# Patient Record
Sex: FEMALE | Race: WHITE | Employment: FULL TIME | URBAN - METROPOLITAN AREA
[De-identification: names, ages, dates, MRNs, and addresses within clinical notes are randomized per-mention and may not be internally consistent; named-entity substitution may affect disease eponyms.]

---

## 2011-01-02 LAB — COLONOSCOPY, EXTERNAL: NORMAL

## 2013-08-08 LAB — MAMMOGRAPHY, EXTERNAL: NORMAL

## 2017-01-03 DIAGNOSIS — E11.65 UNCONTROLLED TYPE 2 DIABETES MELLITUS WITH HYPERGLYCEMIA, WITH LONG-TERM CURRENT USE OF INSULIN (HCC): Primary | ICD-10-CM

## 2017-01-03 DIAGNOSIS — Z79.4 UNCONTROLLED TYPE 2 DIABETES MELLITUS WITH HYPERGLYCEMIA, WITH LONG-TERM CURRENT USE OF INSULIN (HCC): Primary | ICD-10-CM

## 2017-01-03 NOTE — TELEPHONE ENCOUNTER
Patient called stating she received letter stating that Trulicity requires prior auth with [facility]. Please advise patient. [phone]. Patient will need by Sunday.

## 2017-01-05 DIAGNOSIS — Z79.4 TYPE 2 DIABETES MELLITUS WITH HYPERGLYCEMIA, WITH LONG-TERM CURRENT USE OF INSULIN (HCC): Primary | ICD-10-CM

## 2017-01-05 DIAGNOSIS — E11.65 TYPE 2 DIABETES MELLITUS WITH HYPERGLYCEMIA, WITH LONG-TERM CURRENT USE OF INSULIN (HCC): Primary | ICD-10-CM

## 2017-01-05 NOTE — TELEPHONE ENCOUNTER
----- Message from Dom Ayala sent at 1/5/2017  1:02 PM EST -----  Regarding: Dr. Jeff Perry [phone]   Pt would like a call back from Dr. Elzbieta Mendez concerning information she received from [facility] advising an order for \"Bydureon\" Rx was sent to them. Pt stated, she has never heard of the medication and is unsure if it is needed. In addition the Rx is expensive and would like to know if there is an alternative Rx.

## 2017-01-10 NOTE — TELEPHONE ENCOUNTER
[facility] said [facility] will cover BYureon instead of Trulicity which is a similar medicine, may be due to high deductible the cost is $300 - this is just a thought and she can check with her insurance    The reason I wanted her to be on those meds to help with DM aswell as weight loss    If expensive stop Bydureon , Trulicity - change in [facility]    Start Metformin 1 tab daily for a week then 1 tab  twice daily - not as strong as above meds for weight loss

## 2017-01-10 NOTE — TELEPHONE ENCOUNTER
Informed pt that Trulicity is not covered by her insurance and Bydureon was sent to pharmacy. Pt states Bydureon will cost over $300 and she can not afford it. . She is asking for an alternative medication.

## 2017-01-11 RX ORDER — METFORMIN HYDROCHLORIDE 1000 MG/1
1000 TABLET ORAL 2 TIMES DAILY WITH MEALS
Qty: 180 TAB | Refills: 3 | Status: SHIPPED | OUTPATIENT
Start: 2017-01-11 | End: 2017-02-27 | Stop reason: SDUPTHER

## 2017-01-11 NOTE — TELEPHONE ENCOUNTER
Spoke to pt and informed her that we received a letter from [facility] stating Arnaldo Boone is not covered. Asked pt if she could call her insurance to see which medication is preferred or if she like per Dr. Candice Tijerina she can try metfromin. Pt agreed. Will send metformin to pt's pharmacy. Pt verbalized understanding with no further questions or concerns at this time.

## 2017-02-27 ENCOUNTER — OFFICE VISIT (OUTPATIENT)
Dept: ENDOCRINOLOGY | Age: 57
End: 2017-02-27

## 2017-02-27 VITALS
SYSTOLIC BLOOD PRESSURE: 100 MMHG | HEIGHT: 62 IN | BODY MASS INDEX: 38.16 KG/M2 | WEIGHT: 207.4 LBS | HEART RATE: 93 BPM | DIASTOLIC BLOOD PRESSURE: 54 MMHG | TEMPERATURE: 97.8 F | RESPIRATION RATE: 16 BRPM

## 2017-02-27 DIAGNOSIS — Z79.4 TYPE 2 DIABETES MELLITUS WITH HYPERGLYCEMIA, WITH LONG-TERM CURRENT USE OF INSULIN (HCC): Primary | ICD-10-CM

## 2017-02-27 DIAGNOSIS — E11.65 TYPE 2 DIABETES MELLITUS WITH HYPERGLYCEMIA, WITH LONG-TERM CURRENT USE OF INSULIN (HCC): Primary | ICD-10-CM

## 2017-02-27 DIAGNOSIS — I10 ESSENTIAL HYPERTENSION: ICD-10-CM

## 2017-02-27 DIAGNOSIS — E78.2 MIXED HYPERLIPIDEMIA: ICD-10-CM

## 2017-02-27 LAB
GLUCOSE POC: 254 MG/DL
HBA1C MFR BLD HPLC: 10.7 %

## 2017-02-27 RX ORDER — DULAGLUTIDE 0.75 MG/.5ML
INJECTION, SOLUTION SUBCUTANEOUS
COMMUNITY
Start: 2017-02-22 | End: 2017-02-27 | Stop reason: SDUPTHER

## 2017-02-27 RX ORDER — GABAPENTIN 100 MG/1
CAPSULE ORAL
COMMUNITY
Start: 2017-02-09 | End: 2019-01-12

## 2017-02-27 RX ORDER — METFORMIN HYDROCHLORIDE 750 MG/1
750 TABLET, EXTENDED RELEASE ORAL 2 TIMES DAILY
Qty: 180 TAB | Refills: 3 | Status: SHIPPED | OUTPATIENT
Start: 2017-02-27 | End: 2017-03-06 | Stop reason: SDUPTHER

## 2017-02-27 NOTE — PROGRESS NOTES
Inna Neighbor is a 61 y.o. female here for   Chief Complaint   Patient presents with    Diabetes     f/u    Cholesterol Problem     f/u    Hypertension     f/u       Functional glucose monitor and record keeping system? - yes  Eye exam within last year? - no  Foot exam within last year? - yes Feb 2017    Lab Results   Component Value Date/Time    Hemoglobin A1c (POC) 13.4 11/09/2016 03:12 PM       Wt Readings from Last 3 Encounters:   11/09/16 205 lb (93 kg)   09/13/13 199 lb 2 oz (90.3 kg)   06/26/12 195 lb (88.5 kg)     Temp Readings from Last 3 Encounters:   11/09/16 98 °F (36.7 °C) (Oral)   09/13/13 99 °F (37.2 °C)   06/29/12 99 °F (37.2 °C)     BP Readings from Last 3 Encounters:   11/09/16 165/77   09/13/13 128/64   06/29/12 147/78     Pulse Readings from Last 3 Encounters:   11/09/16 66   09/13/13 87   06/29/12 84

## 2017-02-27 NOTE — PROGRESS NOTES
[facility] AND ENDOCRINOLOGY               Gianna Vieira MD        [address] 78 [phone] Fax [phone]               Patient Information  Date:2/27/2017  Name : Andreia Tao 61 y.o.     YOB: 1960         PCP : Matt Mckeon MD         Chief Complaint   Patient presents with    Diabetes     f/u    Cholesterol Problem     f/u    Hypertension     f/u       History of Present Illness: Andreia Tao is a 61 y.o. female here for fu of  Type 2 Diabetes Mellitus. She was managed by Dr. Zachery Slade, endocrinologist.  She has longstanding history of diabetes which was uncontrolled. She has underlying obstructive sleep apnea and hypertension. She has microvascular disease. She CABG in 2009. She is checking blood glucose 2- 3 times daily    - 250   Missing after noon insulin   She was not able to get Trulicity for a while    No hypoglycemia. She has polyuria, polydipsia and weakness.           Wt Readings from Last 3 Encounters:   02/27/17 207 lb 6.4 oz (94.1 kg)   11/09/16 205 lb (93 kg)   09/13/13 199 lb 2 oz (90.3 kg)       BP Readings from Last 3 Encounters:   02/27/17 100/54   11/09/16 165/77   09/13/13 128/64           Past Medical History:   Diagnosis Date    Arthritis     CAD (coronary artery disease) 2009    Hx of 2 MI's and then CABG 5/2009    Diabetes ([facility] [address]     IDDM type 3    Dyslipidemia     Hypertension     Other ill-defined conditions(799.89)     gastroparesis     Unspecified sleep apnea     no cpap repeat study sched for 9/20/13     Current Outpatient Prescriptions   Medication Sig    gabapentin (NEURONTIN) 100 mg capsule     glucose blood VI test strips (ONETOUCH ULTRA TEST) strip Test blood glucose 3 times daily Dx Code: E11.65    lispro-protamine & lispro (HUMALOG 50-50 MIX) 100 unit/mL (50-50) inpn Inject 60 units before each meal    isosorbide mononitrate ER (IMDUR) 60 mg CR tablet Take 60 mg by mouth every morning.  cholecalciferol, vitamin D3, (VITAMIN D3) 2,000 unit Tab Take 1 Tab by mouth daily.  aspirin 81 mg chewable tablet Take 81 mg by mouth nightly.  carvedilol (COREG) 12.5 mg tablet Take 25 mg by mouth daily.  clopidogrel (PLAVIX) 75 mg tablet Take 75 mg by mouth daily.  furosemide (LASIX) 40 mg tablet Take 40 mg by mouth daily.  digoxin (LANOXIN) 0.25 mg tablet Take 0.25 mg by mouth daily.  atorvastatin (LIPITOR) 20 mg tablet Take 20 mg by mouth Daily (before breakfast).  enalapril (VASOTEC) 10 mg tablet Take 20 mg by mouth daily.  potassium chloride (KLOR-CON M20) 20 mEq tablet Take 20 mEq by mouth daily.  dulaglutide (TRULICITY) 1.5 AS/6.6 mL sub-q pen 0.5 mL by SubCUTAneous route every seven (7) days.  metFORMIN ER (GLUCOPHAGE XR) 750 mg tablet Take 1 Tab by mouth two (2) times a day. Stop Metformin 1000 mg     No current facility-administered medications for this visit. No Known Allergies      Review of Systems:  - Constitutional Symptoms: no fevers, no chills, no weight loss  - Eyes: no blurry vision no double vision  - Cardiovascular: no chest pain ,no palpitations  - Respiratory: no cough no shortness of breath  - Gastrointestinal: no dysphagia no  abdominal pain  - Musculoskeletal: no joint pains +  weakness  - Integumentary: no rashes  - Neurological: no numbness, tingling, + headaches  - Psychiatric: no depression no  anxiety  - Endocrine: no heat or cold intolerance    Physical Examination:   Blood pressure 100/54, pulse 93, temperature 97.8 °F (36.6 °C), temperature source Oral, resp. rate 16, height 5' 2\" (1.575 m), weight 207 lb 6.4 oz (94.1 kg), last menstrual period 06/25/2005. Estimated body mass index is 37.93 kg/(m^2) as calculated from the following:    Height as of this encounter: 5' 2\" (1.575 m). -   Weight as of this encounter: 207 lb 6.4 oz (94.1 kg).   - General: pleasant, no distress, good eye contact  - HEENT: no pallor, no periorbital edema, EOMI  - Neck: supple, no thyromegaly, no nodules  - Cardiovascular: regular, normal rate, normal S1 and S2, no murmurs  - Respiratory: clear to auscultation bilaterally  - Gastrointestinal: soft, nontender, nondistended,  BS +  - Musculoskeletal: no proximal muscle weakness in upper or lower extremities  - Integumentary: no acanthosis nigricans,no edema,   - Neurological:alert and oriented  - Psychiatric: normal mood and affect  - Skin: color, texture, turgor normal.       Data Reviewed:     [] Glucose records reviewed. [] See glucose records for details (to be scanned). [] A1C  [] Reviewed labs      Assessment/Plan:     1. Type 2 diabetes mellitus with hyperglycemia, with long-term current use of insulin ([address]    2. Mixed hyperlipidemia    3. Essential hypertension        1. Type 2 Diabetes Mellitus   Lab Results   Component Value Date/Time    Hemoglobin A1c (POC) 10.7 02/27/2017 01:42 PM     She has not seen Gail Miranda MD and has not had any recent blood work. She has signs of insulin resistance ,DM  poorly controlled   A1C has improved to 10.7 from 14     Truclity 1.5 mg weekly. Metformin. Premixed insulin   Discussed the importance of checking home glucose regularly and taking all of their scheduled medications in order to have the best possible outcome. Reviewed the complications and importance of diet. FLU annually ,Pneumovax ,aspirin daily,annual eye exam,microalbumin    2. HTN : Continue current therapy     3. Hyperlipidemia : Continue statin. 4.Obesity:Body mass index is 37.93 kg/(m^2). Discussed about the importance of exercise and carbohydrate portion control. She has a chart dx of Gastroparesis - did not have Gastric emptying study , doing well on GLP1, so less likely she has gastroparesis      There are no Patient Instructions on file for this visit. Follow-up Disposition:  Return in about 3 months (around 5/27/2017).     Thank you for allowing me to participate in the care of this patient.     Malcolm Ortega MD      Patient verbalized understanding

## 2017-02-27 NOTE — MR AVS SNAPSHOT
Visit Information Date & Time Provider Department Dept. Phone Encounter #  
 2/27/2017  1:30 PM Ar Anders MD [facility] Diabetes & Endocrinology [phone] [id number] Follow-up Instructions Return in about 3 months (around 5/27/2017). Upcoming Health Maintenance Date Due Hepatitis C Screening 1960 LIPID PANEL Q1 1960 FOOT EXAM Q1 8/19/1969 EYE EXAM RETINAL OR DILATED Q1 8/19/1969 Pneumococcal 19-64 Medium Risk (1 of 1 - PPSV23) 8/19/1978 DTaP/Tdap/Td series (1 - Tdap) 8/19/1980 PAP AKA CERVICAL CYTOLOGY 8/19/1980 BREAST CANCER SCRN MAMMOGRAM 8/19/2009 FOBT Q 1 YEAR AGE 50-75 8/19/2009 INFLUENZA AGE 9 TO ADULT 8/1/2016 HEMOGLOBIN A1C Q6M 5/9/2017 MICROALBUMIN Q1 11/9/2017 Allergies as of 2/27/2017  Review Complete On: 11/9/2016 By: Ar Anders MD  
 No Known Allergies Current Immunizations  Never Reviewed No immunizations on file. Not reviewed this visit You Were Diagnosed With   
  
 Codes Comments Type 2 diabetes mellitus with hyperglycemia, with long-term current use of insulin (HCC)    -  Primary ICD-10-CM: E11.65, Z79.4 ICD-9-CM: 250.00, 790.29, V58.67 Mixed hyperlipidemia     ICD-10-CM: E78.2 ICD-9-CM: 272.2 Essential hypertension     ICD-10-CM: I10 
ICD-9-CM: 401.9 Vitals BP  
  
  
  
  
  
 100/54 (BP 1 Location: Left arm, BP Patient Position: Sitting) BMI and BSA Data Body Mass Index Body Surface Area  
 37.93 kg/m 2 2.03 m 2 Preferred Pharmacy Pharmacy Name Phone [address] [facility] [phone] Your Updated Medication List  
  
   
This list is accurate as of: 2/27/17  2:25 PM.  Always use your most recent med list.  
  
  
  
  
 aspirin 81 mg chewable tablet Take 81 mg by mouth nightly. carvedilol 12.5 mg tablet Commonly known as:  Milo Kirk Take 25 mg by mouth daily. digoxin 0.25 mg tablet Commonly known as:  LANOXIN Take 0.25 mg by mouth daily. enalapril 10 mg tablet Commonly known as:  Alexandre Cole Take 20 mg by mouth daily. furosemide 40 mg tablet Commonly known as:  LASIX Take 40 mg by mouth daily. gabapentin 100 mg capsule Commonly known as:  NEURONTIN  
  
 glucose blood VI test strips strip Commonly known as:  ONETOUCH ULTRA TEST Test blood glucose 3 times daily Dx Code: E11.65  
  
 isosorbide mononitrate ER 60 mg CR tablet Commonly known as:  IMDUR Take 60 mg by mouth every morning. KLOR-CON M20 20 mEq tablet Generic drug:  potassium chloride Take 20 mEq by mouth daily. LIPITOR 20 mg tablet Generic drug:  atorvastatin Take 20 mg by mouth Daily (before breakfast). lispro-protamine & lispro 100 unit/mL (50-50) Inpn Commonly known as:  HUMALOG 50-50 MIX Inject 60 units before each meal  
  
 metFORMIN 1,000 mg tablet Commonly known as:  GLUCOPHAGE Take 1 Tab by mouth two (2) times daily (with meals). PLAVIX 75 mg Tab Generic drug:  clopidogrel Take 75 mg by mouth daily. TRULICITY 4.70 CI/2.2 mL sub-q pen Generic drug:  dulaglutide VITAMIN D3 2,000 unit Tab Generic drug:  cholecalciferol (vitamin D3) Take 1 Tab by mouth daily. We Performed the Following AMB POC GLUCOSE, QUANTITATIVE, BLOOD [44532 CPT(R)] AMB POC HEMOGLOBIN A1C [78396 CPT(R)] Follow-up Instructions Return in about 3 months (around 5/27/2017). Introducing [facility] & HEALTH SERVICES! Kathia Singh introduces [employer] patient portal. Now you can access parts of your medical record, email your doctor's office, and request medication refills online. 1. In your internet browser, go to [email or web address] 2. Click on the First Time User? Click Here link in the Sign In box. You will see the New Member Sign Up page. 3. Enter your [employer] Access Code exactly as it appears below. You will not need to use this code after youve completed the sign-up process. If you do not sign up before the expiration date, you must request a new code. · [employer] Access Code: [email or web address] Expires: 5/28/2017  2:25 PM 
 
4. Enter the last four digits of your Social Security Number (xxxx) and Date of Birth (mm/dd/yyyy) as indicated and click Submit. You will be taken to the next sign-up page. 5. Create a [employer] ID. This will be your [employer] login ID and cannot be changed, so think of one that is secure and easy to remember. 6. Create a [employer] password. You can change your password at any time. 7. Enter your Password Reset Question and Answer. This can be used at a later time if you forget your password. 8. Enter your e-mail address. You will receive e-mail notification when new information is available in [address] 9. Click Sign Up. You can now view and download portions of your medical record. 10. Click the Download Summary menu link to download a portable copy of your medical information. If you have questions, please visit the Frequently Asked Questions section of the [employer] website. Remember, [employer] is NOT to be used for urgent needs. For medical emergencies, dial 911. Now available from your iPhone and Android! Please provide this summary of care documentation to your next provider. Your primary care clinician is listed as [facility]. If you have any questions after today's visit, please call [phone].

## 2017-03-06 DIAGNOSIS — E11.65 TYPE 2 DIABETES MELLITUS WITH HYPERGLYCEMIA, WITH LONG-TERM CURRENT USE OF INSULIN (HCC): ICD-10-CM

## 2017-03-06 DIAGNOSIS — Z79.4 TYPE 2 DIABETES MELLITUS WITH HYPERGLYCEMIA, WITH LONG-TERM CURRENT USE OF INSULIN (HCC): ICD-10-CM

## 2017-03-06 RX ORDER — METFORMIN HYDROCHLORIDE 750 MG/1
750 TABLET, EXTENDED RELEASE ORAL 2 TIMES DAILY
Qty: 180 TAB | Refills: 3 | Status: SHIPPED | OUTPATIENT
Start: 2017-03-06 | End: 2017-05-31 | Stop reason: ALTCHOICE

## 2017-03-06 NOTE — TELEPHONE ENCOUNTER
Patient called stating that [facility] to [facility].   Please send [facility]

## 2017-05-31 ENCOUNTER — OFFICE VISIT (OUTPATIENT)
Dept: ENDOCRINOLOGY | Age: 57
End: 2017-05-31

## 2017-05-31 VITALS
HEART RATE: 86 BPM | HEIGHT: 62 IN | WEIGHT: 209.2 LBS | RESPIRATION RATE: 16 BRPM | SYSTOLIC BLOOD PRESSURE: 124 MMHG | OXYGEN SATURATION: 99 % | DIASTOLIC BLOOD PRESSURE: 63 MMHG | BODY MASS INDEX: 38.5 KG/M2 | TEMPERATURE: 97.5 F

## 2017-05-31 DIAGNOSIS — E11.65 TYPE 2 DIABETES MELLITUS WITH HYPERGLYCEMIA, WITH LONG-TERM CURRENT USE OF INSULIN (HCC): Primary | ICD-10-CM

## 2017-05-31 DIAGNOSIS — I10 ESSENTIAL HYPERTENSION: ICD-10-CM

## 2017-05-31 DIAGNOSIS — E78.2 MIXED HYPERLIPIDEMIA: ICD-10-CM

## 2017-05-31 DIAGNOSIS — Z79.4 TYPE 2 DIABETES MELLITUS WITH HYPERGLYCEMIA, WITH LONG-TERM CURRENT USE OF INSULIN (HCC): Primary | ICD-10-CM

## 2017-05-31 LAB
GLUCOSE POC: 233 MG/DL
HBA1C MFR BLD HPLC: 11 %

## 2017-05-31 RX ORDER — PREGABALIN 50 MG/1
50 CAPSULE ORAL DAILY
COMMUNITY
End: 2019-01-12

## 2017-05-31 RX ORDER — SYRINGE AND NEEDLE,INSULIN,1ML 31GX15/64"
1 SYRINGE, EMPTY DISPOSABLE MISCELLANEOUS 3 TIMES DAILY
Qty: 100 PEN NEEDLE | Refills: 11 | Status: SHIPPED | OUTPATIENT
Start: 2017-05-31 | End: 2020-09-21

## 2017-05-31 NOTE — MR AVS SNAPSHOT
Visit Information Date & Time Provider Department Dept. Phone Encounter #  
 5/31/2017  2:45 PM Nazia Foster MD [facility] [phone] [id number] Follow-up Instructions Return in about 3 months (around 8/31/2017). Upcoming Health Maintenance Date Due Hepatitis C Screening 1960 LIPID PANEL Q1 1960 FOOT EXAM Q1 8/19/1969 EYE EXAM RETINAL OR DILATED Q1 8/19/1969 Pneumococcal 19-64 Medium Risk (1 of 1 - PPSV23) 8/19/1978 DTaP/Tdap/Td series (1 - Tdap) 8/19/1980 PAP AKA CERVICAL CYTOLOGY 8/19/1980 BREAST CANCER SCRN MAMMOGRAM 8/19/2009 FOBT Q 1 YEAR AGE 50-75 8/19/2009 INFLUENZA AGE 9 TO ADULT 8/1/2017 HEMOGLOBIN A1C Q6M 8/27/2017 MICROALBUMIN Q1 11/9/2017 Allergies as of 5/31/2017  Review Complete On: 5/31/2017 By: Nazia Foster MD  
 No Known Allergies Current Immunizations  Never Reviewed No immunizations on file. Not reviewed this visit You Were Diagnosed With   
  
 Codes Comments Type 2 diabetes mellitus with hyperglycemia, with long-term current use of insulin (HCC)    -  Primary ICD-10-CM: E11.65, Z79.4 ICD-9-CM: 250.00, 790.29, V58.67 Mixed hyperlipidemia     ICD-10-CM: E78.2 ICD-9-CM: 272.2 Essential hypertension     ICD-10-CM: I10 
ICD-9-CM: 401.9 Vitals BP Pulse Temp Resp Height(growth percentile) Weight(growth percentile) 124/63 (BP 1 Location: Right arm, BP Patient Position: Sitting) 86 97.5 °F (36.4 °C) (Oral) 16 5' 2\" (1.575 m) 209 lb 3.2 oz (94.9 kg) LMP SpO2 BMI OB Status Smoking Status 06/25/2005 99% 38.26 kg/m2 Hysterectomy Never Smoker Vitals History BMI and BSA Data Body Mass Index Body Surface Area  
 38.26 kg/m 2 2.04 m 2 Preferred Pharmacy Pharmacy Name Phone [address] [facility] [phone] Your Updated Medication List  
  
   
 This list is accurate as of: 5/31/17  3:08 PM.  Always use your most recent med list.  
  
  
  
  
 aspirin 81 mg chewable tablet Take 81 mg by mouth nightly. carvedilol 12.5 mg tablet Commonly known as:  Costa Cocker Take 25 mg by mouth daily. digoxin 0.25 mg tablet Commonly known as:  LANOXIN Take 0.25 mg by mouth daily. dulaglutide 1.5 mg/0.5 mL sub-q pen Commonly known as:  TRULICITY  
0.5 mL by SubCUTAneous route every seven (7) days. enalapril 10 mg tablet Commonly known as:  Shara Dill Take 20 mg by mouth daily. furosemide 40 mg tablet Commonly known as:  LASIX Take 40 mg by mouth daily. gabapentin 100 mg capsule Commonly known as:  NEURONTIN  
  
 glucose blood VI test strips strip Commonly known as:  ONETOUCH ULTRA TEST Test blood glucose 3 times daily Dx Code: E11.65  
  
 isosorbide mononitrate ER 60 mg CR tablet Commonly known as:  IMDUR Take 60 mg by mouth every morning. KLOR-CON M20 20 mEq tablet Generic drug:  potassium chloride Take 20 mEq by mouth daily. LIPITOR 20 mg tablet Generic drug:  atorvastatin Take 20 mg by mouth Daily (before breakfast). lispro-protamine & lispro 100 unit/mL (50-50) Inpn Commonly known as:  HUMALOG 50-50 MIX Inject 60 units before each meal  
  
 LYRICA 50 mg capsule Generic drug:  pregabalin Take 50 mg by mouth daily. metFORMIN  mg tablet Commonly known as:  GLUCOPHAGE XR Take 1 Tab by mouth two (2) times a day. Stop Metformin 1000 mg PLAVIX 75 mg Tab Generic drug:  clopidogrel Take 75 mg by mouth daily. VITAMIN D3 2,000 unit Tab Generic drug:  cholecalciferol (vitamin D3) Take 1 Tab by mouth daily. We Performed the Following AMB POC GLUCOSE, QUANTITATIVE, BLOOD [93374 CPT(R)] AMB POC HEMOGLOBIN A1C [82203 CPT(R)] Follow-up Instructions Return in about 3 months (around 8/31/2017). Patient Instructions Novolin 70/30  70 units before each meal  
 
Trulicity weekly Introducing \[facility]\"" & HEALTH SERVICES! [place] [employer] introduces [place] patient portal. Now you can access parts of your medical record, email your doctor's office, and request medication refills online. 1. In your internet browser, go to [email or web address] 2. Click on the First Time User? Click Here link in the Sign In box. You will see the New Member Sign Up page. 3. Enter your [place] Access Code exactly as it appears below. You will not need to use this code after youve completed the sign-up process. If you do not sign up before the expiration date, you must request a new code. · [place] Access Code: [email or web address] Expires: 8/29/2017  3:08 PM 
 
4. Enter the last four digits of your Social Security Number (xxxx) and Date of Birth (mm/dd/yyyy) as indicated and click Submit. You will be taken to the next sign-up page. 5. Create a [place] ID. This will be your [place] login ID and cannot be changed, so think of one that is secure and easy to remember. 6. Create a [place] password. You can change your password at any time. 7. Enter your Password Reset Question and Answer. This can be used at a later time if you forget your password. 8. Enter your e-mail address. You will receive e-mail notification when new information is available in [address] 9. Click Sign Up. You can now view and download portions of your medical record. 10. Click the Download Summary menu link to download a portable copy of your medical information. If you have questions, please visit the Frequently Asked Questions section of the [employer] website. Remember, [employer] [place] is NOT to be used for urgent needs. For medical emergencies, dial 911. Now available from your iPhone and Android! Please provide this summary of care documentation to your next provider. Your primary care clinician is listed as [facility].  If you have any questions after today's visit, please call [phone].

## 2017-05-31 NOTE — PROGRESS NOTES
[facility]               Radha Luis MD        [address] [address] SR:[phone] Fax [phone]               Patient Information  Date:5/31/2017  Name : Killian Warren 61 y.o.     YOB: 1960         PCP : Demond England MD         Chief Complaint   Patient presents with    Diabetes       History of Present Illness: Killian Warren is a 61 y.o. female here for fu of  Type 2 Diabetes Mellitus. She was managed by Dr. Camryn Abarca, endocrinologist.  She has longstanding history of diabetes which was uncontrolled. She has underlying obstructive sleep apnea and hypertension. She has microvascular disease. She CABG in 2009. She is checking blood glucose 2- 3 times daily    - 250   Missing after noon insulin     She did not bring the log,no improvement in the glucose  Sometime she  Takes 60 units of premixed insulin, does not eat and after 2 hours the blood glucose is still high      No hypoglycemia. She has polyuria, polydipsia and weakness. Wt Readings from Last 3 Encounters:   05/31/17 209 lb 3.2 oz (94.9 kg)   02/27/17 207 lb 6.4 oz (94.1 kg)   11/09/16 205 lb (93 kg)       BP Readings from Last 3 Encounters:   05/31/17 124/63   02/27/17 100/54   11/09/16 165/77           Past Medical History:   Diagnosis Date    Arthritis     CAD (coronary artery disease) 2009    Hx of 2 MI's and then CABG 5/2009    Diabetes ([facility] [address]     IDDM type 3    Dyslipidemia     Hypertension     Other ill-defined conditions(799.89)     gastroparesis     Unspecified sleep apnea     no cpap repeat study sched for 9/20/13     Current Outpatient Prescriptions   Medication Sig    pregabalin (LYRICA) 50 mg capsule Take 50 mg by mouth daily.  dulaglutide (TRULICITY) 1.5 RM/8.2 mL sub-q pen 0.5 mL by SubCUTAneous route every seven (7) days.     glucose blood VI test strips (ONETOUCH ULTRA TEST) strip Test blood glucose 3 times daily Dx Code: E11.65    isosorbide mononitrate ER (IMDUR) 60 mg CR tablet Take 60 mg by mouth every morning.  cholecalciferol, vitamin D3, (VITAMIN D3) 2,000 unit Tab Take 1 Tab by mouth daily.  aspirin 81 mg chewable tablet Take 81 mg by mouth nightly.  carvedilol (COREG) 12.5 mg tablet Take 25 mg by mouth daily.  clopidogrel (PLAVIX) 75 mg tablet Take 75 mg by mouth daily.  furosemide (LASIX) 40 mg tablet Take 40 mg by mouth daily.  digoxin (LANOXIN) 0.25 mg tablet Take 0.25 mg by mouth daily.  atorvastatin (LIPITOR) 20 mg tablet Take 20 mg by mouth Daily (before breakfast).  enalapril (VASOTEC) 10 mg tablet Take 20 mg by mouth daily.  potassium chloride (KLOR-CON M20) 20 mEq tablet Take 20 mEq by mouth daily.  insulin NPH/insulin regular (NOVOLIN 70/30) 100 unit/mL (70-30) injection Inject 70 units three (3) times daily    dapagliflozin-metformin (XIGDUO XR) 5-1,000 mg TBph Take 1 Tab by mouth two (2) times daily (with meals). Stop Metformin    insulin syringe-needle U-100 1 mL 31 gauge x 15/64\" syrg 1 Syringe by Does Not Apply route three (3) times daily.  gabapentin (NEURONTIN) 100 mg capsule      No current facility-administered medications for this visit. No Known Allergies      Review of Systems:  -   - Respiratory: no cough no shortness of breath  - Gastrointestinal: no dysphagia no  abdominal pain  - Musculoskeletal: no joint pains +  weakness  - Integumentary: no rashes  - Neurological: no numbness, tingling, + headaches  - Psychiatric: no depression no  anxiety  - Endocrine: no heat or cold intolerance    Physical Examination:   Blood pressure 124/63, pulse 86, temperature 97.5 °F (36.4 °C), temperature source Oral, resp. rate 16, height 5' 2\" (1.575 m), weight 209 lb 3.2 oz (94.9 kg), last menstrual period 06/25/2005, SpO2 99 %. Estimated body mass index is 38.26 kg/(m^2) as calculated from the following:    Height as of this encounter: 5' 2\" (1.575 m).   - Weight as of this encounter: 209 lb 3.2 oz (94.9 kg). - General: pleasant, no distress, good eye contact  - HEENT: no pallor, no periorbital edema, EOMI  - Neck: supple, no thyromegaly, no nodules  - Cardiovascular: regular, normal rate, normal S1 and S2, no murmurs  - Respiratory: clear to auscultation bilaterally  - Gastrointestinal: soft, nontender, nondistended,  BS +  - Musculoskeletal: no proximal muscle weakness in upper or lower extremities  - Integumentary: no acanthosis nigricans,no edema,   - Neurological:alert and oriented  - Psychiatric: normal mood and affect  - Skin: color, texture, turgor normal.       Data Reviewed:     [] Glucose records reviewed. [] See glucose records for details (to be scanned). [] A1C  [] Reviewed labs      Assessment/Plan:     1. Type 2 diabetes mellitus with hyperglycemia, with long-term current use of insulin ([address]    2. Mixed hyperlipidemia    3. Essential hypertension        1. Type 2 Diabetes Mellitus   Lab Results   Component Value Date/Time    Hemoglobin A1c (POC) 11 05/31/2017 02:38 PM   She has signs of insulin resistance ,DM  poorly controlled     Truclity 1.5 mg weekly. Metformin. Xigduo  Novolin 70/30 70 units before each meal, changed the needle size to 6 MM,she has limited subcutaneous fat    FLU annually ,Pneumovax ,aspirin daily,annual eye exam,microalbumin    2. HTN : Continue current therapy     3. Hyperlipidemia : Continue statin. 4.Obesity:Body mass index is 38.26 kg/(m^2). Discussed about the importance of exercise and carbohydrate portion control. She has a chart dx of Gastroparesis - did not have Gastric emptying study , doing well on GLP1, so less likely she has gastroparesis      Patient Instructions   Novolin 70/30  70 units before each meal     Trulicity weekly     Follow-up Disposition:  Return in about 3 months (around 8/31/2017). Thank you for allowing me to participate in the care of this patient.     Avery Esparza, MD      Patient verbalized understanding

## 2017-05-31 NOTE — PROGRESS NOTES
Anup Prado is a 61 y.o. female here for   Chief Complaint   Patient presents with    Diabetes       Functional glucose monitor and record keeping system? - yes  Eye exam within last year? - no  Foot exam within last year? - yes, Feb 2017    Lab Results   Component Value Date/Time    Hemoglobin A1c (POC) 10.7 02/27/2017 01:42 PM       Wt Readings from Last 3 Encounters:   02/27/17 207 lb 6.4 oz (94.1 kg)   11/09/16 205 lb (93 kg)   09/13/13 199 lb 2 oz (90.3 kg)     Temp Readings from Last 3 Encounters:   02/27/17 97.8 °F (36.6 °C) (Oral)   11/09/16 98 °F (36.7 °C) (Oral)   09/13/13 99 °F (37.2 °C)     BP Readings from Last 3 Encounters:   02/27/17 100/54   11/09/16 165/77   09/13/13 128/64     Pulse Readings from Last 3 Encounters:   02/27/17 93   11/09/16 66   09/13/13 87

## 2017-12-18 ENCOUNTER — OFFICE VISIT (OUTPATIENT)
Dept: ENDOCRINOLOGY | Age: 57
End: 2017-12-18

## 2017-12-18 VITALS
WEIGHT: 207.8 LBS | OXYGEN SATURATION: 97 % | BODY MASS INDEX: 38.24 KG/M2 | HEART RATE: 87 BPM | RESPIRATION RATE: 14 BRPM | HEIGHT: 62 IN | TEMPERATURE: 98.3 F | DIASTOLIC BLOOD PRESSURE: 74 MMHG | SYSTOLIC BLOOD PRESSURE: 142 MMHG

## 2017-12-18 DIAGNOSIS — I10 ESSENTIAL HYPERTENSION: ICD-10-CM

## 2017-12-18 DIAGNOSIS — Z79.4 TYPE 2 DIABETES MELLITUS WITH HYPERGLYCEMIA, WITH LONG-TERM CURRENT USE OF INSULIN (HCC): ICD-10-CM

## 2017-12-18 DIAGNOSIS — Z79.4 TYPE 2 DIABETES MELLITUS WITH HYPERGLYCEMIA, WITH LONG-TERM CURRENT USE OF INSULIN (HCC): Primary | ICD-10-CM

## 2017-12-18 DIAGNOSIS — E78.2 MIXED HYPERLIPIDEMIA: ICD-10-CM

## 2017-12-18 DIAGNOSIS — E11.65 TYPE 2 DIABETES MELLITUS WITH HYPERGLYCEMIA, WITH LONG-TERM CURRENT USE OF INSULIN (HCC): Primary | ICD-10-CM

## 2017-12-18 DIAGNOSIS — E11.65 TYPE 2 DIABETES MELLITUS WITH HYPERGLYCEMIA, WITH LONG-TERM CURRENT USE OF INSULIN (HCC): ICD-10-CM

## 2017-12-18 LAB
GLUCOSE POC: 107 MG/DL
HBA1C MFR BLD HPLC: 9.4 %

## 2017-12-18 RX ORDER — PRAVASTATIN SODIUM 20 MG/1
20 TABLET ORAL
COMMUNITY
End: 2019-01-12

## 2017-12-18 RX ORDER — EZETIMIBE 10 MG/1
TABLET ORAL
COMMUNITY
End: 2019-01-12

## 2017-12-18 NOTE — MR AVS SNAPSHOT
Visit Information Date & Time Provider Department Dept. Phone Encounter #  
 12/18/2017  2:45 PM Panchito Millard MD [facility] Diabetes & Endocrinology [phone] [id number] Follow-up Instructions Return in about 4 months (around 4/18/2018). Upcoming Health Maintenance Date Due Hepatitis C Screening 1960 LIPID PANEL Q1 1960 FOOT EXAM Q1 8/19/1969 EYE EXAM RETINAL OR DILATED Q1 8/19/1969 Pneumococcal 19-64 Medium Risk (1 of 1 - PPSV23) 8/19/1978 DTaP/Tdap/Td series (1 - Tdap) 8/19/1980 PAP AKA CERVICAL CYTOLOGY 8/19/1980 FOBT Q 1 YEAR AGE 50-75 8/19/2009 Influenza Age 5 to Adult 8/1/2017 MICROALBUMIN Q1 11/9/2017 HEMOGLOBIN A1C Q6M 11/30/2017 Allergies as of 12/18/2017  Review Complete On: 12/18/2017 By: Panchito Millard MD  
 No Known Allergies Current Immunizations  Never Reviewed No immunizations on file. Not reviewed this visit You Were Diagnosed With   
  
 Codes Comments Type 2 diabetes mellitus with hyperglycemia, with long-term current use of insulin (HCC)    -  Primary ICD-10-CM: E11.65, Z79.4 ICD-9-CM: 250.00, 790.29, V58.67 Mixed hyperlipidemia     ICD-10-CM: E78.2 ICD-9-CM: 272.2 Essential hypertension     ICD-10-CM: I10 
ICD-9-CM: 401.9 Vitals BP Pulse Temp Resp Height(growth percentile) Weight(growth percentile) 142/74 (BP 1 Location: Left arm, BP Patient Position: Sitting) 87 98.3 °F (36.8 °C) (Oral) 14 5' 2\" (1.575 m) 207 lb 12.8 oz (94.3 kg) LMP SpO2 BMI OB Status Smoking Status 06/25/2005 97% 38.01 kg/m2 Hysterectomy Never Smoker Vitals History BMI and BSA Data Body Mass Index Body Surface Area 38.01 kg/m 2 2.03 m 2 Preferred Pharmacy Pharmacy Name Phone [facility] [address], [address] [phone] Your Updated Medication List  
  
   
 This list is accurate as of: 12/18/17  3:39 PM.  Always use your most recent med list.  
  
  
  
  
 aspirin 81 mg chewable tablet Take 81 mg by mouth nightly. carvedilol 12.5 mg tablet Commonly known as:  Jinx Re Take 25 mg by mouth daily. dapagliflozin-metformin 5-1,000 mg Tbph  
Commonly known as:  XIGDUO XR Take 1 Tab by mouth two (2) times daily (with meals). Stop Metformin  
  
 digoxin 0.25 mg tablet Commonly known as:  LANOXIN Take 0.25 mg by mouth daily. dulaglutide 1.5 mg/0.5 mL sub-q pen Commonly known as:  TRULICITY  
0.5 mL by SubCUTAneous route every seven (7) days. enalapril 10 mg tablet Commonly known as:  Janace Candi Take 20 mg by mouth daily. furosemide 40 mg tablet Commonly known as:  LASIX Take 40 mg by mouth as needed. gabapentin 100 mg capsule Commonly known as:  NEURONTIN  
  
 glucose blood VI test strips strip Commonly known as:  ONETOUCH ULTRA TEST Test blood glucose 3 times daily Dx Code: E11.65  
  
 insulin NPH/insulin regular 100 unit/mL (70-30) injection Commonly known as:  NovoLIN 70/30 Inject 70 units three (3) times daily  
  
 insulin syringe-needle U-100 1 mL 31 gauge x 15/64\" Syrg 1 Syringe by Does Not Apply route three (3) times daily. isosorbide mononitrate ER 60 mg CR tablet Commonly known as:  IMDUR Take 60 mg by mouth every morning. KLOR-CON M20 20 mEq tablet Generic drug:  potassium chloride Take 20 mEq by mouth daily. LIPITOR 20 mg tablet Generic drug:  atorvastatin Take 20 mg by mouth Daily (before breakfast). LYRICA 50 mg capsule Generic drug:  pregabalin Take 50 mg by mouth daily. PLAVIX 75 mg Tab Generic drug:  clopidogrel Take 75 mg by mouth daily. pravastatin 20 mg tablet Commonly known as:  PRAVACHOL Take 20 mg by mouth nightly. VITAMIN D3 2,000 unit Tab Generic drug:  cholecalciferol (vitamin D3) Take 1 Tab by mouth daily. ZETIA 10 mg tablet Generic drug:  ezetimibe Take  by mouth. We Performed the Following AMB POC GLUCOSE, QUANTITATIVE, BLOOD [62444 CPT(R)] AMB POC HEMOGLOBIN A1C [36684 CPT(R)] Follow-up Instructions Return in about 4 months (around 4/18/2018). Introducing [facility]! Lala Rodriguez introduces flatev patient portal. Now you can access parts of your medical record, email your doctor's office, and request medication refills online. 1. In your internet browser, go to [email or web address] 2. Click on the First Time User? Click Here link in the Sign In box. You will see the New Member Sign Up page. 3. Enter your flatev Access Code exactly as it appears below. You will not need to use this code after youve completed the sign-up process. If you do not sign up before the expiration date, you must request a new code. · flatev Access Code: [email or web address] Expires: 3/18/2018  3:39 PM 
 
4. Enter the last four digits of your Social Security Number (xxxx) and Date of Birth (mm/dd/yyyy) as indicated and click Submit. You will be taken to the next sign-up page. 5. Create a flatev ID. This will be your flatev login ID and cannot be changed, so think of one that is secure and easy to remember. 6. Create a flatev password. You can change your password at any time. 7. Enter your Password Reset Question and Answer. This can be used at a later time if you forget your password. 8. Enter your e-mail address. You will receive e-mail notification when new information is available in [address] 9. Click Sign Up. You can now view and download portions of your medical record. 10. Click the Download Summary menu link to download a portable copy of your medical information. If you have questions, please visit the Frequently Asked Questions section of the flatev website.  Remember, flatev is NOT to be used for urgent needs. For medical emergencies, dial 911. Now available from your iPhone and Android! Please provide this summary of care documentation to your next provider. Your primary care clinician is listed as [facility]. If you have any questions after today's visit, please call [phone].

## 2017-12-18 NOTE — PROGRESS NOTES
Sherice Hall is a 61 y.o. female here for   Chief Complaint   Patient presents with    Diabetes       Functional glucose monitor and record keeping system? - yes  Eye exam within last year? - over a yr ago  Foot exam within last year? - Feb 2017    1. Have you been to the ER, urgent care clinic since your last visit? Hospitalized since your last visit? -no    2. Have you seen or consulted any other health care providers outside of the [address] [place] [address] since your last visit? Include any pap smears or colon screening. -PCP      Lab Results   Component Value Date/Time    Hemoglobin A1c (POC) 11 05/31/2017 02:38 PM       Wt Readings from Last 3 Encounters:   05/31/17 209 lb 3.2 oz (94.9 kg)   02/27/17 207 lb 6.4 oz (94.1 kg)   11/09/16 205 lb (93 kg)     Temp Readings from Last 3 Encounters:   05/31/17 97.5 °F (36.4 °C) (Oral)   02/27/17 97.8 °F (36.6 °C) (Oral)   11/09/16 98 °F (36.7 °C) (Oral)     BP Readings from Last 3 Encounters:   05/31/17 124/63   02/27/17 100/54   11/09/16 165/77     Pulse Readings from Last 3 Encounters:   05/31/17 86   02/27/17 93   11/09/16 66

## 2017-12-18 NOTE — PROGRESS NOTES
[facility]               Kimmy Arreola MD        [address] [address] :[phone] Fax [phone]               Patient Information  Date:12/18/2017  Name : Amy Nielsen 61 y.o.     YOB: 1960         PCP : Becky Brasher MD         Chief Complaint   Patient presents with    Diabetes       History of Present Illness: Amy Nielsen is a 61 y.o. female here for fu of  Type 2 Diabetes Mellitus. She was managed by Dr. Louie Lira, endocrinologist.  She has longstanding history of diabetes which was uncontrolled. She has underlying obstructive sleep apnea and hypertension. She has microvascular disease. She CABG in 2009. She is checking blood glucose 2- 3 times daily   She is still missing noon insulin. Reports if she takes the medications and controls the diet her blood glucose is better, recently under a lot of stress and not able to take care of herself. No log or  meter. No hypoglycemia. She has polyuria, polydipsia and weakness. Wt Readings from Last 3 Encounters:   12/18/17 207 lb 12.8 oz (94.3 kg)   05/31/17 209 lb 3.2 oz (94.9 kg)   02/27/17 207 lb 6.4 oz (94.1 kg)       BP Readings from Last 3 Encounters:   12/18/17 142/74   05/31/17 124/63   02/27/17 100/54           Past Medical History:   Diagnosis Date    Arthritis     CAD (coronary artery disease) 2009    Hx of 2 MI's and then CABG 5/2009    Diabetes ([facility] [address]     IDDM type 3    Dyslipidemia     Hypertension     Other ill-defined conditions(799.89)     gastroparesis     Unspecified sleep apnea     no cpap repeat study sched for 9/20/13     Current Outpatient Prescriptions   Medication Sig    pravastatin (PRAVACHOL) 20 mg tablet Take 20 mg by mouth nightly.  ezetimibe (ZETIA) 10 mg tablet Take  by mouth.  pregabalin (LYRICA) 50 mg capsule Take 50 mg by mouth daily.     insulin NPH/insulin regular (NOVOLIN 70/30) 100 unit/mL (70-30) injection Inject 70 units three (3) times daily    dapagliflozin-metformin (XIGDUO XR) 5-1,000 mg TBph Take 1 Tab by mouth two (2) times daily (with meals). Stop Metformin    insulin syringe-needle U-100 1 mL 31 gauge x 15/64\" syrg 1 Syringe by Does Not Apply route three (3) times daily.  dulaglutide (TRULICITY) 1.5 HZ/8.8 mL sub-q pen 0.5 mL by SubCUTAneous route every seven (7) days.  glucose blood VI test strips (ONETOUCH ULTRA TEST) strip Test blood glucose 3 times daily Dx Code: E11.65    isosorbide mononitrate ER (IMDUR) 60 mg CR tablet Take 60 mg by mouth every morning.  aspirin 81 mg chewable tablet Take 81 mg by mouth nightly.  clopidogrel (PLAVIX) 75 mg tablet Take 75 mg by mouth daily.  furosemide (LASIX) 40 mg tablet Take 40 mg by mouth as needed.  digoxin (LANOXIN) 0.25 mg tablet Take 0.25 mg by mouth daily.  atorvastatin (LIPITOR) 20 mg tablet Take 20 mg by mouth Daily (before breakfast).  enalapril (VASOTEC) 10 mg tablet Take 20 mg by mouth daily.  potassium chloride (KLOR-CON M20) 20 mEq tablet Take 20 mEq by mouth daily.  gabapentin (NEURONTIN) 100 mg capsule     cholecalciferol, vitamin D3, (VITAMIN D3) 2,000 unit Tab Take 1 Tab by mouth daily.  carvedilol (COREG) 12.5 mg tablet Take 25 mg by mouth daily. No current facility-administered medications for this visit. No Known Allergies      Review of Systems:  -   - Respiratory: no cough no shortness of breath  - Gastrointestinal: no dysphagia no  abdominal pain  - Musculoskeletal: no joint pains +  weakness  - Integumentary: no rashes  - Neurological: no numbness, tingling, + headaches  - Psychiatric: no depression no  anxiety  - Endocrine: no heat or cold intolerance    Physical Examination:   Blood pressure 142/74, pulse 87, temperature 98.3 °F (36.8 °C), temperature source Oral, resp. rate 14, height 5' 2\" (1.575 m), weight 207 lb 12.8 oz (94.3 kg), last menstrual period 06/25/2005, SpO2 97 %. Estimated body mass index is 38.01 kg/(m^2) as calculated from the following:    Height as of this encounter: 5' 2\" (1.575 m). -   Weight as of this encounter: 207 lb 12.8 oz (94.3 kg). - General: pleasant, no distress, good eye contact  - HEENT: no pallor, no periorbital edema, EOMI  - Neck: supple, no thyromegaly, no nodules  - Cardiovascular: regular, normal rate, normal S1 and S2, no murmurs  - Respiratory: clear to auscultation bilaterally  - Gastrointestinal: soft, nontender, nondistended,  BS +  - Musculoskeletal: no proximal muscle weakness in upper or lower extremities  - Integumentary: no acanthosis nigricans, +edema,   - Neurological:alert and oriented  - Psychiatric: normal mood and affect  - Skin: color, texture, turgor normal.       Data Reviewed:     [] Glucose records reviewed. [] See glucose records for details (to be scanned). [] A1C  [] Reviewed labs      Assessment/Plan:     1. Type 2 diabetes mellitus with hyperglycemia, with long-term current use of insulin ([address]    2. Mixed hyperlipidemia    3. Essential hypertension        1. Type 2 Diabetes Mellitus   Lab Results   Component Value Date/Time    Hemoglobin A1c (POC) 9.4 12/18/2017 03:05 PM   She has signs of insulin resistance ,DM  poorly controlled   A1c is improved from 11-9  Truclity 1.5 mg weekly. Metformin. Xigduo  Novolin 70/30 70 units before each meal, changed the needle size to 6 MM,she has limited subcutaneous fat    FLU annually ,Pneumovax ,aspirin daily,annual eye exam,microalbumin    2. HTN : Continue current therapy     3. Hyperlipidemia : Continue statin. 4.Obesity:Body mass index is 38.01 kg/(m^2). Discussed about the importance of exercise and carbohydrate portion control. She has a chart dx of Gastroparesis - did not have Gastric emptying study , doing well on GLP1, so less likely she has gastroparesis      There are no Patient Instructions on file for this visit.   Follow-up Disposition: Not on File    Thank you for allowing me to participate in the care of this patient.     Jeryl Buerger, MD      Patient verbalized understanding

## 2018-04-10 ENCOUNTER — ED HISTORICAL/CONVERTED ENCOUNTER (OUTPATIENT)
Dept: OTHER | Age: 58
End: 2018-04-10

## 2018-04-12 ENCOUNTER — OP HISTORICAL/CONVERTED ENCOUNTER (OUTPATIENT)
Dept: OTHER | Age: 58
End: 2018-04-12

## 2018-05-19 ENCOUNTER — IP HISTORICAL/CONVERTED ENCOUNTER (OUTPATIENT)
Dept: OTHER | Age: 58
End: 2018-05-19

## 2018-06-10 ENCOUNTER — ED HISTORICAL/CONVERTED ENCOUNTER (OUTPATIENT)
Dept: OTHER | Age: 58
End: 2018-06-10

## 2018-06-11 ENCOUNTER — OFFICE VISIT (OUTPATIENT)
Dept: ENDOCRINOLOGY | Age: 58
End: 2018-06-11

## 2018-06-11 VITALS
HEART RATE: 96 BPM | WEIGHT: 200.4 LBS | TEMPERATURE: 97.9 F | HEIGHT: 62 IN | OXYGEN SATURATION: 96 % | DIASTOLIC BLOOD PRESSURE: 60 MMHG | BODY MASS INDEX: 36.88 KG/M2 | RESPIRATION RATE: 16 BRPM | SYSTOLIC BLOOD PRESSURE: 147 MMHG

## 2018-06-11 DIAGNOSIS — E78.2 MIXED HYPERLIPIDEMIA: ICD-10-CM

## 2018-06-11 DIAGNOSIS — E11.65 TYPE 2 DIABETES MELLITUS WITH HYPERGLYCEMIA, WITH LONG-TERM CURRENT USE OF INSULIN (HCC): ICD-10-CM

## 2018-06-11 DIAGNOSIS — Z79.4 TYPE 2 DIABETES MELLITUS WITH HYPERGLYCEMIA, WITH LONG-TERM CURRENT USE OF INSULIN (HCC): Primary | ICD-10-CM

## 2018-06-11 DIAGNOSIS — E11.65 TYPE 2 DIABETES MELLITUS WITH HYPERGLYCEMIA, WITH LONG-TERM CURRENT USE OF INSULIN (HCC): Primary | ICD-10-CM

## 2018-06-11 DIAGNOSIS — Z79.4 TYPE 2 DIABETES MELLITUS WITH HYPERGLYCEMIA, WITH LONG-TERM CURRENT USE OF INSULIN (HCC): ICD-10-CM

## 2018-06-11 RX ORDER — METOCLOPRAMIDE 10 MG/1
10 TABLET ORAL
COMMUNITY
End: 2021-03-29

## 2018-06-11 RX ORDER — INSULIN DEGLUDEC 200 U/ML
100 INJECTION, SOLUTION SUBCUTANEOUS DAILY
Qty: 18 ML | Refills: 11 | Status: SHIPPED | OUTPATIENT
Start: 2018-06-11 | End: 2019-07-11 | Stop reason: SDUPTHER

## 2018-06-11 RX ORDER — HYDROCODONE BITARTRATE AND ACETAMINOPHEN 5; 325 MG/1; MG/1
TABLET ORAL
COMMUNITY
End: 2019-01-12

## 2018-06-11 RX ORDER — SUCRALFATE 1 G/1
1 TABLET ORAL 4 TIMES DAILY
COMMUNITY
End: 2020-09-21

## 2018-06-11 RX ORDER — ONDANSETRON 4 MG/1
4 TABLET, FILM COATED ORAL
COMMUNITY
End: 2019-01-12

## 2018-06-11 RX ORDER — INSULIN DEGLUDEC 200 U/ML
INJECTION, SOLUTION SUBCUTANEOUS
Qty: 1 PEN | Refills: 0 | Status: SHIPPED | COMMUNITY
Start: 2018-06-11 | End: 2018-06-11 | Stop reason: SDUPTHER

## 2018-06-11 NOTE — PROGRESS NOTES
Eulalia Meza is a 61 y.o. female here for   Chief Complaint   Patient presents with    Diabetes       Functional glucose monitor and record keeping system? - yes  Eye exam within last year? - within last year Dr Iman Frost exam within last year? - due today    1. Have you been to the ER, urgent care clinic since your last visit? Hospitalized since your last visit? - ER last night for gastroparesis, April 10, 2018 Aril 12, 2018, May 19, 2018    2. Have you seen or consulted any other health care providers outside of the [address] [place] [address] since your last visit?   Include any pap smears or colon screening.- PCP

## 2018-06-11 NOTE — PROGRESS NOTES
[facility]               Helen Moya MD        [address] [address] [phone] Fax [phone]               Patient Information  Date:2018  Name : Kevin aMk 61 y.o.     YOB: 1960         PCP : Nery Lay MD         Chief Complaint   Patient presents with    Diabetes       History of Present Illness: Kevin Mak is a 61 y.o. female here for fu of  Type 2 Diabetes Mellitus. She was managed by Dr. Christiana Kelly, endocrinologist.  She has longstanding history of diabetes which was uncontrolled. She has underlying obstructive sleep apnea and hypertension. She has microvascular disease. She CABG in . She was hospitalized with abdominal complaints and was diagnosed with gastroparesis, however she was on Trulicity at that time. Evaluated by gastroenterologist.  She is on Reglan  Eventually came off Trulicity, now blood glucose has been high. She has not changed the diet. She is on NovoLog 70/30  She is checking blood glucose 2- 3 times daily   She is still missing noon insulin. No hypoglycemia. She has polyuria, polydipsia and weakness. Wt Readings from Last 3 Encounters:   18 200 lb 6.4 oz (90.9 kg)   17 207 lb 12.8 oz (94.3 kg)   17 209 lb 3.2 oz (94.9 kg)       BP Readings from Last 3 Encounters:   18 147/60   17 142/74   17 124/63           Past Medical History:   Diagnosis Date    Arthritis     CAD (coronary artery disease)     Hx of 2 MI's and then CABG 2009    Diabetes ([facility] [address]     IDDM type 3    Dyslipidemia     Hypertension     Other ill-defined conditions(799.89)     gastroparesis     Unspecified sleep apnea     no cpap repeat study sched for 13     Current Outpatient Prescriptions   Medication Sig    evolocumab (REPATHA SURECLICK) pen injection by SubCUTAneous route.     metoclopramide HCl (REGLAN) 10 mg tablet Take 10 mg by mouth Before breakfast, lunch, dinner and at bedtime.  sucralfate (CARAFATE) 1 gram tablet Take 1 g by mouth four (4) times daily.  HYDROcodone-acetaminophen (NORCO) 5-325 mg per tablet Take  by mouth every six (6) hours as needed for Pain.  ondansetron hcl (ZOFRAN) 4 mg tablet Take 4 mg by mouth every eight (8) hours as needed for Nausea.  dapagliflozin-metformin (XIGDUO XR) 5-1,000 mg TBph Take 1 Tab by mouth two (2) times daily (with meals). Stop Metformin (Patient taking differently: Take 1 Tab by mouth daily. Stop Metformin)    insulin syringe-needle U-100 1 mL 31 gauge x 15/64\" syrg 1 Syringe by Does Not Apply route three (3) times daily.  glucose blood VI test strips (ONETOUCH ULTRA TEST) strip Test blood glucose 3 times daily Dx Code: E11.65    isosorbide mononitrate ER (IMDUR) 60 mg CR tablet Take 60 mg by mouth every morning.  aspirin 81 mg chewable tablet Take 81 mg by mouth nightly.  clopidogrel (PLAVIX) 75 mg tablet Take 75 mg by mouth daily.  furosemide (LASIX) 40 mg tablet Take 40 mg by mouth as needed.  digoxin (LANOXIN) 0.25 mg tablet Take 0.25 mg by mouth daily.  enalapril (VASOTEC) 10 mg tablet Take 20 mg by mouth daily.  potassium chloride (KLOR-CON M20) 20 mEq tablet Take 20 mEq by mouth daily.  insulin degludec (TRESIBA FLEXTOUCH U-200) 200 unit/mL (3 mL) inpn 100 Units by SubCUTAneous route daily.  insulin lispro (HUMALOG KWIKPEN INSULIN) 200 unit/mL (3 mL) inpn Inject 30-40 units before meals with SSI. Max units daily: 165. Stop Novolog 70/30    pravastatin (PRAVACHOL) 20 mg tablet Take 20 mg by mouth nightly.  ezetimibe (ZETIA) 10 mg tablet Take  by mouth.  pregabalin (LYRICA) 50 mg capsule Take 50 mg by mouth daily.  gabapentin (NEURONTIN) 100 mg capsule     cholecalciferol, vitamin D3, (VITAMIN D3) 2,000 unit Tab Take 1 Tab by mouth daily.  carvedilol (COREG) 12.5 mg tablet Take 25 mg by mouth daily.     atorvastatin (LIPITOR) 20 mg tablet Take 20 mg by mouth Daily (before breakfast). No current facility-administered medications for this visit. No Known Allergies      Review of Systems:  -   - Respiratory: no cough no shortness of breath  - Gastrointestinal: no dysphagia no  abdominal pain  - Musculoskeletal: no joint pains +  weakness  - Integumentary: no rashes  - Neurological: no numbness, tingling, + headaches  - Psychiatric: no depression no  anxiety  - Endocrine: no heat or cold intolerance    Physical Examination:   Blood pressure 147/60, pulse 96, temperature 97.9 °F (36.6 °C), temperature source Oral, resp. rate 16, height 5' 2\" (1.575 m), weight 200 lb 6.4 oz (90.9 kg), last menstrual period 06/25/2005, SpO2 96 %. Estimated body mass index is 36.65 kg/(m^2) as calculated from the following:    Height as of this encounter: 5' 2\" (1.575 m). -   Weight as of this encounter: 200 lb 6.4 oz (90.9 kg). - General: pleasant, no distress, good eye contact  - HEENT: no pallor, no periorbital edema, EOMI  - Neck: supple,   - Cardiovascular: regular, normal rate, normal S1 and S2,   - Respiratory: clear to auscultation bilaterally  - Gastrointestinal: soft, nontender, nondistended,  BS +  - Musculoskeletal: no proximal muscle weakness in upper or lower extremities  - Integumentary:  +edema,   - Neurological:alert and oriented  - Psychiatric: normal mood and affect  - Skin: color, texture, turgor normal.     Diabetic foot exam: June 2018    Left Foot:   Visual Exam: normal    Pulse DP: 2+ (normal)   Filament test: reduced sensation    Vibratory sensation: absent      Right Foot:   Visual Exam: normal    Pulse DP: 2+ (normal)   Filament test: reduced sensation    Vibratory sensation: absent        Data Reviewed:     [] Glucose records reviewed. [] See glucose records for details (to be scanned). [] A1C  [] Reviewed labs      Assessment/Plan:     1.  Type 2 diabetes mellitus with hyperglycemia, with long-term current use of insulin (HCC) 2. Mixed hyperlipidemia        1. Type 2 Diabetes Mellitus   Lab Results   Component Value Date/Time    Hemoglobin A1c 8.7 (H) 05/14/2018 03:24 PM    Hemoglobin A1c (POC) 9.4 12/18/2017 03:05 PM     Uncontrolled diabetes, was diagnosed with gastroparesis however was on GLP-1 agonist.  Cannot really diagnose gastroparesis when on GLP-1 agonist.  Discouraged from going on gastric pacemaker, the key is low carbohydrate diet, controlling the glucose which helps with the symptoms. She is off Trulicity  Stressed the importance of low carbohydrate diet. Tresiba 100 units  NovoLog 30-40 units before each meal.  Discussed about insulin pump  she has limited subcutaneous fat  Compliance discussed, if she has persistent low ejection fraction discussed about discontinuing metformin    FLU annually ,Pneumovax ,aspirin daily,annual eye exam,microalbumin    2. HTN : Continue current therapy     3. Hyperlipidemia : Managed by cardiology    4. Obesity:Body mass index is 36.65 kg/(m^2). Discussed about the importance of exercise and carbohydrate portion control. 5.  CAD-followed by cardiology Dr Cherl Fleischer,    Follow-up Disposition:  Return in about 3 months (around 9/11/2018). Thank you for allowing me to participate in the care of this patient.     Festus Cabral MD      Patient verbalized understanding

## 2018-06-11 NOTE — MR AVS SNAPSHOT
[address] [facility] [address] 
[phone] Patient: Julia Ying MRN: [id number] GLA:[id number] Visit Information Date & Time Provider Department Dept. Phone Encounter #  
 6/11/2018 10:30 AM Kayla Machuca MD [facility] End[facility] [phone] [id number] Follow-up Instructions Return in about 3 months (around 9/11/2018). Upcoming Health Maintenance Date Due Hepatitis C Screening 1960 FOOT EXAM Q1 8/19/1969 EYE EXAM RETINAL OR DILATED Q1 8/19/1969 Pneumococcal 19-64 Medium Risk (1 of 1 - PPSV23) 8/19/1978 DTaP/Tdap/Td series (1 - Tdap) 8/19/1980 PAP AKA CERVICAL CYTOLOGY 8/19/1980 BREAST CANCER SCRN MAMMOGRAM 8/19/2009 FOBT Q 1 YEAR AGE 50-75 8/19/2009 Influenza Age 5 to Adult 8/1/2018 HEMOGLOBIN A1C Q6M 11/14/2018 MICROALBUMIN Q1 5/14/2019 LIPID PANEL Q1 5/14/2019 Allergies as of 6/11/2018  Review Complete On: 6/11/2018 By: Kayla Machuca MD  
 No Known Allergies Current Immunizations  Never Reviewed No immunizations on file. Not reviewed this visit You Were Diagnosed With   
  
 Codes Comments Type 2 diabetes mellitus with hyperglycemia, with long-term current use of insulin (HCC)    -  Primary ICD-10-CM: E11.65, Z79.4 ICD-9-CM: 250.00, 790.29, V58.67 Mixed hyperlipidemia     ICD-10-CM: E78.2 ICD-9-CM: 272.2 Vitals BP Pulse Temp Resp Height(growth percentile) Weight(growth percentile) 147/60 (BP 1 Location: Left arm, BP Patient Position: Sitting) 96 97.9 °F (36.6 °C) (Oral) 16 5' 2\" (1.575 m) 200 lb 6.4 oz (90.9 kg) LMP SpO2 BMI OB Status Smoking Status 06/25/2005 96% 36.65 kg/m2 Hysterectomy Never Smoker Vitals History BMI and BSA Data Body Mass Index Body Surface Area  
 36.65 kg/m 2 1.99 m 2 [facility] [address] [facility] [phone] Your Updated Medication List  
  
   
This list is accurate as of 6/11/18 11:34 AM.  Always use your most recent med list.  
  
  
  
  
 aspirin 81 mg chewable tablet Take 81 mg by mouth nightly. carvedilol 12.5 mg tablet Commonly known as:  Greyson Peace Take 25 mg by mouth daily. dapagliflozin-metformin 5-1,000 mg Tbph  
Commonly known as:  XIGDUO XR Take 1 Tab by mouth two (2) times daily (with meals). Stop Metformin  
  
 digoxin 0.25 mg tablet Commonly known as:  LANOXIN Take 0.25 mg by mouth daily. enalapril 10 mg tablet Commonly known as:  Willia Aroldo Take 20 mg by mouth daily. furosemide 40 mg tablet Commonly known as:  LASIX Take 40 mg by mouth as needed. gabapentin 100 mg capsule Commonly known as:  NEURONTIN  
  
 glucose blood VI test strips strip Commonly known as:  ONETOUCH ULTRA TEST Test blood glucose 3 times daily Dx Code: E11.65  
  
 insulin NPH/insulin regular 100 unit/mL (70-30) injection Commonly known as:  NovoLIN 70/30 U-100 Insulin Inject 70 units three (3) times daily  
  
 insulin syringe-needle U-100 1 mL 31 gauge x 15/64\" Syrg 1 Syringe by Does Not Apply route three (3) times daily. isosorbide mononitrate ER 60 mg CR tablet Commonly known as:  IMDUR Take 60 mg by mouth every morning. KLOR-CON M20 20 mEq tablet Generic drug:  potassium chloride Take 20 mEq by mouth daily. LIPITOR 20 mg tablet Generic drug:  atorvastatin Take 20 mg by mouth Daily (before breakfast). LYRICA 50 mg capsule Generic drug:  pregabalin Take 50 mg by mouth daily. NORCO 5-325 mg per tablet Generic drug:  HYDROcodone-acetaminophen Take  by mouth every six (6) hours as needed for Pain. PLAVIX 75 mg Tab Generic drug:  clopidogrel Take 75 mg by mouth daily. pravastatin 20 mg tablet Commonly known as:  PRAVACHOL  
 Take 20 mg by mouth nightly. REGLAN 10 mg tablet Generic drug:  metoclopramide HCl Take 10 mg by mouth Before breakfast, lunch, dinner and at bedtime. REPATHA SURECLICK pen injection Generic drug:  evolocumab  
by SubCUTAneous route. sucralfate 1 gram tablet Commonly known as:  Yesy Constant Take 1 g by mouth four (4) times daily. VITAMIN D3 2,000 unit Tab Generic drug:  cholecalciferol (vitamin D3) Take 1 Tab by mouth daily. ZETIA 10 mg tablet Generic drug:  ezetimibe Take  by mouth. ZOFRAN 4 mg tablet Generic drug:  ondansetron hcl Take 4 mg by mouth every eight (8) hours as needed for Nausea. Follow-up Instructions Return in about 3 months (around 9/11/2018). Patient Instructions Stop Old insulin Start tresiba 100 units in AM  
 
Novolog 30 - 40 units after meals Additional Novolog fo high sugars Blood sugar  Breakfast/Lunch/Dinner 130-200  Add  3  Units 201-250  Add  6 Units 251-300  Add  9 Units 301-350  Add 12  Units 351-400  Add 15  Units Introducing [facility]! [employer] introduces [email or web address] patient portal. Now you can access parts of your medical record, email your doctor's office, and request medication refills online. 1. In your internet browser, go to [email or web address] 2. Click on the First Time User? Click Here link in the Sign In box. You will see the New Member Sign Up page. 3. Enter your Par[employer] Access Code exactly as it appears below. You will not need to use this code after youve completed the sign-up process. If you do not sign up before the expiration date, you must request a new code. · [email or web address] Access Code: [email or web address] Expires: 9/9/2018 11:34 AM 
 
4. Enter the last four digits of your Social Security Number (xxxx) and Date of Birth (mm/dd/yyyy) as indicated and click Submit. You will be taken to the next sign-up page. 5. Create a 9Cookies ID. This will be your 9Cookies login ID and cannot be changed, so think of one that is secure and easy to remember. 6. Create a 9Cookies password. You can change your password at any time. 7. Enter your Password Reset Question and Answer. This can be used at a later time if you forget your password. 8. Enter your e-mail address. You will receive e-mail notification when new information is available in [address] 9. Click Sign Up. You can now view and download portions of your medical record. 10. Click the Download Summary menu link to download a portable copy of your medical information. If you have questions, please visit the Frequently Asked Questions section of the 9Cook[employer] website. Remember, 9Cookies is NOT to be used for urgent needs. For medical emergencies, dial 911. Now available from your iPhone and Android! Please provide this summary of care documentation to your next provider. Your primary care clinician is listed as [facility]. If you have any questions after today's visit, please call [phone].

## 2018-06-11 NOTE — PATIENT INSTRUCTIONS
Stop Old insulin     Start tresiba 100 units in AM     Novolog 30 - 40 units after meals     Additional Novolog fo high sugars     Blood sugar  Breakfast/Lunch/Dinner        130-200  Add  3  Units       201-250  Add  6 Units       251-300  Add  9 Units       301-350  Add 12  Units        351-400  Add 15  Units

## 2018-06-15 ENCOUNTER — ED HISTORICAL/CONVERTED ENCOUNTER (OUTPATIENT)
Dept: OTHER | Age: 58
End: 2018-06-15

## 2018-06-21 ENCOUNTER — IP HISTORICAL/CONVERTED ENCOUNTER (OUTPATIENT)
Dept: OTHER | Age: 58
End: 2018-06-21

## 2018-09-24 ENCOUNTER — ED HISTORICAL/CONVERTED ENCOUNTER (OUTPATIENT)
Dept: OTHER | Age: 58
End: 2018-09-24

## 2018-10-14 LAB
CREATININE, EXTERNAL: 0.75
HBA1C MFR BLD HPLC: 9.5 %
LDL-C, EXTERNAL: 74
MICROALBUMIN UR TEST STR-MCNC: 30.7 MG/DL

## 2018-10-29 ENCOUNTER — ED HISTORICAL/CONVERTED ENCOUNTER (OUTPATIENT)
Dept: OTHER | Age: 58
End: 2018-10-29

## 2018-12-03 DIAGNOSIS — E11.65 TYPE 2 DIABETES MELLITUS WITH HYPERGLYCEMIA, WITH LONG-TERM CURRENT USE OF INSULIN (HCC): ICD-10-CM

## 2018-12-03 DIAGNOSIS — Z79.4 TYPE 2 DIABETES MELLITUS WITH HYPERGLYCEMIA, WITH LONG-TERM CURRENT USE OF INSULIN (HCC): ICD-10-CM

## 2018-12-03 RX ORDER — DAPAGLIFLOZIN AND METFORMIN HYDROCHLORIDE 5; 1000 MG/1; MG/1
TABLET, FILM COATED, EXTENDED RELEASE ORAL
Qty: 60 TAB | Refills: 11 | Status: SHIPPED | OUTPATIENT
Start: 2018-12-03 | End: 2020-09-21

## 2018-12-04 ENCOUNTER — TELEPHONE (OUTPATIENT)
Dept: ENDOCRINOLOGY | Age: 58
End: 2018-12-04

## 2019-01-07 ENCOUNTER — TELEPHONE (OUTPATIENT)
Dept: ENDOCRINOLOGY | Age: 59
End: 2019-01-07

## 2019-01-07 ENCOUNTER — OFFICE VISIT (OUTPATIENT)
Dept: ENDOCRINOLOGY | Age: 59
End: 2019-01-07

## 2019-01-07 VITALS
RESPIRATION RATE: 16 BRPM | OXYGEN SATURATION: 96 % | TEMPERATURE: 99.2 F | SYSTOLIC BLOOD PRESSURE: 159 MMHG | BODY MASS INDEX: 39.73 KG/M2 | WEIGHT: 215.9 LBS | HEIGHT: 62 IN | HEART RATE: 85 BPM | DIASTOLIC BLOOD PRESSURE: 74 MMHG

## 2019-01-07 DIAGNOSIS — E78.2 MIXED HYPERLIPIDEMIA: ICD-10-CM

## 2019-01-07 DIAGNOSIS — Z79.4 TYPE 2 DIABETES MELLITUS WITH HYPERGLYCEMIA, WITH LONG-TERM CURRENT USE OF INSULIN (HCC): Primary | ICD-10-CM

## 2019-01-07 DIAGNOSIS — I10 ESSENTIAL HYPERTENSION: ICD-10-CM

## 2019-01-07 DIAGNOSIS — E11.65 TYPE 2 DIABETES MELLITUS WITH HYPERGLYCEMIA, WITH LONG-TERM CURRENT USE OF INSULIN (HCC): Primary | ICD-10-CM

## 2019-01-07 PROBLEM — E11.21 TYPE 2 DIABETES WITH NEPHROPATHY (HCC): Status: ACTIVE | Noted: 2019-01-07

## 2019-01-07 PROBLEM — E66.01 SEVERE OBESITY (HCC): Status: ACTIVE | Noted: 2019-01-07

## 2019-01-07 PROBLEM — E11.40 TYPE 2 DIABETES MELLITUS WITH DIABETIC NEUROPATHY (HCC): Status: ACTIVE | Noted: 2019-01-07

## 2019-01-07 RX ORDER — ERYTHROMYCIN 250 MG/1
TABLET, COATED ORAL
COMMUNITY
End: 2020-09-21

## 2019-01-07 NOTE — PROGRESS NOTES
Haylie Padilla is a 60 y.o. female Chief Complaint Patient presents with  Follow-up 3 month  Diabetes 1. Have you been to the ER, urgent care clinic since your last visit? Hospitalized since your last visit? No 
 
2. Have you seen or consulted any other health care providers outside of the [address] [place] [address] since your last visit? Include any pap smears or colon screening. No 
 
Visit Vitals /74 (BP 1 Location: Right arm, BP Patient Position: Sitting) Pulse 85 Temp 99.2 °F (37.3 °C) (Oral) Resp 16 Ht 5' 2\" (1.575 m) Wt 215 lb 14.4 oz (97.9 kg) SpO2 96% BMI 39.49 kg/m²

## 2019-01-07 NOTE — PROGRESS NOTES
[facility] DIABETES AND ENDOCRINOLOGY Isael Reyes MD 
 
    [address] [phone] Fax [phone] Patient Information Date:1/12/2019 Name : Matt Gallardo 60 y.o.    
YOB: 1960 PCP : Radha Streeter MD  
 
 
 
Chief Complaint Patient presents with  Follow-up 3 month  Diabetes History of Present Illness: Matt Gallardo is a 60 y.o. female here for fu of  Type 2 Diabetes Mellitus. She was managed by Dr. Ne Steiner, endocrinologist.  She has longstanding history of diabetes which was uncontrolled. She has underlying obstructive sleep apnea and hypertension. She has microvascular disease. She CABG in 2009. She was hospitalized with abdominal complaints and was diagnosed with gastroparesis, however she was on Trulicity at that time. Evaluated by gastroenterologist.  She is on Reglan 
 came off Trulicity, now blood glucose has been high. She is checking blood glucose 2- 3 times daily Complains of  cramping in the calf No hypoglycemia. She has polyuria, polydipsia and weakness. Wt Readings from Last 3 Encounters:  
01/07/19 215 lb 14.4 oz (97.9 kg) 06/11/18 200 lb 6.4 oz (90.9 kg) 12/18/17 207 lb 12.8 oz (94.3 kg) BP Readings from Last 3 Encounters:  
01/07/19 159/74  
06/11/18 147/60  
12/18/17 142/74 Past Medical History:  
Diagnosis Date  Arthritis  CAD (coronary artery disease) 2009 Hx of 2 MI's and then CABG 5/2009  Diabetes ([facility] [address] IDDM type 3  Dyslipidemia  Hypertension  Other ill-defined conditions(799.89) gastroparesis  Unspecified sleep apnea   
 no cpap repeat study sched for 9/20/13 Current Outpatient Medications Medication Sig  
 alirocumab (PRALUENT PEN) 75 mg/mL injector pen 75 mg by SubCUTAneous route Once every 2 weeks.  digoxin (LANOXIN) 0.25 mg/5 mL (5 mL) oral solution 5 mL.  erythromycin base (E-MYCIN) 250 mg tablet erythromycin 250 mg tablet  XIGDUO XR 5-1,000 mg TBph TAKE ONE TABLET BY MOUTH TWICE DAILY WITH MEALS  metoclopramide HCl (REGLAN) 10 mg tablet Take 10 mg by mouth Before breakfast, lunch, dinner and at bedtime.  sucralfate (CARAFATE) 1 gram tablet Take 1 g by mouth four (4) times daily.  insulin degludec (TRESIBA FLEXTOUCH U-200) 200 unit/mL (3 mL) inpn 100 Units by SubCUTAneous route daily.  insulin lispro (HUMALOG KWIKPEN INSULIN) 200 unit/mL (3 mL) inpn Inject 30-40 units before meals with SSI. Max units daily: 165. Stop Novolog 70/30  
 insulin syringe-needle U-100 1 mL 31 gauge x 15/64\" syrg 1 Syringe by Does Not Apply route three (3) times daily.  glucose blood VI test strips (ONETOUCH ULTRA TEST) strip Test blood glucose 3 times daily Dx Code: E11.65  
 aspirin 81 mg chewable tablet Take 81 mg by mouth nightly.  carvedilol (COREG) 12.5 mg tablet Take 25 mg by mouth daily.  clopidogrel (PLAVIX) 75 mg tablet Take 75 mg by mouth daily.  furosemide (LASIX) 40 mg tablet Take 40 mg by mouth as needed.  digoxin (LANOXIN) 0.25 mg tablet Take 0.25 mg by mouth daily.  enalapril (VASOTEC) 10 mg tablet Take 20 mg by mouth daily.  potassium chloride (KLOR-CON M20) 20 mEq tablet Take 20 mEq by mouth daily.  evolocumab (REPATHA SURECLICK) pen injection by SubCUTAneous route.  HYDROcodone-acetaminophen (NORCO) 5-325 mg per tablet Take  by mouth every six (6) hours as needed for Pain.  ondansetron hcl (ZOFRAN) 4 mg tablet Take 4 mg by mouth every eight (8) hours as needed for Nausea.  pravastatin (PRAVACHOL) 20 mg tablet Take 20 mg by mouth nightly.  ezetimibe (ZETIA) 10 mg tablet Take  by mouth.  pregabalin (LYRICA) 50 mg capsule Take 50 mg by mouth daily.  gabapentin (NEURONTIN) 100 mg capsule  isosorbide mononitrate ER (IMDUR) 60 mg CR tablet Take 60 mg by mouth every morning.  cholecalciferol, vitamin D3, (VITAMIN D3) 2,000 unit Tab Take 1 Tab by mouth daily.  atorvastatin (LIPITOR) 20 mg tablet Take 20 mg by mouth Daily (before breakfast). No current facility-administered medications for this visit. No Known Allergies Review of Systems: 
-  
- Respiratory: no cough no shortness of breath 
- Gastrointestinal: no dysphagia no  abdominal pain - Musculoskeletal: no joint pains +  weakness - Integumentary: no rashes - Neurological: no numbness, tingling, + headaches - Psychiatric: no depression no  anxiety - Endocrine: no heat or cold intolerance Physical Examination: 
 Blood pressure 159/74, pulse 85, temperature 99.2 °F (37.3 °C), temperature source Oral, resp. rate 16, height 5' 2\" (1.575 m), weight 215 lb 14.4 oz (97.9 kg), last menstrual period 06/25/2005, SpO2 96 %. Estimated body mass index is 39.49 kg/m² as calculated from the following: 
  Height as of this encounter: 5' 2\" (1.575 m). -   Weight as of this encounter: 215 lb 14.4 oz (97.9 kg). - General: pleasant, no distress, good eye contact 
- HEENT: no pallor, no periorbital edema, EOMI 
- Neck: supple,  
- Cardiovascular: regular, normal rate, normal S1 and S2,  
- Respiratory: clear to auscultation bilaterally - Gastrointestinal: soft, nontender, nondistended,  BS + 
- Musculoskeletal: no proximal muscle weakness in upper or lower extremities - Integumentary:  +edema,  
- Neurological:alert and oriented - Psychiatric: normal mood and affect 
- Skin: color, texture, turgor normal.  
 
Diabetic foot exam: June 2018 Left Foot: 
 Visual Exam: normal  
 Pulse DP: 2+ (normal) Filament test: reduced sensation Vibratory sensation: absent Right Foot: 
 Visual Exam: normal  
 Pulse DP: 2+ (normal) Filament test: reduced sensation Vibratory sensation: absent Data Reviewed:  
 
 
Assessment/Plan: 1.  Type 2 diabetes mellitus with hyperglycemia, with long-term current use of insulin ([facility] [address] 2. Mixed hyperlipidemia 3. Essential hypertension 1. Type 2 Diabetes Mellitus Lab Results Component Value Date/Time Hemoglobin A1c 8.7 (H) 05/14/2018 03:24 PM  
 Hemoglobin A1c (POC) 9.4 12/18/2017 03:05 PM  
 Hemoglobin A1c, External 9.5 10/14/2018 Uncontrolled diabetes, was diagnosed with gastroparesis however was on GLP-1 agonist.  Cannot really diagnose gastroparesis when on GLP-1 agonist.   
She is off Trulicity Stressed the importance of low carbohydrate diet. Tresiba 100 units NovoLog 30-40 units before each meal.  Discussed about insulin pump 
she has limited subcutaneous fat Compliance discussed, if she has persistent low ejection fraction discussed about discontinuing metformin Discussed about insulin pump FLU annually ,Pneumovax ,aspirin daily,annual eye exam,microalbumin 2. HTN : Continue current therapy 3. Hyperlipidemia : Managed by cardiology 4. Obesity:Body mass index is 39.49 kg/m². Discussed about the importance of exercise and carbohydrate portion control. 5.  CAD-followed by cardiology Dr Glenny Plunkett, 
 
Calf claudication: ANAHI ordered Follow-up Disposition: 
Return in about 4 months (around 5/7/2019). Thank you for allowing me to participate in the care of this patient. Christene Goodpasture, MD 
 
 
Patient verbalized understanding

## 2019-01-08 NOTE — TELEPHONE ENCOUNTER
Informed pt that Dr Anupam Coreas willmpalce the orders for the doppler and someone will contact her to schedule. Pt verbalized understanding and states she may never reach her deductible away so she wants test ordered.

## 2019-03-11 ENCOUNTER — ED HISTORICAL/CONVERTED ENCOUNTER (OUTPATIENT)
Dept: OTHER | Age: 59
End: 2019-03-11

## 2019-03-25 ENCOUNTER — HOSPITAL ENCOUNTER (OUTPATIENT)
Dept: VASCULAR SURGERY | Age: 59
Discharge: HOME | End: 2019-03-25
Attending: INTERNAL MEDICINE
Payer: COMMERCIAL

## 2019-03-25 DIAGNOSIS — E11.65 TYPE 2 DIABETES MELLITUS WITH HYPERGLYCEMIA, WITH LONG-TERM CURRENT USE OF INSULIN (HCC): ICD-10-CM

## 2019-03-25 DIAGNOSIS — E11.65 HYPERGLYCEMIA DUE TO TYPE 2 DIABETES MELLITUS (HCC): ICD-10-CM

## 2019-03-25 DIAGNOSIS — Z79.4 TYPE 2 DIABETES MELLITUS WITH HYPERGLYCEMIA, WITH LONG-TERM CURRENT USE OF INSULIN (HCC): ICD-10-CM

## 2019-03-25 LAB
LEFT ABI: 1.11
LEFT ANTERIOR TIBIAL: 195 MMHG
LEFT ARM BP: 186 MMHG
LEFT POSTERIOR TIBIAL: 208 MMHG
RIGHT ABI: 1.11
RIGHT ANTERIOR TIBIAL: 184 MMHG
RIGHT ARM BP: 188 MMHG
RIGHT POSTERIOR TIBIAL: 209 MMHG

## 2019-03-25 PROCEDURE — 93922 UPR/L XTREMITY ART 2 LEVELS: CPT

## 2019-05-29 ENCOUNTER — ED HISTORICAL/CONVERTED ENCOUNTER (OUTPATIENT)
Dept: OTHER | Age: 59
End: 2019-05-29

## 2019-07-06 ENCOUNTER — ED HISTORICAL/CONVERTED ENCOUNTER (OUTPATIENT)
Dept: OTHER | Age: 59
End: 2019-07-06

## 2019-07-09 ENCOUNTER — ED HISTORICAL/CONVERTED ENCOUNTER (OUTPATIENT)
Dept: OTHER | Age: 59
End: 2019-07-09

## 2019-07-11 DIAGNOSIS — E78.2 MIXED HYPERLIPIDEMIA: ICD-10-CM

## 2019-07-11 DIAGNOSIS — Z79.4 TYPE 2 DIABETES MELLITUS WITH HYPERGLYCEMIA, WITH LONG-TERM CURRENT USE OF INSULIN (HCC): ICD-10-CM

## 2019-07-11 DIAGNOSIS — E11.65 TYPE 2 DIABETES MELLITUS WITH HYPERGLYCEMIA, WITH LONG-TERM CURRENT USE OF INSULIN (HCC): ICD-10-CM

## 2019-07-12 RX ORDER — INSULIN DEGLUDEC INJECTION 200 U/ML
INJECTION, SOLUTION SUBCUTANEOUS
Qty: 18 ML | Refills: 11 | Status: SHIPPED | OUTPATIENT
Start: 2019-07-12 | End: 2019-07-23 | Stop reason: SDUPTHER

## 2019-07-16 DIAGNOSIS — E11.65 TYPE 2 DIABETES MELLITUS WITH HYPERGLYCEMIA, WITH LONG-TERM CURRENT USE OF INSULIN (HCC): ICD-10-CM

## 2019-07-16 DIAGNOSIS — E78.2 MIXED HYPERLIPIDEMIA: ICD-10-CM

## 2019-07-16 DIAGNOSIS — Z79.4 TYPE 2 DIABETES MELLITUS WITH HYPERGLYCEMIA, WITH LONG-TERM CURRENT USE OF INSULIN (HCC): ICD-10-CM

## 2019-07-17 ENCOUNTER — TELEPHONE (OUTPATIENT)
Dept: ENDOCRINOLOGY | Age: 59
End: 2019-07-17

## 2019-07-17 LAB
ALBUMIN SERPL-MCNC: 3.7 G/DL (ref 3.5–5.5)
ALBUMIN/CREAT UR: 11 MG/G CREAT (ref 0–30)
ALBUMIN/GLOB SERPL: 1.5 {RATIO} (ref 1.2–2.2)
ALP SERPL-CCNC: 59 IU/L (ref 39–117)
ALT SERPL-CCNC: 33 IU/L (ref 0–32)
AST SERPL-CCNC: 25 IU/L (ref 0–40)
BILIRUB SERPL-MCNC: 0.4 MG/DL (ref 0–1.2)
BUN SERPL-MCNC: 16 MG/DL (ref 6–24)
BUN/CREAT SERPL: 16 (ref 9–23)
CALCIUM SERPL-MCNC: 9.4 MG/DL (ref 8.7–10.2)
CHLORIDE SERPL-SCNC: 98 MMOL/L (ref 96–106)
CHOLEST SERPL-MCNC: 224 MG/DL (ref 100–199)
CO2 SERPL-SCNC: 23 MMOL/L (ref 20–29)
CREAT SERPL-MCNC: 1.01 MG/DL (ref 0.57–1)
CREAT UR-MCNC: 62.7 MG/DL
EST. AVERAGE GLUCOSE BLD GHB EST-MCNC: 272 MG/DL
GLOBULIN SER CALC-MCNC: 2.4 G/DL (ref 1.5–4.5)
GLUCOSE SERPL-MCNC: 541 MG/DL (ref 65–99)
HBA1C MFR BLD: 11.1 % (ref 4.8–5.6)
HDLC SERPL-MCNC: 36 MG/DL
INTERPRETATION, 910389: NORMAL
LDLC SERPL CALC-MCNC: ABNORMAL MG/DL (ref 0–99)
Lab: NORMAL
MICROALBUMIN UR-MCNC: 6.9 UG/ML
POTASSIUM SERPL-SCNC: 5.1 MMOL/L (ref 3.5–5.2)
PROT SERPL-MCNC: 6.1 G/DL (ref 6–8.5)
SODIUM SERPL-SCNC: 139 MMOL/L (ref 134–144)
TRIGL SERPL-MCNC: 467 MG/DL (ref 0–149)
VLDLC SERPL CALC-MCNC: ABNORMAL MG/DL (ref 5–40)

## 2019-07-17 NOTE — TELEPHONE ENCOUNTER
Informed pt of Dr. Jamie Garcia note. Pt verbalized understanding and that she tries to take it consistently but misses some times. She misses her lunch injection a lot, but stated Dr. Timoteo Hamlin is aware. Pt will keep logs and bring them to her appt. No further questions or concerns at this time.

## 2019-07-17 NOTE — TELEPHONE ENCOUNTER
Let pt know that sugars are very high - has been taking insulin consistently     if she is not feeling well - chest pain SOB need to go to ER     If not take the insulin  Consistently , cut down starches and check sugars , write down along with what she is eating , bring it to her appointment

## 2019-07-23 ENCOUNTER — OFFICE VISIT (OUTPATIENT)
Dept: ENDOCRINOLOGY | Age: 59
End: 2019-07-23

## 2019-07-23 VITALS
WEIGHT: 216.4 LBS | RESPIRATION RATE: 16 BRPM | BODY MASS INDEX: 39.82 KG/M2 | HEART RATE: 95 BPM | TEMPERATURE: 97.4 F | SYSTOLIC BLOOD PRESSURE: 179 MMHG | OXYGEN SATURATION: 98 % | HEIGHT: 62 IN | DIASTOLIC BLOOD PRESSURE: 81 MMHG

## 2019-07-23 DIAGNOSIS — E11.65 TYPE 2 DIABETES MELLITUS WITH HYPERGLYCEMIA, WITH LONG-TERM CURRENT USE OF INSULIN (HCC): ICD-10-CM

## 2019-07-23 DIAGNOSIS — I10 ESSENTIAL HYPERTENSION: ICD-10-CM

## 2019-07-23 DIAGNOSIS — Z79.4 TYPE 2 DIABETES MELLITUS WITH HYPERGLYCEMIA, WITH LONG-TERM CURRENT USE OF INSULIN (HCC): ICD-10-CM

## 2019-07-23 DIAGNOSIS — E11.21 TYPE 2 DIABETES WITH NEPHROPATHY (HCC): Primary | ICD-10-CM

## 2019-07-23 DIAGNOSIS — E78.2 MIXED HYPERLIPIDEMIA: ICD-10-CM

## 2019-07-23 DIAGNOSIS — B37.9 YEAST INFECTION: Primary | ICD-10-CM

## 2019-07-23 RX ORDER — INSULIN DEGLUDEC 200 U/ML
INJECTION, SOLUTION SUBCUTANEOUS
Qty: 18 ML | Refills: 11 | Status: SHIPPED | OUTPATIENT
Start: 2019-07-23 | End: 2020-07-27

## 2019-07-23 RX ORDER — PEN NEEDLE, DIABETIC 31 GX3/16"
NEEDLE, DISPOSABLE MISCELLANEOUS
Qty: 400 PEN NEEDLE | Refills: 3 | Status: CANCELLED | OUTPATIENT
Start: 2019-07-23

## 2019-07-23 RX ORDER — FLUCONAZOLE 150 MG/1
150 TABLET ORAL DAILY
Qty: 1 TAB | Refills: 0 | Status: SHIPPED | OUTPATIENT
Start: 2019-07-23 | End: 2019-07-24

## 2019-07-23 RX ORDER — PEN NEEDLE, DIABETIC 31 GX3/16"
NEEDLE, DISPOSABLE MISCELLANEOUS
Qty: 400 PEN NEEDLE | Refills: 11 | Status: SHIPPED | OUTPATIENT
Start: 2019-07-23 | End: 2020-09-21

## 2019-07-23 NOTE — LETTER
7/24/19 Patient: Anibal Lyles YOB: 1960 Date of Visit: 7/23/2019 Gurmeet Montoya MD 
[address] [facility] [address] VIA Facsimile: [phone] Dear Gurmeet Montoya MD, Thank you for referring Ms. Mary Morales to [id number] [place] [address] for evaluation. My notes for this consultation are attached. If you have questions, please do not hesitate to call me. I look forward to following your patient along with you. Sincerely, Sarah Hollingsworth MD

## 2019-07-23 NOTE — PROGRESS NOTES
[facility]               Aleksandra Sanchez MD        [address] 78 [phone] Fax [phone]               Patient Information  Date:7/23/2019  Name : Mily Abdul 60 y.o.     YOB: 1960         PCP : Aurora Ling MD         Chief Complaint   Patient presents with    Diabetes     follow up       History of Present Illness: Mily Abdul is a 60 y.o. female here for fu of  Type 2 Diabetes Mellitus. She was managed by Dr. Sea Angel, endocrinologist.  She has longstanding history of diabetes which was uncontrolled. She has underlying obstructive sleep apnea and hypertension. She has microvascular disease. CABG in 2009. She was hospitalized with abdominal complaints and was diagnosed with gastroparesis, however she was on Trulicity at that time. She is missing afternoon insulin  She is checking the blood glucose intermittently  Blood glucose are ranging from 250-300, diet is high in carbohydrates    Complains of weakness, no polyuria, polydipsia  No chest pain  Complains of vaginal itching, discharge            No hypoglycemia. She has polyuria, polydipsia and weakness.           Wt Readings from Last 3 Encounters:   07/23/19 216 lb 6.4 oz (98.2 kg)   01/07/19 215 lb 14.4 oz (97.9 kg)   06/11/18 200 lb 6.4 oz (90.9 kg)       BP Readings from Last 3 Encounters:   07/23/19 179/81   01/07/19 159/74   06/11/18 147/60           Past Medical History:   Diagnosis Date    Arthritis     CAD (coronary artery disease) 2009    Hx of 2 MI's and then CABG 5/2009    Diabetes ([facility] [address]     IDDM type 3    Dyslipidemia     Hypertension     Other ill-defined conditions(799.89)     gastroparesis     Unspecified sleep apnea     no cpap repeat study sched for 9/20/13     Current Outpatient Medications   Medication Sig    insulin lispro (HUMALOG KWIKPEN INSULIN) 200 unit/mL (3 mL) inpn INJECT 30 TO 40 UNITS SUBCUTANEOUSLY BEFORE MEALS WITH sliding scale (max 165 UNITS DAILY) STOP novolog 70/30    TRESIBA FLEXTOUCH U-200 200 unit/mL (3 mL) inpn INJECT 100 UNITS SUBCUTANEOUSLY EVERY DAY    digoxin (LANOXIN) 0.25 mg/5 mL (5 mL) oral solution 5 mL.  erythromycin base (E-MYCIN) 250 mg tablet erythromycin 250 mg tablet    XIGDUO XR 5-1,000 mg TBph TAKE ONE TABLET BY MOUTH TWICE DAILY WITH MEALS    metoclopramide HCl (REGLAN) 10 mg tablet Take 10 mg by mouth Before breakfast, lunch, dinner and at bedtime.  insulin syringe-needle U-100 1 mL 31 gauge x 15/64\" syrg 1 Syringe by Does Not Apply route three (3) times daily.  glucose blood VI test strips (ONETOUCH ULTRA TEST) strip Test blood glucose 3 times daily Dx Code: E11.65    aspirin 81 mg chewable tablet Take 81 mg by mouth nightly.  clopidogrel (PLAVIX) 75 mg tablet Take 75 mg by mouth daily.  furosemide (LASIX) 40 mg tablet Take 40 mg by mouth as needed.  digoxin (LANOXIN) 0.25 mg tablet Take 0.25 mg by mouth daily.  enalapril (VASOTEC) 10 mg tablet Take 20 mg by mouth daily.  potassium chloride (KLOR-CON M20) 20 mEq tablet Take 20 mEq by mouth daily.  alirocumab (PRALUENT PEN) 75 mg/mL injector pen 75 mg by SubCUTAneous route Once every 2 weeks.  evolocumab (REPATHA SURECLICK) pen injection by SubCUTAneous route.  sucralfate (CARAFATE) 1 gram tablet Take 1 g by mouth four (4) times daily.  carvedilol (COREG) 12.5 mg tablet Take 25 mg by mouth daily.  atorvastatin (LIPITOR) 20 mg tablet Take 20 mg by mouth Daily (before breakfast). No current facility-administered medications for this visit.       No Known Allergies      Review of Systems:  -   - Respiratory: no cough no shortness of breath  - Gastrointestinal: no dysphagia no  abdominal pain  - Musculoskeletal: no joint pains +  weakness  - Integumentary: no rashes  - Neurological: no numbness, tingling, + headaches  - Psychiatric: no depression no  anxiety  - Endocrine: no heat or cold intolerance    Physical Examination:   Blood pressure 179/81, pulse 95, temperature 97.4 °F (36.3 °C), temperature source Oral, resp. rate 16, height 5' 2\" (1.575 m), weight 216 lb 6.4 oz (98.2 kg), last menstrual period 06/25/2005, SpO2 98 %. Estimated body mass index is 39.58 kg/m² as calculated from the following:    Height as of this encounter: 5' 2\" (1.575 m). -   Weight as of this encounter: 216 lb 6.4 oz (98.2 kg).   - General: pleasant, no distress, good eye contact  - HEENT: no pallor, no periorbital edema, EOMI  - Neck: supple,   - Cardiovascular: regular, normal rate, normal S1 and S2,   - Respiratory: clear to auscultation bilaterally  - Gastrointestinal: soft, nontender, nondistended,  BS +  - Musculoskeletal: no proximal muscle weakness in upper or lower extremities  - Integumentary:  +edema,   - Neurological:alert and oriented  - Psychiatric: normal mood and affect  - Skin: color, texture, turgor normal.     Diabetic foot exam: July 2019    Left Foot:   Visual Exam: normal    Pulse DP: 2+ (normal)   Filament test: reduced sensation    Vibratory sensation: absent      Right Foot:   Visual Exam: normal    Pulse DP: 2+ (normal)   Filament test: reduced sensation    Vibratory sensation: absent    Lab Results   Component Value Date/Time    Hemoglobin A1c 11.1 (H) 07/16/2019 03:38 PM    Hemoglobin A1c 8.7 (H) 05/14/2018 03:24 PM    Hemoglobin A1c, External 9.5 10/14/2018    Glucose 541 (>) 07/16/2019 03:38 PM    Glucose (POC) 246 (H) 09/13/2013 09:09 AM    Glucose  12/18/2017 03:05 PM    Microalb/Creat ratio (ug/mg creat.) 11.0 07/16/2019 03:38 PM    LDL, calculated Comment 07/16/2019 03:38 PM    Creatinine (POC) 0.9 06/20/2012 11:40 AM    Creatinine 1.01 (H) 07/16/2019 03:38 PM      Lab Results   Component Value Date/Time    GFR est non-AA 61 07/16/2019 03:38 PM    GFRNA, POC >60 06/20/2012 11:40 AM    GFR est AA 70 07/16/2019 03:38 PM    GFRAA, POC >60 06/20/2012 11:40 AM    Creatinine 1.01 (H) 07/16/2019 03:38 PM    Creatinine (POC) 0.9 06/20/2012 11:40 AM    BUN 16 07/16/2019 03:38 PM    Sodium 139 07/16/2019 03:38 PM    Potassium 5.1 07/16/2019 03:38 PM    Chloride 98 07/16/2019 03:38 PM    CO2 23 07/16/2019 03:38 PM         Data Reviewed:       Assessment/Plan:     1. Type 2 diabetes with nephropathy ([address]    2. Type 2 diabetes mellitus with hyperglycemia, with long-term current use of insulin ([facility])        1. Type 2 Diabetes Mellitus   Lab Results   Component Value Date/Time    Hemoglobin A1c 11.1 (H) 07/16/2019 03:38 PM    Hemoglobin A1c (POC) 9.4 12/18/2017 03:05 PM    Hemoglobin A1c, External 9.5 10/14/2018     Poorly controlled diabetes mellitus, dietary noncompliance  Was diagnosed with gastroparesis however was on GLP-1 agonist.    Severe hyperglycemia  Stressed the importance of low carbohydrate diet. Tresiba 110 units  NovoLog 40 units before each meal.  Compliance with insulin dietary plan given, she needs to go on low-carb diet, has difficulty reducing the carbs  Insulin requirement is increasing    FLU annually ,Pneumovax ,aspirin daily,annual eye exam,microalbumin    2. HTN : Continue current therapy     3. Hyperlipidemia : Managed by cardiology  Triglycerides are high due to uncontrolled diabetes mellitus  Was on Repatha, now on statin  Low-carb diet, good glycemic control    4. Obesity:Body mass index is 39.58 kg/m². Discussed about the importance of exercise and carbohydrate portion control. 5.  CAD-followed by cardiology Dr Maisha Welsh,    ANAHI normal 2019    Vaginal candidiasis: Diflucan        Thank you for allowing me to participate in the care of this patient.     Jesus Hedrick MD      Patient verbalized understanding

## 2019-07-23 NOTE — PROGRESS NOTES
1. Have you been to the ER, urgent care clinic since your last visit? Hospitalized since your last visit? No    2. Have you seen or consulted any other health care providers outside of the [address] [place] [address] since your last visit? Include any pap smears or colon screening. No     Chief Complaint   Patient presents with    Diabetes     follow up     Learning assessment complete  Abuse Screening Questionnaire 7/23/2019   Do you ever feel afraid of your partner? N   Are you in a relationship with someone who physically or mentally threatens you? N   Is it safe for you to go home? Y     Wt Readings from Last 3 Encounters:   07/23/19 216 lb 6.4 oz (98.2 kg)   01/07/19 215 lb 14.4 oz (97.9 kg)   06/11/18 200 lb 6.4 oz (90.9 kg)     Temp Readings from Last 3 Encounters:   07/23/19 97.4 °F (36.3 °C) (Oral)   01/07/19 99.2 °F (37.3 °C) (Oral)   06/11/18 97.9 °F (36.6 °C) (Oral)     BP Readings from Last 3 Encounters:   07/23/19 179/81   01/07/19 159/74   06/11/18 147/60     Pulse Readings from Last 3 Encounters:   07/23/19 95   01/07/19 85   06/11/18 96     Lab Results   Component Value Date/Time    Hemoglobin A1c 11.1 (H) 07/16/2019 03:38 PM    Hemoglobin A1c (POC) 9.4 12/18/2017 03:05 PM    Hemoglobin A1c, External 9.5 10/14/2018     Medication reviewed with patient to the best of his ability and nurse document \"not taking\" and/or \"taking\" based on patients response.

## 2019-07-23 NOTE — PATIENT INSTRUCTIONS
Foods to Eat  You should base the majority of your meals around these foods:   Meat: Red meat, steak, ham, sausage, burris, chicken and turkey. Fatty fish: Such as salmon, trout, tuna and mackerel. Eggs: Look for pastured or omega-3 whole eggs. Butter and cream: Look for grass-fed when possible. Cheese: Unprocessed cheese (cheddar, goat, cream, blue or mozzarella). Nuts and seeds: Almonds, walnuts, flaxseeds, pumpkin seeds, ernst seeds, etc.   Healthy oils: Primarily extra virgin olive oil, coconut oil and avocado oil. Avocados: Whole avocados or freshly made guacamole. Low-carb veggies: Most green veggies, tomatoes, onions, peppers, etc.   Condiments: You can use salt, pepper and various healthy herbs and spices. A Sample Meal Plan For 1 Week    Monday  Breakfast: [employer] Gaw, eggs and tomatoes. Lunch: Chicken salad with olive oil and feta cheese. Dinner: [employer] [place] with asparagus cooked in butter. Tuesday  Breakfast: Egg, tomato, basil and goat cheese omelet. Lunch: [place] milk, peanut butter, cocoa powder and stevia milkshake. Dinner: Meatballs, cheddar cheese and vegetables. Wednesday  Breakfast: Premier protein 2 + egg   Lunch: Shrimp salad with olive oil and avocado. Dinner: [employer] with Parmesan cheese, broccoli and salad. Thursday  Breakfast: Omelet with avocado, salsa, peppers, onion and spices. Lunch: A handful of nuts and celery sticks with guacamole and salsa. Dinner: Chicken stuffed with pesto and cream cheese, along with vegetables. Friday  Breakfast: Sugar-free yogurt with peanut butter, cocoa powder and stevia. Lunch: Beef stir-gomez cooked in coconut oil with vegetables. Dinner: [employer] with burris, egg and cheese. Saturday  Breakfast: Ham and cheese omelet with vegetables. Lunch: Ham and cheese slices with nuts. Dinner: White fish, egg and spinach cooked in coconut oil. Sunday  Breakfast: Fried eggs with burris and mushrooms.    Lunch: Katie [employer]lar with salsa, cheese and guacamole. Dinner: Steak and eggs with a side salad.         Tresiba 110 units in AM     Novolog 40  units after meals     Additional Novolog fo high sugars     Blood sugar  Breakfast/Lunch/Dinner        130-200  Add  3  Units       201-250  Add  6 Units       251-300  Add  9 Units       301-350  Add 12  Units        351-400  Add 15  Units       If you change the diet and have low sugars     Tresiba 80 units     Humalog 25 units before meals

## 2019-07-25 ENCOUNTER — TELEPHONE (OUTPATIENT)
Dept: ENDOCRINOLOGY | Age: 59
End: 2019-07-25

## 2019-07-25 DIAGNOSIS — E78.2 MIXED HYPERLIPIDEMIA: ICD-10-CM

## 2019-07-25 DIAGNOSIS — E11.65 TYPE 2 DIABETES MELLITUS WITH HYPERGLYCEMIA, WITH LONG-TERM CURRENT USE OF INSULIN (HCC): ICD-10-CM

## 2019-07-25 DIAGNOSIS — Z79.4 TYPE 2 DIABETES MELLITUS WITH HYPERGLYCEMIA, WITH LONG-TERM CURRENT USE OF INSULIN (HCC): ICD-10-CM

## 2019-07-25 NOTE — TELEPHONE ENCOUNTER
Pt stated all the freestyle tomi supplies were to go to [facility] , they were sent to [place].  Please resend to obie

## 2019-07-31 DIAGNOSIS — E11.65 UNCONTROLLED TYPE 2 DIABETES MELLITUS WITH HYPERGLYCEMIA, WITH LONG-TERM CURRENT USE OF INSULIN (HCC): ICD-10-CM

## 2019-07-31 DIAGNOSIS — Z79.4 UNCONTROLLED TYPE 2 DIABETES MELLITUS WITH HYPERGLYCEMIA, WITH LONG-TERM CURRENT USE OF INSULIN (HCC): ICD-10-CM

## 2019-08-26 ENCOUNTER — OFFICE VISIT (OUTPATIENT)
Dept: ENDOCRINOLOGY | Age: 59
End: 2019-08-26

## 2019-08-26 VITALS
SYSTOLIC BLOOD PRESSURE: 173 MMHG | TEMPERATURE: 97.8 F | BODY MASS INDEX: 40.67 KG/M2 | HEART RATE: 91 BPM | WEIGHT: 221 LBS | RESPIRATION RATE: 14 BRPM | OXYGEN SATURATION: 97 % | HEIGHT: 62 IN | DIASTOLIC BLOOD PRESSURE: 73 MMHG

## 2019-08-26 DIAGNOSIS — E78.2 MIXED HYPERLIPIDEMIA: ICD-10-CM

## 2019-08-26 DIAGNOSIS — Z79.4 TYPE 2 DIABETES MELLITUS WITH HYPERGLYCEMIA, WITH LONG-TERM CURRENT USE OF INSULIN (HCC): Primary | ICD-10-CM

## 2019-08-26 DIAGNOSIS — E11.65 TYPE 2 DIABETES MELLITUS WITH HYPERGLYCEMIA, WITH LONG-TERM CURRENT USE OF INSULIN (HCC): Primary | ICD-10-CM

## 2019-08-26 NOTE — PROGRESS NOTES
[facility] ENDOCRINOLOGY               Diana Fernandez MD        [address] [phone] Fax [phone]               Patient Information  Date:8/26/2019  Name : Erwin Sun 60 y.o.     YOB: 1960         PCP : Ita Disla MD         Chief Complaint   Patient presents with    Diabetes       History of Present Illness: Erwin Sun is a 60 y.o. female here for fu of  Type 2 Diabetes Mellitus. She has long-standing history of diabetes mellitus. She has underlying obstructive sleep apnea and hypertension. She has microvascular disease. CABG in 2009. She was hospitalized with abdominal complaints and was diagnosed with gastroparesis, however she was on Trulicity at that time.   However prior to that reports to have been diagnosed with gastroparesis when she was not on Trulicity    Since the last visit she has changed to low-carb diet, requiring less insulin, the blood glucose has significantly improved  She is checking the blood glucose 4 times daily  Motivated after seeing the good blood glucose readings  Few episodes of hypoglycemia          Wt Readings from Last 3 Encounters:   08/26/19 221 lb (100.2 kg)   07/23/19 216 lb 6.4 oz (98.2 kg)   01/07/19 215 lb 14.4 oz (97.9 kg)       BP Readings from Last 3 Encounters:   08/26/19 173/73   07/23/19 179/81   01/07/19 159/74           Past Medical History:   Diagnosis Date    Arthritis     CAD (coronary artery disease) 2009    Hx of 2 MI's and then CABG 5/2009    Diabetes ([facility] [address]     IDDM type 3    Dyslipidemia     Hypertension     Other ill-defined conditions(799.89)     gastroparesis     Unspecified sleep apnea     no cpap repeat study sched for 9/20/13     Current Outpatient Medications   Medication Sig    ONETOUCH ULTRA BLUE TEST STRIP strip TEST BLOOD GLUCOSE THREE TIMES A DAY    insulin degludec (TRESIBA FLEXTOUCH U-200) 200 unit/mL (3 mL) inpn INJECT 110 UNITS SUBCUTANEOUSLY EVERY DAY    insulin lispro (HUMALOG KWIKPEN INSULIN) 200 unit/mL (3 mL) inpn INJECT 40 UNITS SUBCUTANEOUSLY BEFORE MEALS WITH sliding scale (max 165 UNITS DAILY) STOP novolog 70/30    Insulin Needles, Disposable, (RENE PEN NEEDLE) 32 gauge x 5/32\" ndle USe with insulin 4 times a day    XIGDUO XR 5-1,000 mg TBph TAKE ONE TABLET BY MOUTH TWICE DAILY WITH MEALS    metoclopramide HCl (REGLAN) 10 mg tablet Take 10 mg by mouth Before breakfast, lunch, dinner and at bedtime.  insulin syringe-needle U-100 1 mL 31 gauge x 15/64\" syrg 1 Syringe by Does Not Apply route three (3) times daily.  aspirin 81 mg chewable tablet Take 81 mg by mouth nightly.  carvedilol (COREG) 12.5 mg tablet Take 25 mg by mouth daily.  clopidogrel (PLAVIX) 75 mg tablet Take 75 mg by mouth daily.  furosemide (LASIX) 40 mg tablet Take 40 mg by mouth as needed.  digoxin (LANOXIN) 0.25 mg tablet Take 0.25 mg by mouth daily.  enalapril (VASOTEC) 10 mg tablet Take 20 mg by mouth daily.  potassium chloride (KLOR-CON M20) 20 mEq tablet Take 20 mEq by mouth daily.  flash glucose sensor (FREESTYLE OMARI 14 DAY SENSOR) kit Use to check BG 4 times daily. Dx code E11.65    flash glucose scanning reader (FREESTYLE OMARI 14 DAY READER) misc Use to check BG 4 times daily. Dx code E11.65    alirocumab (PRALUENT PEN) 75 mg/mL injector pen 75 mg by SubCUTAneous route Once every 2 weeks.  digoxin (LANOXIN) 0.25 mg/5 mL (5 mL) oral solution 5 mL.  erythromycin base (E-MYCIN) 250 mg tablet erythromycin 250 mg tablet    evolocumab (REPATHA SURECLICK) pen injection by SubCUTAneous route.  sucralfate (CARAFATE) 1 gram tablet Take 1 g by mouth four (4) times daily. No current facility-administered medications for this visit.       No Known Allergies      Review of Systems:  -   - Respiratory: no cough no shortness of breath  - Gastrointestinal: no dysphagia no  abdominal pain  - Musculoskeletal: no joint pains +  weakness  - Integumentary: no rashes  - Neurological: no numbness, tingling, + headaches  - Psychiatric: no depression no  anxiety  - Endocrine: no heat or cold intolerance    Physical Examination:   Blood pressure 173/73, pulse 91, temperature 97.8 °F (36.6 °C), temperature source Oral, resp. rate 14, height 5' 2\" (1.575 m), weight 221 lb (100.2 kg), last menstrual period 06/25/2005, SpO2 97 %. Estimated body mass index is 40.42 kg/m² as calculated from the following:    Height as of this encounter: 5' 2\" (1.575 m). -   Weight as of this encounter: 221 lb (100.2 kg).   - General: pleasant, no distress, good eye contact  - HEENT: no pallor, no periorbital edema, EOMI  - Neck: supple,   - Cardiovascular: regular, normal rate, normal S1 and S2,   - Respiratory: clear to auscultation bilaterally  - Gastrointestinal: soft, nontender, nondistended,  BS +  - Musculoskeletal: no proximal muscle weakness in upper or lower extremities  - Integumentary:  +edema,   - Neurological:alert and oriented  - Psychiatric: normal mood and affect  - Skin: color, texture, turgor normal.     Diabetic foot exam: July 2019    Left Foot:   Visual Exam: normal    Pulse DP: 2+ (normal)   Filament test: reduced sensation    Vibratory sensation: absent      Right Foot:   Visual Exam: normal    Pulse DP: 2+ (normal)   Filament test: reduced sensation    Vibratory sensation: absent    Lab Results   Component Value Date/Time    Hemoglobin A1c 11.1 (H) 07/16/2019 03:38 PM    Hemoglobin A1c 8.7 (H) 05/14/2018 03:24 PM    Hemoglobin A1c, External 9.5 10/14/2018    Glucose 541 (>) 07/16/2019 03:38 PM    Glucose (POC) 246 (H) 09/13/2013 09:09 AM    Glucose  12/18/2017 03:05 PM    Microalb/Creat ratio (ug/mg creat.) 11.0 07/16/2019 03:38 PM    LDL, calculated Comment 07/16/2019 03:38 PM    Creatinine (POC) 0.9 06/20/2012 11:40 AM    Creatinine 1.01 (H) 07/16/2019 03:38 PM      Lab Results   Component Value Date/Time    GFR est non-AA 61 07/16/2019 03:38 PM    GFRNA, POC >60 06/20/2012 11:40 AM    GFR est AA 70 07/16/2019 03:38 PM    GFRAA, POC >60 06/20/2012 11:40 AM    Creatinine 1.01 (H) 07/16/2019 03:38 PM    Creatinine (POC) 0.9 06/20/2012 11:40 AM    BUN 16 07/16/2019 03:38 PM    Sodium 139 07/16/2019 03:38 PM    Potassium 5.1 07/16/2019 03:38 PM    Chloride 98 07/16/2019 03:38 PM    CO2 23 07/16/2019 03:38 PM         Data Reviewed:       Assessment/Plan:     No diagnosis found. 1. Type 2 Diabetes Mellitus   Lab Results   Component Value Date/Time    Hemoglobin A1c 11.1 (H) 07/16/2019 03:38 PM    Hemoglobin A1c (POC) 9.4 12/18/2017 03:05 PM    Hemoglobin A1c, External 9.5 10/14/2018     Improvement in the blood glucose  ? Gastroparesis, followed by a GI, reports to have had gastroparesis before being on GLP-1 agonist  Tresiba 100  NovoLog 30-40 units before each meal.    Expect A1c to improve if she continues the same dietary plan    FLU annually ,Pneumovax ,aspirin daily,annual eye exam,microalbumin    2. HTN : Continue current therapy     3. Hyperlipidemia : Managed by cardiology  Triglycerides are high due to uncontrolled diabetes mellitus  Was on Repatha, now on statin      4. Obesity:Body mass index is 40.42 kg/m². Discussed about the importance of exercise and carbohydrate portion control. 5.  CAD-followed by cardiology Dr Karlene Patel,    ANAHI normal 2019            Thank you for allowing me to participate in the care of this patient.     Meron Laughlin MD      Patient verbalized understanding

## 2019-08-26 NOTE — PROGRESS NOTES
Carol Ann Johnson is a 60 y.o. female here for   Chief Complaint   Patient presents with    Diabetes       Functional glucose monitor and record keeping system? -yes   Eye exam within last year? - on file  Foot exam within last year? - on file    1. Have you been to the ER, urgent care clinic since your last visit? Hospitalized since your last visit? -no    2. Have you seen or consulted any other health care providers outside of the [address] [place] [address] since your last visit? Include any pap smears or colon screening. -PCP

## 2019-08-26 NOTE — PATIENT INSTRUCTIONS
Foods to Eat  You should base the majority of your meals around these foods:   Meat: Red meat, steak, ham, sausage, burris, chicken and turkey. Fatty fish: Such as salmon, trout, tuna and mackerel. Eggs: Look for pastured or omega-3 whole eggs. Butter and cream: Look for grass-fed when possible. Cheese: Unprocessed cheese (cheddar, goat, cream, blue or mozzarella). Nuts and seeds: Almonds, walnuts, flaxseeds, pumpkin seeds, ernst seeds, etc.   Healthy oils: Primarily extra virgin olive oil, coconut oil and avocado oil. Avocados: Whole avocados or freshly made guacamole. Low-carb veggies: Most green veggies, tomatoes, onions, peppers, etc.   Condiments: You can use salt, pepper and various healthy herbs and spices. A Sample Meal Plan For 1 Week    Monday  Breakfast: [place] [employer], eggs and tomatoes. Lunch: Chicken salad with olive oil and feta cheese. Dinner: [employer] with asparagus cooked in butter. Tuesday  Breakfast: Egg, tomato, basil and goat cheese omelet. Lunch: [place] milk, peanut butter, cocoa powder and stevia milkshake. Dinner: Meatballs, cheddar cheese and vegetables. Wednesday  Breakfast: Premier protein 2 + egg   Lunch: Shrimp salad with olive oil and avocado. Dinner: [place] with Parmesan cheese, broccoli and salad. Thursday  Breakfast: Omelet with avocado, salsa, peppers, onion and spices. Lunch: A handful of nuts and celery sticks with guacamole and salsa. Dinner: Chicken stuffed with pesto and cream cheese, along with vegetables. Friday  Breakfast: Sugar-free yogurt with peanut butter, cocoa powder and stevia. Lunch: Beef stir-gomez cooked in coconut oil with vegetables. Dinner: [employer] with burris, egg and cheese. Saturday  Breakfast: Ham and cheese omelet with vegetables. Lunch: Ham and cheese slices with nuts. Dinner: White fish, egg and spinach cooked in coconut oil. Sunday  Breakfast: Fried eggs with burris and mushrooms.    Lunch: [employer] with salsa, cheese and guacamole. Dinner: Steak and eggs with a side salad.         Tresiba 100 units in AM     Novolog 40  units after meals     Additional Novolog fo high sugars     Blood sugar  Breakfast/Lunch/Dinner        130-200  Add  3  Units       201-250  Add  6 Units       251-300  Add  9 Units       301-350  Add 12  Units        351-400  Add 15  Units

## 2019-08-26 NOTE — LETTER
8/26/19 Patient: Anibal Lyles YOB: 1960 Date of Visit: 8/26/2019 Gurmeet Montoya MD 
[address] [facility] [address] VIA Facsimile: [phone] Dear Gurmeet Montoya MD, Thank you for referring Ms. Mary Morales to [id number] [place] [address] for evaluation. My notes for this consultation are attached. If you have questions, please do not hesitate to call me. I look forward to following your patient along with you. Sincerely, Sarah Hollingsworth MD

## 2019-11-09 ENCOUNTER — ED HISTORICAL/CONVERTED ENCOUNTER (OUTPATIENT)
Dept: OTHER | Age: 59
End: 2019-11-09

## 2019-11-24 ENCOUNTER — OP HISTORICAL/CONVERTED ENCOUNTER (OUTPATIENT)
Dept: OTHER | Age: 59
End: 2019-11-24

## 2020-03-19 ENCOUNTER — ED HISTORICAL/CONVERTED ENCOUNTER (OUTPATIENT)
Dept: OTHER | Age: 60
End: 2020-03-19

## 2020-06-07 ENCOUNTER — IP HISTORICAL/CONVERTED ENCOUNTER (OUTPATIENT)
Dept: OTHER | Age: 60
End: 2020-06-07

## 2020-07-04 ENCOUNTER — ED HISTORICAL/CONVERTED ENCOUNTER (OUTPATIENT)
Dept: OTHER | Age: 60
End: 2020-07-04

## 2020-07-14 ENCOUNTER — OP HISTORICAL/CONVERTED ENCOUNTER (OUTPATIENT)
Dept: OTHER | Age: 60
End: 2020-07-14

## 2020-07-29 ENCOUNTER — OP HISTORICAL/CONVERTED ENCOUNTER (OUTPATIENT)
Dept: OTHER | Age: 60
End: 2020-07-29

## 2020-08-05 ENCOUNTER — TELEPHONE (OUTPATIENT)
Dept: ENDOCRINOLOGY | Age: 60
End: 2020-08-05

## 2020-08-05 NOTE — TELEPHONE ENCOUNTER
Attempted to call. Unsuccessful. Pt's full name on VM. Left detailed msg for Amanda Hathaway informing her Humalog was sent to [facility] this AM. A callback number was left for any questions or concerns.

## 2020-08-05 NOTE — TELEPHONE ENCOUNTER
----- Message from Sachi Jaeger sent at 8/5/2020  9:34 AM EDT -----  Regarding: Dr. Karla Hernandez first and last name:      Reason for call:  Follow up on request to refill Rx '' Humalog\" that was sent to [address] [place] [address] required yes/no and why: yes      Best contact number(s): [phone]      Details to clarify the request:      Sachi Jaeger

## 2020-09-21 ENCOUNTER — HOSPITAL ENCOUNTER (OUTPATIENT)
Age: 60
Setting detail: OBSERVATION
Discharge: HOME | End: 2020-09-22
Attending: EMERGENCY MEDICINE | Admitting: HOSPITALIST
Payer: COMMERCIAL

## 2020-09-21 DIAGNOSIS — E87.6 HYPOKALEMIA: ICD-10-CM

## 2020-09-21 DIAGNOSIS — K31.84 GASTROPARESIS: Primary | ICD-10-CM

## 2020-09-21 PROBLEM — E11.43 DIABETIC GASTROPARESIS (HCC): Status: ACTIVE | Noted: 2020-09-21

## 2020-09-21 LAB
ALBUMIN SERPL-MCNC: 3.4 G/DL (ref 3.5–5)
ALBUMIN/GLOB SERPL: 0.9 {RATIO} (ref 1.1–2.2)
ALP SERPL-CCNC: 48 U/L (ref 45–117)
ALT SERPL-CCNC: 20 U/L (ref 12–78)
ANION GAP SERPL CALC-SCNC: 7 MMOL/L (ref 5–15)
APPEARANCE UR: CLEAR
AST SERPL W P-5'-P-CCNC: 18 U/L (ref 15–37)
BACTERIA URNS QL MICRO: NEGATIVE /HPF
BASOPHILS # BLD: 0 K/UL (ref 0–0.1)
BASOPHILS NFR BLD: 0 % (ref 0–1)
BILIRUB SERPL-MCNC: 0.7 MG/DL (ref 0.2–1)
BILIRUB UR QL: NEGATIVE
BUN SERPL-MCNC: 9 MG/DL (ref 6–20)
BUN/CREAT SERPL: 13 (ref 12–20)
CA-I BLD-MCNC: 9.7 MG/DL (ref 8.5–10.1)
CHLORIDE SERPL-SCNC: 102 MMOL/L (ref 97–108)
CO2 SERPL-SCNC: 30 MMOL/L (ref 21–32)
COLOR UR: YELLOW
CREAT SERPL-MCNC: 0.7 MG/DL (ref 0.55–1.02)
DIFFERENTIAL METHOD BLD: ABNORMAL
EOSINOPHIL # BLD: 0 K/UL (ref 0–0.4)
EOSINOPHIL NFR BLD: 0 % (ref 0–7)
ERYTHROCYTE [DISTWIDTH] IN BLOOD BY AUTOMATED COUNT: 13.6 % (ref 11.5–14.5)
GLOBULIN SER CALC-MCNC: 3.8 G/DL (ref 2–4)
GLUCOSE BLD STRIP.AUTO-MCNC: 132 MG/DL (ref 65–100)
GLUCOSE SERPL-MCNC: 134 MG/DL (ref 65–100)
GLUCOSE UR STRIP.AUTO-MCNC: >300 MG/DL
HCT VFR BLD AUTO: 43.5 % (ref 35–47)
HGB BLD-MCNC: 14.7 % (ref 11.5–16)
HGB UR QL STRIP: NEGATIVE
IMM GRANULOCYTES # BLD AUTO: 0 K/UL (ref 0–0.04)
IMM GRANULOCYTES NFR BLD AUTO: 0 % (ref 0–0.5)
KETONES UR QL STRIP.AUTO: 20 MG/DL
LEUKOCYTE ESTERASE UR QL STRIP.AUTO: NEGATIVE
LIPASE SERPL-CCNC: 54 U/L (ref 73–393)
LYMPHOCYTES # BLD: 1.1 K/UL (ref 0.8–3.5)
LYMPHOCYTES NFR BLD: 11 % (ref 12–49)
MCH RBC QN AUTO: 30.8 PG (ref 26–34)
MCHC RBC AUTO-ENTMCNC: 33.8 G/DL (ref 30–36.5)
MCV RBC AUTO: 91 FL (ref 80–99)
MONOCYTES # BLD: 0.6 K/UL (ref 0–1)
MONOCYTES NFR BLD: 6 % (ref 5–13)
MUCOUS THREADS URNS QL MICRO: ABNORMAL /LPF
NEUTS SEG # BLD: 8.2 K/UL (ref 1.8–8)
NEUTS SEG NFR BLD: 83 % (ref 32–75)
NITRITE UR QL STRIP.AUTO: NEGATIVE
PERFORMED BY, TECHID: ABNORMAL
PH UR STRIP: 7 [PH] (ref 5–8)
PLATELET # BLD AUTO: 267 K/UL (ref 150–400)
PMV BLD AUTO: 11.5 FL (ref 8.9–12.9)
POTASSIUM SERPL-SCNC: 3.2 MMOL/L (ref 3.5–5.1)
PROT SERPL-MCNC: 7.2 G/DL (ref 6.4–8.2)
PROT UR STRIP-MCNC: 100 MG/DL
RBC # BLD AUTO: 4.78 M/UL (ref 3.8–5.2)
RBC #/AREA URNS HPF: ABNORMAL /HPF (ref 0–5)
SODIUM SERPL-SCNC: 139 MMOL/L (ref 136–145)
SP GR UR REFRACTOMETRY: 1.03 (ref 1–1.03)
TROPONIN I SERPL-MCNC: <0.05 NG/ML
UROBILINOGEN UR QL STRIP.AUTO: 2 EU/DL (ref 0.1–1)
WBC # BLD AUTO: 9.9 K/UL (ref 3.6–11)
WBC URNS QL MICRO: ABNORMAL /HPF (ref 0–4)

## 2020-09-21 PROCEDURE — 83690 ASSAY OF LIPASE: CPT

## 2020-09-21 PROCEDURE — 74011250636 HC RX REV CODE- 250/636: Performed by: EMERGENCY MEDICINE

## 2020-09-21 PROCEDURE — 81001 URINALYSIS AUTO W/SCOPE: CPT

## 2020-09-21 PROCEDURE — 99285 EMERGENCY DEPT VISIT HI MDM: CPT

## 2020-09-21 PROCEDURE — 80053 COMPREHEN METABOLIC PANEL: CPT

## 2020-09-21 PROCEDURE — 84484 ASSAY OF TROPONIN QUANT: CPT

## 2020-09-21 PROCEDURE — 99218 HC RM OBSERVATION: CPT

## 2020-09-21 PROCEDURE — 93005 ELECTROCARDIOGRAM TRACING: CPT

## 2020-09-21 PROCEDURE — 74011636637 HC RX REV CODE- 636/637: Performed by: HOSPITALIST

## 2020-09-21 PROCEDURE — 82962 GLUCOSE BLOOD TEST: CPT

## 2020-09-21 PROCEDURE — 85025 COMPLETE CBC W/AUTO DIFF WBC: CPT

## 2020-09-21 PROCEDURE — 96372 THER/PROPH/DIAG INJ SC/IM: CPT

## 2020-09-21 PROCEDURE — 36415 COLL VENOUS BLD VENIPUNCTURE: CPT

## 2020-09-21 RX ORDER — PANTOPRAZOLE SODIUM 40 MG/1
40 TABLET, DELAYED RELEASE ORAL
Status: DISCONTINUED | OUTPATIENT
Start: 2020-09-22 | End: 2020-09-23 | Stop reason: HOSPADM

## 2020-09-21 RX ORDER — POTASSIUM CHLORIDE 1.5 G/1.77G
40 POWDER, FOR SOLUTION ORAL ONCE
Status: DISCONTINUED | OUTPATIENT
Start: 2020-09-21 | End: 2020-09-21

## 2020-09-21 RX ORDER — POTASSIUM CHLORIDE 20 MEQ/1
20 TABLET, EXTENDED RELEASE ORAL DAILY
Status: DISCONTINUED | OUTPATIENT
Start: 2020-09-22 | End: 2020-09-23 | Stop reason: HOSPADM

## 2020-09-21 RX ORDER — CARVEDILOL 3.12 MG/1
1 TABLET ORAL 2 TIMES DAILY
COMMUNITY
Start: 2020-06-16

## 2020-09-21 RX ORDER — DIGOXIN 125 MCG
0.25 TABLET ORAL DAILY
Status: DISCONTINUED | OUTPATIENT
Start: 2020-09-22 | End: 2020-09-23 | Stop reason: HOSPADM

## 2020-09-21 RX ORDER — METOCLOPRAMIDE 10 MG/1
10 TABLET ORAL
Status: CANCELLED | OUTPATIENT
Start: 2020-09-22

## 2020-09-21 RX ORDER — ONDANSETRON 2 MG/ML
4 INJECTION INTRAMUSCULAR; INTRAVENOUS
Status: DISCONTINUED | OUTPATIENT
Start: 2020-09-21 | End: 2020-09-23 | Stop reason: HOSPADM

## 2020-09-21 RX ORDER — ENOXAPARIN SODIUM 100 MG/ML
40 INJECTION SUBCUTANEOUS DAILY
Status: DISCONTINUED | OUTPATIENT
Start: 2020-09-22 | End: 2020-09-23 | Stop reason: HOSPADM

## 2020-09-21 RX ORDER — ROSUVASTATIN CALCIUM 20 MG/1
20 TABLET, COATED ORAL
Status: ON HOLD | COMMUNITY
Start: 2019-04-10 | End: 2021-11-19

## 2020-09-21 RX ORDER — DRONABINOL 2.5 MG/1
1 CAPSULE ORAL 2 TIMES DAILY
COMMUNITY
Start: 2020-09-15 | End: 2020-10-19

## 2020-09-21 RX ORDER — AMOXICILLIN 250 MG
1 CAPSULE ORAL 2 TIMES DAILY
Status: DISCONTINUED | OUTPATIENT
Start: 2020-09-21 | End: 2020-09-23 | Stop reason: HOSPADM

## 2020-09-21 RX ORDER — DEXTROSE 50 % IN WATER (D50W) INTRAVENOUS SYRINGE
25-50 AS NEEDED
Status: DISCONTINUED | OUTPATIENT
Start: 2020-09-21 | End: 2020-09-23 | Stop reason: HOSPADM

## 2020-09-21 RX ORDER — CARVEDILOL 3.12 MG/1
3.12 TABLET ORAL 2 TIMES DAILY
Status: DISCONTINUED | OUTPATIENT
Start: 2020-09-22 | End: 2020-09-23 | Stop reason: HOSPADM

## 2020-09-21 RX ORDER — ATORVASTATIN CALCIUM 10 MG/1
10 TABLET, FILM COATED ORAL
Status: DISCONTINUED | OUTPATIENT
Start: 2020-09-21 | End: 2020-09-23 | Stop reason: HOSPADM

## 2020-09-21 RX ORDER — ACETAMINOPHEN 325 MG/1
650 TABLET ORAL
Status: DISCONTINUED | OUTPATIENT
Start: 2020-09-21 | End: 2020-09-23 | Stop reason: HOSPADM

## 2020-09-21 RX ORDER — PROMETHAZINE HYDROCHLORIDE 25 MG/1
12.5 TABLET ORAL
Status: DISCONTINUED | OUTPATIENT
Start: 2020-09-21 | End: 2020-09-23 | Stop reason: HOSPADM

## 2020-09-21 RX ORDER — CLOPIDOGREL BISULFATE 75 MG/1
75 TABLET ORAL DAILY
Status: DISCONTINUED | OUTPATIENT
Start: 2020-09-22 | End: 2020-09-23 | Stop reason: HOSPADM

## 2020-09-21 RX ORDER — ACETAMINOPHEN 650 MG/1
650 SUPPOSITORY RECTAL
Status: DISCONTINUED | OUTPATIENT
Start: 2020-09-21 | End: 2020-09-23 | Stop reason: HOSPADM

## 2020-09-21 RX ORDER — ERYTHROMYCIN 250 MG/1
250 TABLET, DELAYED RELEASE ORAL 4 TIMES DAILY
Status: DISCONTINUED | OUTPATIENT
Start: 2020-09-22 | End: 2020-09-23 | Stop reason: HOSPADM

## 2020-09-21 RX ORDER — GUAIFENESIN 100 MG/5ML
81 LIQUID (ML) ORAL
Status: DISCONTINUED | OUTPATIENT
Start: 2020-09-21 | End: 2020-09-23 | Stop reason: HOSPADM

## 2020-09-21 RX ORDER — MAGNESIUM SULFATE 100 %
4 CRYSTALS MISCELLANEOUS AS NEEDED
Status: DISCONTINUED | OUTPATIENT
Start: 2020-09-21 | End: 2020-09-23 | Stop reason: HOSPADM

## 2020-09-21 RX ORDER — PANTOPRAZOLE SODIUM 40 MG/1
1 TABLET, DELAYED RELEASE ORAL
COMMUNITY
Start: 2020-09-15 | End: 2021-03-29

## 2020-09-21 RX ORDER — DRONABINOL 2.5 MG/1
2.5 CAPSULE ORAL 2 TIMES DAILY
Status: DISCONTINUED | OUTPATIENT
Start: 2020-09-22 | End: 2020-09-23 | Stop reason: HOSPADM

## 2020-09-21 RX ORDER — ISOSORBIDE MONONITRATE 30 MG/1
1 TABLET, EXTENDED RELEASE ORAL DAILY
COMMUNITY
Start: 2020-06-16 | End: 2020-10-19

## 2020-09-21 RX ORDER — ENALAPRIL MALEATE 10 MG/1
10 TABLET ORAL DAILY
COMMUNITY
End: 2021-03-29

## 2020-09-21 RX ORDER — INSULIN GLARGINE 100 [IU]/ML
60 INJECTION, SOLUTION SUBCUTANEOUS
Status: DISCONTINUED | OUTPATIENT
Start: 2020-09-21 | End: 2020-09-23 | Stop reason: HOSPADM

## 2020-09-21 RX ORDER — ENALAPRIL MALEATE 10 MG/1
10 TABLET ORAL DAILY
Status: DISCONTINUED | OUTPATIENT
Start: 2020-09-22 | End: 2020-09-23 | Stop reason: HOSPADM

## 2020-09-21 RX ORDER — INSULIN LISPRO 100 [IU]/ML
20 INJECTION, SOLUTION INTRAVENOUS; SUBCUTANEOUS
Status: DISCONTINUED | OUTPATIENT
Start: 2020-09-22 | End: 2020-09-23 | Stop reason: HOSPADM

## 2020-09-21 RX ORDER — ISOSORBIDE MONONITRATE 30 MG/1
30 TABLET, EXTENDED RELEASE ORAL DAILY
Status: DISCONTINUED | OUTPATIENT
Start: 2020-09-22 | End: 2020-09-23 | Stop reason: HOSPADM

## 2020-09-21 RX ORDER — INSULIN DEGLUDEC 100 U/ML
100 INJECTION, SOLUTION SUBCUTANEOUS
COMMUNITY
End: 2020-09-25 | Stop reason: ALTCHOICE

## 2020-09-21 RX ORDER — SODIUM CHLORIDE 0.9 % (FLUSH) 0.9 %
5-40 SYRINGE (ML) INJECTION EVERY 8 HOURS
Status: DISCONTINUED | OUTPATIENT
Start: 2020-09-21 | End: 2020-09-23 | Stop reason: HOSPADM

## 2020-09-21 RX ORDER — SODIUM CHLORIDE 0.9 % (FLUSH) 0.9 %
5-40 SYRINGE (ML) INJECTION AS NEEDED
Status: DISCONTINUED | OUTPATIENT
Start: 2020-09-21 | End: 2020-09-23 | Stop reason: HOSPADM

## 2020-09-21 RX ADMIN — SODIUM CHLORIDE 1000 ML: 9 INJECTION, SOLUTION INTRAVENOUS at 19:17

## 2020-09-21 RX ADMIN — INSULIN GLARGINE 60 UNITS: 100 INJECTION, SOLUTION SUBCUTANEOUS at 23:13

## 2020-09-21 NOTE — ED PROVIDER NOTES
HPI   Chief Complaint   Patient presents with    Abdominal Pain     60yoF hx of cholecystectomy, CAD, DM on insulin and gastroparesis presents with abdominal pain. Pt reports since June waxing waning epigastric pain, radiating to RUQ, sharp. She is here today because she now experiences constant pain, mild currently but at time severe, associated with nausea and bilious emesis and constipation. Last BM was this morning and was small. She reports reduced PO intake and weight loss of 10 lbs since June. She reports poor glucose control. She reports being on reglan, prucalopride and dronabinol and PPI with no relief. She tried to see GI today but no availability, she does have GI appointment for 10/9/2020. Past Medical History:   Diagnosis Date    Arthritis     CAD (coronary artery disease) 2009    Hx of 2 MI's and then CABG 5/2009    Diabetes [facility])     IDDM type 3    Dyslipidemia     Hypertension     Other ill-defined conditions(799.89)     gastroparesis     Unspecified sleep apnea     no cpap repeat study sched for 9/20/13       Past Surgical History:   Procedure Laterality Date    CARDIAC SURG PROCEDURE UNLIST  2009    CABG X 3    ECHO 2D ADULT  6/27/12    LVEF about 50%. There is possible HK of distal ant/septal wall and apical walls (technically difficult study).  ECHO 2D ADULT  4/2010    LVEF 45%, akinetic apex and septum.   HK mid-anterior wall    HX CHOLECYSTECTOMY  6/2012    HX HYSTERECTOMY  2005    HX ORTHOPAEDIC  2008    RIGHT SHOULDER ARTHROSCOPY    HX ORTHOPAEDIC  2003    ANTERIOR CERVICAL FUSION INSTRUMENTATION         Family History:   Problem Relation Age of Onset    Diabetes Mother     Heart Disease Father        Social History     Socioeconomic History    Marital status:      Spouse name: Not on file    Number of children: Not on file    Years of education: Not on file    Highest education level: Not on file   Occupational History    Not on file   Social Needs    Financial resource strain: Not on file    Food insecurity     Worry: Not on file     Inability: Not on file    Transportation needs     Medical: Not on file     Non-medical: Not on file   Tobacco Use    Smoking status: Never Smoker    Smokeless tobacco: Never Used   Substance and Sexual Activity    Alcohol use: No     Comment: 1 DRINK PER MONTH    Drug use: No    Sexual activity: Yes     Birth control/protection: None   Lifestyle    Physical activity     Days per week: Not on file     Minutes per session: Not on file    Stress: Not on file   Relationships    Social connections     Talks on phone: Not on file     Gets together: Not on file     Attends Hindu service: Not on file     Active member of club or organization: Not on file     Attends meetings of clubs or organizations: Not on file     Relationship status: Not on file    Intimate partner violence     Fear of current or ex partner: Not on file     Emotionally abused: Not on file     Physically abused: Not on file     Forced sexual activity: Not on file   Other Topics Concern    Not on file   Social History Narrative    Not on file         ALLERGIES: Patient has no known allergies. Review of Systems   Constitutional: Positive for appetite change. Gastrointestinal: Positive for abdominal pain, constipation, nausea and vomiting. Endocrine:        Poor glucose control   All other systems reviewed and are negative. Vitals:    09/21/20 1036   BP: (!) 147/68   Pulse: 99   Resp: 18   Temp: 99.1 °F (37.3 °C)   SpO2: 98%   Weight: 88.5 kg (195 lb)   Height: 5' 2\" (1.575 m)            Physical Exam   No data found. Nursing note and vitals reviewed. Constitutional: NAD. Head: Normocephalic and atraumatic. Mouth/Throat: Airway patent. Moist mucous membranes. Eyes: EOMI. No scleral icterus. Neck: Neck supple. Cardiovascular: Normal rate, regular rhythm. Normal heart sounds. No murmur, rub, or gallop.  Good pulses throughout. Pulmonary/Chest: No respiratory distress. Clear to auscultation bilaterally. No wheezes, rales, or rhonchi. Abdominal/GI: BS normal, Soft, mildly tender epigastrium, non-distended. No rebound or guarding. Musculoskeletal: No gross injuries or deformities. Neurological: Alert and oriented to person, place, and time. Cranial Nerves 2-12 intact. Moving all extremities. No gross deficits. Psych: Pleasant, cooperative. Skin: Skin is warm and dry. No rash noted. MDM   DDx = gastroparesis, pancreatitis, DKA, ACS, lower suspicion for bowel obstruction. Trop neg. Hypokalemia K of 3.2. Pt says erythromycin in the past has worked for her gastroparesis but she has complex cardiac history, hesitant to prescribe for her as outpatient especially with abnormal potassium. Admitting to hospitalist for gastroparesis. Labs Reviewed   CBC WITH AUTOMATED DIFF - Abnormal; Notable for the following components:       Result Value    NEUTROPHILS 83 (*)     LYMPHOCYTES 11 (*)     ABS.  NEUTROPHILS 8.2 (*)     All other components within normal limits   URINALYSIS W/ RFLX MICROSCOPIC - Abnormal; Notable for the following components:    Protein 100 (*)     Glucose >300 (*)     Ketone 20 (*)     Urobilinogen 2.0 (*)     All other components within normal limits   METABOLIC PANEL, COMPREHENSIVE - Abnormal; Notable for the following components:    Potassium 3.2 (*)     Glucose 134 (*)     Albumin 3.4 (*)     A-G Ratio 0.9 (*)     All other components within normal limits   LIPASE - Abnormal; Notable for the following components:    Lipase 54 (*)     All other components within normal limits   GLUCOSE, POC - Abnormal; Notable for the following components:    Glucose (POC) 132 (*)     All other components within normal limits   TROPONIN I   URINE MICROSCOPIC     No orders to display     Medications   potassium chloride (KLOR-CON) packet for solution 40 mEq (has no administration in time range)   sodium chloride 0.9 % bolus infusion 1,000 mL (1,000 mL IntraVENous New Bag 9/21/20 1917)            Procedures

## 2020-09-22 VITALS
TEMPERATURE: 98.7 F | SYSTOLIC BLOOD PRESSURE: 155 MMHG | RESPIRATION RATE: 18 BRPM | HEART RATE: 87 BPM | WEIGHT: 197.75 LBS | BODY MASS INDEX: 36.39 KG/M2 | DIASTOLIC BLOOD PRESSURE: 69 MMHG | HEIGHT: 62 IN | OXYGEN SATURATION: 99 %

## 2020-09-22 LAB
ALBUMIN SERPL-MCNC: 3.2 G/DL (ref 3.5–5)
ANION GAP SERPL CALC-SCNC: 6 MMOL/L (ref 5–15)
BACTERIA URNS QL MICRO: NEGATIVE /HPF
BUN SERPL-MCNC: 10 MG/DL (ref 6–20)
BUN/CREAT SERPL: 12 (ref 12–20)
CA-I BLD-MCNC: 9.3 MG/DL (ref 8.5–10.1)
CHLORIDE SERPL-SCNC: 102 MMOL/L (ref 97–108)
CO2 SERPL-SCNC: 31 MMOL/L (ref 21–32)
CREAT SERPL-MCNC: 0.83 MG/DL (ref 0.55–1.02)
DIGOXIN SERPL-MCNC: <0.2 NG/ML (ref 0.9–2)
GLUCOSE BLD STRIP.AUTO-MCNC: 178 MG/DL (ref 65–100)
GLUCOSE BLD STRIP.AUTO-MCNC: 236 MG/DL (ref 65–100)
GLUCOSE SERPL-MCNC: 168 MG/DL (ref 65–100)
PERFORMED BY, TECHID: ABNORMAL
PERFORMED BY, TECHID: ABNORMAL
PHOSPHATE SERPL-MCNC: 3.1 MG/DL (ref 2.6–4.7)
POTASSIUM SERPL-SCNC: 3.1 MMOL/L (ref 3.5–5.1)
RBC #/AREA URNS HPF: NORMAL /HPF (ref 0–5)
SODIUM SERPL-SCNC: 139 MMOL/L (ref 136–145)
WBC URNS QL MICRO: NORMAL /HPF (ref 0–4)

## 2020-09-22 PROCEDURE — 96372 THER/PROPH/DIAG INJ SC/IM: CPT

## 2020-09-22 PROCEDURE — 99218 HC RM OBSERVATION: CPT

## 2020-09-22 PROCEDURE — 74011250637 HC RX REV CODE- 250/637: Performed by: HOSPITALIST

## 2020-09-22 PROCEDURE — 36415 COLL VENOUS BLD VENIPUNCTURE: CPT

## 2020-09-22 PROCEDURE — 74011250637 HC RX REV CODE- 250/637: Performed by: NURSE PRACTITIONER

## 2020-09-22 PROCEDURE — 74011250636 HC RX REV CODE- 250/636: Performed by: HOSPITALIST

## 2020-09-22 PROCEDURE — 80162 ASSAY OF DIGOXIN TOTAL: CPT

## 2020-09-22 PROCEDURE — 74011636637 HC RX REV CODE- 636/637: Performed by: HOSPITALIST

## 2020-09-22 PROCEDURE — 82962 GLUCOSE BLOOD TEST: CPT

## 2020-09-22 PROCEDURE — 80069 RENAL FUNCTION PANEL: CPT

## 2020-09-22 PROCEDURE — 74011250637 HC RX REV CODE- 250/637

## 2020-09-22 RX ORDER — ERYTHROMYCIN 250 MG/1
250 TABLET, DELAYED RELEASE ORAL 4 TIMES DAILY
Qty: 48 TAB | Refills: 0 | Status: SHIPPED | OUTPATIENT
Start: 2020-09-22 | End: 2020-10-04

## 2020-09-22 RX ORDER — ISOSORBIDE MONONITRATE 30 MG/1
TABLET, EXTENDED RELEASE ORAL
Status: COMPLETED
Start: 2020-09-22 | End: 2020-09-22

## 2020-09-22 RX ORDER — ATORVASTATIN CALCIUM 10 MG/1
TABLET, FILM COATED ORAL
Status: DISPENSED
Start: 2020-09-22 | End: 2020-09-22

## 2020-09-22 RX ORDER — POTASSIUM CHLORIDE 750 MG/1
40 TABLET, FILM COATED, EXTENDED RELEASE ORAL
Status: COMPLETED | OUTPATIENT
Start: 2020-09-22 | End: 2020-09-22

## 2020-09-22 RX ORDER — POTASSIUM CHLORIDE 20 MEQ/1
TABLET, EXTENDED RELEASE ORAL
Status: DISPENSED
Start: 2020-09-22 | End: 2020-09-22

## 2020-09-22 RX ADMIN — INSULIN LISPRO 20 UNITS: 100 INJECTION, SOLUTION INTRAVENOUS; SUBCUTANEOUS at 13:04

## 2020-09-22 RX ADMIN — Medication 5 ML: at 13:08

## 2020-09-22 RX ADMIN — POTASSIUM CHLORIDE 20 MEQ: 1500 TABLET, EXTENDED RELEASE ORAL at 09:45

## 2020-09-22 RX ADMIN — CARVEDILOL 3.12 MG: 3.12 TABLET, FILM COATED ORAL at 09:46

## 2020-09-22 RX ADMIN — CLOPIDOGREL BISULFATE 75 MG: 75 TABLET ORAL at 09:46

## 2020-09-22 RX ADMIN — POTASSIUM CHLORIDE 40 MEQ: 750 TABLET, FILM COATED, EXTENDED RELEASE ORAL at 17:45

## 2020-09-22 RX ADMIN — ISOSORBIDE MONONITRATE 30 MG: 30 TABLET, EXTENDED RELEASE ORAL at 13:05

## 2020-09-22 RX ADMIN — DRONABINOL 2.5 MG: 2.5 CAPSULE ORAL at 09:45

## 2020-09-22 RX ADMIN — ENOXAPARIN SODIUM 40 MG: 40 INJECTION SUBCUTANEOUS at 09:46

## 2020-09-22 RX ADMIN — ATORVASTATIN CALCIUM 10 MG: 10 TABLET, FILM COATED ORAL at 03:41

## 2020-09-22 RX ADMIN — PANTOPRAZOLE SODIUM 40 MG: 40 TABLET, DELAYED RELEASE ORAL at 13:04

## 2020-09-22 NOTE — PROGRESS NOTES
Hospitalist Progress Note             Daily Progress Note: 9/22/2020      Subjective:     Problem List:  Patient Active Problem List   Diagnosis Code    Mixed hyperlipidemia E78.2    Essential hypertension I10    Type 2 diabetes with nephropathy ([facility] [address] E11.21    Type 2 diabetes mellitus with diabetic neuropathy ([facility] E11.40    Severe obesity ([facility] E66.01    Gastroparesis K31.84    Diabetic gastroparesis ([email or web address] E11.43, K31.84         Medications reviewed  Current Facility-Administered Medications   Medication Dose Route Frequency    aspirin chewable tablet 81 mg  81 mg Oral QHS    clopidogreL (PLAVIX) tablet 75 mg  75 mg Oral DAILY    digoxin (LANOXIN) tablet 0.25 mg  0.25 mg Oral DAILY    potassium chloride (K-DUR, KLOR-CON) SR tablet 20 mEq  20 mEq Oral DAILY    insulin lispro (HUMALOG) injection 20 Units  20 Units SubCUTAneous TID WITH MEALS    pantoprazole (PROTONIX) tablet 40 mg  40 mg Oral ACB    dronabinoL (MARINOL) capsule 2.5 mg  2.5 mg Oral BID    carvediloL (COREG) tablet 3.125 mg  3.125 mg Oral BID    atorvastatin (LIPITOR) tablet 10 mg  10 mg Oral QHS    isosorbide mononitrate ER (IMDUR) tablet 30 mg  30 mg Oral DAILY    enalapril (VASOTEC) tablet 10 mg  10 mg Oral DAILY    insulin glargine (LANTUS) injection 60 Units  60 Units SubCUTAneous QHS    sodium chloride (NS) flush 5-40 mL  5-40 mL IntraVENous Q8H    sodium chloride (NS) flush 5-40 mL  5-40 mL IntraVENous PRN    acetaminophen (TYLENOL) tablet 650 mg  650 mg Oral Q6H PRN    Or    acetaminophen (TYLENOL) suppository 650 mg  650 mg Rectal Q6H PRN    promethazine (PHENERGAN) tablet 12.5 mg  12.5 mg Oral Q6H PRN    Or    ondansetron (ZOFRAN) injection 4 mg  4 mg IntraVENous Q6H PRN    enoxaparin (LOVENOX) injection 40 mg  40 mg SubCUTAneous DAILY    senna-docusate (PERICOLACE) 8.6-50 mg per tablet 1 Tab  1 Tab Oral BID    glucose chewable tablet 16 g  4 Tab Oral PRN    dextrose (D50W) injection syrg 12.5-25 g  25-50 mL IntraVENous PRN    glucagon (GLUCAGEN) injection 1 mg  1 mg IntraMUSCular PRN    erythromycin (ARCADIO-TAB) tablet 250 mg  250 mg Oral QID     Current Outpatient Medications   Medication Sig    prucalopride 2 mg tab Take 2 mg by mouth daily.  pantoprazole (PROTONIX) 40 mg tablet Take 1 Tab by mouth Daily (before breakfast).  dronabinoL (MARINOL) 2.5 mg capsule Take 1 Cap by mouth two (2) times a day.  carvediloL (COREG) 3.125 mg tablet Take 1 Tab by mouth two (2) times a day.  rosuvastatin (CRESTOR) 5 mg tablet Take 1 Tab by mouth nightly.  isosorbide mononitrate ER (IMDUR) 30 mg tablet Take 1 Tab by mouth daily.  insulin degludec 100 unit/mL soln 100 Units by SubCUTAneous route nightly.  enalapril (VASOTEC) 10 mg tablet Take 10 mg by mouth daily.  insulin lispro (HumaLOG KwikPen Insulin) 200 unit/mL (3 mL) inpn Inject 40 units subcutaneously before meals with sliding scale (Max  UNITS daily), need appointment for future refills    metoclopramide HCl (REGLAN) 10 mg tablet Take 10 mg by mouth Before breakfast, lunch, dinner and at bedtime.  aspirin 81 mg chewable tablet Take 81 mg by mouth nightly.  clopidogrel (PLAVIX) 75 mg tablet Take 75 mg by mouth daily.  furosemide (LASIX) 40 mg tablet Take 40 mg by mouth as needed.  digoxin (LANOXIN) 0.25 mg tablet Take 0.25 mg by mouth daily.  potassium chloride (KLOR-CON M20) 20 mEq tablet Take 20 mEq by mouth daily. Review of Systems:   Review of Systems   Constitutional: Negative for chills, diaphoresis, fever, malaise/fatigue and weight loss. HENT: Negative for ear discharge, ear pain, hearing loss, nosebleeds, sinus pain and tinnitus. Respiratory: Negative for cough, hemoptysis, sputum production, shortness of breath and wheezing. Cardiovascular: Negative for chest pain, palpitations, orthopnea, claudication and leg swelling. Gastrointestinal: Positive for nausea.  Negative for abdominal pain, constipation, diarrhea, heartburn and vomiting. Genitourinary: Negative for dysuria, flank pain, frequency, hematuria and urgency. Musculoskeletal: Negative for back pain, falls, joint pain, myalgias and neck pain. Neurological: Negative for dizziness, tingling, focal weakness, seizures, weakness and headaches. Psychiatric/Behavioral: Negative for depression, hallucinations, memory loss, substance abuse and suicidal ideas. The patient is not nervous/anxious. Objective:   Physical Exam  Constitutional:       General: She is not in acute distress. Appearance: Normal appearance. HENT:      Head: Normocephalic and atraumatic. Mouth/Throat:      Mouth: Mucous membranes are moist.   Eyes:      Pupils: Pupils are equal, round, and reactive to light. Neck:      Musculoskeletal: No neck rigidity. Cardiovascular:      Rate and Rhythm: Normal rate and regular rhythm. Pulses: Normal pulses. Heart sounds: Normal heart sounds. Pulmonary:      Effort: Pulmonary effort is normal. No respiratory distress. Breath sounds: Normal breath sounds. No wheezing. Abdominal:      General: Bowel sounds are normal.      Palpations: Abdomen is soft. There is no mass. Tenderness: There is no abdominal tenderness. Musculoskeletal:         General: No swelling, tenderness, deformity or signs of injury. Skin:     General: Skin is warm and dry. Findings: No bruising or erythema. Neurological:      General: No focal deficit present. Mental Status: She is alert and oriented to person, place, and time. Motor: No weakness.    Psychiatric:         Mood and Affect: Mood normal.            Visit Vitals  BP (!) 155/69 (BP 1 Location: Right arm, BP Patient Position: At rest)   Pulse 87   Temp 98.7 °F (37.1 °C)   Resp 18   Ht 5' 2\" (1.575 m)   Wt 89.7 kg (197 lb 12 oz)   SpO2 99%   BMI 36.17 kg/m²         Data Review:       Recent Days:  Recent Labs     09/21/20  1615 WBC 9.9   HGB 14.7   HCT 43.5        Recent Labs     09/22/20  0332 09/21/20  1700    139   K 3.1* 3.2*    102   CO2 31 30   * 134*   BUN 10 9   CREA 0.83 0.70   CA 9.3 9.7   PHOS 3.1  --    ALB 3.2* 3.4*   TBILI  --  0.7   ALT  --  20     No results for input(s): PH, PCO2, PO2, HCO3, FIO2 in the last 72 hours.         Assessment/Plan

## 2020-09-22 NOTE — PROGRESS NOTES
Admit Skin Assessment    Pt has an IV in the left hand. The pt has a healed surgical scar on chest. All other skin is a warm, dry, and intact. Skin assessment completed by YUMIKO Hill RN and Nikko Arellano RN.

## 2020-09-22 NOTE — PROGRESS NOTES
Discharge    Pt discharge education and materials provided at bedside. Pt and caregiver verbalize understanding at bedside. Pt escorted down to vehicle. Pt showing no signs or symptoms of acute distress.

## 2020-09-22 NOTE — DISCHARGE SUMMARY
Hospitalist         Discharge Summary Note: 9/22/2020      Subjective:   Patient is a 79-year-old female with history of uncontrolled diabetes and chronic gastroparesis who was admitted on 09/21/2020 due to nausea and vomiting. She was found to have exacerbation of gastroparesis. She was started on erythromycin with resolution of symptoms. She was also found to have hypokalemia secondary to vomiting prior to admission. She was given potassium during hospital stay. Today she is without abdominal pain nausea or vomiting and is feeling well. She is stable for discharge to home to complete treatment with erythromycin outpatient and follow-up with her gastroenterologist on 10/09/2020 as scheduled. She has also been counseled to follow-up with her primary care provider in 3 to 5 days for repeat potassium level.       Problem List:  Patient Active Problem List   Diagnosis Code    Mixed hyperlipidemia E78.2    Essential hypertension I10    Type 2 diabetes with nephropathy ([facility] [address] E11.21    Type 2 diabetes mellitus with diabetic neuropathy ([facility] [address] E11.40    Severe obesity ([address] E66.01    Gastroparesis K31.84    Diabetic gastroparesis ([address] E11.43, K31.84         Medications reviewed  Current Facility-Administered Medications   Medication Dose Route Frequency    potassium chloride SR (KLOR-CON 10) tablet 40 mEq  40 mEq Oral NOW    aspirin chewable tablet 81 mg  81 mg Oral QHS    clopidogreL (PLAVIX) tablet 75 mg  75 mg Oral DAILY    digoxin (LANOXIN) tablet 0.25 mg  0.25 mg Oral DAILY    potassium chloride (K-DUR, KLOR-CON) SR tablet 20 mEq  20 mEq Oral DAILY    insulin lispro (HUMALOG) injection 20 Units  20 Units SubCUTAneous TID WITH MEALS    pantoprazole (PROTONIX) tablet 40 mg  40 mg Oral ACB    dronabinoL (MARINOL) capsule 2.5 mg  2.5 mg Oral BID    carvediloL (COREG) tablet 3.125 mg  3.125 mg Oral BID    atorvastatin (LIPITOR) tablet 10 mg  10 mg Oral QHS    isosorbide mononitrate ER (IMDUR) tablet 30 mg  30 mg Oral DAILY    enalapril (VASOTEC) tablet 10 mg  10 mg Oral DAILY    insulin glargine (LANTUS) injection 60 Units  60 Units SubCUTAneous QHS    sodium chloride (NS) flush 5-40 mL  5-40 mL IntraVENous Q8H    sodium chloride (NS) flush 5-40 mL  5-40 mL IntraVENous PRN    acetaminophen (TYLENOL) tablet 650 mg  650 mg Oral Q6H PRN    Or    acetaminophen (TYLENOL) suppository 650 mg  650 mg Rectal Q6H PRN    promethazine (PHENERGAN) tablet 12.5 mg  12.5 mg Oral Q6H PRN    Or    ondansetron (ZOFRAN) injection 4 mg  4 mg IntraVENous Q6H PRN    enoxaparin (LOVENOX) injection 40 mg  40 mg SubCUTAneous DAILY    senna-docusate (PERICOLACE) 8.6-50 mg per tablet 1 Tab  1 Tab Oral BID    glucose chewable tablet 16 g  4 Tab Oral PRN    dextrose (D50W) injection syrg 12.5-25 g  25-50 mL IntraVENous PRN    glucagon (GLUCAGEN) injection 1 mg  1 mg IntraMUSCular PRN    erythromycin (ARCADIO-TAB) tablet 250 mg  250 mg Oral QID       Review of Systems:   Review of Systems   Constitutional: Negative for chills, diaphoresis, fever, malaise/fatigue and weight loss. HENT: Negative for ear discharge, ear pain, hearing loss, nosebleeds, sinus pain and tinnitus. Respiratory: Negative for cough, hemoptysis, sputum production, shortness of breath and wheezing. Cardiovascular: Negative for chest pain, palpitations, orthopnea, claudication and leg swelling. Gastrointestinal: Negative for abdominal pain, constipation, diarrhea, heartburn, nausea and vomiting. Genitourinary: Negative for dysuria, flank pain, frequency, hematuria and urgency. Musculoskeletal: Negative for back pain, falls, joint pain, myalgias and neck pain. Neurological: Negative for dizziness, tingling, focal weakness, seizures, weakness and headaches. Psychiatric/Behavioral: Negative for depression, hallucinations, memory loss, substance abuse and suicidal ideas. The patient is not nervous/anxious. Objective:   Physical Exam  Constitutional:       General: She is not in acute distress. Appearance: Normal appearance. HENT:      Head: Normocephalic and atraumatic. Mouth/Throat:      Mouth: Mucous membranes are moist.   Eyes:      Pupils: Pupils are equal, round, and reactive to light. Neck:      Musculoskeletal: No neck rigidity. Cardiovascular:      Rate and Rhythm: Normal rate and regular rhythm. Pulses: Normal pulses. Heart sounds: Normal heart sounds. Pulmonary:      Effort: Pulmonary effort is normal. No respiratory distress. Breath sounds: Normal breath sounds. No wheezing. Abdominal:      General: Bowel sounds are normal.      Palpations: Abdomen is soft. There is no mass. Tenderness: There is no abdominal tenderness. Musculoskeletal:         General: No swelling, tenderness, deformity or signs of injury. Skin:     General: Skin is warm and dry. Findings: No bruising or erythema. Neurological:      General: No focal deficit present. Mental Status: She is alert and oriented to person, place, and time. Motor: No weakness. Psychiatric:         Mood and Affect: Mood normal.          Visit Vitals  BP (!) 155/69 (BP 1 Location: Right arm, BP Patient Position: At rest)   Pulse 87   Temp 98.7 °F (37.1 °C)   Resp 18   Ht 5' 2\" (1.575 m)   Wt 89.7 kg (197 lb 12 oz)   SpO2 99%   BMI 36.17 kg/m²        Data Review:       Recent Days:  Recent Labs     09/21/20  1615   WBC 9.9   HGB 14.7   HCT 43.5        Recent Labs     09/22/20  0332 09/21/20  1700    139   K 3.1* 3.2*    102   CO2 31 30   * 134*   BUN 10 9   CREA 0.83 0.70   CA 9.3 9.7   PHOS 3.1  --    ALB 3.2* 3.4*   TBILI  --  0.7   ALT  --  20     No results for input(s): PH, PCO2, PO2, HCO3, FIO2 in the last 72 hours. Assessment/Plan   1. Gastroparesis  2. Diabetes type 2  3. Essential hypertension  4.   Hypokalemia    Continue with erythromycin 250 mg 4 times daily for 12 more days  Continue with insulin at home  Continue with potassium supplements at home  Follow-up with PCP in 3 to 5 days for repeat potassium level  Follow-up with your primary gastroenterologist on 10/09/20 as scheduled      Care Plan discussed with: Patient/Family    Total time spent with patient: 30 minutes.     Shweta Coe NP

## 2020-09-22 NOTE — H&P
History and Physical            History and Physical    Subjective:   Chief Complaint : Intractable vomiting  Source of information : Patient, medical records, ER provider    History of present illness:   63 y.o. female with history of diabetes, diabetic gastroparesis, coronary artery disease, hypertension presents to the emergency room with intractable nausea vomiting. States diagnosed with gastroparesis 12 years ago, but getting worse since  of this year, was kept on multiple medications with not much improvement. She is unable to tolerate anything by mouth, complains of abdominal pain that is not improved even when not eating. It is like a knot in the stomach and feels significant pain. Denies any fever, chills, cough or trouble breathing. Past Medical History:   Diagnosis Date    Arthritis     CAD (coronary artery disease)     Hx of 2 MI's and then CABG 2009    Diabetes [facility])     IDDM type 3    Dyslipidemia     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[facility] [address]     Hypertension     Other ill-defined conditions(799.89)     gastroparesis     Unspecified sleep apnea     no cpap repeat study sched for 13     Past Surgical History:   Procedure Laterality Date    CARDIAC SURG PROCEDURE UNLIST  2009    CABG X 3    ECHO 2D ADULT  12    LVEF about 50%. There is possible HK of distal ant/septal wall and apical walls (technically difficult study).  ECHO 2D ADULT  2010    LVEF 45%, akinetic apex and septum.   HK mid-anterior wall    HX CHOLECYSTECTOMY  2012    HX HYSTERECTOMY      HX ORTHOPAEDIC  2008    RIGHT SHOULDER ARTHROSCOPY    HX ORTHOPAEDIC  2003    ANTERIOR CERVICAL FUSION INSTRUMENTATION     Family History   Problem Relation Age of Onset    Diabetes Mother     Heart Disease Father          of heart disease at age 61      Social History     Tobacco Use    Smoking status: Never Smoker    Smokeless tobacco: Never Used   Substance Use Topics    Alcohol use: No     Comment: 1 DRINK PER MONTH       Prior to Admission medications    Medication Sig Start Date End Date Taking? Authorizing Provider   prucalopride 2 mg tab Take 2 mg by mouth daily. 9/15/20  Yes Provider, Historical   pantoprazole (PROTONIX) 40 mg tablet Take 1 Tab by mouth Daily (before breakfast). 9/15/20  Yes Provider, Historical   dronabinoL (MARINOL) 2.5 mg capsule Take 1 Cap by mouth two (2) times a day. 9/15/20  Yes Provider, Historical   carvediloL (COREG) 3.125 mg tablet Take 1 Tab by mouth two (2) times a day. 6/16/20  Yes Provider, Historical   rosuvastatin (CRESTOR) 5 mg tablet Take 1 Tab by mouth nightly. 4/10/19  Yes Provider, Historical   isosorbide mononitrate ER (IMDUR) 30 mg tablet Take 1 Tab by mouth daily. 6/16/20  Yes Provider, Historical   insulin degludec 100 unit/mL soln 100 Units by SubCUTAneous route nightly. Yes Provider, Historical   enalapril (VASOTEC) 10 mg tablet Take 10 mg by mouth daily. Yes Provider, Historical   insulin lispro (HumaLOG KwikPen Insulin) 200 unit/mL (3 mL) inpn Inject 40 units subcutaneously before meals with sliding scale (Max  UNITS daily), need appointment for future refills 8/5/20  Yes Shira Viera MD   metoclopramide HCl (REGLAN) 10 mg tablet Take 10 mg by mouth Before breakfast, lunch, dinner and at bedtime. Yes Provider, Historical   aspirin 81 mg chewable tablet Take 81 mg by mouth nightly. Yes Other, MD Jamal   clopidogrel (PLAVIX) 75 mg tablet Take 75 mg by mouth daily. Yes Other, MD Jamal   furosemide (LASIX) 40 mg tablet Take 40 mg by mouth as needed. Yes Other, MD Jamal   digoxin (LANOXIN) 0.25 mg tablet Take 0.25 mg by mouth daily. Yes Justo, MD Jamal   potassium chloride (KLOR-CON M20) 20 mEq tablet Take 20 mEq by mouth daily.      Yes Other, MD Jamal     No Known Allergies          Review of Systems:  Constitutional: Appetite is not good, admits weight loss, no fever, no chills, no night sweats  Eye: No recent visual disturbances, no discharge, no double vision  Ear/nose/mouth/throat : No hearing disturbance, no ear pain, no nasal congestion, no sore throat, no trouble swallowing. Respiratory : No trouble breathing, no cough, no shortness of breath, no hemoptysis, no wheezing  Cardiovascular : No chest pain, no palpitation, no racing of heart, no orthopnea, no paroxysmal nocturnal dyspnea, no peripheral edema  Gastrointestinal : +++ nausea,   +++vomiting, no diarrhea, +++constipation, ++heartburn, ++++abdominal pain  Genitourinary : No dysuria, no hematuria, no increased frequency, incontinence,  Lymphatics : No swollen glands -Neck, axillary, inguinal  Endocrine : No excessive thirst, no polyuria no cold intolerance, no heat intolerance. Immunologic : No hives, urticaria, no seasonal allergies,   Musculoskeletal : No joint swelling, pain, effusion,  no back pain, no neck pain,   Integumentary : No rash, no pruritus, no ecchymosis  Hematology : No petechiae, No easy bruising,  No tendency to bleed easy  Neurology : Denies change in mental status, no abnormal balance, no headache, no confusion, numbness, tingling,  Psychiatric : No mood swings, no anxiety, depression    Vitals:     Visit Vitals  BP (!) 147/68   Pulse 99   Temp 99.1 °F (37.3 °C)   Resp 18   Ht 5' 2\" (1.575 m)   Wt 88.5 kg (195 lb)   SpO2 98%   BMI 35.67 kg/m²       Physical Exam:   General : Appearance, looks comfortable, no respiratory distress noted  HEENT : PERRLA, normal oral mucosa, atraumatic normocephalic, Normal ear and nose.    Neck : Supple, no JVD, no masses noted, no carotid bruit  Lungs : Breath sounds with good air entry bilaterally, no wheezes or rales, no accessory muscle use,  CVS : Rhythm rate regular, S1+, S2+, no murmur or gallop  Abdomen : Soft, nontender, no organomegaly, bowel sounds active  Extremities : No edema noted,  pedal pulses palpable  Musculoskeletal : Fair range of motion, no joint swelling or effusion, muscle tone and power appears normal,   Skin : Moist, warm, skin turgor, no pathological rash  Lymphatic : No cervical lymphadenopathy. Neurological : Awake, alert, oriented to time place person. No neurological deficits psychiatric : Mood and affect appears appropriate to the situation. Data Review:   Recent Results (from the past 24 hour(s))   CBC WITH AUTOMATED DIFF    Collection Time: 09/21/20  4:15 PM   Result Value Ref Range    WBC 9.9 3.6 - 11.0 K/uL    RBC 4.78 3.80 - 5.20 M/uL    HGB 14.7 11.5 - 16.0 %    HCT 43.5 35.0 - 47.0 %    MCV 91.0 80.0 - 99.0 FL    MCH 30.8 26.0 - 34.0 PG    MCHC 33.8 30.0 - 36.5 g/dL    RDW 13.6 11.5 - 14.5 %    PLATELET 017 012 - 748 K/uL    MPV 11.5 8.9 - 12.9 FL    NEUTROPHILS 83 (H) 32 - 75 %    LYMPHOCYTES 11 (L) 12 - 49 %    MONOCYTES 6 5 - 13 %    EOSINOPHILS 0 0 - 7 %    BASOPHILS 0 0 - 1 %    IMMATURE GRANULOCYTES 0 0.0 - 0.5 %    ABS. NEUTROPHILS 8.2 (H) 1.8 - 8.0 K/UL    ABS. LYMPHOCYTES 1.1 0.8 - 3.5 K/UL    ABS. MONOCYTES 0.6 0.0 - 1.0 K/UL    ABS. EOSINOPHILS 0.0 0.0 - 0.4 K/UL    ABS. BASOPHILS 0.0 0.0 - 0.1 K/UL    ABS. IMM.  GRANS. 0.0 0.00 - 0.04 K/UL    DF AUTOMATED     URINALYSIS W/ RFLX MICROSCOPIC    Collection Time: 09/21/20  4:15 PM   Result Value Ref Range    Color Yellow      Appearance Clear Clear      Specific gravity 1.027 1.003 - 1.030      pH (UA) 7.0 5.0 - 8.0      Protein 100 (A) Negative mg/dL    Glucose >300 (A) Negative mg/dL    Ketone 20 (A) Negative mg/dL    Bilirubin Negative Negative      Blood Negative Negative      Urobilinogen 2.0 (H) 0.1 - 1.0 EU/dL    Nitrites Negative Negative      Leukocyte Esterase Negative Negative      WBC 0-4 0 - 4 /hpf    RBC 0-5 0 - 5 /hpf    Bacteria Negative Negative /hpf    Mucus 2+ /lpf   TROPONIN I    Collection Time: 09/21/20  5:00 PM   Result Value Ref Range    Troponin-I, Qt. <0.05 <6.27 ng/mL   METABOLIC PANEL, COMPREHENSIVE    Collection Time: 09/21/20  5:00 PM   Result Value Ref Range    Sodium 139 136 - 145 mmol/L    Potassium 3.2 (L) 3.5 - 5.1 mmol/L    Chloride 102 97 - 108 mmol/L    CO2 30 21 - 32 mmol/L    Anion gap 7 5 - 15 mmol/L    Glucose 134 (H) 65 - 100 mg/dL    BUN 9 6 - 20 mg/dL    Creatinine 0.70 0.55 - 1.02 mg/dL    BUN/Creatinine ratio 13 12 - 20      GFR est AA >60 >60 ml/min/1.73m2    GFR est non-AA >60 >60 ml/min/1.73m2    Calcium 9.7 8.5 - 10.1 mg/dL    Bilirubin, total 0.7 0.2 - 1.0 mg/dL    AST (SGOT) 18 15 - 37 U/L    ALT (SGPT) 20 12 - 78 U/L    Alk. phosphatase 48 45 - 117 U/L    Protein, total 7.2 6.4 - 8.2 g/dL    Albumin 3.4 (L) 3.5 - 5.0 g/dL    Globulin 3.8 2.0 - 4.0 g/dL    A-G Ratio 0.9 (L) 1.1 - 2.2     LIPASE    Collection Time: 09/21/20  5:00 PM   Result Value Ref Range    Lipase 54 (L) 73 - 393 U/L   GLUCOSE, POC    Collection Time: 09/21/20  7:41 PM   Result Value Ref Range    Glucose (POC) 132 (H) 65 - 100 mg/dL    Performed by Kaleigh Austin              Assessment and Plan :     Intractable vomiting associated with abdominal pain and nausea: Received erythromycin in the emergency room with some improvement. Admitted for close monitoring, and will continue azithromycin. We are holding the metoclopramide and other new medication that she is taking for gastroparesis that is not helping. 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 Benign essential hypertension: Seems controlled with home medications which I will continue, already doses were reduced from previous doses    History of coronary artery disease with coronary artery bypass graft status, stable with no evidence of decompensation    Admitted for observation, full CODE STATUS, home medications reviewed and documented. She has no advance medical directives.       Signed By: Brie Patten MD     September 21, 2020 This dictation was done by dragon, computer voice recognition software. Often unanticipated grammatical, syntax, [place] phones and other interpretive errors are inadvertently transcribed. Please excuse errors that have escaped final proofreading.

## 2020-09-22 NOTE — ROUTINE PROCESS
TRANSFER - OUT REPORT:    Verbal report given to JEFF Moore RN on [employer]  being transferred to [phone] (unit) for routine progression of care       Report consisted of patients Situation, Background, Assessment and   Recommendations(SBAR). Information from the following report(s) SBAR, ED Summary and MAR was reviewed with the receiving nurse. Lines:   Peripheral IV 09/21/20 Anterior; Left Hand (Active)   Site Assessment Clean, dry, & intact 09/21/20 1642   Dressing Status Clean, dry, & intact 09/21/20 1642   Alcohol Cap Used Yes 09/21/20 1642        Opportunity for questions and clarification was provided.       Patient transported with:   [employer]

## 2020-09-22 NOTE — DISCHARGE INSTRUCTIONS
Patient Education        Learning About Certified Diabetes Educators  What is a certified diabetes educator? Certified diabetes educators are health professionals who have special training to help you manage your diabetes. They may be:  · Registered nurses. · Registered dietitians. · Pharmacists. · Social workers. · Doctors. Your diabetes educator will give you tips and help with daily diabetes care. He or she also may teach classes. These classes give you a chance to learn from and connect with others who have diabetes. Why see a diabetes educator? Your doctor wants you to get the personal support and help that a diabetes educator can give. And most insurance plans will cover part or all of the cost.  Learning is a key part of living with diabetes. A diabetes educator teaches about the most important parts of your care. You will learn about:  · Eating healthy meals. · Being active. · Taking medicine. · Checking your blood sugar. · Dealing with your feelings about having diabetes. He or she can help you find ways to live better with diabetes. And your diabetes educator can show you small changes that can make a big difference in your daily routine and your health. If you need to make a big change, he or she will be able to answer your questions and guide you though each step. When should you see one? It can be helpful to see a diabetes educator at certain points in your care. He or she can:  · Get you started when you're first diagnosed with diabetes. · Check in once a year for a review of your health and daily routine. · Show you how to handle a new health problem along with your diabetes. · Help you work with a new health care team.  Follow-up care is a key part of your treatment and safety. Be sure to make and go to all appointments, and call your doctor if you are having problems. It's also a good idea to know your test results and keep a list of the medicines you take.   Where can you learn more?  Go to http://aye-denton.info/  Enter A317 in the search box to learn more about \"Learning About Certified Diabetes Educators. \"  Current as of: December 20, 2019               Content Version: 12.6  © 9294-8006 [employer]. Care instructions adapted under license by [employer] (which disclaims liability or warranty for this information). If you have questions about a medical condition or this instruction, always ask your healthcare professional. [address] any warranty or liability for your use of this information. Patient Education        Learning About Meal Planning for Diabetes  Why plan your meals? Meal planning can be a key part of managing diabetes. Planning meals and snacks with the right balance of carbohydrate, protein, and fat can help you keep your blood sugar at the target level you set with your doctor. You don't have to eat special foods. You can eat what your family eats, including sweets once in a while. But you do have to pay attention to how often you eat and how much you eat of certain foods. You may want to work with a dietitian or a certified diabetes educator. He or she can give you tips and meal ideas and can answer your questions about meal planning. This health professional can also help you reach a healthy weight if that is one of your goals. What plan is right for you? Your dietitian or diabetes educator may suggest that you start with the plate format or carbohydrate counting. The plate format  The plate format is a simple way to help you manage how you eat. You plan meals by learning how much space each food should take on a plate. Using the plate format helps you spread carbohydrate throughout the day. It can make it easier to keep your blood sugar level within your target range. It also helps you see if you're eating healthy portion sizes.   To use the plate format, you put non-starchy vegetables on half your plate. Add meat or meat substitutes on one-quarter of the plate. Put a grain or starchy vegetable (such as brown rice or a potato) on the final quarter of the plate. You can add a small piece of fruit and some low-fat or fat-free milk or yogurt, depending on your carbohydrate goal for each meal.  Here are some tips for using the plate format:  · Make sure that you are not using an oversized plate. A 9-inch plate is best. Many restaurants use larger plates. · Get used to using the plate format at home. Then you can use it when you eat out. · Write down your questions about using the plate format. Talk to your doctor, a dietitian, or a diabetes educator about your concerns. Carbohydrate counting  With carbohydrate counting, you plan meals based on the amount of carbohydrate in each food. Carbohydrate raises blood sugar higher and more quickly than any other nutrient. It is found in desserts, breads and cereals, and fruit. It's also found in starchy vegetables such as potatoes and corn, grains such as rice and pasta, and milk and yogurt. Spreading carbohydrate throughout the day helps keep your blood sugar levels within your target range. Your daily amount depends on several things, including your weight, how active you are, which diabetes medicines you take, and what your goals are for your blood sugar levels. A registered dietitian or diabetes educator can help you plan how much carbohydrate to include in each meal and snack. A guideline for your daily amount of carbohydrate is:  · 45 to 60 grams at each meal. That's about the same as 3 to 4 carbohydrate servings. · 15 to 20 grams at each snack. That's about the same as 1 carbohydrate serving. The Nutrition Facts label on packaged foods tells you how much carbohydrate is in a serving of the food. First, look at the serving size on the food label. Is that the amount you eat in a serving?  All of the nutrition information on a food label is based on that serving size. So if you eat more or less than that, you'll need to adjust the other numbers. Total carbohydrate is the next thing you need to look for on the label. If you count carbohydrate servings, one serving of carbohydrate is 15 grams. For foods that don't come with labels, such as fresh fruits and vegetables, you'll need a guide that lists carbohydrate in these foods. Ask your doctor, dietitian, or diabetes educator about books or other nutrition guides you can use. If you take insulin, you need to know how many grams of carbohydrate are in a meal. This lets you know how much rapid-acting insulin to take before you eat. If you use an insulin pump, you get a constant rate of insulin during the day. So the pump must be programmed at meals to give you extra insulin to cover the rise in blood sugar after meals. When you know how much carbohydrate you will eat, you can take the right amount of insulin. Or, if you always use the same amount of insulin, you need to make sure that you eat the same amount of carbohydrate at meals. If you need more help to understand carbohydrate counting and food labels, ask your doctor, dietitian, or diabetes educator. How do you get started with meal planning? Here are some tips to get started:  · Plan your meals a week at a time. Don't forget to include snacks too. · Use cookbooks or online recipes to plan several main meals. Plan some quick meals for busy nights. You also can double some recipes that freeze well. Then you can save half for other busy nights when you don't have time to cook. · Make sure you have the ingredients you need for your recipes. If you're running low on basic items, put these items on your shopping list too. · List foods that you use to make breakfasts, lunches, and snacks. List plenty of fruits and vegetables. · Post this list on the refrigerator. Add to it as you think of more things you need.   · Take the list to the store to do your weekly shopping. Follow-up care is a key part of your treatment and safety. Be sure to make and go to all appointments, and call your doctor if you are having problems. It's also a good idea to know your test results and keep a list of the medicines you take. Where can you learn more? Go to http://aye-denton.info/  Enter J158 in the search box to learn more about \"Learning About Meal Planning for Diabetes. \"  Current as of: December 20, 2019               Content Version: 12.6  © 3613-7776 [employer]. Care instructions adapted under license by [employer] (which disclaims liability or warranty for this information). If you have questions about a medical condition or this instruction, always ask your healthcare professional. [address] any warranty or liability for your use of this information. Patient Education        Gastroparesis: Care Instructions  Your Care Instructions     When you have gastroparesis, your stomach takes a lot longer to empty. This delay can cause belly pain, bloating, and belching. It also can cause hiccups, heartburn, nausea or vomiting. You may not feel like eating. These symptoms may come and go. They most often occur during and after meals. You may feel full after only a few bites of food. This condition occurs when the nerves to the stomach don't work properly. Diabetes is the most common cause of this nerve damage. Gastroparesis can make it harder to control your blood sugar levels. But keeping your blood sugar levels under control may help with your symptoms. Parkinson's disease, stroke, and some medicines can also cause this condition. Home treatment can often help. Follow-up care is a key part of your treatment and safety. Be sure to make and go to all appointments, and call your doctor if you are having problems.  It's also a good idea to know your test results and keep a list of the medicines you take. How can you care for yourself at home? · Eat several small meals each day rather than three large meals. · Eat foods that are low in fiber and fat. · If your doctor suggests it, take medicines that help the stomach empty more quickly. These are called motility agents. When should you call for help? Call your doctor now or seek immediate medical care if:    · You are vomiting.     · You have new or worse belly pain.     · You have a fever.     · You cannot pass stools or gas. Watch closely for changes in your health, and be sure to contact your doctor if you have any problems. Where can you learn more? Go to http://aye-denton.info/  Enter M106 in the search box to learn more about \"Gastroparesis: Care Instructions. \"  Current as of: April 15, 2020               Content Version: 12.6  © 3208-4875 [facility]. Care instructions adapted under license by [employer] (which disclaims liability or warranty for this information). If you have questions about a medical condition or this instruction, always ask your healthcare professional. [address] any warranty or liability for your use of this information.        Follow-up with your primary gastroenterologist on 10/09/2020 as scheduled

## 2020-10-19 ENCOUNTER — OFFICE VISIT (OUTPATIENT)
Dept: ENDOCRINOLOGY | Age: 60
End: 2020-10-19
Payer: COMMERCIAL

## 2020-10-19 VITALS
DIASTOLIC BLOOD PRESSURE: 82 MMHG | RESPIRATION RATE: 16 BRPM | TEMPERATURE: 97.9 F | SYSTOLIC BLOOD PRESSURE: 160 MMHG | BODY MASS INDEX: 37.36 KG/M2 | WEIGHT: 203 LBS | OXYGEN SATURATION: 99 % | HEART RATE: 95 BPM | HEIGHT: 62 IN

## 2020-10-19 DIAGNOSIS — E78.2 MIXED HYPERLIPIDEMIA: ICD-10-CM

## 2020-10-19 DIAGNOSIS — E11.65 TYPE 2 DIABETES MELLITUS WITH HYPERGLYCEMIA, WITH LONG-TERM CURRENT USE OF INSULIN (HCC): ICD-10-CM

## 2020-10-19 DIAGNOSIS — E11.43 DIABETIC GASTROPARESIS (HCC): ICD-10-CM

## 2020-10-19 DIAGNOSIS — K31.84 DIABETIC GASTROPARESIS (HCC): ICD-10-CM

## 2020-10-19 DIAGNOSIS — E11.40 TYPE 2 DIABETES MELLITUS WITH DIABETIC NEUROPATHY, WITH LONG-TERM CURRENT USE OF INSULIN (HCC): Primary | ICD-10-CM

## 2020-10-19 DIAGNOSIS — Z79.4 TYPE 2 DIABETES MELLITUS WITH DIABETIC NEUROPATHY, WITH LONG-TERM CURRENT USE OF INSULIN (HCC): Primary | ICD-10-CM

## 2020-10-19 DIAGNOSIS — Z79.4 TYPE 2 DIABETES MELLITUS WITH HYPERGLYCEMIA, WITH LONG-TERM CURRENT USE OF INSULIN (HCC): ICD-10-CM

## 2020-10-19 DIAGNOSIS — I10 ESSENTIAL HYPERTENSION: ICD-10-CM

## 2020-10-19 LAB — HBA1C MFR BLD HPLC: 7.9 %

## 2020-10-19 PROCEDURE — 83036 HEMOGLOBIN GLYCOSYLATED A1C: CPT | Performed by: INTERNAL MEDICINE

## 2020-10-19 PROCEDURE — 99215 OFFICE O/P EST HI 40 MIN: CPT | Performed by: INTERNAL MEDICINE

## 2020-10-19 PROCEDURE — 3051F HG A1C>EQUAL 7.0%<8.0%: CPT | Performed by: INTERNAL MEDICINE

## 2020-10-19 RX ORDER — ERYTHROMYCIN 500 MG/1
250 TABLET, COATED ORAL
COMMUNITY
End: 2021-11-30

## 2020-10-19 RX ORDER — FLUCONAZOLE 150 MG/1
150 TABLET ORAL DAILY
Qty: 1 TAB | Refills: 1 | Status: SHIPPED | OUTPATIENT
Start: 2020-10-19 | End: 2020-10-20

## 2020-10-19 RX ORDER — INSULIN DEGLUDEC INJECTION 200 U/ML
INJECTION, SOLUTION SUBCUTANEOUS
Qty: 18 ML | Refills: 11 | Status: SHIPPED | OUTPATIENT
Start: 2020-10-19 | End: 2021-11-09

## 2020-10-19 RX ORDER — METFORMIN HYDROCHLORIDE 500 MG/1
500 TABLET, FILM COATED, EXTENDED RELEASE ORAL 2 TIMES DAILY
COMMUNITY
End: 2021-03-29 | Stop reason: SDUPTHER

## 2020-10-19 NOTE — LETTER
10/20/20 Patient: Osmar Church YOB: 1960 Date of Visit: 10/19/2020 Miguel Weir MD 
[address] [facility] [address] VIA In Basket Dear Miguel Weir MD, Thank you for referring Ms. Edin Saha to [id number] [place] [address] for evaluation. My notes for this consultation are attached. If you have questions, please do not hesitate to call me. I look forward to following your patient along with you. Sincerely, Nilson Ledezma MD

## 2020-10-19 NOTE — PROGRESS NOTES
Arnaldo Lemus MD          Patient Information  Date:10/20/2020  Name : Kajal Jacob 63 y.o.     YOB: 1960         PCP : Michelle Ojeda MD         Chief Complaint   Patient presents with    Diabetes     LV 8/2019       History of Present Illness: Kajal Jacob is a 63 y.o. female here for fu of  Type 2 Diabetes Mellitus. She has long-standing history of diabetes mellitus. She has underlying obstructive sleep apnea and hypertension. She has microvascular disease. CABG in 2009. She was hospitalized with abdominal complaints and was diagnosed with gastroparesis, of GLP-1 agonist.  Last visit was more than a year ago  Having hypoglycemia, not able to say if it was within an hour or 2 after eating. She is checking blood glucose intermittently    Blood pressure is elevated  No pain  Gastroparesis seeing gastroenterologist, on Reglan  She has gained weight      Wt Readings from Last 3 Encounters:   10/19/20 203 lb (92.1 kg)   09/22/20 197 lb 12 oz (89.7 kg)   08/26/19 221 lb (100.2 kg)       BP Readings from Last 3 Encounters:   10/19/20 (!) 160/82   09/22/20 (!) 155/69   08/26/19 173/73           Past Medical History:   Diagnosis Date    Arthritis     CAD (coronary artery disease) 2009    Hx of 2 MI's and then CABG 5/2009    Diabetes ([address]     IDDM type 3    Dyslipidemia     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  erythromycin 500 mg tablet Take 250 mg by mouth four (4) times daily (with meals).  pantoprazole (PROTONIX) 40 mg tablet Take 1 Tab by mouth Daily (before breakfast).     carvediloL (COREG) 3.125 mg tablet Take 1 Tab by mouth daily.  rosuvastatin (CRESTOR) 5 mg tablet Take 10 mg by mouth nightly.  enalapril (VASOTEC) 10 mg tablet Take 10 mg by mouth daily.  insulin lispro (HumaLOG KwikPen Insulin) 200 unit/mL (3 mL) inpn Inject 40 units subcutaneously before meals with sliding scale (Max  UNITS daily), need appointment for future refills (Patient taking differently: Inject 40 units subcutaneously QID)    metoclopramide HCl (REGLAN) 10 mg tablet Take 10 mg by mouth Before breakfast, lunch, dinner and at bedtime.  aspirin 81 mg chewable tablet Take 81 mg by mouth nightly.  clopidogrel (PLAVIX) 75 mg tablet Take 75 mg by mouth daily.  furosemide (LASIX) 40 mg tablet Take 40 mg by mouth as needed.  potassium chloride (KLOR-CON M20) 20 mEq tablet Take 20 mEq by mouth daily.  insulin degludec Tiffanie Sa FlexTouch U-200) 200 unit/mL (3 mL) inpn INJECT 100 UNITS SUBCUTANEOUSLY EVERY DAY    dapagliflozin (Farxiga) 10 mg tab tablet Take 1 Tab by mouth every morning.  fluconazole (DIFLUCAN) 150 mg tablet Take 1 Tab by mouth daily for 1 day. FDA advises cautious prescribing of oral fluconazole in pregnancy.  prucalopride 2 mg tab Take 2 mg by mouth daily. No current facility-administered medications for this visit. No Known Allergies      Review of Systems:  -   - Review of all systems negative other than mentioned in HPI    Physical Examination:   Blood pressure (!) 160/82, pulse 95, temperature 97.9 °F (36.6 °C), temperature source Oral, resp. rate 16, height 5' 2\" (1.575 m), weight 203 lb (92.1 kg), last menstrual period 06/25/2005, SpO2 99 %. Estimated body mass index is 37.13 kg/m² as calculated from the following:    Height as of this encounter: 5' 2\" (1.575 m). -   Weight as of this encounter: 203 lb (92.1 kg).   - General: pleasant, no distress, good eye contact  - HEENT: no pallor, no periorbital edema, EOMI  - Neck: supple,   - Cardiovascular: regular, normal rate, normal S1 and S2, - Respiratory: clear to auscultation bilaterally  - Gastrointestinal: soft, nontender, nondistended,  BS +  - Musculoskeletal: no proximal muscle weakness in upper or lower extremities  - Integumentary: No edema  - Neurological:alert and oriented  - Psychiatric: normal mood and affect  - Skin: color, texture, turgor normal.     Diabetic foot exam: July 2019    Left Foot:   Visual Exam: normal    Pulse DP: 2+ (normal)   Filament test: reduced sensation    Vibratory sensation: absent      Right Foot:   Visual Exam: normal    Pulse DP: 2+ (normal)   Filament test: reduced sensation    Vibratory sensation: absent    Lab Results   Component Value Date/Time    Hemoglobin A1c 11.1 (H) 07/16/2019 03:38 PM    Hemoglobin A1c 8.7 (H) 05/14/2018 03:24 PM    Hemoglobin A1c, External 9.5 10/14/2018    Glucose 168 (H) 09/22/2020 03:32 AM    Glucose (POC) 236 (H) 09/22/2020 12:46 PM    Microalb/Creat ratio (ug/mg creat.) 11.0 07/16/2019 03:38 PM    LDL, calculated Comment 07/16/2019 03:38 PM    Creatinine (POC) 0.9 06/20/2012 11:40 AM    Creatinine 0.83 09/22/2020 03:32 AM      Lab Results   Component Value Date/Time    GFR est non-AA >60 09/22/2020 03:32 AM    GFRNA, POC >60 06/20/2012 11:40 AM    GFR est AA >60 09/22/2020 03:32 AM    GFRAA, POC >60 06/20/2012 11:40 AM    Creatinine 0.83 09/22/2020 03:32 AM    Creatinine (POC) 0.9 06/20/2012 11:40 AM    BUN 10 09/22/2020 03:32 AM    Sodium 139 09/22/2020 03:32 AM    Potassium 3.1 (L) 09/22/2020 03:32 AM    Chloride 102 09/22/2020 03:32 AM    CO2 31 09/22/2020 03:32 AM    Phosphorus 3.1 09/22/2020 03:32 AM         Data Reviewed:       Assessment/Plan:     1. Type 2 diabetes mellitus with diabetic neuropathy, with long-term current use of insulin ([address]    2. Mixed hyperlipidemia    3. Essential hypertension        1.  Type 2 Diabetes Mellitus   Lab Results   Component Value Date/Time    Hemoglobin A1c 11.1 (H) 07/16/2019 03:38 PM    Hemoglobin A1c (POC) 7.9 10/19/2020 08:44 AM Hemoglobin A1c, External 9.5 10/14/2018     Better A1c control with change in the diet  Dietitian visit to help with food choices  Tresiba 100  NovoLog 30-40 units before each meal.    Multiple comorbidities    FLU annually ,Pneumovax ,aspirin daily,annual eye exam,microalbumin    2. HTN : Continue current therapy     3. Hyperlipidemia : Managed by cardiology  Triglycerides are high due to uncontrolled diabetes mellitus  Was on Repatha, now on statin      4. Obesity:Body mass index is 37.13 kg/m². Discussed about the importance of exercise and carbohydrate portion control. 5.  CAD-followed by cardiology Dr Axel Parker,      6. Gastroparesis: Followed by gastroenterologist        Follow-up and Dispositions    · Return in about 3 months (around 1/19/2021). Thank you for allowing me to participate in the care of this patient.     Cassie Beltran MD      Patient verbalized understanding

## 2020-10-19 NOTE — PROGRESS NOTES
Danny Luu is a 63 y.o. female here for   Chief Complaint   Patient presents with    Diabetes     LV 8/2019       1. Have you been to the ER, urgent care clinic since your last visit? Hospitalized since your last visit? -[facility] in June for cardiac stent and 3 weeks ago for gastroparesis    2. Have you seen or consulted any other health care providers outside of the [address] [place] [address] since your last visit? Include any pap smears or colon screening. -GI Dr. Jessy Galeano

## 2020-11-02 ENCOUNTER — VIRTUAL VISIT (OUTPATIENT)
Dept: DIABETES SERVICES | Age: 60
End: 2020-11-02
Payer: COMMERCIAL

## 2020-11-02 DIAGNOSIS — E11.40 TYPE 2 DIABETES MELLITUS WITH DIABETIC NEUROPATHY, WITH LONG-TERM CURRENT USE OF INSULIN (HCC): ICD-10-CM

## 2020-11-02 DIAGNOSIS — K31.84 DIABETIC GASTROPARESIS (HCC): ICD-10-CM

## 2020-11-02 DIAGNOSIS — Z79.4 TYPE 2 DIABETES MELLITUS WITH DIABETIC NEUROPATHY, WITH LONG-TERM CURRENT USE OF INSULIN (HCC): ICD-10-CM

## 2020-11-02 DIAGNOSIS — E11.43 DIABETIC GASTROPARESIS (HCC): ICD-10-CM

## 2020-11-02 PROCEDURE — 97802 MEDICAL NUTRITION INDIV IN: CPT | Performed by: DIETITIAN, REGISTERED

## 2020-11-02 NOTE — PROGRESS NOTES
[facility]  Initial Nutrition Assessment    Patient's Name: Carley Ortega  Date: 11/2/2020  Reason for Referral: gastroparesis meal planning with low carbohydrates  Referral Source: Selena Snow MD    Nutrition Assessment:  Pertinent Medical History:    Patient Active Problem List   Diagnosis Code    Mixed hyperlipidemia E78.2    Essential hypertension I10    Type 2 diabetes with nephropathy ([address] E11.21    Type 2 diabetes mellitus with diabetic neuropathy ([address] E11.40    Severe obesity ([address] E66.01    Gastroparesis K31.84    Diabetic gastroparesis ([address] E11.43, K31.84       Pertinent Medications/Supplements:  Prior to Admission medications    Medication Sig Start Date End Date Taking? Authorizing Provider   [facility] ER) 500 mg TG24 24 hour tablet Take 500 mg by mouth daily. Provider, Historical   erythromycin 500 mg tablet Take 250 mg by mouth four (4) times daily (with meals). Provider, Historical   insulin degludec Davida Taylor FlexTouch U-200) 200 unit/mL (3 mL) inpn INJECT 100 UNITS SUBCUTANEOUSLY EVERY DAY 10/19/20   Selena Snow MD   dapagliflozin Sherif Ros) 10 mg tab tablet Take 1 Tab by mouth every morning. 10/19/20   Selena Snow MD   prucalopride 2 mg tab Take 2 mg by mouth daily. 9/15/20   Provider, Historical   pantoprazole (PROTONIX) 40 mg tablet Take 1 Tab by mouth Daily (before breakfast). 9/15/20   Provider, Historical   carvediloL (COREG) 3.125 mg tablet Take 1 Tab by mouth daily. 6/16/20   Provider, Historical   rosuvastatin (CRESTOR) 5 mg tablet Take 10 mg by mouth nightly. 4/10/19   Provider, Historical   enalapril (VASOTEC) 10 mg tablet Take 10 mg by mouth daily.     Provider, Historical   insulin lispro (HumaLOG KwikPen Insulin) 200 unit/mL (3 mL) inpn Inject 40 units subcutaneously before meals with sliding scale (Max  UNITS daily), need appointment for future refills  Patient taking differently: Inject 40 units subcutaneously QID 8/5/20   Alanna Maloney MD   metoclopramide HCl (REGLAN) 10 mg tablet Take 10 mg by mouth Before breakfast, lunch, dinner and at bedtime. Provider, Jose   aspirin 81 mg chewable tablet Take 81 mg by mouth nightly. Jamal Kemp MD   clopidogrel (PLAVIX) 75 mg tablet Take 75 mg by mouth daily. Jamal Kemp MD   furosemide (LASIX) 40 mg tablet Take 40 mg by mouth as needed. Jamal Kemp MD   potassium chloride (KLOR-CON M20) 20 mEq tablet Take 20 mEq by mouth daily. Jamal Kemp MD       Vitamin/mineral supplements: need to assess    Pertinent Biochemical Data:      Per pt her A1c 2 weeks ago was 7.7%    Lab Results   Component Value Date/Time    Hemoglobin A1c 11.1 (H) 07/16/2019 03:38 PM    Hemoglobin A1c (POC) 7.9 10/19/2020 08:44 AM    Hemoglobin A1c, External 9.5 10/14/2018          Home blood glucose records: SMBG 1x/day, Range of readings- 102-167 mg/dL but this am was 232 mg/dl    Anthropometrics:  Height:   Ht Readings from Last 1 Encounters:   10/19/20 5' 2\" (1.575 m)     Weight:   Wt Readings from Last 1 Encounters:   10/19/20 203 lb (92.1 kg)       Wgt 187# now per pt    IBW: 110#   Adjusted BW: 141#  BMI: 34.2 kg/m2         Weight hx: Wt Readings from Last 3 Encounters:   10/19/20 203 lb (92.1 kg)   09/22/20 197 lb 12 oz (89.7 kg)   08/26/19 221 lb (100.2 kg)        Food- and nutrition-related history:  Pt has had the gastric emptying study 3 times and last time was told everything was normal.  She reports she started walking 4 times a day after meals and went weeks with no issue but then \"it all fell apart again\"      Pt has not had education on gastroparesis diet other than her own research. She is not tolerating most veggies and is afraid to try much of them. Tolerates green beans mostly. She is making many high fat choices which can set off issues. She does have glucerna at home for bad days.  She is a fast eater so is not chewing what she does eat well.  She denies lactose intolerance but is using whole milk products which between fat and lactose may cause an issue. May not be aware of how fat is impacting her sx i.e. made pasta with olive oil and had issues later    She is requesting sample menus to help her with daily meal prep      24-hour recall:  Breakfast ): bagel thin with egg (18 gm carb) or 1 packet brown sugar oatmeal (24-30 gms carb) or 1 pack cream of wheat (15 gms carb) or rice krispies with whole milk (? amount)  Lunch  Bagel thin w/PB; Whole milk cottage cheese or pretzels  Dinner : shrimp, rice, b. Potato-no skin; most veggies cause a flare up  Snacks  Pretzels; ice cream at night  Beverages: water; gatorade        Activity level: Light activity    Estimated Nutrition Needs:    Calorie needs (using [place] St Jeor x 1.35AF): 1564 kcal/day   Very low carb (<26% carb) =105 gms carb/day  Low carb (40% )150 gms carb per day        Nutrition Diagnosis: (11/2/2020): Variable carbohydrate intake related to lack of knowledge of gastroparesis diet as evidenced by food recall and discussion. Nutrition Interventions:  Reviewed gastroparesis guidelines: low fat; low fiber; frequent small meals  Discussed low FOD-MAP foods-garlic, onion, wheat; sugar alcohols  Discussed taking 20 minutes to eat and chew food thoroughly  Discussed starting day with solids and ending day with liquids as one apprpoach  Explored meal ideas and using lowfat or FF version of cottage cheese and milk      Patient Goals: to have a sample menu to follow so she is getting a variety of foods    Resources Provided/Reviewed: info on gastroparesis diet and sample plans  Information Reviewed with: Patient Pt was receptive and exhibited understanding of the material presented    Nutrition Monitoring/Evaluation:  (11/2/2020): decrease in sx and frequency of sx    MNT Follow-up Visit: TBD    REGKH-[id number] Public Health Emergency Adaptations for Telehealth:    Hilario Rios is a 63 y.o. female being evaluated through a synchronous (real-time) audio-video technology platform, as a substitution for an in-person encounter, to address the healthcare issues mentioned above. A caregiver was present when appropriate. I was in the office. The patient was at home. The patient and/or her healthcare decision maker, is aware that this patient-initiated, Telehealth encounter on 11/2/2020 is a billable service, with coverage as determined by her insurance carrier. She is aware that she may receive a bill and has provided verbal consent to proceed: Yes. This telehealth encounter occurred during the COVID-19 pandemic and public health emergency. Evaluation of the following organ systems was limited: Vitals/Constitutional/EENT/Resp/CV/GI//MS/Neuro/Skin/Heme-Lymph-Imm. Pursuant to the emergency declaration under the [address] [place] [address], [address] [place] [address] authority and the [employer] [place] Act, this Virtual Visit was conducted with patient's (and/or legal guardian's) consent, to reduce the risk of exposure to COVID-19 and provide necessary medical care. --Steve Winn RD on 11/2/2020 at 3:44 PM    An electronic signature was used to authenticate this note.  I was in the office for the appointment and time spent: 44 minutes

## 2021-01-12 DIAGNOSIS — E11.65 UNCONTROLLED TYPE 2 DIABETES MELLITUS WITH HYPERGLYCEMIA, WITH LONG-TERM CURRENT USE OF INSULIN (HCC): ICD-10-CM

## 2021-01-12 DIAGNOSIS — Z79.4 UNCONTROLLED TYPE 2 DIABETES MELLITUS WITH HYPERGLYCEMIA, WITH LONG-TERM CURRENT USE OF INSULIN (HCC): ICD-10-CM

## 2021-03-03 LAB
ALBUMIN SERPL-MCNC: 4 G/DL (ref 3.8–4.8)
ALBUMIN/CREAT UR: 32 MG/G CREAT (ref 0–29)
ALBUMIN/GLOB SERPL: 1.5 {RATIO} (ref 1.2–2.2)
ALP SERPL-CCNC: 44 IU/L (ref 39–117)
ALT SERPL-CCNC: 16 IU/L (ref 0–32)
AST SERPL-CCNC: 17 IU/L (ref 0–40)
BILIRUB SERPL-MCNC: 0.6 MG/DL (ref 0–1.2)
BUN SERPL-MCNC: 11 MG/DL (ref 8–27)
BUN/CREAT SERPL: 13 (ref 12–28)
CALCIUM SERPL-MCNC: 9.7 MG/DL (ref 8.7–10.3)
CHLORIDE SERPL-SCNC: 101 MMOL/L (ref 96–106)
CO2 SERPL-SCNC: 28 MMOL/L (ref 20–29)
CREAT SERPL-MCNC: 0.87 MG/DL (ref 0.57–1)
CREAT UR-MCNC: 33.9 MG/DL
EST. AVERAGE GLUCOSE BLD GHB EST-MCNC: 174 MG/DL
GLOBULIN SER CALC-MCNC: 2.7 G/DL (ref 1.5–4.5)
GLUCOSE SERPL-MCNC: 266 MG/DL (ref 65–99)
HBA1C MFR BLD: 7.7 % (ref 4.8–5.6)
MICROALBUMIN UR-MCNC: 11 UG/ML
POTASSIUM SERPL-SCNC: 3.9 MMOL/L (ref 3.5–5.2)
PROT SERPL-MCNC: 6.7 G/DL (ref 6–8.5)
SODIUM SERPL-SCNC: 145 MMOL/L (ref 134–144)

## 2021-03-29 ENCOUNTER — OFFICE VISIT (OUTPATIENT)
Dept: ENDOCRINOLOGY | Age: 61
End: 2021-03-29
Payer: COMMERCIAL

## 2021-03-29 VITALS
HEIGHT: 62 IN | TEMPERATURE: 98.5 F | WEIGHT: 202 LBS | HEART RATE: 88 BPM | OXYGEN SATURATION: 99 % | SYSTOLIC BLOOD PRESSURE: 140 MMHG | RESPIRATION RATE: 18 BRPM | BODY MASS INDEX: 37.17 KG/M2 | DIASTOLIC BLOOD PRESSURE: 63 MMHG

## 2021-03-29 DIAGNOSIS — E11.65 TYPE 2 DIABETES MELLITUS WITH HYPERGLYCEMIA, UNSPECIFIED WHETHER LONG TERM INSULIN USE (HCC): Primary | ICD-10-CM

## 2021-03-29 DIAGNOSIS — E78.2 MIXED HYPERLIPIDEMIA: ICD-10-CM

## 2021-03-29 PROCEDURE — 99215 OFFICE O/P EST HI 40 MIN: CPT | Performed by: INTERNAL MEDICINE

## 2021-03-29 PROCEDURE — 3051F HG A1C>EQUAL 7.0%<8.0%: CPT | Performed by: INTERNAL MEDICINE

## 2021-03-29 RX ORDER — TORSEMIDE 20 MG/1
20 TABLET ORAL DAILY
COMMUNITY

## 2021-03-29 RX ORDER — METFORMIN HYDROCHLORIDE 500 MG/1
500 TABLET, FILM COATED, EXTENDED RELEASE ORAL 2 TIMES DAILY
Qty: 180 TAB | Refills: 3 | Status: SHIPPED | OUTPATIENT
Start: 2021-03-29 | End: 2021-04-02 | Stop reason: ALTCHOICE

## 2021-03-29 NOTE — LETTER
3/30/2021 Patient: Jeff Vizcarra YOB: 1960 Date of Visit: 3/29/2021 Domingo Whipple MD 
[address] [place] [phone] Via Fax: [phone] Dear Domingo Whipple MD, Thank you for referring Ms. Logan Akbar to [id number] [place] [address] for evaluation. My notes for this consultation are attached. If you have questions, please do not hesitate to call me. I look forward to following your patient along with you. Sincerely, Jefry Soares MD

## 2021-03-29 NOTE — PROGRESS NOTES
Adriana Laguerre is a 63 y.o. female here for   Chief Complaint   Patient presents with    Diabetes       1. Have you been to the ER, urgent care clinic since your last visit? Hospitalized since your last visit? -[facility] in Jan and [facility] for caths     2. Have you seen or consulted any other health care providers outside of the [address] [place] [address] since your last visit? Include any pap smears or colon screening. -PCP and Dr. Marcie Herrera

## 2021-03-29 NOTE — PROGRESS NOTES
Mary Morris MD          Patient Information  Date:3/30/2021  Name : Kervin Palacio 63 y.o.     YOB: 1960         PCP : Luz Sands MD         Chief Complaint   Patient presents with    Diabetes       History of Present Illness: Kervin Palacio is a 63 y.o. female here for fu of  Type 2 Diabetes Mellitus. She has long-standing history of diabetes mellitus. She has underlying obstructive sleep apnea and hypertension. She has microvascular disease. CABG in 2009.    2020: She was hospitalized with abdominal complaints and was diagnosed with gastroparesis, off GLP-1 agonist.  2021: Admitted to hospital for cardiac cath, insulin was reduced after discharge, recently blood glucose started increasing. 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  OneTouch Ultra Blue Test Strip strip USE TO TEST BLOOD GLUCOSE THREE TIMES A DAY    erythromycin 500 mg tablet Take 250 mg by mouth four (4) times daily (with meals).     insulin degludec Silver Nay FlexTouch U-200) 200 unit/mL (3 mL) inpn INJECT 100 UNITS SUBCUTANEOUSLY EVERY DAY    dapagliflozin (Farxiga) 10 mg tab tablet Take 1 Tab by mouth every morning.  carvediloL (COREG) 3.125 mg tablet Take 1 Tab by mouth two (2) times a day.  rosuvastatin (CRESTOR) 20 mg tablet Take 20 mg by mouth nightly.  insulin lispro (HumaLOG KwikPen Insulin) 200 unit/mL (3 mL) inpn Inject 40 units subcutaneously before meals with sliding scale (Max  UNITS daily), need appointment for future refills (Patient taking differently: Inject 40 units subcutaneously QID)    aspirin 81 mg chewable tablet Take 81 mg by mouth nightly.  clopidogrel (PLAVIX) 75 mg tablet Take 75 mg by mouth daily.  potassium chloride (KLOR-CON M20) 20 mEq tablet Take 20 mEq by mouth daily.  metFORMIN (GLUMETZA ER) 500 mg TG24 24 hour tablet Take 500 mg by mouth two (2) times a day.  prucalopride 2 mg tab Take 2 mg by mouth daily. No current facility-administered medications for this visit. No Known Allergies      Review of Systems:  -   - Review of all systems negative other than mentioned in HPI    Physical Examination:   Blood pressure (!) 140/63, pulse 88, temperature 98.5 °F (36.9 °C), temperature source Oral, resp. rate 18, height 5' 2\" (1.575 m), weight 202 lb (91.6 kg), last menstrual period 06/25/2005, SpO2 99 %. Estimated body mass index is 36.95 kg/m² as calculated from the following:    Height as of this encounter: 5' 2\" (1.575 m). -   Weight as of this encounter: 202 lb (91.6 kg).   - General: pleasant, no distress, good eye contact  - HEENT: no pallor, no periorbital edema, EOMI  - Neck: supple,   - Cardiovascular: regular, normal rate, normal S1 and S2,   - Respiratory: clear to auscultation bilaterally  -   - Musculoskeletal: no proximal muscle weakness in upper or lower extremities  -   - Neurological:alert and oriented  - Psychiatric: normal mood and affect  - Skin: color, texture, turgor normal.     Diabetic foot exam: July 2019    Left Foot:   Visual Exam: normal    Pulse DP: 2+ (normal)   Filament test: reduced sensation    Vibratory sensation: absent      Right Foot:   Visual Exam: normal    Pulse DP: 2+ (normal)   Filament test: reduced sensation    Vibratory sensation: absent    Lab Results   Component Value Date/Time    Hemoglobin A1c 7.7 (H) 03/02/2021 07:07 AM    Hemoglobin A1c 11.1 (H) 07/16/2019 03:38 PM    Hemoglobin A1c 8.7 (H) 05/14/2018 03:24 PM    Hemoglobin A1c, External 9.5 10/14/2018    Glucose 266 (H) 03/02/2021 07:07 AM    Glucose (POC) 236 (H) 09/22/2020 12:46 PM    Microalb/Creat ratio (ug/mg creat.) 32 (H) 03/02/2021 07:07 AM    LDL, calculated Comment 07/16/2019 03:38 PM    Creatinine (POC) 0.9 06/20/2012 11:40 AM    Creatinine 0.87 03/02/2021 07:07 AM      Lab Results   Component Value Date/Time    GFR est non-AA 72 03/02/2021 07:07 AM    GFRNA, POC >60 06/20/2012 11:40 AM    GFR est AA 83 03/02/2021 07:07 AM    GFRAA, POC >60 06/20/2012 11:40 AM    Creatinine 0.87 03/02/2021 07:07 AM    Creatinine (POC) 0.9 06/20/2012 11:40 AM    BUN 11 03/02/2021 07:07 AM    Sodium 145 (H) 03/02/2021 07:07 AM    Potassium 3.9 03/02/2021 07:07 AM    Chloride 101 03/02/2021 07:07 AM    CO2 28 03/02/2021 07:07 AM    Phosphorus 3.1 09/22/2020 03:32 AM         Data Reviewed:       Assessment/Plan:     1. Type 2 diabetes mellitus with hyperglycemia, unspecified whether long term insulin use ([address]    2. Mixed hyperlipidemia        1.  Type 2 Diabetes Mellitus   Lab Results   Component Value Date/Time    Hemoglobin A1c 7.7 (H) 03/02/2021 07:07 AM    Hemoglobin A1c (POC) 7.9 10/19/2020 08:44 AM    Hemoglobin A1c, External 9.5 10/14/2018   A1c 7.7  Mixed meal, protein snacks  Insulin adjusted  Metformin, Farxiga  Tresiba 65 units, was on 100 units in the past  NovoLog 25 units before each meal.  Was on 40 units before meal.  No hypoglycemia, if she has hypoglycemia immediately postmeal, will change the prandial insulin to right after the meals  Multiple comorbidities  Weight loss and low carbohydrate diet    FLU annually ,Pneumovax ,aspirin daily,annual eye exam,microalbumin    2. HTN : Continue current therapy     3. Hyperlipidemia : Managed by cardiology  Was on Repatha, now on statin      4. Obesity:Body mass index is 36.95 kg/m². Discussed about the importance of exercise and carbohydrate portion control. 5.  CAD-followed by cardiology Dr Millicent Bazan,  EF 45% 2021    6. Gastroparesis: Followed by gastroenterologist  On erythromycin      7 Neuropathy -B12, was on gabapentin in the past  Alpha lipoic acid    Spent > 40 minutes on the day of the visit reviewing chart, examining, ordering/reviewing labs, counseling, discussing therapeutics and documentation in the medical record  Follow-up and Dispositions    · Return in about 4 months (around 7/29/2021). Thank you for allowing me to participate in the care of this patient.     Yazmin Tucker MD      Patient verbalized understanding

## 2021-03-29 NOTE — PATIENT INSTRUCTIONS
Tresiba 65 units in AM  
 
Novolog 25 - 30   units after meals Additional Novolog fo high sugars Blood sugar  Breakfast/Lunch/Dinner 130-200  Add  3  Units 201-250  Add  6 Units 251-300  Add  9 Units 301-350  Add 12  Units 351-400  Add 15  Units Ftsg Text: The defect edges were debeveled with a #15 scalpel blade.  Given the location of the defect, shape of the defect and the proximity to free margins a full thickness skin graft was deemed most appropriate.  Using a sterile surgical marker, the primary defect shape was transferred to the donor site. The area thus outlined was incised deep to adipose tissue with a #15 scalpel blade.  The harvested graft was then trimmed of adipose tissue until only dermis and epidermis was left.  The skin margins of the secondary defect were undermined to an appropriate distance in all directions utilizing iris scissors.  The secondary defect was closed with interrupted buried subcutaneous sutures.  The skin edges were then re-apposed with running  sutures.  The skin graft was then placed in the primary defect and oriented appropriately.

## 2021-04-02 DIAGNOSIS — E11.65 TYPE 2 DIABETES MELLITUS WITH HYPERGLYCEMIA, UNSPECIFIED WHETHER LONG TERM INSULIN USE (HCC): Primary | ICD-10-CM

## 2021-04-02 RX ORDER — METFORMIN HYDROCHLORIDE 500 MG/1
500 TABLET, EXTENDED RELEASE ORAL 2 TIMES DAILY
Qty: 180 TAB | Refills: 3 | Status: SHIPPED | OUTPATIENT
Start: 2021-04-02 | End: 2022-05-24 | Stop reason: ALTCHOICE

## 2021-07-20 PROBLEM — I25.2 OLD MYOCARDIAL INFARCTION: Status: ACTIVE | Noted: 2021-07-20

## 2021-07-20 PROBLEM — I11.0 HYPERTENSIVE HEART DISEASE WITH HEART FAILURE (HCC): Status: ACTIVE | Noted: 2021-07-20

## 2021-07-20 PROBLEM — I25.810 ATHEROSCLEROSIS OF CORONARY ARTERY BYPASS GRAFT(S) WITHOUT ANGINA PECTORIS: Status: ACTIVE | Noted: 2021-07-20

## 2021-07-20 PROBLEM — G47.33 OBSTRUCTIVE SLEEP APNEA (ADULT) (PEDIATRIC): Status: ACTIVE | Noted: 2021-07-20

## 2021-07-20 PROBLEM — I25.82 CHRONIC TOTAL OCCLUSION OF CORONARY ARTERY: Status: ACTIVE | Noted: 2021-07-20

## 2021-07-20 PROBLEM — I50.22 CHRONIC SYSTOLIC (CONGESTIVE) HEART FAILURE (HCC): Status: ACTIVE | Noted: 2021-07-20

## 2021-09-15 DIAGNOSIS — E78.2 MIXED HYPERLIPIDEMIA: ICD-10-CM

## 2021-09-15 DIAGNOSIS — E11.65 TYPE 2 DIABETES MELLITUS WITH HYPERGLYCEMIA, WITH LONG-TERM CURRENT USE OF INSULIN (HCC): ICD-10-CM

## 2021-09-15 DIAGNOSIS — Z79.4 TYPE 2 DIABETES MELLITUS WITH HYPERGLYCEMIA, WITH LONG-TERM CURRENT USE OF INSULIN (HCC): ICD-10-CM

## 2021-09-16 RX ORDER — INSULIN LISPRO 200 [IU]/ML
INJECTION, SOLUTION SUBCUTANEOUS
Qty: 60 ML | Refills: 0 | Status: SHIPPED | OUTPATIENT
Start: 2021-09-16 | End: 2021-10-12 | Stop reason: ALTCHOICE

## 2021-10-11 NOTE — TELEPHONE ENCOUNTER
Informed pt:    Office did not receive a request from pharmacy regarding Humalog. Last PA request was March 2020. PA will be attempted. Informed pt someone will shan with decision from insurance company.

## 2021-10-11 NOTE — TELEPHONE ENCOUNTER
Patient is doing follow up on pa for humalog stating is has been a month she would like a follow up call today with roxy for jany as she is almost out of medication

## 2021-10-12 RX ORDER — INSULIN ASPART 100 [IU]/ML
INJECTION, SOLUTION INTRAVENOUS; SUBCUTANEOUS
Qty: 60 ML | Refills: 11 | Status: SHIPPED | OUTPATIENT
Start: 2021-10-12 | End: 2022-10-14

## 2021-11-09 DIAGNOSIS — E11.65 TYPE 2 DIABETES MELLITUS WITH HYPERGLYCEMIA, WITH LONG-TERM CURRENT USE OF INSULIN (HCC): ICD-10-CM

## 2021-11-09 DIAGNOSIS — Z79.4 TYPE 2 DIABETES MELLITUS WITH HYPERGLYCEMIA, WITH LONG-TERM CURRENT USE OF INSULIN (HCC): ICD-10-CM

## 2021-11-09 DIAGNOSIS — E78.2 MIXED HYPERLIPIDEMIA: ICD-10-CM

## 2021-11-09 RX ORDER — INSULIN DEGLUDEC INJECTION 200 U/ML
INJECTION, SOLUTION SUBCUTANEOUS
Qty: 18 ML | Refills: 11 | Status: SHIPPED | OUTPATIENT
Start: 2021-11-09

## 2021-11-12 NOTE — PERIOP NOTES
Informed patient of COVID requirements, patient to complete COVID curbside testing at [facility] Tuesday, November 16th between [id number]. Patient verbalized understanding that COVID test is required to proceed with procedure.

## 2021-11-16 ENCOUNTER — TRANSCRIBE ORDER (OUTPATIENT)
Dept: REGISTRATION | Age: 61
End: 2021-11-16

## 2021-11-16 ENCOUNTER — HOSPITAL ENCOUNTER (OUTPATIENT)
Dept: LAB | Age: 61
Discharge: HOME | End: 2021-11-16
Payer: COMMERCIAL

## 2021-11-16 DIAGNOSIS — Z01.812 PRE-PROCEDURAL LABORATORY EXAMINATIONS: Primary | ICD-10-CM

## 2021-11-16 DIAGNOSIS — Z01.812 PRE-PROCEDURAL LABORATORY EXAMINATIONS: ICD-10-CM

## 2021-11-16 PROCEDURE — U0005 INFEC AGEN DETEC AMPLI PROBE: HCPCS

## 2021-11-17 LAB
SARS-COV-2, XPLCVT: NOT DETECTED
SOURCE, COVRS: NORMAL

## 2021-11-18 RX ORDER — METOCLOPRAMIDE 5 MG/1
5 TABLET ORAL
COMMUNITY

## 2021-11-18 RX ORDER — PANTOPRAZOLE SODIUM 40 MG/1
40 TABLET, DELAYED RELEASE ORAL DAILY
COMMUNITY

## 2021-11-18 NOTE — PROGRESS NOTES
[address] [place] [address] Dr Bailey Preprocedure Instructions      1. On the day of your procedure, please report to registration located on the 2nd floor of the  [facility]. yes    2. You must have a responsible adult to drive you to the hospital, stay at the hospital during your procedure and drive you home. If they leave your procedure will not be started (no exceptions). yes    3. Do not have anything to eat or drink (including water, gum, mints, coffee, and juice) after midnight. This does not apply to the medications you were instructed to take by your physician. yes  If you are currently taking Plavix, Coumadin, Aspirin, or other blood-thinning agents, contact your physician for special instructions. yes    4. EGD    5. Have a list of all current medications, including vitamins, herbal supplements and any other over the counter medications. yes    6. If you wear glasses, contacts, dentures and/or hearing aids, they may be removed prior to procedure, please bring a case to store them in. yes    7. You should understand that if you do not follow these instructions your procedure may be cancelled. If your physical condition changes (I.e. fever, cold or flu) please contact your doctor as soon as possible. 8. It is important that you be on time. If for any reason you are unable to keep your appointment please call [phone] the day of or your physicians office prior to your scheduled procedure    9.  Have you received your COVID Vaccine? yes If no, you will need to receive a COVID test/swab here at [facility] the MOB parking lot Monday - Friday 8a - 11am. There are no Saturday or Sunday swabbing at any [facility]. (patient verbalizes understanding) yes

## 2021-11-19 ENCOUNTER — ANESTHESIA (OUTPATIENT)
Dept: ENDOSCOPY | Age: 61
End: 2021-11-19
Payer: COMMERCIAL

## 2021-11-19 ENCOUNTER — HOSPITAL ENCOUNTER (OUTPATIENT)
Age: 61
Setting detail: OUTPATIENT SURGERY
Discharge: HOME | End: 2021-11-19
Attending: INTERNAL MEDICINE | Admitting: INTERNAL MEDICINE
Payer: COMMERCIAL

## 2021-11-19 ENCOUNTER — ANESTHESIA EVENT (OUTPATIENT)
Dept: ENDOSCOPY | Age: 61
End: 2021-11-19
Payer: COMMERCIAL

## 2021-11-19 VITALS
HEART RATE: 97 BPM | RESPIRATION RATE: 23 BRPM | TEMPERATURE: 96.8 F | DIASTOLIC BLOOD PRESSURE: 76 MMHG | SYSTOLIC BLOOD PRESSURE: 133 MMHG | HEIGHT: 62 IN | BODY MASS INDEX: 37.61 KG/M2 | OXYGEN SATURATION: 98 % | WEIGHT: 204.37 LBS

## 2021-11-19 DIAGNOSIS — K31.84 GASTROPARESIS: Primary | ICD-10-CM

## 2021-11-19 LAB
ALBUMIN SERPL-MCNC: 4.1 G/DL (ref 3.8–4.8)
ALBUMIN/CREAT UR: 159 MG/G CREAT (ref 0–29)
ALBUMIN/GLOB SERPL: 1.6 {RATIO} (ref 1.2–2.2)
ALP SERPL-CCNC: 70 IU/L (ref 44–121)
ALT SERPL-CCNC: 14 IU/L (ref 0–32)
AST SERPL-CCNC: 19 IU/L (ref 0–40)
BILIRUB SERPL-MCNC: 0.8 MG/DL (ref 0–1.2)
BUN SERPL-MCNC: 13 MG/DL (ref 8–27)
BUN/CREAT SERPL: 13 (ref 12–28)
CALCIUM SERPL-MCNC: 10.1 MG/DL (ref 8.7–10.3)
CHLORIDE SERPL-SCNC: 100 MMOL/L (ref 96–106)
CO2 SERPL-SCNC: 25 MMOL/L (ref 20–29)
CREAT SERPL-MCNC: 0.98 MG/DL (ref 0.57–1)
CREAT UR-MCNC: 88.1 MG/DL
EST. AVERAGE GLUCOSE BLD GHB EST-MCNC: 280 MG/DL
GLOBULIN SER CALC-MCNC: 2.6 G/DL (ref 1.5–4.5)
GLUCOSE SERPL-MCNC: 349 MG/DL (ref 65–99)
HBA1C MFR BLD: 11.4 % (ref 4.8–5.6)
MICROALBUMIN UR-MCNC: 140.2 UG/ML
POTASSIUM SERPL-SCNC: 4.5 MMOL/L (ref 3.5–5.2)
PROT SERPL-MCNC: 6.7 G/DL (ref 6–8.5)
SODIUM SERPL-SCNC: 141 MMOL/L (ref 134–144)

## 2021-11-19 PROCEDURE — 77030021593 HC FCPS BIOP ENDOSC BSC -A: Performed by: INTERNAL MEDICINE

## 2021-11-19 PROCEDURE — 76060000031 HC ANESTHESIA FIRST 0.5 HR: Performed by: INTERNAL MEDICINE

## 2021-11-19 PROCEDURE — 88305 TISSUE EXAM BY PATHOLOGIST: CPT

## 2021-11-19 PROCEDURE — 76040000019: Performed by: INTERNAL MEDICINE

## 2021-11-19 PROCEDURE — 74011250636 HC RX REV CODE- 250/636: Performed by: INTERNAL MEDICINE

## 2021-11-19 PROCEDURE — 74011250636 HC RX REV CODE- 250/636: Performed by: NURSE ANESTHETIST, CERTIFIED REGISTERED

## 2021-11-19 PROCEDURE — 2709999900 HC NON-CHARGEABLE SUPPLY: Performed by: INTERNAL MEDICINE

## 2021-11-19 PROCEDURE — 74011000250 HC RX REV CODE- 250: Performed by: NURSE ANESTHETIST, CERTIFIED REGISTERED

## 2021-11-19 RX ORDER — SODIUM CHLORIDE 9 MG/ML
50 INJECTION, SOLUTION INTRAVENOUS CONTINUOUS
Status: DISCONTINUED | OUTPATIENT
Start: 2021-11-19 | End: 2021-11-19 | Stop reason: HOSPADM

## 2021-11-19 RX ORDER — EPINEPHRINE 0.1 MG/ML
1 INJECTION INTRACARDIAC; INTRAVENOUS
Status: DISCONTINUED | OUTPATIENT
Start: 2021-11-19 | End: 2021-11-19 | Stop reason: HOSPADM

## 2021-11-19 RX ORDER — LIDOCAINE HYDROCHLORIDE 20 MG/ML
INJECTION, SOLUTION EPIDURAL; INFILTRATION; INTRACAUDAL; PERINEURAL AS NEEDED
Status: DISCONTINUED | OUTPATIENT
Start: 2021-11-19 | End: 2021-11-19 | Stop reason: HOSPADM

## 2021-11-19 RX ORDER — FENTANYL CITRATE 50 UG/ML
100 INJECTION, SOLUTION INTRAMUSCULAR; INTRAVENOUS
Status: DISCONTINUED | OUTPATIENT
Start: 2021-11-19 | End: 2021-11-19 | Stop reason: HOSPADM

## 2021-11-19 RX ORDER — ATROPINE SULFATE 0.1 MG/ML
0.5 INJECTION INTRAVENOUS
Status: DISCONTINUED | OUTPATIENT
Start: 2021-11-19 | End: 2021-11-19 | Stop reason: HOSPADM

## 2021-11-19 RX ORDER — SODIUM CHLORIDE 9 MG/ML
INJECTION, SOLUTION INTRAVENOUS
Status: DISCONTINUED | OUTPATIENT
Start: 2021-11-19 | End: 2021-11-19 | Stop reason: HOSPADM

## 2021-11-19 RX ORDER — DEXTROMETHORPHAN/PSEUDOEPHED 2.5-7.5/.8
1.2 DROPS ORAL
Status: DISCONTINUED | OUTPATIENT
Start: 2021-11-19 | End: 2021-11-19 | Stop reason: HOSPADM

## 2021-11-19 RX ORDER — MIDAZOLAM HYDROCHLORIDE 1 MG/ML
.25-5 INJECTION, SOLUTION INTRAMUSCULAR; INTRAVENOUS
Status: DISCONTINUED | OUTPATIENT
Start: 2021-11-19 | End: 2021-11-19 | Stop reason: HOSPADM

## 2021-11-19 RX ORDER — FENTANYL CITRATE 50 UG/ML
INJECTION, SOLUTION INTRAMUSCULAR; INTRAVENOUS AS NEEDED
Status: DISCONTINUED | OUTPATIENT
Start: 2021-11-19 | End: 2021-11-19 | Stop reason: HOSPADM

## 2021-11-19 RX ORDER — GABAPENTIN 100 MG/1
100 CAPSULE ORAL
Qty: 60 CAPSULE | Refills: 5 | Status: SHIPPED | OUTPATIENT
Start: 2021-11-19 | End: 2021-12-19

## 2021-11-19 RX ORDER — GLYCOPYRROLATE 0.2 MG/ML
INJECTION INTRAMUSCULAR; INTRAVENOUS AS NEEDED
Status: DISCONTINUED | OUTPATIENT
Start: 2021-11-19 | End: 2021-11-19 | Stop reason: HOSPADM

## 2021-11-19 RX ORDER — NALOXONE HYDROCHLORIDE 0.4 MG/ML
0.4 INJECTION, SOLUTION INTRAMUSCULAR; INTRAVENOUS; SUBCUTANEOUS
Status: DISCONTINUED | OUTPATIENT
Start: 2021-11-19 | End: 2021-11-19 | Stop reason: HOSPADM

## 2021-11-19 RX ORDER — FLUMAZENIL 0.1 MG/ML
0.2 INJECTION INTRAVENOUS
Status: DISCONTINUED | OUTPATIENT
Start: 2021-11-19 | End: 2021-11-19 | Stop reason: HOSPADM

## 2021-11-19 RX ORDER — PROPOFOL 10 MG/ML
INJECTION, EMULSION INTRAVENOUS AS NEEDED
Status: DISCONTINUED | OUTPATIENT
Start: 2021-11-19 | End: 2021-11-19 | Stop reason: HOSPADM

## 2021-11-19 RX ADMIN — SODIUM CHLORIDE 50 ML/HR: 9 INJECTION, SOLUTION INTRAVENOUS at 15:14

## 2021-11-19 RX ADMIN — GLYCOPYRROLATE 0.1 MG: 0.2 INJECTION INTRAMUSCULAR; INTRAVENOUS at 15:47

## 2021-11-19 RX ADMIN — FENTANYL CITRATE 25 MCG: 50 INJECTION, SOLUTION INTRAMUSCULAR; INTRAVENOUS at 15:47

## 2021-11-19 RX ADMIN — SODIUM CHLORIDE: 9 INJECTION, SOLUTION INTRAVENOUS at 15:47

## 2021-11-19 RX ADMIN — PROPOFOL INJECTABLE EMULSION 70 MG: 10 INJECTION, EMULSION INTRAVENOUS at 15:47

## 2021-11-19 RX ADMIN — LIDOCAINE HYDROCHLORIDE 100 MG: 20 INJECTION, SOLUTION INTRAVENOUS at 15:47

## 2021-11-19 RX ADMIN — PROPOFOL INJECTABLE EMULSION 30 MG: 10 INJECTION, EMULSION INTRAVENOUS at 15:50

## 2021-11-19 RX ADMIN — PROPOFOL INJECTABLE EMULSION 30 MG: 10 INJECTION, EMULSION INTRAVENOUS at 15:52

## 2021-11-19 NOTE — PERIOP NOTES
1546  Timeout performed. Anesthesia staff at patient's bedside administering anesthesia and monitoring patients vital signs throughout procedure. See anesthesia note. Post procedure, report received from Tish FOSTER. 46  Endoscope was pre-cleaned at bedside immediately following procedure by endo Uri choi RN.     2261  Patient tolerated procedure. Abdomen soft and patient arousable and voices no complaints. Patient transported to endoscopy recovery area. Report given to post procedure Chela WADE.

## 2021-11-19 NOTE — H&P
Patient Name: Manfred Mckeon     2021     Gender: Female      (age): 1960 (57)           Referring Physician:      Mic Garcia, [address] [place] [address]  [phone] (phone)  [phone] (fax)            Chief Complaint:   Abdominal pain, nauseas               History of Present Illness:           58 y/o female presents with complaint of flare of gastroparesis. Has history of diabetic gastroparesis. She does not want to talk about her gastroparesis. She had Covid 2 months ago. Since then she has had pain in LUQ and has had a lot of nausea and vomiting. The emesis is \"fluffy\" and different. The pain is different from her gastroparesis. The pain is sharp. It is constant but varies in intensity. Worse with eating, no certain food or drink triggers. She is more constipated because she is eating more bland food. The pain is nonradiating. No rectal bleeding or melena. No weight loss. No reflux symptoms. Recent thyroid test normal. She has history of CCY. She rarely uses NSAIDs. She does not drink alcohol or smoke. LOV 10/2020, reglan was stopped and erythromycin sent in to use prn. History of MI 2020 with 2 stents. Last EGD 2018 by Dr. Senthil Mcconnell with gastritis and fluid in stomach. Last colonoscopy 10/2012 and it was normal.  She is not taking an acid reducer.      Recent CT scan implies fatty liver; recent glucose 370                 Past Medical History          Medical Conditions:     Arthritis    Congestive Heart Failure    COVID-19    Diabetes Mellitus    Gastroparesis    High blood pressure    High cholesterol    Ischemic Heart Disease    Name of blood thinner: plavix    S/P Stents         Surgical Procedures:     Stents, 2020    Abdominal exploratory surgery, 10/18/2012    Gallbladder surgery, 2012    Shoulder Surgery,     Triple Bypass, 2009    Hysterectomy, 2005    Neck Surgery,     Hand Surgery         Dx Studies:     Colonoscopy, 10/16/2012    Gastric Emptying Scan, 2018    Gastric Emptying Scan, 06/2020    Ultrasound, 7/29/2020         Medications:     aspirin 81 mg Take 1 tablet by mouth once a day    carvedilol 25 mg Take 1 tablet by mouth once a day    erythromycin 500 mg Take 1/2 tablet by mouth four times a day    Humalog KwikPen Insulin 200 unit/mL (3 mL) Administer 30 units subcutaneously three times a day    Klor-Con M20 20 mEq Take 1 tablet by mouth once a day    Plavix 75 mg Take 1 tablet by mouth once a day    Tresiba FlexTouch U-200 200 unit/mL (3 mL) Inject 100 unit subcutaneously once a day         Allergies:     Patient has no known allergies or drug allergies         Immunizations:     Influenza vaccination, 10/01/2020    Influenza, seasonal, injectable, 10/01/2019    COVID Vaccine (refused)    Influenza vaccination (refused)            Social History          Alcohol:   Alcohol consumption: Monthly. Tobacco:     Never smoker         Drugs:     None         Exercise:     None         Caffeine:   2. Weekly. Family History   No history of Celiac sprue, Colon Cancer, Colon Polyps, Esophageal Cancer, Esophogeal Cancer, GI Cancers, IBD (Crohn's or UC), Inflammatory bowel disease (Crohn's or Ulcerative Colitis), Liver disease, Pancreatic Cancer, Stomach Cancer           Review of Systems:       Cardiovascular: Denies chest pain, irregular heart beat, palpitations, peripheral edema, syncope, Sweats. Constitutional: Denies fatigue, fever, loss of appetite, weight gain, weight loss. ENMT: Denies nose bleeds, sore throat, hearing loss. Endocrine: Denies excessive thirst, heat intolerance. Eyes: Denies loss of vision. Gastrointestinal: Presents suffers from abdominal pain, abdominal swelling, constipation, Bloating/gas, nausea, vomiting. Denies change in bowel habits, diarrhea, heartburn, jaundice, rectal bleeding, stomach cramps, dysphagia, rectal pain, Stool incontinence, hematemesis. Genitourinary: Denies dark urine, dysuria, frequent urination, hematuria, incontinence. Hematologic/Lymphatic: Presents suffers from easy bruising. Denies prolonged bleeding. Integumentary: Denies itching, rashes, sun sensitivity. Musculoskeletal: Denies arthritis, back pain, gout, joint pain, muscle weakness, stiffness. Neurological: Denies dizziness, fainting, frequent headaches, memory loss. Psychiatric: Denies anxiety, depression, difficulty sleeping, hallucinations, nervousness, panic attacks, paranoia. Respiratory: Denies cough, dyspnea, wheezing. Vital Signs: See nursing notes        Physical Exam:       Constitutional:      Appearance: No distress, appears comfortable. Communication: Understands/receives spoken information. Head/face: Inspection: Normacephalic, atraumatic. Palpation: normal.   Eyes:      Conjunctivae/lids: Normal.   Pupils/irises: Pupils equal, round and normal.   Respiratory:      Effort: Normal respiratory effort, comfortable, speaks in complete sentences. Percussion: thorax normoresonant. Auscultation: normal breath sounds, no rubs, wheezes or rhonchi. Cardiovascular:      Palpation: normal size, PMI is palpable in the 5th intercostal space, left midclavicular line, normal rythym. Auscultation: normal, S1 and S2, no gallops , no rubs or murmurs . Gastrointestinal/Abdomen:      Liver/Spleen: normal, normal size, Liver size and consistency normal, spleen is non-palpable. Abdomen Exam: normal bowel sounds, mild distention, mild generalized TTP especially upper abdomen, epigastrum and LUQ. Psychiatric:      Judgment/insight: Normal, normal judgement, normal insight. Orientation: oriented to time, space and person.           Impressions:         Epigastric pain  LUQ pain  Nausea & vomiting  Constipation  GERD (gastroesophageal reflux disease)  Gastroparesis      I have discussed EGD biopsy, dilation, alternatives complications including but not limited to pain, cardiopulmonary event, bleeding, perforation; all questions answered.

## 2021-11-19 NOTE — ANESTHESIA PREPROCEDURE EVALUATION
Relevant Problems   RESPIRATORY SYSTEM   (+) Obstructive sleep apnea (adult) (pediatric)      CARDIOVASCULAR   (+) Atherosclerosis of coronary artery bypass graft(s) without angina pectoris   (+) Essential hypertension   (+) Old myocardial infarction      ENDOCRINE   (+) Severe obesity (HCC)   (+) Type 2 diabetes mellitus with diabetic neuropathy (HCC)   (+) Type 2 diabetes with nephropathy (HCC)       Anesthetic History   No history of anesthetic complications            Review of Systems / Medical History  Patient summary reviewed and pertinent labs reviewed    Pulmonary        Sleep apnea: No treatment           Neuro/Psych   Within defined limits           Cardiovascular    Hypertension          Past MI, CAD, cardiac stents (last stent 1/21), CABG (2008) and hyperlipidemia    Exercise tolerance: <4 METS     GI/Hepatic/Renal     GERD           Endo/Other    Diabetes: type 2    Morbid obesity and arthritis     Other Findings              Physical Exam    Airway  Mallampati: II  TM Distance: 4 - 6 cm  Neck ROM: normal range of motion   Mouth opening: Normal     Cardiovascular    Rhythm: regular  Rate: normal         Dental    Dentition: Upper dentition intact and Lower dentition intact     Pulmonary  Breath sounds clear to auscultation               Abdominal         Other Findings            Anesthetic Plan    ASA: 3  Anesthesia type: MAC          Induction: Intravenous  Anesthetic plan and risks discussed with: Patient

## 2021-11-19 NOTE — PROGRESS NOTES
Endoscopy discharge instructions have been reviewed and given to patient. The patient verbalized understanding and acceptance of instructions. Dr. Marlys Meza discussed with patient procedure findings and next steps.

## 2021-11-19 NOTE — PROGRESS NOTES
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 Chavez Gómez  Assistant(s): Endoscopy RN-1: Sudha Rico RN  Endoscopy RN-2: Tay Rankin RN    Specimens:   ID Type Source Tests Collected by Time Destination   1 : at #18 in esophagus Biopsy Preservative Esophagus, Mid  Yoselin Perez MD 11/19/2021 1553 Pathology     H. Pylori  no    Assessment:  Intra-procedure medications       Anesthesia gave intra-procedure sedation and medications, see anesthesia flow sheet yes    Intravenous fluids: NS@ KVO     Vital signs stable  yes    Abdominal assessment: round and soft  yes    Recommendation:  Discharge patient per MD order yes.   Return to floor outpatient  Family or Friend  spouse  Permission to share finding with family or friend yes

## 2021-11-19 NOTE — PROCEDURES
Hang Dumont M.D. 2021    Esophagogastroduodenoscopy (EGD) Procedure Note  Xander Bowser  : 1960  [employer] Medical Record Number: [id number]      Indications:    Vomiting, persitent of unclear etilogy  Referring Physician:  Brynn Hoover MD  Anesthesia/Sedation: see nursing notes  Endoscopist:  Dr. Sveta Garcia  Assistants: None  Permit:  The indications, risks, benefits and alternatives were reviewed with the patient or their decision maker who was provided an opportunity to ask questions and all questions were answered. The specific risks of esophagogastroduodenoscopy with conscious sedation were reviewed, including but not limited to anesthetic complication, bleeding, adverse drug reaction, missed lesion, infection, IV site reactions, and intestinal perforation which would lead to the need for surgical repair. Alternatives to EGD including radiographic imaging, observation without testing, or laboratory testing were reviewed as well as the limitations of those alternatives discussed. After considering the options and having all their questions answered, the patient or their decision maker provided both verbal and written consent to proceed. Procedure in Detail:  After obtaining informed consent, positioning of the patient in the left lateral decubitus position, and conduction of a pre-procedure pause or \"time out\" the endoscope was introduced into the mouth and advanced to the duodenum. A careful inspection was made, and findings or interventions are described below.     Findings:   Esophagus:ectopic gastric mucosa 18 cm; otherwise normal  Stomach: normal   Duodenum/jejunum: normal    Complications/estimated blood loss: none    Therapies:  biopsy of esophagus at 18 cm    Specimens: esophageal biopsies    Implants:none           Recommendations:  -need gastric emptying scan results from participating hospitals  Gabapentin 100 mg twice daily; if better with this medication will have to have refilled with your primary care providers  Thank you for entrusting me with this patient's care. Please do not hesitate to contact me with any questions or if I can be of assistance with any of your other patients' GI needs.   Signed By: Katherine Ruano MD

## 2021-11-19 NOTE — DISCHARGE INSTRUCTIONS
Jabier Blood  [id number]  1960    DISCHARGE INSTRUCTIONS    Results:  normal    Discomfort:  Redness at IV site- apply warm compress to area; if redness or soreness persist- contact your physician. There may be a slight amount of blood passed from the rectum. Gaseous discomfort - walking, belching will help relieve any discomfort. You may not operate a vehicle for 12 hours. You may not engage in an occupation involving machinery or appliances for rest of today. You may not drink alcoholic beverages for at least 12 hours. Avoid making any critical decisions for at least 24 hours. DIET:   High fiber diet. Medications: If erythromycin not of benefit discontinue. If erythromycin not helpful try gabapentin 100 mg before breakfast and before last daily meal              Resume usual medications today   ACTIVITY:  You may resume your normal daily activities it is recommended that you spend the remainder of the day resting -  avoid any strenuous activity. CALL M.D. ANY SIGN OF:   Increasing pain, nausea, vomiting  Abdominal distension (swelling)  New increased bleeding (oral or rectal)  Fever (chills)  Pain in chest area  Bloody discharge from nose or mouth  Shortness of breath     Follow-up Instructions:  Call Dr. Letty Hawk if you have any questions or problems.   Telephone # [phone]-        DISCHARGE SUMMARY from Nurse    The following personal items collected during your admission are returned to you:   Dental Appliance: Dental Appliances: None  Vision: Visual Aid: Glasses, At bedside  Hearing Aid:    Jewelry:    Clothing:    Other Valuables:    Valuables sent to safe:

## 2021-11-22 NOTE — ANESTHESIA POSTPROCEDURE EVALUATION
Procedure(s):  UPPER ENDOSCOPY (EGD)  ESOPHAGOGASTRODUODENAL (EGD) BIOPSY.     MAC    Anesthesia Post Evaluation      Multimodal analgesia: multimodal analgesia used between 6 hours prior to anesthesia start to PACU discharge  Patient location during evaluation: bedside  Patient participation: complete - patient participated  Level of consciousness: awake  Pain management: adequate  Airway patency: patent  Anesthetic complications: no  Cardiovascular status: acceptable  Respiratory status: acceptable  Hydration status: acceptable        INITIAL Post-op Vital signs:   Vitals Value Taken Time   /76 11/19/21 1615   Temp     Pulse 97 11/19/21 1615   Resp 23 11/19/21 1615   SpO2 98 % 11/19/21 1615

## 2021-11-30 ENCOUNTER — OFFICE VISIT (OUTPATIENT)
Dept: ENDOCRINOLOGY | Age: 61
End: 2021-11-30
Payer: COMMERCIAL

## 2021-11-30 VITALS
OXYGEN SATURATION: 100 % | TEMPERATURE: 98.5 F | RESPIRATION RATE: 18 BRPM | SYSTOLIC BLOOD PRESSURE: 131 MMHG | HEIGHT: 62 IN | WEIGHT: 191 LBS | HEART RATE: 103 BPM | BODY MASS INDEX: 35.15 KG/M2 | DIASTOLIC BLOOD PRESSURE: 68 MMHG

## 2021-11-30 DIAGNOSIS — E11.65 TYPE 2 DIABETES MELLITUS WITH HYPERGLYCEMIA, UNSPECIFIED WHETHER LONG TERM INSULIN USE (HCC): Primary | ICD-10-CM

## 2021-11-30 DIAGNOSIS — I10 ESSENTIAL HYPERTENSION: ICD-10-CM

## 2021-11-30 DIAGNOSIS — I73.9 PAD (PERIPHERAL ARTERY DISEASE) (HCC): ICD-10-CM

## 2021-11-30 PROCEDURE — 99215 OFFICE O/P EST HI 40 MIN: CPT | Performed by: INTERNAL MEDICINE

## 2021-11-30 NOTE — LETTER
12/2/2021    Patient: Avelina Marcum   YOB: 1960   Date of Visit: 11/30/2021     Kolton Davidson MD  [facility] [address]  Via Fax: [phone]    Dear Kolton Davidson MD,      Thank you for referring Ms. Brad Garcia to [address] [place] [address] for evaluation. My notes for this consultation are attached. If you have questions, please do not hesitate to call me. I look forward to following your patient along with you.       Sincerely,    Alessio Roberts MD

## 2021-11-30 NOTE — PATIENT INSTRUCTIONS
I have ordered scan/test and if you do not hear from the hospital in 3- 4 business days  please call the number [phone] to speak with the scheduling team directly.

## 2021-11-30 NOTE — PROGRESS NOTES
Hammad Acuña is a 57 y.o. female here for   Chief Complaint   Patient presents with    Diabetes       1. Have you been to the ER, urgent care clinic since your last visit? Hospitalized since your last visit? -[facility] for COVID testing in July 2. Have you seen or consulted any other health care providers outside of the [address] [place] [address] since your last visit?   Include any pap smears or colon screening.-dermatology

## 2021-11-30 NOTE — PROGRESS NOTES
Imelda Anderson MD          Patient Information  Date:12/2/2021  Name : Jabier Pace 57 y.o.     YOB: 1960         PCP : Jovita Denis MD         Chief Complaint   Patient presents with    Diabetes       History of Present Illness: Jabier Pace is a 57 y.o. female here for fu of  Type 2 Diabetes Mellitus. She has long-standing history of diabetes mellitus. She has underlying obstructive sleep apnea and hypertension. She has microvascular disease. CABG in 2009.    2020: She was hospitalized with abdominal complaints and was diagnosed with gastroparesis, off GLP-1 agonist.  2021: Admitted to hospital for cardiac cath, insulin was reduced after discharge  Under lot of stress, could not care for herself, did not bring the meter, not checking according to the recommendations. A1c increased  Has worsening of gastroparesis    Gastroparesis seeing gastroenterologist, on Reglan  Polyuria      Wt Readings from Last 3 Encounters:   11/30/21 191 lb (86.6 kg)   11/19/21 204 lb 5.9 oz (92.7 kg)   03/29/21 202 lb (91.6 kg)       BP Readings from Last 3 Encounters:   11/30/21 131/68   11/19/21 133/76   03/29/21 (!) 140/63           Past Medical History:   Diagnosis Date    Arthritis     CAD (coronary artery disease) 2009    Hx of 2 MI's and then CABG 5/2009    Diabetes ([facility]     IDDM type 3    Dyslipidemia     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[facility]     Hypertension     Other ill-defined conditions(799.89)     gastroparesis     Unspecified sleep apnea     no cpap repeat study sched for 9/20/13     Current Outpatient Medications   Medication Sig    gabapentin (NEURONTIN) 100 mg capsule Take 1 Capsule by mouth Before breakfast and dinner for 30 days. Max Daily Amount: 200 mg.    metoclopramide HCl (REGLAN) 5 mg tablet Take 5 mg by mouth Before breakfast, lunch, and dinner.     pantoprazole (PROTONIX) 40 mg tablet Take 40 mg by mouth daily. [place] FlexTouch U-200 200 unit/mL (3 mL) inpn pen INJECT 100 UNITS SUBCUTANEOUSLY EVERY DAY (Patient taking differently: INJECT 65 UNITS SUBCUTANEOUSLY EVERY DAY)    insulin aspart U-100 (NOVOLOG) 100 unit/mL (3 mL) inpn Inject 40 units before meals w/ SSI Max units daily: 165 Stop Humalog    torsemide (DEMADEX) 20 mg tablet Take 20 mg by mouth daily.  OneTouch Ultra Blue Test Strip strip USE TO TEST BLOOD GLUCOSE THREE TIMES A DAY    carvediloL (COREG) 3.125 mg tablet Take 1 Tablet by mouth two (2) times a day.  aspirin 81 mg chewable tablet Take 81 mg by mouth nightly.  clopidogrel (PLAVIX) 75 mg tablet Take 75 mg by mouth daily.  potassium chloride (KLOR-CON M20) 20 mEq tablet Take 20 mEq by mouth daily.  Farxiga 10 mg tab tablet TAKE ONE TABLET BY MOUTH EVERY MORNING    metFORMIN ER (GLUCOPHAGE XR) 500 mg tablet Take 1 Tab by mouth two (2) times a day. (Patient not taking: Reported on 11/18/2021)     No current facility-administered medications for this visit. No Known Allergies      Review of Systems:  -   - Per HPI    Physical Examination:   Blood pressure 131/68, pulse (!) 103, temperature 98.5 °F (36.9 °C), temperature source Temporal, resp. rate 18, height 5' 2\" (1.575 m), weight 191 lb (86.6 kg), last menstrual period 06/25/2005, SpO2 100 %. Estimated body mass index is 34.93 kg/m² as calculated from the following:    Height as of this encounter: 5' 2\" (1.575 m). -   Weight as of this encounter: 191 lb (86.6 kg).   - General: pleasant, no distress, good eye contact  - HEENT: no pallor, no periorbital edema, EOMI  - Neck: supple,   - Cardiovascular: regular, normal rate, normal S1 and S2,   - Respiratory: clear to auscultation bilaterally  -   - Musculoskeletal: no proximal muscle weakness in upper or lower extremities  -   - Neurological:alert and oriented  - Psychiatric: normal mood and affect  - Skin: color, texture, turgor normal.     Diabetic Foot and Eye Exam HM Status   Topic Date Due    Eye Exam  06/11/2020    Diabetic Foot Care  11/30/2022         Diabetic foot exam: November 2021    Left Foot:   Visual Exam: normal    Pulse DP: Could not palpate   Filament test: reduced sensation    Vibratory sensation: absent      Right Foot:   Visual Exam: normal    Pulse DP: Could not palpate   Filament test: reduced sensation    Vibratory sensation: absent    Lab Results   Component Value Date/Time    Hemoglobin A1c 11.4 (H) 11/18/2021 07:11 AM    Hemoglobin A1c 7.7 (H) 03/02/2021 07:07 AM    Hemoglobin A1c 11.1 (H) 07/16/2019 03:38 PM    Hemoglobin A1c, External 9.5 10/14/2018 12:00 AM    Glucose 349 (H) 11/18/2021 07:11 AM    Glucose (POC) 236 (H) 09/22/2020 12:46 PM    Microalb/Creat ratio (ug/mg creat.) 159 (H) 11/18/2021 07:11 AM    LDL, calculated Comment 07/16/2019 03:38 PM    Creatinine (POC) 0.9 06/20/2012 11:40 AM    Creatinine 0.98 11/18/2021 07:11 AM      Lab Results   Component Value Date/Time    GFR est non-AA 62 11/18/2021 07:11 AM    GFRNA, POC >60 06/20/2012 11:40 AM    GFR est AA 71 11/18/2021 07:11 AM    GFRAA, POC >60 06/20/2012 11:40 AM    Creatinine 0.98 11/18/2021 07:11 AM    Creatinine (POC) 0.9 06/20/2012 11:40 AM    BUN 13 11/18/2021 07:11 AM    Sodium 141 11/18/2021 07:11 AM    Potassium 4.5 11/18/2021 07:11 AM    Chloride 100 11/18/2021 07:11 AM    CO2 25 11/18/2021 07:11 AM    Phosphorus 3.1 09/22/2020 03:32 AM         Data Reviewed:       Assessment/Plan:     1. Type 2 diabetes mellitus with hyperglycemia, unspecified whether long term insulin use ([facility]    2. Essential hypertension    3. PAD (peripheral artery disease) ([facility]        1.  Type 2 Diabetes Mellitus   Lab Results   Component Value Date/Time    Hemoglobin A1c 11.4 (H) 11/18/2021 07:11 AM    Hemoglobin A1c (POC) 7.9 10/19/2020 08:44 AM    Hemoglobin A1c, External 9.5 10/14/2018 12:00 AM   Poorly controlled diabetes mellitus, worsening hyperglycemia  Mixed meal, protein snacks  Insulin adjusted  Metformin, Ulysess Counter 65 units, was on 100 units in the past  NovoLog 25 units before each meal. Multiple comorbidities  Weight loss and low carbohydrate diet      FLU annually ,Pneumovax ,aspirin daily,annual eye exam,microalbumin    2. HTN : Continue current therapy     3. Hyperlipidemia : Managed by cardiology  Was on Repatha, now on statin      4. Obesity:Body mass index is 34.93 kg/m². Discussed about the importance of exercise and carbohydrate portion control. 5.  CAD-followed by cardiology Dr Pretty Head  EF 45% 2021    6. Gastroparesis: Followed by gastroenterologist  On erythromycin      7 Neuropathy -B12, was on gabapentin in the past  Alpha lipoic acid    8. PAD: Claudication, cannot palpate dorsalis pedis, ANAHI  Has underlying peripheral neuropathy    Spent > 40 minutes on the day of the visit reviewing chart, examining, ordering/reviewing labs, counseling, discussing therapeutics and documentation in the medical record  Follow-up and Dispositions    · Return in about 3 weeks (around 12/21/2021). Thank you for allowing me to participate in the care of this patient.     Hafsa Emmanuel MD      Patient verbalized understanding

## 2021-12-01 DIAGNOSIS — E78.2 MIXED HYPERLIPIDEMIA: ICD-10-CM

## 2021-12-01 DIAGNOSIS — E11.65 TYPE 2 DIABETES MELLITUS WITH HYPERGLYCEMIA, WITH LONG-TERM CURRENT USE OF INSULIN (HCC): ICD-10-CM

## 2021-12-01 DIAGNOSIS — Z79.4 TYPE 2 DIABETES MELLITUS WITH HYPERGLYCEMIA, WITH LONG-TERM CURRENT USE OF INSULIN (HCC): ICD-10-CM

## 2021-12-01 RX ORDER — DAPAGLIFLOZIN 10 MG/1
TABLET, FILM COATED ORAL
Qty: 30 TABLET | Refills: 11 | Status: SHIPPED | OUTPATIENT
Start: 2021-12-01 | End: 2022-05-24 | Stop reason: ALTCHOICE

## 2021-12-21 ENCOUNTER — OFFICE VISIT (OUTPATIENT)
Dept: ENDOCRINOLOGY | Age: 61
End: 2021-12-21
Payer: COMMERCIAL

## 2021-12-21 VITALS
OXYGEN SATURATION: 99 % | HEART RATE: 94 BPM | TEMPERATURE: 97.6 F | DIASTOLIC BLOOD PRESSURE: 65 MMHG | HEIGHT: 62 IN | SYSTOLIC BLOOD PRESSURE: 125 MMHG | WEIGHT: 198.1 LBS | BODY MASS INDEX: 36.46 KG/M2

## 2021-12-21 DIAGNOSIS — G62.9 POLYNEUROPATHY, UNSPECIFIED: ICD-10-CM

## 2021-12-21 DIAGNOSIS — E11.65 TYPE 2 DIABETES MELLITUS WITH HYPERGLYCEMIA, UNSPECIFIED WHETHER LONG TERM INSULIN USE (HCC): Primary | ICD-10-CM

## 2021-12-21 PROCEDURE — 99214 OFFICE O/P EST MOD 30 MIN: CPT | Performed by: INTERNAL MEDICINE

## 2021-12-21 RX ORDER — GABAPENTIN 100 MG/1
100 CAPSULE ORAL
COMMUNITY
End: 2022-05-24

## 2021-12-21 NOTE — PROGRESS NOTES
Glen Rebolledo MD          Patient Information  Date:12/23/2021  Name : Frank Denny 57 y.o.     YOB: 1960         PCP : Abby Pan MD         Chief Complaint   Patient presents with    Diabetes       History of Present Illness: Frank Denny is a 57 y.o. female here for fu of  Type 2 Diabetes Mellitus. She has long-standing history of diabetes mellitus. She has underlying obstructive sleep apnea and hypertension. She has microvascular disease. CABG in 2009.    2020: She was hospitalized with abdominal complaints and was diagnosed with gastroparesis, off GLP-1 agonist.  2021: Admitted to hospital for cardiac cath  On MDI   Checking BG, when not checked missing prandial insulin   Has worsening of gastroparesis    Gastroparesis seeing gastroenterologist, on Reglan  Polyuria      Wt Readings from Last 3 Encounters:   12/21/21 198 lb 1.6 oz (89.9 kg)   11/30/21 191 lb (86.6 kg)   11/19/21 204 lb 5.9 oz (92.7 kg)       BP Readings from Last 3 Encounters:   12/21/21 125/65   11/30/21 131/68   11/19/21 133/76           Past Medical History:   Diagnosis Date    Arthritis     CAD (coronary artery disease) 2009    Hx of 2 MI's and then CABG 5/2009    Diabetes ([address]     IDDM type 3    Dyslipidemia     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[address]     Hypertension     Other ill-defined conditions(799.89)     gastroparesis     Unspecified sleep apnea     no cpap repeat study sched for 9/20/13     Current Outpatient Medications   Medication Sig    gabapentin (NEURONTIN) 100 mg capsule Take 100 mg by mouth nightly.  Farxiga 10 mg tab tablet TAKE ONE TABLET BY MOUTH EVERY MORNING    metoclopramide HCl (REGLAN) 5 mg tablet Take 5 mg by mouth Before breakfast, lunch, and dinner. As needed    pantoprazole (PROTONIX) 40 mg tablet Take 40 mg by mouth daily.    [facility] [place] FlexTouch U-200 200 unit/mL (3 mL) inpn pen INJECT 100 UNITS SUBCUTANEOUSLY EVERY DAY (Patient taking differently: INJECT 65 UNITS SUBCUTANEOUSLY EVERY DAY)    insulin aspart U-100 (NOVOLOG) 100 unit/mL (3 mL) inpn Inject 40 units before meals w/ SSI Max units daily: 165 Stop Humalog    metFORMIN ER (GLUCOPHAGE XR) 500 mg tablet Take 1 Tab by mouth two (2) times a day.  torsemide (DEMADEX) 20 mg tablet Take 20 mg by mouth daily.  OneTouch Ultra Blue Test Strip strip USE TO TEST BLOOD GLUCOSE THREE TIMES A DAY    carvediloL (COREG) 3.125 mg tablet Take 1 Tablet by mouth two (2) times a day.  aspirin 81 mg chewable tablet Take 81 mg by mouth nightly.  clopidogrel (PLAVIX) 75 mg tablet Take 75 mg by mouth daily.  potassium chloride (KLOR-CON M20) 20 mEq tablet Take 20 mEq by mouth daily. No current facility-administered medications for this visit. No Known Allergies      Review of Systems:  -   - Per HPI    Physical Examination:   Blood pressure 125/65, pulse 94, temperature 97.6 °F (36.4 °C), temperature source Temporal, height 5' 2\" (1.575 m), weight 198 lb 1.6 oz (89.9 kg), last menstrual period 06/25/2005, SpO2 99 %. Estimated body mass index is 36.23 kg/m² as calculated from the following:    Height as of this encounter: 5' 2\" (1.575 m). -   Weight as of this encounter: 198 lb 1.6 oz (89.9 kg).   - General: pleasant, no distress, good eye contact  - HEENT: no pallor, no periorbital edema, EOMI  - Neck: supple,   - Cardiovascular: regular, normal rate, normal S1 and S2,   - Respiratory: clear to auscultation bilaterally  -   - Musculoskeletal: no proximal muscle weakness in upper or lower extremities  -   - Neurological:alert and oriented  - Psychiatric: normal mood and affect  - Skin: color, texture, turgor normal.     Diabetic Foot and Eye Exam HM Status   Topic Date Due    Eye Exam  06/11/2020    Diabetic Foot Care  12/21/2022         Diabetic foot exam: November 2021    Left Foot:   Visual Exam: normal    Pulse DP: Could not palpate   Filament test: reduced sensation    Vibratory sensation: absent      Right Foot:   Visual Exam: normal    Pulse DP: Could not palpate   Filament test: reduced sensation    Vibratory sensation: absent    Lab Results   Component Value Date/Time    Hemoglobin A1c 11.4 (H) 11/18/2021 07:11 AM    Hemoglobin A1c 7.7 (H) 03/02/2021 07:07 AM    Hemoglobin A1c 11.1 (H) 07/16/2019 03:38 PM    Hemoglobin A1c, External 9.5 10/14/2018 12:00 AM    Glucose 349 (H) 11/18/2021 07:11 AM    Glucose (POC) 236 (H) 09/22/2020 12:46 PM    Microalb/Creat ratio (ug/mg creat.) 159 (H) 11/18/2021 07:11 AM    LDL, calculated Comment 07/16/2019 03:38 PM    Creatinine (POC) 0.9 06/20/2012 11:40 AM    Creatinine 0.98 11/18/2021 07:11 AM      Lab Results   Component Value Date/Time    GFR est non-AA 62 11/18/2021 07:11 AM    GFRNA, POC >60 06/20/2012 11:40 AM    GFR est AA 71 11/18/2021 07:11 AM    GFRAA, POC >60 06/20/2012 11:40 AM    Creatinine 0.98 11/18/2021 07:11 AM    Creatinine (POC) 0.9 06/20/2012 11:40 AM    BUN 13 11/18/2021 07:11 AM    Sodium 141 11/18/2021 07:11 AM    Potassium 4.5 11/18/2021 07:11 AM    Chloride 100 11/18/2021 07:11 AM    CO2 25 11/18/2021 07:11 AM    Phosphorus 3.1 09/22/2020 03:32 AM         Data Reviewed:       Assessment/Plan:     1. Type 2 diabetes mellitus with hyperglycemia, unspecified whether long term insulin use ([address]    2. Polyneuropathy, unspecified        1. Type 2 Diabetes Mellitus   Lab Results   Component Value Date/Time    Hemoglobin A1c 11.4 (H) 11/18/2021 07:11 AM    Hemoglobin A1c (POC) 7.9 10/19/2020 08:44 AM    Hemoglobin A1c, External 9.5 10/14/2018 12:00 AM   Poorly controlled diabetes mellitus  Mixed meal, protein snacks    Metformin, Farxiga  Tresiba 65 units, was on 100 units in the past  NovoLog 25 units before each meal. Multiple comorbidities  Weight loss and low carbohydrate diet      FLU annually ,Pneumovax ,aspirin daily,annual eye exam,microalbumin    2.   HTN : Continue current therapy     3. Hyperlipidemia : Managed by cardiology  Statin intolerance , off Crestor       4. Obesity:Body mass index is 36.23 kg/m². Discussed about the importance of exercise and carbohydrate portion control. 5.  CAD-followed by cardiology Dr Cl hodges  EF 45% 2021    6. Gastroparesis: Followed by gastroenterologist  On erythromycin      7 Neuropathy -B12, was on gabapentin in the past  Alpha lipoic acid    8. PAD: Claudication, cannot palpate dorsalis pedis, ANAHI  Has underlying peripheral neuropathy      Follow-up and Dispositions    · Return in about 4 months (around 4/21/2022) for fasting labs before next visit and follow up. Thank you for allowing me to participate in the care of this patient.     Freida Singh MD      Patient verbalized understanding

## 2021-12-21 NOTE — PROGRESS NOTES
Filemon Cuellar is a 57 y.o. female here for   Chief Complaint   Patient presents with    Diabetes       1. Have you been to the ER, urgent care clinic since your last visit? Hospitalized since your last visit? - no    2. Have you seen or consulted any other health care providers outside of the [address] [place] [address] since your last visit?   Include any pap smears or colon screening.- [facility] (Dr. Yury Caldwell)

## 2021-12-21 NOTE — LETTER
12/23/2021    Patient: Nelida King   YOB: 1960   Date of Visit: 12/21/2021     Neli Nye MD  [facility] [phone]  Via Fax: [phone]    Dear Neil Nye MD,      Thank you for referring Ms. Ji Cabrera to [address] [place] [address] for evaluation. My notes for this consultation are attached. If you have questions, please do not hesitate to call me. I look forward to following your patient along with you.       Sincerely,    Candida Talavera MD

## 2021-12-21 NOTE — PATIENT INSTRUCTIONS
Tresiba 65 units in AM     Novolog 25 units  before breakfast , 25 units before lunch , 20 units before dinner     Additional Novolog fo high sugars     Blood sugar  Fast acting insulin        150- 200  Add  3  Units       201-250  Add  6 Units       251-300  Add  9 Units       301-350  Add 12  Units        351-400  Add 15  Units

## 2021-12-27 ENCOUNTER — HOSPITAL ENCOUNTER (OUTPATIENT)
Dept: NON INVASIVE DIAGNOSTICS | Age: 61
Discharge: HOME | End: 2021-12-27
Payer: COMMERCIAL

## 2021-12-27 DIAGNOSIS — I73.9 PAD (PERIPHERAL ARTERY DISEASE) (HCC): ICD-10-CM

## 2021-12-27 PROCEDURE — 93922 UPR/L XTREMITY ART 2 LEVELS: CPT

## 2021-12-28 LAB
LEFT ARM BP: 156 MMHG
LEFT TBI: 0.6
LEFT TOE PRESSURE: 94 MMHG
RIGHT ARM BP: 155 MMHG
RIGHT TBI: 0.65
RIGHT TOE PRESSURE: 101 MMHG

## 2021-12-28 PROCEDURE — 93922 UPR/L XTREMITY ART 2 LEVELS: CPT | Performed by: SURGERY

## 2022-01-31 ENCOUNTER — APPOINTMENT (OUTPATIENT)
Dept: ULTRASOUND IMAGING | Age: 62
End: 2022-01-31
Attending: PHYSICIAN ASSISTANT
Payer: COMMERCIAL

## 2022-01-31 ENCOUNTER — HOSPITAL ENCOUNTER (EMERGENCY)
Age: 62
Discharge: HOME | End: 2022-01-31
Payer: COMMERCIAL

## 2022-01-31 ENCOUNTER — APPOINTMENT (OUTPATIENT)
Dept: GENERAL RADIOLOGY | Age: 62
End: 2022-01-31
Attending: PHYSICIAN ASSISTANT
Payer: COMMERCIAL

## 2022-01-31 VITALS
HEART RATE: 104 BPM | WEIGHT: 198 LBS | TEMPERATURE: 97.6 F | HEIGHT: 62 IN | BODY MASS INDEX: 36.44 KG/M2 | SYSTOLIC BLOOD PRESSURE: 140 MMHG | DIASTOLIC BLOOD PRESSURE: 80 MMHG | RESPIRATION RATE: 20 BRPM | OXYGEN SATURATION: 100 %

## 2022-01-31 DIAGNOSIS — R60.9 PERIPHERAL EDEMA: Primary | ICD-10-CM

## 2022-01-31 DIAGNOSIS — F41.1 ANXIETY STATE: ICD-10-CM

## 2022-01-31 DIAGNOSIS — E80.6 HYPERBILIRUBINEMIA: ICD-10-CM

## 2022-01-31 LAB
ALBUMIN SERPL-MCNC: 3.5 G/DL (ref 3.5–5)
ALBUMIN/GLOB SERPL: 0.9 {RATIO} (ref 1.1–2.2)
ALP SERPL-CCNC: 70 U/L (ref 45–117)
ALT SERPL-CCNC: 16 U/L (ref 12–78)
ANION GAP SERPL CALC-SCNC: 8 MMOL/L (ref 5–15)
AST SERPL W P-5'-P-CCNC: 25 U/L (ref 15–37)
BASOPHILS # BLD: 0.1 K/UL (ref 0–0.1)
BASOPHILS NFR BLD: 1 % (ref 0–1)
BILIRUB SERPL-MCNC: 2.1 MG/DL (ref 0.2–1)
BUN SERPL-MCNC: 19 MG/DL (ref 6–20)
BUN/CREAT SERPL: 13 (ref 12–20)
CA-I BLD-MCNC: 9.3 MG/DL (ref 8.5–10.1)
CHLORIDE SERPL-SCNC: 101 MMOL/L (ref 97–108)
CO2 SERPL-SCNC: 33 MMOL/L (ref 21–32)
CREAT SERPL-MCNC: 1.45 MG/DL (ref 0.55–1.02)
DIFFERENTIAL METHOD BLD: ABNORMAL
EOSINOPHIL # BLD: 0.1 K/UL (ref 0–0.4)
EOSINOPHIL NFR BLD: 2 % (ref 0–7)
ERYTHROCYTE [DISTWIDTH] IN BLOOD BY AUTOMATED COUNT: 15.8 % (ref 11.5–14.5)
GLOBULIN SER CALC-MCNC: 3.8 G/DL (ref 2–4)
GLUCOSE SERPL-MCNC: 258 MG/DL (ref 65–100)
HCT VFR BLD AUTO: 38.8 % (ref 35–47)
HGB BLD-MCNC: 11.4 G/DL (ref 11.5–16)
IMM GRANULOCYTES # BLD AUTO: 0 K/UL (ref 0–0.04)
IMM GRANULOCYTES NFR BLD AUTO: 0 % (ref 0–0.5)
LYMPHOCYTES # BLD: 0.9 K/UL (ref 0.8–3.5)
LYMPHOCYTES NFR BLD: 14 % (ref 12–49)
MCH RBC QN AUTO: 24.3 PG (ref 26–34)
MCHC RBC AUTO-ENTMCNC: 29.4 G/DL (ref 30–36.5)
MCV RBC AUTO: 82.6 FL (ref 80–99)
MONOCYTES # BLD: 0.4 K/UL (ref 0–1)
MONOCYTES NFR BLD: 6 % (ref 5–13)
NEUTS SEG # BLD: 4.8 K/UL (ref 1.8–8)
NEUTS SEG NFR BLD: 77 % (ref 32–75)
PLATELET # BLD AUTO: 228 K/UL (ref 150–400)
PMV BLD AUTO: 11 FL (ref 8.9–12.9)
POTASSIUM SERPL-SCNC: 3.6 MMOL/L (ref 3.5–5.1)
PROT SERPL-MCNC: 7.3 G/DL (ref 6.4–8.2)
RBC # BLD AUTO: 4.7 M/UL (ref 3.8–5.2)
SODIUM SERPL-SCNC: 142 MMOL/L (ref 136–145)
TROPONIN-HIGH SENSITIVITY: 26 NG/L (ref 0–51)
TROPONIN-HIGH SENSITIVITY: 28 NG/L (ref 0–51)
WBC # BLD AUTO: 6.3 K/UL (ref 3.6–11)

## 2022-01-31 PROCEDURE — 74011250636 HC RX REV CODE- 250/636: Performed by: PHYSICIAN ASSISTANT

## 2022-01-31 PROCEDURE — 76705 ECHO EXAM OF ABDOMEN: CPT

## 2022-01-31 PROCEDURE — 96374 THER/PROPH/DIAG INJ IV PUSH: CPT

## 2022-01-31 PROCEDURE — 85025 COMPLETE CBC W/AUTO DIFF WBC: CPT

## 2022-01-31 PROCEDURE — 84484 ASSAY OF TROPONIN QUANT: CPT

## 2022-01-31 PROCEDURE — 99284 EMERGENCY DEPT VISIT MOD MDM: CPT

## 2022-01-31 PROCEDURE — 93005 ELECTROCARDIOGRAM TRACING: CPT

## 2022-01-31 PROCEDURE — 80053 COMPREHEN METABOLIC PANEL: CPT

## 2022-01-31 PROCEDURE — 71045 X-RAY EXAM CHEST 1 VIEW: CPT

## 2022-01-31 RX ORDER — FUROSEMIDE 10 MG/ML
40 INJECTION INTRAMUSCULAR; INTRAVENOUS ONCE
Status: COMPLETED | OUTPATIENT
Start: 2022-01-31 | End: 2022-01-31

## 2022-01-31 RX ADMIN — FUROSEMIDE 40 MG: 10 INJECTION, SOLUTION INTRAMUSCULAR; INTRAVENOUS at 14:53

## 2022-01-31 NOTE — ED PROVIDER NOTES
EMERGENCY DEPARTMENT HISTORY AND PHYSICAL EXAM      Date: 1/31/2022  Patient Name: Ean Kerr    History of Presenting Illness     Chief Complaint   Patient presents with    Cough    Shortness of Breath       History Provided By: Patient and Patient's Daughter    HPI: Ean Kerr, 57 y.o. female with a past medical history significant Hypertension, high cholesterol, diabetes, coronary artery disease, bypass surgery 2009, gastroparesis presents to the ED with cc of shortness of breath x 2 weeks. Patient reports her symptoms started out as a cough but have been worsening over the last couple of weeks especially the last several days. Patient saw her PCP today who advised her to come to the emergency department for further evaluation as soon as possible. Patient states that she did have COVID-19 infection months ago and her symptoms feel similar however she feels like she never fully recovered. She is also complaining of waking up in the middle of the night when laying flat feeling like she cannot breathe and it causing a panic attack. Patient is never had a sleep study but does report snoring. Patient also reports generalized itching to her body over the last couple of days. She also states that she feels dehydrated. Associated symptoms include leg swelling. Patient specifically denies chest pain, fever, nausea, vomiting, diarrhea, recent travel, recent surgery, recent hospitalization, abdominal pain. There are no other complaints, changes, or physical findings at this time. PCP: Valente Braga MD    Current Outpatient Medications   Medication Sig Dispense Refill    Farxiga 10 mg tab tablet TAKE ONE TABLET BY MOUTH EVERY MORNING 30 Tablet 11    pantoprazole (PROTONIX) 40 mg tablet Take 40 mg by mouth daily.       insulin aspart U-100 (NOVOLOG) 100 unit/mL (3 mL) inpn Inject 40 units before meals w/ SSI Max units daily: 165 Stop Humalog 60 mL 11    metFORMIN ER (GLUCOPHAGE XR) 500 mg tablet Take 1 Tab by mouth two (2) times a day. 180 Tab 3    torsemide (DEMADEX) 20 mg tablet Take 20 mg by mouth daily.  carvediloL (COREG) 3.125 mg tablet Take 1 Tablet by mouth two (2) times a day.  aspirin 81 mg chewable tablet Take 81 mg by mouth nightly.  clopidogrel (PLAVIX) 75 mg tablet Take 75 mg by mouth daily.  potassium chloride (KLOR-CON M20) 20 mEq tablet Take 20 mEq by mouth daily.  gabapentin (NEURONTIN) 100 mg capsule Take 100 mg by mouth nightly.  metoclopramide HCl (REGLAN) 5 mg tablet Take 5 mg by mouth Before breakfast, lunch, and dinner. As needed      Tresiba FlexTouch U-200 200 unit/mL (3 mL) inpn pen INJECT 100 UNITS SUBCUTANEOUSLY EVERY DAY (Patient taking differently: INJECT 65 UNITS SUBCUTANEOUSLY EVERY DAY) 18 mL 11    OneTouch Ultra Blue Test Strip strip USE TO TEST BLOOD GLUCOSE THREE TIMES A  Strip 3       Past History     Past Medical History:  Past Medical History:   Diagnosis Date    Arthritis     CAD (coronary artery disease) 2009    Hx of 2 MI's and then CABG 5/2009    Diabetes [facility])     IDDM type 3    Dyslipidemia     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[facility] [address]     Hypertension     Other ill-defined conditions(799.89)     gastroparesis     Unspecified sleep apnea     no cpap repeat study sched for 9/20/13       Past Surgical History:  Past Surgical History:   Procedure Laterality Date    ECHO 2D ADULT  6/27/12    LVEF about 50%. There is possible HK of distal ant/septal wall and apical walls (technically difficult study).  ECHO 2D ADULT  4/2010    LVEF 45%, akinetic apex and septum. HK mid-anterior wall    HX CHOLECYSTECTOMY  6/2012    HX CORONARY STENT PLACEMENT  06/2020    HX HEART CATHETERIZATION  1/2021, 2/2021    with stents placed.      HX HYSTERECTOMY  2005    HX ORTHOPAEDIC  2008    RIGHT SHOULDER ARTHROSCOPY    HX ORTHOPAEDIC  2003    ANTERIOR CERVICAL FUSION INSTRUMENTATION    [place] CARDIAC SURG PROCEDURE UNLIST  2009    CABG X 3       Family History:  Family History   Problem Relation Age of Onset    Diabetes Mother     Heart Disease Father          of heart disease at age 61       Social History:  Social History     Tobacco Use    Smoking status: Never Smoker    Smokeless tobacco: Never Used   Vaping Use    Vaping Use: Never used   Substance Use Topics    Alcohol use: Yes     Comment: 1 DRINK PER MONTH    Drug use: No       Allergies:  No Known Allergies      Review of Systems   Review of Systems   Constitutional: Negative for activity change, chills and fever. HENT: Negative for congestion, ear pain, rhinorrhea, sneezing and sore throat. Eyes: Negative for pain and visual disturbance. Respiratory: Positive for cough, chest tightness and shortness of breath. Cardiovascular: Positive for leg swelling. Negative for chest pain. Gastrointestinal: Negative for abdominal pain, diarrhea, nausea and vomiting. Genitourinary: Negative for dysuria and hematuria. Musculoskeletal: Negative for gait problem. Skin: Negative for rash. Neurological: Negative for speech difficulty, weakness and headaches. Psychiatric/Behavioral: The patient is nervous/anxious. All other systems reviewed and are negative. Physical Exam   Physical Exam  Vitals and nursing note reviewed. Constitutional:       General: She is not in acute distress. Appearance: Normal appearance. She is not ill-appearing or toxic-appearing. HENT:      Head: Normocephalic and atraumatic. Nose: Nose normal.      Mouth/Throat:      Mouth: Mucous membranes are dry. Pharynx: Oropharynx is clear. Eyes:      Extraocular Movements: Extraocular movements intact. Conjunctiva/sclera: Conjunctivae normal.      Pupils: Pupils are equal, round, and reactive to light. Neck:      Vascular: No JVD. Trachea: Trachea normal.   Cardiovascular:      Rate and Rhythm: Tachycardia present.       Pulses: Normal pulses. Heart sounds: Normal heart sounds. No murmur heard. Pulmonary:      Effort: Pulmonary effort is normal. No tachypnea or respiratory distress. Breath sounds: Normal breath sounds. No wheezing, rhonchi or rales. Comments: Speaking in 5-6 word sentences  Abdominal:      General: Bowel sounds are normal.      Palpations: Abdomen is soft. Tenderness: There is no abdominal tenderness. Musculoskeletal:         General: No deformity or signs of injury. Normal range of motion. Cervical back: Normal range of motion. Right lower le+ Pitting Edema present. Left lower le+ Pitting Edema present. Skin:     General: Skin is warm and dry. Capillary Refill: Capillary refill takes less than 2 seconds. Findings: No rash. Neurological:      General: No focal deficit present. Mental Status: She is alert and oriented to person, place, and time. Cranial Nerves: No cranial nerve deficit.    Psychiatric:         Mood and Affect: Mood normal.         Diagnostic Study Results     Labs -     Recent Results (from the past 48 hour(s))   EKG, 12 LEAD, INITIAL    Collection Time: 22  2:51 PM   Result Value Ref Range    Ventricular Rate 102 BPM    Atrial Rate 100 BPM    QRS Duration 116 ms    Q-T Interval 538 ms    QTC Calculation (Bezet) 701 ms    Calculated R Axis -35 degrees    Calculated T Axis 120 degrees    Diagnosis       Normal sinus rhythm  Left axis deviation  ST & T wave abnormality, consider anterolateral ischemia  Prolonged QT  Abnormal ECG  When compared with ECG of 21-SEP-2020 20:23,  Significant changes have occurred  Confirmed by ERI MCCAIN, [facility] ([id number]) on 2022 10:15:55 AM     CBC WITH AUTOMATED DIFF    Collection Time: 22  3:00 PM   Result Value Ref Range    WBC 6.3 3.6 - 11.0 K/uL    RBC 4.70 3.80 - 5.20 M/uL    HGB 11.4 (L) 11.5 - 16.0 g/dL    HCT 38.8 35.0 - 47.0 %    MCV 82.6 80.0 - 99.0 FL    MCH 24.3 (L) 26.0 - 34.0 PG    MCHC 29.4 (L) 30.0 - 36.5 g/dL    RDW 15.8 (H) 11.5 - 14.5 %    PLATELET 847 851 - 076 K/uL    MPV 11.0 8.9 - 12.9 FL    NEUTROPHILS 77 (H) 32 - 75 %    LYMPHOCYTES 14 12 - 49 %    MONOCYTES 6 5 - 13 %    EOSINOPHILS 2 0 - 7 %    BASOPHILS 1 0 - 1 %    IMMATURE GRANULOCYTES 0 0.0 - 0.5 %    ABS. NEUTROPHILS 4.8 1.8 - 8.0 K/UL    ABS. LYMPHOCYTES 0.9 0.8 - 3.5 K/UL    ABS. MONOCYTES 0.4 0.0 - 1.0 K/UL    ABS. EOSINOPHILS 0.1 0.0 - 0.4 K/UL    ABS. BASOPHILS 0.1 0.0 - 0.1 K/UL    ABS. IMM. GRANS. 0.0 0.00 - 0.04 K/UL    DF AUTOMATED     METABOLIC PANEL, COMPREHENSIVE    Collection Time: 01/31/22  3:00 PM   Result Value Ref Range    Sodium 142 136 - 145 mmol/L    Potassium 3.6 3.5 - 5.1 mmol/L    Chloride 101 97 - 108 mmol/L    CO2 33 (H) 21 - 32 mmol/L    Anion gap 8 5 - 15 mmol/L    Glucose 258 (H) 65 - 100 mg/dL    BUN 19 6 - 20 mg/dL    Creatinine 1.45 (H) 0.55 - 1.02 mg/dL    BUN/Creatinine ratio 13 12 - 20      GFR est AA 44 (L) >60 ml/min/1.73m2    GFR est non-AA 37 (L) >60 ml/min/1.73m2    Calcium 9.3 8.5 - 10.1 mg/dL    Bilirubin, total 2.1 (H) 0.2 - 1.0 mg/dL    AST (SGOT) 25 15 - 37 U/L    ALT (SGPT) 16 12 - 78 U/L    Alk. phosphatase 70 45 - 117 U/L    Protein, total 7.3 6.4 - 8.2 g/dL    Albumin 3.5 3.5 - 5.0 g/dL    Globulin 3.8 2.0 - 4.0 g/dL    A-G Ratio 0.9 (L) 1.1 - 2.2     TROPONIN-HIGH SENSITIVITY    Collection Time: 01/31/22  3:00 PM   Result Value Ref Range    Troponin-High Sensitivity 26 0 - 51 ng/L   TROPONIN-HIGH SENSITIVITY    Collection Time: 01/31/22  4:20 PM   Result Value Ref Range    Troponin-High Sensitivity 28 0 - 51 ng/L       Radiologic Studies -   XR Results (most recent):  Results from Hospital Encounter encounter on 01/31/22    XR CHEST PORT    Narrative  Chest single view. Comparison single view chest July 4, 2020. Lungs clear. No interstitial or alveolar pulmonary edema. Unchanged sternal wires, some of which are chronically broken.  Cardiac and  mediastinal structures unchanged. No pneumothorax or sizable pleural effusion. Cervical neck hardware. CT Results  (Last 48 hours)    None        US Results (most recent):  Results from Hospital Encounter encounter on 01/31/22    US ABD LTD    Narrative  Elevated bilirubin, abdominal bloating. Comparison CT abdomen pelvis without contrast 3/19/2020. FINDINGS: Transabdominal ultrasound. Visualized portion of pancreas unremarkable. Proximal IVC normal grayscale. Abdominal aorta normal caliber. Liver normal size and echotexture. No identified intrahepatic mass or biliary  ductal dilation. Main portal vein normal flow directionality. Gallbladder absent. Common bile duct nondilated 0.4 cm. Sonographic Perea sign not indicated. Right kidney 10.7 cm long axis. No hydronephrosis, evident renal stone or solid  mass. Small-volume perihepatic ascites. Impression  1. Status post cholecystectomy. No evident biliary ductal dilation. 2. Small volume perihepatic ascites. Medical Decision Making and ED Course   I am the first provider for this patient. I reviewed the vital signs, available nursing notes, past medical history, past surgical history, family history and social history. Vital Signs-Reviewed the patient's vital signs. Wt Readings from Last 3 Encounters:   01/31/22 89.8 kg (198 lb)   12/21/21 89.9 kg (198 lb 1.6 oz)   11/30/21 86.6 kg (191 lb)     Temp Readings from Last 3 Encounters:   01/31/22 97.6 °F (36.4 °C)   12/21/21 97.6 °F (36.4 °C) (Temporal)   11/30/21 98.5 °F (36.9 °C) (Temporal)     BP Readings from Last 3 Encounters:   01/31/22 (!) 140/80   12/21/21 125/65   11/30/21 131/68     Pulse Readings from Last 3 Encounters:   01/31/22 (!) 104   12/21/21 94   11/30/21 (!) 103       No data found.     EKG interpretation: (Preliminary)  Normal sinus rhythm at 102 bpm, left axis deviation, T wave inversion in lead V4, V5, V6, changed from previous EKG 2012    Records Reviewed: Nursing Notes, Old Medical Records and Previous electrocardiograms    Provider Notes (Medical Decision Making):       MDM  Number of Diagnoses or Management Options  Anxiety state  Hyperbilirubinemia  Peripheral edema  Diagnosis management comments: DDX: CHF exacerbation, PNA, COVID-19, sleep apnea, orthopnea, abnormal LFTs, ACS    59-year-old female presenting with shortness of breath, gasping for air when laying down to sleep at night. She also reports that her skin is itching. Negative COVID-19 test in the previous visit. She does have a history of heart failure, coronary artery disease. Her EKG is with subtle changes from previous. Her troponin is negative x2. Her chest x-ray is without any acute findings to include no pulmonary edema or pleural effusion. She is saturating 100% on room air. Blood pressure stable. No leukocytosis, LFTs within normal range. No acute anemia. She does have an elevated bilirubin at 2.1. She has a prior cholecystectomy. Discussed the case with the ED attending who advised abdominal ultrasound. Abdominal ultrasound is without any biliary ductal dilation, small amount of perihepatic ascites. This could be contributing to patient's feeling of abdominal bloating. I suspect her itching is secondary to the elevated bilirubin level. No fever, no evidence of acute cholangitis. Patient is followed closely with a GI specialist, recently seen in office. I advised her to schedule a follow-up appointment as soon as possible to have her bilirubin level rechecked and discuss her lab results with her GI specialist.  Also advised close follow-up appointment with PCP in 2 to 3 days to ensure she is feeling better. She is stable for discharge with close return precautions to include fever, worsening chest pain or shortness of breath, vomiting, worsening abdominal pain. She voices understanding of the discharge plan.        Amount and/or Complexity of Data Reviewed  Clinical lab tests: ordered and reviewed  Tests in the radiology section of CPT®: ordered and reviewed  Review and summarize past medical records: yes          ED Course:   Initial assessment performed. The patients presenting problems have been discussed, and they are in agreement with the care plan formulated and outlined with them. I have encouraged them to ask questions as they arise throughout their visit. 6:00 PM  Progress Note:  Patient was re-evaluated at bedside. Patient states that she feels much better, her breathing is more controlled, she has had more than 500 cc of urine output. Procedures       Flynn Shone, PA-C    Procedures     Flynn Shone, PA-C        Disposition     Disposition: DC- Adult Discharges: All of the diagnostic tests were reviewed and questions answered. Diagnosis, care plan and treatment options were discussed. The patient understands the instructions and will follow up as directed. The patients results have been reviewed with them. They have been counseled regarding their diagnosis. The patient verbally convey understanding and agreement of the signs, symptoms, diagnosis, treatment and prognosis and additionally agrees to follow up as recommended with their PCP in 24 - 48 hours. They also agree with the care-plan and convey that all of their questions have been answered. I have also put together some discharge instructions for them that include: 1) educational information regarding their diagnosis, 2) how to care for their diagnosis at home, as well a 3) list of reasons why they would want to return to the ED prior to their follow-up appointment, should their condition change. Discharged     DISCHARGE PLAN:  1.    Current Discharge Medication List      CONTINUE these medications which have NOT CHANGED    Details   Farxiga 10 mg tab tablet TAKE ONE TABLET BY MOUTH EVERY MORNING  Qty: 30 Tablet, Refills: 11    Associated Diagnoses: Type 2 diabetes mellitus with hyperglycemia, with long-term current use of insulin ([address] Mixed hyperlipidemia      pantoprazole (PROTONIX) 40 mg tablet Take 40 mg by mouth daily. insulin aspart U-100 (NOVOLOG) 100 unit/mL (3 mL) inpn Inject 40 units before meals w/ SSI Max units daily: 165 Stop Humalog  Qty: 60 mL, Refills: 11    Associated Diagnoses: Type 2 diabetes mellitus with hyperglycemia, with long-term current use of insulin ([address] Mixed hyperlipidemia      metFORMIN ER (GLUCOPHAGE XR) 500 mg tablet Take 1 Tab by mouth two (2) times a day. Qty: 180 Tab, Refills: 3    Associated Diagnoses: Type 2 diabetes mellitus with hyperglycemia, unspecified whether long term insulin use (HCC)      torsemide (DEMADEX) 20 mg tablet Take 20 mg by mouth daily. carvediloL (COREG) 3.125 mg tablet Take 1 Tablet by mouth two (2) times a day. aspirin 81 mg chewable tablet Take 81 mg by mouth nightly. clopidogrel (PLAVIX) 75 mg tablet Take 75 mg by mouth daily. potassium chloride (KLOR-CON M20) 20 mEq tablet Take 20 mEq by mouth daily. gabapentin (NEURONTIN) 100 mg capsule Take 100 mg by mouth nightly. metoclopramide HCl (REGLAN) 5 mg tablet Take 5 mg by mouth Before breakfast, lunch, and dinner. As needed      Tresiba FlexTouch U-200 200 unit/mL (3 mL) inpn pen INJECT 100 UNITS SUBCUTANEOUSLY EVERY DAY  Qty: 18 mL, Refills: 11    Associated Diagnoses: Type 2 diabetes mellitus with hyperglycemia, with long-term current use of insulin ([address] Mixed hyperlipidemia      OneTouch Ultra Blue Test Strip strip USE TO TEST BLOOD GLUCOSE THREE TIMES A DAY  Qty: 300 Strip, Refills: 3    Associated Diagnoses: Uncontrolled type 2 diabetes mellitus with hyperglycemia, with long-term current use of insulin ([address]           2.    Follow-up Information     Follow up With Specialties Details Why Contact Info    Jennifer Leija MD Internal Medicine Schedule an appointment as soon as possible for a visit  for follow up from ER visit [address]  [phone]      Nohelia Carr MD Cardio Vascular Surgery, Cardiology Schedule an appointment as soon as possible for a visit  for follow up from ER visit [facility] [address]      Marlee Quiroz MD Gastroenterology Schedule an appointment as soon as possible for a visit  for follow up from ER visit [address] [address]  [phone]          3. Return to ED if worse   4. Discharge Medication List as of 1/31/2022  6:28 PM          Diagnosis     Clinical Impression:   1. Peripheral edema    2. Hyperbilirubinemia    3. Anxiety state        Attestations:    Logan Garrett PA-C    Please note that this dictation was completed with [employer], the computer voice recognition software. Quite often unanticipated grammatical, syntax, homophones, and other interpretive errors are inadvertently transcribed by the computer software. Please disregard these errors. Please excuse any errors that have escaped final proofreading. Thank you.

## 2022-01-31 NOTE — DISCHARGE INSTRUCTIONS
Continue torsemide as prescribed  Follow up with Dr. Enedina Gonzalez (I have an elevated bilirubin level and small amount of fluid around my liver, need a follow up appointment)

## 2022-02-01 LAB
ATRIAL RATE: 100 BPM
CALCULATED R AXIS, ECG10: -35 DEGREES
CALCULATED T AXIS, ECG11: 120 DEGREES
DIAGNOSIS, 93000: NORMAL
Q-T INTERVAL, ECG07: 538 MS
QRS DURATION, ECG06: 116 MS
QTC CALCULATION (BEZET), ECG08: 701 MS
VENTRICULAR RATE, ECG03: 102 BPM

## 2022-03-05 ENCOUNTER — HOSPITAL ENCOUNTER (EMERGENCY)
Age: 62
Discharge: HOME | End: 2022-03-06
Attending: EMERGENCY MEDICINE
Payer: COMMERCIAL

## 2022-03-05 ENCOUNTER — APPOINTMENT (OUTPATIENT)
Dept: GENERAL RADIOLOGY | Age: 62
End: 2022-03-05
Attending: EMERGENCY MEDICINE
Payer: COMMERCIAL

## 2022-03-05 DIAGNOSIS — R05.9 COUGH: ICD-10-CM

## 2022-03-05 DIAGNOSIS — R60.9 PERIPHERAL EDEMA: ICD-10-CM

## 2022-03-05 DIAGNOSIS — R06.02 SOB (SHORTNESS OF BREATH): Primary | ICD-10-CM

## 2022-03-05 LAB
ALBUMIN SERPL-MCNC: 3 G/DL (ref 3.5–5)
ALBUMIN/GLOB SERPL: 0.7 {RATIO} (ref 1.1–2.2)
ALP SERPL-CCNC: 69 U/L (ref 45–117)
ALT SERPL-CCNC: 17 U/L (ref 12–78)
ANION GAP SERPL CALC-SCNC: 11 MMOL/L (ref 5–15)
AST SERPL W P-5'-P-CCNC: 27 U/L (ref 15–37)
BASOPHILS # BLD: 0.1 K/UL (ref 0–0.1)
BASOPHILS NFR BLD: 1 % (ref 0–1)
BILIRUB SERPL-MCNC: 1.3 MG/DL (ref 0.2–1)
BNP SERPL-MCNC: 1781 PG/ML
BUN SERPL-MCNC: 20 MG/DL (ref 6–20)
BUN/CREAT SERPL: 12 (ref 12–20)
CA-I BLD-MCNC: 9.1 MG/DL (ref 8.5–10.1)
CHLORIDE SERPL-SCNC: 96 MMOL/L (ref 97–108)
CO2 SERPL-SCNC: 31 MMOL/L (ref 21–32)
CREAT SERPL-MCNC: 1.64 MG/DL (ref 0.55–1.02)
DIFFERENTIAL METHOD BLD: ABNORMAL
EOSINOPHIL # BLD: 0.1 K/UL (ref 0–0.4)
EOSINOPHIL NFR BLD: 1 % (ref 0–7)
ERYTHROCYTE [DISTWIDTH] IN BLOOD BY AUTOMATED COUNT: 17 % (ref 11.5–14.5)
GLOBULIN SER CALC-MCNC: 4.4 G/DL (ref 2–4)
GLUCOSE SERPL-MCNC: 374 MG/DL (ref 65–100)
HCT VFR BLD AUTO: 36.8 % (ref 35–47)
HGB BLD-MCNC: 10.8 G/DL (ref 11.5–16)
IMM GRANULOCYTES # BLD AUTO: 0 K/UL (ref 0–0.04)
IMM GRANULOCYTES NFR BLD AUTO: 0 % (ref 0–0.5)
LYMPHOCYTES # BLD: 0.7 K/UL (ref 0.8–3.5)
LYMPHOCYTES NFR BLD: 10 % (ref 12–49)
MCH RBC QN AUTO: 23.5 PG (ref 26–34)
MCHC RBC AUTO-ENTMCNC: 29.3 G/DL (ref 30–36.5)
MCV RBC AUTO: 80 FL (ref 80–99)
MONOCYTES # BLD: 0.4 K/UL (ref 0–1)
MONOCYTES NFR BLD: 5 % (ref 5–13)
NEUTS SEG # BLD: 6.2 K/UL (ref 1.8–8)
NEUTS SEG NFR BLD: 83 % (ref 32–75)
PLATELET # BLD AUTO: 217 K/UL (ref 150–400)
PMV BLD AUTO: 10.6 FL (ref 8.9–12.9)
POTASSIUM SERPL-SCNC: 3.5 MMOL/L (ref 3.5–5.1)
PROT SERPL-MCNC: 7.4 G/DL (ref 6.4–8.2)
RBC # BLD AUTO: 4.6 M/UL (ref 3.8–5.2)
SODIUM SERPL-SCNC: 138 MMOL/L (ref 136–145)
TROPONIN-HIGH SENSITIVITY: 48 NG/L (ref 0–51)
WBC # BLD AUTO: 7.4 K/UL (ref 3.6–11)

## 2022-03-05 PROCEDURE — 36415 COLL VENOUS BLD VENIPUNCTURE: CPT

## 2022-03-05 PROCEDURE — 71046 X-RAY EXAM CHEST 2 VIEWS: CPT

## 2022-03-05 PROCEDURE — 83880 ASSAY OF NATRIURETIC PEPTIDE: CPT

## 2022-03-05 PROCEDURE — 80053 COMPREHEN METABOLIC PANEL: CPT

## 2022-03-05 PROCEDURE — 74011250636 HC RX REV CODE- 250/636: Performed by: EMERGENCY MEDICINE

## 2022-03-05 PROCEDURE — 85025 COMPLETE CBC W/AUTO DIFF WBC: CPT

## 2022-03-05 PROCEDURE — 96375 TX/PRO/DX INJ NEW DRUG ADDON: CPT

## 2022-03-05 PROCEDURE — 99284 EMERGENCY DEPT VISIT MOD MDM: CPT

## 2022-03-05 PROCEDURE — 84484 ASSAY OF TROPONIN QUANT: CPT

## 2022-03-05 PROCEDURE — 96374 THER/PROPH/DIAG INJ IV PUSH: CPT

## 2022-03-05 RX ORDER — BENZONATATE 100 MG/1
100 CAPSULE ORAL
Qty: 30 CAPSULE | Refills: 0 | Status: SHIPPED | OUTPATIENT
Start: 2022-03-05 | End: 2022-03-06 | Stop reason: SDUPTHER

## 2022-03-05 RX ORDER — KETOROLAC TROMETHAMINE 15 MG/ML
15 INJECTION, SOLUTION INTRAMUSCULAR; INTRAVENOUS
Status: COMPLETED | OUTPATIENT
Start: 2022-03-05 | End: 2022-03-05

## 2022-03-05 RX ORDER — ONDANSETRON 4 MG/1
4 TABLET, FILM COATED ORAL
Qty: 20 TABLET | Refills: 0 | Status: SHIPPED | OUTPATIENT
Start: 2022-03-05 | End: 2022-03-06 | Stop reason: SDUPTHER

## 2022-03-05 RX ORDER — ONDANSETRON 2 MG/ML
4 INJECTION INTRAMUSCULAR; INTRAVENOUS
Status: COMPLETED | OUTPATIENT
Start: 2022-03-05 | End: 2022-03-05

## 2022-03-05 RX ORDER — TRAMADOL HYDROCHLORIDE 50 MG/1
50 TABLET ORAL
Qty: 12 TABLET | Refills: 0 | Status: SHIPPED | OUTPATIENT
Start: 2022-03-05 | End: 2022-03-06 | Stop reason: SDUPTHER

## 2022-03-05 RX ORDER — FUROSEMIDE 10 MG/ML
60 INJECTION INTRAMUSCULAR; INTRAVENOUS ONCE
Status: COMPLETED | OUTPATIENT
Start: 2022-03-06 | End: 2022-03-06

## 2022-03-05 RX ADMIN — ONDANSETRON 4 MG: 2 INJECTION INTRAMUSCULAR; INTRAVENOUS at 22:53

## 2022-03-05 RX ADMIN — KETOROLAC TROMETHAMINE 15 MG: 15 INJECTION, SOLUTION INTRAMUSCULAR; INTRAVENOUS at 22:53

## 2022-03-05 NOTE — Clinical Note
[facility] EMERGENCY DEPARTMENT  [place] [address]  [phone]    Work/School Note    Date: 3/5/2022    To Whom It May concern:    Bhavya Valdez was seen and treated today in the emergency room by the following provider(s):  Attending Provider: Elizabteh Salmon MD.      Bhavya Valdez is excused from work/school on 3/5/2022 through 3/7/2022. She is medically clear to return to work/school on 3/8/2022.          Sincerely,          Roselia Dhillon MD

## 2022-03-06 VITALS
HEIGHT: 62 IN | SYSTOLIC BLOOD PRESSURE: 130 MMHG | HEART RATE: 95 BPM | DIASTOLIC BLOOD PRESSURE: 72 MMHG | WEIGHT: 200 LBS | BODY MASS INDEX: 36.8 KG/M2 | OXYGEN SATURATION: 98 % | TEMPERATURE: 97.9 F | RESPIRATION RATE: 20 BRPM

## 2022-03-06 PROCEDURE — 74011250636 HC RX REV CODE- 250/636: Performed by: EMERGENCY MEDICINE

## 2022-03-06 RX ORDER — ONDANSETRON 4 MG/1
4 TABLET, FILM COATED ORAL
Qty: 20 TABLET | Refills: 0 | Status: SHIPPED | OUTPATIENT
Start: 2022-03-06

## 2022-03-06 RX ORDER — BENZONATATE 100 MG/1
100 CAPSULE ORAL
Qty: 30 CAPSULE | Refills: 0 | Status: SHIPPED | OUTPATIENT
Start: 2022-03-06 | End: 2022-03-13

## 2022-03-06 RX ORDER — TRAMADOL HYDROCHLORIDE 50 MG/1
50 TABLET ORAL
Qty: 12 TABLET | Refills: 0 | Status: SHIPPED | OUTPATIENT
Start: 2022-03-06 | End: 2022-03-09

## 2022-03-06 RX ADMIN — FUROSEMIDE 60 MG: 10 INJECTION, SOLUTION INTRAMUSCULAR; INTRAVENOUS at 00:03

## 2022-03-06 NOTE — ED TRIAGE NOTES
\" I have been short of breath for a while now, I have seen all my doctors and had lab work done last week \"

## 2022-03-06 NOTE — ED PROVIDER NOTES
EMERGENCY DEPARTMENT HISTORY AND PHYSICAL EXAM      Date: 3/5/2022  Patient Name: Lucila Vera    History of Presenting Illness     Chief Complaint   Patient presents with    Shortness of Breath    Cough       History Provided By: Patient    HPI: Lucila Vera, 57 y.o. female with a past medical history significant diabetes, hypertension and CHF presents to the ED with cc of shortness of breath. Patient reports her symptoms have been going on for some time, has seen her doctors this week with unremarkable blood work. Patient reports orthopnea, reports anxiety. Patient reports bilateral lower extremity edema, states her cardiologist recently increased her torsemide dose 3 days ago. Patient denies fever chills, denies nausea or vomiting. There are no other complaints, changes, or physical findings at this time. PCP: Jennifer Turner MD    No current facility-administered medications on file prior to encounter. Current Outpatient Medications on File Prior to Encounter   Medication Sig Dispense Refill    gabapentin (NEURONTIN) 100 mg capsule Take 100 mg by mouth nightly.  Farxiga 10 mg tab tablet TAKE ONE TABLET BY MOUTH EVERY MORNING 30 Tablet 11    metoclopramide HCl (REGLAN) 5 mg tablet Take 5 mg by mouth Before breakfast, lunch, and dinner. As needed      pantoprazole (PROTONIX) 40 mg tablet Take 40 mg by mouth daily. [facility] FlexTouch U-200 200 unit/mL (3 mL) inpn pen INJECT 100 UNITS SUBCUTANEOUSLY EVERY DAY (Patient taking differently: INJECT 65 UNITS SUBCUTANEOUSLY EVERY DAY) 18 mL 11    insulin aspart U-100 (NOVOLOG) 100 unit/mL (3 mL) inpn Inject 40 units before meals w/ SSI Max units daily: 165 Stop Humalog 60 mL 11    metFORMIN ER (GLUCOPHAGE XR) 500 mg tablet Take 1 Tab by mouth two (2) times a day. 180 Tab 3    torsemide (DEMADEX) 20 mg tablet Take 20 mg by mouth daily.       OneTouch Ultra Blue Test Strip strip USE TO TEST BLOOD GLUCOSE THREE TIMES A  Strip 3    carvediloL (COREG) 3.125 mg tablet Take 1 Tablet by mouth two (2) times a day.  aspirin 81 mg chewable tablet Take 81 mg by mouth nightly.  clopidogrel (PLAVIX) 75 mg tablet Take 75 mg by mouth daily.  potassium chloride (KLOR-CON M20) 20 mEq tablet Take 20 mEq by mouth daily. 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 There is possible HK of distal ant/septal wall and apical walls (technically difficult study).  ECHO 2D ADULT  2010    LVEF 45%, akinetic apex and septum. HK mid-anterior wall    HX CHOLECYSTECTOMY  2012    HX CORONARY STENT PLACEMENT  2020    HX HEART CATHETERIZATION  2021, 2021    with stents placed.  HX HYSTERECTOMY  2005    HX ORTHOPAEDIC  2008    RIGHT SHOULDER ARTHROSCOPY    HX ORTHOPAEDIC  2003    ANTERIOR CERVICAL FUSION INSTRUMENTATION    SC CARDIAC SURG PROCEDURE UNLIST  2009    CABG X 3       Family History:  Family History   Problem Relation Age of Onset    Diabetes Mother     Heart Disease Father          of heart disease at age 61       Social History:  Social History     Tobacco Use    Smoking status: Never Smoker    Smokeless tobacco: Never Used   Vaping Use    Vaping Use: Never used   Substance Use Topics    Alcohol use: Yes     Comment: 1 DRINK PER MONTH    Drug use: No       Allergies:  No Known Allergies      Review of Systems   Review of Systems   Constitutional: Negative for chills and fever.    HENT: Negative for sinus pressure and sinus pain. Eyes: Negative for photophobia and redness. Respiratory: Positive for shortness of breath and negative for wheezing. Cardiovascular: Negative for chest pain and palpitations. Gastrointestinal: Negative for abdominal pain and nausea. Genitourinary: Negative for flank pain and hematuria. Musculoskeletal: Negative for arthralgias and gait problem. Skin: Negative for color change and pallor. Neurological: Negative for dizziness and weakness. Review of Systems    Physical Exam   Physical Exam  Constitutional:       General: No acute distress. Appearance: Normal appearance. Not toxic-appearing. HENT:      Head: Normocephalic and atraumatic. Nose: Nose normal.      Mouth/Throat:      Mouth: Mucous membranes are moist.   Eyes:      Extraocular Movements: Extraocular movements intact. Pupils: Pupils are equal, round, and reactive to light. Cardiovascular:      Rate and Rhythm: Normal rate. Pulses: Normal pulses. Pulmonary:      Effort: Pulmonary effort is normal.      Breath sounds: No stridor, clear to auscultation bilaterally  Abdominal:      General: Abdomen is flat. There is no distension. Musculoskeletal:         General: Normal range of motion. Cervical back: Normal range of motion and neck supple. Skin:     General: Skin is warm and dry. Bilateral distal lower extremity 2+ pitting edema     Capillary Refill: Capillary refill takes less than 2 seconds. Neurological:      General: No focal deficit present. Mental Status: Alert and oriented to person, place, and time.    Psychiatric:         Mood and Affect: Mood normal.         Behavior: Behavior normal.     Physical Exam    Lab and Diagnostic Study Results     Labs -     Recent Results (from the past 12 hour(s))   CBC WITH AUTOMATED DIFF    Collection Time: 03/05/22  9:33 PM   Result Value Ref Range    WBC 7.4 3.6 - 11.0 K/uL    RBC 4.60 3.80 - 5.20 M/uL    HGB 10.8 (L) 11.5 - 16.0 g/dL    HCT 36.8 35.0 - 47.0 %    MCV 80.0 80.0 - 99.0 FL    MCH 23.5 (L) 26.0 - 34.0 PG    MCHC 29.3 (L) 30.0 - 36.5 g/dL    RDW 17.0 (H) 11.5 - 14.5 %    PLATELET 196 722 - 386 K/uL    MPV 10.6 8.9 - 12.9 FL    NEUTROPHILS 83 (H) 32 - 75 %    LYMPHOCYTES 10 (L) 12 - 49 %    MONOCYTES 5 5 - 13 %    EOSINOPHILS 1 0 - 7 %    BASOPHILS 1 0 - 1 %    IMMATURE GRANULOCYTES 0 0.0 - 0.5 %    ABS. NEUTROPHILS 6.2 1.8 - 8.0 K/UL    ABS. LYMPHOCYTES 0.7 (L) 0.8 - 3.5 K/UL    ABS. MONOCYTES 0.4 0.0 - 1.0 K/UL    ABS. EOSINOPHILS 0.1 0.0 - 0.4 K/UL    ABS. BASOPHILS 0.1 0.0 - 0.1 K/UL    ABS. IMM. GRANS. 0.0 0.00 - 0.04 K/UL    DF AUTOMATED     METABOLIC PANEL, COMPREHENSIVE    Collection Time: 03/05/22  9:33 PM   Result Value Ref Range    Sodium 138 136 - 145 mmol/L    Potassium 3.5 3.5 - 5.1 mmol/L    Chloride 96 (L) 97 - 108 mmol/L    CO2 31 21 - 32 mmol/L    Anion gap 11 5 - 15 mmol/L    Glucose 374 (H) 65 - 100 mg/dL    BUN 20 6 - 20 mg/dL    Creatinine 1.64 (H) 0.55 - 1.02 mg/dL    BUN/Creatinine ratio 12 12 - 20      GFR est AA 38 (L) >60 ml/min/1.73m2    GFR est non-AA 32 (L) >60 ml/min/1.73m2    Calcium 9.1 8.5 - 10.1 mg/dL    Bilirubin, total 1.3 (H) 0.2 - 1.0 mg/dL    AST (SGOT) 27 15 - 37 U/L    ALT (SGPT) 17 12 - 78 U/L    Alk.  phosphatase 69 45 - 117 U/L    Protein, total 7.4 6.4 - 8.2 g/dL    Albumin 3.0 (L) 3.5 - 5.0 g/dL    Globulin 4.4 (H) 2.0 - 4.0 g/dL    A-G Ratio 0.7 (L) 1.1 - 2.2     NT-PRO BNP    Collection Time: 03/05/22  9:33 PM   Result Value Ref Range    NT pro-BNP 1,781 (H) <125 pg/mL   TROPONIN-HIGH SENSITIVITY    Collection Time: 03/05/22  9:34 PM   Result Value Ref Range    Troponin-High Sensitivity 48 0 - 51 ng/L       Radiologic Studies -   @lastxrresult@  CT Results  (Last 48 hours)    None        CXR Results  (Last 48 hours)               03/05/22 4059  XR CHEST PA LAT Final result    Impression:  Blunting of the left costophrenic angle which may represent   atelectasis or trace effusion. Chronic mild cardiomegaly without overt CHF manifest in the lungs. Otherwise   stable chest x-ray                   Narrative:  PROCEDURE: XR CHEST PA LAT       HISTORY:Short of breath, history of CHF       COMPARISON:Chest x-ray 31 January 2022       TECHNIQUE PA and lateral chest x-ray       BONES/EXTRATHORACIC SOFT TISSUES: No acute process. Postsurgical changes in the   cervical spine as well as wires from previous median sternotomy appear stable. LUNG: There is slight blunting of the left costophrenic angle. Elsewhere the   lungs are clear. MEDIASTINUM: Heart remains enlarged with evidence of previous CABG                   Medical Decision Making   - I am the first provider for this patient. - I reviewed the vital signs, available nursing notes, past medical history, past surgical history, family history and social history. - Initial assessment performed. The patients presenting problems have been discussed, and they are in agreement with the care plan formulated and outlined with them. I have encouraged them to ask questions as they arise throughout their visit. Vital Signs-Reviewed the patient's vital signs. Patient Vitals for the past 12 hrs:   Temp Pulse Resp BP SpO2   03/05/22 2127 97.9 °F (36.6 °C) (!) 110 22 138/69 97 %       Records Reviewed: Old Medical Records      ED Course:     ED Course as of 03/05/22 2354   Sat Mar 05, 2022   2351 Discussed with patient and family present the possibility of a trace pleural effusion, but not hypoxic, elevated BNP but normal troponin. Patient states she has a follow-up appointment on Monday with her primary care. Discussed plan to diurese, she states her cardiologist just increased her dose of torsemide wants to follow-up within a few weeks. Discussed return precautions at length. [CS]      ED Course User Index  [CS] Ulis Cockayne, MD         Disposition   Disposition: DC- Adult Discharges:  All of the diagnostic tests were reviewed and questions answered. Diagnosis, care plan and treatment options were discussed. The patient understands the instructions and will follow up as directed. The patients results have been reviewed with them. They have been counseled regarding their diagnosis. The patient verbally convey understanding and agreement of the signs, symptoms, diagnosis, treatment and prognosis and additionally agrees to follow up as recommended with their PCP in 24 - 48 hours. They also agree with the care-plan and convey that all of their questions have been answered. I have also put together some discharge instructions for them that include: 1) educational information regarding their diagnosis, 2) how to care for their diagnosis at home, as well a 3) list of reasons why they would want to return to the ED prior to their follow-up appointment, should their condition change. Discharged    DISCHARGE PLAN:  1. Current Discharge Medication List      CONTINUE these medications which have NOT CHANGED    Details   gabapentin (NEURONTIN) 100 mg capsule Take 100 mg by mouth nightly. Farxiga 10 mg tab tablet TAKE ONE TABLET BY MOUTH EVERY MORNING  Qty: 30 Tablet, Refills: 11    Associated Diagnoses: Type 2 diabetes mellitus with hyperglycemia, with long-term current use of insulin ([address] Mixed hyperlipidemia      metoclopramide HCl (REGLAN) 5 mg tablet Take 5 mg by mouth Before breakfast, lunch, and dinner. As needed      pantoprazole (PROTONIX) 40 mg tablet Take 40 mg by mouth daily. [facility] FlexTouch U-200 200 unit/mL (3 mL) inpn pen INJECT 100 UNITS SUBCUTANEOUSLY EVERY DAY  Qty: 18 mL, Refills: 11    Associated Diagnoses: Type 2 diabetes mellitus with hyperglycemia, with long-term current use of insulin ([address]  Mixed hyperlipidemia      insulin aspart U-100 (NOVOLOG) 100 unit/mL (3 mL) inpn Inject 40 units before meals w/ SSI Max units daily: 165 Stop Humalog  Qty: 60 mL, Refills: 11 Associated Diagnoses: Type 2 diabetes mellitus with hyperglycemia, with long-term current use of insulin ([address] Mixed hyperlipidemia      metFORMIN ER (GLUCOPHAGE XR) 500 mg tablet Take 1 Tab by mouth two (2) times a day. Qty: 180 Tab, Refills: 3    Associated Diagnoses: Type 2 diabetes mellitus with hyperglycemia, unspecified whether long term insulin use (HCC)      torsemide (DEMADEX) 20 mg tablet Take 20 mg by mouth daily. OneTouch Ultra Blue Test Strip strip USE TO TEST BLOOD GLUCOSE THREE TIMES A DAY  Qty: 300 Strip, Refills: 3    Associated Diagnoses: Uncontrolled type 2 diabetes mellitus with hyperglycemia, with long-term current use of insulin (HCC)      carvediloL (COREG) 3.125 mg tablet Take 1 Tablet by mouth two (2) times a day. aspirin 81 mg chewable tablet Take 81 mg by mouth nightly. clopidogrel (PLAVIX) 75 mg tablet Take 75 mg by mouth daily. potassium chloride (KLOR-CON M20) 20 mEq tablet Take 20 mEq by mouth daily. 2.   Follow-up Information     Follow up With Specialties Details Why Contact Info    Valente Braga MD Internal Medicine In 2 days  [address]  [phone]          3. Return to ED if worse   4. Current Discharge Medication List      START taking these medications    Details   benzonatate (Tessalon Perles) 100 mg capsule Take 1 Capsule by mouth three (3) times daily as needed for Cough for up to 7 days. Qty: 30 Capsule, Refills: 0  Start date: 3/5/2022, End date: 3/12/2022      ondansetron hcl (Zofran) 4 mg tablet Take 1 Tablet by mouth every eight (8) hours as needed for Nausea. Qty: 20 Tablet, Refills: 0  Start date: 3/5/2022      traMADoL (Ultram) 50 mg tablet Take 1 Tablet by mouth every six (6) hours as needed for Pain for up to 3 days. Max Daily Amount: 200 mg.   Qty: 12 Tablet, Refills: 0  Start date: 3/5/2022, End date: 3/8/2022    Associated Diagnoses: Peripheral edema               Diagnosis Clinical Impression:   1. SOB (shortness of breath)    2. Peripheral edema    3. Cough        Attestations:    Jeaneth Rivera MD    Please note that this dictation was completed with [employer], the computer voice recognition software. Quite often unanticipated grammatical, syntax, homophones, and other interpretive errors are inadvertently transcribed by the computer software. Please disregard these errors. Please excuse any errors that have escaped final proofreading. Thank you.

## 2022-03-06 NOTE — DISCHARGE INSTRUCTIONS
Thank you! Thank you for allowing me to care for you in the emergency department. I sincerely hope that you are satisfied with your visit today. It is my goal to provide you with excellent care. Below you will find a list of your labs and imaging from your visit today. Should you have any questions regarding these results please do not hesitate to call the emergency department. Labs -     Recent Results (from the past 12 hour(s))   CBC WITH AUTOMATED DIFF    Collection Time: 03/05/22  9:33 PM   Result Value Ref Range    WBC 7.4 3.6 - 11.0 K/uL    RBC 4.60 3.80 - 5.20 M/uL    HGB 10.8 (L) 11.5 - 16.0 g/dL    HCT 36.8 35.0 - 47.0 %    MCV 80.0 80.0 - 99.0 FL    MCH 23.5 (L) 26.0 - 34.0 PG    MCHC 29.3 (L) 30.0 - 36.5 g/dL    RDW 17.0 (H) 11.5 - 14.5 %    PLATELET 253 251 - 328 K/uL    MPV 10.6 8.9 - 12.9 FL    NEUTROPHILS 83 (H) 32 - 75 %    LYMPHOCYTES 10 (L) 12 - 49 %    MONOCYTES 5 5 - 13 %    EOSINOPHILS 1 0 - 7 %    BASOPHILS 1 0 - 1 %    IMMATURE GRANULOCYTES 0 0.0 - 0.5 %    ABS. NEUTROPHILS 6.2 1.8 - 8.0 K/UL    ABS. LYMPHOCYTES 0.7 (L) 0.8 - 3.5 K/UL    ABS. MONOCYTES 0.4 0.0 - 1.0 K/UL    ABS. EOSINOPHILS 0.1 0.0 - 0.4 K/UL    ABS. BASOPHILS 0.1 0.0 - 0.1 K/UL    ABS. IMM. GRANS. 0.0 0.00 - 0.04 K/UL    DF AUTOMATED     METABOLIC PANEL, COMPREHENSIVE    Collection Time: 03/05/22  9:33 PM   Result Value Ref Range    Sodium 138 136 - 145 mmol/L    Potassium 3.5 3.5 - 5.1 mmol/L    Chloride 96 (L) 97 - 108 mmol/L    CO2 31 21 - 32 mmol/L    Anion gap 11 5 - 15 mmol/L    Glucose 374 (H) 65 - 100 mg/dL    BUN 20 6 - 20 mg/dL    Creatinine 1.64 (H) 0.55 - 1.02 mg/dL    BUN/Creatinine ratio 12 12 - 20      GFR est AA 38 (L) >60 ml/min/1.73m2    GFR est non-AA 32 (L) >60 ml/min/1.73m2    Calcium 9.1 8.5 - 10.1 mg/dL    Bilirubin, total 1.3 (H) 0.2 - 1.0 mg/dL    AST (SGOT) 27 15 - 37 U/L    ALT (SGPT) 17 12 - 78 U/L    Alk.  phosphatase 69 45 - 117 U/L    Protein, total 7.4 6.4 - 8.2 g/dL    Albumin 3.0 (L) 3.5 - 5.0 g/dL    Globulin 4.4 (H) 2.0 - 4.0 g/dL    A-G Ratio 0.7 (L) 1.1 - 2.2     NT-PRO BNP    Collection Time: 03/05/22  9:33 PM   Result Value Ref Range    NT pro-BNP 1,781 (H) <125 pg/mL   TROPONIN-HIGH SENSITIVITY    Collection Time: 03/05/22  9:34 PM   Result Value Ref Range    Troponin-High Sensitivity 48 0 - 51 ng/L       Radiologic Studies -   XR CHEST PA LAT   Final Result   Blunting of the left costophrenic angle which may represent   atelectasis or trace effusion. Chronic mild cardiomegaly without overt CHF manifest in the lungs. Otherwise   stable chest x-ray                 CT Results  (Last 48 hours)      None          CXR Results  (Last 48 hours)                 03/05/22 2209  XR CHEST PA LAT Final result    Impression:  Blunting of the left costophrenic angle which may represent   atelectasis or trace effusion. Chronic mild cardiomegaly without overt CHF manifest in the lungs. Otherwise   stable chest x-ray                   Narrative:  PROCEDURE: XR CHEST PA LAT       HISTORY:Short of breath, history of CHF       COMPARISON:Chest x-ray 31 January 2022       TECHNIQUE PA and lateral chest x-ray       BONES/EXTRATHORACIC SOFT TISSUES: No acute process. Postsurgical changes in the   cervical spine as well as wires from previous median sternotomy appear stable. LUNG: There is slight blunting of the left costophrenic angle. Elsewhere the   lungs are clear. MEDIASTINUM: Heart remains enlarged with evidence of previous CABG                      If you feel that you have not received excellent quality care or timely care, please ask to speak to the nurse manager. Please choose us in the future for your continued health care needs. ------------------------------------------------------------------------------------------------------------  The exam and treatment you received in the Emergency Department were for an urgent problem and are not intended as complete care.  It is important that you follow-up with a doctor, nurse practitioner, or physician assistant to:  (1) confirm your diagnosis,  (2) re-evaluation of changes in your illness and treatment, and  (3) for ongoing care. If your symptoms become worse or you do not improve as expected and you are unable to reach your usual health care provider, you should return to the Emergency Department. We are available 24 hours a day. Please take your discharge instructions with you when you go to your follow-up appointment. If you have any problem arranging a follow-up appointment, contact the Emergency Department immediately. If a prescription has been provided, please have it filled as soon as possible to prevent a delay in treatment. Read the entire medication instruction sheet provided to you by the pharmacy. If you have any questions or reservations about taking the medication due to side effects or interactions with other medications, please call your primary care physician or contact the ER to speak with the charge nurse. Make an appointment with your family doctor or the physician you were referred to for follow-up of this visit as instructed on your discharge paperwork, as this is a mandatory follow-up. Return to the ER if you are unable to be seen or if you are unable to be seen in a timely manner. If you have any problem arranging the follow-up visit, contact the Emergency Department immediately.

## 2022-03-18 PROBLEM — I25.82 CHRONIC TOTAL OCCLUSION OF CORONARY ARTERY: Status: ACTIVE | Noted: 2021-07-20

## 2022-03-19 PROBLEM — E11.43 DIABETIC GASTROPARESIS (HCC): Status: ACTIVE | Noted: 2020-09-21

## 2022-03-19 PROBLEM — G47.33 OBSTRUCTIVE SLEEP APNEA (ADULT) (PEDIATRIC): Status: ACTIVE | Noted: 2021-07-20

## 2022-03-19 PROBLEM — E11.21 TYPE 2 DIABETES WITH NEPHROPATHY (HCC): Status: ACTIVE | Noted: 2019-01-07

## 2022-03-19 PROBLEM — I25.2 OLD MYOCARDIAL INFARCTION: Status: ACTIVE | Noted: 2021-07-20

## 2022-03-19 PROBLEM — E66.01 SEVERE OBESITY (HCC): Status: ACTIVE | Noted: 2019-01-07

## 2022-03-19 PROBLEM — I50.22 CHRONIC SYSTOLIC (CONGESTIVE) HEART FAILURE (HCC): Status: ACTIVE | Noted: 2021-07-20

## 2022-03-19 PROBLEM — I11.0 HYPERTENSIVE HEART DISEASE WITH HEART FAILURE (HCC): Status: ACTIVE | Noted: 2021-07-20

## 2022-03-19 PROBLEM — K31.84 GASTROPARESIS: Status: ACTIVE | Noted: 2020-09-21

## 2022-03-19 PROBLEM — I25.810 ATHEROSCLEROSIS OF CORONARY ARTERY BYPASS GRAFT(S) WITHOUT ANGINA PECTORIS: Status: ACTIVE | Noted: 2021-07-20

## 2022-03-19 PROBLEM — K31.84 DIABETIC GASTROPARESIS (HCC): Status: ACTIVE | Noted: 2020-09-21

## 2022-03-19 PROBLEM — E11.40 TYPE 2 DIABETES MELLITUS WITH DIABETIC NEUROPATHY (HCC): Status: ACTIVE | Noted: 2019-01-07

## 2022-04-05 ENCOUNTER — HOSPITAL ENCOUNTER (EMERGENCY)
Age: 62
Discharge: HOME | End: 2022-04-05
Attending: EMERGENCY MEDICINE
Payer: COMMERCIAL

## 2022-04-05 ENCOUNTER — APPOINTMENT (OUTPATIENT)
Dept: GENERAL RADIOLOGY | Age: 62
End: 2022-04-05
Attending: EMERGENCY MEDICINE
Payer: COMMERCIAL

## 2022-04-05 VITALS
WEIGHT: 198 LBS | BODY MASS INDEX: 36.44 KG/M2 | TEMPERATURE: 98.4 F | HEIGHT: 62 IN | RESPIRATION RATE: 22 BRPM | HEART RATE: 104 BPM | SYSTOLIC BLOOD PRESSURE: 137 MMHG | DIASTOLIC BLOOD PRESSURE: 84 MMHG | OXYGEN SATURATION: 100 %

## 2022-04-05 DIAGNOSIS — R73.9 HYPERGLYCEMIA: ICD-10-CM

## 2022-04-05 DIAGNOSIS — J04.0 LARYNGITIS, ACUTE: Primary | ICD-10-CM

## 2022-04-05 DIAGNOSIS — E87.6 HYPOKALEMIA: ICD-10-CM

## 2022-04-05 DIAGNOSIS — I51.7 CARDIOMEGALY: ICD-10-CM

## 2022-04-05 LAB
ALBUMIN SERPL-MCNC: 3 G/DL (ref 3.5–5)
ALBUMIN/GLOB SERPL: 0.7 {RATIO} (ref 1.1–2.2)
ALP SERPL-CCNC: 75 U/L (ref 45–117)
ALT SERPL-CCNC: 19 U/L (ref 12–78)
ANION GAP SERPL CALC-SCNC: 10 MMOL/L (ref 5–15)
AST SERPL W P-5'-P-CCNC: 23 U/L (ref 15–37)
BASOPHILS # BLD: 0.1 K/UL (ref 0–0.1)
BASOPHILS NFR BLD: 1 % (ref 0–1)
BILIRUB SERPL-MCNC: 2.2 MG/DL (ref 0.2–1)
BUN SERPL-MCNC: 27 MG/DL (ref 6–20)
BUN/CREAT SERPL: 17 (ref 12–20)
CA-I BLD-MCNC: 9.7 MG/DL (ref 8.5–10.1)
CHLORIDE SERPL-SCNC: 93 MMOL/L (ref 97–108)
CO2 SERPL-SCNC: 35 MMOL/L (ref 21–32)
CREAT SERPL-MCNC: 1.62 MG/DL (ref 0.55–1.02)
DIFFERENTIAL METHOD BLD: ABNORMAL
EOSINOPHIL # BLD: 0.1 K/UL (ref 0–0.4)
EOSINOPHIL NFR BLD: 1 % (ref 0–7)
ERYTHROCYTE [DISTWIDTH] IN BLOOD BY AUTOMATED COUNT: 18 % (ref 11.5–14.5)
FLUAV AG NPH QL IA: NEGATIVE
FLUBV AG NOSE QL IA: NEGATIVE
GLOBULIN SER CALC-MCNC: 4.3 G/DL (ref 2–4)
GLUCOSE BLD STRIP.AUTO-MCNC: 164 MG/DL (ref 65–117)
GLUCOSE SERPL-MCNC: 294 MG/DL (ref 65–100)
HCT VFR BLD AUTO: 38.8 % (ref 35–47)
HGB BLD-MCNC: 11.2 G/DL (ref 11.5–16)
IMM GRANULOCYTES # BLD AUTO: 0 K/UL (ref 0–0.04)
IMM GRANULOCYTES NFR BLD AUTO: 0 % (ref 0–0.5)
LACTATE SERPL-SCNC: 3.1 MMOL/L (ref 0.4–2)
LYMPHOCYTES # BLD: 0.8 K/UL (ref 0.8–3.5)
LYMPHOCYTES NFR BLD: 9 % (ref 12–49)
MAGNESIUM SERPL-MCNC: 1.7 MG/DL (ref 1.6–2.4)
MCH RBC QN AUTO: 22.9 PG (ref 26–34)
MCHC RBC AUTO-ENTMCNC: 28.9 G/DL (ref 30–36.5)
MCV RBC AUTO: 79.3 FL (ref 80–99)
MONOCYTES # BLD: 0.7 K/UL (ref 0–1)
MONOCYTES NFR BLD: 8 % (ref 5–13)
NEUTS SEG # BLD: 7.2 K/UL (ref 1.8–8)
NEUTS SEG NFR BLD: 81 % (ref 32–75)
PERFORMED BY, TECHID: ABNORMAL
PLATELET # BLD AUTO: 285 K/UL (ref 150–400)
PMV BLD AUTO: 10.3 FL (ref 8.9–12.9)
POTASSIUM SERPL-SCNC: 3.4 MMOL/L (ref 3.5–5.1)
PROT SERPL-MCNC: 7.3 G/DL (ref 6.4–8.2)
RBC # BLD AUTO: 4.89 M/UL (ref 3.8–5.2)
SODIUM SERPL-SCNC: 138 MMOL/L (ref 136–145)
WBC # BLD AUTO: 8.8 K/UL (ref 3.6–11)

## 2022-04-05 PROCEDURE — 71045 X-RAY EXAM CHEST 1 VIEW: CPT

## 2022-04-05 PROCEDURE — 87804 INFLUENZA ASSAY W/OPTIC: CPT

## 2022-04-05 PROCEDURE — 74011636637 HC RX REV CODE- 636/637: Performed by: EMERGENCY MEDICINE

## 2022-04-05 PROCEDURE — 83605 ASSAY OF LACTIC ACID: CPT

## 2022-04-05 PROCEDURE — 74011250636 HC RX REV CODE- 250/636: Performed by: EMERGENCY MEDICINE

## 2022-04-05 PROCEDURE — 96361 HYDRATE IV INFUSION ADD-ON: CPT

## 2022-04-05 PROCEDURE — 99284 EMERGENCY DEPT VISIT MOD MDM: CPT

## 2022-04-05 PROCEDURE — 74011250637 HC RX REV CODE- 250/637: Performed by: EMERGENCY MEDICINE

## 2022-04-05 PROCEDURE — 82962 GLUCOSE BLOOD TEST: CPT

## 2022-04-05 PROCEDURE — 74011000258 HC RX REV CODE- 258: Performed by: EMERGENCY MEDICINE

## 2022-04-05 PROCEDURE — 96365 THER/PROPH/DIAG IV INF INIT: CPT

## 2022-04-05 PROCEDURE — 85025 COMPLETE CBC W/AUTO DIFF WBC: CPT

## 2022-04-05 PROCEDURE — 83735 ASSAY OF MAGNESIUM: CPT

## 2022-04-05 PROCEDURE — 80053 COMPREHEN METABOLIC PANEL: CPT

## 2022-04-05 PROCEDURE — 96375 TX/PRO/DX INJ NEW DRUG ADDON: CPT

## 2022-04-05 PROCEDURE — 87040 BLOOD CULTURE FOR BACTERIA: CPT

## 2022-04-05 RX ORDER — POTASSIUM CHLORIDE 20 MEQ/1
40 TABLET, EXTENDED RELEASE ORAL
Status: COMPLETED | OUTPATIENT
Start: 2022-04-05 | End: 2022-04-05

## 2022-04-05 RX ORDER — PREDNISONE 20 MG/1
40 TABLET ORAL ONCE
Status: COMPLETED | OUTPATIENT
Start: 2022-04-05 | End: 2022-04-05

## 2022-04-05 RX ORDER — AMOXICILLIN AND CLAVULANATE POTASSIUM 875; 125 MG/1; MG/1
1 TABLET, FILM COATED ORAL 2 TIMES DAILY
Qty: 20 TABLET | Refills: 0 | Status: SHIPPED | OUTPATIENT
Start: 2022-04-05 | End: 2022-05-24 | Stop reason: ALTCHOICE

## 2022-04-05 RX ADMIN — PIPERACILLIN AND TAZOBACTAM 3.38 G: 3; .375 INJECTION, POWDER, LYOPHILIZED, FOR SOLUTION INTRAVENOUS at 18:12

## 2022-04-05 RX ADMIN — SODIUM CHLORIDE 250 ML: 9 INJECTION, SOLUTION INTRAVENOUS at 17:56

## 2022-04-05 RX ADMIN — POTASSIUM CHLORIDE 40 MEQ: 20 TABLET, EXTENDED RELEASE ORAL at 17:52

## 2022-04-05 RX ADMIN — PREDNISONE 40 MG: 20 TABLET ORAL at 18:57

## 2022-04-05 RX ADMIN — INSULIN HUMAN 4 UNITS: 100 INJECTION, SOLUTION PARENTERAL at 17:52

## 2022-04-05 NOTE — ED TRIAGE NOTES
Patient reports severe cough for about 4 days, reports productive cough. Also reports sore throat as well. Denies fever or body aches.

## 2022-04-05 NOTE — ED PROVIDER NOTES
EMERGENCY DEPARTMENT HISTORY AND PHYSICAL EXAM      Date: 4/5/2022  Patient Name: Kiarra Apple      History of Presenting Illness     Chief Complaint   Patient presents with    Cough       History Provided By: Patient    HPI: Kiarra Apple, 57 y.o. female with a past medical history significant diabetes, hypertension, hyperlipidemia and obesity presents to the ED with cc of Cough, loss of voice and sore throat. Started 3 days ago. No fever, chest pain or chills. Had CoVID last year. Has used OTC meds without relief. There are no other complaints, changes, or physical findings at this time. PCP: Giselle Foss MD    Current Facility-Administered Medications   Medication Dose Route Frequency Provider Last Rate Last Admin    predniSONE (DELTASONE) tablet 40 mg  40 mg Oral ONCE Monika Sevilla MD         Current Outpatient Medications   Medication Sig Dispense Refill    amoxicillin-clavulanate (Augmentin) 875-125 mg per tablet Take 1 Tablet by mouth two (2) times a day. 20 Tablet 0    ondansetron hcl (Zofran) 4 mg tablet Take 1 Tablet by mouth every eight (8) hours as needed for Nausea. 20 Tablet 0    gabapentin (NEURONTIN) 100 mg capsule Take 100 mg by mouth nightly.  Farxiga 10 mg tab tablet TAKE ONE TABLET BY MOUTH EVERY MORNING 30 Tablet 11    metoclopramide HCl (REGLAN) 5 mg tablet Take 5 mg by mouth Before breakfast, lunch, and dinner. As needed      pantoprazole (PROTONIX) 40 mg tablet Take 40 mg by mouth daily. Hardin Mable Goldberg FlexTouch U-200 200 unit/mL (3 mL) inpn pen INJECT 100 UNITS SUBCUTANEOUSLY EVERY DAY (Patient taking differently: INJECT 65 UNITS SUBCUTANEOUSLY EVERY DAY) 18 mL 11    insulin aspart U-100 (NOVOLOG) 100 unit/mL (3 mL) inpn Inject 40 units before meals w/ SSI Max units daily: 165 Stop Humalog 60 mL 11    metFORMIN ER (GLUCOPHAGE XR) 500 mg tablet Take 1 Tab by mouth two (2) times a day.  180 Tab 3    torsemide (DEMADEX) 20 mg tablet Take 20 mg by mouth daily.      OneTouch Ultra Blue Test Strip strip USE TO TEST BLOOD GLUCOSE THREE TIMES A  Strip 3    carvediloL (COREG) 3.125 mg tablet Take 1 Tablet by mouth two (2) times a day.  aspirin 81 mg chewable tablet Take 81 mg by mouth nightly.  clopidogrel (PLAVIX) 75 mg tablet Take 75 mg by mouth daily.  potassium chloride (KLOR-CON M20) 20 mEq tablet Take 20 mEq by mouth daily. 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 There is possible HK of distal ant/septal wall and apical walls (technically difficult study).  ECHO 2D ADULT  2010    LVEF 45%, akinetic apex and septum. HK mid-anterior wall    HX CHOLECYSTECTOMY  2012    HX CORONARY STENT PLACEMENT  2020    HX HEART CATHETERIZATION  2021, 2021    with stents placed.      HX HYSTERECTOMY  2005    HX ORTHOPAEDIC  2008    RIGHT SHOULDER ARTHROSCOPY    HX ORTHOPAEDIC  2003    ANTERIOR CERVICAL FUSION INSTRUMENTATION    MD CARDIAC SURG PROCEDURE UNLIST  2009    CABG X 3       Family History:  Family History   Problem Relation Age of Onset    Diabetes Mother     Heart Disease Father          of heart disease at age 61       Social History:  Social History     Tobacco Use    Smoking status: Never Smoker    Smokeless tobacco: Never Used   Vaping Use    Vaping Use: Never used   Substance Use Topics    Alcohol use: Yes     Comment: 1 DRINK PER MONTH    Drug use: No       Allergies:  No Known Allergies      Review of Systems     Review of Systems   Constitutional: Negative. HENT: Positive for sore throat. Respiratory: Positive for cough. Gastrointestinal: Negative. Genitourinary: Negative. Musculoskeletal: Negative. Neurological: Negative. All other systems reviewed and are negative. Physical Exam     Physical Exam  Vitals and nursing note reviewed. Constitutional:       Appearance: Normal appearance. She is obese. Eyes:      Extraocular Movements: Extraocular movements intact. Pupils: Pupils are equal, round, and reactive to light. Cardiovascular:      Rate and Rhythm: Normal rate and regular rhythm. Pulses: Normal pulses. Heart sounds: Normal heart sounds. Pulmonary:      Effort: Pulmonary effort is normal.      Breath sounds: Normal breath sounds. Abdominal:      Palpations: Abdomen is soft. Musculoskeletal:      Cervical back: Normal range of motion and neck supple. Neurological:      General: No focal deficit present. Mental Status: She is alert and oriented to person, place, and time. Lab and Diagnostic Study Results     Labs -     Recent Results (from the past 12 hour(s))   INFLUENZA A & B AG (RAPID TEST)    Collection Time: 04/05/22  4:30 PM   Result Value Ref Range    Influenza A Antigen Negative Negative      Influenza B Antigen Negative Negative     CBC WITH AUTOMATED DIFF    Collection Time: 04/05/22  4:50 PM   Result Value Ref Range    WBC 8.8 3.6 - 11.0 K/uL    RBC 4.89 3.80 - 5.20 M/uL    HGB 11.2 (L) 11.5 - 16.0 g/dL    HCT 38.8 35.0 - 47.0 %    MCV 79.3 (L) 80.0 - 99.0 FL    MCH 22.9 (L) 26.0 - 34.0 PG    MCHC 28.9 (L) 30.0 - 36.5 g/dL    RDW 18.0 (H) 11.5 - 14.5 %    PLATELET 159 887 - 377 K/uL    MPV 10.3 8.9 - 12.9 FL    NEUTROPHILS 81 (H) 32 - 75 %    LYMPHOCYTES 9 (L) 12 - 49 %    MONOCYTES 8 5 - 13 %    EOSINOPHILS 1 0 - 7 %    BASOPHILS 1 0 - 1 %    IMMATURE GRANULOCYTES 0 0.0 - 0.5 %    ABS.  NEUTROPHILS 7.2 1.8 - 8.0 K/UL    ABS. LYMPHOCYTES 0.8 0.8 - 3.5 K/UL    ABS. MONOCYTES 0.7 0.0 - 1.0 K/UL    ABS. EOSINOPHILS 0.1 0.0 - 0.4 K/UL    ABS. BASOPHILS 0.1 0.0 - 0.1 K/UL    ABS. IMM. GRANS. 0.0 0.00 - 0.04 K/UL    DF AUTOMATED     LACTIC ACID    Collection Time: 04/05/22  4:50 PM   Result Value Ref Range    Lactic acid 3.1 (HH) 0.4 - 2.0 mmol/L   MAGNESIUM    Collection Time: 04/05/22  4:50 PM   Result Value Ref Range    Magnesium 1.7 1.6 - 2.4 mg/dL   METABOLIC PANEL, COMPREHENSIVE    Collection Time: 04/05/22  4:50 PM   Result Value Ref Range    Sodium 138 136 - 145 mmol/L    Potassium 3.4 (L) 3.5 - 5.1 mmol/L    Chloride 93 (L) 97 - 108 mmol/L    CO2 35 (H) 21 - 32 mmol/L    Anion gap 10 5 - 15 mmol/L    Glucose 294 (H) 65 - 100 mg/dL    BUN 27 (H) 6 - 20 mg/dL    Creatinine 1.62 (H) 0.55 - 1.02 mg/dL    BUN/Creatinine ratio 17 12 - 20      GFR est AA 39 (L) >60 ml/min/1.73m2    GFR est non-AA 32 (L) >60 ml/min/1.73m2    Calcium 9.7 8.5 - 10.1 mg/dL    Bilirubin, total 2.2 (H) 0.2 - 1.0 mg/dL    AST (SGOT) 23 15 - 37 U/L    ALT (SGPT) 19 12 - 78 U/L    Alk. phosphatase 75 45 - 117 U/L    Protein, total 7.3 6.4 - 8.2 g/dL    Albumin 3.0 (L) 3.5 - 5.0 g/dL    Globulin 4.3 (H) 2.0 - 4.0 g/dL    A-G Ratio 0.7 (L) 1.1 - 2.2     GLUCOSE, POC    Collection Time: 04/05/22  6:49 PM   Result Value Ref Range    Glucose (POC) 164 (H) 65 - 117 mg/dL    Performed by Araceli Landin        Radiologic Studies -   [unfilled]  CT Results  (Last 48 hours)    None        CXR Results  (Last 48 hours)               04/05/22 1649  XR CHEST PORT Final result    Narrative:  1 new comparison March 5       Continued cardiomegaly and increased congestion. Underinflated but clear lungs. No effusion or pneumothorax             Medical Decision Making and ED Course   - I am the first and primary provider for this patient AND AM THE PRIMARY PROVIDER OF RECORD.     - I reviewed the vital signs, available nursing notes, past medical history, past surgical history, family history and social history. - Initial assessment performed. The patients presenting problems have been discussed, and the staff are in agreement with the care plan formulated and outlined with them. I have encouraged them to ask questions as they arise throughout their visit. Vital Signs-Reviewed the patient's vital signs. Patient Vitals for the past 12 hrs:   Temp Pulse Resp BP SpO2   04/05/22 1700  (!) 106  133/81    04/05/22 1611 98.4 °F (36.9 °C) (!) 110 24 (!) 143/85 95 %           Records Reviewed: Nursing Notes    The patient presents with     ED Course:              Provider Notes (Medical Decision Making):     MDM  Number of Diagnoses or Management Options     Amount and/or Complexity of Data Reviewed  Clinical lab tests: ordered and reviewed  Tests in the radiology section of CPT®: ordered and reviewed    Risk of Complications, Morbidity, and/or Mortality  Presenting problems: moderate  Diagnostic procedures: moderate  Management options: moderate  General comments: Refused admission. Adviced to return if worse. Daughter at bedside               Consultations:       Consultations:         Procedures and Critical Care       Performed by: Jayjay Guillen MD  PROCEDURES:  Procedures                   Disposition     Disposition: [facility] Sample Discharge: I informed the pt that they needed admission for adequate evaluation for their SIRS, Laryngitis and that by refusing the above, they at risk for further deterioration. They are awake, alert, and they understand their condition and the risks involved in leaving. They are clinically aware of their surroundings and able to ask appropriate questions. The patients relative and the nurse present confirms They are not clinically intoxicated and able to make medical decisions. They have verbalized knowing the risks and understood it was recommended that they stay and could also return at any time.  The patient's relative was present for the discussion and also verbalized that they understood both diagnosis, risks and could return at any time. They were both provided with warnings regarding worsening of Their condition and were provided instructions to follow up with their PCP tomorrow or return to the Emergency Room as soon as possible. This discussion was witnessed by nurse RN. Discharged    Remove if not discharged  DISCHARGE PLAN:  1. Current Discharge Medication List      CONTINUE these medications which have NOT CHANGED    Details   ondansetron hcl (Zofran) 4 mg tablet Take 1 Tablet by mouth every eight (8) hours as needed for Nausea. Qty: 20 Tablet, Refills: 0      gabapentin (NEURONTIN) 100 mg capsule Take 100 mg by mouth nightly. Farxiga 10 mg tab tablet TAKE ONE TABLET BY MOUTH EVERY MORNING  Qty: 30 Tablet, Refills: 11    Associated Diagnoses: Type 2 diabetes mellitus with hyperglycemia, with long-term current use of insulin ([address] Mixed hyperlipidemia      metoclopramide HCl (REGLAN) 5 mg tablet Take 5 mg by mouth Before breakfast, lunch, and dinner. As needed      pantoprazole (PROTONIX) 40 mg tablet Take 40 mg by mouth daily. Nica Parra FlexTouch U-200 200 unit/mL (3 mL) inpn pen INJECT 100 UNITS SUBCUTANEOUSLY EVERY DAY  Qty: 18 mL, Refills: 11    Associated Diagnoses: Type 2 diabetes mellitus with hyperglycemia, with long-term current use of insulin ([address] Mixed hyperlipidemia      insulin aspart U-100 (NOVOLOG) 100 unit/mL (3 mL) inpn Inject 40 units before meals w/ SSI Max units daily: 165 Stop Humalog  Qty: 60 mL, Refills: 11    Associated Diagnoses: Type 2 diabetes mellitus with hyperglycemia, with long-term current use of insulin ([address] Mixed hyperlipidemia      metFORMIN ER (GLUCOPHAGE XR) 500 mg tablet Take 1 Tab by mouth two (2) times a day.   Qty: 180 Tab, Refills: 3    Associated Diagnoses: Type 2 diabetes mellitus with hyperglycemia, unspecified whether long term insulin use (HCC)      torsemide (DEMADEX) 20 mg tablet Take 20 mg by mouth daily. OneTouch Ultra Blue Test Strip strip USE TO TEST BLOOD GLUCOSE THREE TIMES A DAY  Qty: 300 Strip, Refills: 3    Associated Diagnoses: Uncontrolled type 2 diabetes mellitus with hyperglycemia, with long-term current use of insulin (HCC)      carvediloL (COREG) 3.125 mg tablet Take 1 Tablet by mouth two (2) times a day. aspirin 81 mg chewable tablet Take 81 mg by mouth nightly. clopidogrel (PLAVIX) 75 mg tablet Take 75 mg by mouth daily. potassium chloride (KLOR-CON M20) 20 mEq tablet Take 20 mEq by mouth daily. 2.   Follow-up Information     Follow up With Specialties Details Why Contact Info    Cari Kamara MD Internal Medicine In 2 days  [address]  [phone]          3. Return to ED if worse   4. Current Discharge Medication List      START taking these medications    Details   amoxicillin-clavulanate (Augmentin) 875-125 mg per tablet Take 1 Tablet by mouth two (2) times a day. Qty: 20 Tablet, Refills: 0  Start date: 4/5/2022             Diagnosis     Clinical Impression:   1. Laryngitis, acute    2. Hyperglycemia    3. Hypokalemia    4. Cardiomegaly        Attestations:    Rashad Velázquez MD    Please note that this dictation was completed with [email or web address], the computer voice recognition software. Quite often unanticipated grammatical, syntax, homophones, and other interpretive errors are inadvertently transcribed by the computer software. Please disregard these errors. Please excuse any errors that have escaped final proofreading. Thank you.

## 2022-04-12 LAB
BACTERIA SPEC CULT: NORMAL
SPECIAL REQUESTS,SREQ: NORMAL

## 2022-05-03 PROCEDURE — 80048 BASIC METABOLIC PNL TOTAL CA: CPT

## 2022-05-03 PROCEDURE — 85025 COMPLETE CBC W/AUTO DIFF WBC: CPT

## 2022-05-04 ENCOUNTER — HOSPITAL ENCOUNTER (OUTPATIENT)
Dept: LAB | Age: 62
Discharge: HOME | End: 2022-05-04

## 2022-05-04 LAB
ANION GAP SERPL CALC-SCNC: 4 MMOL/L (ref 5–15)
BASOPHILS # BLD: 0.1 K/UL (ref 0–0.1)
BASOPHILS NFR BLD: 1 % (ref 0–1)
BUN SERPL-MCNC: 40 MG/DL (ref 6–20)
BUN/CREAT SERPL: 29 (ref 12–20)
CA-I BLD-MCNC: 9.9 MG/DL (ref 8.5–10.1)
CHLORIDE SERPL-SCNC: 100 MMOL/L (ref 97–108)
CO2 SERPL-SCNC: 33 MMOL/L (ref 21–32)
CREAT SERPL-MCNC: 1.37 MG/DL (ref 0.55–1.02)
DIFFERENTIAL METHOD BLD: ABNORMAL
EOSINOPHIL # BLD: 0.2 K/UL (ref 0–0.4)
EOSINOPHIL NFR BLD: 4 % (ref 0–7)
ERYTHROCYTE [DISTWIDTH] IN BLOOD BY AUTOMATED COUNT: 20.8 % (ref 11.5–14.5)
GLUCOSE SERPL-MCNC: 135 MG/DL (ref 65–100)
HCT VFR BLD AUTO: 38.2 % (ref 35–47)
HGB BLD-MCNC: 10.4 G/DL (ref 11.5–16)
IMM GRANULOCYTES # BLD AUTO: 0 K/UL (ref 0–0.04)
IMM GRANULOCYTES NFR BLD AUTO: 0 % (ref 0–0.5)
LYMPHOCYTES # BLD: 1.3 K/UL (ref 0.8–3.5)
LYMPHOCYTES NFR BLD: 23 % (ref 12–49)
MCH RBC QN AUTO: 22 PG (ref 26–34)
MCHC RBC AUTO-ENTMCNC: 27.2 G/DL (ref 30–36.5)
MCV RBC AUTO: 80.9 FL (ref 80–99)
MONOCYTES # BLD: 0.6 K/UL (ref 0–1)
MONOCYTES NFR BLD: 10 % (ref 5–13)
NEUTS SEG # BLD: 3.4 K/UL (ref 1.8–8)
NEUTS SEG NFR BLD: 62 % (ref 32–75)
NRBC # BLD: 0 K/UL (ref 0–0.01)
NRBC BLD-RTO: 0 PER 100 WBC
PLATELET # BLD AUTO: 280 K/UL (ref 150–400)
PMV BLD AUTO: 10.8 FL (ref 8.9–12.9)
POTASSIUM SERPL-SCNC: 3.6 MMOL/L (ref 3.5–5.1)
RBC # BLD AUTO: 4.72 M/UL (ref 3.8–5.2)
SODIUM SERPL-SCNC: 137 MMOL/L (ref 136–145)
WBC # BLD AUTO: 5.5 K/UL (ref 3.6–11)

## 2022-05-24 ENCOUNTER — OFFICE VISIT (OUTPATIENT)
Dept: PODIATRY | Age: 62
End: 2022-05-24
Payer: COMMERCIAL

## 2022-05-24 VITALS
HEIGHT: 62 IN | OXYGEN SATURATION: 98 % | HEART RATE: 79 BPM | BODY MASS INDEX: 36.21 KG/M2 | DIASTOLIC BLOOD PRESSURE: 62 MMHG | SYSTOLIC BLOOD PRESSURE: 130 MMHG | TEMPERATURE: 97.3 F

## 2022-05-24 DIAGNOSIS — E11.40 TYPE 2 DIABETES MELLITUS WITH DIABETIC NEUROPATHY, WITH LONG-TERM CURRENT USE OF INSULIN (HCC): Primary | ICD-10-CM

## 2022-05-24 DIAGNOSIS — Z79.4 TYPE 2 DIABETES MELLITUS WITH DIABETIC NEUROPATHY, WITH LONG-TERM CURRENT USE OF INSULIN (HCC): Primary | ICD-10-CM

## 2022-05-24 PROCEDURE — 99203 OFFICE O/P NEW LOW 30 MIN: CPT | Performed by: PODIATRIST

## 2022-05-24 RX ORDER — MIDODRINE HYDROCHLORIDE 10 MG/1
1 TABLET ORAL 3 TIMES DAILY
COMMUNITY
Start: 2022-04-25

## 2022-05-24 RX ORDER — METOLAZONE 2.5 MG/1
2.5 TABLET ORAL
COMMUNITY
Start: 2022-03-28

## 2022-05-24 RX ORDER — GABAPENTIN 300 MG/1
300 CAPSULE ORAL 2 TIMES DAILY
Qty: 60 CAPSULE | Refills: 2 | Status: SHIPPED | OUTPATIENT
Start: 2022-05-24 | End: 2022-08-26

## 2022-05-24 RX ORDER — HYDROXYZINE 25 MG/1
25 TABLET, FILM COATED ORAL
COMMUNITY
Start: 2022-01-31 | End: 2022-06-04

## 2022-05-24 RX ORDER — ALPRAZOLAM 0.25 MG/1
0.25 TABLET ORAL
COMMUNITY
Start: 2022-03-07

## 2022-05-24 NOTE — PROGRESS NOTES
1. Have you been to the ER, urgent care clinic since your last visit? Hospitalized since your last visit? Yes Where: [facility]    2. Have you seen or consulted any other health care providers outside of the [address] [place] [address] since your last visit? Include any pap smears or colon screening.  No    Chief Complaint   Patient presents with    Foot Pain     numbness/ tingling in both feet

## 2022-06-20 NOTE — PROGRESS NOTES
[facility]    Subjective:         Patient is a 57 y.o. female who is being seen as a new pt for diabetic pedal exam.  Patient states she has close follow-up with her PCP (Dr. Panda Alcantar). She states her last office visit with her PCP was 2022. She describes her diabetes as being under control, but cannot recall her last hemoglobin A1c. She admits to bilateral pedal pain, rises level of 5 out of 10. She describes the pain as a burning/tingling that is worse in the evenings. She denies any recent pedal trauma. She denies any local/systemic signs of infection. 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 There is possible HK of distal ant/septal wall and apical walls (technically difficult study).  ECHO 2D ADULT  2010    LVEF 45%, akinetic apex and septum. HK mid-anterior wall    HX CHOLECYSTECTOMY  2012    HX CORONARY STENT PLACEMENT  2020    HX HEART CATHETERIZATION  2021, 2021    with stents placed.      HX HYSTERECTOMY  2005    HX ORTHOPAEDIC  2008    RIGHT SHOULDER ARTHROSCOPY    HX ORTHOPAEDIC  2003    ANTERIOR CERVICAL FUSION INSTRUMENTATION    [place] CARDIAC SURG PROCEDURE UNLIST  2009    CABG X 3       Family History   Problem Relation Age of Onset    Diabetes Mother     Heart Disease Father          of heart disease at age 61      Social History     Tobacco Use    Smoking status: Never Smoker    Smokeless tobacco: Never Used   Substance Use Topics    Alcohol use: Yes     Comment: 1 DRINK PER MONTH     No Known Allergies  Prior to Admission medications    Medication Sig Start Date End Date Taking? Authorizing Provider   gabapentin (NEURONTIN) 300 mg capsule Take 1 Capsule by mouth two (2) times a day. Max Daily Amount: 600 mg. Patient taking differently: Take 200 mg by mouth two (2) times a day. 5/24/22  Yes Virgil Calles DPM   midodrine (PROAMATINE) 10 mg tablet Take 1 Tablet by mouth three (3) times daily. 4/25/22  Yes Provider, Historical   metOLazone (ZAROXOLYN) 2.5 mg tablet Take 2.5 mg by mouth. 3/28/22  Yes Provider, Historical   ALPRAZolam (XANAX) 0.25 mg tablet Take 0.25 mg by mouth two (2) times daily as needed. 3/7/22  Yes Provider, Historical   ondansetron hcl (Zofran) 4 mg tablet Take 1 Tablet by mouth every eight (8) hours as needed for Nausea. Patient taking differently: Take 4 mg by mouth every eight (8) hours as needed for Nausea. 3/6/22  Yes Bart Roche MD   metoclopramide HCl (REGLAN) 5 mg tablet Take 5 mg by mouth Before breakfast, lunch, and dinner. As needed   Yes Provider, Historical   pantoprazole (PROTONIX) 40 mg tablet Take 40 mg by mouth daily. Yes Provider, Historical   Kayleen Conde FlexTouch U-200 200 unit/mL (3 mL) inpn pen INJECT [address] [facility]  Patient taking differently: INJECT 65 UNITS SUBCUTANEOUSLY EVERY DAY 11/9/21  Yes Sherrell Knight MD   torsemide (DEMADEX) 20 mg tablet Take 20 mg by mouth daily. Yes Provider, Historical   carvediloL (COREG) 3.125 mg tablet Take 1 Tablet by mouth two (2) times a day. 6/16/20  Yes Provider, Historical   aspirin 81 mg chewable tablet Take 81 mg by mouth nightly. Yes Other, MD Jamal   clopidogrel (PLAVIX) 75 mg tablet Take 75 mg by mouth daily. Yes Other, MD Jamal   potassium chloride (KLOR-CON M20) 20 mEq tablet Take 20 mEq by mouth daily.      Yes Other, MD Jamal   insulin aspart U-100 (NOVOLOG) 100 unit/mL (3 mL) inpn Inject 40 units before meals w/ SSI Max units daily: 165 Stop Humalog 10/12/21   Jesus Reynoso MD   OneTouch Ultra Blue Test Strip strip USE TO TEST BLOOD GLUCOSE THREE TIMES A DAY 1/12/21   Malu Delgadillo MD       Review of Systems   Constitutional: Negative. HENT: Negative. Eyes: Negative. Respiratory: Positive for apnea. Cardiovascular: Negative. Gastrointestinal: Negative. Endocrine: Negative. Genitourinary: Negative. Musculoskeletal: Negative. Skin: Negative. Allergic/Immunologic: Positive for immunocompromised state. Neurological: Positive for numbness. Hematological: Negative. Psychiatric/Behavioral: The patient is nervous/anxious. All other systems reviewed and are negative. Objective:     Visit Vitals  /62 (BP 1 Location: Left upper arm, BP Patient Position: Sitting, BP Cuff Size: Adult)   Pulse 79   Temp 97.3 °F (36.3 °C) (Temporal)   Ht 5' 2\" (1.575 m)   SpO2 98%   BMI 36.21 kg/m²       Physical Exam  Vitals reviewed. Constitutional:       Appearance: She is morbidly obese. Cardiovascular:      Pulses:           Dorsalis pedis pulses are 2+ on the right side and 2+ on the left side. Posterior tibial pulses are 2+ on the right side and 2+ on the left side. Pulmonary:      Effort: Pulmonary effort is normal.   Musculoskeletal:      Right lower leg: No edema. Left lower leg: No edema. Right foot: Normal range of motion. No deformity or bunion. Left foot: Normal range of motion. No deformity or bunion. Feet:      Right foot:      Protective Sensation: 10 sites tested. 10 sites sensed. Skin integrity: Skin integrity normal.      Toenail Condition: Right toenails are normal.      Left foot:      Protective Sensation: 10 sites tested. 10 sites sensed. Skin integrity: Skin integrity normal.      Toenail Condition: Left toenails are normal.   Lymphadenopathy:      Lower Body: No right inguinal adenopathy.  No left inguinal adenopathy. Skin:     General: Skin is warm. Capillary Refill: Capillary refill takes 2 to 3 seconds. Neurological:      Mental Status: She is alert and oriented to person, place, and time. Comments: Paresthesias/burning noted   Psychiatric:         Mood and Affect: Mood and affect normal.         Behavior: Behavior is cooperative. Data Review: No results found for this or any previous visit (from the past 24 hour(s)). Impression:       ICD-10-CM ICD-9-CM    1. Type 2 diabetes mellitus with diabetic neuropathy, with long-term current use of insulin (HCC)  E11.40 250.60 gabapentin (NEURONTIN) 300 mg capsule    Z79.4 357.2      V58.67          Recommendation:     Patient seen and evaluated in the office  Discussed and educated patient regarding their current medical condition at it pertains to her diabetes and her overall pedal health. Instructed patient to monitor their feet daily for possible wound formation, be compliant with all medications and wear supportive shoe gear for wound prevention. Reviewed and discussed most recent lab work, specifically as it pertains to her diabetes. Pt verbalized understanding of all discussed and reviewed  In depth conversation had regarding her diabetic peripheral neuropathy. A prescription was given for gabapentin 300 mg taken p.o. daily for symptomatic relief        Ez Crowe, [facility], [address] [place] [address] and [facility]  [address] [place] [address]  O: [phone]  F: [phone]  C: [phone]

## 2022-06-24 LAB — HBA1C MFR BLD HPLC: 9.8 %

## 2022-08-03 LAB — CREATININE, EXTERNAL: 1.62

## 2022-08-26 DIAGNOSIS — Z79.4 TYPE 2 DIABETES MELLITUS WITH DIABETIC NEUROPATHY, WITH LONG-TERM CURRENT USE OF INSULIN (HCC): ICD-10-CM

## 2022-08-26 DIAGNOSIS — E11.40 TYPE 2 DIABETES MELLITUS WITH DIABETIC NEUROPATHY, WITH LONG-TERM CURRENT USE OF INSULIN (HCC): ICD-10-CM

## 2022-08-26 RX ORDER — GABAPENTIN 100 MG/1
200 CAPSULE ORAL 2 TIMES DAILY
Qty: 60 CAPSULE | Refills: 2 | Status: SHIPPED | OUTPATIENT
Start: 2022-08-26 | End: 2022-09-27

## 2022-09-27 ENCOUNTER — OFFICE VISIT (OUTPATIENT)
Dept: PODIATRY | Age: 62
End: 2022-09-27
Payer: COMMERCIAL

## 2022-09-27 VITALS
OXYGEN SATURATION: 99 % | TEMPERATURE: 97.3 F | HEIGHT: 62 IN | BODY MASS INDEX: 36.44 KG/M2 | HEART RATE: 92 BPM | DIASTOLIC BLOOD PRESSURE: 65 MMHG | SYSTOLIC BLOOD PRESSURE: 133 MMHG | WEIGHT: 198 LBS

## 2022-09-27 DIAGNOSIS — Z79.4 TYPE 2 DIABETES MELLITUS WITH DIABETIC NEUROPATHY, WITH LONG-TERM CURRENT USE OF INSULIN (HCC): Primary | ICD-10-CM

## 2022-09-27 DIAGNOSIS — E11.40 TYPE 2 DIABETES MELLITUS WITH DIABETIC NEUROPATHY, WITH LONG-TERM CURRENT USE OF INSULIN (HCC): Primary | ICD-10-CM

## 2022-09-27 PROCEDURE — 99213 OFFICE O/P EST LOW 20 MIN: CPT | Performed by: PODIATRIST

## 2022-09-27 PROCEDURE — 3078F DIAST BP <80 MM HG: CPT | Performed by: PODIATRIST

## 2022-09-27 PROCEDURE — 3074F SYST BP LT 130 MM HG: CPT | Performed by: PODIATRIST

## 2022-09-27 RX ORDER — GABAPENTIN 300 MG/1
300 CAPSULE ORAL 2 TIMES DAILY
Qty: 60 CAPSULE | Refills: 2 | Status: CANCELLED | OUTPATIENT
Start: 2022-09-27

## 2022-09-27 NOTE — PROGRESS NOTES
1. \"Have you been to the ER, urgent care clinic since your last visit? Hospitalized since your last visit? \" No    2. \"Have you seen or consulted any other health care providers outside of the [address] [place] [address] since your last visit? \" No     3. For patients aged 39-70: Has the patient had a colonoscopy / FIT/ Cologuard? Yes - no Care Gap present      If the patient is female:    4. For patients aged 41-77: Has the patient had a mammogram within the past 2 years? No      5. For patients aged 21-65: Has the patient had a pap smear?  No    Chief Complaint   Patient presents with    Diabetic Foot Exam     Glucose- 201

## 2022-10-14 DIAGNOSIS — E11.65 TYPE 2 DIABETES MELLITUS WITH HYPERGLYCEMIA, WITH LONG-TERM CURRENT USE OF INSULIN (HCC): ICD-10-CM

## 2022-10-14 DIAGNOSIS — Z79.4 TYPE 2 DIABETES MELLITUS WITH HYPERGLYCEMIA, WITH LONG-TERM CURRENT USE OF INSULIN (HCC): ICD-10-CM

## 2022-10-14 DIAGNOSIS — E78.2 MIXED HYPERLIPIDEMIA: ICD-10-CM

## 2022-10-14 RX ORDER — INSULIN ASPART 100 [IU]/ML
INJECTION, SOLUTION INTRAVENOUS; SUBCUTANEOUS
Qty: 60 ML | Refills: 11 | Status: SHIPPED | OUTPATIENT
Start: 2022-10-14

## 2022-10-19 DIAGNOSIS — E11.40 TYPE 2 DIABETES MELLITUS WITH DIABETIC NEUROPATHY, WITH LONG-TERM CURRENT USE OF INSULIN (HCC): ICD-10-CM

## 2022-10-19 DIAGNOSIS — Z79.4 TYPE 2 DIABETES MELLITUS WITH DIABETIC NEUROPATHY, WITH LONG-TERM CURRENT USE OF INSULIN (HCC): ICD-10-CM

## 2022-10-19 RX ORDER — GABAPENTIN 100 MG/1
CAPSULE ORAL
Qty: 60 CAPSULE | Refills: 2 | Status: SHIPPED | OUTPATIENT
Start: 2022-10-19

## 2022-10-28 ENCOUNTER — TELEPHONE (OUTPATIENT)
Dept: PODIATRY | Age: 62
End: 2022-10-28

## 2022-10-28 DIAGNOSIS — Z79.4 TYPE 2 DIABETES MELLITUS WITH DIABETIC NEUROPATHY, WITH LONG-TERM CURRENT USE OF INSULIN (HCC): Primary | ICD-10-CM

## 2022-10-28 DIAGNOSIS — E11.40 TYPE 2 DIABETES MELLITUS WITH DIABETIC NEUROPATHY, WITH LONG-TERM CURRENT USE OF INSULIN (HCC): Primary | ICD-10-CM

## 2022-10-28 RX ORDER — GABAPENTIN 300 MG/1
300 CAPSULE ORAL 2 TIMES DAILY
Qty: 180 CAPSULE | Refills: 0 | Status: SHIPPED | OUTPATIENT
Start: 2022-10-28

## 2022-11-05 RX ORDER — LANOLIN ALCOHOL/MO/W.PET/CERES
325 CREAM (GRAM) TOPICAL EVERY OTHER DAY
COMMUNITY
Start: 2022-08-17 | End: 2022-11-15

## 2022-11-05 RX ORDER — INSULIN ASPART 100 [IU]/ML
25 INJECTION, SOLUTION INTRAVENOUS; SUBCUTANEOUS
COMMUNITY

## 2022-11-05 RX ORDER — VITAMIN E 1000 UNIT
500 CAPSULE ORAL DAILY
COMMUNITY
Start: 2022-08-17 | End: 2022-11-15

## 2022-11-05 RX ORDER — SPIRONOLACTONE 25 MG/1
25 TABLET ORAL DAILY
COMMUNITY
Start: 2022-06-28

## 2022-11-05 RX ORDER — SACUBITRIL AND VALSARTAN 24; 26 MG/1; MG/1
0.5 TABLET, FILM COATED ORAL 2 TIMES DAILY
COMMUNITY
Start: 2022-10-19

## 2022-11-05 RX ORDER — NITROGLYCERIN 0.4 MG/1
0.4 TABLET SUBLINGUAL
COMMUNITY

## 2022-11-10 ENCOUNTER — TELEPHONE (OUTPATIENT)
Dept: ENDOCRINOLOGY | Age: 62
End: 2022-11-10

## 2022-11-10 DIAGNOSIS — Z79.4 TYPE 2 DIABETES MELLITUS WITH HYPERGLYCEMIA, WITH LONG-TERM CURRENT USE OF INSULIN (HCC): ICD-10-CM

## 2022-11-10 DIAGNOSIS — E78.2 MIXED HYPERLIPIDEMIA: ICD-10-CM

## 2022-11-10 DIAGNOSIS — E11.65 TYPE 2 DIABETES MELLITUS WITH HYPERGLYCEMIA, WITH LONG-TERM CURRENT USE OF INSULIN (HCC): ICD-10-CM

## 2022-11-10 RX ORDER — INSULIN DEGLUDEC 200 U/ML
INJECTION, SOLUTION SUBCUTANEOUS
Qty: 18 ML | Refills: 11 | Status: SHIPPED | OUTPATIENT
Start: 2022-11-10

## 2022-11-10 NOTE — TELEPHONE ENCOUNTER
[facility] pre-op pharmacy called and wanted to verify pt's DM regimen. Advised her that pt has not been seen since 12/2021. Pt is supposed to be on Tresiba, Novolog, metformin and farxiga per last note but in her chart it looks like metformin was d/c'd in May and fraxiga changed to jardiance. Pharmacist verbalized understanding with no further questions or concerns at this time.

## 2022-12-17 PROBLEM — M25.552 LEFT HIP PAIN: Status: ACTIVE | Noted: 2022-12-17

## 2022-12-17 PROBLEM — Z79.4 TYPE 2 DIABETES MELLITUS WITH HYPERGLYCEMIA, WITH LONG-TERM CURRENT USE OF INSULIN (HCC): Status: ACTIVE | Noted: 2019-01-07

## 2022-12-17 PROBLEM — I25.10 PRESENCE OF STENT IN CORONARY ARTERY IN PATIENT WITH CORONARY ARTERY DISEASE: Status: ACTIVE | Noted: 2022-12-17

## 2022-12-17 PROBLEM — I73.9 PVD (PERIPHERAL VASCULAR DISEASE) (HCC): Status: ACTIVE | Noted: 2022-12-17

## 2022-12-17 PROBLEM — E79.0 ELEVATED URIC ACID IN BLOOD: Status: ACTIVE | Noted: 2022-12-17

## 2022-12-17 PROBLEM — E11.65 TYPE 2 DIABETES MELLITUS WITH HYPERGLYCEMIA, WITH LONG-TERM CURRENT USE OF INSULIN (HCC): Status: ACTIVE | Noted: 2019-01-07

## 2022-12-17 PROBLEM — N18.32 STAGE 3B CHRONIC KIDNEY DISEASE (HCC): Status: ACTIVE | Noted: 2022-07-17

## 2022-12-17 PROBLEM — E88.09 HYPOALBUMINEMIA: Status: ACTIVE | Noted: 2022-12-17

## 2022-12-17 PROBLEM — Z95.5 PRESENCE OF STENT IN CORONARY ARTERY IN PATIENT WITH CORONARY ARTERY DISEASE: Status: ACTIVE | Noted: 2022-12-17

## 2022-12-17 PROBLEM — E78.00 HYPERCHOLESTEROLEMIA: Status: ACTIVE | Noted: 2022-12-17

## 2022-12-17 PROBLEM — I25.5 CARDIOMYOPATHY, ISCHEMIC: Status: ACTIVE | Noted: 2022-12-17

## 2022-12-18 LAB — HBA1C MFR BLD HPLC: 7.7 %

## 2022-12-21 ENCOUNTER — HOSPITAL ENCOUNTER (OUTPATIENT)
Dept: LAB | Age: 62
Discharge: HOME | End: 2022-12-21

## 2022-12-21 LAB
ANION GAP SERPL CALC-SCNC: 8 MMOL/L (ref 5–15)
BUN SERPL-MCNC: 36 MG/DL (ref 6–20)
BUN/CREAT SERPL: 27 (ref 12–20)
CA-I BLD-MCNC: 9.7 MG/DL (ref 8.5–10.1)
CHLORIDE SERPL-SCNC: 99 MMOL/L (ref 97–108)
CO2 SERPL-SCNC: 31 MMOL/L (ref 21–32)
CREAT SERPL-MCNC: 1.35 MG/DL (ref 0.55–1.02)
GLUCOSE SERPL-MCNC: 139 MG/DL (ref 65–100)
POTASSIUM SERPL-SCNC: 3.7 MMOL/L (ref 3.5–5.1)
SODIUM SERPL-SCNC: 138 MMOL/L (ref 136–145)

## 2022-12-21 PROCEDURE — 80048 BASIC METABOLIC PNL TOTAL CA: CPT

## 2022-12-24 LAB — CREATININE, EXTERNAL: 1.22

## 2023-01-10 ENCOUNTER — OFFICE VISIT (OUTPATIENT)
Dept: INTERNAL MEDICINE CLINIC | Age: 63
End: 2023-01-10
Payer: COMMERCIAL

## 2023-01-10 VITALS
BODY MASS INDEX: 36.99 KG/M2 | SYSTOLIC BLOOD PRESSURE: 120 MMHG | TEMPERATURE: 97.7 F | HEART RATE: 76 BPM | OXYGEN SATURATION: 99 % | WEIGHT: 201 LBS | DIASTOLIC BLOOD PRESSURE: 72 MMHG | HEIGHT: 62 IN | RESPIRATION RATE: 16 BRPM

## 2023-01-10 DIAGNOSIS — N18.32 STAGE 3B CHRONIC KIDNEY DISEASE (HCC): ICD-10-CM

## 2023-01-10 DIAGNOSIS — I10 ESSENTIAL HYPERTENSION: ICD-10-CM

## 2023-01-10 DIAGNOSIS — Z79.4 TYPE 2 DIABETES MELLITUS WITH HYPERGLYCEMIA, WITH LONG-TERM CURRENT USE OF INSULIN (HCC): ICD-10-CM

## 2023-01-10 DIAGNOSIS — I50.22 CHRONIC SYSTOLIC (CONGESTIVE) HEART FAILURE (HCC): ICD-10-CM

## 2023-01-10 DIAGNOSIS — E11.43 DIABETIC GASTROPARESIS (HCC): ICD-10-CM

## 2023-01-10 DIAGNOSIS — Z98.890 HX OF EXCISION OF LAMINA OF CERVICAL VERTEBRA FOR DECOMPRESSION OF SPINAL CORD: Primary | ICD-10-CM

## 2023-01-10 DIAGNOSIS — K31.84 DIABETIC GASTROPARESIS (HCC): ICD-10-CM

## 2023-01-10 DIAGNOSIS — D50.9 IRON DEFICIENCY ANEMIA, UNSPECIFIED IRON DEFICIENCY ANEMIA TYPE: ICD-10-CM

## 2023-01-10 DIAGNOSIS — E78.2 MIXED HYPERLIPIDEMIA: ICD-10-CM

## 2023-01-10 DIAGNOSIS — I25.810 ATHEROSCLEROSIS OF CORONARY ARTERY BYPASS GRAFT OF NATIVE HEART WITHOUT ANGINA PECTORIS: ICD-10-CM

## 2023-01-10 DIAGNOSIS — E11.65 TYPE 2 DIABETES MELLITUS WITH HYPERGLYCEMIA, WITH LONG-TERM CURRENT USE OF INSULIN (HCC): ICD-10-CM

## 2023-01-10 DIAGNOSIS — I25.5 CARDIOMYOPATHY, ISCHEMIC: ICD-10-CM

## 2023-01-10 PROBLEM — R60.9 EDEMA: Status: ACTIVE | Noted: 2022-11-14

## 2023-01-10 PROBLEM — G56.00 CARPAL TUNNEL SYNDROME: Status: ACTIVE | Noted: 2022-11-14

## 2023-01-10 PROBLEM — F41.9 ANXIETY: Status: ACTIVE | Noted: 2022-11-14

## 2023-01-10 PROCEDURE — 3074F SYST BP LT 130 MM HG: CPT | Performed by: INTERNAL MEDICINE

## 2023-01-10 PROCEDURE — 3078F DIAST BP <80 MM HG: CPT | Performed by: INTERNAL MEDICINE

## 2023-01-10 PROCEDURE — 99214 OFFICE O/P EST MOD 30 MIN: CPT | Performed by: INTERNAL MEDICINE

## 2023-01-10 RX ORDER — ENOXAPARIN SODIUM 100 MG/ML
40 INJECTION SUBCUTANEOUS DAILY
COMMUNITY
Start: 2022-12-16 | End: 2023-01-10

## 2023-01-10 RX ORDER — INSULIN DEGLUDEC 200 U/ML
INJECTION, SOLUTION SUBCUTANEOUS
Qty: 18 ML | Refills: 11 | Status: SHIPPED | OUTPATIENT
Start: 2023-01-10

## 2023-01-10 RX ORDER — DULOXETIN HYDROCHLORIDE 30 MG/1
30 CAPSULE, DELAYED RELEASE ORAL DAILY
COMMUNITY
Start: 2022-12-16 | End: 2023-01-15

## 2023-01-10 RX ORDER — SENNOSIDES 8.6 MG/1
17.2 TABLET ORAL AT BEDTIME
COMMUNITY
Start: 2022-12-16 | End: 2023-01-10

## 2023-01-10 RX ORDER — NITROFURANTOIN 25; 75 MG/1; MG/1
CAPSULE ORAL
COMMUNITY
Start: 2022-12-06 | End: 2023-01-10

## 2023-01-10 RX ORDER — NALOXONE HYDROCHLORIDE 4 MG/.1ML
4 SPRAY NASAL AS NEEDED
COMMUNITY
Start: 2022-12-16 | End: 2023-01-10

## 2023-01-10 RX ORDER — FERROUS SULFATE TAB 325 MG (65 MG ELEMENTAL FE) 325 (65 FE) MG
TAB ORAL
COMMUNITY
Start: 2022-11-15

## 2023-01-10 RX ORDER — IBUPROFEN 100 MG/5ML
500 SUSPENSION, ORAL (FINAL DOSE FORM) ORAL DAILY
COMMUNITY
Start: 2022-11-15

## 2023-01-10 RX ORDER — METHOCARBAMOL 750 MG/1
TABLET, FILM COATED ORAL
COMMUNITY
Start: 2022-12-31

## 2023-01-10 NOTE — PROGRESS NOTES
[address] [facility] Internal Medicine  [address]  [place], [address] [facility]  Phone: [phone]      Monika Hoffmann (: 1960) is a 60 y.o. female, established patient, here for evaluation of the following chief complaint(s):  Follow-up (The patient is here for hospital follow up from back surgery on 2023. She claims that she is now getting around from her surgery and overall she is doing good. The patient does not need any refills at this time.)         SUBJECTIVE/OBJECTIVE:  HPI:  The patient is here for hospital follow up from cervical spine surgery on 2023 at [facility]. She claims that she is now getting around better from her surgery and overall she is doing better. Patient had cervical laminectomy on 2022 and removal of C6-C7 plate which was placed before. Patient stayed in the hospital for 7 days and was discharged to [facility]. Over the patient stayed for 2 weeks. Patient just got home on . Patient's lab review on 2022 potassium was 3.8. Blood sugar was 167. BUN 40. Creatinine was 1.43. Hemoglobin was 9.3. Hematocrit 28.6. Her blood work on 2022 hemoglobin 9.2. BUN 35. Creatinine 1.22. Potassium 4.2. On  hemoglobin was 10.1. Creatinine was 1.38. Potassium was 3.6. Patient had hemoglobin A1c done on 2022 which is 7.7. States while in [place] her insulin has been reduced. Blood sugar has been staying up and down. Today morning it was 202. Patient has appointment with a neurosurgeon on . Getting physical therapy at home through home health. The patient does not need any refills at this time. She denies any fevers, coughs and shortness of breath. Patient states gabapentin is helping with her diabetic gastropathy. Also wants her long-term disability form to be filled. States she has not returned to work. Prior to Admission medications    Medication Sig Start Date End Date Taking? Authorizing Provider   ascorbic acid, vitamin C, (VITAMIN C) 1,000 mg tablet Take 500 mg by mouth daily. 11/15/22  Yes Provider, Historical   DULoxetine (CYMBALTA) 30 mg capsule Take 30 mg by mouth daily. 12/16/22 1/15/23 Yes Provider, Historical   FeroSuL 325 mg (65 mg iron) tablet TAKE ONE TABLET BY MOUTH THREE TIMES WEEKLY (MONDAY, [facility], [address] [facility]) 11/15/22  Yes Provider, Historical   methocarbamoL (ROBAXIN) 750 mg tablet TAKE 1 TABLET BY MOUTH EVERY 8 HOURS AS NEEDED FOR SPASMS 12/31/22  Yes Provider, Historical   insulin degludec Jolena Mitts FlexTouch U-200) 200 unit/mL (3 mL) inpn pen INJECT 65 UNITS SUBCUTANEOUSLY EVERY DAY (18-day supply) 1/10/23  Yes Valente Braga MD   empagliflozin (JARDIANCE) 10 mg tablet Take 1 Tablet by mouth daily. 1/10/23  Yes Valente Braga MD   insulin aspart U-100 (NOVOLOG) 100 unit/mL (3 mL) inpn 25 Units by SubCUTAneous route Before breakfast, lunch, and dinner. And sliding scale   Yes Provider, Historical   Entresto 24-26 mg tablet Take 0.5 Tablets by mouth two (2) times a day. 10/19/22  Yes Provider, Historical   spironolactone (ALDACTONE) 25 mg tablet Take 25 mg by mouth daily. 6/28/22  Yes Provider, Historical   gabapentin (NEURONTIN) 300 mg capsule Take 1 Capsule by mouth two (2) times a day. Max Daily Amount: 600 mg. 10/28/22  Yes Saida Calles DPM   insulin aspart U-100 (NovoLOG Flexpen U-100 Insulin) 100 unit/mL (3 mL) inpn INJECT 40 UNITS SUBCUTANEOUSLY BEFORE MEALS WITH sliding scale. max  UNITS DAILY (STOP humalog) 27 DAY supply 10/14/22  Yes Carmen Dotson MD   ALPRAZolam (XANAX) 0.25 mg tablet Take 0.25 mg by mouth two (2) times daily as needed. 3/7/22  Yes Provider, Historical   torsemide (DEMADEX) 20 mg tablet Take 20 mg by mouth two (2) times a day.    Yes Provider, Historical   OneTouch Ultra Blue Test Strip strip USE TO TEST BLOOD GLUCOSE THREE TIMES A DAY 1/12/21  Yes Citlaly Robb MD   carvediloL (COREG) 3.125 mg tablet Take 1 Tablet by mouth two (2) times a day. 20  Yes Provider, Historical   aspirin 81 mg chewable tablet Take 81 mg by mouth nightly. Yes Other, MD Jamal   clopidogreL (PLAVIX) 75 mg tab Take 75 mg by mouth daily. Yes Other, MD Jamal        Allergies   Allergen Reactions    Atorvastatin Other (comments)     Reaction Type: Allergy; Severity: Severe; Reaction(s): myopathy  Reaction Type: Allergy; Severity: Severe; Reaction(s): myopathy          Past Medical History:   Diagnosis Date    Arthritis     CAD (coronary artery disease)     Hx of 2 MI's and then CABG 2009    Cardiomyopathy, ischemic     Diabetes ([facility]     IDDM type 3    Diabetic neuropathy, painful (HCC)     Dyslipidemia     Elevated uric acid in blood     Elevated uric acid in blood     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)     Hip pain, left     Hypercholesterolemia     Hypertension     Hypertension, diastolic, benign     Hypoalbuminemia     Hypoalbuminemia     Other ill-defined conditions(799.89)     gastroparesis     Peripheral vascular disease ([facility] [address]     Presence of stent in coronary artery in patient with coronary artery disease     S/P CABG x 3     S/P CABG x 3     Type 2 diabetes mellitus with hyperglycemia, with long-term current use of insulin (HCC)     Type 2 diabetes mellitus with hyperglycemia, with long-term current use of insulin (HCC)     Unspecified sleep apnea     no cpap repeat study sched for 13        Family History   Problem Relation Age of Onset    Diabetes Mother     Heart Disease Father          of heart disease at age 61    Heart Disease Paternal Grandmother     Stroke Paternal Grandmother     Heart Disease Paternal Grandfather     Diabetes Paternal Grandfather         Past Surgical History:   Procedure Laterality Date    ECHO 2D ADULT  2012    LVEF about 50%. There is possible HK of distal ant/septal wall and apical walls (technically difficult study).       ECHO 2D ADULT  2010    LVEF 45%, akinetic apex and septum. HK mid-anterior wall    HX CERVICAL LAMINECTOMY      C3 and C4    HX CERVICAL LAMINECTOMY      C3 and C4    HX CHOLECYSTECTOMY  06/2012    HX CORONARY STENT PLACEMENT  06/2020    HX HEART CATHETERIZATION  1/2021, 2/2021    with stents placed. HX HYSTERECTOMY  2005    HX ORTHOPAEDIC  2008    RIGHT SHOULDER ARTHROSCOPY    HX ORTHOPAEDIC  2003    ANTERIOR CERVICAL FUSION INSTRUMENTATION    HX ORTHOPAEDIC  12/09/2022    Back surgery/plate in disc 3 -disc 6    HX OTHER SURGICAL Left 10/2019    hand tendon repair    NM UNLISTED PROCEDURE CARDIAC SURGERY  2009    CABG X 3       Review of Systems  Constitutional:  Negative for chills and fever. +ve weakness  HENT:  Negative for congestion, ear pain, nosebleeds, sinus pain, sore throat and tinnitus. Eyes:  Negative for redness. Respiratory:  Negative for cough and shortness of breath. Cardiovascular:  Negative for chest pain and palpitations. Gastrointestinal:  Negative for abdominal pain, diarrhea, nausea and vomiting. Endocrine: Negative for cold intolerance and polyuria. Genitourinary:  Negative for dysuria and hematuria. Musculoskeletal:  Negative for back pain and neck pain. Hands still bothers. Cannot walk much  Skin:  Negative for rash. Neurological:  Negative for dizziness and headaches. Psychiatric/Behavioral: Negative. /72 (BP 1 Location: Right upper arm, BP Patient Position: Sitting, BP Cuff Size: Large adult)   Pulse 76   Temp 97.7 °F (36.5 °C)   Resp 16   Ht 5' 2\" (1.575 m)   Wt 201 lb (91.2 kg)   LMP 06/25/2005   SpO2 99%   BMI 36.76 kg/m²      Physical Exam  Vitals and nursing note reviewed. Gen:  Not  in no acute distress. Well built and nourished. HEENT:  [place] conjunctivae, FAITH, EOMI, hearing intact . Mouth: Moist mucous membranes. No TPC  Neck:  Pt has neck collar.  Surgical incision healed, without masses, thyroid not enlarged  Resp:  No accessory muscle use, clear breath sounds without wheezes rales or rhonchi  Card: Positive pacemaker left infraclavicular area . 2/6  murmurs, normal S1, S2 without thrills, bruits or peripheral edema  Abd:  Soft, non-tender, non-distended, normoactive bowel sounds are present, no palpable organomegaly and no detectable hernias  Lymph:  No cervical or inguinal adenopathy  Musc:  No cyanosis or clubbing. Gait:Walks with walker   Skin:  No rashes or ulcers, skin turgor is good  Neuro: Alert and oriented x 3. Cranial nerves are grossly intact, no focal motor weakness, follows commands appropriately  Psych:  Good insight, mood normal.     ASSESSMENT/PLAN:  Below is the assessment and plan developed based on review of pertinent history, physical exam, labs, studies, and medications. 1. Hx of excision of lamina of cervical vertebra for decompression of spinal cord. Continue physical therapy. Patient gradually improving. 2. Essential hypertension. Stable. 3. Type 2 diabetes mellitus with hyperglycemia, with long-term current use of insulin ([address] Hemoglobin A1c has decreased to 7.7 from 9.8.  -     insulin degludec [employer] Model FlexTouch U-200) 200 unit/mL (3 mL) inpn pen; INJECT 65 UNITS SUBCUTANEOUSLY EVERY DAY (18-day supply), Normal, Disp-18 mL, R-11  -     HEMOGLOBIN A1C WITH EAG  -     METABOLIC PANEL, COMPREHENSIVE  -     MICROALBUMIN, UR, RAND W/ MICROALB/CREAT RATIO  4. Diabetic gastroparesis ([address] improved after better blood sugar control and also on gabapentin  5. Chronic systolic (congestive) heart failure ([address] Keep up appointment with cardiologist.  6. Atherosclerosis of coronary artery bypass graft of native heart without angina pectoris continue Plavix. 7. Cardiomyopathy, ischemic continue carvedilol and Entresto. 8. Stage 3b chronic kidney disease ([address] Continue to monitor. 9. Mixed hyperlipidemia  -     -     LIPID PANEL  -     TSH 3RD GENERATION  10. Iron deficiency anemia, unspecified iron deficiency anemia type.   Advised to continue iron. -     CBC WITH AUTOMATED DIFF  -     FERRITIN; Future  Patient is disabled because of her congestive cardiomyopathy and because of diabetic neuropathy and from cervical myelopathy. Disability form filled. Total time spent 55 minutes    Return in about 6 weeks (around 2/21/2023). There are no Patient Instructions on file for this visit. Health Maintenance Due   Topic Date Due    Hepatitis C Screening  Never done    COVID-19 Vaccine (1) Never done    Pneumococcal 0-64 years (1 - PCV) Never done    DTaP/Tdap/Td series (1 - Tdap) Never done    Shingles Vaccine (1 of 2) Never done    Breast Cancer Screen Mammogram  08/08/2015    Eye Exam Retinal or Dilated  06/11/2020    Colorectal Cancer Screening Combo  01/02/2021    Depression Screen  03/29/2022    Flu Vaccine (1) 08/01/2022    Diabetic Alb to Cr ratio (uACR) test  11/18/2022    Foot Exam Q1  12/23/2022        Aspects of this note may have been generated using voice recognition software. Despite editing, there may be unrecognized errors. An electronic signature was used to authenticate this note.   -- Jus Rodríguez MD

## 2023-04-20 ENCOUNTER — OFFICE VISIT (OUTPATIENT)
Dept: PODIATRY | Age: 63
End: 2023-04-20

## 2023-04-20 VITALS
DIASTOLIC BLOOD PRESSURE: 98 MMHG | WEIGHT: 201 LBS | BODY MASS INDEX: 36.99 KG/M2 | SYSTOLIC BLOOD PRESSURE: 116 MMHG | RESPIRATION RATE: 16 BRPM | HEIGHT: 62 IN | HEART RATE: 75 BPM

## 2023-04-20 DIAGNOSIS — E11.40 TYPE 2 DIABETES MELLITUS WITH DIABETIC NEUROPATHY, WITH LONG-TERM CURRENT USE OF INSULIN (HCC): Primary | ICD-10-CM

## 2023-04-20 DIAGNOSIS — Z79.4 TYPE 2 DIABETES MELLITUS WITH DIABETIC NEUROPATHY, WITH LONG-TERM CURRENT USE OF INSULIN (HCC): Primary | ICD-10-CM

## 2023-04-20 RX ORDER — GABAPENTIN 300 MG/1
300 CAPSULE ORAL 2 TIMES DAILY
Qty: 10 CAPSULE | Refills: 0 | Status: CANCELLED | OUTPATIENT
Start: 2023-04-20

## 2023-04-20 NOTE — PROGRESS NOTES
Chief Complaint   Patient presents with    Diabetes     Foot care    Medication Refill     Visit Vitals  BP (!) 116/98   Pulse 75   Resp 16   Ht 5' 2\" (1.575 m)   Wt 201 lb (91.2 kg)   LMP 06/25/2005   BMI 36.76 kg/m²     Sheryl Finley, A

## 2023-04-20 NOTE — PROGRESS NOTES
Chief Complaint   Patient presents with    Diabetes     Foot care    Medication Refill     Visit Vitals  BP (!) 116/98   Pulse 75   Resp 16   Ht 5' 2\" (1.575 m)   Wt 201 lb (91.2 kg)   LMP 06/25/2005   BMI 36.76 kg/m²     ARCHIE Ayoub

## 2023-04-27 ENCOUNTER — TELEPHONE (OUTPATIENT)
Dept: PODIATRY | Age: 63
End: 2023-04-27

## 2023-04-27 DIAGNOSIS — Z79.4 TYPE 2 DIABETES MELLITUS WITH DIABETIC NEUROPATHY, WITH LONG-TERM CURRENT USE OF INSULIN (HCC): Primary | ICD-10-CM

## 2023-04-27 DIAGNOSIS — E11.40 TYPE 2 DIABETES MELLITUS WITH DIABETIC NEUROPATHY, WITH LONG-TERM CURRENT USE OF INSULIN (HCC): Primary | ICD-10-CM

## 2023-04-27 RX ORDER — GABAPENTIN 300 MG/1
300 CAPSULE ORAL 2 TIMES DAILY
Qty: 60 CAPSULE | Refills: 0 | Status: SHIPPED | OUTPATIENT
Start: 2023-04-27 | End: 2023-05-27

## 2023-04-27 NOTE — TELEPHONE ENCOUNTER
Patient has been waiting for meds for over a week she said, can you please call her and let her know when it is sent to the pharmacy

## 2023-04-27 NOTE — TELEPHONE ENCOUNTER
Last RX was on 4/14/23 for 10 capsules to get her to the appointment. She came in for her appointment now needs the full refill. Med pended.      Honey Dancer, RMA

## 2023-04-28 ENCOUNTER — HOSPITAL ENCOUNTER (OUTPATIENT)
Dept: NON INVASIVE DIAGNOSTICS | Age: 63
End: 2023-04-28
Attending: PODIATRIST
Payer: COMMERCIAL

## 2023-04-28 DIAGNOSIS — Z79.4 TYPE 2 DIABETES MELLITUS WITH DIABETIC NEUROPATHY, WITH LONG-TERM CURRENT USE OF INSULIN (HCC): ICD-10-CM

## 2023-04-28 DIAGNOSIS — E11.40 TYPE 2 DIABETES MELLITUS WITH DIABETIC NEUROPATHY, WITH LONG-TERM CURRENT USE OF INSULIN (HCC): ICD-10-CM

## 2023-04-28 PROCEDURE — 93923 UPR/LXTR ART STDY 3+ LVLS: CPT

## 2023-04-30 DIAGNOSIS — I73.9 PVD (PERIPHERAL VASCULAR DISEASE) (HCC): Primary | ICD-10-CM

## 2023-04-30 LAB
LEFT ABI: 0.68
LEFT ARM BP: 151 MMHG
LEFT POSTERIOR TIBIAL: 102 MMHG
LEFT TBI: 0.25
LEFT TOE PRESSURE: 38 MMHG
RIGHT ABI: 0.31
RIGHT ARM BP: 145 MMHG
RIGHT POSTERIOR TIBIAL: 47 MMHG
RIGHT TBI: 0.32
RIGHT TOE PRESSURE: 48 MMHG
VAS LEFT DORSALIS PEDIS BP: 76 MMHG

## 2023-05-03 ENCOUNTER — TELEPHONE (OUTPATIENT)
Dept: PODIATRY | Age: 63
End: 2023-05-03

## 2023-05-25 ENCOUNTER — TELEPHONE (OUTPATIENT)
Age: 63
End: 2023-05-25

## 2023-05-25 DIAGNOSIS — E11.40 TYPE 2 DIABETES MELLITUS WITH DIABETIC NEUROPATHY, UNSPECIFIED (HCC): ICD-10-CM

## 2023-05-25 RX ORDER — METHOCARBAMOL 750 MG/1
TABLET, FILM COATED ORAL
COMMUNITY
Start: 2022-12-31

## 2023-05-25 RX ORDER — GABAPENTIN 300 MG/1
300 CAPSULE ORAL 2 TIMES DAILY
Qty: 180 CAPSULE | Refills: 0 | Status: SHIPPED | OUTPATIENT
Start: 2023-05-25 | End: 2023-08-23

## 2023-05-25 RX ORDER — GABAPENTIN 400 MG/1
CAPSULE ORAL
COMMUNITY
Start: 2023-02-16

## 2023-06-21 ENCOUNTER — TELEPHONE (OUTPATIENT)
Facility: CLINIC | Age: 63
End: 2023-06-21

## 2023-06-21 NOTE — TELEPHONE ENCOUNTER
Patient states that she just got new insurance and she called her [facility] and they stated that [facility] does not accept her Insurance. She wants to let you know that she is going to have to find a new doctor unfortunately. She says that she definitely does not want to but does not have a choice.

## 2023-06-23 DIAGNOSIS — E78.2 MIXED HYPERLIPIDEMIA: ICD-10-CM

## 2023-06-23 DIAGNOSIS — E11.65 TYPE 2 DIABETES MELLITUS WITH HYPERGLYCEMIA (HCC): ICD-10-CM

## 2023-06-27 RX ORDER — FLUCONAZOLE 150 MG/1
TABLET ORAL
Qty: 1 TABLET | Refills: 1 | OUTPATIENT
Start: 2023-06-27

## 2024-01-30 LAB
LEFT VENTRICULAR EJECTION FRACTION MODE: NORMAL
LV EF: 20 %

## 2024-07-25 LAB
HBA1C MFR BLD HPLC: 9.6 %
MICROALBUMIN/CREATININE RATIO, EXTERNAL: 72

## 2024-08-05 PROBLEM — G62.9 POLYNEUROPATHY: Status: ACTIVE | Noted: 2024-08-05

## 2024-08-13 ENCOUNTER — HOSPITAL ENCOUNTER (EMERGENCY)
Facility: HOSPITAL | Age: 64
Discharge: HOME | End: 2024-08-13
Attending: EMERGENCY MEDICINE
Payer: MEDICARE

## 2024-08-13 ENCOUNTER — APPOINTMENT (OUTPATIENT)
Facility: HOSPITAL | Age: 64
End: 2024-08-13
Attending: EMERGENCY MEDICINE
Payer: MEDICARE

## 2024-08-13 VITALS
OXYGEN SATURATION: 99 % | SYSTOLIC BLOOD PRESSURE: 129 MMHG | RESPIRATION RATE: 18 BRPM | TEMPERATURE: 99.3 F | WEIGHT: 208 LBS | HEART RATE: 92 BPM | BODY MASS INDEX: 38.28 KG/M2 | DIASTOLIC BLOOD PRESSURE: 66 MMHG | HEIGHT: 62 IN

## 2024-08-13 DIAGNOSIS — L03.116 CELLULITIS OF LEFT LOWER EXTREMITY: Primary | ICD-10-CM

## 2024-08-13 DIAGNOSIS — M71.22 BAKER'S CYST OF KNEE, LEFT: ICD-10-CM

## 2024-08-13 DIAGNOSIS — Z86.79 HISTORY OF PERIPHERAL VASCULAR DISEASE: ICD-10-CM

## 2024-08-13 PROCEDURE — 93971 EXTREMITY STUDY: CPT

## 2024-08-13 PROCEDURE — 6370000000 HC RX 637 (ALT 250 FOR IP): Performed by: EMERGENCY MEDICINE

## 2024-08-13 PROCEDURE — 99284 EMERGENCY DEPT VISIT MOD MDM: CPT

## 2024-08-13 RX ORDER — DOXYCYCLINE HYCLATE 100 MG
100 TABLET ORAL 2 TIMES DAILY
Qty: 20 TABLET | Refills: 0 | Status: SHIPPED | OUTPATIENT
Start: 2024-08-13 | End: 2024-08-23

## 2024-08-13 RX ORDER — CEPHALEXIN 500 MG/1
500 CAPSULE ORAL
Status: COMPLETED | OUTPATIENT
Start: 2024-08-13 | End: 2024-08-13

## 2024-08-13 RX ORDER — HYDROCODONE BITARTRATE AND ACETAMINOPHEN 5; 325 MG/1; MG/1
1 TABLET ORAL
Status: COMPLETED | OUTPATIENT
Start: 2024-08-13 | End: 2024-08-13

## 2024-08-13 RX ORDER — DOXYCYCLINE HYCLATE 100 MG/1
100 CAPSULE ORAL
Status: COMPLETED | OUTPATIENT
Start: 2024-08-13 | End: 2024-08-13

## 2024-08-13 RX ORDER — CEPHALEXIN 500 MG/1
500 CAPSULE ORAL 4 TIMES DAILY
Qty: 28 CAPSULE | Refills: 0 | Status: SHIPPED | OUTPATIENT
Start: 2024-08-13 | End: 2024-08-20

## 2024-08-13 RX ADMIN — HYDROCODONE BITARTRATE AND ACETAMINOPHEN 1 TABLET: 5; 325 TABLET ORAL at 19:05

## 2024-08-13 RX ADMIN — DOXYCYCLINE HYCLATE 100 MG: 100 CAPSULE ORAL at 19:05

## 2024-08-13 RX ADMIN — CEPHALEXIN 500 MG: 500 CAPSULE ORAL at 19:05

## 2024-08-13 ASSESSMENT — PAIN - FUNCTIONAL ASSESSMENT: PAIN_FUNCTIONAL_ASSESSMENT: 0-10

## 2024-08-13 ASSESSMENT — PAIN DESCRIPTION - ORIENTATION
ORIENTATION: LEFT
ORIENTATION: LEFT

## 2024-08-13 ASSESSMENT — PAIN DESCRIPTION - LOCATION
LOCATION: LEG;FOOT
LOCATION: LEG

## 2024-08-13 ASSESSMENT — PAIN SCALES - GENERAL
PAINLEVEL_OUTOF10: 9
PAINLEVEL_OUTOF10: 8

## 2024-08-13 ASSESSMENT — LIFESTYLE VARIABLES
HOW OFTEN DO YOU HAVE A DRINK CONTAINING ALCOHOL: NEVER
HOW MANY STANDARD DRINKS CONTAINING ALCOHOL DO YOU HAVE ON A TYPICAL DAY: PATIENT DOES NOT DRINK

## 2024-08-13 NOTE — ED TRIAGE NOTES
Pt arrived with complaint of left  leg pain swelling and redness, was warm earlier she states, concerned for blood clot, also has a wound to left foot that's been going on for a while, seen regularly for that. Alert oriented and ambulatory with Yonas on arrival

## 2024-08-13 NOTE — ED PROVIDER NOTES
redness.  She has already had prior bypass grafting to the leg and has been followed by vascular surgery and podiatry for this.  On exam she has redness and induration likely consistent with cellulitis.  DVT was ruled out with ultrasound.  They also found a small Baker's cyst unlikely to be contributing.  Will start on Keflex and doxycycline as she is not taking any antibiotics.  She says that her podiatrist been very pleased with her progress with the wounds although I suspect she may be developing underlying cellulitis.  Recommended she return if she develops a fever or chills which she says she has not as of yet, if she develops increasing pain or redness around the area in question or any other symptoms           ED Course:          _________________________________________________________________              Procedures and Critical Care   Performed by: Mike Abreu, DO  Procedures       Disposition: Discharged Home    DISCHARGE: At this time, patient is stable and appropriate for discharge home.  Patient demonstrates understanding of current diagnoses and is in agreement with the treatment plan.  They are advised that while the likelihood of serious underlying condition is low at this point given the evaluation performed today, we cannot fully rule it out.  They are advised to immediately return with any new symptoms or worsening of current condition. Any Incidental findings were noted on the patient's discharge paperwork as well as verbally directly to the patient, and the appropriate follow-up was given to the patient as far as instructions on testing needed as well as the timeframe.  All questions have been answered.  Patient is given educational material regarding their diagnoses, including danger symptoms and when to return to the ED.           Diagnosis:   1. Cellulitis of left lower extremity    2. History of peripheral vascular disease    3. Baker's cyst of knee, left          PATIENT REFERRED

## 2024-08-14 LAB — ECHO BSA: 2.03 M2

## 2024-08-19 ENCOUNTER — OFFICE VISIT (OUTPATIENT)
Age: 64
End: 2024-08-19
Payer: MEDICARE

## 2024-08-19 VITALS
HEIGHT: 62 IN | DIASTOLIC BLOOD PRESSURE: 73 MMHG | BODY MASS INDEX: 39.51 KG/M2 | HEART RATE: 95 BPM | SYSTOLIC BLOOD PRESSURE: 137 MMHG | OXYGEN SATURATION: 98 % | TEMPERATURE: 97.7 F | WEIGHT: 214.7 LBS

## 2024-08-19 DIAGNOSIS — E11.40 TYPE 2 DIABETES MELLITUS WITH DIABETIC NEUROPATHY, WITH LONG-TERM CURRENT USE OF INSULIN (HCC): Primary | ICD-10-CM

## 2024-08-19 DIAGNOSIS — N18.4 CHRONIC KIDNEY DISEASE (CKD), STAGE IV (SEVERE) (HCC): ICD-10-CM

## 2024-08-19 DIAGNOSIS — Z79.4 TYPE 2 DIABETES MELLITUS WITH DIABETIC NEUROPATHY, WITH LONG-TERM CURRENT USE OF INSULIN (HCC): Primary | ICD-10-CM

## 2024-08-19 DIAGNOSIS — I10 ESSENTIAL HYPERTENSION: ICD-10-CM

## 2024-08-19 DIAGNOSIS — G62.9 POLYNEUROPATHY: ICD-10-CM

## 2024-08-19 DIAGNOSIS — E78.2 MIXED HYPERLIPIDEMIA: ICD-10-CM

## 2024-08-19 DIAGNOSIS — I73.9 PAD (PERIPHERAL ARTERY DISEASE) (HCC): ICD-10-CM

## 2024-08-19 PROCEDURE — 3017F COLORECTAL CA SCREEN DOC REV: CPT | Performed by: INTERNAL MEDICINE

## 2024-08-19 PROCEDURE — 1036F TOBACCO NON-USER: CPT | Performed by: INTERNAL MEDICINE

## 2024-08-19 PROCEDURE — G8400 PT W/DXA NO RESULTS DOC: HCPCS | Performed by: INTERNAL MEDICINE

## 2024-08-19 PROCEDURE — 1090F PRES/ABSN URINE INCON ASSESS: CPT | Performed by: INTERNAL MEDICINE

## 2024-08-19 PROCEDURE — 3046F HEMOGLOBIN A1C LEVEL >9.0%: CPT | Performed by: INTERNAL MEDICINE

## 2024-08-19 PROCEDURE — G8427 DOCREV CUR MEDS BY ELIG CLIN: HCPCS | Performed by: INTERNAL MEDICINE

## 2024-08-19 PROCEDURE — 1123F ACP DISCUSS/DSCN MKR DOCD: CPT | Performed by: INTERNAL MEDICINE

## 2024-08-19 PROCEDURE — 3075F SYST BP GE 130 - 139MM HG: CPT | Performed by: INTERNAL MEDICINE

## 2024-08-19 PROCEDURE — 2022F DILAT RTA XM EVC RTNOPTHY: CPT | Performed by: INTERNAL MEDICINE

## 2024-08-19 PROCEDURE — G2211 COMPLEX E/M VISIT ADD ON: HCPCS | Performed by: INTERNAL MEDICINE

## 2024-08-19 PROCEDURE — G8417 CALC BMI ABV UP PARAM F/U: HCPCS | Performed by: INTERNAL MEDICINE

## 2024-08-19 PROCEDURE — 99215 OFFICE O/P EST HI 40 MIN: CPT | Performed by: INTERNAL MEDICINE

## 2024-08-19 PROCEDURE — 3078F DIAST BP <80 MM HG: CPT | Performed by: INTERNAL MEDICINE

## 2024-08-19 RX ORDER — LANCETS 33 GAUGE
EACH MISCELLANEOUS
Qty: 300 EACH | Refills: 3 | Status: SHIPPED | OUTPATIENT
Start: 2024-08-19

## 2024-08-19 RX ORDER — BLOOD SUGAR DIAGNOSTIC
STRIP MISCELLANEOUS
Qty: 300 EACH | Refills: 3 | Status: SHIPPED | OUTPATIENT
Start: 2024-08-19

## 2024-08-19 RX ORDER — BLOOD-GLUCOSE METER
EACH MISCELLANEOUS
Qty: 1 KIT | Refills: 0 | Status: SHIPPED | OUTPATIENT
Start: 2024-08-19

## 2024-08-19 RX ORDER — DAPAGLIFLOZIN 5 MG/1
5 TABLET, FILM COATED ORAL EVERY MORNING
COMMUNITY

## 2024-08-19 RX ORDER — PREGABALIN 100 MG/1
75 CAPSULE ORAL 2 TIMES DAILY
COMMUNITY

## 2024-08-19 NOTE — PROGRESS NOTES
[facility] DIABETES AND ENDOCRINOLOGY                 Madelyn Juares MD                  PCP : Cleveland Webb MD               Chief Complaint   Patient presents with    Diabetes               History of Present Illness: Nicci Hernandez is  here for reestablishment of care.  Insurance changed no which are excepted by our office.  Seen almost 3 years ago      She has long-standing history of diabetes mellitus.   She has underlying obstructive sleep apnea and hypertension.  She has microvascular disease. CABG in 2009.     2020: She was hospitalized with abdominal complaints and was diagnosed with gastroparesis, off GLP-1 agonist.  2024: PAD, foot ulcer, antibiotics, CKD   On MDI  CKD: Off metformin  Currently on Tresiba, NovoLog, Farxiga  Checking blood glucose intermittently  No worsening of gastroparesis  Blood glucose have been high when she checked,     Polyuria   is on dialysis  Limited activity, no weightbearing        Wt Readings from Last 3 Encounters:   08/19/24 97.4 kg (214 lb 11.2 oz)   08/13/24 94.3 kg (208 lb)   04/20/23 91.2 kg (201 lb)        Past Medical History:   Diagnosis Date    Arthritis     Bilateral lower leg cellulitis     CAD (coronary artery disease) 2009    Hx of 2 MI's and then CABG 5/2009    Cardiomyopathy, ischemic     Diabetes (HCC)     IDDM type 3    Diabetic neuropathy, painful (HCC)     Dyslipidemia     Elevated uric acid in blood     Elevated uric acid in blood     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)     Hip pain, left     Hypercholesterolemia     Hypertension     Hypertension, diastolic, benign     Hypoalbuminemia     Hypoalbuminemia     Other ill-defined conditions(799.89)     gastroparesis     Peripheral vascular disease (HCC)     Presence of stent in coronary artery in patient with coronary artery disease     S/P CABG x 3     S/P CABG x 3     Type 2 diabetes mellitus with hyperglycemia, with long-term current use of insulin (HCC)     Type

## 2024-08-19 NOTE — PATIENT INSTRUCTIONS
Blood glucose goals    Fasting or before meals less than 120,  2 hours after meals or at bedtime less than 180.If the bedtime sugars are less than 100 ,eat a 15 gm snack.    Low blood glucose is less than 70, if you are using continuous glucose monitor please crosscheck with fingerstick as continuous glucose monitor is not accurate when glucose is less than 70.    4 glucose tablets or half a cup of juice for sugars <70. Check blood sugar 15 minutes later, may repeat juice or tablets in 15 minutes.     Glucagon use by family member for severe hypoglycemia (unconciousness)        Tresiba 65 units in AM     Novolog or Humalog 20 units before breakfast  , lunch and dinner       Additional Novolog or Humalog for high sugars     Correction Scale:  3 units for every 50 above 150    Blood sugar  Fast acting insulin          150-200  Extra 3 Units       201-250  Extra 6 Units       251-300  Extra 9 Units       301-350  Extra 12  Units        351-400  Extra 15  Units     Example:   My planned insulin dose:    ____ Units for meals    +    ____ Extra Correction Units  if high =  ____ total units to take together as one injection.

## 2024-08-28 DIAGNOSIS — Z79.4 TYPE 2 DIABETES MELLITUS WITH DIABETIC NEUROPATHY, WITH LONG-TERM CURRENT USE OF INSULIN (HCC): ICD-10-CM

## 2024-08-28 DIAGNOSIS — E11.40 TYPE 2 DIABETES MELLITUS WITH DIABETIC NEUROPATHY, WITH LONG-TERM CURRENT USE OF INSULIN (HCC): ICD-10-CM

## 2024-08-28 RX ORDER — INSULIN ASPART INJECTION 100 [IU]/ML
INJECTION, SOLUTION SUBCUTANEOUS
Qty: 90 ML | Refills: 3 | Status: SHIPPED | OUTPATIENT
Start: 2024-08-28

## 2024-09-04 DIAGNOSIS — Z79.4 TYPE 2 DIABETES MELLITUS WITH DIABETIC NEUROPATHY, WITH LONG-TERM CURRENT USE OF INSULIN (HCC): ICD-10-CM

## 2024-09-04 DIAGNOSIS — E11.40 TYPE 2 DIABETES MELLITUS WITH DIABETIC NEUROPATHY, WITH LONG-TERM CURRENT USE OF INSULIN (HCC): ICD-10-CM

## 2024-09-04 RX ORDER — INSULIN DEGLUDEC 200 U/ML
65 INJECTION, SOLUTION SUBCUTANEOUS
Qty: 36 ML | Refills: 3 | Status: SHIPPED | OUTPATIENT
Start: 2024-09-04

## 2024-09-04 NOTE — TELEPHONE ENCOUNTER
[facility] called stating Insulin Degludec is not covered - brand Tresiba and Basaglar preferred.

## 2024-09-09 ENCOUNTER — TELEPHONE (OUTPATIENT)
Age: 64
End: 2024-09-09

## 2024-09-09 NOTE — TELEPHONE ENCOUNTER
Attempted to call, went straight to . Full name on . Left detailed msg advising pt we already took care of it and spoke to her insurance. They would not pay for the generic but pay for the brand name insulin. She should not have any issues getting her medication and it would be the brand Tresiba. A callback number was left. For any questions or concerns.

## 2024-09-09 NOTE — TELEPHONE ENCOUNTER
Patient would like a call in ref. Why her flex pen was denied. She received a letter from insurance. Thank you

## 2024-10-10 LAB — HBA1C MFR BLD HPLC: 7.2 %

## 2024-10-15 ENCOUNTER — OFFICE VISIT (OUTPATIENT)
Age: 64
End: 2024-10-15
Payer: MEDICARE

## 2024-10-15 VITALS
WEIGHT: 216.1 LBS | TEMPERATURE: 98.5 F | HEIGHT: 62 IN | BODY MASS INDEX: 39.77 KG/M2 | DIASTOLIC BLOOD PRESSURE: 63 MMHG | SYSTOLIC BLOOD PRESSURE: 136 MMHG | HEART RATE: 88 BPM | OXYGEN SATURATION: 98 %

## 2024-10-15 DIAGNOSIS — E66.01 MORBID (SEVERE) OBESITY DUE TO EXCESS CALORIES: ICD-10-CM

## 2024-10-15 DIAGNOSIS — K31.84 GASTROPARESIS: Primary | ICD-10-CM

## 2024-10-15 DIAGNOSIS — Z79.4 TYPE 2 DIABETES MELLITUS WITH DIABETIC NEUROPATHY, WITH LONG-TERM CURRENT USE OF INSULIN (HCC): ICD-10-CM

## 2024-10-15 DIAGNOSIS — E11.40 TYPE 2 DIABETES MELLITUS WITH DIABETIC NEUROPATHY, WITH LONG-TERM CURRENT USE OF INSULIN (HCC): ICD-10-CM

## 2024-10-15 PROCEDURE — 3017F COLORECTAL CA SCREEN DOC REV: CPT | Performed by: INTERNAL MEDICINE

## 2024-10-15 PROCEDURE — 1090F PRES/ABSN URINE INCON ASSESS: CPT | Performed by: INTERNAL MEDICINE

## 2024-10-15 PROCEDURE — 1036F TOBACCO NON-USER: CPT | Performed by: INTERNAL MEDICINE

## 2024-10-15 PROCEDURE — 3075F SYST BP GE 130 - 139MM HG: CPT | Performed by: INTERNAL MEDICINE

## 2024-10-15 PROCEDURE — G8484 FLU IMMUNIZE NO ADMIN: HCPCS | Performed by: INTERNAL MEDICINE

## 2024-10-15 PROCEDURE — G2211 COMPLEX E/M VISIT ADD ON: HCPCS | Performed by: INTERNAL MEDICINE

## 2024-10-15 PROCEDURE — G8417 CALC BMI ABV UP PARAM F/U: HCPCS | Performed by: INTERNAL MEDICINE

## 2024-10-15 PROCEDURE — G8400 PT W/DXA NO RESULTS DOC: HCPCS | Performed by: INTERNAL MEDICINE

## 2024-10-15 PROCEDURE — 1123F ACP DISCUSS/DSCN MKR DOCD: CPT | Performed by: INTERNAL MEDICINE

## 2024-10-15 PROCEDURE — 2022F DILAT RTA XM EVC RTNOPTHY: CPT | Performed by: INTERNAL MEDICINE

## 2024-10-15 PROCEDURE — 99215 OFFICE O/P EST HI 40 MIN: CPT | Performed by: INTERNAL MEDICINE

## 2024-10-15 PROCEDURE — 3078F DIAST BP <80 MM HG: CPT | Performed by: INTERNAL MEDICINE

## 2024-10-15 PROCEDURE — 3046F HEMOGLOBIN A1C LEVEL >9.0%: CPT | Performed by: INTERNAL MEDICINE

## 2024-10-15 PROCEDURE — G8427 DOCREV CUR MEDS BY ELIG CLIN: HCPCS | Performed by: INTERNAL MEDICINE

## 2024-10-15 RX ORDER — SEMAGLUTIDE 0.68 MG/ML
0.5 INJECTION, SOLUTION SUBCUTANEOUS WEEKLY
Qty: 9 ML | Refills: 3 | Status: SHIPPED | OUTPATIENT
Start: 2024-10-15 | End: 2024-10-15 | Stop reason: SDUPTHER

## 2024-10-15 RX ORDER — LOSARTAN POTASSIUM 25 MG/1
25 TABLET ORAL DAILY
COMMUNITY

## 2024-10-15 RX ORDER — SEMAGLUTIDE 0.68 MG/ML
0.5 INJECTION, SOLUTION SUBCUTANEOUS WEEKLY
Qty: 9 ML | Refills: 3 | Status: SHIPPED | OUTPATIENT
Start: 2024-10-15

## 2024-10-15 NOTE — PROGRESS NOTES
[facility] DIABETES AND ENDOCRINOLOGY                 Madelyn Juares MD                  PCP : Cleveland Webb MD               Chief Complaint   Patient presents with    Diabetes               History of Present Illness: Nicci Hernandez is  here for follow-up      She has long-standing history of diabetes mellitus.   She has underlying obstructive sleep apnea and hypertension.  She has microvascular disease. CABG in 2009.     2020: She was hospitalized with abdominal complaints and was diagnosed with gastroparesis, off GLP-1 agonist.  2024: PAD, foot ulcer, antibiotics, CKD   On MDI  CKD: Off metformin    History of Present Illness    She reports fluctuating blood sugar levels and is currently on Tresiba 65. She has been unable to obtain a new glucometer due to insurance issues. Her nephrologist, Dr. Ryder, recently conducted an A1c test, but the results are not yet available. She has attempted to use a Chas device, but it does not adhere properly. She takes NovoLog 20 units in the morning and forgets to take it at lunchtime. She has noticed that her blood sugar tends to drop after eating in the mornings.    She is interested in starting Ozempic, as recommended by her cardiologist, but is concerned about the cost. She has previously taken Victoza. She is also seeking advice on weight management, as she feels she is gaining weight even though she is not eating much.    Has underlying gastroparesis    She is under significant stress at home, caring for her mother who has dementia and her  who is on dialysis.            Wt Readings from Last 3 Encounters:   10/15/24 98 kg (216 lb 1.6 oz)   08/19/24 97.4 kg (214 lb 11.2 oz)   08/13/24 94.3 kg (208 lb)        Past Medical History:   Diagnosis Date    Arthritis     Bilateral lower leg cellulitis     CAD (coronary artery disease) 2009    Hx of 2 MI's and then CABG 5/2009    Cardiomyopathy, ischemic     Diabetes (HCC)     IDDM type 3     37.1

## 2024-10-15 NOTE — PATIENT INSTRUCTIONS
Blood glucose goals    Fasting or before meals less than 120,  2 hours after meals or at bedtime less than 180.If the bedtime sugars are less than 100 ,eat a 15 gm snack.    Low blood glucose is less than 70, if you are using continuous glucose monitor please crosscheck with fingerstick as continuous glucose monitor is not accurate when glucose is less than 70.    4 glucose tablets or half a cup of juice for sugars <70. Check blood sugar 15 minutes later, may repeat juice or tablets in 15 minutes.     Glucagon use by family member for severe hypoglycemia (unconciousness)        Tresiba 65 units in AM     Novolog or Humalog 20 units before breakfast  , lunch and dinner       Additional Novolog or Humalog for high sugars     Correction Scale:  3 units for every 50 above 150    Blood sugar  Fast acting insulin          150-200  Extra 3 Units       201-250  Extra 6 Units       251-300  Extra 9 Units       301-350  Extra 12  Units        351-400  Extra 15  Units     Example:   My planned insulin dose:    ____ Units for meals    +    ____ Extra Correction Units  if high =  ____ total units to take together as one injection.        OZEMPIC     Inject 0.25 mg weekly for one month, then increase to 0.5 mg weekly.  Some of the side effects are nausea, vomiting and in most cases it will eventually resolve.  If there is a family history of medullary thyroid cancer, pancreatic cancer you should not be taking this medication.  In some instances sudden shift in the blood glucose can worsen retinopathy with pre-existing retinopathy .    Once you tolerate 0.5 mg weekly we can increase it to 1 mg weekly if desired weight loss or sugar control is not achieved, you need a different prescription for 1 mg weekly dose    Missed dose: Missed dose should be administered as soon as possible within 4 days; resume usual schedule thereafter. If >4 days have elapsed, skip the missed dose and resume administration at the next scheduled weekly

## 2024-10-24 RX ORDER — MULTIVITAMIN WITH IRON
100 TABLET ORAL DAILY
COMMUNITY

## 2024-10-24 RX ORDER — PREGABALIN 75 MG/1
75 CAPSULE ORAL 2 TIMES DAILY
COMMUNITY

## 2024-10-24 NOTE — PROGRESS NOTES
[facility]  Preoperative Instructions        Surgery Date 11/5/2024          Time of Arrival to be called @ [phone]     On the day of your surgery, please report to Surgical Services Registration Desk and sign in at your designated time.  The Surgery Center is located to the right of the Emergency Room.     2. You must have someone with you to drive you home. You should not drive a car for 24 hours following surgery. Please make arrangements for a friend or family member to stay with you for the first 24 hours after your surgery.    3. Do not have anything to eat or drink (including water, gum, mints, coffee, juice) after midnight ??      .?This may not apply to medications prescribed by your physician. ?(Please note below the special instructions with medications to take the morning of your procedure.)    4. We recommend you do not drink any alcoholic beverages for 24 hours before and after your surgery.    5. Contact your surgeon's office for instructions on the following medications: non-steroidal anti-inflammatory drugs (i.e. Advil, Aleve), vitamins, and supplements. (Some surgeon's will want you to stop these medications prior to surgery and others may allow you to take them)  **If you are currently taking Plavix, Coumadin, Aspirin and/or other blood-thinning agents, contact your surgeon for instructions.** Your surgeon will partner with the physician prescribing these medications to determine if it is safe to stop or if you need to continue taking.  Please do not stop taking these medications without instructions from your surgeon    6. Wear comfortable clothes.  Wear glasses instead of contacts.  Do not bring any money or jewelry. Please bring picture ID, insurance card, and any prearranged co-payment or hospital payment.  Do not wear make-up, particularly mascara the morning of your surgery.  Do not wear nail polish, particularly if you are having foot /hand surgery.  Wear your

## 2024-10-25 NOTE — PROGRESS NOTES
Called [facility] and spoke to Bianca asking her if Kelly Holman NP or Dr Fazal Riddle could give us aspirin and plavix instructions for patients surgery scheduled on 10/25/2024 with Dr Hernandez. Fax and call back number provided. Requested last office notes and EKG with her instructions.     10/25/2024 0907 am Left voice message with Dr Hernandez's office requesting order sheet and H&P to be faxed.

## 2024-10-30 NOTE — PERIOP NOTE
Spoke with patient and she is aware to continue Aspirin and hold Plavix for 5 days per cardiologist.  Pt had been contacted by cardiology office and understood instructions.

## 2024-11-05 ENCOUNTER — ANESTHESIA (OUTPATIENT)
Facility: HOSPITAL | Age: 64
End: 2024-11-05
Payer: MEDICARE

## 2024-11-05 ENCOUNTER — ANESTHESIA EVENT (OUTPATIENT)
Facility: HOSPITAL | Age: 64
End: 2024-11-05
Payer: MEDICARE

## 2024-11-05 ENCOUNTER — HOSPITAL ENCOUNTER (OUTPATIENT)
Facility: HOSPITAL | Age: 64
Setting detail: OUTPATIENT SURGERY
Discharge: HOME | End: 2024-11-05
Attending: PODIATRIST | Admitting: PODIATRIST
Payer: MEDICARE

## 2024-11-05 VITALS
TEMPERATURE: 97.9 F | HEART RATE: 77 BPM | RESPIRATION RATE: 13 BRPM | OXYGEN SATURATION: 94 % | SYSTOLIC BLOOD PRESSURE: 118 MMHG | BODY MASS INDEX: 40.08 KG/M2 | WEIGHT: 217.81 LBS | DIASTOLIC BLOOD PRESSURE: 64 MMHG | HEIGHT: 62 IN

## 2024-11-05 DIAGNOSIS — G89.18 POST-OP PAIN: Primary | ICD-10-CM

## 2024-11-05 LAB
GLUCOSE BLD STRIP.AUTO-MCNC: 123 MG/DL (ref 65–117)
GLUCOSE BLD STRIP.AUTO-MCNC: 98 MG/DL (ref 65–117)
SERVICE CMNT-IMP: ABNORMAL
SERVICE CMNT-IMP: NORMAL

## 2024-11-05 PROCEDURE — C1763 CONN TISS, NON-HUMAN: HCPCS | Performed by: PODIATRIST

## 2024-11-05 PROCEDURE — 2500000003 HC RX 250 WO HCPCS: Performed by: NURSE ANESTHETIST, CERTIFIED REGISTERED

## 2024-11-05 PROCEDURE — 7100000000 HC PACU RECOVERY - FIRST 15 MIN: Performed by: PODIATRIST

## 2024-11-05 PROCEDURE — 6360000002 HC RX W HCPCS: Performed by: NURSE ANESTHETIST, CERTIFIED REGISTERED

## 2024-11-05 PROCEDURE — 6360000002 HC RX W HCPCS: Performed by: PODIATRIST

## 2024-11-05 PROCEDURE — 3600000002 HC SURGERY LEVEL 2 BASE: Performed by: PODIATRIST

## 2024-11-05 PROCEDURE — 3700000001 HC ADD 15 MINUTES (ANESTHESIA): Performed by: PODIATRIST

## 2024-11-05 PROCEDURE — 3600000012 HC SURGERY LEVEL 2 ADDTL 15MIN: Performed by: PODIATRIST

## 2024-11-05 PROCEDURE — 2709999900 HC NON-CHARGEABLE SUPPLY: Performed by: PODIATRIST

## 2024-11-05 PROCEDURE — 82962 GLUCOSE BLOOD TEST: CPT

## 2024-11-05 PROCEDURE — 6370000000 HC RX 637 (ALT 250 FOR IP): Performed by: PODIATRIST

## 2024-11-05 PROCEDURE — 2580000003 HC RX 258: Performed by: ANESTHESIOLOGY

## 2024-11-05 PROCEDURE — 2580000003 HC RX 258: Performed by: PODIATRIST

## 2024-11-05 PROCEDURE — 7100000001 HC PACU RECOVERY - ADDTL 15 MIN: Performed by: PODIATRIST

## 2024-11-05 PROCEDURE — 3700000000 HC ANESTHESIA ATTENDED CARE: Performed by: PODIATRIST

## 2024-11-05 DEVICE — TISSUE BIO MATRIDERM 5.2 X 7.4CM 1MM SM: Type: IMPLANTABLE DEVICE | Site: FOOT | Status: FUNCTIONAL

## 2024-11-05 RX ORDER — PHENYLEPHRINE HCL IN 0.9% NACL 0.4MG/10ML
SYRINGE (ML) INTRAVENOUS
Status: DISCONTINUED | OUTPATIENT
Start: 2024-11-05 | End: 2024-11-05 | Stop reason: SDUPTHER

## 2024-11-05 RX ORDER — PROCHLORPERAZINE EDISYLATE 5 MG/ML
5 INJECTION INTRAMUSCULAR; INTRAVENOUS
Status: DISCONTINUED | OUTPATIENT
Start: 2024-11-05 | End: 2024-11-05 | Stop reason: HOSPADM

## 2024-11-05 RX ORDER — FENTANYL CITRATE 50 UG/ML
INJECTION, SOLUTION INTRAMUSCULAR; INTRAVENOUS
Status: DISCONTINUED | OUTPATIENT
Start: 2024-11-05 | End: 2024-11-05 | Stop reason: SDUPTHER

## 2024-11-05 RX ORDER — OXYCODONE AND ACETAMINOPHEN 5; 325 MG/1; MG/1
1 TABLET ORAL EVERY 4 HOURS PRN
Qty: 12 TABLET | Refills: 0 | Status: SHIPPED | OUTPATIENT
Start: 2024-11-05 | End: 2024-11-08

## 2024-11-05 RX ORDER — IPRATROPIUM BROMIDE AND ALBUTEROL SULFATE 2.5; .5 MG/3ML; MG/3ML
1 SOLUTION RESPIRATORY (INHALATION)
Status: DISCONTINUED | OUTPATIENT
Start: 2024-11-05 | End: 2024-11-05 | Stop reason: HOSPADM

## 2024-11-05 RX ORDER — PROMETHAZINE HYDROCHLORIDE 25 MG/1
25 TABLET ORAL 4 TIMES DAILY PRN
Qty: 20 TABLET | Refills: 0 | Status: SHIPPED | OUTPATIENT
Start: 2024-11-05 | End: 2024-11-12

## 2024-11-05 RX ORDER — BUPIVACAINE HYDROCHLORIDE 5 MG/ML
INJECTION, SOLUTION PERINEURAL PRN
Status: DISCONTINUED | OUTPATIENT
Start: 2024-11-05 | End: 2024-11-05 | Stop reason: ALTCHOICE

## 2024-11-05 RX ORDER — HYDROMORPHONE HYDROCHLORIDE 1 MG/ML
0.5 INJECTION, SOLUTION INTRAMUSCULAR; INTRAVENOUS; SUBCUTANEOUS EVERY 5 MIN PRN
Status: DISCONTINUED | OUTPATIENT
Start: 2024-11-05 | End: 2024-11-05 | Stop reason: HOSPADM

## 2024-11-05 RX ORDER — EPHEDRINE SULFATE/0.9% NACL/PF 50 MG/5 ML
SYRINGE (ML) INTRAVENOUS
Status: DISCONTINUED | OUTPATIENT
Start: 2024-11-05 | End: 2024-11-05 | Stop reason: SDUPTHER

## 2024-11-05 RX ORDER — NALOXONE HYDROCHLORIDE 0.4 MG/ML
INJECTION, SOLUTION INTRAMUSCULAR; INTRAVENOUS; SUBCUTANEOUS
Status: DISCONTINUED | OUTPATIENT
Start: 2024-11-05 | End: 2024-11-05 | Stop reason: SDUPTHER

## 2024-11-05 RX ORDER — DEXTROSE MONOHYDRATE 100 MG/ML
INJECTION, SOLUTION INTRAVENOUS CONTINUOUS PRN
Status: DISCONTINUED | OUTPATIENT
Start: 2024-11-05 | End: 2024-11-05 | Stop reason: HOSPADM

## 2024-11-05 RX ORDER — SODIUM CHLORIDE, SODIUM LACTATE, POTASSIUM CHLORIDE, CALCIUM CHLORIDE 600; 310; 30; 20 MG/100ML; MG/100ML; MG/100ML; MG/100ML
INJECTION, SOLUTION INTRAVENOUS CONTINUOUS
Status: DISCONTINUED | OUTPATIENT
Start: 2024-11-05 | End: 2024-11-05 | Stop reason: HOSPADM

## 2024-11-05 RX ORDER — SODIUM CHLORIDE 0.9 % (FLUSH) 0.9 %
5-40 SYRINGE (ML) INJECTION PRN
Status: DISCONTINUED | OUTPATIENT
Start: 2024-11-05 | End: 2024-11-05 | Stop reason: HOSPADM

## 2024-11-05 RX ORDER — GLYCOPYRROLATE 0.2 MG/ML
INJECTION INTRAMUSCULAR; INTRAVENOUS
Status: DISCONTINUED | OUTPATIENT
Start: 2024-11-05 | End: 2024-11-05 | Stop reason: SDUPTHER

## 2024-11-05 RX ORDER — SODIUM CHLORIDE 9 MG/ML
INJECTION, SOLUTION INTRAVENOUS PRN
Status: DISCONTINUED | OUTPATIENT
Start: 2024-11-05 | End: 2024-11-05 | Stop reason: HOSPADM

## 2024-11-05 RX ORDER — DEXMEDETOMIDINE HYDROCHLORIDE 100 UG/ML
INJECTION, SOLUTION INTRAVENOUS
Status: DISCONTINUED | OUTPATIENT
Start: 2024-11-05 | End: 2024-11-05 | Stop reason: SDUPTHER

## 2024-11-05 RX ORDER — SODIUM CHLORIDE 0.9 % (FLUSH) 0.9 %
5-40 SYRINGE (ML) INJECTION EVERY 12 HOURS SCHEDULED
Status: DISCONTINUED | OUTPATIENT
Start: 2024-11-05 | End: 2024-11-05 | Stop reason: HOSPADM

## 2024-11-05 RX ORDER — GLUCAGON 1 MG/ML
1 KIT INJECTION PRN
Status: DISCONTINUED | OUTPATIENT
Start: 2024-11-05 | End: 2024-11-05 | Stop reason: HOSPADM

## 2024-11-05 RX ORDER — MIDAZOLAM HYDROCHLORIDE 1 MG/ML
INJECTION, SOLUTION INTRAMUSCULAR; INTRAVENOUS
Status: DISCONTINUED | OUTPATIENT
Start: 2024-11-05 | End: 2024-11-05 | Stop reason: SDUPTHER

## 2024-11-05 RX ORDER — FENTANYL CITRATE 50 UG/ML
25 INJECTION, SOLUTION INTRAMUSCULAR; INTRAVENOUS EVERY 5 MIN PRN
Status: DISCONTINUED | OUTPATIENT
Start: 2024-11-05 | End: 2024-11-05 | Stop reason: HOSPADM

## 2024-11-05 RX ORDER — FLUMAZENIL 0.1 MG/ML
INJECTION INTRAVENOUS
Status: DISCONTINUED | OUTPATIENT
Start: 2024-11-05 | End: 2024-11-05 | Stop reason: SDUPTHER

## 2024-11-05 RX ORDER — ONDANSETRON 2 MG/ML
4 INJECTION INTRAMUSCULAR; INTRAVENOUS
Status: DISCONTINUED | OUTPATIENT
Start: 2024-11-05 | End: 2024-11-05 | Stop reason: HOSPADM

## 2024-11-05 RX ORDER — NALOXONE HYDROCHLORIDE 0.4 MG/ML
INJECTION, SOLUTION INTRAMUSCULAR; INTRAVENOUS; SUBCUTANEOUS PRN
Status: DISCONTINUED | OUTPATIENT
Start: 2024-11-05 | End: 2024-11-05 | Stop reason: HOSPADM

## 2024-11-05 RX ADMIN — MIDAZOLAM HYDROCHLORIDE 2 MG: 1 INJECTION, SOLUTION INTRAMUSCULAR; INTRAVENOUS at 15:12

## 2024-11-05 RX ADMIN — FLUMAZENIL 0.2 MG: 0.1 INJECTION, SOLUTION INTRAVENOUS at 15:52

## 2024-11-05 RX ADMIN — NALXONE HYDROCHLORIDE 0.2 MG: 0.4 INJECTION INTRAMUSCULAR; INTRAVENOUS; SUBCUTANEOUS at 15:26

## 2024-11-05 RX ADMIN — Medication 3 AMPULE: at 14:25

## 2024-11-05 RX ADMIN — DEXMEDETOMIDINE 10 MCG: 100 INJECTION, SOLUTION INTRAVENOUS at 16:26

## 2024-11-05 RX ADMIN — Medication 10 MG: at 15:20

## 2024-11-05 RX ADMIN — SODIUM CHLORIDE, POTASSIUM CHLORIDE, SODIUM LACTATE AND CALCIUM CHLORIDE: 600; 310; 30; 20 INJECTION, SOLUTION INTRAVENOUS at 14:25

## 2024-11-05 RX ADMIN — GLYCOPYRROLATE 0.2 MG: 0.2 INJECTION, SOLUTION INTRAMUSCULAR; INTRAVENOUS at 15:19

## 2024-11-05 RX ADMIN — FENTANYL CITRATE 100 MCG: 50 INJECTION, SOLUTION INTRAMUSCULAR; INTRAVENOUS at 15:12

## 2024-11-05 RX ADMIN — WATER 2000 MG: 1 INJECTION INTRAMUSCULAR; INTRAVENOUS; SUBCUTANEOUS at 15:21

## 2024-11-05 RX ADMIN — Medication 120 MCG: at 15:23

## 2024-11-05 RX ADMIN — NALXONE HYDROCHLORIDE 0.2 MG: 0.4 INJECTION INTRAMUSCULAR; INTRAVENOUS; SUBCUTANEOUS at 15:38

## 2024-11-05 NOTE — OP NOTE
Operative Note      Patient: Nicci Hernandez  YOB: 1960  MRN: [id number]    Date of Procedure: 11/5/2024    Pre-Op Diagnosis Codes:      * Open wound of left foot, subsequent encounter [S91.302D]    Post-Op Diagnosis: Same       Procedure(s):  LEFT FOOT PREP WOUND, APPLY GRAFT    Surgeon(s):  Irma Hernandez DPM Wentz, Jennifer, DPM    Assistant:   Surgical Assistant: Dione Haas    Anesthesia: Monitor Anesthesia Care    Estimated Blood Loss (mL): Minimal    Complications: None    Specimens:   * No specimens in log *    Implants:  * No implants in log *      Drains: * No LDAs found *    Findings:  Infection Present At Time Of Surgery (PATOS) (choose all levels that have infection present):  No infection present      Detailed Description of Procedure:   The patient was seen in the preoperative area where we discussed her surgery including risks and complications.  She is agreeable to the procedure and signed consent.  She was taken the operating placed on the operative table supine position after adequate duction of sedation she was block with 0.5% Marcaine plain.  The wound on the underside of the left hallux submet 1 was debrided with the use of rongeur's curettes and a 15 blade down to healthy bleeding tissue.  Once we have prepped the wound appropriately a major Derm graft was sutured in place with a 4-0 Monocryl.  A dry sterile dressing was placed over this.    Electronically signed by Irma Hernandez DPM on 11/5/2024 at 3:30 PM

## 2024-11-05 NOTE — BRIEF OP NOTE
Brief Postoperative Note      Patient: Nicci Hernandez  YOB: 1960  MRN: [id number]    Date of Procedure: 11/5/2024    Pre-Op Diagnosis Codes:      * Open wound of left foot, subsequent encounter [S91.302D]    Post-Op Diagnosis: Same       Procedure(s):  LEFT FOOT PREP WOUND, APPLY GRAFT    Surgeon(s):  Irma Hernandez DPM Wentz, Jennifer, DPM    Assistant:  Surgical Assistant: Dione Haas    Anesthesia: Monitor Anesthesia Care    Estimated Blood Loss (mL): Minimal    Complications: None    Specimens:   * No specimens in log *    Implants:  * No implants in log *      Drains: * No LDAs found *    Findings:  Infection Present At Time Of Surgery (PATOS) (choose all levels that have infection present):  No infection present      Electronically signed by Irma Hernandez DPM on 11/5/2024 at 3:29 PM

## 2024-11-05 NOTE — DISCHARGE INSTRUCTIONS
INSTRUCTIONS FOLLOWING FOOT SURGERY    ACTIVITY:  Elevate feet for 48 hours  Use ice as directed by your doctor  Use crutches / walker as directed by your doctor, and follow your doctors instructions as to your weight bearing status - you can bear weight on the left foot    DIET:  Clear liquids until no nausea or vomiting; then light diet for the first day  An advance to regular diet on second day, unless your doctor orders otherwise.    PAIN:  Take pain medication as directed by your doctor.  Call your doctor if pain is not relieved by medication.  Do not take aspirin or blood thinners until directed by your doctor.    DRESSING CARE: keep dressing clean and dry, do not remove       FOLLOW-UP PHONE CALLS:  Calls will be made by nursing staff.  If you had any problems, call your doctor as needed.    CALL YOUR DOCTOR IF:  Excessive bleeding that does not stop after holding mild pressure over the area  Temperature of 101° F or above  Redness, excessive swelling or bruising, and/or green or yellow, smelly discharge from incision  Loss of sensation -cold, white, or blue toes    AFTER ANESTHESIA :   For the first 24 hours: do not drive, drink alcoholic beverages, or make important decisions.  Be aware of dizziness following anesthesia and while taking pain medication.      DISCHARGE MEDICATIONS: @[email or web address]@      APPOINTMENT DATE/TIME: please call for an appointment    YOUR DOCTOR’S PHONE NUMBER: [phone]    Patient’s signature:  Date: 11/5/2024  Discharging Nurse:     DISCHARGE SUMMARY from Nurse    PATIENT INSTRUCTIONS:    After general anesthesia or intravenous sedation, for 24 hours or while taking prescription narcotics:    Have someone responsible help you with your care  Limit your activities  Do not drive and operate hazardous machinery  Do not make important personal, legal or business decisions  Do not drink alcoholic beverages  If you have not urinated within 8 hours after discharge, please contact

## 2024-11-05 NOTE — ANESTHESIA PRE PROCEDURE
anesthetic complications:   Airway: Mallampati: II  TM distance: >3 FB   Neck ROM: full  Mouth opening: > = 3 FB   Dental: normal exam         Pulmonary:normal exam    (+)     sleep apnea:                                  Cardiovascular:  Exercise tolerance: poor (<4 METS)  (+) hypertension:, past MI: > 6 months, CAD:, CABG/stent: no interval change, CHF (EF 20% with mod MR): systolic, hyperlipidemia      ECG reviewed      Echocardiogram reviewed               ROS comment: 1/2024 TTE LVEF 20% and mild RV systolic dysfunction     Neuro/Psych:               GI/Hepatic/Renal:   (+) renal disease: CRI, morbid obesity          Endo/Other:    (+) Diabetes, blood dyscrasia: anemia:..                 Abdominal:             Vascular:   + PVD, aortic or cerebral.       Other Findings: Abdominal exam deferred        Anesthesia Plan      MAC     ASA 4       Induction: intravenous.    MIPS: Postoperative opioids intended and Prophylactic antiemetics administered.  Anesthetic plan and risks discussed with patient.    Use of blood products discussed with patient whom consented to blood products.    Plan discussed with CRNA.    Attending anesthesiologist reviewed and agrees with Preprocedure content              Juan Olsen MD   11/5/2024

## 2024-11-06 NOTE — ANESTHESIA POSTPROCEDURE EVALUATION
Department of Anesthesiology  Postprocedure Note    Patient: Nicci Hernandez  MRN: [id number]  YOB: 1960  Date of evaluation: 11/6/2024    Procedure Summary       Date: 11/05/24 Room / Location: [facility] MAIN OR  / [facility] MAIN OR    Anesthesia Start: 1512 Anesthesia Stop: 1547    Procedure: LEFT FOOT PREP WOUND, APPLY GRAFT (Left: Foot) Diagnosis:       Open wound of left foot, subsequent encounter      (Open wound of left foot, subsequent encounter [S91.302D])    Providers: Irma Hernandez DPM Responsible Provider: Mark Stinson MD    Anesthesia Type: MAC ASA Status: 4            Anesthesia Type: MAC    Gerson Phase I: Gerson Score: 10    Gerson Phase II:      Anesthesia Post Evaluation    Patient location during evaluation: PACU  Patient participation: complete - patient participated  Level of consciousness: sleepy but conscious and responsive to verbal stimuli  Airway patency: patent  Nausea & Vomiting: no vomiting and no nausea  Cardiovascular status: blood pressure returned to baseline and hemodynamically stable  Respiratory status: acceptable  Hydration status: stable    No notable events documented.

## 2024-11-21 ENCOUNTER — HOSPITAL ENCOUNTER (INPATIENT)
Facility: HOSPITAL | Age: 64
LOS: 10 days | Discharge: HOME HEALTH | DRG: 280 | End: 2024-12-01
Attending: INTERNAL MEDICINE | Admitting: INTERNAL MEDICINE
Payer: MEDICARE

## 2024-11-21 ENCOUNTER — APPOINTMENT (OUTPATIENT)
Facility: HOSPITAL | Age: 64
DRG: 280 | End: 2024-11-21
Payer: MEDICARE

## 2024-11-21 DIAGNOSIS — I50.9 ACUTE ON CHRONIC CONGESTIVE HEART FAILURE, UNSPECIFIED HEART FAILURE TYPE (HCC): Primary | ICD-10-CM

## 2024-11-21 DIAGNOSIS — R79.89 ELEVATED TROPONIN: ICD-10-CM

## 2024-11-21 DIAGNOSIS — R09.02 HYPOXIA: ICD-10-CM

## 2024-11-21 DIAGNOSIS — J18.9 PNEUMONIA DUE TO INFECTIOUS ORGANISM, UNSPECIFIED LATERALITY, UNSPECIFIED PART OF LUNG: ICD-10-CM

## 2024-11-21 LAB
ALBUMIN SERPL-MCNC: 3.7 G/DL (ref 3.5–5)
ALBUMIN/GLOB SERPL: 0.9 (ref 1.1–2.2)
ALP SERPL-CCNC: 83 U/L (ref 45–117)
ALT SERPL-CCNC: 29 U/L (ref 12–78)
ANION GAP SERPL CALC-SCNC: 7 MMOL/L (ref 2–12)
AST SERPL W P-5'-P-CCNC: 46 U/L (ref 15–37)
BASOPHILS # BLD: 0 K/UL (ref 0–0.1)
BASOPHILS NFR BLD: 1 % (ref 0–1)
BILIRUB SERPL-MCNC: 0.7 MG/DL (ref 0.2–1)
BNP SERPL-MCNC: ABNORMAL PG/ML
BUN SERPL-MCNC: 31 MG/DL (ref 6–20)
BUN/CREAT SERPL: 18 (ref 12–20)
CA-I BLD-MCNC: 9.4 MG/DL (ref 8.5–10.1)
CHLORIDE SERPL-SCNC: 106 MMOL/L (ref 97–108)
CO2 SERPL-SCNC: 26 MMOL/L (ref 21–32)
CREAT SERPL-MCNC: 1.77 MG/DL (ref 0.55–1.02)
CRP SERPL-MCNC: 1.45 MG/DL (ref 0–0.3)
DIFFERENTIAL METHOD BLD: ABNORMAL
EKG ATRIAL RATE: 88 BPM
EKG DIAGNOSIS: NORMAL
EKG P AXIS: 83 DEGREES
EKG P-R INTERVAL: 174 MS
EKG Q-T INTERVAL: 424 MS
EKG QRS DURATION: 138 MS
EKG QTC CALCULATION (BAZETT): 513 MS
EKG R AXIS: -58 DEGREES
EKG T AXIS: 132 DEGREES
EKG VENTRICULAR RATE: 88 BPM
EOSINOPHIL # BLD: 0 K/UL (ref 0–0.4)
EOSINOPHIL NFR BLD: 0 % (ref 0–7)
ERYTHROCYTE [DISTWIDTH] IN BLOOD BY AUTOMATED COUNT: 14.6 % (ref 11.5–14.5)
FLUAV RNA SPEC QL NAA+PROBE: NOT DETECTED
FLUBV RNA SPEC QL NAA+PROBE: NOT DETECTED
GLOBULIN SER CALC-MCNC: 3.9 G/DL (ref 2–4)
GLUCOSE BLD STRIP.AUTO-MCNC: 152 MG/DL (ref 65–100)
GLUCOSE SERPL-MCNC: 109 MG/DL (ref 65–100)
HCT VFR BLD AUTO: 40.8 % (ref 35–47)
HGB BLD-MCNC: 12.8 G/DL (ref 11.5–16)
IMM GRANULOCYTES # BLD AUTO: 0 K/UL (ref 0–0.04)
IMM GRANULOCYTES NFR BLD AUTO: 0 % (ref 0–0.5)
LYMPHOCYTES # BLD: 0.6 K/UL (ref 0.8–3.5)
LYMPHOCYTES NFR BLD: 12 % (ref 12–49)
MAGNESIUM SERPL-MCNC: 1.9 MG/DL (ref 1.6–2.4)
MCH RBC QN AUTO: 29.4 PG (ref 26–34)
MCHC RBC AUTO-ENTMCNC: 31.4 G/DL (ref 30–36.5)
MCV RBC AUTO: 93.6 FL (ref 80–99)
MONOCYTES # BLD: 0.4 K/UL (ref 0–1)
MONOCYTES NFR BLD: 10 % (ref 5–13)
NEUTS SEG # BLD: 3.6 K/UL (ref 1.8–8)
NEUTS SEG NFR BLD: 77 % (ref 32–75)
NRBC # BLD: 0 K/UL (ref 0–0.01)
NRBC BLD-RTO: 0 PER 100 WBC
PERFORMED BY:: ABNORMAL
PLATELET # BLD AUTO: 188 K/UL (ref 150–400)
PMV BLD AUTO: 10.8 FL (ref 8.9–12.9)
POTASSIUM SERPL-SCNC: 4 MMOL/L (ref 3.5–5.1)
PROCALCITONIN SERPL-MCNC: 0.08 NG/ML
PROT SERPL-MCNC: 7.6 G/DL (ref 6.4–8.2)
RBC # BLD AUTO: 4.36 M/UL (ref 3.8–5.2)
SARS-COV-2 RNA RESP QL NAA+PROBE: NOT DETECTED
SODIUM SERPL-SCNC: 139 MMOL/L (ref 136–145)
TROPONIN I SERPL HS-MCNC: 117 NG/L (ref 0–51)
TROPONIN I SERPL HS-MCNC: 121 NG/L (ref 0–51)
TROPONIN I SERPL HS-MCNC: 127 NG/L (ref 0–51)
WBC # BLD AUTO: 4.7 K/UL (ref 3.6–11)

## 2024-11-21 PROCEDURE — 84145 PROCALCITONIN (PCT): CPT

## 2024-11-21 PROCEDURE — 2580000003 HC RX 258: Performed by: NURSE PRACTITIONER

## 2024-11-21 PROCEDURE — 80053 COMPREHEN METABOLIC PANEL: CPT

## 2024-11-21 PROCEDURE — 6360000002 HC RX W HCPCS: Performed by: NURSE PRACTITIONER

## 2024-11-21 PROCEDURE — 87636 SARSCOV2 & INF A&B AMP PRB: CPT

## 2024-11-21 PROCEDURE — 86140 C-REACTIVE PROTEIN: CPT

## 2024-11-21 PROCEDURE — 82962 GLUCOSE BLOOD TEST: CPT

## 2024-11-21 PROCEDURE — 99285 EMERGENCY DEPT VISIT HI MDM: CPT

## 2024-11-21 PROCEDURE — 83880 ASSAY OF NATRIURETIC PEPTIDE: CPT

## 2024-11-21 PROCEDURE — 2060000000 HC ICU INTERMEDIATE R&B

## 2024-11-21 PROCEDURE — 96375 TX/PRO/DX INJ NEW DRUG ADDON: CPT

## 2024-11-21 PROCEDURE — 36415 COLL VENOUS BLD VENIPUNCTURE: CPT

## 2024-11-21 PROCEDURE — 71045 X-RAY EXAM CHEST 1 VIEW: CPT

## 2024-11-21 PROCEDURE — 87040 BLOOD CULTURE FOR BACTERIA: CPT

## 2024-11-21 PROCEDURE — 83735 ASSAY OF MAGNESIUM: CPT

## 2024-11-21 PROCEDURE — 71275 CT ANGIOGRAPHY CHEST: CPT

## 2024-11-21 PROCEDURE — 93005 ELECTROCARDIOGRAM TRACING: CPT | Performed by: INTERNAL MEDICINE

## 2024-11-21 PROCEDURE — 6370000000 HC RX 637 (ALT 250 FOR IP): Performed by: EMERGENCY MEDICINE

## 2024-11-21 PROCEDURE — 94762 N-INVAS EAR/PLS OXIMTRY CONT: CPT

## 2024-11-21 PROCEDURE — 96374 THER/PROPH/DIAG INJ IV PUSH: CPT

## 2024-11-21 PROCEDURE — 2580000003 HC RX 258

## 2024-11-21 PROCEDURE — 85025 COMPLETE CBC W/AUTO DIFF WBC: CPT

## 2024-11-21 PROCEDURE — 6360000004 HC RX CONTRAST MEDICATION: Performed by: EMERGENCY MEDICINE

## 2024-11-21 PROCEDURE — 6370000000 HC RX 637 (ALT 250 FOR IP)

## 2024-11-21 PROCEDURE — 84484 ASSAY OF TROPONIN QUANT: CPT

## 2024-11-21 PROCEDURE — 6370000000 HC RX 637 (ALT 250 FOR IP): Performed by: NURSE PRACTITIONER

## 2024-11-21 RX ORDER — ENOXAPARIN SODIUM 100 MG/ML
40 INJECTION SUBCUTANEOUS DAILY
Status: DISCONTINUED | OUTPATIENT
Start: 2024-11-22 | End: 2024-12-01 | Stop reason: HOSPADM

## 2024-11-21 RX ORDER — ASPIRIN 81 MG/1
81 TABLET, CHEWABLE ORAL DAILY
Status: DISCONTINUED | OUTPATIENT
Start: 2024-11-21 | End: 2024-12-01 | Stop reason: HOSPADM

## 2024-11-21 RX ORDER — CODEINE PHOSPHATE AND GUAIFENESIN 10; 100 MG/5ML; MG/5ML
5 SOLUTION ORAL EVERY 4 HOURS PRN
Status: DISCONTINUED | OUTPATIENT
Start: 2024-11-21 | End: 2024-12-01 | Stop reason: HOSPADM

## 2024-11-21 RX ORDER — LIDOCAINE 4 G/G
1 PATCH TOPICAL
Status: COMPLETED | OUTPATIENT
Start: 2024-11-21 | End: 2024-11-22

## 2024-11-21 RX ORDER — FUROSEMIDE 10 MG/ML
40 INJECTION INTRAMUSCULAR; INTRAVENOUS
Status: COMPLETED | OUTPATIENT
Start: 2024-11-21 | End: 2024-11-21

## 2024-11-21 RX ORDER — SODIUM CHLORIDE 0.9 % (FLUSH) 0.9 %
5-40 SYRINGE (ML) INJECTION PRN
Status: DISCONTINUED | OUTPATIENT
Start: 2024-11-21 | End: 2024-12-01 | Stop reason: HOSPADM

## 2024-11-21 RX ORDER — METHOCARBAMOL 500 MG/1
750 TABLET, FILM COATED ORAL PRN
Status: DISCONTINUED | OUTPATIENT
Start: 2024-11-21 | End: 2024-12-01 | Stop reason: HOSPADM

## 2024-11-21 RX ORDER — IOPAMIDOL 755 MG/ML
100 INJECTION, SOLUTION INTRAVASCULAR
Status: COMPLETED | OUTPATIENT
Start: 2024-11-21 | End: 2024-11-21

## 2024-11-21 RX ORDER — IPRATROPIUM BROMIDE AND ALBUTEROL SULFATE 2.5; .5 MG/3ML; MG/3ML
1 SOLUTION RESPIRATORY (INHALATION)
Status: DISCONTINUED | OUTPATIENT
Start: 2024-11-21 | End: 2024-11-21

## 2024-11-21 RX ORDER — INSULIN GLARGINE 100 [IU]/ML
0.25 INJECTION, SOLUTION SUBCUTANEOUS NIGHTLY
Status: DISCONTINUED | OUTPATIENT
Start: 2024-11-21 | End: 2024-12-01 | Stop reason: HOSPADM

## 2024-11-21 RX ORDER — GUAIFENESIN 600 MG/1
600 TABLET, EXTENDED RELEASE ORAL 2 TIMES DAILY
Status: DISCONTINUED | OUTPATIENT
Start: 2024-11-22 | End: 2024-12-01 | Stop reason: HOSPADM

## 2024-11-21 RX ORDER — INSULIN LISPRO 100 [IU]/ML
0-8 INJECTION, SOLUTION INTRAVENOUS; SUBCUTANEOUS
Status: DISCONTINUED | OUTPATIENT
Start: 2024-11-21 | End: 2024-12-01 | Stop reason: HOSPADM

## 2024-11-21 RX ORDER — POTASSIUM CHLORIDE 1500 MG/1
40 TABLET, EXTENDED RELEASE ORAL PRN
Status: DISCONTINUED | OUTPATIENT
Start: 2024-11-21 | End: 2024-12-01 | Stop reason: HOSPADM

## 2024-11-21 RX ORDER — SODIUM CHLORIDE 0.9 % (FLUSH) 0.9 %
5-40 SYRINGE (ML) INJECTION EVERY 12 HOURS SCHEDULED
Status: DISCONTINUED | OUTPATIENT
Start: 2024-11-21 | End: 2024-12-01 | Stop reason: HOSPADM

## 2024-11-21 RX ORDER — ONDANSETRON 2 MG/ML
4 INJECTION INTRAMUSCULAR; INTRAVENOUS EVERY 6 HOURS PRN
Status: DISCONTINUED | OUTPATIENT
Start: 2024-11-21 | End: 2024-12-01 | Stop reason: HOSPADM

## 2024-11-21 RX ORDER — POTASSIUM CHLORIDE 7.45 MG/ML
10 INJECTION INTRAVENOUS PRN
Status: DISCONTINUED | OUTPATIENT
Start: 2024-11-21 | End: 2024-12-01 | Stop reason: HOSPADM

## 2024-11-21 RX ORDER — GUAIFENESIN 600 MG/1
1200 TABLET, EXTENDED RELEASE ORAL
Status: COMPLETED | OUTPATIENT
Start: 2024-11-21 | End: 2024-11-21

## 2024-11-21 RX ORDER — TORSEMIDE 10 MG/1
20 TABLET ORAL 2 TIMES DAILY
Status: DISCONTINUED | OUTPATIENT
Start: 2024-11-21 | End: 2024-11-29

## 2024-11-21 RX ORDER — PREGABALIN 75 MG/1
75 CAPSULE ORAL 2 TIMES DAILY
Status: DISCONTINUED | OUTPATIENT
Start: 2024-11-21 | End: 2024-11-27

## 2024-11-21 RX ORDER — SODIUM CHLORIDE 9 MG/ML
INJECTION, SOLUTION INTRAVENOUS PRN
Status: DISCONTINUED | OUTPATIENT
Start: 2024-11-21 | End: 2024-12-01 | Stop reason: HOSPADM

## 2024-11-21 RX ORDER — ALPRAZOLAM 0.25 MG/1
0.25 TABLET ORAL 2 TIMES DAILY PRN
Status: DISCONTINUED | OUTPATIENT
Start: 2024-11-21 | End: 2024-12-01 | Stop reason: HOSPADM

## 2024-11-21 RX ORDER — MAGNESIUM SULFATE IN WATER 40 MG/ML
2000 INJECTION, SOLUTION INTRAVENOUS PRN
Status: DISCONTINUED | OUTPATIENT
Start: 2024-11-21 | End: 2024-12-01 | Stop reason: HOSPADM

## 2024-11-21 RX ORDER — ACETAMINOPHEN 650 MG/1
650 SUPPOSITORY RECTAL EVERY 6 HOURS PRN
Status: DISCONTINUED | OUTPATIENT
Start: 2024-11-21 | End: 2024-12-01 | Stop reason: HOSPADM

## 2024-11-21 RX ORDER — SPIRONOLACTONE 25 MG/1
25 TABLET ORAL DAILY
Status: DISCONTINUED | OUTPATIENT
Start: 2024-11-21 | End: 2024-11-29

## 2024-11-21 RX ORDER — ONDANSETRON 4 MG/1
4 TABLET, ORALLY DISINTEGRATING ORAL EVERY 8 HOURS PRN
Status: DISCONTINUED | OUTPATIENT
Start: 2024-11-21 | End: 2024-12-01 | Stop reason: HOSPADM

## 2024-11-21 RX ORDER — 0.9 % SODIUM CHLORIDE 0.9 %
500 INTRAVENOUS SOLUTION INTRAVENOUS ONCE
Status: DISCONTINUED | OUTPATIENT
Start: 2024-11-21 | End: 2024-11-21

## 2024-11-21 RX ORDER — LOSARTAN POTASSIUM 50 MG/1
25 TABLET ORAL DAILY
Status: DISCONTINUED | OUTPATIENT
Start: 2024-11-21 | End: 2024-11-27

## 2024-11-21 RX ORDER — ACETAMINOPHEN 325 MG/1
650 TABLET ORAL EVERY 6 HOURS PRN
Status: DISCONTINUED | OUTPATIENT
Start: 2024-11-21 | End: 2024-12-01 | Stop reason: HOSPADM

## 2024-11-21 RX ORDER — DEXTROSE MONOHYDRATE 100 MG/ML
INJECTION, SOLUTION INTRAVENOUS CONTINUOUS PRN
Status: DISCONTINUED | OUTPATIENT
Start: 2024-11-21 | End: 2024-12-01 | Stop reason: HOSPADM

## 2024-11-21 RX ORDER — GLUCAGON 1 MG/ML
1 KIT INJECTION PRN
Status: DISCONTINUED | OUTPATIENT
Start: 2024-11-21 | End: 2024-12-01 | Stop reason: HOSPADM

## 2024-11-21 RX ORDER — ASPIRIN 325 MG
325 TABLET ORAL
Status: COMPLETED | OUTPATIENT
Start: 2024-11-21 | End: 2024-11-21

## 2024-11-21 RX ORDER — MULTIVITAMIN WITH IRON
100 TABLET ORAL DAILY
Status: CANCELLED | OUTPATIENT
Start: 2024-11-21

## 2024-11-21 RX ORDER — POLYETHYLENE GLYCOL 3350 17 G/17G
17 POWDER, FOR SOLUTION ORAL DAILY PRN
Status: DISCONTINUED | OUTPATIENT
Start: 2024-11-21 | End: 2024-12-01 | Stop reason: HOSPADM

## 2024-11-21 RX ORDER — CLOPIDOGREL BISULFATE 75 MG/1
75 TABLET ORAL DAILY
Status: DISCONTINUED | OUTPATIENT
Start: 2024-11-21 | End: 2024-12-01 | Stop reason: HOSPADM

## 2024-11-21 RX ORDER — CARVEDILOL 3.12 MG/1
3.12 TABLET ORAL 2 TIMES DAILY
Status: DISCONTINUED | OUTPATIENT
Start: 2024-11-21 | End: 2024-12-01 | Stop reason: HOSPADM

## 2024-11-21 RX ADMIN — CEFTRIAXONE SODIUM 1000 MG: 1 INJECTION, POWDER, FOR SOLUTION INTRAMUSCULAR; INTRAVENOUS at 18:15

## 2024-11-21 RX ADMIN — ASPIRIN 325 MG: 325 TABLET ORAL at 16:13

## 2024-11-21 RX ADMIN — PREGABALIN 75 MG: 75 CAPSULE ORAL at 21:27

## 2024-11-21 RX ADMIN — GUAIFENESIN 1200 MG: 600 TABLET, EXTENDED RELEASE ORAL at 15:22

## 2024-11-21 RX ADMIN — SODIUM CHLORIDE 500 ML: 9 INJECTION, SOLUTION INTRAVENOUS at 15:54

## 2024-11-21 RX ADMIN — WATER 80 MG: 1 INJECTION INTRAMUSCULAR; INTRAVENOUS; SUBCUTANEOUS at 15:20

## 2024-11-21 RX ADMIN — CARVEDILOL 3.12 MG: 3.12 TABLET, FILM COATED ORAL at 21:27

## 2024-11-21 RX ADMIN — GUAIFENESIN AND CODEINE PHOSPHATE 5 ML: 100; 10 SOLUTION ORAL at 21:24

## 2024-11-21 RX ADMIN — FUROSEMIDE 40 MG: 10 INJECTION, SOLUTION INTRAMUSCULAR; INTRAVENOUS at 16:14

## 2024-11-21 RX ADMIN — SODIUM CHLORIDE, PRESERVATIVE FREE 10 ML: 5 INJECTION INTRAVENOUS at 21:27

## 2024-11-21 RX ADMIN — TORSEMIDE 20 MG: 10 TABLET ORAL at 21:26

## 2024-11-21 RX ADMIN — IOPAMIDOL 100 ML: 755 INJECTION, SOLUTION INTRAVENOUS at 16:40

## 2024-11-21 RX ADMIN — INSULIN GLARGINE 24 UNITS: 100 INJECTION, SOLUTION SUBCUTANEOUS at 21:25

## 2024-11-21 ASSESSMENT — LIFESTYLE VARIABLES
HOW MANY STANDARD DRINKS CONTAINING ALCOHOL DO YOU HAVE ON A TYPICAL DAY: PATIENT DOES NOT DRINK
HOW OFTEN DO YOU HAVE A DRINK CONTAINING ALCOHOL: NEVER

## 2024-11-21 ASSESSMENT — PAIN SCALES - GENERAL: PAINLEVEL_OUTOF10: 0

## 2024-11-21 ASSESSMENT — HEART SCORE: ECG: NON-SPECIFC REPOLARIZATION DISTURBANCE/LBTB/PM

## 2024-11-21 NOTE — ED NOTES
ED TO INPATIENT SBAR HANDOFF    Patient Name: Nicci Hernandez   Preferred Name: Nicci  : 1960  64 y.o.   Family/Caregiver Present: no   Code Status Order: No Order  PO Status:   Telemetry Order:   C-SSRS: Risk of Suicide: No Risk  Sitter no   Restraints:   Sepsis Risk Score     Situation  Chief Complaint   Patient presents with    Shortness of Breath     SOB, states  pneumonia.      Brief Description of Patient's Condition: pt came in with complaints of sob and cough.  Mental Status: oriented, alert, and coherent  Arrived from:Home  Imaging:   CTA CHEST W WO CONTRAST PE Eval   Final Result   1. Probable acute bronchiolitis in the lower lungs.   2. Cardiomegaly, LV dilation. Chronic LV apical MI.   3. Mild hepatic steatosis. Possible cirrhosis.      Electronically signed by Indio Larios      XR CHEST PORTABLE   Final Result   Cardiomegaly, pulmonary vascular congestion, probable interstitial edema.      Electronically signed by Indio Larios        Abnormal labs:   Abnormal Labs Reviewed   CBC WITH AUTO DIFFERENTIAL - Abnormal; Notable for the following components:       Result Value    RDW 14.6 (*)     Neutrophils % 77 (*)     Lymphocytes Absolute 0.6 (*)     All other components within normal limits   COMPREHENSIVE METABOLIC PANEL - Abnormal; Notable for the following components:    Glucose 109 (*)     BUN 31 (*)     Creatinine 1.77 (*)     Est, Glom Filt Rate 32 (*)     AST 46 (*)     Albumin/Globulin Ratio 0.9 (*)     All other components within normal limits   TROPONIN - Abnormal; Notable for the following components:    Troponin, High Sensitivity 121 (*)     All other components within normal limits   TROPONIN - Abnormal; Notable for the following components:    Troponin, High Sensitivity 127 (*)     All other components within normal limits   BRAIN NATRIURETIC PEPTIDE - Abnormal; Notable for the following components:    NT Pro-BNP 12,355 (*)     All other components within normal limits

## 2024-11-21 NOTE — ED PROVIDER NOTES
[facility] EMERGENCY DEPT  EMERGENCY DEPARTMENT HISTORY AND PHYSICAL EXAM      Date: 11/21/2024  Patient Name: Nicci Hernandez  MRN: [id number]  YOB: 1960  Date of evaluation: 11/21/2024  Provider: ILIA Shah NP   Note Started: 3:07 PM EST 11/21/24    HISTORY OF PRESENT ILLNESS     Chief Complaint   Patient presents with    Shortness of Breath     SOB, states  pneumonia.        History Provided By: Patient    HPI: Nicci Hernandez is a 64 y.o. female past medical history reviewed as listed below presents with 1 week history of cough productive of clear sputum, fatigue, nausea and diarrhea.  No known fever.   is currently admitted with pneumonia.  Saw her PCP 3 days ago with negative COVID test at that time.  Has chest x-ray ordered by her PCP however she was feeling so bad she came to the ER.  Denies any chest pain but does have shortness of breath that increases with activity and lying flat.  No improvement with over-the-counter cough medications.    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

## 2024-11-21 NOTE — H&P
Differential Type AUTOMATED     Comprehensive Metabolic Panel    Collection Time: 11/21/24  2:57 PM   Result Value Ref Range    Sodium 139 136 - 145 mmol/L    Potassium 4.0 3.5 - 5.1 mmol/L    Chloride 106 97 - 108 mmol/L    CO2 26 21 - 32 mmol/L    Anion Gap 7 2 - 12 mmol/L    Glucose 109 (H) 65 - 100 mg/dL    BUN 31 (H) 6 - 20 mg/dL    Creatinine 1.77 (H) 0.55 - 1.02 mg/dL    BUN/Creatinine Ratio 18 12 - 20      Est, Glom Filt Rate 32 (L) >60 ml/min/1.73m2    Calcium 9.4 8.5 - 10.1 mg/dL    Total Bilirubin 0.7 0.2 - 1.0 mg/dL    AST 46 (H) 15 - 37 U/L    ALT 29 12 - 78 U/L    Alk Phosphatase 83 45 - 117 U/L    Total Protein 7.6 6.4 - 8.2 g/dL    Albumin 3.7 3.5 - 5.0 g/dL    Globulin 3.9 2.0 - 4.0 g/dL    Albumin/Globulin Ratio 0.9 (L) 1.1 - 2.2     Magnesium    Collection Time: 11/21/24  2:57 PM   Result Value Ref Range    Magnesium 1.9 1.6 - 2.4 mg/dL   Troponin “IF” patient is greater than 40 years of age or has a history of cardiac disease.    Collection Time: 11/21/24  2:57 PM   Result Value Ref Range    Troponin, High Sensitivity 121 (HH) 0 - 51 ng/L   Troponin    Collection Time: 11/21/24  3:56 PM   Result Value Ref Range    Troponin, High Sensitivity 127 (HH) 0 - 51 ng/L   Brain Natriuretic Peptide    Collection Time: 11/21/24  3:56 PM   Result Value Ref Range    NT Pro-BNP 12,355 (H) <125 pg/mL   COVID-19 & Influenza Combo    Collection Time: 11/21/24  4:50 PM    Specimen: Nasopharyngeal   Result Value Ref Range    SARS-CoV-2, PCR Not Detected Not Detected      Rapid Influenza A By PCR Not Detected Not Detected      Rapid Influenza B By PCR Not Detected Not Detected           CT Result (most recent):  CTA CHEST W WO CONTRAST 11/21/2024    Narrative  CT ANGIOGRAPHY CHEST. 11/21/2024 4:52 PM    INDICATION: Pulmonary embolism.    COMPARISON: None.    TECHNIQUE: CT angiography of the chest was performed after the administration of  100 cc IV Isovue 370. Coronal and sagittal, and coronal MIP reconstructions

## 2024-11-21 NOTE — ED TRIAGE NOTES
Shortness of breathe, thinks she has pneumonia. Has a chest xray ordered but wanted to come here instead.

## 2024-11-22 ENCOUNTER — APPOINTMENT (OUTPATIENT)
Facility: HOSPITAL | Age: 64
DRG: 280 | End: 2024-11-22
Payer: MEDICARE

## 2024-11-22 LAB
ANION GAP SERPL CALC-SCNC: 10 MMOL/L (ref 2–12)
BASOPHILS # BLD: 0 K/UL (ref 0–0.1)
BASOPHILS NFR BLD: 0 % (ref 0–1)
BUN SERPL-MCNC: 34 MG/DL (ref 6–20)
BUN/CREAT SERPL: 21 (ref 12–20)
CA-I BLD-MCNC: 9.3 MG/DL (ref 8.5–10.1)
CHLORIDE SERPL-SCNC: 104 MMOL/L (ref 97–108)
CHOLEST SERPL-MCNC: 147 MG/DL
CO2 SERPL-SCNC: 25 MMOL/L (ref 21–32)
CREAT SERPL-MCNC: 1.61 MG/DL (ref 0.55–1.02)
DIFFERENTIAL METHOD BLD: ABNORMAL
EKG ATRIAL RATE: 72 BPM
EKG DIAGNOSIS: NORMAL
EKG P AXIS: 70 DEGREES
EKG P-R INTERVAL: 176 MS
EKG Q-T INTERVAL: 472 MS
EKG QRS DURATION: 146 MS
EKG QTC CALCULATION (BAZETT): 516 MS
EKG R AXIS: -66 DEGREES
EKG T AXIS: 141 DEGREES
EKG VENTRICULAR RATE: 72 BPM
EOSINOPHIL # BLD: 0 K/UL (ref 0–0.4)
EOSINOPHIL NFR BLD: 0 % (ref 0–7)
ERYTHROCYTE [DISTWIDTH] IN BLOOD BY AUTOMATED COUNT: 14.4 % (ref 11.5–14.5)
EST. AVERAGE GLUCOSE BLD GHB EST-MCNC: 131 MG/DL
GLUCOSE BLD STRIP.AUTO-MCNC: 133 MG/DL (ref 65–100)
GLUCOSE BLD STRIP.AUTO-MCNC: 160 MG/DL (ref 65–100)
GLUCOSE BLD STRIP.AUTO-MCNC: 172 MG/DL (ref 65–100)
GLUCOSE BLD STRIP.AUTO-MCNC: 288 MG/DL (ref 65–100)
GLUCOSE SERPL-MCNC: 159 MG/DL (ref 65–100)
HBA1C MFR BLD: 6.2 % (ref 4–5.6)
HCT VFR BLD AUTO: 39.8 % (ref 35–47)
HDLC SERPL-MCNC: 25 MG/DL
HDLC SERPL: 5.9 (ref 0–5)
HGB BLD-MCNC: 12.6 G/DL (ref 11.5–16)
IMM GRANULOCYTES # BLD AUTO: 0 K/UL
IMM GRANULOCYTES NFR BLD AUTO: 0 %
LDLC SERPL CALC-MCNC: 105.6 MG/DL (ref 0–100)
LIPID PANEL: ABNORMAL
LYMPHOCYTES # BLD: 0.3 K/UL (ref 0.8–3.5)
LYMPHOCYTES NFR BLD: 11 % (ref 12–49)
MCH RBC QN AUTO: 29.2 PG (ref 26–34)
MCHC RBC AUTO-ENTMCNC: 31.7 G/DL (ref 30–36.5)
MCV RBC AUTO: 92.1 FL (ref 80–99)
MONOCYTES # BLD: 0.2 K/UL (ref 0–1)
MONOCYTES NFR BLD: 5 % (ref 5–13)
NEUTS SEG # BLD: 2.5 K/UL (ref 1.8–8)
NEUTS SEG NFR BLD: 84 % (ref 32–75)
NRBC # BLD: 0 K/UL (ref 0–0.01)
NRBC BLD-RTO: 0 PER 100 WBC
PERFORMED BY:: ABNORMAL
PLATELET # BLD AUTO: 176 K/UL (ref 150–400)
PMV BLD AUTO: 10.8 FL (ref 8.9–12.9)
POTASSIUM SERPL-SCNC: 3.7 MMOL/L (ref 3.5–5.1)
RBC # BLD AUTO: 4.32 M/UL (ref 3.8–5.2)
RBC MORPH BLD: ABNORMAL
SODIUM SERPL-SCNC: 139 MMOL/L (ref 136–145)
TRIGL SERPL-MCNC: 82 MG/DL
VLDLC SERPL CALC-MCNC: 16.4 MG/DL
WBC # BLD AUTO: 3 K/UL (ref 3.6–11)

## 2024-11-22 PROCEDURE — 36415 COLL VENOUS BLD VENIPUNCTURE: CPT

## 2024-11-22 PROCEDURE — 6370000000 HC RX 637 (ALT 250 FOR IP)

## 2024-11-22 PROCEDURE — 97530 THERAPEUTIC ACTIVITIES: CPT

## 2024-11-22 PROCEDURE — 93005 ELECTROCARDIOGRAM TRACING: CPT | Performed by: INTERNAL MEDICINE

## 2024-11-22 PROCEDURE — 2580000003 HC RX 258

## 2024-11-22 PROCEDURE — 80061 LIPID PANEL: CPT

## 2024-11-22 PROCEDURE — 97161 PT EVAL LOW COMPLEX 20 MIN: CPT

## 2024-11-22 PROCEDURE — 80048 BASIC METABOLIC PNL TOTAL CA: CPT

## 2024-11-22 PROCEDURE — 6370000000 HC RX 637 (ALT 250 FOR IP): Performed by: INTERNAL MEDICINE

## 2024-11-22 PROCEDURE — 87077 CULTURE AEROBIC IDENTIFY: CPT

## 2024-11-22 PROCEDURE — 85025 COMPLETE CBC W/AUTO DIFF WBC: CPT

## 2024-11-22 PROCEDURE — 2500000003 HC RX 250 WO HCPCS: Performed by: INTERNAL MEDICINE

## 2024-11-22 PROCEDURE — 6360000004 HC RX CONTRAST MEDICATION

## 2024-11-22 PROCEDURE — 87186 SC STD MICRODIL/AGAR DIL: CPT

## 2024-11-22 PROCEDURE — 87205 SMEAR GRAM STAIN: CPT

## 2024-11-22 PROCEDURE — 83036 HEMOGLOBIN GLYCOSYLATED A1C: CPT

## 2024-11-22 PROCEDURE — 82962 GLUCOSE BLOOD TEST: CPT

## 2024-11-22 PROCEDURE — 87070 CULTURE OTHR SPECIMN AEROBIC: CPT

## 2024-11-22 PROCEDURE — C8924 2D TTE W OR W/O FOL W/CON,FU: HCPCS

## 2024-11-22 PROCEDURE — 2060000000 HC ICU INTERMEDIATE R&B

## 2024-11-22 PROCEDURE — 6360000002 HC RX W HCPCS

## 2024-11-22 RX ORDER — PREDNISONE 20 MG/1
20 TABLET ORAL DAILY
Status: COMPLETED | OUTPATIENT
Start: 2024-11-22 | End: 2024-11-24

## 2024-11-22 RX ADMIN — WHITE PETROLATUM,ZINC OXIDE: 57; 17 PASTE TOPICAL at 12:29

## 2024-11-22 RX ADMIN — CLOPIDOGREL BISULFATE 75 MG: 75 TABLET ORAL at 09:14

## 2024-11-22 RX ADMIN — INSULIN LISPRO 4 UNITS: 100 INJECTION, SOLUTION INTRAVENOUS; SUBCUTANEOUS at 20:38

## 2024-11-22 RX ADMIN — INSULIN GLARGINE 24 UNITS: 100 INJECTION, SOLUTION SUBCUTANEOUS at 20:38

## 2024-11-22 RX ADMIN — GUAIFENESIN 600 MG: 600 TABLET, EXTENDED RELEASE ORAL at 20:38

## 2024-11-22 RX ADMIN — LOSARTAN POTASSIUM 25 MG: 50 TABLET, FILM COATED ORAL at 09:13

## 2024-11-22 RX ADMIN — PERFLUTREN 2 ML: 6.52 INJECTION, SUSPENSION INTRAVENOUS at 18:24

## 2024-11-22 RX ADMIN — ENOXAPARIN SODIUM 40 MG: 100 INJECTION SUBCUTANEOUS at 09:13

## 2024-11-22 RX ADMIN — SODIUM CHLORIDE, PRESERVATIVE FREE 10 ML: 5 INJECTION INTRAVENOUS at 09:16

## 2024-11-22 RX ADMIN — CARVEDILOL 3.12 MG: 3.12 TABLET, FILM COATED ORAL at 09:14

## 2024-11-22 RX ADMIN — PREDNISONE 20 MG: 20 TABLET ORAL at 15:18

## 2024-11-22 RX ADMIN — GUAIFENESIN 600 MG: 600 TABLET, EXTENDED RELEASE ORAL at 09:14

## 2024-11-22 RX ADMIN — PREGABALIN 75 MG: 75 CAPSULE ORAL at 09:14

## 2024-11-22 RX ADMIN — GUAIFENESIN AND CODEINE PHOSPHATE 5 ML: 100; 10 SOLUTION ORAL at 22:30

## 2024-11-22 RX ADMIN — GUAIFENESIN AND CODEINE PHOSPHATE 5 ML: 100; 10 SOLUTION ORAL at 09:12

## 2024-11-22 RX ADMIN — SPIRONOLACTONE 25 MG: 25 TABLET ORAL at 09:14

## 2024-11-22 RX ADMIN — TORSEMIDE 20 MG: 10 TABLET ORAL at 09:13

## 2024-11-22 RX ADMIN — TORSEMIDE 20 MG: 10 TABLET ORAL at 20:39

## 2024-11-22 RX ADMIN — CARVEDILOL 3.12 MG: 3.12 TABLET, FILM COATED ORAL at 20:39

## 2024-11-22 RX ADMIN — SODIUM CHLORIDE, PRESERVATIVE FREE 10 ML: 5 INJECTION INTRAVENOUS at 20:31

## 2024-11-22 RX ADMIN — GUAIFENESIN AND CODEINE PHOSPHATE 5 ML: 100; 10 SOLUTION ORAL at 17:25

## 2024-11-22 RX ADMIN — WHITE PETROLATUM,ZINC OXIDE: 57; 17 PASTE TOPICAL at 20:44

## 2024-11-22 RX ADMIN — ASPIRIN 81 MG: 81 TABLET, CHEWABLE ORAL at 09:14

## 2024-11-22 RX ADMIN — PREGABALIN 75 MG: 75 CAPSULE ORAL at 20:38

## 2024-11-22 ASSESSMENT — PAIN SCALES - GENERAL
PAINLEVEL_OUTOF10: 0

## 2024-11-22 NOTE — WOUND CARE
every 2 hours  Pillow/Wedge  Manage Incontinence  Promote Continence; Skin Protective lotion to buttocks and sacrum daily and as needed with incontinence care  Offload heels with pillows or offloading boots      Please reconsult WC for any changes in skin condition.

## 2024-11-22 NOTE — CARE COORDINATION
11/22/24 1617   Service Assessment   Patient Orientation Alert and Oriented   Cognition Alert   History Provided By Patient   Primary Caregiver Self   Support Systems Family Members;Spouse/Significant Other   Patient's Healthcare Decision Maker is: Legal Next of Kin   PCP Verified by CM Yes   Last Visit to PCP Within last 3 months   Prior Functional Level Independent in ADLs/IADLs   Current Functional Level Independent in ADLs/IADLs   Can patient return to prior living arrangement Unknown at present   Ability to make needs known: Good   Family able to assist with home care needs: Yes   Would you like for me to discuss the discharge plan with any other family members/significant others, and if so, who? Yes  (Spouse.)     Advance Care Planning     General Advance Care Planning (ACP) Conversation    Date of Conversation: 11/22/2024  Conducted with: Patient with Decision Making Capacity  Other persons present: None    Healthcare Decision Maker:   Primary Decision Maker: Maurilio Hernandez - Spouse - [phone]    Secondary Decision Maker: Narcisa High - Child - [phone]     Today we documented Decision Maker(s) consistent with Legal Next of Kin hierarchy.    Length of Voluntary ACP Conversation in minutes:  <16 minutes (Non-Billable)    GARCÍA Dorman      CM met w/ pt at bedside to discuss dc planning. Pt lives at home w/ spouse, charted above. Pt reports using a walker at home and is currently followed by [facility], referral sent. Hx at [facility] from last year. Charted PCP is correct, uses [facility]. CM team to follow.

## 2024-11-22 NOTE — CONSULTS
Cardiology Consult    NAME: Nicci Hernandez   :  1960   MRN:  [id number]     Date/Time:  2024 8:01 AM    Patient PCP: Franco Ross MD  ________________________________________________________________________     Assessment/Plan:    Cough, shortness of breath.  Known severe left ventricular systolic dysfunction.  Continue diuresis, monitor electrolytes closely, monitor renal function.  Was home on Entresto, subsequently switched to losartan with affordability issues.  Continue carvedilol.  Will restart losartan with stabilizing renal function.Continue spironolactone and torsemide.    Troponin leak, flat , known coronary artery disease status post three-vessel CABG  at  [facility].  Denies any chest pain though patient acknowledges she never had chest pain with her MIs.  Await repeat echo.  Will repeat EKG.    Hypertension, well-controlled.    Diabetes,  hemoglobin A1c at 9.6.  Farxiga was prescribed, again with affordability issues was stopped.    Dyslipidemia, check lipid profile.  Stop statins with aches and pains.?   PCSK9    Chronic kidney disease, improving creatinine.    Obesity    Probable respiratory infection/pneumonia, continue antibiotics.    Diarrhea, patient says has resolved since admission.             []        High complexity decision making was performed        Subjective:   CHIEF COMPLAINT:   Cough, shortness of breath    REASON FOR CONSULT:    Decompensated CHF  HISTORY OF PRESENT ILLNESS:     Nicci Hernandez is a 64 y.o. White (non-) female who is known to have severe left ventricular dysfunction and coronary artery disease presenting with cough and shortness of breath.  Patient  also was ill and in the hospital just transferred to [facility].  Patient has had grafting on the left toe.  Reports being relatively immobile.  Presents with symptoms suggestive of decompensation as well as and or  respiratory/pneumonia.        Past Medical History:   Diagnosis

## 2024-11-22 NOTE — CONSULTS
Podiatry Consult Note    Subjective:         Date of Consultation: November 22, 2024     Referring Physician: Martha Choudhary PA-C      Patient is a 64 y.o.  female who is being seen for evaluation of right foot wound.   Patient has a hx of CHF, CKD, CAD, type 2 diabetes, hyperlipidemia, hypertension, PVD presenting the ED with shortness of breath and cough.  Patient had a debridement and graft application by Dr. Hernandez.  Date of surgery 11/5/2024.    Patient Active Problem List    Diagnosis Date Noted    Decompensated heart failure (HCC) 11/21/2024    Body mass index [BMI] 39.0-39.9, adult (Z68.39) 10/15/2024    Polyneuropathy 08/05/2024    Left hip pain 12/17/2022    Elevated uric acid in blood 12/17/2022    Cardiomyopathy, ischemic 12/17/2022    Hypoalbuminemia 12/17/2022    Presence of stent in coronary artery in patient with coronary artery disease 12/17/2022    Hypercholesterolemia 12/17/2022    PVD (peripheral vascular disease) (HCC) 12/17/2022    Anxiety 11/14/2022    Carpal tunnel syndrome 11/14/2022    Edema 11/14/2022    Stage 3b chronic kidney disease (HCC) 07/17/2022    Chronic total occlusion of coronary artery 07/20/2021    Chronic systolic (congestive) heart failure (HCC) 07/20/2021    Hypertensive heart disease with heart failure (HCC) 07/20/2021    Obstructive sleep apnea (adult) (pediatric) 07/20/2021    Atherosclerosis of coronary artery bypass graft(s) without angina pectoris 07/20/2021    Old myocardial infarction 07/20/2021    Diabetic gastroparesis (HCC) 09/21/2020    Type 2 diabetes mellitus with diabetic neuropathy (HCC) 01/07/2019    Severe obesity 01/07/2019    Type 2 diabetes mellitus with hyperglycemia, with long-term current use of insulin (HCC) 01/07/2019    Essential hypertension 11/10/2016    Mixed hyperlipidemia 11/10/2016     Past Medical History:   Diagnosis Date    Arthritis     Bilateral lower leg cellulitis     CAD (coronary artery disease) 2009    Hx of 2 MI's and

## 2024-11-23 ENCOUNTER — APPOINTMENT (OUTPATIENT)
Facility: HOSPITAL | Age: 64
DRG: 280 | End: 2024-11-23
Payer: MEDICARE

## 2024-11-23 LAB
ANION GAP SERPL CALC-SCNC: 7 MMOL/L (ref 2–12)
BASOPHILS # BLD: 0 K/UL (ref 0–0.1)
BASOPHILS NFR BLD: 0 % (ref 0–1)
BUN SERPL-MCNC: 48 MG/DL (ref 6–20)
BUN/CREAT SERPL: 25 (ref 12–20)
CA-I BLD-MCNC: 9 MG/DL (ref 8.5–10.1)
CHLORIDE SERPL-SCNC: 109 MMOL/L (ref 97–108)
CO2 SERPL-SCNC: 26 MMOL/L (ref 21–32)
CREAT SERPL-MCNC: 1.92 MG/DL (ref 0.55–1.02)
DIFFERENTIAL METHOD BLD: NORMAL
ECHO AO ASC DIAM: 2.5 CM
ECHO AO ASCENDING AORTA INDEX: 1.29 CM/M2
ECHO AO ROOT DIAM: 2.7 CM
ECHO AO ROOT INDEX: 1.39 CM/M2
ECHO AV AREA PEAK VELOCITY: 0.8 CM2
ECHO AV AREA VTI: 0.7 CM2
ECHO AV AREA/BSA PEAK VELOCITY: 0.4 CM2/M2
ECHO AV AREA/BSA VTI: 0.4 CM2/M2
ECHO AV MEAN GRADIENT: 3 MMHG
ECHO AV MEAN VELOCITY: 0.8 M/S
ECHO AV PEAK GRADIENT: 6 MMHG
ECHO AV PEAK VELOCITY: 1.2 M/S
ECHO AV VELOCITY RATIO: 0.5
ECHO AV VTI: 24 CM
ECHO BSA: 2.05 M2
ECHO EST RA PRESSURE: 8 MMHG
ECHO IVC EXP: 2.2 CM
ECHO LA AREA 4C: 22.4 CM2
ECHO LA DIAMETER INDEX: 1.96 CM/M2
ECHO LA DIAMETER: 3.8 CM
ECHO LA MAJOR AXIS: 6.1 CM
ECHO LA TO AORTIC ROOT RATIO: 1.41
ECHO LA VOL MOD A4C: 66 ML (ref 22–52)
ECHO LA VOLUME INDEX MOD A4C: 34 ML/M2 (ref 16–34)
ECHO LV EDV A2C: 213 ML
ECHO LV EDV A4C: 210 ML
ECHO LV EDV INDEX A4C: 108 ML/M2
ECHO LV EDV NDEX A2C: 110 ML/M2
ECHO LV EJECTION FRACTION A2C: 15 %
ECHO LV EJECTION FRACTION A4C: 16 %
ECHO LV EJECTION FRACTION BIPLANE: 17 % (ref 55–100)
ECHO LV ESV A2C: 182 ML
ECHO LV ESV A4C: 176 ML
ECHO LV ESV INDEX A2C: 94 ML/M2
ECHO LV ESV INDEX A4C: 91 ML/M2
ECHO LV FRACTIONAL SHORTENING: 14 % (ref 28–44)
ECHO LV INTERNAL DIMENSION DIASTOLE INDEX: 2.89 CM/M2
ECHO LV INTERNAL DIMENSION DIASTOLIC: 5.6 CM (ref 3.9–5.3)
ECHO LV INTERNAL DIMENSION SYSTOLIC INDEX: 2.47 CM/M2
ECHO LV INTERNAL DIMENSION SYSTOLIC: 4.8 CM
ECHO LV IVSD: 1.1 CM (ref 0.6–0.9)
ECHO LV MASS 2D: 296.6 G (ref 67–162)
ECHO LV MASS INDEX 2D: 152.9 G/M2 (ref 43–95)
ECHO LV POSTERIOR WALL DIASTOLIC: 1.4 CM (ref 0.6–0.9)
ECHO LV RELATIVE WALL THICKNESS RATIO: 0.5
ECHO LVOT AREA: 1.5 CM2
ECHO LVOT AV VTI INDEX: 0.48
ECHO LVOT DIAM: 1.4 CM
ECHO LVOT MEAN GRADIENT: 1 MMHG
ECHO LVOT PEAK GRADIENT: 1 MMHG
ECHO LVOT PEAK VELOCITY: 0.6 M/S
ECHO LVOT STROKE VOLUME INDEX: 9.1 ML/M2
ECHO LVOT SV: 17.7 ML
ECHO LVOT VTI: 11.5 CM
ECHO MV AREA VTI: 0.6 CM2
ECHO MV LVOT VTI INDEX: 2.43
ECHO MV MAX VELOCITY: 1.3 M/S
ECHO MV MEAN GRADIENT: 3 MMHG
ECHO MV MEAN VELOCITY: 0.8 M/S
ECHO MV PEAK GRADIENT: 7 MMHG
ECHO MV REGURGITANT PEAK GRADIENT: 71 MMHG
ECHO MV REGURGITANT PEAK VELOCITY: 4.2 M/S
ECHO MV REGURGITANT VTIA: 152 CM
ECHO MV VTI: 28 CM
ECHO PULMONARY ARTERY END DIASTOLIC PRESSURE: 4 MMHG
ECHO PV ACCELERATION TIME (AT): 108 MS
ECHO PV MAX VELOCITY: 0.5 M/S
ECHO PV PEAK GRADIENT: 1 MMHG
ECHO PV REGURGITANT MAX VELOCITY: 1 M/S
ECHO RIGHT VENTRICULAR SYSTOLIC PRESSURE (RVSP): 38 MMHG
ECHO RV FREE WALL PEAK S': 5 CM/S
ECHO RV TAPSE: 1.2 CM (ref 1.7–?)
ECHO TV REGURGITANT MAX VELOCITY: 2.74 M/S
ECHO TV REGURGITANT PEAK GRADIENT: 30 MMHG
EOSINOPHIL # BLD: 0 K/UL (ref 0–0.4)
EOSINOPHIL NFR BLD: 0 % (ref 0–7)
ERYTHROCYTE [DISTWIDTH] IN BLOOD BY AUTOMATED COUNT: 14.3 % (ref 11.5–14.5)
GLUCOSE BLD STRIP.AUTO-MCNC: 150 MG/DL (ref 65–100)
GLUCOSE BLD STRIP.AUTO-MCNC: 176 MG/DL (ref 65–100)
GLUCOSE BLD STRIP.AUTO-MCNC: 214 MG/DL (ref 65–100)
GLUCOSE BLD STRIP.AUTO-MCNC: 267 MG/DL (ref 65–100)
GLUCOSE SERPL-MCNC: 161 MG/DL (ref 65–100)
HCT VFR BLD AUTO: 40.5 % (ref 35–47)
HGB BLD-MCNC: 12.5 G/DL (ref 11.5–16)
IMM GRANULOCYTES # BLD AUTO: 0 K/UL
IMM GRANULOCYTES NFR BLD AUTO: 0 %
LYMPHOCYTES # BLD: 1.2 K/UL (ref 0.8–3.5)
LYMPHOCYTES NFR BLD: 22 % (ref 12–49)
MAGNESIUM SERPL-MCNC: 2.2 MG/DL (ref 1.6–2.4)
MCH RBC QN AUTO: 28.6 PG (ref 26–34)
MCHC RBC AUTO-ENTMCNC: 30.9 G/DL (ref 30–36.5)
MCV RBC AUTO: 92.7 FL (ref 80–99)
MONOCYTES # BLD: 0.4 K/UL (ref 0–1)
MONOCYTES NFR BLD: 7 % (ref 5–13)
NEUTS SEG # BLD: 3.7 K/UL (ref 1.8–8)
NEUTS SEG NFR BLD: 71 % (ref 32–75)
NRBC # BLD: 0 K/UL (ref 0–0.01)
NRBC BLD-RTO: 0 PER 100 WBC
PERFORMED BY:: ABNORMAL
PLATELET # BLD AUTO: 192 K/UL (ref 150–400)
PMV BLD AUTO: 10.9 FL (ref 8.9–12.9)
POTASSIUM SERPL-SCNC: 3.8 MMOL/L (ref 3.5–5.1)
RBC # BLD AUTO: 4.37 M/UL (ref 3.8–5.2)
RBC MORPH BLD: NORMAL
SODIUM SERPL-SCNC: 142 MMOL/L (ref 136–145)
WBC # BLD AUTO: 5.3 K/UL (ref 3.6–11)
WBC MORPH BLD: NORMAL

## 2024-11-23 PROCEDURE — 6370000000 HC RX 637 (ALT 250 FOR IP): Performed by: INTERNAL MEDICINE

## 2024-11-23 PROCEDURE — 94640 AIRWAY INHALATION TREATMENT: CPT

## 2024-11-23 PROCEDURE — 36415 COLL VENOUS BLD VENIPUNCTURE: CPT

## 2024-11-23 PROCEDURE — 94761 N-INVAS EAR/PLS OXIMETRY MLT: CPT

## 2024-11-23 PROCEDURE — 2060000000 HC ICU INTERMEDIATE R&B

## 2024-11-23 PROCEDURE — 6370000000 HC RX 637 (ALT 250 FOR IP)

## 2024-11-23 PROCEDURE — 80048 BASIC METABOLIC PNL TOTAL CA: CPT

## 2024-11-23 PROCEDURE — 71045 X-RAY EXAM CHEST 1 VIEW: CPT

## 2024-11-23 PROCEDURE — 6360000002 HC RX W HCPCS: Performed by: INTERNAL MEDICINE

## 2024-11-23 PROCEDURE — 6360000002 HC RX W HCPCS

## 2024-11-23 PROCEDURE — 83735 ASSAY OF MAGNESIUM: CPT

## 2024-11-23 PROCEDURE — 85025 COMPLETE CBC W/AUTO DIFF WBC: CPT

## 2024-11-23 PROCEDURE — 2580000003 HC RX 258

## 2024-11-23 PROCEDURE — 6370000000 HC RX 637 (ALT 250 FOR IP): Performed by: PHYSICIAN ASSISTANT

## 2024-11-23 PROCEDURE — 82962 GLUCOSE BLOOD TEST: CPT

## 2024-11-23 RX ORDER — IPRATROPIUM BROMIDE AND ALBUTEROL SULFATE 2.5; .5 MG/3ML; MG/3ML
1 SOLUTION RESPIRATORY (INHALATION)
Status: DISCONTINUED | OUTPATIENT
Start: 2024-11-23 | End: 2024-11-23

## 2024-11-23 RX ORDER — IPRATROPIUM BROMIDE AND ALBUTEROL SULFATE 2.5; .5 MG/3ML; MG/3ML
1 SOLUTION RESPIRATORY (INHALATION)
Status: DISCONTINUED | OUTPATIENT
Start: 2024-11-23 | End: 2024-11-29

## 2024-11-23 RX ORDER — IPRATROPIUM BROMIDE AND ALBUTEROL SULFATE 2.5; .5 MG/3ML; MG/3ML
1 SOLUTION RESPIRATORY (INHALATION) EVERY 4 HOURS PRN
Status: DISCONTINUED | OUTPATIENT
Start: 2024-11-23 | End: 2024-12-01 | Stop reason: HOSPADM

## 2024-11-23 RX ORDER — DOBUTAMINE HYDROCHLORIDE 200 MG/100ML
5 INJECTION INTRAVENOUS CONTINUOUS
Status: DISCONTINUED | OUTPATIENT
Start: 2024-11-23 | End: 2024-11-27

## 2024-11-23 RX ADMIN — INSULIN LISPRO 2 UNITS: 100 INJECTION, SOLUTION INTRAVENOUS; SUBCUTANEOUS at 11:27

## 2024-11-23 RX ADMIN — GUAIFENESIN 600 MG: 600 TABLET, EXTENDED RELEASE ORAL at 08:54

## 2024-11-23 RX ADMIN — INSULIN GLARGINE 24 UNITS: 100 INJECTION, SOLUTION SUBCUTANEOUS at 20:51

## 2024-11-23 RX ADMIN — CLOPIDOGREL BISULFATE 75 MG: 75 TABLET ORAL at 08:52

## 2024-11-23 RX ADMIN — Medication 1 LOZENGE: at 09:03

## 2024-11-23 RX ADMIN — IPRATROPIUM BROMIDE AND ALBUTEROL SULFATE 1 DOSE: 2.5; .5 SOLUTION RESPIRATORY (INHALATION) at 08:25

## 2024-11-23 RX ADMIN — WHITE PETROLATUM,ZINC OXIDE: 57; 17 PASTE TOPICAL at 09:03

## 2024-11-23 RX ADMIN — LOSARTAN POTASSIUM 25 MG: 50 TABLET, FILM COATED ORAL at 08:53

## 2024-11-23 RX ADMIN — DOBUTAMINE HYDROCHLORIDE 5 MCG/KG/MIN: 200 INJECTION INTRAVENOUS at 18:08

## 2024-11-23 RX ADMIN — PREDNISONE 20 MG: 20 TABLET ORAL at 08:53

## 2024-11-23 RX ADMIN — WHITE PETROLATUM,ZINC OXIDE: 57; 17 PASTE TOPICAL at 20:44

## 2024-11-23 RX ADMIN — IPRATROPIUM BROMIDE AND ALBUTEROL SULFATE 1 DOSE: 2.5; .5 SOLUTION RESPIRATORY (INHALATION) at 20:21

## 2024-11-23 RX ADMIN — GUAIFENESIN AND CODEINE PHOSPHATE 5 ML: 100; 10 SOLUTION ORAL at 03:50

## 2024-11-23 RX ADMIN — GUAIFENESIN AND CODEINE PHOSPHATE 5 ML: 100; 10 SOLUTION ORAL at 15:54

## 2024-11-23 RX ADMIN — ENOXAPARIN SODIUM 40 MG: 100 INJECTION SUBCUTANEOUS at 08:51

## 2024-11-23 RX ADMIN — CARVEDILOL 3.12 MG: 3.12 TABLET, FILM COATED ORAL at 20:42

## 2024-11-23 RX ADMIN — ASPIRIN 81 MG: 81 TABLET, CHEWABLE ORAL at 08:54

## 2024-11-23 RX ADMIN — SODIUM CHLORIDE, PRESERVATIVE FREE 5 ML: 5 INJECTION INTRAVENOUS at 08:58

## 2024-11-23 RX ADMIN — GUAIFENESIN 600 MG: 600 TABLET, EXTENDED RELEASE ORAL at 20:42

## 2024-11-23 RX ADMIN — SPIRONOLACTONE 25 MG: 25 TABLET ORAL at 08:53

## 2024-11-23 RX ADMIN — SODIUM CHLORIDE, PRESERVATIVE FREE 10 ML: 5 INJECTION INTRAVENOUS at 20:46

## 2024-11-23 RX ADMIN — CARVEDILOL 3.12 MG: 3.12 TABLET, FILM COATED ORAL at 08:54

## 2024-11-23 RX ADMIN — GUAIFENESIN AND CODEINE PHOSPHATE 5 ML: 100; 10 SOLUTION ORAL at 23:13

## 2024-11-23 RX ADMIN — TORSEMIDE 20 MG: 10 TABLET ORAL at 20:42

## 2024-11-23 RX ADMIN — POLYETHYLENE GLYCOL 3350 17 G: 17 POWDER, FOR SOLUTION ORAL at 08:51

## 2024-11-23 RX ADMIN — PREGABALIN 75 MG: 75 CAPSULE ORAL at 08:52

## 2024-11-23 RX ADMIN — INSULIN LISPRO 4 UNITS: 100 INJECTION, SOLUTION INTRAVENOUS; SUBCUTANEOUS at 15:54

## 2024-11-23 RX ADMIN — PREGABALIN 75 MG: 75 CAPSULE ORAL at 20:41

## 2024-11-23 RX ADMIN — GUAIFENESIN AND CODEINE PHOSPHATE 5 ML: 100; 10 SOLUTION ORAL at 08:50

## 2024-11-23 RX ADMIN — TORSEMIDE 20 MG: 10 TABLET ORAL at 08:52

## 2024-11-23 ASSESSMENT — PAIN SCALES - GENERAL
PAINLEVEL_OUTOF10: 0

## 2024-11-23 NOTE — RT PROTOCOL NOTE
RT Inhaler-Nebulizer Bronchodilator Protocol Note    There is a bronchodilator order in the chart from a provider indicating to follow the RT Bronchodilator Protocol and there is an “Initiate RT Inhaler-Nebulizer Bronchodilator Protocol” order as well (see protocol at bottom of note).    CXR Findings:  XR CHEST PORTABLE    Result Date: 11/23/2024  Increased mild pulmonary edema. Electronically signed by Jm Madsen    XR CHEST PORTABLE    Result Date: 11/21/2024  Cardiomegaly, pulmonary vascular congestion, probable interstitial edema. Electronically signed by Indio Larios      The findings from the last RT Protocol Assessment were as follows:   History Pulmonary Disease: None or smoker <15 pack years  Respiratory Pattern: Dyspnea on exertion or RR 21-25 bpm  Breath Sounds: Slightly diminished and/or crackles  Cough: Strong, productive  Indication for Bronchodilator Therapy:    Bronchodilator Assessment Score: 5    Aerosolized bronchodilator medication orders have been revised according to the RT Inhaler-Nebulizer Bronchodilator Protocol below.    Respiratory Therapist to perform RT Therapy Protocol Assessment initially then follow the protocol.  Repeat RT Therapy Protocol Assessment PRN for score 0-3 or on second treatment, BID, and PRN for scores above 3.    No Indications - adjust the frequency to every 6 hours PRN wheezing or bronchospasm, if no treatments needed after 48 hours then discontinue using Per Protocol order mode.     If indication present, adjust the RT bronchodilator orders based on the Bronchodilator Assessment Score as indicated below.  Use Inhaler orders unless patient has one or more of the following: on home nebulizer, not able to hold breath for 10 seconds, is not alert and oriented, cannot activate and use MDI correctly, or respiratory rate 25 breaths per minute or more, then use the equivalent nebulizer order(s) with same Frequency and PRN reasons based on the score.  If a patient is

## 2024-11-24 LAB
ANION GAP SERPL CALC-SCNC: 7 MMOL/L (ref 2–12)
BASOPHILS # BLD: 0 K/UL (ref 0–0.1)
BASOPHILS NFR BLD: 0 % (ref 0–1)
BUN SERPL-MCNC: 52 MG/DL (ref 6–20)
BUN/CREAT SERPL: 27 (ref 12–20)
CA-I BLD-MCNC: 9 MG/DL (ref 8.5–10.1)
CHLORIDE SERPL-SCNC: 105 MMOL/L (ref 97–108)
CO2 SERPL-SCNC: 28 MMOL/L (ref 21–32)
CREAT SERPL-MCNC: 1.95 MG/DL (ref 0.55–1.02)
DIFFERENTIAL METHOD BLD: ABNORMAL
EOSINOPHIL # BLD: 0 K/UL (ref 0–0.4)
EOSINOPHIL NFR BLD: 0 % (ref 0–7)
ERYTHROCYTE [DISTWIDTH] IN BLOOD BY AUTOMATED COUNT: 14.6 % (ref 11.5–14.5)
GLUCOSE BLD STRIP.AUTO-MCNC: 104 MG/DL (ref 65–100)
GLUCOSE BLD STRIP.AUTO-MCNC: 155 MG/DL (ref 65–100)
GLUCOSE BLD STRIP.AUTO-MCNC: 303 MG/DL (ref 65–100)
GLUCOSE BLD STRIP.AUTO-MCNC: 350 MG/DL (ref 65–100)
GLUCOSE SERPL-MCNC: 98 MG/DL (ref 65–100)
HCT VFR BLD AUTO: 41.5 % (ref 35–47)
HGB BLD-MCNC: 13.4 G/DL (ref 11.5–16)
IMM GRANULOCYTES # BLD AUTO: 0 K/UL
IMM GRANULOCYTES NFR BLD AUTO: 0 %
LYMPHOCYTES # BLD: 2.1 K/UL (ref 0.8–3.5)
LYMPHOCYTES NFR BLD: 27 % (ref 12–49)
MCH RBC QN AUTO: 29.2 PG (ref 26–34)
MCHC RBC AUTO-ENTMCNC: 32.3 G/DL (ref 30–36.5)
MCV RBC AUTO: 90.4 FL (ref 80–99)
MONOCYTES # BLD: 0.4 K/UL (ref 0–1)
MONOCYTES NFR BLD: 5 % (ref 5–13)
NEUTS SEG # BLD: 5.4 K/UL (ref 1.8–8)
NEUTS SEG NFR BLD: 68 % (ref 32–75)
NRBC # BLD: 0 K/UL (ref 0–0.01)
NRBC BLD-RTO: 0 PER 100 WBC
PERFORMED BY:: ABNORMAL
PLATELET # BLD AUTO: 231 K/UL (ref 150–400)
PMV BLD AUTO: 11 FL (ref 8.9–12.9)
POTASSIUM SERPL-SCNC: 3.6 MMOL/L (ref 3.5–5.1)
RBC # BLD AUTO: 4.59 M/UL (ref 3.8–5.2)
RBC MORPH BLD: ABNORMAL
SODIUM SERPL-SCNC: 140 MMOL/L (ref 136–145)
WBC # BLD AUTO: 7.9 K/UL (ref 3.6–11)

## 2024-11-24 PROCEDURE — 2060000000 HC ICU INTERMEDIATE R&B

## 2024-11-24 PROCEDURE — 2500000003 HC RX 250 WO HCPCS: Performed by: INTERNAL MEDICINE

## 2024-11-24 PROCEDURE — 85025 COMPLETE CBC W/AUTO DIFF WBC: CPT

## 2024-11-24 PROCEDURE — 6370000000 HC RX 637 (ALT 250 FOR IP): Performed by: INTERNAL MEDICINE

## 2024-11-24 PROCEDURE — 6360000002 HC RX W HCPCS: Performed by: INTERNAL MEDICINE

## 2024-11-24 PROCEDURE — 6370000000 HC RX 637 (ALT 250 FOR IP)

## 2024-11-24 PROCEDURE — 94640 AIRWAY INHALATION TREATMENT: CPT

## 2024-11-24 PROCEDURE — 6360000002 HC RX W HCPCS

## 2024-11-24 PROCEDURE — 80048 BASIC METABOLIC PNL TOTAL CA: CPT

## 2024-11-24 PROCEDURE — 94761 N-INVAS EAR/PLS OXIMETRY MLT: CPT

## 2024-11-24 PROCEDURE — 82962 GLUCOSE BLOOD TEST: CPT

## 2024-11-24 PROCEDURE — 2580000003 HC RX 258

## 2024-11-24 PROCEDURE — 36415 COLL VENOUS BLD VENIPUNCTURE: CPT

## 2024-11-24 RX ADMIN — SODIUM CHLORIDE, PRESERVATIVE FREE 5 ML: 5 INJECTION INTRAVENOUS at 08:41

## 2024-11-24 RX ADMIN — PREGABALIN 75 MG: 75 CAPSULE ORAL at 08:33

## 2024-11-24 RX ADMIN — GUAIFENESIN 600 MG: 600 TABLET, EXTENDED RELEASE ORAL at 21:56

## 2024-11-24 RX ADMIN — CARVEDILOL 3.12 MG: 3.12 TABLET, FILM COATED ORAL at 08:34

## 2024-11-24 RX ADMIN — GUAIFENESIN AND CODEINE PHOSPHATE 5 ML: 100; 10 SOLUTION ORAL at 21:56

## 2024-11-24 RX ADMIN — LOSARTAN POTASSIUM 25 MG: 50 TABLET, FILM COATED ORAL at 08:34

## 2024-11-24 RX ADMIN — PREGABALIN 75 MG: 75 CAPSULE ORAL at 22:00

## 2024-11-24 RX ADMIN — TORSEMIDE 20 MG: 10 TABLET ORAL at 08:34

## 2024-11-24 RX ADMIN — IPRATROPIUM BROMIDE AND ALBUTEROL SULFATE 1 DOSE: 2.5; .5 SOLUTION RESPIRATORY (INHALATION) at 20:02

## 2024-11-24 RX ADMIN — INSULIN GLARGINE 24 UNITS: 100 INJECTION, SOLUTION SUBCUTANEOUS at 21:57

## 2024-11-24 RX ADMIN — INSULIN LISPRO 6 UNITS: 100 INJECTION, SOLUTION INTRAVENOUS; SUBCUTANEOUS at 16:23

## 2024-11-24 RX ADMIN — PREDNISONE 20 MG: 20 TABLET ORAL at 08:33

## 2024-11-24 RX ADMIN — GUAIFENESIN AND CODEINE PHOSPHATE 5 ML: 100; 10 SOLUTION ORAL at 07:20

## 2024-11-24 RX ADMIN — GUAIFENESIN 600 MG: 600 TABLET, EXTENDED RELEASE ORAL at 08:33

## 2024-11-24 RX ADMIN — SPIRONOLACTONE 25 MG: 25 TABLET ORAL at 08:33

## 2024-11-24 RX ADMIN — WHITE PETROLATUM,ZINC OXIDE: 57; 17 PASTE TOPICAL at 08:42

## 2024-11-24 RX ADMIN — ASPIRIN 81 MG: 81 TABLET, CHEWABLE ORAL at 08:34

## 2024-11-24 RX ADMIN — DOBUTAMINE HYDROCHLORIDE 5 MCG/KG/MIN: 200 INJECTION INTRAVENOUS at 12:32

## 2024-11-24 RX ADMIN — IPRATROPIUM BROMIDE AND ALBUTEROL SULFATE 1 DOSE: 2.5; .5 SOLUTION RESPIRATORY (INHALATION) at 09:48

## 2024-11-24 RX ADMIN — TORSEMIDE 20 MG: 10 TABLET ORAL at 21:56

## 2024-11-24 RX ADMIN — SODIUM CHLORIDE, PRESERVATIVE FREE 10 ML: 5 INJECTION INTRAVENOUS at 21:56

## 2024-11-24 RX ADMIN — CARVEDILOL 3.12 MG: 3.12 TABLET, FILM COATED ORAL at 21:56

## 2024-11-24 RX ADMIN — ENOXAPARIN SODIUM 40 MG: 100 INJECTION SUBCUTANEOUS at 08:33

## 2024-11-24 RX ADMIN — INSULIN LISPRO 8 UNITS: 100 INJECTION, SOLUTION INTRAVENOUS; SUBCUTANEOUS at 21:57

## 2024-11-24 RX ADMIN — CLOPIDOGREL BISULFATE 75 MG: 75 TABLET ORAL at 08:34

## 2024-11-24 ASSESSMENT — PAIN SCALES - GENERAL
PAINLEVEL_OUTOF10: 0

## 2024-11-25 LAB
ANION GAP SERPL CALC-SCNC: 8 MMOL/L (ref 2–12)
BACTERIA SPEC CULT: NORMAL
BASOPHILS # BLD: 0 K/UL (ref 0–0.1)
BASOPHILS NFR BLD: 0 % (ref 0–1)
BUN SERPL-MCNC: 54 MG/DL (ref 6–20)
BUN/CREAT SERPL: 29 (ref 12–20)
CA-I BLD-MCNC: 9.4 MG/DL (ref 8.5–10.1)
CHLORIDE SERPL-SCNC: 103 MMOL/L (ref 97–108)
CO2 SERPL-SCNC: 28 MMOL/L (ref 21–32)
CREAT SERPL-MCNC: 1.86 MG/DL (ref 0.55–1.02)
DIFFERENTIAL METHOD BLD: NORMAL
EOSINOPHIL # BLD: 0 K/UL (ref 0–0.4)
EOSINOPHIL NFR BLD: 0 % (ref 0–7)
ERYTHROCYTE [DISTWIDTH] IN BLOOD BY AUTOMATED COUNT: 14.3 % (ref 11.5–14.5)
GLUCOSE BLD STRIP.AUTO-MCNC: 161 MG/DL (ref 65–100)
GLUCOSE BLD STRIP.AUTO-MCNC: 176 MG/DL (ref 65–100)
GLUCOSE BLD STRIP.AUTO-MCNC: 192 MG/DL (ref 65–100)
GLUCOSE BLD STRIP.AUTO-MCNC: 242 MG/DL (ref 65–100)
GLUCOSE SERPL-MCNC: 148 MG/DL (ref 65–100)
GRAM STN SPEC: NORMAL
GRAM STN SPEC: NORMAL
HCT VFR BLD AUTO: 42.1 % (ref 35–47)
HGB BLD-MCNC: 13.6 G/DL (ref 11.5–16)
IMM GRANULOCYTES # BLD AUTO: 0 K/UL (ref 0–0.04)
IMM GRANULOCYTES NFR BLD AUTO: 0 % (ref 0–0.5)
LYMPHOCYTES # BLD: 1.1 K/UL (ref 0.8–3.5)
LYMPHOCYTES NFR BLD: 17 % (ref 12–49)
Lab: NORMAL
MCH RBC QN AUTO: 28.8 PG (ref 26–34)
MCHC RBC AUTO-ENTMCNC: 32.3 G/DL (ref 30–36.5)
MCV RBC AUTO: 89.2 FL (ref 80–99)
MONOCYTES # BLD: 0.6 K/UL (ref 0–1)
MONOCYTES NFR BLD: 10 % (ref 5–13)
NEUTS SEG # BLD: 4.7 K/UL (ref 1.8–8)
NEUTS SEG NFR BLD: 73 % (ref 32–75)
NRBC # BLD: 0 K/UL (ref 0–0.01)
NRBC BLD-RTO: 0 PER 100 WBC
PERFORMED BY:: ABNORMAL
PLATELET # BLD AUTO: 227 K/UL (ref 150–400)
PMV BLD AUTO: 10.9 FL (ref 8.9–12.9)
POTASSIUM SERPL-SCNC: 3.4 MMOL/L (ref 3.5–5.1)
RBC # BLD AUTO: 4.72 M/UL (ref 3.8–5.2)
SODIUM SERPL-SCNC: 139 MMOL/L (ref 136–145)
WBC # BLD AUTO: 6.4 K/UL (ref 3.6–11)

## 2024-11-25 PROCEDURE — 94761 N-INVAS EAR/PLS OXIMETRY MLT: CPT

## 2024-11-25 PROCEDURE — 6360000002 HC RX W HCPCS

## 2024-11-25 PROCEDURE — 2060000000 HC ICU INTERMEDIATE R&B

## 2024-11-25 PROCEDURE — 85025 COMPLETE CBC W/AUTO DIFF WBC: CPT

## 2024-11-25 PROCEDURE — 94640 AIRWAY INHALATION TREATMENT: CPT

## 2024-11-25 PROCEDURE — 6370000000 HC RX 637 (ALT 250 FOR IP): Performed by: INTERNAL MEDICINE

## 2024-11-25 PROCEDURE — 36415 COLL VENOUS BLD VENIPUNCTURE: CPT

## 2024-11-25 PROCEDURE — 6370000000 HC RX 637 (ALT 250 FOR IP): Performed by: STUDENT IN AN ORGANIZED HEALTH CARE EDUCATION/TRAINING PROGRAM

## 2024-11-25 PROCEDURE — 6370000000 HC RX 637 (ALT 250 FOR IP)

## 2024-11-25 PROCEDURE — 6360000002 HC RX W HCPCS: Performed by: INTERNAL MEDICINE

## 2024-11-25 PROCEDURE — 2580000003 HC RX 258

## 2024-11-25 PROCEDURE — 80048 BASIC METABOLIC PNL TOTAL CA: CPT

## 2024-11-25 PROCEDURE — 82962 GLUCOSE BLOOD TEST: CPT

## 2024-11-25 RX ORDER — DOXYCYCLINE 100 MG/1
100 CAPSULE ORAL EVERY 12 HOURS SCHEDULED
Status: DISCONTINUED | OUTPATIENT
Start: 2024-11-25 | End: 2024-12-01 | Stop reason: HOSPADM

## 2024-11-25 RX ORDER — BENZONATATE 100 MG/1
100 CAPSULE ORAL 3 TIMES DAILY PRN
Status: DISCONTINUED | OUTPATIENT
Start: 2024-11-25 | End: 2024-12-01 | Stop reason: HOSPADM

## 2024-11-25 RX ADMIN — INSULIN LISPRO 2 UNITS: 100 INJECTION, SOLUTION INTRAVENOUS; SUBCUTANEOUS at 17:34

## 2024-11-25 RX ADMIN — PREGABALIN 75 MG: 75 CAPSULE ORAL at 21:14

## 2024-11-25 RX ADMIN — DOXYCYCLINE HYCLATE 100 MG: 100 CAPSULE ORAL at 12:39

## 2024-11-25 RX ADMIN — IPRATROPIUM BROMIDE AND ALBUTEROL SULFATE 1 DOSE: 2.5; .5 SOLUTION RESPIRATORY (INHALATION) at 20:42

## 2024-11-25 RX ADMIN — CARVEDILOL 3.12 MG: 3.12 TABLET, FILM COATED ORAL at 08:46

## 2024-11-25 RX ADMIN — TORSEMIDE 20 MG: 10 TABLET ORAL at 08:47

## 2024-11-25 RX ADMIN — WHITE PETROLATUM,ZINC OXIDE: 57; 17 PASTE TOPICAL at 21:16

## 2024-11-25 RX ADMIN — SPIRONOLACTONE 25 MG: 25 TABLET ORAL at 08:46

## 2024-11-25 RX ADMIN — PREGABALIN 75 MG: 75 CAPSULE ORAL at 08:53

## 2024-11-25 RX ADMIN — GUAIFENESIN 600 MG: 600 TABLET, EXTENDED RELEASE ORAL at 08:46

## 2024-11-25 RX ADMIN — WHITE PETROLATUM,ZINC OXIDE: 57; 17 PASTE TOPICAL at 09:10

## 2024-11-25 RX ADMIN — GUAIFENESIN AND CODEINE PHOSPHATE 5 ML: 100; 10 SOLUTION ORAL at 17:33

## 2024-11-25 RX ADMIN — DOXYCYCLINE HYCLATE 100 MG: 100 CAPSULE ORAL at 21:14

## 2024-11-25 RX ADMIN — INSULIN LISPRO 2 UNITS: 100 INJECTION, SOLUTION INTRAVENOUS; SUBCUTANEOUS at 22:19

## 2024-11-25 RX ADMIN — DOBUTAMINE HYDROCHLORIDE 5 MCG/KG/MIN: 200 INJECTION INTRAVENOUS at 09:08

## 2024-11-25 RX ADMIN — LOSARTAN POTASSIUM 25 MG: 50 TABLET, FILM COATED ORAL at 08:47

## 2024-11-25 RX ADMIN — TORSEMIDE 20 MG: 10 TABLET ORAL at 21:14

## 2024-11-25 RX ADMIN — GUAIFENESIN AND CODEINE PHOSPHATE 5 ML: 100; 10 SOLUTION ORAL at 08:52

## 2024-11-25 RX ADMIN — GUAIFENESIN 600 MG: 600 TABLET, EXTENDED RELEASE ORAL at 21:14

## 2024-11-25 RX ADMIN — ENOXAPARIN SODIUM 40 MG: 100 INJECTION SUBCUTANEOUS at 08:45

## 2024-11-25 RX ADMIN — INSULIN GLARGINE 24 UNITS: 100 INJECTION, SOLUTION SUBCUTANEOUS at 22:20

## 2024-11-25 RX ADMIN — SODIUM CHLORIDE, PRESERVATIVE FREE 10 ML: 5 INJECTION INTRAVENOUS at 08:47

## 2024-11-25 RX ADMIN — ASPIRIN 81 MG: 81 TABLET, CHEWABLE ORAL at 08:46

## 2024-11-25 RX ADMIN — SODIUM CHLORIDE, PRESERVATIVE FREE 10 ML: 5 INJECTION INTRAVENOUS at 21:14

## 2024-11-25 RX ADMIN — IPRATROPIUM BROMIDE AND ALBUTEROL SULFATE 1 DOSE: 2.5; .5 SOLUTION RESPIRATORY (INHALATION) at 07:10

## 2024-11-25 RX ADMIN — CLOPIDOGREL BISULFATE 75 MG: 75 TABLET ORAL at 08:46

## 2024-11-25 ASSESSMENT — PAIN SCALES - GENERAL
PAINLEVEL_OUTOF10: 0

## 2024-11-25 NOTE — CARE COORDINATION
Chart reviewed, DCP remains for patient to d/c from  to home with spouse and HH, current with [facility] for SN only.    Clinical updates sent to  via [employer].    CM continues to follow and monitor for further DCP needs.

## 2024-11-26 LAB
ALBUMIN SERPL-MCNC: 3.2 G/DL (ref 3.5–5)
ANION GAP SERPL CALC-SCNC: 7 MMOL/L (ref 2–12)
ANION GAP SERPL CALC-SCNC: 9 MMOL/L (ref 2–12)
BASOPHILS # BLD: 0 K/UL (ref 0–0.1)
BASOPHILS NFR BLD: 1 % (ref 0–1)
BNP SERPL-MCNC: 9425 PG/ML
BUN SERPL-MCNC: 63 MG/DL (ref 6–20)
BUN SERPL-MCNC: 69 MG/DL (ref 6–20)
BUN/CREAT SERPL: 24 (ref 12–20)
BUN/CREAT SERPL: 25 (ref 12–20)
CA-I BLD-MCNC: 9.1 MG/DL (ref 8.5–10.1)
CA-I BLD-MCNC: 9.1 MG/DL (ref 8.5–10.1)
CHLORIDE SERPL-SCNC: 100 MMOL/L (ref 97–108)
CHLORIDE SERPL-SCNC: 100 MMOL/L (ref 97–108)
CO2 SERPL-SCNC: 25 MMOL/L (ref 21–32)
CO2 SERPL-SCNC: 30 MMOL/L (ref 21–32)
CREAT SERPL-MCNC: 2.56 MG/DL (ref 0.55–1.02)
CREAT SERPL-MCNC: 2.91 MG/DL (ref 0.55–1.02)
DIFFERENTIAL METHOD BLD: NORMAL
EOSINOPHIL # BLD: 0.2 K/UL (ref 0–0.4)
EOSINOPHIL NFR BLD: 3 % (ref 0–7)
ERYTHROCYTE [DISTWIDTH] IN BLOOD BY AUTOMATED COUNT: 14.4 % (ref 11.5–14.5)
GLUCOSE BLD STRIP.AUTO-MCNC: 117 MG/DL (ref 65–100)
GLUCOSE BLD STRIP.AUTO-MCNC: 178 MG/DL (ref 65–100)
GLUCOSE BLD STRIP.AUTO-MCNC: 214 MG/DL (ref 65–100)
GLUCOSE BLD STRIP.AUTO-MCNC: 222 MG/DL (ref 65–100)
GLUCOSE SERPL-MCNC: 102 MG/DL (ref 65–100)
GLUCOSE SERPL-MCNC: 182 MG/DL (ref 65–100)
HCT VFR BLD AUTO: 44.4 % (ref 35–47)
HGB BLD-MCNC: 13.9 G/DL (ref 11.5–16)
IMM GRANULOCYTES # BLD AUTO: 0 K/UL (ref 0–0.04)
IMM GRANULOCYTES NFR BLD AUTO: 0 % (ref 0–0.5)
LYMPHOCYTES # BLD: 1.7 K/UL (ref 0.8–3.5)
LYMPHOCYTES NFR BLD: 26 % (ref 12–49)
MCH RBC QN AUTO: 29 PG (ref 26–34)
MCHC RBC AUTO-ENTMCNC: 31.3 G/DL (ref 30–36.5)
MCV RBC AUTO: 92.5 FL (ref 80–99)
MONOCYTES # BLD: 0.7 K/UL (ref 0–1)
MONOCYTES NFR BLD: 11 % (ref 5–13)
NEUTS SEG # BLD: 3.8 K/UL (ref 1.8–8)
NEUTS SEG NFR BLD: 59 % (ref 32–75)
NRBC # BLD: 0 K/UL (ref 0–0.01)
NRBC BLD-RTO: 0 PER 100 WBC
PERFORMED BY:: ABNORMAL
PHOSPHATE SERPL-MCNC: 3.1 MG/DL (ref 2.6–4.7)
PLATELET # BLD AUTO: 238 K/UL (ref 150–400)
PMV BLD AUTO: 11.4 FL (ref 8.9–12.9)
POTASSIUM SERPL-SCNC: 3.6 MMOL/L (ref 3.5–5.1)
POTASSIUM SERPL-SCNC: 4.1 MMOL/L (ref 3.5–5.1)
RBC # BLD AUTO: 4.8 M/UL (ref 3.8–5.2)
SODIUM SERPL-SCNC: 134 MMOL/L (ref 136–145)
SODIUM SERPL-SCNC: 137 MMOL/L (ref 136–145)
WBC # BLD AUTO: 6.4 K/UL (ref 3.6–11)

## 2024-11-26 PROCEDURE — 80069 RENAL FUNCTION PANEL: CPT

## 2024-11-26 PROCEDURE — 6370000000 HC RX 637 (ALT 250 FOR IP)

## 2024-11-26 PROCEDURE — 82962 GLUCOSE BLOOD TEST: CPT

## 2024-11-26 PROCEDURE — 2060000000 HC ICU INTERMEDIATE R&B

## 2024-11-26 PROCEDURE — 6370000000 HC RX 637 (ALT 250 FOR IP): Performed by: INTERNAL MEDICINE

## 2024-11-26 PROCEDURE — 80048 BASIC METABOLIC PNL TOTAL CA: CPT

## 2024-11-26 PROCEDURE — 83880 ASSAY OF NATRIURETIC PEPTIDE: CPT

## 2024-11-26 PROCEDURE — 85025 COMPLETE CBC W/AUTO DIFF WBC: CPT

## 2024-11-26 PROCEDURE — 6370000000 HC RX 637 (ALT 250 FOR IP): Performed by: STUDENT IN AN ORGANIZED HEALTH CARE EDUCATION/TRAINING PROGRAM

## 2024-11-26 PROCEDURE — 2500000003 HC RX 250 WO HCPCS: Performed by: INTERNAL MEDICINE

## 2024-11-26 PROCEDURE — 6360000002 HC RX W HCPCS: Performed by: INTERNAL MEDICINE

## 2024-11-26 PROCEDURE — 94640 AIRWAY INHALATION TREATMENT: CPT

## 2024-11-26 PROCEDURE — 94761 N-INVAS EAR/PLS OXIMETRY MLT: CPT

## 2024-11-26 PROCEDURE — 36415 COLL VENOUS BLD VENIPUNCTURE: CPT

## 2024-11-26 PROCEDURE — 6360000002 HC RX W HCPCS

## 2024-11-26 PROCEDURE — 2580000003 HC RX 258

## 2024-11-26 RX ADMIN — IPRATROPIUM BROMIDE AND ALBUTEROL SULFATE 1 DOSE: 2.5; .5 SOLUTION RESPIRATORY (INHALATION) at 19:44

## 2024-11-26 RX ADMIN — PREGABALIN 75 MG: 75 CAPSULE ORAL at 08:52

## 2024-11-26 RX ADMIN — ASPIRIN 81 MG: 81 TABLET, CHEWABLE ORAL at 08:52

## 2024-11-26 RX ADMIN — INSULIN LISPRO 2 UNITS: 100 INJECTION, SOLUTION INTRAVENOUS; SUBCUTANEOUS at 16:54

## 2024-11-26 RX ADMIN — DOXYCYCLINE HYCLATE 100 MG: 100 CAPSULE ORAL at 20:49

## 2024-11-26 RX ADMIN — LOSARTAN POTASSIUM 25 MG: 50 TABLET, FILM COATED ORAL at 08:52

## 2024-11-26 RX ADMIN — SODIUM CHLORIDE, PRESERVATIVE FREE 10 ML: 5 INJECTION INTRAVENOUS at 20:50

## 2024-11-26 RX ADMIN — GUAIFENESIN 600 MG: 600 TABLET, EXTENDED RELEASE ORAL at 20:49

## 2024-11-26 RX ADMIN — CLOPIDOGREL BISULFATE 75 MG: 75 TABLET ORAL at 08:52

## 2024-11-26 RX ADMIN — DOXYCYCLINE HYCLATE 100 MG: 100 CAPSULE ORAL at 08:52

## 2024-11-26 RX ADMIN — Medication 1 LOZENGE: at 17:36

## 2024-11-26 RX ADMIN — GUAIFENESIN 600 MG: 600 TABLET, EXTENDED RELEASE ORAL at 08:52

## 2024-11-26 RX ADMIN — PREGABALIN 75 MG: 75 CAPSULE ORAL at 20:49

## 2024-11-26 RX ADMIN — DOBUTAMINE HYDROCHLORIDE 5 MCG/KG/MIN: 200 INJECTION INTRAVENOUS at 03:20

## 2024-11-26 RX ADMIN — SODIUM CHLORIDE, PRESERVATIVE FREE 10 ML: 5 INJECTION INTRAVENOUS at 08:52

## 2024-11-26 RX ADMIN — ENOXAPARIN SODIUM 40 MG: 100 INJECTION SUBCUTANEOUS at 08:52

## 2024-11-26 RX ADMIN — IPRATROPIUM BROMIDE AND ALBUTEROL SULFATE 1 DOSE: 2.5; .5 SOLUTION RESPIRATORY (INHALATION) at 09:18

## 2024-11-26 RX ADMIN — INSULIN LISPRO 2 UNITS: 100 INJECTION, SOLUTION INTRAVENOUS; SUBCUTANEOUS at 20:49

## 2024-11-26 RX ADMIN — DOBUTAMINE HYDROCHLORIDE 5 MCG/KG/MIN: 200 INJECTION INTRAVENOUS at 17:33

## 2024-11-26 RX ADMIN — WHITE PETROLATUM,ZINC OXIDE: 57; 17 PASTE TOPICAL at 20:52

## 2024-11-26 RX ADMIN — WHITE PETROLATUM,ZINC OXIDE: 57; 17 PASTE TOPICAL at 09:00

## 2024-11-26 RX ADMIN — INSULIN GLARGINE 24 UNITS: 100 INJECTION, SOLUTION SUBCUTANEOUS at 20:49

## 2024-11-26 NOTE — NURSE NAVIGATOR
Heart Failure Education/Evaluation/Recommendations    Code Status: Full Code    Echo:11/22/2024    Left Ventricle: Severely reduced left ventricular systolic function with a visually estimated EF of 15 - 20%. Left ventricle is dilated. Increased wall thickness. EF BP is 17%. Severe global hypokinesis present.    Right Ventricle: Not well visualized. Reduced systolic function. TAPSE is abnormal. TAPSE is 1.2 cm. RV Peak S' is 5.0 cm/s.    Mitral Valve: Moderate regurgitation.    Tricuspid Valve: Mild regurgitation. The estimated RVSP is 38 mmHg.    Image quality is fair. Contrast used: Definity. Technically difficult study, technically difficult study due to patient's body habitus and technically difficult study due to low parasternal window.    BNP:  DATE   RESULTS  11/21/2024  67274  11/26/2024  9425    HGA1C:  DATE   RESULTS  11/18/2021  11.4  11/22/2024  6.2    - Patient is on Dobutamine IV at this time.      Cardiology Consulted: YES      Education packet brought to the patients room.     RN-NN knocks, enters the room, and performs hand hygiene/ uses proper PPE. RN-NN introduces herself and the reason for visit.    Patient agrees to Education.    Patient has a scale that works.    Patient lives with Spouse. Patients Mother admitted to the hospital and Spouse at [facility]. Patient does receive wound care at home.    Patient does meet criteria for outpatient follow-up appt.     Patient educated by RN-NN, Doctor, and/or Nurse    Low sodium Diet, reading Nutrition labels, and foods to avoid  Fluid Restriction  Smoking Cessation, ETOH abuse cessation, Drug use/abuse cessation  Daily Weights  Symptoms and Reporting symptoms to Cardiology/Nephrology  Activity/Exercise  Support Groups and family support  Local Resources    RN-NN defers ONGOING Education to Nursing Staff.    Social Determinants of Health problems

## 2024-11-26 NOTE — CARE COORDINATION
Chart reviewed, DCP remains for patient to d/c from  to home with spouse and HH, current with [facility] for SN only.     Clinical updates sent to  via [employer].     CM continues to follow and monitor for further DCP needs.

## 2024-11-27 ENCOUNTER — APPOINTMENT (OUTPATIENT)
Facility: HOSPITAL | Age: 64
DRG: 280 | End: 2024-11-27
Payer: MEDICARE

## 2024-11-27 LAB
ANION GAP SERPL CALC-SCNC: 7 MMOL/L (ref 2–12)
BASOPHILS # BLD: 0 K/UL (ref 0–0.1)
BASOPHILS NFR BLD: 0 % (ref 0–1)
BUN SERPL-MCNC: 77 MG/DL (ref 6–20)
BUN/CREAT SERPL: 27 (ref 12–20)
CA-I BLD-MCNC: 8.9 MG/DL (ref 8.5–10.1)
CHLORIDE SERPL-SCNC: 102 MMOL/L (ref 97–108)
CK SERPL-CCNC: 82 U/L (ref 26–192)
CO2 SERPL-SCNC: 28 MMOL/L (ref 21–32)
CREAT SERPL-MCNC: 2.81 MG/DL (ref 0.55–1.02)
DIFFERENTIAL METHOD BLD: ABNORMAL
EOSINOPHIL # BLD: 0.2 K/UL (ref 0–0.4)
EOSINOPHIL NFR BLD: 3 % (ref 0–7)
ERYTHROCYTE [DISTWIDTH] IN BLOOD BY AUTOMATED COUNT: 14.7 % (ref 11.5–14.5)
GLUCOSE BLD STRIP.AUTO-MCNC: 132 MG/DL (ref 65–100)
GLUCOSE BLD STRIP.AUTO-MCNC: 177 MG/DL (ref 65–100)
GLUCOSE BLD STRIP.AUTO-MCNC: 237 MG/DL (ref 65–100)
GLUCOSE BLD STRIP.AUTO-MCNC: 243 MG/DL (ref 65–100)
GLUCOSE SERPL-MCNC: 127 MG/DL (ref 65–100)
HCT VFR BLD AUTO: 40.4 % (ref 35–47)
HGB BLD-MCNC: 12.9 G/DL (ref 11.5–16)
IMM GRANULOCYTES # BLD AUTO: 0 K/UL (ref 0–0.04)
IMM GRANULOCYTES NFR BLD AUTO: 0 % (ref 0–0.5)
LYMPHOCYTES # BLD: 1.5 K/UL (ref 0.8–3.5)
LYMPHOCYTES NFR BLD: 22 % (ref 12–49)
MCH RBC QN AUTO: 29 PG (ref 26–34)
MCHC RBC AUTO-ENTMCNC: 31.9 G/DL (ref 30–36.5)
MCV RBC AUTO: 90.8 FL (ref 80–99)
MONOCYTES # BLD: 0.8 K/UL (ref 0–1)
MONOCYTES NFR BLD: 12 % (ref 5–13)
NEUTS SEG # BLD: 4.3 K/UL (ref 1.8–8)
NEUTS SEG NFR BLD: 63 % (ref 32–75)
NRBC # BLD: 0 K/UL (ref 0–0.01)
NRBC BLD-RTO: 0 PER 100 WBC
PERFORMED BY:: ABNORMAL
PLATELET # BLD AUTO: 209 K/UL (ref 150–400)
PMV BLD AUTO: 11.2 FL (ref 8.9–12.9)
POTASSIUM SERPL-SCNC: 3.6 MMOL/L (ref 3.5–5.1)
RBC # BLD AUTO: 4.45 M/UL (ref 3.8–5.2)
SODIUM SERPL-SCNC: 137 MMOL/L (ref 136–145)
WBC # BLD AUTO: 7 K/UL (ref 3.6–11)

## 2024-11-27 PROCEDURE — 6370000000 HC RX 637 (ALT 250 FOR IP): Performed by: STUDENT IN AN ORGANIZED HEALTH CARE EDUCATION/TRAINING PROGRAM

## 2024-11-27 PROCEDURE — 6370000000 HC RX 637 (ALT 250 FOR IP)

## 2024-11-27 PROCEDURE — 6370000000 HC RX 637 (ALT 250 FOR IP): Performed by: INTERNAL MEDICINE

## 2024-11-27 PROCEDURE — 94761 N-INVAS EAR/PLS OXIMETRY MLT: CPT

## 2024-11-27 PROCEDURE — 82550 ASSAY OF CK (CPK): CPT

## 2024-11-27 PROCEDURE — P9047 ALBUMIN (HUMAN), 25%, 50ML: HCPCS | Performed by: INTERNAL MEDICINE

## 2024-11-27 PROCEDURE — 2060000000 HC ICU INTERMEDIATE R&B

## 2024-11-27 PROCEDURE — 2580000003 HC RX 258

## 2024-11-27 PROCEDURE — 6360000002 HC RX W HCPCS: Performed by: INTERNAL MEDICINE

## 2024-11-27 PROCEDURE — 76770 US EXAM ABDO BACK WALL COMP: CPT

## 2024-11-27 PROCEDURE — 71045 X-RAY EXAM CHEST 1 VIEW: CPT

## 2024-11-27 PROCEDURE — 36415 COLL VENOUS BLD VENIPUNCTURE: CPT

## 2024-11-27 PROCEDURE — 82962 GLUCOSE BLOOD TEST: CPT

## 2024-11-27 PROCEDURE — 80048 BASIC METABOLIC PNL TOTAL CA: CPT

## 2024-11-27 PROCEDURE — 85025 COMPLETE CBC W/AUTO DIFF WBC: CPT

## 2024-11-27 PROCEDURE — 94640 AIRWAY INHALATION TREATMENT: CPT

## 2024-11-27 PROCEDURE — 6360000002 HC RX W HCPCS

## 2024-11-27 RX ORDER — FUROSEMIDE 10 MG/ML
40 INJECTION INTRAMUSCULAR; INTRAVENOUS ONCE
Status: COMPLETED | OUTPATIENT
Start: 2024-11-27 | End: 2024-11-27

## 2024-11-27 RX ORDER — ALBUMIN (HUMAN) 12.5 G/50ML
25 SOLUTION INTRAVENOUS ONCE
Status: COMPLETED | OUTPATIENT
Start: 2024-11-27 | End: 2024-11-27

## 2024-11-27 RX ORDER — PREGABALIN 75 MG/1
75 CAPSULE ORAL 2 TIMES DAILY
Status: DISCONTINUED | OUTPATIENT
Start: 2024-11-27 | End: 2024-12-01 | Stop reason: HOSPADM

## 2024-11-27 RX ORDER — FLUTICASONE PROPIONATE 50 MCG
1 SPRAY, SUSPENSION (ML) NASAL DAILY
Status: DISCONTINUED | OUTPATIENT
Start: 2024-11-27 | End: 2024-12-01 | Stop reason: HOSPADM

## 2024-11-27 RX ADMIN — GUAIFENESIN AND CODEINE PHOSPHATE 5 ML: 100; 10 SOLUTION ORAL at 17:21

## 2024-11-27 RX ADMIN — IPRATROPIUM BROMIDE AND ALBUTEROL SULFATE 1 DOSE: 2.5; .5 SOLUTION RESPIRATORY (INHALATION) at 22:44

## 2024-11-27 RX ADMIN — PREGABALIN 75 MG: 75 CAPSULE ORAL at 09:28

## 2024-11-27 RX ADMIN — CLOPIDOGREL BISULFATE 75 MG: 75 TABLET ORAL at 09:25

## 2024-11-27 RX ADMIN — GUAIFENESIN 600 MG: 600 TABLET, EXTENDED RELEASE ORAL at 22:36

## 2024-11-27 RX ADMIN — INSULIN LISPRO 2 UNITS: 100 INJECTION, SOLUTION INTRAVENOUS; SUBCUTANEOUS at 17:25

## 2024-11-27 RX ADMIN — ENOXAPARIN SODIUM 40 MG: 100 INJECTION SUBCUTANEOUS at 09:26

## 2024-11-27 RX ADMIN — ASPIRIN 81 MG: 81 TABLET, CHEWABLE ORAL at 09:23

## 2024-11-27 RX ADMIN — Medication 1 LOZENGE: at 21:44

## 2024-11-27 RX ADMIN — INSULIN LISPRO 2 UNITS: 100 INJECTION, SOLUTION INTRAVENOUS; SUBCUTANEOUS at 22:41

## 2024-11-27 RX ADMIN — DOXYCYCLINE HYCLATE 100 MG: 100 CAPSULE ORAL at 22:36

## 2024-11-27 RX ADMIN — DOXYCYCLINE HYCLATE 100 MG: 100 CAPSULE ORAL at 09:23

## 2024-11-27 RX ADMIN — FLUTICASONE PROPIONATE 1 SPRAY: 50 SPRAY, METERED NASAL at 15:23

## 2024-11-27 RX ADMIN — SODIUM CHLORIDE, PRESERVATIVE FREE 10 ML: 5 INJECTION INTRAVENOUS at 10:54

## 2024-11-27 RX ADMIN — PREGABALIN 75 MG: 75 CAPSULE ORAL at 22:56

## 2024-11-27 RX ADMIN — GUAIFENESIN AND CODEINE PHOSPHATE 5 ML: 100; 10 SOLUTION ORAL at 22:32

## 2024-11-27 RX ADMIN — FUROSEMIDE 40 MG: 10 INJECTION, SOLUTION INTRAMUSCULAR; INTRAVENOUS at 18:25

## 2024-11-27 RX ADMIN — CARVEDILOL 3.12 MG: 3.12 TABLET, FILM COATED ORAL at 22:36

## 2024-11-27 RX ADMIN — GUAIFENESIN 600 MG: 600 TABLET, EXTENDED RELEASE ORAL at 09:25

## 2024-11-27 RX ADMIN — LOSARTAN POTASSIUM 25 MG: 50 TABLET, FILM COATED ORAL at 09:23

## 2024-11-27 RX ADMIN — SODIUM CHLORIDE, PRESERVATIVE FREE 10 ML: 5 INJECTION INTRAVENOUS at 22:16

## 2024-11-27 RX ADMIN — IPRATROPIUM BROMIDE AND ALBUTEROL SULFATE 1 DOSE: 2.5; .5 SOLUTION RESPIRATORY (INHALATION) at 07:39

## 2024-11-27 RX ADMIN — INSULIN GLARGINE 24 UNITS: 100 INJECTION, SOLUTION SUBCUTANEOUS at 22:41

## 2024-11-27 RX ADMIN — WHITE PETROLATUM,ZINC OXIDE: 57; 17 PASTE TOPICAL at 22:35

## 2024-11-27 RX ADMIN — ALBUMIN (HUMAN) 25 G: 0.25 INJECTION, SOLUTION INTRAVENOUS at 18:28

## 2024-11-27 ASSESSMENT — PAIN SCALES - GENERAL: PAINLEVEL_OUTOF10: 0

## 2024-11-27 NOTE — CONSULTS
NAME:  Nicci Hernandez   :   1960   MRN:   [id number]     ATTENDING: Nando Cifuentes MD  PCP:  Franco Ross MD    Date/Time:  2024       Subjective:   REQUESTING PHYSICIAN: Dr. Talavera  REASON FOR CONSULT:    CKD    History of presenting illness: Patient is a 64-year-old female with past medical history of hypertension, type 2 diabetes, hyperlipidemia, obesity, coronary artery disease, chronic kidney disease presented to the emergency room with complaints of shortness of breath.  Initial labs at the time of admission showed a creatinine of 1.7 which was increased to 2.9 yesterday and a nephrology consultation is requested for further evaluation and management.   Patient seen at bedside, she is alert awake well-oriented, her shortness of breath has improved she received antibiotics.  Patient had intermittent borderline hypotension.  She has history of congestive heart failure with low ejection fraction and had some complaints of lower extremity edema and has been taking diuretics at home.  No complaints of any abdominal or flank pains, no complaints of any voiding difficulties.        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

## 2024-11-27 NOTE — CARE COORDINATION
CM reviewed chart. DCP home with [facility] when medicallly ready. CM will continue to follow.

## 2024-11-28 LAB
25(OH)D3 SERPL-MCNC: <9 NG/ML (ref 30–100)
ALBUMIN SERPL-MCNC: 3.2 G/DL (ref 3.5–5)
ANION GAP SERPL CALC-SCNC: 7 MMOL/L (ref 2–12)
BACTERIA SPEC CULT: NORMAL
BACTERIA SPEC CULT: NORMAL
BASOPHILS # BLD: 0 K/UL (ref 0–0.1)
BASOPHILS NFR BLD: 0 % (ref 0–1)
BNP SERPL-MCNC: 4654 PG/ML
BUN SERPL-MCNC: 85 MG/DL (ref 6–20)
BUN/CREAT SERPL: 31 (ref 12–20)
CA-I BLD-MCNC: 8.7 MG/DL (ref 8.5–10.1)
CA-I BLD-MCNC: 8.8 MG/DL (ref 8.5–10.1)
CHLORIDE SERPL-SCNC: 103 MMOL/L (ref 97–108)
CHLORIDE UR-SCNC: <10 MMOL/L
CK SERPL-CCNC: 132 U/L (ref 26–192)
CO2 SERPL-SCNC: 26 MMOL/L (ref 21–32)
CREAT SERPL-MCNC: 2.76 MG/DL (ref 0.55–1.02)
CREAT UR-MCNC: 168 MG/DL
DIFFERENTIAL METHOD BLD: ABNORMAL
EOSINOPHIL # BLD: 0.2 K/UL (ref 0–0.4)
EOSINOPHIL NFR BLD: 4 % (ref 0–7)
ERYTHROCYTE [DISTWIDTH] IN BLOOD BY AUTOMATED COUNT: 14.6 % (ref 11.5–14.5)
GLUCOSE BLD STRIP.AUTO-MCNC: 163 MG/DL (ref 65–100)
GLUCOSE BLD STRIP.AUTO-MCNC: 186 MG/DL (ref 65–100)
GLUCOSE BLD STRIP.AUTO-MCNC: 209 MG/DL (ref 65–100)
GLUCOSE BLD STRIP.AUTO-MCNC: 239 MG/DL (ref 65–100)
GLUCOSE SERPL-MCNC: 141 MG/DL (ref 65–100)
HCT VFR BLD AUTO: 38 % (ref 35–47)
HGB BLD-MCNC: 12.1 G/DL (ref 11.5–16)
IMM GRANULOCYTES # BLD AUTO: 0 K/UL (ref 0–0.04)
IMM GRANULOCYTES NFR BLD AUTO: 1 % (ref 0–0.5)
LYMPHOCYTES # BLD: 1.1 K/UL (ref 0.8–3.5)
LYMPHOCYTES NFR BLD: 20 % (ref 12–49)
Lab: NORMAL
Lab: NORMAL
MCH RBC QN AUTO: 29 PG (ref 26–34)
MCHC RBC AUTO-ENTMCNC: 31.8 G/DL (ref 30–36.5)
MCV RBC AUTO: 91.1 FL (ref 80–99)
MONOCYTES # BLD: 0.7 K/UL (ref 0–1)
MONOCYTES NFR BLD: 13 % (ref 5–13)
NEUTS SEG # BLD: 3.4 K/UL (ref 1.8–8)
NEUTS SEG NFR BLD: 62 % (ref 32–75)
NRBC # BLD: 0 K/UL (ref 0–0.01)
NRBC BLD-RTO: 0 PER 100 WBC
PERFORMED BY:: ABNORMAL
PHOSPHATE SERPL-MCNC: 4.2 MG/DL (ref 2.6–4.7)
PLATELET # BLD AUTO: 186 K/UL (ref 150–400)
PMV BLD AUTO: 11.6 FL (ref 8.9–12.9)
POTASSIUM SERPL-SCNC: 4.1 MMOL/L (ref 3.5–5.1)
PROT UR-MCNC: 22 MG/DL (ref 0–11.9)
PROT/CREAT UR-RTO: 0.1
PTH-INTACT SERPL-MCNC: 310.8 PG/ML (ref 18.4–88)
RBC # BLD AUTO: 4.17 M/UL (ref 3.8–5.2)
SODIUM SERPL-SCNC: 136 MMOL/L (ref 136–145)
SODIUM UR-SCNC: 23 MMOL/L
WBC # BLD AUTO: 5.5 K/UL (ref 3.6–11)

## 2024-11-28 PROCEDURE — 83880 ASSAY OF NATRIURETIC PEPTIDE: CPT

## 2024-11-28 PROCEDURE — 6370000000 HC RX 637 (ALT 250 FOR IP): Performed by: INTERNAL MEDICINE

## 2024-11-28 PROCEDURE — 82550 ASSAY OF CK (CPK): CPT

## 2024-11-28 PROCEDURE — 84300 ASSAY OF URINE SODIUM: CPT

## 2024-11-28 PROCEDURE — 84156 ASSAY OF PROTEIN URINE: CPT

## 2024-11-28 PROCEDURE — 94761 N-INVAS EAR/PLS OXIMETRY MLT: CPT

## 2024-11-28 PROCEDURE — 6370000000 HC RX 637 (ALT 250 FOR IP): Performed by: STUDENT IN AN ORGANIZED HEALTH CARE EDUCATION/TRAINING PROGRAM

## 2024-11-28 PROCEDURE — 6360000002 HC RX W HCPCS

## 2024-11-28 PROCEDURE — 83970 ASSAY OF PARATHORMONE: CPT

## 2024-11-28 PROCEDURE — 2580000003 HC RX 258

## 2024-11-28 PROCEDURE — 82436 ASSAY OF URINE CHLORIDE: CPT

## 2024-11-28 PROCEDURE — 2060000000 HC ICU INTERMEDIATE R&B

## 2024-11-28 PROCEDURE — 2500000003 HC RX 250 WO HCPCS: Performed by: INTERNAL MEDICINE

## 2024-11-28 PROCEDURE — 36415 COLL VENOUS BLD VENIPUNCTURE: CPT

## 2024-11-28 PROCEDURE — 94640 AIRWAY INHALATION TREATMENT: CPT

## 2024-11-28 PROCEDURE — 85025 COMPLETE CBC W/AUTO DIFF WBC: CPT

## 2024-11-28 PROCEDURE — 82306 VITAMIN D 25 HYDROXY: CPT

## 2024-11-28 PROCEDURE — 6370000000 HC RX 637 (ALT 250 FOR IP)

## 2024-11-28 PROCEDURE — 80069 RENAL FUNCTION PANEL: CPT

## 2024-11-28 PROCEDURE — 82570 ASSAY OF URINE CREATININE: CPT

## 2024-11-28 PROCEDURE — 82962 GLUCOSE BLOOD TEST: CPT

## 2024-11-28 RX ORDER — OXYMETAZOLINE HYDROCHLORIDE 0.05 G/100ML
2 SPRAY NASAL 2 TIMES DAILY
Status: COMPLETED | OUTPATIENT
Start: 2024-11-28 | End: 2024-12-01

## 2024-11-28 RX ORDER — ERGOCALCIFEROL 1.25 MG/1
50000 CAPSULE, LIQUID FILLED ORAL WEEKLY
Status: DISCONTINUED | OUTPATIENT
Start: 2024-11-28 | End: 2024-12-01 | Stop reason: HOSPADM

## 2024-11-28 RX ORDER — MIDODRINE HYDROCHLORIDE 5 MG/1
10 TABLET ORAL
Status: DISCONTINUED | OUTPATIENT
Start: 2024-11-28 | End: 2024-12-01 | Stop reason: HOSPADM

## 2024-11-28 RX ORDER — CALCITRIOL 0.25 UG/1
0.25 CAPSULE, LIQUID FILLED ORAL DAILY
Status: DISCONTINUED | OUTPATIENT
Start: 2024-11-28 | End: 2024-12-01 | Stop reason: HOSPADM

## 2024-11-28 RX ADMIN — SODIUM CHLORIDE, PRESERVATIVE FREE 10 ML: 5 INJECTION INTRAVENOUS at 09:28

## 2024-11-28 RX ADMIN — INSULIN GLARGINE 24 UNITS: 100 INJECTION, SOLUTION SUBCUTANEOUS at 22:09

## 2024-11-28 RX ADMIN — ASPIRIN 81 MG: 81 TABLET, CHEWABLE ORAL at 09:28

## 2024-11-28 RX ADMIN — DOXYCYCLINE HYCLATE 100 MG: 100 CAPSULE ORAL at 22:08

## 2024-11-28 RX ADMIN — MIDODRINE HYDROCHLORIDE 10 MG: 5 TABLET ORAL at 16:33

## 2024-11-28 RX ADMIN — IPRATROPIUM BROMIDE AND ALBUTEROL SULFATE 1 DOSE: 2.5; .5 SOLUTION RESPIRATORY (INHALATION) at 08:04

## 2024-11-28 RX ADMIN — INSULIN LISPRO 2 UNITS: 100 INJECTION, SOLUTION INTRAVENOUS; SUBCUTANEOUS at 16:33

## 2024-11-28 RX ADMIN — CLOPIDOGREL BISULFATE 75 MG: 75 TABLET ORAL at 09:28

## 2024-11-28 RX ADMIN — FLUTICASONE PROPIONATE 1 SPRAY: 50 SPRAY, METERED NASAL at 09:29

## 2024-11-28 RX ADMIN — WHITE PETROLATUM,ZINC OXIDE: 57; 17 PASTE TOPICAL at 22:09

## 2024-11-28 RX ADMIN — MIDODRINE HYDROCHLORIDE 10 MG: 5 TABLET ORAL at 14:04

## 2024-11-28 RX ADMIN — IPRATROPIUM BROMIDE AND ALBUTEROL SULFATE 1 DOSE: 2.5; .5 SOLUTION RESPIRATORY (INHALATION) at 21:04

## 2024-11-28 RX ADMIN — ERGOCALCIFEROL 50000 UNITS: 1.25 CAPSULE ORAL at 15:01

## 2024-11-28 RX ADMIN — GUAIFENESIN 600 MG: 600 TABLET, EXTENDED RELEASE ORAL at 22:08

## 2024-11-28 RX ADMIN — DOXYCYCLINE HYCLATE 100 MG: 100 CAPSULE ORAL at 09:28

## 2024-11-28 RX ADMIN — WHITE PETROLATUM,ZINC OXIDE: 57; 17 PASTE TOPICAL at 09:34

## 2024-11-28 RX ADMIN — INSULIN LISPRO 2 UNITS: 100 INJECTION, SOLUTION INTRAVENOUS; SUBCUTANEOUS at 11:24

## 2024-11-28 RX ADMIN — GUAIFENESIN AND CODEINE PHOSPHATE 5 ML: 100; 10 SOLUTION ORAL at 22:15

## 2024-11-28 RX ADMIN — OXYMETAZOLINE HYDROCHLORIDE 2 SPRAY: 0.5 SPRAY NASAL at 22:10

## 2024-11-28 RX ADMIN — CARVEDILOL 3.12 MG: 3.12 TABLET, FILM COATED ORAL at 22:08

## 2024-11-28 RX ADMIN — PREGABALIN 75 MG: 75 CAPSULE ORAL at 09:28

## 2024-11-28 RX ADMIN — CALCITRIOL CAPSULES 0.25 MCG 0.25 MCG: 0.25 CAPSULE ORAL at 15:01

## 2024-11-28 RX ADMIN — INSULIN LISPRO 2 UNITS: 100 INJECTION, SOLUTION INTRAVENOUS; SUBCUTANEOUS at 22:09

## 2024-11-28 RX ADMIN — CARVEDILOL 3.12 MG: 3.12 TABLET, FILM COATED ORAL at 09:28

## 2024-11-28 RX ADMIN — GUAIFENESIN 600 MG: 600 TABLET, EXTENDED RELEASE ORAL at 09:28

## 2024-11-28 RX ADMIN — SODIUM CHLORIDE, PRESERVATIVE FREE 10 ML: 5 INJECTION INTRAVENOUS at 21:47

## 2024-11-28 RX ADMIN — ENOXAPARIN SODIUM 40 MG: 100 INJECTION SUBCUTANEOUS at 09:28

## 2024-11-28 RX ADMIN — GUAIFENESIN AND CODEINE PHOSPHATE 5 ML: 100; 10 SOLUTION ORAL at 11:24

## 2024-11-28 ASSESSMENT — PAIN DESCRIPTION - ONSET: ONSET: GRADUAL

## 2024-11-28 ASSESSMENT — PAIN SCALES - GENERAL
PAINLEVEL_OUTOF10: 1
PAINLEVEL_OUTOF10: 0

## 2024-11-28 ASSESSMENT — PAIN DESCRIPTION - DESCRIPTORS: DESCRIPTORS: DISCOMFORT

## 2024-11-28 ASSESSMENT — PAIN DESCRIPTION - LOCATION: LOCATION: COCCYX

## 2024-11-28 ASSESSMENT — PAIN SCALES - WONG BAKER: WONGBAKER_NUMERICALRESPONSE: HURTS A LITTLE BIT

## 2024-11-29 LAB
ALBUMIN SERPL-MCNC: 3.1 G/DL (ref 3.5–5)
ANION GAP SERPL CALC-SCNC: 7 MMOL/L (ref 2–12)
BASOPHILS # BLD: 0.1 K/UL (ref 0–0.1)
BASOPHILS NFR BLD: 1 % (ref 0–1)
BUN SERPL-MCNC: 102 MG/DL (ref 6–20)
BUN/CREAT SERPL: 41 (ref 12–20)
CA-I BLD-MCNC: 9 MG/DL (ref 8.5–10.1)
CHLORIDE SERPL-SCNC: 104 MMOL/L (ref 97–108)
CO2 SERPL-SCNC: 25 MMOL/L (ref 21–32)
CREAT SERPL-MCNC: 2.49 MG/DL (ref 0.55–1.02)
DIFFERENTIAL METHOD BLD: ABNORMAL
EOSINOPHIL # BLD: 0.4 K/UL (ref 0–0.4)
EOSINOPHIL NFR BLD: 5 % (ref 0–7)
ERYTHROCYTE [DISTWIDTH] IN BLOOD BY AUTOMATED COUNT: 14.6 % (ref 11.5–14.5)
GLUCOSE BLD STRIP.AUTO-MCNC: 156 MG/DL (ref 65–100)
GLUCOSE BLD STRIP.AUTO-MCNC: 188 MG/DL (ref 65–100)
GLUCOSE BLD STRIP.AUTO-MCNC: 199 MG/DL (ref 65–100)
GLUCOSE BLD STRIP.AUTO-MCNC: 212 MG/DL (ref 65–100)
GLUCOSE BLD STRIP.AUTO-MCNC: 226 MG/DL (ref 65–100)
GLUCOSE BLD STRIP.AUTO-MCNC: 262 MG/DL (ref 65–100)
GLUCOSE SERPL-MCNC: 122 MG/DL (ref 65–100)
HCT VFR BLD AUTO: 38.9 % (ref 35–47)
HGB BLD-MCNC: 12.3 G/DL (ref 11.5–16)
IMM GRANULOCYTES # BLD AUTO: 0 K/UL (ref 0–0.04)
IMM GRANULOCYTES NFR BLD AUTO: 0 % (ref 0–0.5)
LYMPHOCYTES # BLD: 1.4 K/UL (ref 0.8–3.5)
LYMPHOCYTES NFR BLD: 20 % (ref 12–49)
MCH RBC QN AUTO: 28.7 PG (ref 26–34)
MCHC RBC AUTO-ENTMCNC: 31.6 G/DL (ref 30–36.5)
MCV RBC AUTO: 90.9 FL (ref 80–99)
MONOCYTES # BLD: 0.9 K/UL (ref 0–1)
MONOCYTES NFR BLD: 13 % (ref 5–13)
NEUTS SEG # BLD: 4.3 K/UL (ref 1.8–8)
NEUTS SEG NFR BLD: 61 % (ref 32–75)
NRBC # BLD: 0 K/UL (ref 0–0.01)
NRBC BLD-RTO: 0 PER 100 WBC
PERFORMED BY:: ABNORMAL
PHOSPHATE SERPL-MCNC: 5.3 MG/DL (ref 2.6–4.7)
PLATELET # BLD AUTO: 211 K/UL (ref 150–400)
PMV BLD AUTO: 11.7 FL (ref 8.9–12.9)
POTASSIUM SERPL-SCNC: 4 MMOL/L (ref 3.5–5.1)
RBC # BLD AUTO: 4.28 M/UL (ref 3.8–5.2)
SODIUM SERPL-SCNC: 136 MMOL/L (ref 136–145)
WBC # BLD AUTO: 7.1 K/UL (ref 3.6–11)

## 2024-11-29 PROCEDURE — 6370000000 HC RX 637 (ALT 250 FOR IP): Performed by: INTERNAL MEDICINE

## 2024-11-29 PROCEDURE — 2580000003 HC RX 258

## 2024-11-29 PROCEDURE — 94640 AIRWAY INHALATION TREATMENT: CPT

## 2024-11-29 PROCEDURE — 85025 COMPLETE CBC W/AUTO DIFF WBC: CPT

## 2024-11-29 PROCEDURE — 6360000002 HC RX W HCPCS

## 2024-11-29 PROCEDURE — 94761 N-INVAS EAR/PLS OXIMETRY MLT: CPT

## 2024-11-29 PROCEDURE — 6370000000 HC RX 637 (ALT 250 FOR IP)

## 2024-11-29 PROCEDURE — 80069 RENAL FUNCTION PANEL: CPT

## 2024-11-29 PROCEDURE — 36415 COLL VENOUS BLD VENIPUNCTURE: CPT

## 2024-11-29 PROCEDURE — 6370000000 HC RX 637 (ALT 250 FOR IP): Performed by: STUDENT IN AN ORGANIZED HEALTH CARE EDUCATION/TRAINING PROGRAM

## 2024-11-29 PROCEDURE — 2060000000 HC ICU INTERMEDIATE R&B

## 2024-11-29 PROCEDURE — 82962 GLUCOSE BLOOD TEST: CPT

## 2024-11-29 RX ORDER — IPRATROPIUM BROMIDE AND ALBUTEROL SULFATE 2.5; .5 MG/3ML; MG/3ML
1 SOLUTION RESPIRATORY (INHALATION) 2 TIMES DAILY PRN
Status: DISCONTINUED | OUTPATIENT
Start: 2024-11-29 | End: 2024-12-01 | Stop reason: HOSPADM

## 2024-11-29 RX ORDER — SPIRONOLACTONE 25 MG/1
12.5 TABLET ORAL DAILY
Status: DISCONTINUED | OUTPATIENT
Start: 2024-11-30 | End: 2024-12-01 | Stop reason: HOSPADM

## 2024-11-29 RX ORDER — TORSEMIDE 10 MG/1
20 TABLET ORAL DAILY
Status: DISCONTINUED | OUTPATIENT
Start: 2024-11-30 | End: 2024-12-01 | Stop reason: HOSPADM

## 2024-11-29 RX ADMIN — DOXYCYCLINE HYCLATE 100 MG: 100 CAPSULE ORAL at 20:47

## 2024-11-29 RX ADMIN — INSULIN LISPRO 4 UNITS: 100 INJECTION, SOLUTION INTRAVENOUS; SUBCUTANEOUS at 20:55

## 2024-11-29 RX ADMIN — INSULIN LISPRO 2 UNITS: 100 INJECTION, SOLUTION INTRAVENOUS; SUBCUTANEOUS at 12:05

## 2024-11-29 RX ADMIN — GUAIFENESIN AND CODEINE PHOSPHATE 5 ML: 100; 10 SOLUTION ORAL at 11:34

## 2024-11-29 RX ADMIN — GUAIFENESIN 600 MG: 600 TABLET, EXTENDED RELEASE ORAL at 20:47

## 2024-11-29 RX ADMIN — MIDODRINE HYDROCHLORIDE 10 MG: 5 TABLET ORAL at 13:08

## 2024-11-29 RX ADMIN — Medication 1 LOZENGE: at 11:34

## 2024-11-29 RX ADMIN — GUAIFENESIN AND CODEINE PHOSPHATE 5 ML: 100; 10 SOLUTION ORAL at 18:26

## 2024-11-29 RX ADMIN — CLOPIDOGREL BISULFATE 75 MG: 75 TABLET ORAL at 08:57

## 2024-11-29 RX ADMIN — ENOXAPARIN SODIUM 40 MG: 100 INJECTION SUBCUTANEOUS at 08:58

## 2024-11-29 RX ADMIN — ASPIRIN 81 MG: 81 TABLET, CHEWABLE ORAL at 08:57

## 2024-11-29 RX ADMIN — INSULIN LISPRO 2 UNITS: 100 INJECTION, SOLUTION INTRAVENOUS; SUBCUTANEOUS at 17:41

## 2024-11-29 RX ADMIN — WHITE PETROLATUM,ZINC OXIDE: 57; 17 PASTE TOPICAL at 07:45

## 2024-11-29 RX ADMIN — CARVEDILOL 3.12 MG: 3.12 TABLET, FILM COATED ORAL at 20:47

## 2024-11-29 RX ADMIN — INSULIN GLARGINE 24 UNITS: 100 INJECTION, SOLUTION SUBCUTANEOUS at 20:55

## 2024-11-29 RX ADMIN — IPRATROPIUM BROMIDE AND ALBUTEROL SULFATE 1 DOSE: 2.5; .5 SOLUTION RESPIRATORY (INHALATION) at 08:04

## 2024-11-29 RX ADMIN — SODIUM CHLORIDE, PRESERVATIVE FREE 10 ML: 5 INJECTION INTRAVENOUS at 11:26

## 2024-11-29 RX ADMIN — MIDODRINE HYDROCHLORIDE 10 MG: 5 TABLET ORAL at 17:40

## 2024-11-29 RX ADMIN — GUAIFENESIN 600 MG: 600 TABLET, EXTENDED RELEASE ORAL at 08:57

## 2024-11-29 RX ADMIN — FLUTICASONE PROPIONATE 1 SPRAY: 50 SPRAY, METERED NASAL at 11:26

## 2024-11-29 RX ADMIN — SODIUM CHLORIDE, PRESERVATIVE FREE 10 ML: 5 INJECTION INTRAVENOUS at 20:56

## 2024-11-29 RX ADMIN — Medication 1 LOZENGE: at 21:03

## 2024-11-29 RX ADMIN — CARVEDILOL 3.12 MG: 3.12 TABLET, FILM COATED ORAL at 08:57

## 2024-11-29 RX ADMIN — WHITE PETROLATUM,ZINC OXIDE: 57; 17 PASTE TOPICAL at 21:01

## 2024-11-29 RX ADMIN — OXYMETAZOLINE HYDROCHLORIDE 2 SPRAY: 0.5 SPRAY NASAL at 10:00

## 2024-11-29 RX ADMIN — DOXYCYCLINE HYCLATE 100 MG: 100 CAPSULE ORAL at 08:57

## 2024-11-29 RX ADMIN — MIDODRINE HYDROCHLORIDE 10 MG: 5 TABLET ORAL at 08:57

## 2024-11-29 RX ADMIN — CALCITRIOL CAPSULES 0.25 MCG 0.25 MCG: 0.25 CAPSULE ORAL at 08:57

## 2024-11-29 RX ADMIN — OXYMETAZOLINE HYDROCHLORIDE 2 SPRAY: 0.5 SPRAY NASAL at 20:59

## 2024-11-29 ASSESSMENT — PAIN SCALES - GENERAL
PAINLEVEL_OUTOF10: 0

## 2024-11-29 NOTE — CARE COORDINATION
Chart reviewed, DCP remaina for patient to return home with spouse and HH, [facility], updates sent via [employer].    CM continues to follow and monitor for needs.

## 2024-11-30 LAB
ALBUMIN SERPL-MCNC: 3 G/DL (ref 3.5–5)
ANION GAP SERPL CALC-SCNC: 6 MMOL/L (ref 2–12)
BASOPHILS # BLD: 0.1 K/UL (ref 0–0.1)
BASOPHILS NFR BLD: 1 % (ref 0–1)
BUN SERPL-MCNC: 88 MG/DL (ref 6–20)
BUN/CREAT SERPL: 50 (ref 12–20)
CA-I BLD-MCNC: 9.2 MG/DL (ref 8.5–10.1)
CHLORIDE SERPL-SCNC: 108 MMOL/L (ref 97–108)
CO2 SERPL-SCNC: 26 MMOL/L (ref 21–32)
CREAT SERPL-MCNC: 1.75 MG/DL (ref 0.55–1.02)
DIFFERENTIAL METHOD BLD: NORMAL
EOSINOPHIL # BLD: 0.3 K/UL (ref 0–0.4)
EOSINOPHIL NFR BLD: 4 % (ref 0–7)
ERYTHROCYTE [DISTWIDTH] IN BLOOD BY AUTOMATED COUNT: 14.5 % (ref 11.5–14.5)
GLUCOSE BLD STRIP.AUTO-MCNC: 166 MG/DL (ref 65–100)
GLUCOSE BLD STRIP.AUTO-MCNC: 170 MG/DL (ref 65–100)
GLUCOSE BLD STRIP.AUTO-MCNC: 195 MG/DL (ref 65–100)
GLUCOSE BLD STRIP.AUTO-MCNC: 208 MG/DL (ref 65–100)
GLUCOSE SERPL-MCNC: 176 MG/DL (ref 65–100)
HCT VFR BLD AUTO: 37.8 % (ref 35–47)
HGB BLD-MCNC: 12.2 G/DL (ref 11.5–16)
IMM GRANULOCYTES # BLD AUTO: 0 K/UL (ref 0–0.04)
IMM GRANULOCYTES NFR BLD AUTO: 0 % (ref 0–0.5)
LYMPHOCYTES # BLD: 1.1 K/UL (ref 0.8–3.5)
LYMPHOCYTES NFR BLD: 15 % (ref 12–49)
MCH RBC QN AUTO: 29.5 PG (ref 26–34)
MCHC RBC AUTO-ENTMCNC: 32.3 G/DL (ref 30–36.5)
MCV RBC AUTO: 91.3 FL (ref 80–99)
MONOCYTES # BLD: 1 K/UL (ref 0–1)
MONOCYTES NFR BLD: 13 % (ref 5–13)
NEUTS SEG # BLD: 4.8 K/UL (ref 1.8–8)
NEUTS SEG NFR BLD: 67 % (ref 32–75)
NRBC # BLD: 0 K/UL (ref 0–0.01)
NRBC BLD-RTO: 0 PER 100 WBC
PERFORMED BY:: ABNORMAL
PHOSPHATE SERPL-MCNC: 3.8 MG/DL (ref 2.6–4.7)
PLATELET # BLD AUTO: 213 K/UL (ref 150–400)
PMV BLD AUTO: 11.7 FL (ref 8.9–12.9)
POTASSIUM SERPL-SCNC: 4.2 MMOL/L (ref 3.5–5.1)
RBC # BLD AUTO: 4.14 M/UL (ref 3.8–5.2)
SODIUM SERPL-SCNC: 140 MMOL/L (ref 136–145)
WBC # BLD AUTO: 7.2 K/UL (ref 3.6–11)

## 2024-11-30 PROCEDURE — 6370000000 HC RX 637 (ALT 250 FOR IP): Performed by: STUDENT IN AN ORGANIZED HEALTH CARE EDUCATION/TRAINING PROGRAM

## 2024-11-30 PROCEDURE — 6370000000 HC RX 637 (ALT 250 FOR IP): Performed by: INTERNAL MEDICINE

## 2024-11-30 PROCEDURE — 94640 AIRWAY INHALATION TREATMENT: CPT

## 2024-11-30 PROCEDURE — 80069 RENAL FUNCTION PANEL: CPT

## 2024-11-30 PROCEDURE — 36415 COLL VENOUS BLD VENIPUNCTURE: CPT

## 2024-11-30 PROCEDURE — 6360000002 HC RX W HCPCS

## 2024-11-30 PROCEDURE — 82962 GLUCOSE BLOOD TEST: CPT

## 2024-11-30 PROCEDURE — 2580000003 HC RX 258

## 2024-11-30 PROCEDURE — 6370000000 HC RX 637 (ALT 250 FOR IP)

## 2024-11-30 PROCEDURE — 85025 COMPLETE CBC W/AUTO DIFF WBC: CPT

## 2024-11-30 PROCEDURE — 2060000000 HC ICU INTERMEDIATE R&B

## 2024-11-30 RX ORDER — LIDOCAINE 4 G/G
1 PATCH TOPICAL DAILY
Status: DISCONTINUED | OUTPATIENT
Start: 2024-11-30 | End: 2024-12-01 | Stop reason: HOSPADM

## 2024-11-30 RX ADMIN — OXYMETAZOLINE HYDROCHLORIDE 2 SPRAY: 0.5 SPRAY NASAL at 22:24

## 2024-11-30 RX ADMIN — ENOXAPARIN SODIUM 40 MG: 100 INJECTION SUBCUTANEOUS at 08:50

## 2024-11-30 RX ADMIN — CARVEDILOL 3.12 MG: 3.12 TABLET, FILM COATED ORAL at 22:22

## 2024-11-30 RX ADMIN — SPIRONOLACTONE 12.5 MG: 25 TABLET ORAL at 08:52

## 2024-11-30 RX ADMIN — INSULIN LISPRO 2 UNITS: 100 INJECTION, SOLUTION INTRAVENOUS; SUBCUTANEOUS at 08:51

## 2024-11-30 RX ADMIN — SODIUM CHLORIDE, PRESERVATIVE FREE 10 ML: 5 INJECTION INTRAVENOUS at 22:23

## 2024-11-30 RX ADMIN — INSULIN LISPRO 2 UNITS: 100 INJECTION, SOLUTION INTRAVENOUS; SUBCUTANEOUS at 17:53

## 2024-11-30 RX ADMIN — OXYMETAZOLINE HYDROCHLORIDE 2 SPRAY: 0.5 SPRAY NASAL at 08:55

## 2024-11-30 RX ADMIN — SODIUM CHLORIDE, PRESERVATIVE FREE 10 ML: 5 INJECTION INTRAVENOUS at 08:54

## 2024-11-30 RX ADMIN — ASPIRIN 81 MG: 81 TABLET, CHEWABLE ORAL at 08:52

## 2024-11-30 RX ADMIN — MIDODRINE HYDROCHLORIDE 10 MG: 5 TABLET ORAL at 12:35

## 2024-11-30 RX ADMIN — GUAIFENESIN AND CODEINE PHOSPHATE 5 ML: 100; 10 SOLUTION ORAL at 22:23

## 2024-11-30 RX ADMIN — ALPRAZOLAM 0.25 MG: 0.25 TABLET ORAL at 18:01

## 2024-11-30 RX ADMIN — INSULIN GLARGINE 24 UNITS: 100 INJECTION, SOLUTION SUBCUTANEOUS at 22:23

## 2024-11-30 RX ADMIN — IPRATROPIUM BROMIDE AND ALBUTEROL SULFATE 1 DOSE: 2.5; .5 SOLUTION RESPIRATORY (INHALATION) at 03:23

## 2024-11-30 RX ADMIN — CLOPIDOGREL BISULFATE 75 MG: 75 TABLET ORAL at 08:52

## 2024-11-30 RX ADMIN — GUAIFENESIN AND CODEINE PHOSPHATE 5 ML: 100; 10 SOLUTION ORAL at 15:44

## 2024-11-30 RX ADMIN — DOXYCYCLINE HYCLATE 100 MG: 100 CAPSULE ORAL at 08:51

## 2024-11-30 RX ADMIN — CARVEDILOL 3.12 MG: 3.12 TABLET, FILM COATED ORAL at 08:52

## 2024-11-30 RX ADMIN — MIDODRINE HYDROCHLORIDE 10 MG: 5 TABLET ORAL at 08:51

## 2024-11-30 RX ADMIN — FLUTICASONE PROPIONATE 1 SPRAY: 50 SPRAY, METERED NASAL at 08:55

## 2024-11-30 RX ADMIN — MIDODRINE HYDROCHLORIDE 10 MG: 5 TABLET ORAL at 17:52

## 2024-11-30 RX ADMIN — DOXYCYCLINE HYCLATE 100 MG: 100 CAPSULE ORAL at 22:22

## 2024-11-30 RX ADMIN — GUAIFENESIN 600 MG: 600 TABLET, EXTENDED RELEASE ORAL at 08:52

## 2024-11-30 RX ADMIN — CALCITRIOL CAPSULES 0.25 MCG 0.25 MCG: 0.25 CAPSULE ORAL at 08:51

## 2024-11-30 RX ADMIN — TORSEMIDE 20 MG: 10 TABLET ORAL at 08:51

## 2024-11-30 RX ADMIN — GUAIFENESIN AND CODEINE PHOSPHATE 5 ML: 100; 10 SOLUTION ORAL at 03:18

## 2024-11-30 ASSESSMENT — PAIN SCALES - GENERAL: PAINLEVEL_OUTOF10: 0

## 2024-12-01 VITALS
TEMPERATURE: 98.2 F | HEIGHT: 62 IN | RESPIRATION RATE: 18 BRPM | SYSTOLIC BLOOD PRESSURE: 130 MMHG | DIASTOLIC BLOOD PRESSURE: 68 MMHG | WEIGHT: 207.67 LBS | HEART RATE: 82 BPM | BODY MASS INDEX: 38.22 KG/M2 | OXYGEN SATURATION: 96 %

## 2024-12-01 LAB
ALBUMIN SERPL-MCNC: 3 G/DL (ref 3.5–5)
ANION GAP SERPL CALC-SCNC: 6 MMOL/L (ref 2–12)
BASOPHILS # BLD: 0.1 K/UL (ref 0–0.1)
BASOPHILS NFR BLD: 1 % (ref 0–1)
BUN SERPL-MCNC: 71 MG/DL (ref 6–20)
BUN/CREAT SERPL: 50 (ref 12–20)
CA-I BLD-MCNC: 9.3 MG/DL (ref 8.5–10.1)
CHLORIDE SERPL-SCNC: 111 MMOL/L (ref 97–108)
CO2 SERPL-SCNC: 23 MMOL/L (ref 21–32)
CREAT SERPL-MCNC: 1.41 MG/DL (ref 0.55–1.02)
DIFFERENTIAL METHOD BLD: ABNORMAL
EOSINOPHIL # BLD: 0.3 K/UL (ref 0–0.4)
EOSINOPHIL NFR BLD: 4 % (ref 0–7)
ERYTHROCYTE [DISTWIDTH] IN BLOOD BY AUTOMATED COUNT: 14.9 % (ref 11.5–14.5)
GLUCOSE BLD STRIP.AUTO-MCNC: 158 MG/DL (ref 65–100)
GLUCOSE SERPL-MCNC: 157 MG/DL (ref 65–100)
HCT VFR BLD AUTO: 38.9 % (ref 35–47)
HGB BLD-MCNC: 12.3 G/DL (ref 11.5–16)
IMM GRANULOCYTES # BLD AUTO: 0 K/UL (ref 0–0.04)
IMM GRANULOCYTES NFR BLD AUTO: 0 % (ref 0–0.5)
LYMPHOCYTES # BLD: 1.1 K/UL (ref 0.8–3.5)
LYMPHOCYTES NFR BLD: 18 % (ref 12–49)
MAGNESIUM SERPL-MCNC: 2.2 MG/DL (ref 1.6–2.4)
MCH RBC QN AUTO: 29.2 PG (ref 26–34)
MCHC RBC AUTO-ENTMCNC: 31.6 G/DL (ref 30–36.5)
MCV RBC AUTO: 92.4 FL (ref 80–99)
MONOCYTES # BLD: 0.7 K/UL (ref 0–1)
MONOCYTES NFR BLD: 12 % (ref 5–13)
NEUTS SEG # BLD: 4.1 K/UL (ref 1.8–8)
NEUTS SEG NFR BLD: 65 % (ref 32–75)
NRBC # BLD: 0 K/UL (ref 0–0.01)
NRBC BLD-RTO: 0 PER 100 WBC
PERFORMED BY:: ABNORMAL
PHOSPHATE SERPL-MCNC: 2.8 MG/DL (ref 2.6–4.7)
PLATELET # BLD AUTO: 215 K/UL (ref 150–400)
PMV BLD AUTO: 11.8 FL (ref 8.9–12.9)
POTASSIUM SERPL-SCNC: 4.1 MMOL/L (ref 3.5–5.1)
RBC # BLD AUTO: 4.21 M/UL (ref 3.8–5.2)
SODIUM SERPL-SCNC: 140 MMOL/L (ref 136–145)
WBC # BLD AUTO: 6.3 K/UL (ref 3.6–11)

## 2024-12-01 PROCEDURE — 83735 ASSAY OF MAGNESIUM: CPT

## 2024-12-01 PROCEDURE — 80069 RENAL FUNCTION PANEL: CPT

## 2024-12-01 PROCEDURE — 6370000000 HC RX 637 (ALT 250 FOR IP): Performed by: STUDENT IN AN ORGANIZED HEALTH CARE EDUCATION/TRAINING PROGRAM

## 2024-12-01 PROCEDURE — 6360000002 HC RX W HCPCS

## 2024-12-01 PROCEDURE — 6370000000 HC RX 637 (ALT 250 FOR IP): Performed by: INTERNAL MEDICINE

## 2024-12-01 PROCEDURE — 85025 COMPLETE CBC W/AUTO DIFF WBC: CPT

## 2024-12-01 PROCEDURE — 36415 COLL VENOUS BLD VENIPUNCTURE: CPT

## 2024-12-01 PROCEDURE — 6370000000 HC RX 637 (ALT 250 FOR IP)

## 2024-12-01 PROCEDURE — 82962 GLUCOSE BLOOD TEST: CPT

## 2024-12-01 PROCEDURE — 2580000003 HC RX 258

## 2024-12-01 RX ORDER — CODEINE PHOSPHATE AND GUAIFENESIN 10; 100 MG/5ML; MG/5ML
5 SOLUTION ORAL EVERY 4 HOURS PRN
Qty: 420 ML | Refills: 0 | Status: SHIPPED | OUTPATIENT
Start: 2024-12-01 | End: 2024-12-15

## 2024-12-01 RX ORDER — ALBUTEROL SULFATE 90 UG/1
2 INHALANT RESPIRATORY (INHALATION) 4 TIMES DAILY PRN
Qty: 18 G | Refills: 0 | Status: SHIPPED | OUTPATIENT
Start: 2024-12-01

## 2024-12-01 RX ORDER — TORSEMIDE 20 MG/1
20 TABLET ORAL DAILY
Qty: 30 TABLET | Refills: 3 | Status: SHIPPED | OUTPATIENT
Start: 2024-12-01

## 2024-12-01 RX ORDER — GUAIFENESIN 600 MG/1
600 TABLET, EXTENDED RELEASE ORAL 2 TIMES DAILY
Qty: 30 TABLET | Refills: 0 | Status: SHIPPED | OUTPATIENT
Start: 2024-12-01

## 2024-12-01 RX ORDER — CALCITRIOL 0.25 UG/1
0.25 CAPSULE, LIQUID FILLED ORAL DAILY
Qty: 30 CAPSULE | Refills: 3 | Status: SHIPPED | OUTPATIENT
Start: 2024-12-01

## 2024-12-01 RX ORDER — MIDODRINE HYDROCHLORIDE 10 MG/1
10 TABLET ORAL
Qty: 90 TABLET | Refills: 3 | Status: SHIPPED | OUTPATIENT
Start: 2024-12-01

## 2024-12-01 RX ADMIN — GUAIFENESIN AND CODEINE PHOSPHATE 5 ML: 100; 10 SOLUTION ORAL at 10:34

## 2024-12-01 RX ADMIN — SPIRONOLACTONE 12.5 MG: 25 TABLET ORAL at 08:46

## 2024-12-01 RX ADMIN — MIDODRINE HYDROCHLORIDE 10 MG: 5 TABLET ORAL at 08:45

## 2024-12-01 RX ADMIN — ENOXAPARIN SODIUM 40 MG: 100 INJECTION SUBCUTANEOUS at 08:44

## 2024-12-01 RX ADMIN — OXYMETAZOLINE HYDROCHLORIDE 2 SPRAY: 0.5 SPRAY NASAL at 08:52

## 2024-12-01 RX ADMIN — FLUTICASONE PROPIONATE 1 SPRAY: 50 SPRAY, METERED NASAL at 08:52

## 2024-12-01 RX ADMIN — DOXYCYCLINE HYCLATE 100 MG: 100 CAPSULE ORAL at 08:46

## 2024-12-01 RX ADMIN — ASPIRIN 81 MG: 81 TABLET, CHEWABLE ORAL at 08:45

## 2024-12-01 RX ADMIN — GUAIFENESIN 600 MG: 600 TABLET, EXTENDED RELEASE ORAL at 08:45

## 2024-12-01 RX ADMIN — CALCITRIOL CAPSULES 0.25 MCG 0.25 MCG: 0.25 CAPSULE ORAL at 08:46

## 2024-12-01 RX ADMIN — CARVEDILOL 3.12 MG: 3.12 TABLET, FILM COATED ORAL at 08:46

## 2024-12-01 RX ADMIN — CLOPIDOGREL BISULFATE 75 MG: 75 TABLET ORAL at 08:45

## 2024-12-01 RX ADMIN — ACETAMINOPHEN 650 MG: 325 TABLET ORAL at 08:46

## 2024-12-01 RX ADMIN — SODIUM CHLORIDE, PRESERVATIVE FREE 10 ML: 5 INJECTION INTRAVENOUS at 08:53

## 2024-12-01 RX ADMIN — GUAIFENESIN AND CODEINE PHOSPHATE 5 ML: 100; 10 SOLUTION ORAL at 06:27

## 2024-12-01 RX ADMIN — TORSEMIDE 20 MG: 10 TABLET ORAL at 08:45

## 2024-12-01 ASSESSMENT — PAIN SCALES - GENERAL
PAINLEVEL_OUTOF10: 1
PAINLEVEL_OUTOF10: 0
PAINLEVEL_OUTOF10: 5

## 2024-12-01 ASSESSMENT — PAIN DESCRIPTION - LOCATION: LOCATION: RIB CAGE

## 2024-12-01 ASSESSMENT — PAIN DESCRIPTION - DESCRIPTORS: DESCRIPTORS: SORE

## 2024-12-01 ASSESSMENT — PAIN DESCRIPTION - ORIENTATION: ORIENTATION: RIGHT;LEFT

## 2024-12-01 NOTE — CASE COMMUNICATION
Discharge Noted:  Patient has been accepted to [facility] when medically stable.  CM will continue to follow.

## 2024-12-01 NOTE — PLAN OF CARE
PHYSICAL THERAPY EVALUATION AND DISCHARGE  Patient: Nicci Hernandez (64 y.o. female)  Date: 11/22/2024  Primary Diagnosis: Hypoxia [R09.02]  Elevated troponin [R79.89]  Pneumonia due to infectious organism, unspecified laterality, unspecified part of lung [J18.9]  Acute on chronic congestive heart failure, unspecified heart failure type (HCC) [I50.9]  Decompensated heart failure (HCC) [I50.9]       Precautions: Weight Bearing, Fall Risk     Left Lower Extremity Weight Bearing:  (heel touch)                Recommendations for nursing mobility: Use of bed/chair alarm for safety and LE elevation for management of edema    In place during session: Peripheral IV, External Catheter, and EKG/telemetry     ASSESSMENT  Pt is a 64 y.o. female admitted on 11/21/2024 for sob; pt currently being treated for hypoxia . Pt semi supine upon PT arrival, agreeable to evaluation. Pt A&O x 4.     Based on the objective data described below pt currently present at baseline indep for transfers and mobility at this time.Patient had left foot SX and suppose to be heel touch WB.Patient required reminders for the WB(See below for objective details and assist levels).     Overall pt tolerated session fair/good today with PT.  Pt has no skilled acute PT needs at this time noted by PT or reported by pt, will DC skilled PT following evaluation; pt verbalized understanding and agreement.  Current PT DC recommendation Intermittent physical therapy up to 2-3x/week in previous living setting once medically appropriate.         PLAN :  Recommendations and Planned Interventions: DC from skilled PT services following evaluation.     Rationale for discharge: Patient currently at functional baseline for transfers/mobility, no further skilled therapy required at this time    Frequency/Duration: DC from services following evaluation due to pt being at functional baseline; no skilled therapy required during admission, please reorder if needed 
  Problem: Chronic Conditions and Co-morbidities  Goal: Patient's chronic conditions and co-morbidity symptoms are monitored and maintained or improved  11/22/2024 0805 by David Winston RN  Outcome: Progressing  11/21/2024 2050 by Leigh Ann Elmore RN  Outcome: Progressing     Problem: Discharge Planning  Goal: Discharge to home or other facility with appropriate resources  11/22/2024 0805 by David Winston RN  Outcome: Progressing  11/21/2024 2050 by Leigh Ann Elmore RN  Outcome: Progressing     Problem: Skin/Tissue Integrity  Goal: Absence of new skin breakdown  Description: 1.  Monitor for areas of redness and/or skin breakdown  2.  Assess vascular access sites hourly  3.  Every 4-6 hours minimum:  Change oxygen saturation probe site  4.  Every 4-6 hours:  If on nasal continuous positive airway pressure, respiratory therapy assess nares and determine need for appliance change or resting period.  11/22/2024 0805 by David Winston RN  Outcome: Progressing  11/21/2024 2050 by Leigh Ann Elmore RN  Outcome: Progressing     Problem: Safety - Adult  Goal: Free from fall injury  11/22/2024 0805 by David Winston RN  Outcome: Progressing  11/21/2024 2050 by Leigh Ann Elmore RN  Outcome: Progressing     Problem: ABCDS Injury Assessment  Goal: Absence of physical injury  11/22/2024 0805 by David Winston RN  Outcome: Progressing  11/21/2024 2050 by Leigh Ann Elmore RN  Outcome: Progressing     
  Problem: Chronic Conditions and Co-morbidities  Goal: Patient's chronic conditions and co-morbidity symptoms are monitored and maintained or improved  11/23/2024 0720 by David Winston RN  Outcome: Progressing  11/23/2024 0046 by Meli Snider RN  Outcome: Progressing     Problem: Discharge Planning  Goal: Discharge to home or other facility with appropriate resources  11/23/2024 0720 by David Winston RN  Outcome: Progressing  11/23/2024 0046 by Meli Snider RN  Outcome: Progressing     Problem: Skin/Tissue Integrity  Goal: Absence of new skin breakdown  Description: 1.  Monitor for areas of redness and/or skin breakdown  2.  Assess vascular access sites hourly  3.  Every 4-6 hours minimum:  Change oxygen saturation probe site  4.  Every 4-6 hours:  If on nasal continuous positive airway pressure, respiratory therapy assess nares and determine need for appliance change or resting period.  11/23/2024 0720 by David Winston RN  Outcome: Progressing  11/23/2024 0046 by Meli Snider RN  Outcome: Progressing     Problem: Safety - Adult  Goal: Free from fall injury  11/23/2024 0720 by David Winston RN  Outcome: Progressing  11/23/2024 0046 by Meli Snider RN  Outcome: Progressing     Problem: ABCDS Injury Assessment  Goal: Absence of physical injury  11/23/2024 0720 by David Winston RN  Outcome: Progressing  11/23/2024 0046 by Meli Snider RN  Outcome: Progressing     
  Problem: Chronic Conditions and Co-morbidities  Goal: Patient's chronic conditions and co-morbidity symptoms are monitored and maintained or improved  11/23/2024 2036 by Liz Padilla RN  Outcome: Progressing  11/23/2024 0720 by David Winston RN  Outcome: Progressing     Problem: Discharge Planning  Goal: Discharge to home or other facility with appropriate resources  11/23/2024 2036 by Liz Padilla RN  Outcome: Progressing  11/23/2024 0720 by David Winston RN  Outcome: Progressing     Problem: Skin/Tissue Integrity  Goal: Absence of new skin breakdown  Description: 1.  Monitor for areas of redness and/or skin breakdown  2.  Assess vascular access sites hourly  3.  Every 4-6 hours minimum:  Change oxygen saturation probe site  4.  Every 4-6 hours:  If on nasal continuous positive airway pressure, respiratory therapy assess nares and determine need for appliance change or resting period.  11/23/2024 2036 by Liz Padilla RN  Outcome: Progressing  11/23/2024 0720 by David Winston RN  Outcome: Progressing     Problem: Safety - Adult  Goal: Free from fall injury  11/23/2024 2036 by Liz Padilla RN  Outcome: Progressing  11/23/2024 0720 by David Winston RN  Outcome: Progressing     Problem: ABCDS Injury Assessment  Goal: Absence of physical injury  11/23/2024 2036 by Liz Padilla RN  Outcome: Progressing  11/23/2024 0720 by David Winston RN  Outcome: Progressing     
  Problem: Chronic Conditions and Co-morbidities  Goal: Patient's chronic conditions and co-morbidity symptoms are monitored and maintained or improved  11/24/2024 0733 by David Winston RN  Outcome: Progressing  11/23/2024 2036 by Liz Padilla RN  Outcome: Progressing  Flowsheets (Taken 11/23/2024 2030)  Care Plan - Patient's Chronic Conditions and Co-Morbidity Symptoms are Monitored and Maintained or Improved: Monitor and assess patient's chronic conditions and comorbid symptoms for stability, deterioration, or improvement     Problem: Discharge Planning  Goal: Discharge to home or other facility with appropriate resources  11/24/2024 0733 by David Winston RN  Outcome: Progressing  11/23/2024 2036 by Liz Padilla RN  Outcome: Progressing  Flowsheets (Taken 11/23/2024 2030)  Discharge to home or other facility with appropriate resources: Identify barriers to discharge with patient and caregiver     Problem: Skin/Tissue Integrity  Goal: Absence of new skin breakdown  Description: 1.  Monitor for areas of redness and/or skin breakdown  2.  Assess vascular access sites hourly  3.  Every 4-6 hours minimum:  Change oxygen saturation probe site  4.  Every 4-6 hours:  If on nasal continuous positive airway pressure, respiratory therapy assess nares and determine need for appliance change or resting period.  11/24/2024 0733 by David Winston RN  Outcome: Progressing  11/23/2024 2036 by Liz Padilla RN  Outcome: Progressing     Problem: Safety - Adult  Goal: Free from fall injury  11/24/2024 0733 by David Winston RN  Outcome: Progressing  11/23/2024 2036 by Liz Padilla RN  Outcome: Progressing     Problem: ABCDS Injury Assessment  Goal: Absence of physical injury  11/24/2024 0733 by Dvaid Winston RN  Outcome: Progressing  11/23/2024 2036 by Liz Padilla RN  Outcome: Progressing     
  Problem: Chronic Conditions and Co-morbidities  Goal: Patient's chronic conditions and co-morbidity symptoms are monitored and maintained or improved  11/27/2024 2225 by Hallie Kennedy RN  Outcome: Progressing  11/27/2024 1102 by Brandin Trinidad RN  Outcome: Progressing     Problem: Discharge Planning  Goal: Discharge to home or other facility with appropriate resources  11/27/2024 2225 by Hallie Kennedy RN  Outcome: Progressing  11/27/2024 1102 by Brandin Trinidad RN  Outcome: Progressing     Problem: Skin/Tissue Integrity  Goal: Absence of new skin breakdown  Description: 1.  Monitor for areas of redness and/or skin breakdown  2.  Assess vascular access sites hourly  3.  Every 4-6 hours minimum:  Change oxygen saturation probe site  4.  Every 4-6 hours:  If on nasal continuous positive airway pressure, respiratory therapy assess nares and determine need for appliance change or resting period.  11/27/2024 2225 by Hallie Kennedy RN  Outcome: Progressing  11/27/2024 1102 by Brandin Trinidad RN  Outcome: Progressing     Problem: Safety - Adult  Goal: Free from fall injury  11/27/2024 2225 by Hallie Kennedy RN  Outcome: Progressing  11/27/2024 1102 by Brnadin Trinidad RN  Outcome: Progressing     Problem: ABCDS Injury Assessment  Goal: Absence of physical injury  11/27/2024 2225 by Hallie Kennedy RN  Outcome: Progressing  11/27/2024 1102 by Brandin Trinidad RN  Outcome: Progressing     
  Problem: Chronic Conditions and Co-morbidities  Goal: Patient's chronic conditions and co-morbidity symptoms are monitored and maintained or improved  11/28/2024 2148 by Hallie Kennedy RN  Outcome: Progressing  11/28/2024 0958 by Tristan Pedroza RN  Outcome: Progressing     Problem: Discharge Planning  Goal: Discharge to home or other facility with appropriate resources  11/28/2024 2148 by Hallie Kennedy RN  Outcome: Progressing  11/28/2024 0958 by Tristan Pedroza RN  Outcome: Progressing     Problem: Skin/Tissue Integrity  Goal: Absence of new skin breakdown  Description: 1.  Monitor for areas of redness and/or skin breakdown  2.  Assess vascular access sites hourly  3.  Every 4-6 hours minimum:  Change oxygen saturation probe site  4.  Every 4-6 hours:  If on nasal continuous positive airway pressure, respiratory therapy assess nares and determine need for appliance change or resting period.  11/28/2024 2148 by Hallie Kennedy RN  Outcome: Progressing  11/28/2024 0958 by Tristan Pedroza RN  Outcome: Progressing     Problem: Safety - Adult  Goal: Free from fall injury  11/28/2024 2148 by Hallie Kennedy RN  Outcome: Progressing  11/28/2024 0958 by Tristan Pedroza RN  Outcome: Progressing     Problem: ABCDS Injury Assessment  Goal: Absence of physical injury  11/28/2024 2148 by Hallie Kennedy RN  Outcome: Progressing  11/28/2024 0958 by Tristan Pedroza RN  Outcome: Progressing     Problem: Pain  Goal: Verbalizes/displays adequate comfort level or baseline comfort level  Outcome: Progressing     
  Problem: Chronic Conditions and Co-morbidities  Goal: Patient's chronic conditions and co-morbidity symptoms are monitored and maintained or improved  11/29/2024 2225 by Antonella Barreto RN  Outcome: Progressing  Flowsheets (Taken 11/29/2024 2030)  Care Plan - Patient's Chronic Conditions and Co-Morbidity Symptoms are Monitored and Maintained or Improved: Monitor and assess patient's chronic conditions and comorbid symptoms for stability, deterioration, or improvement  11/29/2024 0955 by Jessica Barrera RN  Outcome: Progressing  Flowsheets (Taken 11/29/2024 0720)  Care Plan - Patient's Chronic Conditions and Co-Morbidity Symptoms are Monitored and Maintained or Improved:   Monitor and assess patient's chronic conditions and comorbid symptoms for stability, deterioration, or improvement   Collaborate with multidisciplinary team to address chronic and comorbid conditions and prevent exacerbation or deterioration   Update acute care plan with appropriate goals if chronic or comorbid symptoms are exacerbated and prevent overall improvement and discharge     Problem: Discharge Planning  Goal: Discharge to home or other facility with appropriate resources  11/29/2024 2225 by Antonella Barreto RN  Outcome: Progressing  Flowsheets (Taken 11/29/2024 2030)  Discharge to home or other facility with appropriate resources: Identify barriers to discharge with patient and caregiver  11/29/2024 0955 by Jessica Barrera RN  Outcome: Progressing  Flowsheets (Taken 11/29/2024 0720)  Discharge to home or other facility with appropriate resources:   Identify barriers to discharge with patient and caregiver   Arrange for needed discharge resources and transportation as appropriate   Identify discharge learning needs (meds, wound care, etc)   Refer to discharge planning if patient needs post-hospital services based on physician order or complex needs related to functional status, cognitive ability or social support 
  Problem: Chronic Conditions and Co-morbidities  Goal: Patient's chronic conditions and co-morbidity symptoms are monitored and maintained or improved  12/1/2024 1011 by Linda Bustos RN  Outcome: Progressing  12/1/2024 0014 by Antonella Barreto RN  Outcome: Progressing  Flowsheets (Taken 11/30/2024 2008)  Care Plan - Patient's Chronic Conditions and Co-Morbidity Symptoms are Monitored and Maintained or Improved: Monitor and assess patient's chronic conditions and comorbid symptoms for stability, deterioration, or improvement     Problem: Discharge Planning  Goal: Discharge to home or other facility with appropriate resources  12/1/2024 1011 by Linda Bustos RN  Outcome: Progressing  12/1/2024 0014 by Antonella Barreto RN  Outcome: Progressing  Flowsheets (Taken 11/30/2024 2008)  Discharge to home or other facility with appropriate resources: Identify barriers to discharge with patient and caregiver     Problem: Skin/Tissue Integrity  Goal: Absence of new skin breakdown  Description: 1.  Monitor for areas of redness and/or skin breakdown  2.  Assess vascular access sites hourly  3.  Every 4-6 hours minimum:  Change oxygen saturation probe site  4.  Every 4-6 hours:  If on nasal continuous positive airway pressure, respiratory therapy assess nares and determine need for appliance change or resting period.  12/1/2024 1011 by Linda Bustos RN  Outcome: Progressing  12/1/2024 0014 by Antonella Barreto RN  Outcome: Progressing     Problem: Safety - Adult  Goal: Free from fall injury  12/1/2024 1011 by Linda Bustos RN  Outcome: Progressing  12/1/2024 0014 by Antonella Barreto RN  Outcome: Progressing     Problem: ABCDS Injury Assessment  Goal: Absence of physical injury  12/1/2024 1011 by Linda Bustos RN  Outcome: Progressing  12/1/2024 0014 by Antonella Barreto RN  Outcome: Progressing     Problem: Pain  Goal: Verbalizes/displays adequate comfort level or baseline comfort level  12/1/2024 1011 by 
  Problem: Chronic Conditions and Co-morbidities  Goal: Patient's chronic conditions and co-morbidity symptoms are monitored and maintained or improved  12/1/2024 1027 by Linda Bustos RN  Outcome: Adequate for Discharge  12/1/2024 1011 by Linda Bustos RN  Outcome: Progressing  12/1/2024 0014 by Antonella Barreto RN  Outcome: Progressing  Flowsheets (Taken 11/30/2024 2008)  Care Plan - Patient's Chronic Conditions and Co-Morbidity Symptoms are Monitored and Maintained or Improved: Monitor and assess patient's chronic conditions and comorbid symptoms for stability, deterioration, or improvement     Problem: Discharge Planning  Goal: Discharge to home or other facility with appropriate resources  12/1/2024 1027 by Linda Bustos RN  Outcome: Adequate for Discharge  12/1/2024 1011 by Linda Bustos RN  Outcome: Progressing  12/1/2024 0014 by Antonella Barreto RN  Outcome: Progressing  Flowsheets (Taken 11/30/2024 2008)  Discharge to home or other facility with appropriate resources: Identify barriers to discharge with patient and caregiver     Problem: Skin/Tissue Integrity  Goal: Absence of new skin breakdown  Description: 1.  Monitor for areas of redness and/or skin breakdown  2.  Assess vascular access sites hourly  3.  Every 4-6 hours minimum:  Change oxygen saturation probe site  4.  Every 4-6 hours:  If on nasal continuous positive airway pressure, respiratory therapy assess nares and determine need for appliance change or resting period.  12/1/2024 1027 by Linda Bustos RN  Outcome: Adequate for Discharge  12/1/2024 1011 by Linda Bustos RN  Outcome: Progressing  12/1/2024 0014 by Antonella Barreto RN  Outcome: Progressing     Problem: Safety - Adult  Goal: Free from fall injury  12/1/2024 1027 by Linda Bustos RN  Outcome: Adequate for Discharge  12/1/2024 1011 by Linda Bustos RN  Outcome: Progressing  12/1/2024 0014 by Antonella Barreto RN  Outcome: Progressing     Problem: ABCDS 
  Problem: Chronic Conditions and Co-morbidities  Goal: Patient's chronic conditions and co-morbidity symptoms are monitored and maintained or improved  Outcome: Progressing     Problem: Discharge Planning  Goal: Discharge to home or other facility with appropriate resources  Outcome: Progressing     Problem: ABCDS Injury Assessment  Goal: Absence of physical injury  Outcome: Progressing     
  Problem: Chronic Conditions and Co-morbidities  Goal: Patient's chronic conditions and co-morbidity symptoms are monitored and maintained or improved  Outcome: Progressing     Problem: Discharge Planning  Goal: Discharge to home or other facility with appropriate resources  Outcome: Progressing     Problem: Skin/Tissue Integrity  Goal: Absence of new skin breakdown  Description: 1.  Monitor for areas of redness and/or skin breakdown  2.  Assess vascular access sites hourly  3.  Every 4-6 hours minimum:  Change oxygen saturation probe site  4.  Every 4-6 hours:  If on nasal continuous positive airway pressure, respiratory therapy assess nares and determine need for appliance change or resting period.  11/27/2024 1102 by Brandin Trinidad, CATHY  Outcome: Progressing  11/26/2024 2208 by Cassie Roth, RN  Outcome: Progressing  11/26/2024 2208 by Cassie Roth, RN  Outcome: Progressing     Problem: Safety - Adult  Goal: Free from fall injury  Outcome: Progressing     Problem: ABCDS Injury Assessment  Goal: Absence of physical injury  Outcome: Progressing     
  Problem: Chronic Conditions and Co-morbidities  Goal: Patient's chronic conditions and co-morbidity symptoms are monitored and maintained or improved  Outcome: Progressing     Problem: Discharge Planning  Goal: Discharge to home or other facility with appropriate resources  Outcome: Progressing     Problem: Skin/Tissue Integrity  Goal: Absence of new skin breakdown  Description: 1.  Monitor for areas of redness and/or skin breakdown  2.  Assess vascular access sites hourly  3.  Every 4-6 hours minimum:  Change oxygen saturation probe site  4.  Every 4-6 hours:  If on nasal continuous positive airway pressure, respiratory therapy assess nares and determine need for appliance change or resting period.  Outcome: Progressing     Problem: Safety - Adult  Goal: Free from fall injury  Outcome: Progressing     Problem: ABCDS Injury Assessment  Goal: Absence of physical injury  Outcome: Progressing     
  Problem: Chronic Conditions and Co-morbidities  Goal: Patient's chronic conditions and co-morbidity symptoms are monitored and maintained or improved  Outcome: Progressing     Problem: Discharge Planning  Goal: Discharge to home or other facility with appropriate resources  Outcome: Progressing     Problem: Skin/Tissue Integrity  Goal: Absence of new skin breakdown  Description: 1.  Monitor for areas of redness and/or skin breakdown  2.  Assess vascular access sites hourly  3.  Every 4-6 hours minimum:  Change oxygen saturation probe site  4.  Every 4-6 hours:  If on nasal continuous positive airway pressure, respiratory therapy assess nares and determine need for appliance change or resting period.  Outcome: Progressing     Problem: Safety - Adult  Goal: Free from fall injury  Outcome: Progressing     Problem: ABCDS Injury Assessment  Goal: Absence of physical injury  Outcome: Progressing     
  Problem: Chronic Conditions and Co-morbidities  Goal: Patient's chronic conditions and co-morbidity symptoms are monitored and maintained or improved  Outcome: Progressing  Flowsheets (Taken 11/30/2024 2008)  Care Plan - Patient's Chronic Conditions and Co-Morbidity Symptoms are Monitored and Maintained or Improved: Monitor and assess patient's chronic conditions and comorbid symptoms for stability, deterioration, or improvement     Problem: Discharge Planning  Goal: Discharge to home or other facility with appropriate resources  Outcome: Progressing  Flowsheets (Taken 11/30/2024 2008)  Discharge to home or other facility with appropriate resources: Identify barriers to discharge with patient and caregiver     Problem: Skin/Tissue Integrity  Goal: Absence of new skin breakdown  Description: 1.  Monitor for areas of redness and/or skin breakdown  2.  Assess vascular access sites hourly  3.  Every 4-6 hours minimum:  Change oxygen saturation probe site  4.  Every 4-6 hours:  If on nasal continuous positive airway pressure, respiratory therapy assess nares and determine need for appliance change or resting period.  Outcome: Progressing     Problem: Safety - Adult  Goal: Free from fall injury  Outcome: Progressing     Problem: ABCDS Injury Assessment  Goal: Absence of physical injury  Outcome: Progressing     Problem: Pain  Goal: Verbalizes/displays adequate comfort level or baseline comfort level  Outcome: Progressing     
period.  11/29/2024 0955 by Jessica Barrera RN  Outcome: Progressing  11/28/2024 2148 by Hallie Kennedy RN  Outcome: Progressing     Problem: Safety - Adult  Goal: Free from fall injury  11/29/2024 0955 by Jessica Barrera RN  Outcome: Progressing  11/28/2024 2148 by Hallie Kennedy RN  Outcome: Progressing     Problem: ABCDS Injury Assessment  Goal: Absence of physical injury  11/29/2024 0955 by Jessica Barrera RN  Outcome: Progressing  11/28/2024 2148 by Hallie Kennedy RN  Outcome: Progressing     Problem: Pain  Goal: Verbalizes/displays adequate comfort level or baseline comfort level  11/29/2024 0955 by Jessica Barrera RN  Outcome: Progressing  Flowsheets (Taken 11/29/2024 0747)  Verbalizes/displays adequate comfort level or baseline comfort level:   Encourage patient to monitor pain and request assistance   Assess pain using appropriate pain scale   Administer analgesics based on type and severity of pain and evaluate response   Implement non-pharmacological measures as appropriate and evaluate response   Consider cultural and social influences on pain and pain management   Notify Licensed Independent Practitioner if interventions unsuccessful or patient reports new pain  11/28/2024 2148 by Hallie Kennedy RN  Outcome: Progressing

## 2024-12-01 NOTE — DISCHARGE SUMMARY
in the last 72 hours.   No results for input(s): \"IRON\", \"TIBC\" in the last 72 hours.    Invalid input(s): \"PSAT\", \"FERR\"   No results for input(s): \"PH\", \"PCO2\", \"PO2\" in the last 72 hours.  No results for input(s): \"CKTOTAL\", \"CKMB\", \"TROPONINI\" in the last 72 hours.  Lab Results   Component Value Date/Time    POCGLU 158 12/01/2024 08:26 AM    POCGLU 166 11/30/2024 10:03 PM    POCGLU 195 11/30/2024 05:12 PM    POCGLU 170 11/30/2024 11:24 AM    POCGLU 208 11/30/2024 08:22 AM         Discharge time spent 35 minutes    Signed:  Sammy Skinner MD  12/1/2024  8:44 AM

## 2024-12-01 NOTE — PROGRESS NOTES
[facility] at [address] [address]  Phone: [phone]      Progress Note      11/23/2024 5:07 PM  NAME: Nicci Hernandez   MRN:  [id number]   Admit Diagnosis: Hypoxia [R09.02]  Elevated troponin [R79.89]  Pneumonia due to infectious organism, unspecified laterality, unspecified part of lung [J18.9]  Acute on chronic congestive heart failure, unspecified heart failure type (HCC) [I50.9]  Decompensated heart failure (HCC) [I50.9]          Assessment/Plan:     Acute on chronic HFrEF.  Patient's echo on this admission shows LV ejection fraction of 15-20%.  Patient continues to be dyspneic with cough.  Will start patient on IV dobutamine for improving cardiac output.  Will continue home regimen of GDMT inclusive of losartan, Aldactone and carvedilol keeping close watch on patient's electrolyte.  Troponin leak which is flat and appears to be type II non-STEMI.  Patient has a history of CAD with three-vessel CABG in 2003.  Currently free of anginal symptoms.  Continue DAPT with beta-blocker for empiric antianginal coverage.  Patient is allergic to atorvastatin.  Hypertension, stable  Diabetes mellitus         []       High complexity decision making was performed in this patient at high risk for decompensation with multiple organ involvement.    Subjective:     Nicci Hernandez denies chest pain, complains of cough productive of mucousy sputum and dyspnea.  Discussed with RN events overnight.     Review of Systems:     []         Unable to obtain  ROS due to ---   [x]         Total of 12 systems reviewed as follows:     Constitutional: negative fever, negative chills, negative weight loss  Eyes:               negative visual changes  ENT:                negative sore throat, tongue or lip swelling  Respiratory:     negative cough, negative dyspnea  Cards:             As per HPI  GI:                   negative for nausea, vomiting, diarrhea, and abdominal pain  Genitourinary: 
        Progress Note      11/26/2024 11:03 AM  NAME: Nicci Hernandez   MRN:  [id number]   Admit Diagnosis: Hypoxia [R09.02]  Elevated troponin [R79.89]  Pneumonia due to infectious organism, unspecified laterality, unspecified part of lung [J18.9]  Acute on chronic congestive heart failure, unspecified heart failure type (HCC) [I50.9]  Decompensated heart failure (HCC) [I50.9]      Problem List:   Acute on chronic CHF  Dilated cardiomyopathy with a EF 15-20%  Type II non-Q MI  Remote CABG  Hypertension  Diabetes     Assessment/Plan:   Continue IV dobutamine  Continue with IV diuretics  Guideline directed medical therapy for CHF  Continued [facility] follow-up [facility]         []       High complexity decision making was performed in this patient at high risk for decompensation with multiple organ involvement.    Subjective:     Nicci Hernandez reports mild dyspnea.  Discussed with RN events overnight.     Review of Systems:   Negative except for as noted above.    Objective:      Physical Exam:    Last 24hrs VS reviewed since prior progress note. Most recent are:    BP (!) 131/57   Pulse (!) 104   Temp 98 °F (36.7 °C) (Oral)   Resp 18   Ht 1.575 m (5' 2\")   Wt 104.8 kg (231 lb 0.7 oz)   SpO2 95%   BMI 42.26 kg/m²     Intake/Output Summary (Last 24 hours) at 11/26/2024 1103  Last data filed at 11/26/2024 0859  Gross per 24 hour   Intake 460 ml   Output 1200 ml   Net -740 ml        General Appearance: Alert; no acute distress.  Ears/Nose/Mouth/Throat: moist mucous membranes  Neck: Supple.  Chest: Lungs with dullness at the bases  Cardiovascular: Regular rate and rhythm, S1S2 normal  Abdomen: Soft, non-tender, bowel sounds are active.  Extremities: No edema bilaterally.  Skin: Warm and dry.      PMH/SH reviewed - no change compared to H&P    Telemetry: NSR        []  No new EKG for review    Lab Data Personally Reviewed:    Recent Labs     11/25/24  0212 11/26/24  0250   WBC 6.4 6.4 
        [facility] at [address] [address]  Phone: [phone]      Progress Note      11/25/2024 11:36 AM  NAME: Nicci Hernandez   MRN:  [id number]   Admit Diagnosis: Hypoxia [R09.02]  Elevated troponin [R79.89]  Pneumonia due to infectious organism, unspecified laterality, unspecified part of lung [J18.9]  Acute on chronic congestive heart failure, unspecified heart failure type (HCC) [I50.9]  Decompensated heart failure (HCC) [I50.9]          Assessment/Plan:     Acute on chronic HFrEF.  Patient's echo on this admission shows LV ejection fraction of 15-20%.  Patient on IV dobutamine along with torsemide and Aldactone had a good urine output with marked improvement in her dyspnea and cough.  Will continue IV dobutamine for one more day with  home regimen of GDMT.  Troponin leak which is flat and appears to be type II non-STEMI.  Patient has a history of CAD with three-vessel CABG in 2003.  Currently free of anginal symptoms.  Continue DAPT with beta-blocker for empiric antianginal coverage.  Patient is allergic to atorvastatin.  Hypertension, stable  Diabetes mellitus         []       High complexity decision making was performed in this patient at high risk for decompensation with multiple organ involvement.    Subjective:     Nicci Hernandez denies chest pain, complains of cough productive of mucousy sputum and dyspnea.  Discussed with RN events overnight.     Review of Systems:     []         Unable to obtain  ROS due to ---   [x]         Total of 12 systems reviewed as follows:     Constitutional: negative fever, negative chills, negative weight loss  Eyes:               negative visual changes  ENT:                negative sore throat, tongue or lip swelling  Respiratory:     negative cough, negative dyspnea  Cards:             As per HPI  GI:                   negative for nausea, vomiting, diarrhea, and abdominal pain  Genitourinary: negative for frequency, 
      Hospitalist Progress Note    NAME:   Nicci Hernandez   : 1960   MRN: [id number]     Date/Time: 2024 11:16 AM  Patient PCP: Franco Ross MD    Estimated discharge date:   Barriers: Cardiology clearance, dobutamine drip    Hospital Course:  Nicci Hernandez is a 64 y.o.  female with PMHx significant for CHF, CKD, CAD, type 2 diabetes, hyperlipidemia, hypertension, PVD admitted 2024 for acute respiratory failure with hypoxia secondary to viral bronchiolitis.  CTA chest with and without contrast shows probable acute bronchiolitis in the lower lungs.  Patient requiring 2 L via nasal cannula, wean as tolerated; now tolerating room air without difficulty.  Patient also has heart failure with pulmonary edema on chest x-ray.Cardiomegaly, LV dilation. Mild hepatic steatosis. Possible cirrhosis. CBC WNL. Troponin elevated at 127. proBNP 12,355.  Echo shows severely reduced left ventricular systolic function with EF 15 to 20%.  Left ventricle dilated.  Severe global hypokinesis present.  Started on dobutamine drip for improved cardiac output.  Wound care consulted to evaluate patient's left foot skin graft with wound.  Podiatry consulted, no inpatient interventions required.  Follow-up with outpatient podiatry.    Assessment / Plan:  Bronchiolitis, viral versus bacterial  Acute respiratory failure with hypoxia  Patient initially requiring 2 L via nasal cannula to keep oxygen saturations above 94%.  Weaned to room air and tolerating without difficulty  CT chest shows acute bronchiolitis in the lower lungs.  Procalcitonin and CRP low, no need for further antibiotics at this time.  Patient received ceftriaxone and Solu-Medrol in ED.  Also received Mucinex.  Will continue supportive measures.      Diarrhea, resolved  No sample for C. difficile and enteric panel, orders fell off.     Acute decompensated heart failure  Follows with [facility] cardiology  BNP 12,355 echo from outside source 2024 shows EF 20%. 
      Hospitalist Progress Note    NAME:   Nicci Hernandez   : 1960   MRN: [id number]     Date/Time: 2024 12:15 PM  Patient PCP: Franco Ross MD    Estimated discharge date: 24 hours  Barriers: Cardiology clearance, improvement in renal function    Hospital Course:  Nicci Hernandez is a 64 y.o.  female with PMHx significant for CHF, CKD, CAD, type 2 diabetes, hyperlipidemia, hypertension, PVD admitted 2024 for acute respiratory failure with hypoxia secondary to viral bronchiolitis.  CTA chest with and without contrast shows probable acute bronchiolitis in the lower lungs.  Patient initially requiring 2 L via nasal cannula. Gradually weaned to RA s/p empiric antibiotics and solumedrol.  Patient also has heart failure with pulmonary edema on chest x-ray and elevated BNP 12,355 concerning for acute exacerbation. Troponin elevated at 127. Echo showed severely reduced left ventricular systolic function with EF 15 to 20%.  Left ventricle dilated.  Severe global hypokinesis present.  Cardiology consulted. Patient started on dobutamine drip for improved cardiac output.  Wound care consulted to evaluate patient's left foot skin graft with wound.  Podiatry consulted, no inpatient interventions required. Wound culture obtained and positive for MRSA patient started on oral doxycycline.     Patient noted to have significant worsening renal function with creatinine 2.56 increased from 1.86.  Suspected due to diuresis.  Diuresis held and renal function rechecked with creatinine continued to uptrend to 2.91.  Given worsening renal function nephrology consulted. Oral diuretics currently held. Cardiology discontinued dobutamine .    Patient received albumin and IV Lasix  per nephrology recommendations.  Overnight patient developed increased cough, shortness of breath.  Repeat chest x-ray obtained with no acute pathology.  Renal function gradually downtrending, 2.76.  BNP continuing to downtrend 
   11/29/24 0800   RT Protocol   History Pulmonary Disease 0   Respiratory pattern 0   Breath sounds 0   Cough 0   Indications for Bronchodilator Therapy None   Bronchodilator Assessment Score 0     RT Inhaler-Nebulizer Bronchodilator Protocol Note    There is a bronchodilator order in the chart from a provider indicating to follow the RT Bronchodilator Protocol and there is an “Initiate RT Inhaler-Nebulizer Bronchodilator Protocol” order as well (see protocol at bottom of note).    CXR Findings:  XR CHEST PORTABLE    Result Date: 11/27/2024  No acute process. Electronically signed by Jm Madsen      The findings from the last RT Protocol Assessment were as follows:   History Pulmonary Disease: (P) None or smoker <15 pack years  Respiratory Pattern: (P) Regular pattern and RR 12-20 bpm  Breath Sounds: (P) Clear breath sounds  Cough: (P) Strong, spontaneous, non-productive  Indication for Bronchodilator Therapy: (P) None  Bronchodilator Assessment Score: (P) 0    Aerosolized bronchodilator medication orders have been revised according to the RT Inhaler-Nebulizer Bronchodilator Protocol below.    Respiratory Therapist to perform RT Therapy Protocol Assessment initially then follow the protocol.  Repeat RT Therapy Protocol Assessment PRN for score 0-3 or on second treatment, BID, and PRN for scores above 3.    No Indications - adjust the frequency to every 6 hours PRN wheezing or bronchospasm, if no treatments needed after 48 hours then discontinue using Per Protocol order mode.     If indication present, adjust the RT bronchodilator orders based on the Bronchodilator Assessment Score as indicated below.  Use Inhaler orders unless patient has one or more of the following: on home nebulizer, not able to hold breath for 10 seconds, is not alert and oriented, cannot activate and use MDI correctly, or respiratory rate 25 breaths per minute or more, then use the equivalent nebulizer order(s) with same Frequency and PRN 
  Physician Progress Note      PATIENT:               SU HILARIO  [facility] #:                  [id number]  :                       1960  ADMIT DATE:       2024 2:24 PM  DISCH DATE:  RESPONDING  PROVIDER #:        EVELYN ONEILL          QUERY TEXT:    Dear Attending,    Patient admitted with respiratory failure.   Noted documentation of   Bronchiolitis in  HP and following Attending notes and Pneumonia in   - Cardiology notes.  If possible, please document in progress notes and discharge summary if you   are evaluating and /or treating any of the following:    The medical record reflects the following:  Risk Factors: 64 year old female, CHF, respiratory failure, CAD, HTN  Clinical Indicators:  HP - presenting the ED with shortness of breath and   cough.  Patient states her symptoms started 11/15/2024 with sudden onset of   cough and shortness of breath.  She denies any fever or chills though does   have significant diarrhea and mild nausea.  Bronchiolitis, viral versus bacterial  Patient experiencing acute cough, wheezing   Cardiology note - Pneumonia due to infectious organism   CXR - Cardiomegaly, pulmonary vascular congestion, probable interstitial   edema.   CTA Chest -  Probable acute bronchiolitis in the lower lungs.   CXR - There are increased mild diffuse interstitial opacities. There is   no pleural  effusion or pneumothorax.  Treatment: IV Ceftriaxone , PO Doxycycline, Mucinex, Solumedrol ,   Prednisone -, Nebulizers        Please email [email or web address] with any questions  Options provided:  -- Bronchiolitis confirmed and Pneumonia ruled out  -- Pneumonia confirmed and Bronchiolitis ruled out  -- Both Bronchiolitis and Pneumonia confirmed  -- Other - I will add my own diagnosis  -- Disagree - Not applicable / Not valid  -- Disagree - Clinically unable to determine / Unknown  -- Refer to Clinical Documentation 
4 Eyes Skin Assessment     NAME:  Nicci Hernandez  YOB: 1960  MEDICAL RECORD NUMBER:  [id number]    The patient is being assessed for  Other : Routine    I agree that at least one RN has performed a thorough Head to Toe Skin Assessment on the patient. ALL assessment sites listed below have been assessed.      Areas assessed by both nurses:    Head, Face, Ears, Shoulders, Back, Chest, Arms, Elbows, Hands, Sacrum. Buttock, Coccyx, Ischium, Legs. Feet and Heels, and Under Medical Devices         Does the Patient have a Wound? Yes wound(s) were present on assessment. LDA wound assessment was Initiated and completed by RN       Rupert Prevention initiated by RN: No  Wound Care Orders initiated by RN: Yes    Pressure Injury (Stage 3,4, Unstageable, DTI, NWPT, and Complex wounds) if present, place Wound referral order by RN under : No    New Ostomies, if present place, Ostomy referral order under : No     Nurse 1 eSignature: Electronically signed by Brandin Trinidad RN on 11/27/24 at 6:07 PM EST    **SHARE this note so that the co-signing nurse can place an eSignature**    Nurse 2 eSignature: Electronically signed by Sheridan Mcfarlane RN on 11/27/24 at 6:14 PM EST       
4 Eyes Skin Assessment     NAME:  Nicci Hernandez  YOB: 1960  MEDICAL RECORD NUMBER:  [id number]    The patient is being assessed for  Admission    I agree that at least one RN has performed a thorough Head to Toe Skin Assessment on the patient. ALL assessment sites listed below have been assessed.      Areas assessed by both nurses:    Head, Face, Ears, Shoulders, Back, Chest, Arms, Elbows, Hands, Sacrum. Buttock, Coccyx, Ischium, and Legs. Feet and Heels        Does the Patient have a Wound? Yes wound(s) were present on assessment. LDA wound assessment was Initiated and completed by RN  Skin graft to left toe. Redness to inner buttocks        Rupert Prevention initiated by RN: No  Wound Care Orders initiated by RN: Yes    Pressure Injury (Stage 3,4, Unstageable, DTI, NWPT, and Complex wounds) if present, place Wound referral order by RN under : No    New Ostomies, if present place, Ostomy referral order under : No     Nurse 1 eSignature: Electronically signed by Leigh Ann orta RN on 11/22/24 at 12:00 AM EST    **SHARE this note so that the co-signing nurse can place an eSignature**    Nurse 2 eSignature: Electronically signed by Angle Carpenter RN on 11/22/24 at 4:20 AM EST   
Comprehensive Nutrition Assessment    Type and Reason for Visit:  Initial, LOS    Nutrition Recommendations/Plan:   Encourage continued intakes >75%  Monitor weight, intakes, labs, and bowel fxn     Malnutrition Assessment:  Malnutrition Status:  At risk for malnutrition (11/27/24 1059)    Context:  Acute Illness     Findings of the 6 clinical characteristics of malnutrition:  Energy Intake:  Mild decrease in energy intake  Weight Loss:  No weight loss     Body Fat Loss:  No body fat loss     Muscle Mass Loss:  No muscle mass loss    Fluid Accumulation:  Mild     Strength:  Not Performed    Nutrition Assessment:    Presented w/ cough and SOB. Admitted for hypoxia 2/2 viral bronchiolitis. Pt has foot wound POA s/p graft 11/5. +MRSA wound cxr, on abx. Has been eating well, % 4CHO, cardiac diet. Pt reports wt loss this admission d/t fluid, now back to baseline wt per standing scale wt taken y'day. Labs and meds reviewed. BUN69, Cr 2.91, on dobutamine drip.    Nutrition Related Findings:    NFPE deferred, no recent wt loss. No reported n/v/d/c, LBM 11/26. Pt w/o reports of difficulty chewing/swallowing. Trace BLE edema. Wound Type: Open Wounds       Current Nutrition Intake & Therapies:    Average Meal Intake: %  Average Supplements Intake: None Ordered  ADULT DIET; Regular; 4 carb choices (60 gm/meal); Low Fat/Low Chol/High Fiber/2 gm Na    Anthropometric Measures:  Height: 157.5 cm (5' 2\")  Ideal Body Weight (IBW): 110 lbs (50 kg)    Current Body Weight: 93 kg (205 lb), 186.4 % IBW. Weight Source: Standing scale  Current BMI (kg/m2): 37.5  % Weight Change (Calculated): -1  Weight Adjustment For: No Adjustment  BMI Categories: Obese Class 2 (BMI 35.0 -39.9)    Estimated Daily Nutrient Needs:  Energy Requirements Based On: Formula  Weight Used for Energy Requirements: Current  Energy (kcal/day): 2003 kcals/day (MSJ, 1.2S, 1.2A)  Weight Used for Protein Requirements: Ideal  Protein (g/day): 75-90 g/day 
Echo completed. Report to follow.    
OT eval order received and acknowledged. Per PT note, pt is demonstrating baseline modified independence for self care tasks and functional mobility/transfers. OT evaluation order will therefore be discontinued this pt has no acute OT needs. Please reorder OT if pt's functional status changes. Thank you.     
Our group was asked to see Ms. Hernandez in Nephrology consultation.  The database in [facility] was reviewed this afternoon.  Ms. Hernandez is an office patient of Dr. Virgen [[facility]].  She was last seen in office by Dr. Virgen in  August 2022    Dr. Beltran's group [[facility]] sees Dr. Virgen's patients during their admissions to [facility].    I spoke with Dr. Beltran [via telephone this afternoon].  He was notified of the consult.  His group will see Ms Hernandez  in Nephrology consultation during her admission.      
Patient discharged home- home health. IV and tele removed. Patient received discharge instructions and verbalized understanding of follow up appointments and medication instructions. Patients daughter provided transportation home.   
Per Dr. Choudhary, we cancelled stool collection attempts to rule out c-diff as pt was not putting out enough sample to collect. Removed orders 11/23/24 and no longer need to collect.   
Progress Note      11/29/2024 3:55 PM  NAME: Nicci Hernandez   MRN:  [id number]   Admit Diagnosis: Hypoxia [R09.02]  Elevated troponin [R79.89]  Pneumonia due to infectious organism, unspecified laterality, unspecified part of lung [J18.9]  Acute on chronic congestive heart failure, unspecified heart failure type (HCC) [I50.9]  Decompensated heart failure (HCC) [I50.9]          Assessment/Plan:     Shortness of breath, heart failure with reduced ejection fraction, off dobutamine after 5 days.  Did have an uneventful night.  Denies chest pain.   On carvedilol, midodrine.  Not on ARB with chronic kidney disease.  Dietary restrictions discussed.    Chronic kidney disease, creatinine is downtrending.    Troponin leak, mild.    Hypertension, now with low blood pressure requiring midodrine.    Diabetes             []       High complexity decision making was performed in this patient at high risk for decompensation with multiple organ involvement.    Subjective:     Nicci Hernandez denies chest pain or shortness of breath.  Daughter brought her chips.  Avoiding salty foods discussed.  Discussed with RN events overnight.     Review of Systems:    Symptom Y/N Comments  Symptom Y/N Comments   Fever/Chills N   Chest Pain N    Poor Appetite N   Edema N    Cough N   Abdominal Pain N    Sputum N   Joint Pain N    SOB/DAMON N   Pruritis/Rash N    Nausea/vomit N   Tolerating PT/OT Y    Diarrhea N   Tolerating Diet Y    Constipation N   Other       Could NOT obtain due to:      Objective:      Physical Exam:    Last 24hrs VS reviewed since prior progress note. Most recent are:    /61   Pulse 87   Temp 98.1 °F (36.7 °C) (Oral)   Resp 18   Ht 1.575 m (5' 2\")   Wt 97.7 kg (215 lb 6.2 oz)   SpO2 95%   BMI 39.40 kg/m²     Intake/Output Summary (Last 24 hours) at 11/29/2024 1555  Last data filed at 11/29/2024 1312  Gross per 24 hour   Intake 1060 ml   Output 800 ml   Net 260 ml        General Appearance: Well developed, well 
Progress Note      11/30/2024 10:40 AM  NAME: Nicci Hernandez   MRN:  [id number]   Admit Diagnosis: Hypoxia [R09.02]  Elevated troponin [R79.89]  Pneumonia due to infectious organism, unspecified laterality, unspecified part of lung [J18.9]  Acute on chronic congestive heart failure, unspecified heart failure type (HCC) [I50.9]  Decompensated heart failure (HCC) [I50.9]          Assessment/Plan:     Shortness of breath, heart failure with reduced ejection fraction, off dobutamine after 5 days.  Did have panic attacks and coughing and shortness of breath last night..  Denies chest pain.   On carvedilol, midodrine.  Not on ARB with chronic kidney disease.  Continues diuresis, patient says she did not get any diuretics yesterday and may have been the cause.,    Chronic kidney disease, creatinine is downtrending.    Troponin leak, mild.    Hypertension, now with low blood pressure requiring midodrine.    Diabetes             []       High complexity decision making was performed in this patient at high risk for decompensation with multiple organ involvement.    Subjective:     Nicci Hernandez denies chest pain.  Was with shortness of breath last night and cough.    Discussed with RN events overnight.     Review of Systems:    Symptom Y/N Comments  Symptom Y/N Comments   Fever/Chills N   Chest Pain N    Poor Appetite N   Edema N    Cough N   Abdominal Pain N    Sputum N   Joint Pain N    SOB/DAMON N   Pruritis/Rash N    Nausea/vomit N   Tolerating PT/OT Y    Diarrhea N   Tolerating Diet Y    Constipation N   Other       Could NOT obtain due to:      Objective:      Physical Exam:    Last 24hrs VS reviewed since prior progress note. Most recent are:    /61   Pulse 65   Temp 97.9 °F (36.6 °C) (Oral)   Resp 20   Ht 1.575 m (5' 2\")   Wt 97.7 kg (215 lb 6.2 oz)   SpO2 98%   BMI 39.40 kg/m²     Intake/Output Summary (Last 24 hours) at 11/30/2024 1040  Last data filed at 11/29/2024 2030  Gross per 24 hour   Intake 640 
Progress Note      12/1/2024 11:22 AM  NAME: Nicci Hernandez   MRN:  [id number]   Admit Diagnosis: Hypoxia [R09.02]  Elevated troponin [R79.89]  Pneumonia due to infectious organism, unspecified laterality, unspecified part of lung [J18.9]  Acute on chronic congestive heart failure, unspecified heart failure type (HCC) [I50.9]  Decompensated heart failure (HCC) [I50.9]          Assessment/Plan:     Shortness of breath, feeling better, still have cough, okay to go home today.  Will follow-up with Dr. Riddle in the office.    Chronic kidney disease, creatinine is significantly improved.    Troponin leak, mild.    Hypertension, now with low blood pressure requiring midodrine.    Diabetes             []       High complexity decision making was performed in this patient at high risk for decompensation with multiple organ involvement.    Subjective:     Nicci Hernandez denies chest pain.  Was with shortness of breath last night and cough.    Discussed with RN events overnight.     Review of Systems:    Symptom Y/N Comments  Symptom Y/N Comments   Fever/Chills N   Chest Pain N    Poor Appetite N   Edema N    Cough N   Abdominal Pain N    Sputum N   Joint Pain N    SOB/DAMON N   Pruritis/Rash N    Nausea/vomit N   Tolerating PT/OT Y    Diarrhea N   Tolerating Diet Y    Constipation N   Other       Could NOT obtain due to:      Objective:      Physical Exam:    Last 24hrs VS reviewed since prior progress note. Most recent are:    /68   Pulse 82   Temp 98.2 °F (36.8 °C) (Oral)   Resp 18   Ht 1.575 m (5' 2\")   Wt 94.2 kg (207 lb 10.8 oz)   SpO2 96%   BMI 37.98 kg/m²     Intake/Output Summary (Last 24 hours) at 12/1/2024 1122  Last data filed at 12/1/2024 0612  Gross per 24 hour   Intake 650 ml   Output 1800 ml   Net -1150 ml        General Appearance: Well developed, well nourished, alert; no acute distress.  Ears/Nose/Mouth/Throat: Hearing grossly normal; moist mucous membranes  Neck: Supple.  Chest: Lungs clear to 
Received Order for Telemetry     Nicci Matiasxton   1960   [id number]   Decompensated heart failure (HCC) [I50.9]   Nando Cifuentes*     Tele Box # [id number] placed on patient at  1821 pm  ER Room # 26  Admitting to Room 478  Verified with Primary Nurse *** at {Time:[id number]}    
Renal Progress Note    Patient: Nicci Hernandez MRN: [id number]  SSN: [id number]    YOB: 1960  Age: 64 y.o.  Sex: female      Admit Date: 11/21/2024    LOS: 8 days     Subjective:   Patient seen at bedside. Alert and awake, no acute distress.   Reports shortness of breath only with coughing and increased abdominal fullness. Minimal dependent BLE edema. On RA.    Creatinine improved to 2.4, electrolytes stable.    Current Facility-Administered Medications   Medication Dose Route Frequency    ipratropium 0.5 mg-albuterol 2.5 mg (DUONEB) nebulizer solution 1 Dose  1 Dose Inhalation BID PRN    midodrine (PROAMATINE) tablet 10 mg  10 mg Oral TID WC    vitamin D (ERGOCALCIFEROL) capsule 50,000 Units  50,000 Units Oral Weekly    calcitRIOL (ROCALTROL) capsule 0.25 mcg  0.25 mcg Oral Daily    oxymetazoline (AFRIN) 0.05 % nasal spray 2 spray  2 spray Each Nostril BID    fluticasone (FLONASE) 50 MCG/ACT nasal spray 1 spray  1 spray Each Nostril Daily    [Held by provider] pregabalin (LYRICA) capsule 75 mg  75 mg Oral BID    Benzocaine-Menthol (CEPACOL) 1 lozenge  1 lozenge Oral Q2H PRN    phenol 1.4 % mouth spray 1 spray  1 spray Mouth/Throat Q2H PRN    benzonatate (TESSALON) capsule 100 mg  100 mg Oral TID PRN    doxycycline hyclate (VIBRAMYCIN) capsule 100 mg  100 mg Oral 2 times per day    ipratropium 0.5 mg-albuterol 2.5 mg (DUONEB) nebulizer solution 1 Dose  1 Dose Inhalation Q4H PRN    Petrolatum-Zinc Oxide ointment   Topical BID    ALPRAZolam (XANAX) tablet 0.25 mg  0.25 mg Oral BID PRN    aspirin chewable tablet 81 mg  81 mg Oral Daily    carvedilol (COREG) tablet 3.125 mg  3.125 mg Oral BID    clopidogrel (PLAVIX) tablet 75 mg  75 mg Oral Daily    methocarbamol (ROBAXIN) tablet 750 mg  750 mg Oral PRN    spironolactone (ALDACTONE) tablet 25 mg  25 mg Oral Daily    torsemide (DEMADEX) tablet 20 mg  20 mg Oral BID    sodium chloride flush 0.9 % injection 5-40 mL  5-40 mL IntraVENous 2 times per day    
Renal Progress Note    Patient: Nicci Hernandez MRN: [id number]  SSN: [id number]    YOB: 1960  Age: 64 y.o.  Sex: female      Admit Date: 11/21/2024    LOS: 9 days     Subjective:   Patient seen at bedside.  Sitting in a chair   alert and awake, no acute distress.   Reports shortness of breath only with coughing and increased abdominal fullness. Minimal dependent BLE edema. On RA.    Creatinine improved to 1.7 electrolytes stable.  She has been started on torsemide 20 mg.  Also on spironolactone 12.5 mg daily    Current Facility-Administered Medications   Medication Dose Route Frequency    lidocaine 4 % external patch 1 patch  1 patch TransDERmal Daily    ipratropium 0.5 mg-albuterol 2.5 mg (DUONEB) nebulizer solution 1 Dose  1 Dose Inhalation BID PRN    torsemide (DEMADEX) tablet 20 mg  20 mg Oral Daily    spironolactone (ALDACTONE) tablet 12.5 mg  12.5 mg Oral Daily    midodrine (PROAMATINE) tablet 10 mg  10 mg Oral TID WC    vitamin D (ERGOCALCIFEROL) capsule 50,000 Units  50,000 Units Oral Weekly    calcitRIOL (ROCALTROL) capsule 0.25 mcg  0.25 mcg Oral Daily    oxymetazoline (AFRIN) 0.05 % nasal spray 2 spray  2 spray Each Nostril BID    fluticasone (FLONASE) 50 MCG/ACT nasal spray 1 spray  1 spray Each Nostril Daily    [Held by provider] pregabalin (LYRICA) capsule 75 mg  75 mg Oral BID    Benzocaine-Menthol (CEPACOL) 1 lozenge  1 lozenge Oral Q2H PRN    phenol 1.4 % mouth spray 1 spray  1 spray Mouth/Throat Q2H PRN    benzonatate (TESSALON) capsule 100 mg  100 mg Oral TID PRN    doxycycline hyclate (VIBRAMYCIN) capsule 100 mg  100 mg Oral 2 times per day    ipratropium 0.5 mg-albuterol 2.5 mg (DUONEB) nebulizer solution 1 Dose  1 Dose Inhalation Q4H PRN    Petrolatum-Zinc Oxide ointment   Topical BID    ALPRAZolam (XANAX) tablet 0.25 mg  0.25 mg Oral BID PRN    aspirin chewable tablet 81 mg  81 mg Oral Daily    carvedilol (COREG) tablet 3.125 mg  3.125 mg Oral BID    clopidogrel (PLAVIX) tablet 
Renal Progress Note    Patient: Nicci Hernandez MRN: [id number]  SSN: [id number]    YOB: 1960  Age: 64 y.o.  Sex: female      Admit Date: 11/21/2024    LOS: 10 days     Subjective:   Patient seen at bedside.  Sitting in a chair   alert and awake, no acute distress.   Reports shortness of breath only with coughing and increased abdominal fullness. Minimal dependent BLE edema. On RA.    Creatinine improved to 1.4 electrolytes stable.  She has been started on torsemide 20 mg.  Also on spironolactone 12.5 mg daily    No current facility-administered medications for this encounter.     Current Outpatient Medications   Medication Sig    guaiFENesin (MUCINEX) 600 MG extended release tablet Take 1 tablet by mouth 2 times daily    guaiFENesin-codeine (GUAIFENESIN AC) 100-10 MG/5ML liquid Take 5 mLs by mouth every 4 hours as needed for Cough for up to 14 days. Max Daily Amount: 30 mLs    torsemide (DEMADEX) 20 MG tablet Take 1 tablet by mouth daily    midodrine (PROAMATINE) 10 MG tablet Take 1 tablet by mouth 3 times daily (with meals)    calcitRIOL (ROCALTROL) 0.25 MCG capsule Take 1 capsule by mouth daily    albuterol sulfate HFA (VENTOLIN HFA) 108 (90 Base) MCG/ACT inhaler Inhale 2 puffs into the lungs 4 times daily as needed for Wheezing    pregabalin (LYRICA) 75 MG capsule Take 1 capsule by mouth 2 times daily.    vitamin B-6 (PYRIDOXINE) 100 MG tablet Take 1 tablet by mouth daily    TRESIBA FLEXTOUCH 200 UNIT/ML SOPN Inject 65 Units into the skin nightly Brand (Patient taking differently: Inject 65 Units into the skin daily Brand)    insulin aspart (NOVOLOG) 100 UNIT/ML injection pen Inject 20 Units into the skin 3 times daily (before meals) With SSI. Max units daily: 100    Blood Glucose Monitoring Suppl (ONETOUCH VERIO FLEX SYSTEM) w/Device KIT Use to check BG 3 times daily. Dx code E11.65    blood glucose test strips (ONETOUCH VERIO) strip Use to check BG 3 times daily. Dx code E11.65    Lancets 
    General Appearance: Well developed, well nourished, alert; no acute distress.  Ears/Nose/Mouth/Throat: Hearing grossly normal; moist mucous membranes  Neck: Supple.  Chest: Lungs clear to auscultation bilaterally.  Cardiovascular: Regular rate and rhythm, S1S2 normal, no murmur.  Abdomen: Soft, non-tender, bowel sounds are active.  Extremities: No edema bilaterally.  Skin: Warm and dry.    []         Post-cath site without hematoma, bruit, tenderness, or thrill.  Distal pulses intact.    PMH/SH reviewed - no change compared to H&P    Data Review    Telemetry:     EKG:   []  No new EKG for review    Lab Data Personally Reviewed:    Recent Labs     11/27/24  0204 11/28/24  0258   WBC 7.0 5.5   HGB 12.9 12.1   HCT 40.4 38.0    186     No results for input(s): \"INR\", \"APTT\" in the last 72 hours.    Invalid input(s): \"PTP\"   Recent Labs     11/26/24  1304 11/27/24  0204 11/28/24  0258   * 137 136   K 4.1 3.6 4.1    102 103   CO2 25 28 26   BUN 69* 77* 85*     No results for input(s): \"CPK\" in the last 72 hours.    Invalid input(s): \"CPKMB\", \"CKNDX\", \"TROIQ\"  Lab Results   Component Value Date/Time    CHOL 147 11/22/2024 02:36 AM    HDL 25 11/22/2024 02:36 AM    .6 11/22/2024 02:36 AM    LDL Comment 07/16/2019 03:38 PM       No results for input(s): \"TP\", \"GLOB\", \"GGT\" in the last 72 hours.    Invalid input(s): \"SGOT\", \"GPT\", \"AP\", \"TBIL\", \"ALB\", \"AML\", \"AMYP\", \"LPSE\", \"HLPSE\"  No results for input(s): \"PH\", \"PCO2\", \"PO2\" in the last 72 hours.    Medications Personally Reviewed:    Current Facility-Administered Medications   Medication Dose Route Frequency    midodrine (PROAMATINE) tablet 10 mg  10 mg Oral TID WC    fluticasone (FLONASE) 50 MCG/ACT nasal spray 1 spray  1 spray Each Nostril Daily    pregabalin (LYRICA) capsule 75 mg  75 mg Oral BID    Benzocaine-Menthol (CEPACOL) 1 lozenge  1 lozenge Oral Q2H PRN    phenol 1.4 % mouth spray 1 spray  1 spray Mouth/Throat Q2H PRN    benzonatate 
and CRP low, no need for further antibiotics at this time.  Patient received ceftriaxone and Solu-Medrol in ED.  Also received Mucinex.  Continue supportive measures with Mucinex, Tessalon as needed    Acute decompensated heart failure  Follows with [facility] cardiology  BNP [phone]  TTE cho from outside source 1/2024 shows EF 20%.   Repeat echo shows severely reduced left ventricular systolic function with EF 15 to 20%.  Left ventricle dilated.  Severe global hypokinesis present.   CXR shows pulmonary vascular congestion  Home torsemide and spironolactone held 11/25  Patient started on dobutamine drip for improving cardiac output.  Patient responding well to diuresis  Cardiology following, recommendations appreciated     AL in CKD   Creatinine initially 1.77-1.86-2.56-2.91  Last baseline appears to be 1.3 from 2022  Diuretics held  Consult nephrology, recommendations appreciated     Elevated troponin  Troponin peaked at 127, patient has history of three-vessel CABG.  Cardiology following  No further invasive cardiac workup recommended at this time    CAD  Hypertension  Continue carvedilol and torsemide  Continue aspirin, Plavix, carvedilol     Type 2 diabetes mellitus  A1c 11/22 6.2  Continue Lantus 24 units nightly  SSI and Accu-Cheks     S/p skin graft for foot wound  11/5/2024 skin graft performed for wound  Wound care nurse evaluated, recommend podiatry consult due to wound having odor and additional wound besides skin grafted wound  Evaluated by podiatry recommended continue dressing changes and outpatient follow-up with Dr. Hernandez, patient's primary podiatrist.  Culture obtained positive for heavy MRSA, sensitive to doxycycline  Continue doxycycline    Hypokalemia  Supplement  Monitor    Sore throat  Likely due to cough  No posterior oropharynx erythema or exudate on exam suggestive of infection  Cepacol lozenge and phenyl spray as needed    Medical Decision Making:   [x] High (any 2)    A. Problems (any 
  Cardiovascular:  Negative for chest pain and leg swelling.   Gastrointestinal:  Negative for abdominal pain, constipation and diarrhea.   Genitourinary:  Negative for difficulty urinating and dysuria.   Musculoskeletal:  Negative for back pain.   Skin:  Negative for color change.   Neurological:  Negative for dizziness, seizures, syncope, weakness and light-headedness.   Psychiatric/Behavioral:  Negative for confusion.         PHYSICAL EXAM:  Physical Exam  Vitals and nursing note reviewed.   Constitutional:       Appearance: She is not ill-appearing.   Eyes:      Extraocular Movements: Extraocular movements intact.   Cardiovascular:      Rate and Rhythm: Normal rate and regular rhythm.   Pulmonary:      Effort: Pulmonary effort is normal.      Breath sounds: Wheezing present.      Comments: On 2 L via nasal cannula  Abdominal:      Palpations: Abdomen is soft.      Tenderness: There is no abdominal tenderness.   Musculoskeletal:         General: Normal range of motion.   Skin:     Comments: Quarter size dry wound on medial plantar surface of first digit of left foot no erythema or edema   Neurological:      General: No focal deficit present.      Mental Status: She is alert and oriented to person, place, and time.          Reviewed most current lab test results and cultures  YES  Reviewed most current radiology test results   YES  Review and summation of old records today    NO  Reviewed patient's current orders and MAR    YES  PMH/ reviewed - no change compared to H&P  ________________________________________________________________________  Care Plan discussed with:    Comments   Patient x    Family      RN x    Care Manager     Consultant                        Multidiciplinary team rounds were held today with , nursing, pharmacist and clinical coordinator.  Patient's plan of care was discussed; medications were reviewed and discharge planning was addressed.   
11/22/2024, as outlined below:    Review of Systems   Constitutional:  Negative for diaphoresis, fatigue and fever.   Respiratory:  Positive for cough and shortness of breath.    Cardiovascular:  Negative for chest pain and leg swelling.   Gastrointestinal:  Negative for abdominal pain, constipation and diarrhea.   Genitourinary:  Negative for difficulty urinating and dysuria.   Musculoskeletal:  Negative for back pain.   Skin:  Negative for color change.   Neurological:  Negative for dizziness, seizures, syncope, weakness and light-headedness.   Psychiatric/Behavioral:  Negative for confusion.         PHYSICAL EXAM:  Physical Exam  Vitals and nursing note reviewed.   Constitutional:       Appearance: She is not ill-appearing.   Eyes:      Extraocular Movements: Extraocular movements intact.   Cardiovascular:      Rate and Rhythm: Normal rate and regular rhythm.   Pulmonary:      Effort: Pulmonary effort is normal.      Breath sounds: Wheezing present.      Comments: On 2 L via nasal cannula  Abdominal:      Palpations: Abdomen is soft.      Tenderness: There is no abdominal tenderness.   Musculoskeletal:         General: Normal range of motion.   Skin:     Comments: Quarter size dry wound on medial plantar surface of first digit of left foot no erythema or edema   Neurological:      General: No focal deficit present.      Mental Status: She is alert and oriented to person, place, and time.          Reviewed most current lab test results and cultures  YES  Reviewed most current radiology test results   YES  Review and summation of old records today    NO  Reviewed patient's current orders and MAR    YES  PMH/ reviewed - no change compared to H&P  ________________________________________________________________________  Care Plan discussed with:    Comments   Patient x    Family      RN x    Care Manager     Consultant                        Multidiciplinary team rounds were held today with , nursing, 
Full  DVT Prophylaxis: Lovenox  GI Prophylaxis: None indicated      Subjective:     Chief Complaint / Reason for Physician Visit  Patient examined bedside.  Patient continuing to feel improved but still complaining of cough.  Does endorse increased nasal congestion today.  No shortness of breath or chest pain.  Still urinating well.  No flank pain or abdominal pain.  Discussed nephrology consultation given AL.  Discussed with RN events overnight.       Objective:     VITALS:   Last 24hrs VS reviewed since prior progress note. Most recent are:  Patient Vitals for the past 24 hrs:   BP Temp Temp src Pulse Resp SpO2 Height Weight   11/27/24 1041 -- -- -- -- -- -- 1.575 m (5' 2\") --   11/27/24 0850 137/63 98.2 °F (36.8 °C) Oral (!) 104 18 97 % -- --   11/27/24 0740 -- -- -- (!) 101 16 98 % -- --   11/27/24 0405 (!) 149/77 98.1 °F (36.7 °C) Oral 100 18 95 % -- 97.8 kg (215 lb 9.8 oz)   11/27/24 0004 -- -- -- 96 -- -- -- --   11/26/24 2331 (!) 129/54 98.6 °F (37 °C) Oral 98 18 93 % -- --   11/26/24 1944 -- -- -- -- -- 96 % -- --   11/26/24 1937 (!) 110/51 97.9 °F (36.6 °C) Oral 100 18 97 % -- --   11/26/24 1715 106/62 97.7 °F (36.5 °C) Oral 99 16 98 % -- --   11/26/24 1531 -- -- -- (!) 103 -- -- -- --   11/26/24 1500 -- -- -- -- -- -- -- 93 kg (205 lb)         Intake/Output Summary (Last 24 hours) at 11/27/2024 1156  Last data filed at 11/27/2024 1135  Gross per 24 hour   Intake 753.92 ml   Output 800 ml   Net -46.08 ml        I had a face to face encounter and independently examined this patient on 11/27/2024, as outlined below:    Review of Systems   Constitutional:  Negative for diaphoresis, fatigue and fever.   HENT:  Positive for congestion, postnasal drip and sore throat.    Respiratory:  Positive for shortness of breath. Negative for cough.    Cardiovascular:  Negative for chest pain and leg swelling.   Gastrointestinal:  Negative for abdominal pain, constipation and diarrhea.   Genitourinary:  Negative for 
impairment and electrolyte derangements  [] Critical electrolyte abnormalities requiring IV replacement and close serial monitoring  [x] SQ Insulin SS- monitoring serial FSBS for Hypoglycemic adverse drug reaction  [x] Other -Lovenox for bleeding, monitor H&H, dobutamine  [] Change in code status:    [] Decision to escalate care:    [] Major surgery/procedure with associated risk factors:    ----------------------------------------------------------------------  C. Data (any 2)  [x] Discussed current management and discharge planning options with Case Management.  [x] Discussed management of the case with: Patient, nurse, wound care nurse  [] Telemetry personally reviewed and interpreted as documented above    [] Imaging personally reviewed and interpreted, includes:    [] Data Review (any 3)  [x] All available Consultant notes from yesterday/today were reviewed  [x] All current labs were reviewed and interpreted for clinical significance   [x] Appropriate follow-up labs were ordered  [] Collateral history obtained from:       Code Status: Full  DVT Prophylaxis: Lovenox  GI Prophylaxis: None indicated      Subjective:     Chief Complaint / Reason for Physician Visit  Patient examined bedside.  Patient with continued cough. States she had another coughing fit yesterday evening that made her anxious due to feeling like she could not breath. No fevers or chills. Patient has not been hypoxic.  Feels abdomen is continuing to to be more full with swelling in her feet.  Patient also feeling much weaker, having more difficulty ambulating to the bathroom and normally is able to do her ADLs without difficulty.  Discussed with RN events overnight.       Objective:     VITALS:   Last 24hrs VS reviewed since prior progress note. Most recent are:  Patient Vitals for the past 24 hrs:   BP Temp Temp src Pulse Resp SpO2   11/30/24 1121 109/61 97.2 °F (36.2 °C) Oral 80 18 99 %   11/30/24 0845 129/61 97.9 °F (36.6 °C) Oral 65 20 98 % 
Conjunctiva/sclera: Conjunctivae normal.   Cardiovascular:      Rate and Rhythm: Normal rate and regular rhythm.   Pulmonary:      Effort: Pulmonary effort is normal.      Breath sounds: Normal breath sounds. No wheezing.      Comments: Diminished breath sounds at the bases  Abdominal:      Palpations: Abdomen is soft.      Tenderness: There is no abdominal tenderness.   Musculoskeletal:         General: Normal range of motion.      Right lower leg: No edema.      Left lower leg: No edema.   Skin:     Comments: Left foot with dressing intact.   Neurological:      General: No focal deficit present.      Mental Status: She is alert and oriented to person, place, and time.   Psychiatric:         Mood and Affect: Mood normal.          Reviewed most current lab test results and cultures  YES  Reviewed most current radiology test results   YES  Review and summation of old records today    NO  Reviewed patient's current orders and MAR    YES  PMH/ reviewed - no change compared to H&P  ________________________________________________________________________  Care Plan discussed with:    Comments   Patient x    Family      RN x    Care Manager     Consultant                        Multidiciplinary team rounds were held today with , nursing, pharmacist and clinical coordinator.  Patient's plan of care was discussed; medications were reviewed and discharge planning was addressed.     ________________________________________________________________________  Total NON critical care TIME:  35  Minutes    Total CRITICAL CARE TIME Spent:   Minutes non procedure based      Comments   >50% of visit spent in counseling and coordination of care     ________________________________________________________________________  Madiha Aguilar PA-C     Procedures: see electronic medical records for all procedures/Xrays and details which were not copied into this note but were reviewed prior to creation of Plan.      LABS:  I 
results and cultures  YES  Reviewed most current radiology test results   YES  Review and summation of old records today    NO  Reviewed patient's current orders and MAR    YES  PMH/SH reviewed - no change compared to H&P  ________________________________________________________________________  Care Plan discussed with:    Comments   Patient x    Family      RN x    Care Manager     Consultant                        Multidiciplinary team rounds were held today with , nursing, pharmacist and clinical coordinator.  Patient's plan of care was discussed; medications were reviewed and discharge planning was addressed.     ________________________________________________________________________  Total NON critical care TIME:  35  Minutes    Total CRITICAL CARE TIME Spent:   Minutes non procedure based      Comments   >50% of visit spent in counseling and coordination of care     ________________________________________________________________________  Madiha Aguilar PA-C     Procedures: see electronic medical records for all procedures/Xrays and details which were not copied into this note but were reviewed prior to creation of Plan.      LABS:  I reviewed today's most current labs and imaging studies.  Pertinent labs include:  Recent Labs     11/27/24  0204 11/28/24  0258 11/29/24  0229   WBC 7.0 5.5 7.1   HGB 12.9 12.1 12.3   HCT 40.4 38.0 38.9    186 211     Recent Labs     11/26/24  1304 11/27/24  0204 11/28/24  0258 11/29/24  0229   * 137 136 136   K 4.1 3.6 4.1 4.0    102 103 104   CO2 25 28 26 25   BUN 69* 77* 85* 102*   PHOS 3.1  --  4.2 5.3*       Signed: Madiha Aguilar PA-C    
rhythm.   Pulmonary:      Effort: Pulmonary effort is normal.      Breath sounds: Normal breath sounds. No wheezing.      Comments: Diminished breath sounds at the bases  Abdominal:      Palpations: Abdomen is soft.      Tenderness: There is no abdominal tenderness.      Comments: Abdominal fullness but soft   Musculoskeletal:         General: Normal range of motion.      Comments: Trace pitting edema bilateral lower extremities   Skin:     Comments: Left foot with dressing intact.   Neurological:      General: No focal deficit present.      Mental Status: She is alert and oriented to person, place, and time.   Psychiatric:         Mood and Affect: Mood normal.          Reviewed most current lab test results and cultures  YES  Reviewed most current radiology test results   YES  Review and summation of old records today    NO  Reviewed patient's current orders and MAR    YES  PMH/SH reviewed - no change compared to H&P  ________________________________________________________________________  Care Plan discussed with:    Comments   Patient x    Family      RN x    Care Manager     Consultant                        Multidiciplinary team rounds were held today with , nursing, pharmacist and clinical coordinator.  Patient's plan of care was discussed; medications were reviewed and discharge planning was addressed.     ________________________________________________________________________  Total NON critical care TIME:  35  Minutes    Total CRITICAL CARE TIME Spent:   Minutes non procedure based      Comments   >50% of visit spent in counseling and coordination of care     ________________________________________________________________________  Sammy Skinner MD     Procedures: see electronic medical records for all procedures/Xrays and details which were not copied into this note but were reviewed prior to creation of Plan.      LABS:  I reviewed today's most current labs and imaging studies.  Pertinent 
(H) 0.55 - 1.02 mg/dL    BUN/Creatinine Ratio 31 (H) 12 - 20      Est, Glom Filt Rate 18 (L) >60 ml/min/1.73m2    Calcium 8.7 8.5 - 10.1 mg/dL    Phosphorus 4.2 2.6 - 4.7 mg/dL    Albumin 3.2 (L) 3.5 - 5.0 g/dL   PTH, Intact    Collection Time: 11/28/24  2:58 AM   Result Value Ref Range    Calcium 8.8 8.5 - 10.1 mg/dL    Pth Intact 310.8 (H) 18.4 - 88.0 pg/mL   Vitamin D 25 Hydroxy    Collection Time: 11/28/24  2:58 AM   Result Value Ref Range    Vit D, 25-Hydroxy <9.0 (L) 30 - 100 ng/mL   CK    Collection Time: 11/28/24  2:58 AM   Result Value Ref Range    Total  26 - 192 U/L   Brain Natriuretic Peptide    Collection Time: 11/28/24  2:58 AM   Result Value Ref Range    NT Pro-BNP 4,654 (H) <125 pg/mL   POCT Glucose    Collection Time: 11/28/24  8:15 AM   Result Value Ref Range    POC Glucose 163 (H) 65 - 100 mg/dL    Performed by: Luis Armando Bonner    POCT Glucose    Collection Time: 11/28/24 11:03 AM   Result Value Ref Range    POC Glucose 186 (H) 65 - 100 mg/dL    Performed by: JANNA MONREAL (Float)        Assessment and plan:     1. Acute Kidney Injury on CKD:   Creatinine was 1.7 on admission which is increased to 2.9.creatinine slightly improved to 2.7 today  AL could be prerenal secondary to losartan/diuretics/hypotension.   Losartan was held yesterday which I will continue to hold  I will hold diuretics for today.  No need for IV fluids.  Her volume overload is better  Renal ultrasound shows echogenic bilaterally small kidneys.  Urine analysis is pending.  Urine chloride<10, suggestive of prerenal etiology as cause of AL.  Minimal proteinuria seen  advised to avoid NSAIDs.  will continue to monitor renal functions and adjust management as needed    2.  Hypotension  Noted low blood pressure readings.  She is on low-dose Coreg 3.125 which I will continue with parameters to  I will start midodrine 10 mg tid  Will avoid IV fluids for today as she had volume overload on admission which is now improved    3.  
into the skin daily Brand 9/4/24  Yes Madelyn Juares MD   insulin aspart (NOVOLOG) 100 UNIT/ML injection pen Inject 20 Units into the skin 3 times daily (before meals) With SSI. Max units daily: 100 8/19/24  Yes Madelyn Juares MD   Blood Glucose Monitoring Suppl (ONETOUCH VERIO FLEX SYSTEM) w/Device KIT Use to check BG 3 times daily. Dx code E11.65 8/19/24  Yes Madelyn Juares MD   blood glucose test strips (ONETOUCH VERIO) strip Use to check BG 3 times daily. Dx code E11.65 8/19/24  Yes Madelyn Juares MD   Lancets (ONETOUCH DELICA PLUS [id number]) MISC Use to check BG 3 times daily. Dx code E11.65 8/19/24  Yes Madelyn Juares MD   methocarbamol (ROBAXIN) 750 MG tablet Take 1 tablet by mouth as needed 12/31/22  Yes Provider, MD Sharon   ALPRAZolam (XANAX) 0.25 MG tablet Take 1 tablet by mouth 2 times daily as needed for Anxiety. 3/7/22  Yes Automatic Reconciliation, Ar   aspirin 81 MG chewable tablet Take 1 tablet by mouth daily   Yes Automatic Reconciliation, Ar   carvedilol (COREG) 3.125 MG tablet Take 1 tablet by mouth 2 times daily 6/16/20  Yes Automatic Reconciliation, Ar   clopidogrel (PLAVIX) 75 MG tablet Take 1 tablet by mouth daily   Yes Automatic Reconciliation, Ar   ferrous sulfate (IRON 325) 325 (65 Fe) MG tablet Take 1 tablet by mouth three times a week 11/15/22  Yes Automatic Reconciliation, Ar   spironolactone (ALDACTONE) 25 MG tablet Take 1 tablet by mouth daily 6/28/22  Yes Automatic Reconciliation, Ar   torsemide (DEMADEX) 20 MG tablet Take 1 tablet by mouth 2 times daily   Yes Automatic Reconciliation, Ar           Santana Marshall MD    
Reconciliation, Ar   clopidogrel (PLAVIX) 75 MG tablet Take 1 tablet by mouth daily   Yes Automatic Reconciliation, Ar   ferrous sulfate (IRON 325) 325 (65 Fe) MG tablet Take 1 tablet by mouth three times a week 11/15/22  Yes Automatic Reconciliation, Ar   spironolactone (ALDACTONE) 25 MG tablet Take 1 tablet by mouth daily 6/28/22  Yes Automatic Reconciliation, Ar   torsemide (DEMADEX) 20 MG tablet Take 1 tablet by mouth 2 times daily   Yes Automatic Reconciliation, Ar           Santana Marshall MD

## 2025-03-06 ENCOUNTER — HOSPITAL ENCOUNTER (EMERGENCY)
Facility: HOSPITAL | Age: 65
Discharge: HOME | End: 2025-03-06
Payer: MEDICARE

## 2025-03-06 ENCOUNTER — APPOINTMENT (OUTPATIENT)
Facility: HOSPITAL | Age: 65
End: 2025-03-06
Payer: MEDICARE

## 2025-03-06 VITALS
HEART RATE: 89 BPM | TEMPERATURE: 97.7 F | OXYGEN SATURATION: 97 % | BODY MASS INDEX: 36.03 KG/M2 | DIASTOLIC BLOOD PRESSURE: 90 MMHG | SYSTOLIC BLOOD PRESSURE: 148 MMHG | RESPIRATION RATE: 16 BRPM | WEIGHT: 197 LBS

## 2025-03-06 DIAGNOSIS — T84.84XA PAINFUL ORTHOPAEDIC HARDWARE: ICD-10-CM

## 2025-03-06 DIAGNOSIS — S16.1XXA ACUTE STRAIN OF NECK MUSCLE, INITIAL ENCOUNTER: Primary | ICD-10-CM

## 2025-03-06 PROCEDURE — 6360000002 HC RX W HCPCS

## 2025-03-06 PROCEDURE — 99284 EMERGENCY DEPT VISIT MOD MDM: CPT

## 2025-03-06 PROCEDURE — 72125 CT NECK SPINE W/O DYE: CPT

## 2025-03-06 PROCEDURE — 96372 THER/PROPH/DIAG INJ SC/IM: CPT

## 2025-03-06 PROCEDURE — 6370000000 HC RX 637 (ALT 250 FOR IP)

## 2025-03-06 RX ORDER — KETOROLAC TROMETHAMINE 30 MG/ML
30 INJECTION, SOLUTION INTRAMUSCULAR; INTRAVENOUS
Status: COMPLETED | OUTPATIENT
Start: 2025-03-06 | End: 2025-03-06

## 2025-03-06 RX ORDER — OXYCODONE AND ACETAMINOPHEN 5; 325 MG/1; MG/1
1 TABLET ORAL EVERY 6 HOURS PRN
Qty: 12 TABLET | Refills: 0 | Status: SHIPPED | OUTPATIENT
Start: 2025-03-06 | End: 2025-03-09

## 2025-03-06 RX ORDER — METHOCARBAMOL 1000 MG/1
1000 TABLET, FILM COATED ORAL 4 TIMES DAILY
Qty: 20 TABLET | Refills: 0 | Status: SHIPPED | OUTPATIENT
Start: 2025-03-06 | End: 2025-03-09

## 2025-03-06 RX ORDER — METHOCARBAMOL 500 MG/1
1000 TABLET, FILM COATED ORAL
Status: COMPLETED | OUTPATIENT
Start: 2025-03-06 | End: 2025-03-06

## 2025-03-06 RX ORDER — KETOROLAC TROMETHAMINE 10 MG/1
10 TABLET, FILM COATED ORAL EVERY 6 HOURS PRN
Qty: 15 TABLET | Refills: 0 | Status: SHIPPED | OUTPATIENT
Start: 2025-03-06

## 2025-03-06 RX ORDER — OXYCODONE AND ACETAMINOPHEN 5; 325 MG/1; MG/1
1 TABLET ORAL
Status: COMPLETED | OUTPATIENT
Start: 2025-03-06 | End: 2025-03-06

## 2025-03-06 RX ORDER — MORPHINE SULFATE 4 MG/ML
4 INJECTION, SOLUTION INTRAMUSCULAR; INTRAVENOUS
Status: DISCONTINUED | OUTPATIENT
Start: 2025-03-06 | End: 2025-03-06

## 2025-03-06 RX ADMIN — OXYCODONE HYDROCHLORIDE AND ACETAMINOPHEN 1 TABLET: 5; 325 TABLET ORAL at 11:26

## 2025-03-06 RX ADMIN — KETOROLAC TROMETHAMINE 30 MG: 30 INJECTION, SOLUTION INTRAMUSCULAR at 11:26

## 2025-03-06 RX ADMIN — METHOCARBAMOL 1000 MG: 500 TABLET ORAL at 11:02

## 2025-03-06 ASSESSMENT — PAIN SCALES - GENERAL: PAINLEVEL_OUTOF10: 10

## 2025-03-06 NOTE — ED PROVIDER NOTES
medication with the same name was added. Make sure you understand how and when to take each.     * Methocarbamol 1000 MG Tabs  Take 1,000 mg by mouth 4 times daily for 3 days  What changed: You were already taking a medication with the same name, and this prescription was added. Make sure you understand how and when to take each.           * This list has 2 medication(s) that are the same as other medications prescribed for you. Read the directions carefully, and ask your doctor or other care provider to review them with you.                CONTINUE taking these medications      OneTouch Delica Plus Oqoput19T Misc  Use to check BG 3 times daily. Dx code E11.65     OneTouch Verio Flex System w/Device Kit  Use to check BG 3 times daily. Dx code E11.65            ASK your doctor about these medications      albuterol sulfate  (90 Base) MCG/ACT inhaler  Commonly known as: Ventolin HFA  Inhale 2 puffs into the lungs 4 times daily as needed for Wheezing     ALPRAZolam 0.25 MG tablet  Commonly known as: XANAX     aspirin 81 MG chewable tablet     calcitRIOL 0.25 MCG capsule  Commonly known as: ROCALTROL  Take 1 capsule by mouth daily     carvedilol 3.125 MG tablet  Commonly known as: COREG     clopidogrel 75 MG tablet  Commonly known as: PLAVIX     ferrous sulfate 325 (65 Fe) MG tablet  Commonly known as: IRON 325     guaiFENesin 600 MG extended release tablet  Commonly known as: MUCINEX  Take 1 tablet by mouth 2 times daily     insulin aspart 100 UNIT/ML injection pen  Commonly known as: NovoLOG  Inject 20 Units into the skin 3 times daily (before meals) With SSI. Max units daily: 100     midodrine 10 MG tablet  Commonly known as: PROAMATINE  Take 1 tablet by mouth 3 times daily (with meals)     OneTouch Verio strip  Generic drug: blood glucose test strips  Use to check BG 3 times daily. Dx code E11.65     pregabalin 75 MG capsule  Commonly known as: LYRICA     spironolactone 25 MG tablet  Commonly known as:

## 2025-03-19 ENCOUNTER — APPOINTMENT (OUTPATIENT)
Facility: HOSPITAL | Age: 65
End: 2025-03-19
Payer: MEDICARE

## 2025-03-19 ENCOUNTER — HOSPITAL ENCOUNTER (EMERGENCY)
Facility: HOSPITAL | Age: 65
Discharge: ANOTHER HOSPITAL | End: 2025-03-19
Attending: STUDENT IN AN ORGANIZED HEALTH CARE EDUCATION/TRAINING PROGRAM
Payer: MEDICARE

## 2025-03-19 ENCOUNTER — HOSPITAL ENCOUNTER (INPATIENT)
Facility: HOSPITAL | Age: 65
LOS: 16 days | Discharge: NURSING, REHAB OR LONG-TERM CARE | DRG: 919 | End: 2025-04-04
Attending: EMERGENCY MEDICINE | Admitting: STUDENT IN AN ORGANIZED HEALTH CARE EDUCATION/TRAINING PROGRAM
Payer: MEDICARE

## 2025-03-19 VITALS
OXYGEN SATURATION: 99 % | SYSTOLIC BLOOD PRESSURE: 144 MMHG | HEIGHT: 62 IN | BODY MASS INDEX: 36.25 KG/M2 | DIASTOLIC BLOOD PRESSURE: 72 MMHG | RESPIRATION RATE: 18 BRPM | HEART RATE: 106 BPM | WEIGHT: 197 LBS | TEMPERATURE: 98.7 F

## 2025-03-19 DIAGNOSIS — L97.425 DIABETIC ULCER OF LEFT MIDFOOT ASSOCIATED WITH TYPE 2 DIABETES MELLITUS, WITH MUSCLE INVOLVEMENT WITHOUT EVIDENCE OF NECROSIS: ICD-10-CM

## 2025-03-19 DIAGNOSIS — E11.621 DIABETIC ULCER OF LEFT MIDFOOT ASSOCIATED WITH TYPE 2 DIABETES MELLITUS, WITH MUSCLE INVOLVEMENT WITHOUT EVIDENCE OF NECROSIS: ICD-10-CM

## 2025-03-19 DIAGNOSIS — R78.81 MRSA BACTEREMIA: ICD-10-CM

## 2025-03-19 DIAGNOSIS — J39.0 RETROPHARYNGEAL ABSCESS: ICD-10-CM

## 2025-03-19 DIAGNOSIS — M86.9 OSTEOMYELITIS OF LEFT FOOT, UNSPECIFIED TYPE (HCC): ICD-10-CM

## 2025-03-19 DIAGNOSIS — R60.9 EDEMA, UNSPECIFIED TYPE: ICD-10-CM

## 2025-03-19 DIAGNOSIS — M54.2 NECK PAIN: Primary | ICD-10-CM

## 2025-03-19 DIAGNOSIS — M86.172 OTHER ACUTE OSTEOMYELITIS OF LEFT FOOT: ICD-10-CM

## 2025-03-19 DIAGNOSIS — B95.62 MRSA BACTEREMIA: ICD-10-CM

## 2025-03-19 DIAGNOSIS — J39.0 RETROPHARYNGEAL ABSCESS: Primary | ICD-10-CM

## 2025-03-19 PROBLEM — L02.11 NECK ABSCESS: Status: ACTIVE | Noted: 2025-03-19

## 2025-03-19 LAB
ALBUMIN SERPL-MCNC: 2.1 G/DL (ref 3.5–5)
ALBUMIN/GLOB SERPL: 0.4 (ref 1.1–2.2)
ALP SERPL-CCNC: 76 U/L (ref 45–117)
ALT SERPL-CCNC: 12 U/L (ref 12–78)
ANION GAP SERPL CALC-SCNC: 8 MMOL/L (ref 2–12)
AST SERPL W P-5'-P-CCNC: 34 U/L (ref 15–37)
BASOPHILS # BLD: 0.05 K/UL (ref 0–0.1)
BASOPHILS NFR BLD: 0.3 % (ref 0–1)
BILIRUB SERPL-MCNC: 0.9 MG/DL (ref 0.2–1)
BUN SERPL-MCNC: 38 MG/DL (ref 6–20)
BUN/CREAT SERPL: 21 (ref 12–20)
CA-I BLD-MCNC: 9.5 MG/DL (ref 8.5–10.1)
CHLORIDE SERPL-SCNC: 100 MMOL/L (ref 97–108)
CO2 SERPL-SCNC: 21 MMOL/L (ref 21–32)
CREAT SERPL-MCNC: 1.81 MG/DL (ref 0.55–1.02)
CRP SERPL-MCNC: 18.5 MG/DL (ref 0–0.3)
DIFFERENTIAL METHOD BLD: ABNORMAL
EOSINOPHIL # BLD: 0.01 K/UL (ref 0–0.4)
EOSINOPHIL NFR BLD: 0.1 % (ref 0–7)
ERYTHROCYTE [DISTWIDTH] IN BLOOD BY AUTOMATED COUNT: 14.1 % (ref 11.5–14.5)
ERYTHROCYTE [SEDIMENTATION RATE] IN BLOOD: 59 MM/HR (ref 0–30)
GLOBULIN SER CALC-MCNC: 5.4 G/DL (ref 2–4)
GLUCOSE BLD STRIP.AUTO-MCNC: 280 MG/DL (ref 65–117)
GLUCOSE SERPL-MCNC: 254 MG/DL (ref 65–100)
HCT VFR BLD AUTO: 39.3 % (ref 35–47)
HGB BLD-MCNC: 12.8 G/DL (ref 11.5–16)
IMM GRANULOCYTES # BLD AUTO: 0.13 K/UL (ref 0–0.04)
IMM GRANULOCYTES NFR BLD AUTO: 0.8 % (ref 0–0.5)
LYMPHOCYTES # BLD: 0.7 K/UL (ref 0.8–3.5)
LYMPHOCYTES NFR BLD: 4.1 % (ref 12–49)
MCH RBC QN AUTO: 29 PG (ref 26–34)
MCHC RBC AUTO-ENTMCNC: 32.6 G/DL (ref 30–36.5)
MCV RBC AUTO: 89.1 FL (ref 80–99)
MONOCYTES # BLD: 1.11 K/UL (ref 0–1)
MONOCYTES NFR BLD: 6.6 % (ref 5–13)
NEUTS SEG # BLD: 14.9 K/UL (ref 1.8–8)
NEUTS SEG NFR BLD: 88.1 % (ref 32–75)
NRBC # BLD: 0 K/UL (ref 0–0.01)
NRBC BLD-RTO: 0 PER 100 WBC
PLATELET # BLD AUTO: 256 K/UL (ref 150–400)
POTASSIUM SERPL-SCNC: 4.4 MMOL/L (ref 3.5–5.1)
PROT SERPL-MCNC: 7.5 G/DL (ref 6.4–8.2)
RBC # BLD AUTO: 4.41 M/UL (ref 3.8–5.2)
SERVICE CMNT-IMP: ABNORMAL
SODIUM SERPL-SCNC: 129 MMOL/L (ref 136–145)
WBC # BLD AUTO: 17.5 K/UL (ref 3.6–11)

## 2025-03-19 PROCEDURE — 6360000002 HC RX W HCPCS: Performed by: STUDENT IN AN ORGANIZED HEALTH CARE EDUCATION/TRAINING PROGRAM

## 2025-03-19 PROCEDURE — 6360000002 HC RX W HCPCS: Performed by: EMERGENCY MEDICINE

## 2025-03-19 PROCEDURE — 6370000000 HC RX 637 (ALT 250 FOR IP): Performed by: SPECIALIST

## 2025-03-19 PROCEDURE — 2500000003 HC RX 250 WO HCPCS: Performed by: STUDENT IN AN ORGANIZED HEALTH CARE EDUCATION/TRAINING PROGRAM

## 2025-03-19 PROCEDURE — 2060000000 HC ICU INTERMEDIATE R&B

## 2025-03-19 PROCEDURE — 4500000002 HC ER NO CHARGE

## 2025-03-19 PROCEDURE — 87040 BLOOD CULTURE FOR BACTERIA: CPT

## 2025-03-19 PROCEDURE — 99285 EMERGENCY DEPT VISIT HI MDM: CPT

## 2025-03-19 PROCEDURE — 96365 THER/PROPH/DIAG IV INF INIT: CPT

## 2025-03-19 PROCEDURE — 6360000004 HC RX CONTRAST MEDICATION: Performed by: EMERGENCY MEDICINE

## 2025-03-19 PROCEDURE — 87077 CULTURE AEROBIC IDENTIFY: CPT

## 2025-03-19 PROCEDURE — 80053 COMPREHEN METABOLIC PANEL: CPT

## 2025-03-19 PROCEDURE — 6370000000 HC RX 637 (ALT 250 FOR IP): Performed by: STUDENT IN AN ORGANIZED HEALTH CARE EDUCATION/TRAINING PROGRAM

## 2025-03-19 PROCEDURE — 70491 CT SOFT TISSUE NECK W/DYE: CPT

## 2025-03-19 PROCEDURE — 36415 COLL VENOUS BLD VENIPUNCTURE: CPT

## 2025-03-19 PROCEDURE — 2580000003 HC RX 258: Performed by: EMERGENCY MEDICINE

## 2025-03-19 PROCEDURE — 72141 MRI NECK SPINE W/O DYE: CPT

## 2025-03-19 PROCEDURE — 87205 SMEAR GRAM STAIN: CPT

## 2025-03-19 PROCEDURE — 87070 CULTURE OTHR SPECIMN AEROBIC: CPT

## 2025-03-19 PROCEDURE — 96367 TX/PROPH/DG ADDL SEQ IV INF: CPT

## 2025-03-19 PROCEDURE — 86140 C-REACTIVE PROTEIN: CPT

## 2025-03-19 PROCEDURE — 87154 CUL TYP ID BLD PTHGN 6+ TRGT: CPT

## 2025-03-19 PROCEDURE — 85652 RBC SED RATE AUTOMATED: CPT

## 2025-03-19 PROCEDURE — 85025 COMPLETE CBC W/AUTO DIFF WBC: CPT

## 2025-03-19 PROCEDURE — 87186 SC STD MICRODIL/AGAR DIL: CPT

## 2025-03-19 PROCEDURE — 96375 TX/PRO/DX INJ NEW DRUG ADDON: CPT

## 2025-03-19 PROCEDURE — 82962 GLUCOSE BLOOD TEST: CPT

## 2025-03-19 RX ORDER — METRONIDAZOLE 500 MG/100ML
500 INJECTION, SOLUTION INTRAVENOUS EVERY 8 HOURS
Status: DISCONTINUED | OUTPATIENT
Start: 2025-03-19 | End: 2025-03-24

## 2025-03-19 RX ORDER — HYDROMORPHONE HYDROCHLORIDE 1 MG/ML
1 INJECTION, SOLUTION INTRAMUSCULAR; INTRAVENOUS; SUBCUTANEOUS ONCE
Status: COMPLETED | OUTPATIENT
Start: 2025-03-19 | End: 2025-03-19

## 2025-03-19 RX ORDER — SODIUM CHLORIDE 0.9 % (FLUSH) 0.9 %
5-40 SYRINGE (ML) INJECTION PRN
Status: DISCONTINUED | OUTPATIENT
Start: 2025-03-19 | End: 2025-04-04 | Stop reason: HOSPADM

## 2025-03-19 RX ORDER — IOPAMIDOL 755 MG/ML
100 INJECTION, SOLUTION INTRAVASCULAR
Status: COMPLETED | OUTPATIENT
Start: 2025-03-19 | End: 2025-03-19

## 2025-03-19 RX ORDER — INSULIN GLARGINE 100 [IU]/ML
30 INJECTION, SOLUTION SUBCUTANEOUS DAILY
Status: DISCONTINUED | OUTPATIENT
Start: 2025-03-20 | End: 2025-03-21

## 2025-03-19 RX ORDER — INSULIN LISPRO 100 [IU]/ML
0-4 INJECTION, SOLUTION INTRAVENOUS; SUBCUTANEOUS
Status: DISCONTINUED | OUTPATIENT
Start: 2025-03-19 | End: 2025-03-19

## 2025-03-19 RX ORDER — SODIUM CHLORIDE 0.9 % (FLUSH) 0.9 %
5-40 SYRINGE (ML) INJECTION EVERY 12 HOURS SCHEDULED
Status: DISCONTINUED | OUTPATIENT
Start: 2025-03-19 | End: 2025-04-04 | Stop reason: HOSPADM

## 2025-03-19 RX ORDER — TORSEMIDE 20 MG/1
20 TABLET ORAL DAILY
Status: DISCONTINUED | OUTPATIENT
Start: 2025-03-20 | End: 2025-03-23

## 2025-03-19 RX ORDER — CARVEDILOL 6.25 MG/1
6.25 TABLET ORAL 2 TIMES DAILY
Status: DISCONTINUED | OUTPATIENT
Start: 2025-03-19 | End: 2025-04-04 | Stop reason: HOSPADM

## 2025-03-19 RX ORDER — INSULIN LISPRO 100 [IU]/ML
0-8 INJECTION, SOLUTION INTRAVENOUS; SUBCUTANEOUS
Status: DISCONTINUED | OUTPATIENT
Start: 2025-03-19 | End: 2025-04-04 | Stop reason: HOSPADM

## 2025-03-19 RX ORDER — CALCITRIOL 0.25 UG/1
0.25 CAPSULE, LIQUID FILLED ORAL DAILY
Status: DISCONTINUED | OUTPATIENT
Start: 2025-03-20 | End: 2025-04-04 | Stop reason: HOSPADM

## 2025-03-19 RX ORDER — ACETAMINOPHEN 325 MG/1
650 TABLET ORAL EVERY 6 HOURS PRN
Status: DISCONTINUED | OUTPATIENT
Start: 2025-03-19 | End: 2025-04-04 | Stop reason: HOSPADM

## 2025-03-19 RX ORDER — ONDANSETRON 2 MG/ML
4 INJECTION INTRAMUSCULAR; INTRAVENOUS EVERY 6 HOURS PRN
Status: DISCONTINUED | OUTPATIENT
Start: 2025-03-19 | End: 2025-04-04 | Stop reason: HOSPADM

## 2025-03-19 RX ORDER — DEXTROSE MONOHYDRATE 100 MG/ML
INJECTION, SOLUTION INTRAVENOUS CONTINUOUS PRN
Status: DISCONTINUED | OUTPATIENT
Start: 2025-03-19 | End: 2025-04-04 | Stop reason: HOSPADM

## 2025-03-19 RX ORDER — SPIRONOLACTONE 25 MG/1
25 TABLET ORAL DAILY
Status: DISCONTINUED | OUTPATIENT
Start: 2025-03-20 | End: 2025-04-04 | Stop reason: HOSPADM

## 2025-03-19 RX ORDER — ONDANSETRON 4 MG/1
4 TABLET, ORALLY DISINTEGRATING ORAL EVERY 8 HOURS PRN
Status: DISCONTINUED | OUTPATIENT
Start: 2025-03-19 | End: 2025-04-04 | Stop reason: HOSPADM

## 2025-03-19 RX ORDER — ACETAMINOPHEN 650 MG/1
650 SUPPOSITORY RECTAL EVERY 6 HOURS PRN
Status: DISCONTINUED | OUTPATIENT
Start: 2025-03-19 | End: 2025-04-04 | Stop reason: HOSPADM

## 2025-03-19 RX ORDER — PREGABALIN 25 MG/1
75 CAPSULE ORAL 2 TIMES DAILY
Status: DISCONTINUED | OUTPATIENT
Start: 2025-03-19 | End: 2025-03-19

## 2025-03-19 RX ORDER — MIDODRINE HYDROCHLORIDE 5 MG/1
10 TABLET ORAL
Status: DISCONTINUED | OUTPATIENT
Start: 2025-03-20 | End: 2025-03-19

## 2025-03-19 RX ORDER — ALPRAZOLAM 0.25 MG
0.25 TABLET ORAL 2 TIMES DAILY PRN
Status: DISCONTINUED | OUTPATIENT
Start: 2025-03-19 | End: 2025-03-19

## 2025-03-19 RX ORDER — KETOROLAC TROMETHAMINE 15 MG/ML
15 INJECTION, SOLUTION INTRAMUSCULAR; INTRAVENOUS ONCE
Status: DISCONTINUED | OUTPATIENT
Start: 2025-03-19 | End: 2025-03-19 | Stop reason: HOSPADM

## 2025-03-19 RX ORDER — MORPHINE SULFATE 2 MG/ML
1 INJECTION, SOLUTION INTRAMUSCULAR; INTRAVENOUS EVERY 4 HOURS PRN
Status: DISCONTINUED | OUTPATIENT
Start: 2025-03-19 | End: 2025-04-04 | Stop reason: HOSPADM

## 2025-03-19 RX ORDER — ALBUTEROL SULFATE 90 UG/1
2 INHALANT RESPIRATORY (INHALATION) 4 TIMES DAILY PRN
Status: DISCONTINUED | OUTPATIENT
Start: 2025-03-19 | End: 2025-03-19 | Stop reason: CLARIF

## 2025-03-19 RX ORDER — CARVEDILOL 6.25 MG/1
3.12 TABLET ORAL 2 TIMES DAILY
Status: DISCONTINUED | OUTPATIENT
Start: 2025-03-19 | End: 2025-03-19

## 2025-03-19 RX ORDER — 0.9 % SODIUM CHLORIDE 0.9 %
500 INTRAVENOUS SOLUTION INTRAVENOUS ONCE
Status: COMPLETED | OUTPATIENT
Start: 2025-03-19 | End: 2025-03-19

## 2025-03-19 RX ORDER — ALBUTEROL SULFATE 0.83 MG/ML
2.5 SOLUTION RESPIRATORY (INHALATION) 4 TIMES DAILY PRN
Status: DISCONTINUED | OUTPATIENT
Start: 2025-03-19 | End: 2025-04-04 | Stop reason: HOSPADM

## 2025-03-19 RX ORDER — DEXAMETHASONE SODIUM PHOSPHATE 10 MG/ML
6 INJECTION, SOLUTION INTRAMUSCULAR; INTRAVENOUS ONCE
Status: COMPLETED | OUTPATIENT
Start: 2025-03-19 | End: 2025-03-19

## 2025-03-19 RX ORDER — DEXTROSE MONOHYDRATE 100 MG/ML
INJECTION, SOLUTION INTRAVENOUS CONTINUOUS PRN
Status: DISCONTINUED | OUTPATIENT
Start: 2025-03-19 | End: 2025-03-19 | Stop reason: SDUPTHER

## 2025-03-19 RX ORDER — PREGABALIN 75 MG/1
75 CAPSULE ORAL 3 TIMES DAILY
Status: DISCONTINUED | OUTPATIENT
Start: 2025-03-19 | End: 2025-03-22

## 2025-03-19 RX ORDER — SODIUM CHLORIDE 9 MG/ML
INJECTION, SOLUTION INTRAVENOUS PRN
Status: DISCONTINUED | OUTPATIENT
Start: 2025-03-19 | End: 2025-04-04 | Stop reason: HOSPADM

## 2025-03-19 RX ORDER — POLYETHYLENE GLYCOL 3350 17 G/17G
17 POWDER, FOR SOLUTION ORAL DAILY PRN
Status: DISCONTINUED | OUTPATIENT
Start: 2025-03-19 | End: 2025-04-04 | Stop reason: HOSPADM

## 2025-03-19 RX ADMIN — IOPAMIDOL 100 ML: 755 INJECTION, SOLUTION INTRAVENOUS at 13:00

## 2025-03-19 RX ADMIN — INSULIN LISPRO 4 UNITS: 100 INJECTION, SOLUTION INTRAVENOUS; SUBCUTANEOUS at 23:07

## 2025-03-19 RX ADMIN — PIPERACILLIN AND TAZOBACTAM 4500 MG: 4; .5 INJECTION, POWDER, FOR SOLUTION INTRAVENOUS at 12:30

## 2025-03-19 RX ADMIN — WATER 2000 MG: 1 INJECTION INTRAMUSCULAR; INTRAVENOUS; SUBCUTANEOUS at 17:38

## 2025-03-19 RX ADMIN — PREGABALIN 75 MG: 75 CAPSULE ORAL at 23:07

## 2025-03-19 RX ADMIN — BENZOCAINE, BUTAMBEN, AND TETRACAINE HYDROCHLORIDE 1 SPRAY: .028; .004; .004 AEROSOL, SPRAY TOPICAL at 17:42

## 2025-03-19 RX ADMIN — HYDROMORPHONE HYDROCHLORIDE 1 MG: 1 INJECTION, SOLUTION INTRAMUSCULAR; INTRAVENOUS; SUBCUTANEOUS at 06:40

## 2025-03-19 RX ADMIN — SODIUM CHLORIDE 500 ML: 0.9 INJECTION, SOLUTION INTRAVENOUS at 11:14

## 2025-03-19 RX ADMIN — VANCOMYCIN HYDROCHLORIDE 2250 MG: 1.25 INJECTION, POWDER, LYOPHILIZED, FOR SOLUTION INTRAVENOUS at 13:22

## 2025-03-19 RX ADMIN — SODIUM CHLORIDE, PRESERVATIVE FREE 10 ML: 5 INJECTION INTRAVENOUS at 23:08

## 2025-03-19 RX ADMIN — DEXAMETHASONE SODIUM PHOSPHATE 6 MG: 10 INJECTION, SOLUTION INTRAMUSCULAR; INTRAVENOUS at 12:30

## 2025-03-19 RX ADMIN — MORPHINE SULFATE 1 MG: 2 INJECTION, SOLUTION INTRAMUSCULAR; INTRAVENOUS at 16:42

## 2025-03-19 RX ADMIN — CARVEDILOL 6.25 MG: 6.25 TABLET, FILM COATED ORAL at 23:07

## 2025-03-19 RX ADMIN — METRONIDAZOLE 500 MG: 500 INJECTION, SOLUTION INTRAVENOUS at 17:52

## 2025-03-19 ASSESSMENT — LIFESTYLE VARIABLES
HOW OFTEN DO YOU HAVE A DRINK CONTAINING ALCOHOL: NEVER
HOW MANY STANDARD DRINKS CONTAINING ALCOHOL DO YOU HAVE ON A TYPICAL DAY: PATIENT DOES NOT DRINK
HOW MANY STANDARD DRINKS CONTAINING ALCOHOL DO YOU HAVE ON A TYPICAL DAY: PATIENT DOES NOT DRINK
HOW OFTEN DO YOU HAVE A DRINK CONTAINING ALCOHOL: NEVER

## 2025-03-19 ASSESSMENT — PAIN DESCRIPTION - LOCATION
LOCATION: NECK

## 2025-03-19 ASSESSMENT — PAIN SCALES - GENERAL
PAINLEVEL_OUTOF10: 4
PAINLEVEL_OUTOF10: 3
PAINLEVEL_OUTOF10: 10

## 2025-03-19 ASSESSMENT — PAIN - FUNCTIONAL ASSESSMENT
PAIN_FUNCTIONAL_ASSESSMENT: 0-10
PAIN_FUNCTIONAL_ASSESSMENT: 0-10

## 2025-03-19 ASSESSMENT — PAIN DESCRIPTION - DESCRIPTORS: DESCRIPTORS: SHARP

## 2025-03-19 NOTE — ED NOTES
IV attempt fail by two different nurses at this time. US line requested  
Pt returning from MRI  
Return form MRI - pt drowsy but asking for water, instructed pt need to hold on the water until all test results have come back, pt agrees. Denies any pain at this.Reports she is here after ?falling once getting home from [facility] last pm.   
      CLINICAL IMPRESSION:  1. Neck pain    2. Retropharyngeal abscess         Disposition       Disposition: Transferred to Another Facility    TRANSFER: Discussed the plan for transfer with the patient, discussed the risks and benefits of transfer to alternative facility however it is medically necessary at this time.  Patient and/or parent or guardian is agreeable to the plan transfer.      PATIENT REFERRED TO:  No follow-up provider specified.    DISCHARGE MEDICATIONS:  Discharge Medication List as of 3/19/2025  2:14 PM          Mike Abreu DO      3:23 PM, 3/19/2025, DO Brady Gonzalez Jon K,   03/19/25 1523       Mike Abreu,   03/22/25 1308       Mike Abreu,   03/22/25 1312

## 2025-03-19 NOTE — ED PROVIDER NOTES
[facility] EMERGENCY DEPT  EMERGENCY DEPARTMENT HISTORY AND PHYSICAL EXAM      Date: 3/19/2025  Patient Name: Nicci Hernandez  MRN: [id number]  Birthdate 1960  Date of evaluation: 3/19/2025  Provider: Jessenia Real MD   Note Started: 4:02 AM EDT 3/19/25    HISTORY OF PRESENT ILLNESS     Chief Complaint   Patient presents with    Neck Pain       History Provided By: Patient    HPI: Nicci Hernandez is a 64 y.o. female with Pmhx of DM, CHF, CKD, CAD, HTN, cervical fusion C3-C6 who presents for generalized fatigue, worsening neck pain X 2 weeks, with gradual worsening with difficulty with ambulation.     Patient was seen twice during the past 24 hours at [facility] ED for similar complaints and discharged in a C-collar. CTA head/neck was done which showed soft tissues to be normal and R ICA with severe stenosis. CBC and BMP were done about 8 hours prior to her ED visit her at [facility].    Daughter reports that patient was discharged home in an uber, and upon arrival to their home patient's legs gave out trying to exit the uber and she fell to her knees. Denies any other injury at this time. She is brining her in for a second opinion.     She was seen in the ED on 3/6 had a C-spine CT showed age-indeterminate hardware complications concerning for lifting  off the baseplate and loosening  of C3-C4 screws. She was seen by Ortho on  3/7 who recommended rest with physical therapy, medrolpak, and 3 week FU with for non surgical management of cervical strain        PAST MEDICAL HISTORY   Past Medical History:  Past Medical History:   Diagnosis Date    Arthritis     Bilateral lower leg cellulitis     CAD (coronary artery disease) 2009    Hx of 2 MI's and then CABG 5/2009    Cardiomyopathy, ischemic     CHF (congestive heart failure) (HCC)     CKD (chronic kidney disease)     Diabetes (HCC)     IDDM type 3    Diabetic neuropathy, painful (HCC)     Dyslipidemia     Elevated uric acid in blood     Gastroparalysis due to secondary

## 2025-03-19 NOTE — ED TRIAGE NOTES
Patient arrived via EMS transfer from [facility]. Initial complaint was for severe neck pain and was found to have a posterior pharyngeal abscess. Patient is in a C-collar which was placed at [facility], pt went to [facility] yesterday with spouse due to his fall and in the waiting room began c/o neck pain and was placed in c-collar, c-spine cleared at [facility] per EMS.

## 2025-03-19 NOTE — ED PROVIDER NOTES
[facility] EMERGENCY DEPARTMENT  EMERGENCY DEPARTMENT ENCOUNTER      Pt Name: Nicci Hernandez  MRN: [id number]  Birthdate 1960  Date of evaluation: 3/19/2025  Provider: Waldemar Hanna MD    CHIEF COMPLAINT       Chief Complaint   Patient presents with    Neck Pain         HISTORY OF PRESENT ILLNESS   (Location/Symptom, Timing/Onset, Context/Setting, Quality, Duration, Modifying Factors, Severity)  Note limiting factors.   64-year-old female presents in transfer from [facility] for a posterior pharyngeal abscess that might be retropharyngeal versus paraspinous.  Neurosurgery Dr. Durán and ear nose and throat on-call have both been contacted regarding the acceptance of this patient to [facility].  Patient is stable en route.  She is on vancomycin IV drip as she arrives at [facility].    The history is provided by the patient.         Review of External Medical Records:     Nursing Notes were reviewed.    REVIEW OF SYSTEMS    (2-9 systems for level 4, 10 or more for level 5)     Review of Systems   Constitutional:  Negative for activity change, appetite change, chills and diaphoresis.   HENT:  Negative for congestion, ear pain, facial swelling and sore throat.    Eyes:  Negative for pain, discharge and visual disturbance.   Respiratory:  Negative for cough, chest tightness and shortness of breath.    Cardiovascular:  Negative for chest pain and palpitations.   Gastrointestinal:  Negative for abdominal pain, diarrhea, nausea and vomiting.   Endocrine: Negative for cold intolerance, heat intolerance, polydipsia and polyphagia.   Genitourinary:  Negative for difficulty urinating, dysuria, frequency, hematuria, urgency and vaginal discharge.   Musculoskeletal:  Negative for arthralgias, back pain, joint swelling and myalgias.   Skin:  Negative for rash and wound.   Neurological:  Negative for dizziness, syncope, facial asymmetry and headaches.   Psychiatric/Behavioral:  Negative for agitation,

## 2025-03-19 NOTE — H&P
(PYRIDOXINE) 100 MG tablet Take 1 tablet by mouth daily    Provider, MD Sharon   TRESIBA FLEXTOUCH 200 UNIT/ML SOPN Inject 65 Units into the skin nightly Brand  Patient taking differently: Inject 65 Units into the skin daily Brand 9/4/24   Madelyn Juares MD   insulin aspart (NOVOLOG) 100 UNIT/ML injection pen Inject 20 Units into the skin 3 times daily (before meals) With SSI. Max units daily: 100 8/19/24   Madelyn Juares MD   Blood Glucose Monitoring Suppl (ONETOUCH VERIO FLEX SYSTEM) w/Device KIT Use to check BG 3 times daily. Dx code E11.65 8/19/24   Madelyn Juares MD   blood glucose test strips (ONETOUCH VERIO) strip Use to check BG 3 times daily. Dx code E11.65 8/19/24   Madelyn Juares MD   Lancets (ONETOUCH DELICA PLUS [id number]) MISC Use to check BG 3 times daily. Dx code E11.65 8/19/24   Madelyn Juares MD   methocarbamol (ROBAXIN) 750 MG tablet Take 1 tablet by mouth as needed 12/31/22   Provider, MD Sharon   ALPRAZolam (XANAX) 0.25 MG tablet Take 1 tablet by mouth 2 times daily as needed for Anxiety. 3/7/22   Automatic Reconciliation, Ar   aspirin 81 MG chewable tablet Take 1 tablet by mouth daily    Automatic Reconciliation, Ar   carvedilol (COREG) 3.125 MG tablet Take 1 tablet by mouth 2 times daily 6/16/20   Automatic Reconciliation, Ar   clopidogrel (PLAVIX) 75 MG tablet Take 1 tablet by mouth daily    Automatic Reconciliation, Ar   ferrous sulfate (IRON 325) 325 (65 Fe) MG tablet Take 1 tablet by mouth three times a week 11/15/22   Automatic Reconciliation, Ar   spironolactone (ALDACTONE) 25 MG tablet Take 1 tablet by mouth daily 6/28/22   Automatic Reconciliation, Ar     Allergies   Allergen Reactions    Atorvastatin Other (See Comments)     Reaction Type: Allergy; Severity: Severe; Reaction(s): myopathy  Reaction Type: Allergy; Severity: Severe; Reaction(s): myopathy      Family History   Problem Relation Age of Onset    Heart Disease Paternal Grandfather     Stroke  HOSPITALIZATION: Ambulatory and capable of self-care but relies on assistive devices (rolling walker/cane).   Ambulatory status/function: By self   EARLY MOBILITY ASSESSMENT: Recommend a consult to the Mobility Team and Recommend an assessment from physical therapy and/or occupational therapy  ANTICIPATED DISCHARGE: 24-48 hours.  ANTICIPATED DISPOSITION: Home  EMERGENCY CONTACT/SURROGATE DECISION MAKER: Spouse    CRITICAL CARE WAS PERFORMED FOR THIS ENCOUNTER: NO.      Signed By: Bill Solorzano MD     March 19, 2025         Please note that this dictation may have been completed with Dragon, the [employer] voice recognition software.  Quite often unanticipated grammatical, syntax, homophones, and other interpretive errors are inadvertently transcribed by the computer software.  Please disregard these errors.  Please excuse any errors that have escaped final proofreading.

## 2025-03-19 NOTE — CONSULTS
[facility]   Maurilio Aragon MD  [phone]- E.A.R.S ([id number])          Subjective:     Date of Consultation:  March 19, 2025    Referring Physician: Bill Bernstein MD     History of Present Illness:   Patient is a 64 y.o. female who is being seen for retropharyngeal abscess . S/p Cervical fusion 20+ years ago by Orthosurgeon Dr. Alvarez(? Spelling) and she is followed by [facility], who has been having neck pain for one month. Seen several times and had scans done at [facility] unavailable to review that were reported as normal She had previously on imaging, with a CT of her neck, that showed potential loosening of some of the screws of her previous fusion surgery / Neck CT with contrast today revealed a Retropharyngeal abscess 1.6 x 0.8 cm juxtaposed  to prior hardware that was placed 20 years ago. Transferred from [facility] with neck abscess for Otolaryngology consultation. Spoke to Neurosurgery and discussed case and due to the length of time the hardware has been present and the morbidity of explore or potentially removing the hardware we both agreed that drainage with longterm antibiotics may be in her best interest if possible to resolve this issue in the quickest and most effective manner.   Patient Active Problem List    Diagnosis Date Noted    Neck abscess 03/19/2025    Decompensated heart failure (HCC) 11/21/2024    Body mass index [BMI] 39.0-39.9, adult (Z68.39) 10/15/2024    Polyneuropathy 08/05/2024    Left hip pain 12/17/2022    Elevated uric acid in blood 12/17/2022    Cardiomyopathy, ischemic 12/17/2022    Hypoalbuminemia 12/17/2022    Presence of stent in coronary artery in patient with coronary artery disease 12/17/2022    Hypercholesterolemia 12/17/2022    PVD (peripheral vascular disease) 12/17/2022    Anxiety 11/14/2022    Carpal tunnel syndrome 11/14/2022    Edema 11/14/2022    Stage 3b chronic kidney disease (HCC) 07/17/2022    Chronic total occlusion of coronary artery 07/20/2021

## 2025-03-19 NOTE — ED TRIAGE NOTES
Pt was assist to the floor by family. Pt was DC today from hospital for left foot wound/pain. Pt complaining of weakness and neck pain since march 05 2025. [facility] placed her in a c-collar.

## 2025-03-20 ENCOUNTER — APPOINTMENT (OUTPATIENT)
Facility: HOSPITAL | Age: 65
DRG: 919 | End: 2025-03-20
Payer: MEDICARE

## 2025-03-20 ENCOUNTER — APPOINTMENT (OUTPATIENT)
Facility: HOSPITAL | Age: 65
DRG: 919 | End: 2025-03-20
Attending: INTERNAL MEDICINE
Payer: MEDICARE

## 2025-03-20 LAB
ACCESSION NUMBER, LLC1M: ABNORMAL
ACINETOBACTER CALCOAC BAUMANNII COMPLEX BY PCR: NOT DETECTED
ANION GAP SERPL CALC-SCNC: 11 MMOL/L (ref 2–12)
APPEARANCE UR: CLEAR
BACTERIA URNS QL MICRO: NEGATIVE /HPF
BACTEROIDES FRAGILIS BY PCR: NOT DETECTED
BASOPHILS # BLD: 0.02 K/UL (ref 0–0.1)
BASOPHILS NFR BLD: 0.2 % (ref 0–1)
BILIRUB UR QL: NEGATIVE
BIOFIRE TEST COMMENT: ABNORMAL
BUN SERPL-MCNC: 45 MG/DL (ref 6–20)
BUN/CREAT SERPL: 23 (ref 12–20)
CALCIUM SERPL-MCNC: 8.9 MG/DL (ref 8.5–10.1)
CANDIDA ALBICANS BY PCR: NOT DETECTED
CANDIDA AURIS BY PCR: NOT DETECTED
CANDIDA GLABRATA: NOT DETECTED
CANDIDA KRUSEI BY PCR: NOT DETECTED
CANDIDA PARAPSILOSIS BY PCR: NOT DETECTED
CANDIDA TROPICALIS BY PCR: NOT DETECTED
CHLORIDE SERPL-SCNC: 102 MMOL/L (ref 97–108)
CO2 SERPL-SCNC: 19 MMOL/L (ref 21–32)
COLOR UR: ABNORMAL
CREAT SERPL-MCNC: 1.93 MG/DL (ref 0.55–1.02)
CRYPTOCOCCUS NEOFORMANS/GATTII BY PCR: NOT DETECTED
DIFFERENTIAL METHOD BLD: ABNORMAL
ECHO BSA: 1.97 M2
ECHO LV EF PHYSICIAN: 23 %
ECHO LV FRACTIONAL SHORTENING: 11 % (ref 28–44)
ECHO LV INTERNAL DIMENSION DIASTOLE INDEX: 3.42 CM/M2
ECHO LV INTERNAL DIMENSION DIASTOLIC: 6.5 CM (ref 3.9–5.3)
ECHO LV INTERNAL DIMENSION SYSTOLIC INDEX: 3.05 CM/M2
ECHO LV INTERNAL DIMENSION SYSTOLIC: 5.8 CM
ENTEROBACTER CLOACAE COMPLEX BY PCR: NOT DETECTED
ENTEROBACTERALES BY PCR: NOT DETECTED
ENTEROCOCCUS FAECALIS BY PCR: NOT DETECTED
ENTEROCOCCUS FAECIUM BY PCR: NOT DETECTED
EOSINOPHIL # BLD: 0 K/UL (ref 0–0.4)
EOSINOPHIL NFR BLD: 0 % (ref 0–7)
EPITH CASTS URNS QL MICRO: ABNORMAL /LPF
ERYTHROCYTE [DISTWIDTH] IN BLOOD BY AUTOMATED COUNT: 14.1 % (ref 11.5–14.5)
ESCHERICHIA COLI: NOT DETECTED
GLUCOSE BLD STRIP.AUTO-MCNC: 271 MG/DL (ref 65–117)
GLUCOSE BLD STRIP.AUTO-MCNC: 283 MG/DL (ref 65–117)
GLUCOSE BLD STRIP.AUTO-MCNC: 294 MG/DL (ref 65–117)
GLUCOSE BLD STRIP.AUTO-MCNC: 362 MG/DL (ref 65–117)
GLUCOSE SERPL-MCNC: 280 MG/DL (ref 65–100)
GLUCOSE UR STRIP.AUTO-MCNC: NEGATIVE MG/DL
HAEM INFLU DNA BLD POS QL NAA+NON-PROBE: NOT DETECTED
HCT VFR BLD AUTO: 33.5 % (ref 35–47)
HGB BLD-MCNC: 10.9 G/DL (ref 11.5–16)
HGB UR QL STRIP: ABNORMAL
IMM GRANULOCYTES # BLD AUTO: 0.07 K/UL (ref 0–0.04)
IMM GRANULOCYTES NFR BLD AUTO: 0.7 % (ref 0–0.5)
KETONES UR QL STRIP.AUTO: NEGATIVE MG/DL
KLEBSIELLA AEROGENES BY PCR: NOT DETECTED
KLEBSIELLA OXYTOCA BY PCR: NOT DETECTED
KLEBSIELLA PNEUMONIAE GROUP BY PCR: NOT DETECTED
LEUKOCYTE ESTERASE UR QL STRIP.AUTO: ABNORMAL
LISTERIA MONOCYTOGENES BY PCR: NOT DETECTED
LYMPHOCYTES # BLD: 0.39 K/UL (ref 0.8–3.5)
LYMPHOCYTES NFR BLD: 3.9 % (ref 12–49)
MCH RBC QN AUTO: 28.3 PG (ref 26–34)
MCHC RBC AUTO-ENTMCNC: 32.5 G/DL (ref 30–36.5)
MCV RBC AUTO: 87 FL (ref 80–99)
MECA+MECC+MREJ ISLT/SPM QL: DETECTED
MONOCYTES # BLD: 0.5 K/UL (ref 0–1)
MONOCYTES NFR BLD: 4.9 % (ref 5–13)
NEISSERIA MENINGITIDIS BY PCR: NOT DETECTED
NEUTS SEG # BLD: 9.12 K/UL (ref 1.8–8)
NEUTS SEG NFR BLD: 90.3 % (ref 32–75)
NITRITE UR QL STRIP.AUTO: NEGATIVE
NRBC # BLD: 0 K/UL (ref 0–0.01)
NRBC BLD-RTO: 0 PER 100 WBC
PH UR STRIP: 5.5 (ref 5–8)
PLATELET # BLD AUTO: 224 K/UL (ref 150–400)
PMV BLD AUTO: 10.9 FL (ref 8.9–12.9)
POTASSIUM SERPL-SCNC: 4 MMOL/L (ref 3.5–5.1)
PROT UR STRIP-MCNC: 30 MG/DL
PROTEUS BY PCR: NOT DETECTED
PSEUDOMONAS AERUGINOSA, PSAEP: NOT DETECTED
RBC # BLD AUTO: 3.85 M/UL (ref 3.8–5.2)
RBC #/AREA URNS HPF: ABNORMAL /HPF (ref 0–5)
RBC MORPH BLD: ABNORMAL
RBC MORPH BLD: ABNORMAL
RESISTANT GENE TARGETS: ABNORMAL
SALMONELLA DNA BLD POS QL NAA+NON-PROBE: NOT DETECTED
SERRATIA MARCESCENS BY PCR: NOT DETECTED
SERVICE CMNT-IMP: ABNORMAL
SODIUM SERPL-SCNC: 132 MMOL/L (ref 136–145)
SP GR UR REFRACTOMETRY: 1.03 (ref 1–1.03)
STAPHYLOCOCCUS AUREUS: DETECTED
STAPHYLOCOCCUS EPIDERMIDIS BY PCR: NOT DETECTED
STAPHYLOCOCCUS LUGDUNENSIS BY PCR: NOT DETECTED
STAPHYLOCOCCUS: DETECTED
STENOTROPHOMONAS MALTOPHILIA BY PCR: NOT DETECTED
STREPTOCOCCUS AGALACTIAE (GROUP B): NOT DETECTED
STREPTOCOCCUS PNEUMONIAE , SPNP: NOT DETECTED
STREPTOCOCCUS PYOGENES (GROUP A), SPYOP: NOT DETECTED
STREPTOCOCCUS: NOT DETECTED
UROBILINOGEN UR QL STRIP.AUTO: 0.2 EU/DL (ref 0.2–1)
VANCOMYCIN SERPL-MCNC: 22.2 UG/ML
WBC # BLD AUTO: 10.1 K/UL (ref 3.6–11)
WBC URNS QL MICRO: ABNORMAL /HPF (ref 0–4)
YEAST URNS QL MICRO: PRESENT

## 2025-03-20 PROCEDURE — 97530 THERAPEUTIC ACTIVITIES: CPT

## 2025-03-20 PROCEDURE — 93306 TTE W/DOPPLER COMPLETE: CPT | Performed by: INTERNAL MEDICINE

## 2025-03-20 PROCEDURE — 6370000000 HC RX 637 (ALT 250 FOR IP): Performed by: INTERNAL MEDICINE

## 2025-03-20 PROCEDURE — 93308 TTE F-UP OR LMTD: CPT

## 2025-03-20 PROCEDURE — 6360000002 HC RX W HCPCS: Performed by: STUDENT IN AN ORGANIZED HEALTH CARE EDUCATION/TRAINING PROGRAM

## 2025-03-20 PROCEDURE — 80202 ASSAY OF VANCOMYCIN: CPT

## 2025-03-20 PROCEDURE — 2580000003 HC RX 258: Performed by: STUDENT IN AN ORGANIZED HEALTH CARE EDUCATION/TRAINING PROGRAM

## 2025-03-20 PROCEDURE — 93970 EXTREMITY STUDY: CPT

## 2025-03-20 PROCEDURE — 80048 BASIC METABOLIC PNL TOTAL CA: CPT

## 2025-03-20 PROCEDURE — 2060000000 HC ICU INTERMEDIATE R&B

## 2025-03-20 PROCEDURE — 97165 OT EVAL LOW COMPLEX 30 MIN: CPT

## 2025-03-20 PROCEDURE — 97161 PT EVAL LOW COMPLEX 20 MIN: CPT

## 2025-03-20 PROCEDURE — 82962 GLUCOSE BLOOD TEST: CPT

## 2025-03-20 PROCEDURE — 81001 URINALYSIS AUTO W/SCOPE: CPT

## 2025-03-20 PROCEDURE — 6370000000 HC RX 637 (ALT 250 FOR IP): Performed by: STUDENT IN AN ORGANIZED HEALTH CARE EDUCATION/TRAINING PROGRAM

## 2025-03-20 PROCEDURE — 2500000003 HC RX 250 WO HCPCS: Performed by: STUDENT IN AN ORGANIZED HEALTH CARE EDUCATION/TRAINING PROGRAM

## 2025-03-20 PROCEDURE — 85025 COMPLETE CBC W/AUTO DIFF WBC: CPT

## 2025-03-20 RX ORDER — OXYCODONE HYDROCHLORIDE 5 MG/1
2.5 TABLET ORAL EVERY 4 HOURS PRN
Refills: 0 | Status: DISCONTINUED | OUTPATIENT
Start: 2025-03-20 | End: 2025-04-04 | Stop reason: HOSPADM

## 2025-03-20 RX ORDER — CASTOR OIL AND BALSAM, PERU 788; 87 MG/G; MG/G
OINTMENT TOPICAL 2 TIMES DAILY
Status: DISCONTINUED | OUTPATIENT
Start: 2025-03-20 | End: 2025-04-04 | Stop reason: HOSPADM

## 2025-03-20 RX ADMIN — Medication: at 21:46

## 2025-03-20 RX ADMIN — METRONIDAZOLE 500 MG: 500 INJECTION, SOLUTION INTRAVENOUS at 09:32

## 2025-03-20 RX ADMIN — CARVEDILOL 6.25 MG: 6.25 TABLET, FILM COATED ORAL at 09:32

## 2025-03-20 RX ADMIN — PREGABALIN 75 MG: 75 CAPSULE ORAL at 15:26

## 2025-03-20 RX ADMIN — INSULIN LISPRO 8 UNITS: 100 INJECTION, SOLUTION INTRAVENOUS; SUBCUTANEOUS at 21:44

## 2025-03-20 RX ADMIN — METRONIDAZOLE 500 MG: 500 INJECTION, SOLUTION INTRAVENOUS at 17:54

## 2025-03-20 RX ADMIN — METRONIDAZOLE 500 MG: 500 INJECTION, SOLUTION INTRAVENOUS at 23:40

## 2025-03-20 RX ADMIN — METRONIDAZOLE 500 MG: 500 INJECTION, SOLUTION INTRAVENOUS at 01:11

## 2025-03-20 RX ADMIN — SPIRONOLACTONE 25 MG: 25 TABLET ORAL at 09:34

## 2025-03-20 RX ADMIN — TORSEMIDE 20 MG: 20 TABLET ORAL at 09:34

## 2025-03-20 RX ADMIN — OXYCODONE HYDROCHLORIDE 2.5 MG: 5 TABLET ORAL at 17:04

## 2025-03-20 RX ADMIN — ACETAMINOPHEN 650 MG: 325 TABLET ORAL at 23:25

## 2025-03-20 RX ADMIN — OXYCODONE HYDROCHLORIDE 2.5 MG: 5 TABLET ORAL at 21:58

## 2025-03-20 RX ADMIN — CEFEPIME 2000 MG: 2 INJECTION, POWDER, FOR SOLUTION INTRAVENOUS at 19:08

## 2025-03-20 RX ADMIN — CEFEPIME 2000 MG: 2 INJECTION, POWDER, FOR SOLUTION INTRAVENOUS at 05:07

## 2025-03-20 RX ADMIN — PREGABALIN 75 MG: 75 CAPSULE ORAL at 21:44

## 2025-03-20 RX ADMIN — SODIUM CHLORIDE: 0.9 INJECTION, SOLUTION INTRAVENOUS at 01:02

## 2025-03-20 RX ADMIN — SODIUM CHLORIDE, PRESERVATIVE FREE 10 ML: 5 INJECTION INTRAVENOUS at 21:44

## 2025-03-20 RX ADMIN — OXYCODONE HYDROCHLORIDE 2.5 MG: 5 TABLET ORAL at 11:14

## 2025-03-20 RX ADMIN — INSULIN LISPRO 4 UNITS: 100 INJECTION, SOLUTION INTRAVENOUS; SUBCUTANEOUS at 17:54

## 2025-03-20 RX ADMIN — SODIUM CHLORIDE, PRESERVATIVE FREE 10 ML: 5 INJECTION INTRAVENOUS at 09:37

## 2025-03-20 RX ADMIN — ACETAMINOPHEN 650 MG: 325 TABLET ORAL at 15:25

## 2025-03-20 RX ADMIN — INSULIN LISPRO 4 UNITS: 100 INJECTION, SOLUTION INTRAVENOUS; SUBCUTANEOUS at 09:35

## 2025-03-20 RX ADMIN — PREGABALIN 75 MG: 75 CAPSULE ORAL at 09:33

## 2025-03-20 RX ADMIN — INSULIN LISPRO 4 UNITS: 100 INJECTION, SOLUTION INTRAVENOUS; SUBCUTANEOUS at 12:50

## 2025-03-20 RX ADMIN — INSULIN GLARGINE 30 UNITS: 100 INJECTION, SOLUTION SUBCUTANEOUS at 09:35

## 2025-03-20 RX ADMIN — CALCITRIOL CAPSULES 0.25 MCG 0.25 MCG: 0.25 CAPSULE ORAL at 09:34

## 2025-03-20 ASSESSMENT — PAIN SCALES - GENERAL
PAINLEVEL_OUTOF10: 10
PAINLEVEL_OUTOF10: 0
PAINLEVEL_OUTOF10: 9
PAINLEVEL_OUTOF10: 10
PAINLEVEL_OUTOF10: 8
PAINLEVEL_OUTOF10: 9

## 2025-03-20 ASSESSMENT — PAIN DESCRIPTION - LOCATION
LOCATION: BACK;NECK
LOCATION: NECK
LOCATION: NECK
LOCATION: BACK;NECK

## 2025-03-20 ASSESSMENT — PAIN DESCRIPTION - ORIENTATION
ORIENTATION: POSTERIOR
ORIENTATION: POSTERIOR

## 2025-03-20 ASSESSMENT — PAIN DESCRIPTION - DESCRIPTORS
DESCRIPTORS: ACHING

## 2025-03-20 NOTE — PROGRESS NOTES
Pharmacist Note - Vancomycin Dosing    Consult provided for this 64 y.o. female for indication of prevetbral abscess vs retropharyngeal abscess.  Antibiotic regimen(s): Vanc+Cefepime+Metronidazole  Patient on vancomycin PTA? NO   Pregnancy status: N/A    Recent Labs     25  1103   WBC 17.5*   CREATININE 1.81*   BUN 38*     Frequency of BMP: Tomorrow AM  Height: 157.5 cm  Weight: 89.4 kg  Est CrCl: 32 ml/min; UO: --- ml/kg/hr  Temp (24hrs), Av.2 °F (36.8 °C), Min:97.8 °F (36.6 °C), Max:98.7 °F (37.1 °C)    Cultures:  Blood-NGTD    MRSA Swab ordered (if applicable)? N/A    The plan below is expected to result in a target range of Trough 10-15 mcg/mL    Therapy will be initiated with a loading dose of 2250 mg IV x 1 then hold.  Will order a random level w/aml tomorrow and a dose will be ordered accordingly based on results.  Pharmacy to follow patient daily and order levels / make dose adjustments as appropriate.    Corey Raymond, PharmD

## 2025-03-20 NOTE — PROGRESS NOTES
Pharmacy consult note: Day #2 of Vancomycin  Indication:  retropharyngeal abscess   -S/p Cervical fusion 20+ years ago, extensive hardware  Current regimen:  2250 mg x1, dose by level  Abx regimen:  cefepime, metronidazole, vanc    Recent Labs     25  1103 25  0657   WBC 17.5* 10.1   CREATININE 1.81* 1.93*   BUN 38* 45*     Est CrCl: 30 ml/min; UO: n/a ml/kg/hr  Temp (24hrs), Av.2 °F (36.8 °C), Min:97.8 °F (36.6 °C), Max:98.7 °F (37.1 °C)    Cultures:   3/19 blood x2: pend  3/19 retropharyngeal wound: pend    MRSA Swab ordered (if applicable)? N/A    Goal target range Trough 10-15 mcg/mL    Recent level history:  Date/Time Dose & Interval Measured Level (mcg/mL) Associated AUC/JULIAN Dose Adjustment    3/20 @ 0657 2250 mg loading dose 22.2 ~ 17.5 hrs p dose N/a hold                                           Plan: Continue to dose by level, next random level with am labs tomorrow. Plan to re-dose once random level 15 mcg/ml or less

## 2025-03-20 NOTE — PLAN OF CARE
2145 - Admitted this patient from ED accompanied by the nurse on duty via stretcher; transferred to bed and routine admission care done. Vital signs taken and recorded. Skin checked with another staff nurse no pressure injury noted but Lt lower leg and foot noted covered with dressing. RT lower leg noted slightly red; bilateral legs swollen. Instructed patient if I can get some history from her including her medications but verbalized that she can't give it at the moment and not aware of all her pills, said she can try to give the information tomorrow. Said she wants to get some rest; side rails up and made comfortable to bed.      Problem: Safety - Adult  Goal: Free from fall injury  Outcome: Progressing  Flowsheets  Taken 3/19/2025 2330  Free From Fall Injury: Instruct family/caregiver on patient safety  Taken 3/19/2025 2145  Free From Fall Injury: Instruct family/caregiver on patient safety     Problem: Chronic Conditions and Co-morbidities  Goal: Patient's chronic conditions and co-morbidity symptoms are monitored and maintained or improved  Outcome: Progressing  Flowsheets (Taken 3/19/2025 2330)  Care Plan - Patient's Chronic Conditions and Co-Morbidity Symptoms are Monitored and Maintained or Improved: Monitor and assess patient's chronic conditions and comorbid symptoms for stability, deterioration, or improvement     Problem: Discharge Planning  Goal: Discharge to home or other facility with appropriate resources  Outcome: Progressing  Flowsheets (Taken 3/19/2025 2330)  Discharge to home or other facility with appropriate resources:   Identify barriers to discharge with patient and caregiver   Identify discharge learning needs (meds, wound care, etc)   Refer to discharge planning if patient needs post-hospital services based on physician order or complex needs related to functional status, cognitive ability or social support system   Arrange for needed discharge resources and transportation as appropriate

## 2025-03-20 NOTE — PLAN OF CARE
Problem: Physical Therapy - Adult  Goal: By Discharge: Performs mobility at highest level of function for planned discharge setting.  See evaluation for individualized goals.  Description: FUNCTIONAL STATUS PRIOR TO ADMISSION: Patient was modified independent, intermittently using a rolling walker for functional mobility.    HOME SUPPORT PRIOR TO ADMISSION: The patient lived with her  who she reports is also in the hospital (at [facility]) currently.    Physical Therapy Goals  Initiated 3/20/2025  1.  Patient will move from supine to sit and sit to supine and roll side to side in bed with modified independence within 7 day(s).    2.  Patient will perform sit to stand with modified independence within 7 day(s).  3.  Patient will transfer from bed to chair and chair to bed with modified independence using the least restrictive device within 7 day(s).  4.  Patient will ambulate with modified independence for 150 feet with the least restrictive device within 7 day(s).   Outcome: Progressing  PHYSICAL THERAPY EVALUATION    Patient: Nicci Hernandez (64 y.o. female)  Date: 3/20/2025  Primary Diagnosis: Retropharyngeal abscess [J39.0]  Neck abscess [L02.11]       Precautions: Restrictions/Precautions  Restrictions/Precautions: Fall Risk            ASSESSMENT :   DEFICITS/IMPAIRMENTS:   The patient is limited by decreased functional mobility, independence in ADLs, high-level IADLs, ROM, strength, sensation, body mechanics, activity tolerance, coordination, balance, increased pain levels s/p admission for neck abscess (noted prior c-spine fusion about 5 years ago). Prior to admission patient reports she was largely independent with mobility, but occasionally utilized RW for ambulation. Patient was received in bed, cleared for PT session by RN.    Based on the impairments listed above patient is below her functional baseline. Today, she completed bed mobility with Max A x 2 for assist with trunk, however upon sitting EOB she  Retrospective, Observational Study. Arch Rehabil Res Clin Transl. 2022 Jul 16;4(3):837879. doi: 10.1016/j.arrct.2022.594266. PMID: 14053366; PMCID: CAY7235591.  4. Mariama ALEXANDER, Lori S, Lisa WKorin. AM-PAC Short Forms Manual 4.0. Revised 2/2020.                                                                                                                                                                                                                               Pain Rating:  Does not quantify, reports some neck & generalized pain  Pain Intervention(s):   nursing notified and repositioning    Activity Tolerance:   Fair , requires rest breaks, and vitals stable throughout session - see flowsheet for details    After treatment:   Patient left in no apparent distress sitting up in chair, Call bell within reach, Bed/ chair alarm activated, Updated patient's board on functional status and mobility recommendations, and RN aware    COMMUNICATION/EDUCATION:   The patient's plan of care was discussed with: occupational therapist and registered nurse    Patient Education  Education Given To: Patient  Education Provided: Role of Therapy;Plan of Care;Transfer Training;Mobility Training;Fall Prevention Strategies;Equipment  Education Method: Demonstration;Verbal  Barriers to Learning: None  Education Outcome: Verbalized understanding;Demonstrated understanding    Thank you for this referral.  Aviva Felix, PT  Minutes: 25      Physical Therapy Evaluation Charge Determination   History Examination Presentation Decision-Making   HIGH Complexity :3+ comorbidities / personal factors will impact the outcome/ POC  MEDIUM Complexity : 3 Standardized tests and measures addressin body structure, function, activity limitation and / or participation in recreation  LOW Complexity : Stable, uncomplicated  AM-PAC  LOW    Based on the above components, the patient evaluation is determined to be of the following complexity level: Low

## 2025-03-20 NOTE — PLAN OF CARE
Problem: Pain  Goal: Verbalizes/displays adequate comfort level or baseline comfort level  3/20/2025 1152 by Sarah Cannon  Outcome: Not Progressing  Flowsheets (Taken 3/20/2025 1114 by Ann Lema RN)  Verbalizes/displays adequate comfort level or baseline comfort level:   Encourage patient to monitor pain and request assistance   Assess pain using appropriate pain scale  3/19/2025 2330 by Rui Michaels RN  Outcome: Progressing  Flowsheets (Taken 3/19/2025 2330)  Verbalizes/displays adequate comfort level or baseline comfort level:   Encourage patient to monitor pain and request assistance   Assess pain using appropriate pain scale   Administer analgesics based on type and severity of pain and evaluate response   Implement non-pharmacological measures as appropriate and evaluate response     Problem: Safety - Adult  Goal: Free from fall injury  3/20/2025 1152 by Sarah Cannon  Outcome: Progressing  3/19/2025 2330 by Rui Michaels RN  Outcome: Progressing  Flowsheets  Taken 3/19/2025 2330  Free From Fall Injury: Instruct family/caregiver on patient safety  Taken 3/19/2025 2145  Free From Fall Injury: Instruct family/caregiver on patient safety     Problem: Chronic Conditions and Co-morbidities  Goal: Patient's chronic conditions and co-morbidity symptoms are monitored and maintained or improved  3/20/2025 1152 by Sarah Cannon  Outcome: Progressing  3/19/2025 2330 by Rui Michaels RN  Outcome: Progressing  Flowsheets (Taken 3/19/2025 2330)  Care Plan - Patient's Chronic Conditions and Co-Morbidity Symptoms are Monitored and Maintained or Improved: Monitor and assess patient's chronic conditions and comorbid symptoms for stability, deterioration, or improvement     Problem: Discharge Planning  Goal: Discharge to home or other facility with appropriate resources  3/20/2025 1152 by Sarah Cannon  Outcome: Progressing  3/19/2025 2330  by Rui Michaels RN  Outcome: Progressing  Flowsheets (Taken 3/19/2025 2330)  Discharge to home or other facility with appropriate resources:   Identify barriers to discharge with patient and caregiver   Identify discharge learning needs (meds, wound care, etc)   Refer to discharge planning if patient needs post-hospital services based on physician order or complex needs related to functional status, cognitive ability or social support system   Arrange for needed discharge resources and transportation as appropriate     Problem: Pain  Goal: Verbalizes/displays adequate comfort level or baseline comfort level  3/20/2025 1152 by Sarah Cannon  Outcome: Not Progressing  Flowsheets (Taken 3/20/2025 1114 by Ann Lema, CATHY)  Verbalizes/displays adequate comfort level or baseline comfort level:   Encourage patient to monitor pain and request assistance   Assess pain using appropriate pain scale  3/19/2025 2330 by Rui Michaels RN  Outcome: Progressing  Flowsheets (Taken 3/19/2025 2330)  Verbalizes/displays adequate comfort level or baseline comfort level:   Encourage patient to monitor pain and request assistance   Assess pain using appropriate pain scale   Administer analgesics based on type and severity of pain and evaluate response   Implement non-pharmacological measures as appropriate and evaluate response

## 2025-03-20 NOTE — PROGRESS NOTES
[facility] Adult  Hospitalist Group                                                                                          Hospitalist Progress Note  Chadwick Garcia MD  Answering service: [phone] OR [phone] from in house phone        Date of Service:  3/20/2025  NAME:  Nicci Hernandez  :  1960  MRN:  [id number]       Admission Summary:   Nicci Hernandez is a 64 y.o. female who presents with retropharyngeal vs prevertebral abscess.     This is a 64-year-old female with past medical history of CKD stage IV, diabetes, dyslipidemia, diabetic neuropathy, coronary artery disease status post CABG in , gastroparesis, hypertension, cervical fusion about 20 years ago, who comes in for the above.  The patient reports that she has been having neck pain for about 3 weeks.  She reports that she has been seen by outpatient orthopedic surgery and started on steroids, and she also went to other physicians.  The patient went to [facility] about 3 days ago where she was also evaluated for this pain.  She had previously on imaging, with a CT of her neck, that showed potential loosening of some of the screws of her previous fusion surgery.  At [facility] she was given pain medicine apparently discharge.  Patient went to [facility] yesterday with her spouse due to a fall that he had and was not so severe neck pain that she was placed on a c-collar.  The patient reports that this pain has been progressively worsening since about 3 weeks ago and that about 4 days ago she has become so weak that she has been unable to walk.  Patient then went to [facility] on 3/6 where she was also imaged with not much seen on imaging and was discharged.  Patient went again to the [facility] ER today where the CT of her soft tissue of her neck shows a prevertebral fluid collection associated with the anterior fusion plate which was suspicious for a possible retropharyngeal

## 2025-03-20 NOTE — PROGRESS NOTES
Occupational Therapy  03/20/25    Orders received, chart reviewed and patient evaluated by occupational therapy. Pending progression with skilled acute occupational therapy, recommend:    Intermittent occupational therapy up to 2-3x/week in previous living setting with additional support needed for IADLs pending progress    Recommend with nursing patient to complete as able in order to maintain strength, endurance and independence: OOB to chair 3x/day, ADLs with assist and performing toileting with Travis to BSC using RW assist. Thank you for your assistance.     Full evaluation to follow.   Nahomy Hartman, OT

## 2025-03-20 NOTE — PROGRESS NOTES
4 Eyes Skin Assessment     NAME:  Nicci Hernandez  YOB: 1960  MEDICAL RECORD NUMBER:  [id number]    The patient is being assessed for  Admission    I agree that at least one RN has performed a thorough Head to Toe Skin Assessment on the patient. ALL assessment sites listed below have been assessed.      Areas assessed by both nurses:    Head, Face, Ears, Shoulders, Back, Chest, Arms, Elbows, Hands, Sacrum. Buttock, Coccyx, Ischium, and Legs. Feet and Heels        Does the Patient have a Wound? No noted wound(s)       Rupert Prevention initiated by RN: Yes  Wound Care Orders initiated by RN: No    Pressure Injury (Stage 3,4, Unstageable, DTI, NWPT, and Complex wounds) if present, place Wound referral order by RN under : No    New Ostomies, if present place, Ostomy referral order under : No     Nurse 1 eSignature: Electronically signed by Rui Michaels RN on 3/19/25 at 10:29 PM EDT    **SHARE this note so that the co-signing nurse can place an eSignature**    Nurse 2 eSignature: Electronically signed by SANTO MADRID RN on 3/19/25 at 11:24 PM EDT

## 2025-03-20 NOTE — ED NOTES
TRANSFER - OUT REPORT:    Verbal report given to CATHY Gilliland on Nicci BENJAMIN Hernandez  being transferred to  for routine progression of patient care       Report consisted of patient's Situation, Background, Assessment and   Recommendations(SBAR).     Information from the following report(s) Nurse Handoff Report, Index, ED SBAR, Intake/Output, MAR, Recent Results, Neuro Assessment, and Event Log was reviewed with the receiving nurse.    Jordan Fall Assessment:    Presents to emergency department  because of falls (Syncope, seizure, or loss of consciousness): No  Age > 70: Yes  Altered Mental Status, Intoxication with alcohol or substance confusion (Disorientation, impaired judgment, poor safety awaremess, or inability to follow instructions): No  Impaired Mobility: Ambulates or transfers with assistive devices or assistance; Unable to ambulate or transer.: Yes  Nursing Judgement: Yes          Lines:   Peripheral IV 03/19/25 Left;Anterior Forearm (Active)        Opportunity for questions and clarification was provided.      Patient transported with:  Monitor, RN

## 2025-03-20 NOTE — PROGRESS NOTES
Physical Therapy 03/20/25    Orders received, chart reviewed and patient evaluated by physical therapy. Pending progression with skilled acute physical therapy, recommend:    Intermittent physical therapy up to 2-3x/week in previous living setting with supervision/assist from family pending progress    Recommend with nursing OOB to chair 3x/day with 2x assist and rolling walker. Thank you for completing as able in order to maintain patient strength, endurance and independence.     Full evaluation to follow.      Aviva Felix, PT, DPT, NCS

## 2025-03-20 NOTE — CONSULTS
[place] [id number]                              CONSULTATION      PATIENT NAME: SU HILARIO              : 1960  MED REC NO: [id number]                       ROOM: 440  ACCOUNT NO: [id number]                       ADMIT DATE: 2025  PROVIDER: Saravanan Cadena MD    DATE OF SERVICE:  2025    ATTENDING PHYSICIAN:  Chadwick Garcia MD    REASON FOR CONSULTATION:  Seen for renal failure.    Thanks for the consult.    HISTORY OF PRESENT ILLNESS:  She sees my partner, Dr. Ryder, CKD stage IIIA to IIIB.  The creatinine between 14 to 18.  Came here from [facility] with neck pain.  She had cervical fusion many years ago, found to have a retropharyngeal abscess.  Next, the hardware, it was drained and cultured and the patient have Staph aureus bacteremia, started on antibiotics.  She is also on cefepime and vancomycin.    PAST MEDICAL HISTORY:    1. CKD, stage IIIB.  Baseline creatinine 14 to 17.  2. History of neck surgery.  3. Retropharyngeal abscess.  4. PAD.  5. Hypertension.  6. Dyslipidemia.  7. Neck abscess.  8. Anxiety.  9. CAD.    SOCIAL HISTORY:  Reviewed.    FAMILY HISTORY:  Reviewed.    REVIEW OF SYSTEMS:  As noted in the HPI, other systems negative.    ALLERGIES:  TO LIPITOR.      MEDICATIONS:  Inpatient as follows, vancomycin, torsemide, spironolactone, Lyrica, Flagyl, Rocaltrol, Coreg, cefepime, insulin.    PHYSICAL EXAMINATION:  VITAL SIGNS:  Blood pressure is 122/68, sating 97% on room air.  A +1 edema.  GENERAL:  Alert and oriented to time and place, not in distress.  Urine output is 1.2 L since yesterday.  This was recorded.    LABORATORY DATA:  Sodium 132, potassium 4, BUN 45, creatinine 1.9.  Sugar is 280.  Gap is 11, calcium is 8.9.  Vanc level 22.2.  Hemoglobin 10.9, platelets 224.  No UA this admission.    IMPRESSION:    1. CKD stage IIIB.  Baseline creatinine between around 1.5 to 1.7.  2. History  of uncontrolled diabetes.  3. Hypertension.  4. Neck pain.  5. Retropharyngeal abscess.  6. Staph aureus bacteremia.    RECOMMENDATIONS:  As follows,  1. We will monitor renal function.  2. Creatinine close to baseline.  3. Check UA.  4. Monitor vanc level.  5. Discussed in length with her and her family.        MD FRANCISCO BURT/AQS  D:  03/20/2025 12:49:25  T:  03/20/2025 13:51:05  JOB #:  [id number]

## 2025-03-20 NOTE — WOUND CARE
WOCN Note:     New consult placed for \"Lt lower leg and foot\"    Assessed in 440/01.  Family at the bedside.    Nicci Hernandez is a 64 y.o. y/o female who presented for Retropharyngeal abscess [J39.0]  Neck abscess [L02.11]  Admitted on 3/19/2025; LOS: 1    Lab Results   Component Value Date/Time    WBC 10.1 03/20/2025 06:57 AM    LABA1C 6.2 (H) 11/22/2024 02:36 AM    POCGLU 271 (H) 03/20/2025 12:14 PM    POCGLU 283 (H) 03/20/2025 08:32 AM    HGB 10.9 (L) 03/20/2025 06:57 AM    HCT 33.5 (L) 03/20/2025 06:57 AM     03/20/2025 06:57 AM      Lab Results   Component Value Date/Time    WBC 10.1 03/20/2025 06:57 AM       ADULT DIET; Regular; 4 carb choices (60 gm/meal)     Assessment:   Patient is A&O x 3, communicative and requires assist with repositioning.    Bed: marcelle gel  GI/: external catheter  Patient reports no pain.    Patient repositioned on right side with pillow.  Heels offloaded with pillows.  Right heel intact with blanching red erythema.     Sacrum and buttocks intact without erythema.  Protected with foam dressing.    Wound Assessment  POA left medial foot, DFU: 1.5 x 0.8 x 0.1 cm  100% red; no exudate; no odor. Periwound calloused and without erythema.   TX: cleansed with vashe; applied puracol ag and mepitel one; covered with dry dressing.  2.  POA left lateral foot, dry stable callous      3. POA Proximal back, pressure injury stage 2 : 3.2 x 3.4 x 0.1 cm  100% slow blanching red and pink. no exudate; no odor. Radha wound without erythema.   TX: applied foam  4.  POA Distal back, pressure injury stage 2:  2.5 x 3 x 0.1 cm; 100% slow blanching red and pink; no exudate; no odor.  Radha wound without erythema.  Tx:  applied foam        Wound, Pressure Prevention & Skin Care Recommendations:    Minimize layers of linen/pads under patient to optimize support surface.    2.  Turn/reposition approximately every 2 hours and offload heels.   3.  Manage moisture/ Keep skin folds clean and dry/minimize

## 2025-03-20 NOTE — CONSULTS
Infectious Disease Services Note    Chart and labs reviewed    MRSA bacteremia per PCR ID. TTE ordered. Continue vancomycin, cefepime, flagyl     Will formally see tomorrow. D/w Dr. Jose Negron APRCAROLYN-NP

## 2025-03-20 NOTE — CONSULTS
Seen and examined  Thanks for the consult  A/P:  CKD-3b,creat close to baseline  HTN/DM  RP abscess,SA bacteremia  Edema    Creat close to baseline  Follow Vanco level  AB  Discussed with her

## 2025-03-20 NOTE — PLAN OF CARE
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)     Gastroparesis     Hip pain, left     Hypercholesterolemia     Hypertension     Hypertension, diastolic, benign     Hypoalbuminemia     Peripheral vascular disease     Presence of stent in coronary artery in patient with coronary artery disease     S/P CABG x 3     Type 2 diabetes mellitus with hyperglycemia, with long-term current use of insulin (HCC)     Unspecified sleep apnea     no cpap repeat study sched for 9/20/13     Past Surgical History:   Procedure Laterality Date    ANTERIOR CERVICAL DISCECTOMY W/ FUSION  2003    ANTERIOR CERVICAL FUSION INSTRUMENTATION    BACK SURGERY  12/09/2022    Back surgery/plate in disc 3 -disc 6    CARDIAC CATHETERIZATION  1/2021, 2/2021    with stents placed.     CERVICAL LAMINECTOMY      C3 and C4    CHOLECYSTECTOMY  06/2012    CORONARY ANGIOPLASTY WITH STENT PLACEMENT  06/2020    CORONARY ARTERY BYPASS GRAFT  2009    triple bypass    ECHO 2D ADULT  04/2010    LVEF 45%, akinetic apex and septum.  HK mid-anterior wall    ECHO 2D ADULT  06/27/2012    LVEF about 50%.  There is possible HK of distal ant/septal wall and apical walls (technically difficult study).      FOOT DEBRIDEMENT Left 11/5/2024    LEFT FOOT PREP WOUND, APPLY GRAFT performed by Irma Hernandez DPM at [facility] MAIN OR    HAND TENDON SURGERY  10/2019    hand tendon repair    HYSTERECTOMY (CERVIX STATUS UNKNOWN)  2005    SHOULDER ARTHROSCOPY Right 2008     Expanded or extensive additional review  understanding;Continued education needed    Thank you for this referral.  Nahomy Hartman OT  Minutes: 24    Occupational Therapy Evaluation Charge Determination   History Examination Decision-Making   LOW Complexity : Brief history review  LOW Complexity: 1-3 Performance deficits relating to physical, cognitive, or psychosocial skills that result in activity limitations and/or participation restrictions LOW Complexity: No comorbidities that affect functional and  no verbal  or physical assist needed to complete eval tasks   Based on the above components, the patient evaluation is determined to be of the following complexity level: Low

## 2025-03-20 NOTE — CONSULTS
65yo s/p hx of ACDF C3-C7 with anterior plate C3-C6 performed many years ago, maybe at [facility].  Records show evaluation at [facility] recently with Dr. Dye  He read a CT and noted that.instrumentation is not loose, however,  may not be optimal placement.  Patient presented to OSH today with neck pain and found to have elevated WBC, 17.5, with 88% neutrophils. CT showed retropharyngeal abscess.   No evidence that plate or bone is infected.  Discussed with Dr. Aragon, ENT.  Safest approach is draining abscess and treating with IV antibiotics.  No indication to remove hardware at this time and an attempt to  remove hardward could cause more harm as it is extensive and has been in place many years .  Agree with ID consultation.  Will be available as needed.

## 2025-03-21 ENCOUNTER — APPOINTMENT (OUTPATIENT)
Facility: HOSPITAL | Age: 65
DRG: 919 | End: 2025-03-21
Payer: MEDICARE

## 2025-03-21 PROBLEM — E11.8 CONTROLLED DIABETES MELLITUS TYPE 2 WITH COMPLICATIONS (HCC): Status: ACTIVE | Noted: 2025-03-21

## 2025-03-21 PROBLEM — R78.81 MRSA BACTEREMIA: Status: ACTIVE | Noted: 2025-03-21

## 2025-03-21 PROBLEM — B95.62 MRSA BACTEREMIA: Status: ACTIVE | Noted: 2025-03-21

## 2025-03-21 PROBLEM — J39.0 RETROPHARYNGEAL ABSCESS: Status: ACTIVE | Noted: 2025-03-21

## 2025-03-21 PROBLEM — A49.02 MRSA INFECTION: Status: ACTIVE | Noted: 2025-03-21

## 2025-03-21 PROBLEM — N17.9 AKI (ACUTE KIDNEY INJURY): Status: ACTIVE | Noted: 2025-03-21

## 2025-03-21 PROBLEM — L97.425 DIABETIC ULCER OF LEFT MIDFOOT ASSOCIATED WITH TYPE 2 DIABETES MELLITUS, WITH MUSCLE INVOLVEMENT WITHOUT EVIDENCE OF NECROSIS: Status: ACTIVE | Noted: 2025-03-21

## 2025-03-21 PROBLEM — E87.1 HYPONATREMIA: Status: ACTIVE | Noted: 2025-03-21

## 2025-03-21 PROBLEM — N18.4 CKD (CHRONIC KIDNEY DISEASE) STAGE 4, GFR 15-29 ML/MIN (HCC): Status: ACTIVE | Noted: 2025-03-21

## 2025-03-21 PROBLEM — E11.621 DIABETIC ULCER OF LEFT MIDFOOT ASSOCIATED WITH TYPE 2 DIABETES MELLITUS, WITH MUSCLE INVOLVEMENT WITHOUT EVIDENCE OF NECROSIS: Status: ACTIVE | Noted: 2025-03-21

## 2025-03-21 LAB
AMMONIA PLAS-SCNC: 17 UMOL/L
ANION GAP SERPL CALC-SCNC: 10 MMOL/L (ref 2–12)
ANION GAP SERPL CALC-SCNC: 10 MMOL/L (ref 2–12)
ARTERIAL PATENCY WRIST A: POSITIVE
BACTERIA SPEC CULT: ABNORMAL
BACTERIA SPEC CULT: ABNORMAL
BASE DEFICIT BLD-SCNC: 9 MMOL/L
BASOPHILS # BLD: 0.03 K/UL (ref 0–0.1)
BASOPHILS NFR BLD: 0.2 % (ref 0–1)
BDY SITE: ABNORMAL
BUN SERPL-MCNC: 58 MG/DL (ref 6–20)
BUN SERPL-MCNC: 64 MG/DL (ref 6–20)
BUN/CREAT SERPL: 23 (ref 12–20)
BUN/CREAT SERPL: 23 (ref 12–20)
CALCIUM SERPL-MCNC: 8.7 MG/DL (ref 8.5–10.1)
CALCIUM SERPL-MCNC: 8.8 MG/DL (ref 8.5–10.1)
CHLORIDE SERPL-SCNC: 100 MMOL/L (ref 97–108)
CHLORIDE SERPL-SCNC: 101 MMOL/L (ref 97–108)
CK SERPL-CCNC: 15 U/L (ref 26–192)
CO2 SERPL-SCNC: 18 MMOL/L (ref 21–32)
CO2 SERPL-SCNC: 20 MMOL/L (ref 21–32)
CREAT SERPL-MCNC: 2.51 MG/DL (ref 0.55–1.02)
CREAT SERPL-MCNC: 2.83 MG/DL (ref 0.55–1.02)
DIFFERENTIAL METHOD BLD: ABNORMAL
EOSINOPHIL # BLD: 0.1 K/UL (ref 0–0.4)
EOSINOPHIL NFR BLD: 0.6 % (ref 0–7)
ERYTHROCYTE [DISTWIDTH] IN BLOOD BY AUTOMATED COUNT: 14.3 % (ref 11.5–14.5)
GAS FLOW.O2 O2 DELIVERY SYS: ABNORMAL
GLUCOSE BLD STRIP.AUTO-MCNC: 240 MG/DL (ref 65–117)
GLUCOSE BLD STRIP.AUTO-MCNC: 262 MG/DL (ref 65–117)
GLUCOSE BLD STRIP.AUTO-MCNC: 268 MG/DL (ref 65–117)
GLUCOSE SERPL-MCNC: 219 MG/DL (ref 65–100)
GLUCOSE SERPL-MCNC: 231 MG/DL (ref 65–100)
GRAM STN SPEC: ABNORMAL
GRAM STN SPEC: ABNORMAL
HCO3 BLD-SCNC: 15.2 MMOL/L (ref 21–28)
HCT VFR BLD AUTO: 35.6 % (ref 35–47)
HGB BLD-MCNC: 11.4 G/DL (ref 11.5–16)
IMM GRANULOCYTES # BLD AUTO: 0.15 K/UL (ref 0–0.04)
IMM GRANULOCYTES NFR BLD AUTO: 0.9 % (ref 0–0.5)
LACTATE SERPL-SCNC: 1.8 MMOL/L (ref 0.4–2)
LYMPHOCYTES # BLD: 0.73 K/UL (ref 0.8–3.5)
LYMPHOCYTES NFR BLD: 4.5 % (ref 12–49)
MCH RBC QN AUTO: 28.1 PG (ref 26–34)
MCHC RBC AUTO-ENTMCNC: 32 G/DL (ref 30–36.5)
MCV RBC AUTO: 87.7 FL (ref 80–99)
MONOCYTES # BLD: 0.83 K/UL (ref 0–1)
MONOCYTES NFR BLD: 5.1 % (ref 5–13)
NEUTS SEG # BLD: 14.46 K/UL (ref 1.8–8)
NEUTS SEG NFR BLD: 88.7 % (ref 32–75)
NRBC # BLD: 0 K/UL (ref 0–0.01)
NRBC BLD-RTO: 0 PER 100 WBC
PCO2 BLD: 27.6 MMHG (ref 35–48)
PH BLD: 7.35 (ref 7.35–7.45)
PLATELET # BLD AUTO: 272 K/UL (ref 150–400)
PMV BLD AUTO: 11 FL (ref 8.9–12.9)
PO2 BLD: 91 MMHG (ref 83–108)
POTASSIUM SERPL-SCNC: 3.9 MMOL/L (ref 3.5–5.1)
POTASSIUM SERPL-SCNC: 4 MMOL/L (ref 3.5–5.1)
RBC # BLD AUTO: 4.06 M/UL (ref 3.8–5.2)
RBC MORPH BLD: ABNORMAL
SAO2 % BLD: 96.8 % (ref 92–97)
SERVICE CMNT-IMP: ABNORMAL
SODIUM SERPL-SCNC: 128 MMOL/L (ref 136–145)
SODIUM SERPL-SCNC: 131 MMOL/L (ref 136–145)
SPECIMEN TYPE: ABNORMAL
VANCOMYCIN SERPL-MCNC: 17.4 UG/ML
WBC # BLD AUTO: 16.3 K/UL (ref 3.6–11)

## 2025-03-21 PROCEDURE — 80048 BASIC METABOLIC PNL TOTAL CA: CPT

## 2025-03-21 PROCEDURE — 2580000003 HC RX 258

## 2025-03-21 PROCEDURE — 51701 INSERT BLADDER CATHETER: CPT

## 2025-03-21 PROCEDURE — 97530 THERAPEUTIC ACTIVITIES: CPT

## 2025-03-21 PROCEDURE — 82962 GLUCOSE BLOOD TEST: CPT

## 2025-03-21 PROCEDURE — 85025 COMPLETE CBC W/AUTO DIFF WBC: CPT

## 2025-03-21 PROCEDURE — 6360000002 HC RX W HCPCS

## 2025-03-21 PROCEDURE — 82550 ASSAY OF CK (CPK): CPT

## 2025-03-21 PROCEDURE — 36600 WITHDRAWAL OF ARTERIAL BLOOD: CPT

## 2025-03-21 PROCEDURE — 6370000000 HC RX 637 (ALT 250 FOR IP): Performed by: INTERNAL MEDICINE

## 2025-03-21 PROCEDURE — 2500000003 HC RX 250 WO HCPCS

## 2025-03-21 PROCEDURE — 83605 ASSAY OF LACTIC ACID: CPT

## 2025-03-21 PROCEDURE — 82803 BLOOD GASES ANY COMBINATION: CPT

## 2025-03-21 PROCEDURE — 51798 US URINE CAPACITY MEASURE: CPT

## 2025-03-21 PROCEDURE — 73620 X-RAY EXAM OF FOOT: CPT

## 2025-03-21 PROCEDURE — 99223 1ST HOSP IP/OBS HIGH 75: CPT | Performed by: INTERNAL MEDICINE

## 2025-03-21 PROCEDURE — 70450 CT HEAD/BRAIN W/O DYE: CPT

## 2025-03-21 PROCEDURE — 2500000003 HC RX 250 WO HCPCS: Performed by: STUDENT IN AN ORGANIZED HEALTH CARE EDUCATION/TRAINING PROGRAM

## 2025-03-21 PROCEDURE — 6370000000 HC RX 637 (ALT 250 FOR IP): Performed by: STUDENT IN AN ORGANIZED HEALTH CARE EDUCATION/TRAINING PROGRAM

## 2025-03-21 PROCEDURE — 80202 ASSAY OF VANCOMYCIN: CPT

## 2025-03-21 PROCEDURE — 2580000003 HC RX 258: Performed by: STUDENT IN AN ORGANIZED HEALTH CARE EDUCATION/TRAINING PROGRAM

## 2025-03-21 PROCEDURE — APPNB30 APP NON BILLABLE TIME 0-30 MINS

## 2025-03-21 PROCEDURE — 6360000002 HC RX W HCPCS: Performed by: STUDENT IN AN ORGANIZED HEALTH CARE EDUCATION/TRAINING PROGRAM

## 2025-03-21 PROCEDURE — 94760 N-INVAS EAR/PLS OXIMETRY 1: CPT

## 2025-03-21 PROCEDURE — 82140 ASSAY OF AMMONIA: CPT

## 2025-03-21 PROCEDURE — 2060000000 HC ICU INTERMEDIATE R&B

## 2025-03-21 RX ORDER — CLOPIDOGREL BISULFATE 75 MG/1
75 TABLET ORAL DAILY
Status: DISCONTINUED | OUTPATIENT
Start: 2025-03-21 | End: 2025-04-04 | Stop reason: HOSPADM

## 2025-03-21 RX ORDER — INSULIN GLARGINE 100 [IU]/ML
40 INJECTION, SOLUTION SUBCUTANEOUS DAILY
Status: DISCONTINUED | OUTPATIENT
Start: 2025-03-21 | End: 2025-03-24

## 2025-03-21 RX ORDER — ASPIRIN 81 MG/1
81 TABLET, CHEWABLE ORAL DAILY
Status: DISCONTINUED | OUTPATIENT
Start: 2025-03-21 | End: 2025-04-04 | Stop reason: HOSPADM

## 2025-03-21 RX ADMIN — PREGABALIN 75 MG: 75 CAPSULE ORAL at 10:53

## 2025-03-21 RX ADMIN — METRONIDAZOLE 500 MG: 500 INJECTION, SOLUTION INTRAVENOUS at 10:57

## 2025-03-21 RX ADMIN — CARVEDILOL 6.25 MG: 6.25 TABLET, FILM COATED ORAL at 10:59

## 2025-03-21 RX ADMIN — CALCITRIOL CAPSULES 0.25 MCG 0.25 MCG: 0.25 CAPSULE ORAL at 10:53

## 2025-03-21 RX ADMIN — CLOPIDOGREL BISULFATE 75 MG: 75 TABLET, FILM COATED ORAL at 18:19

## 2025-03-21 RX ADMIN — INSULIN GLARGINE 40 UNITS: 100 INJECTION, SOLUTION SUBCUTANEOUS at 10:52

## 2025-03-21 RX ADMIN — WATER 1000 MG: 1 INJECTION INTRAMUSCULAR; INTRAVENOUS; SUBCUTANEOUS at 12:06

## 2025-03-21 RX ADMIN — INSULIN LISPRO 2 UNITS: 100 INJECTION, SOLUTION INTRAVENOUS; SUBCUTANEOUS at 22:36

## 2025-03-21 RX ADMIN — PREGABALIN 75 MG: 75 CAPSULE ORAL at 14:18

## 2025-03-21 RX ADMIN — SPIRONOLACTONE 25 MG: 25 TABLET ORAL at 10:54

## 2025-03-21 RX ADMIN — INSULIN LISPRO 4 UNITS: 100 INJECTION, SOLUTION INTRAVENOUS; SUBCUTANEOUS at 10:52

## 2025-03-21 RX ADMIN — ASPIRIN 81 MG CHEWABLE TABLET 81 MG: 81 TABLET CHEWABLE at 18:19

## 2025-03-21 RX ADMIN — MORPHINE SULFATE 1 MG: 2 INJECTION, SOLUTION INTRAMUSCULAR; INTRAVENOUS at 16:16

## 2025-03-21 RX ADMIN — OXYCODONE HYDROCHLORIDE 2.5 MG: 5 TABLET ORAL at 12:54

## 2025-03-21 RX ADMIN — Medication: at 22:38

## 2025-03-21 RX ADMIN — INSULIN LISPRO 4 UNITS: 100 INJECTION, SOLUTION INTRAVENOUS; SUBCUTANEOUS at 14:18

## 2025-03-21 RX ADMIN — SODIUM CHLORIDE, PRESERVATIVE FREE 10 ML: 5 INJECTION INTRAVENOUS at 10:59

## 2025-03-21 RX ADMIN — CARVEDILOL 6.25 MG: 6.25 TABLET, FILM COATED ORAL at 22:28

## 2025-03-21 RX ADMIN — DAPTOMYCIN 700 MG: 500 INJECTION, POWDER, LYOPHILIZED, FOR SUSPENSION INTRAVENOUS at 12:55

## 2025-03-21 RX ADMIN — TORSEMIDE 20 MG: 20 TABLET ORAL at 10:54

## 2025-03-21 RX ADMIN — PREGABALIN 75 MG: 75 CAPSULE ORAL at 22:28

## 2025-03-21 RX ADMIN — METRONIDAZOLE 500 MG: 500 INJECTION, SOLUTION INTRAVENOUS at 18:25

## 2025-03-21 RX ADMIN — Medication: at 11:00

## 2025-03-21 RX ADMIN — INSULIN LISPRO 2 UNITS: 100 INJECTION, SOLUTION INTRAVENOUS; SUBCUTANEOUS at 18:26

## 2025-03-21 RX ADMIN — SODIUM CHLORIDE, PRESERVATIVE FREE 10 ML: 5 INJECTION INTRAVENOUS at 22:38

## 2025-03-21 RX ADMIN — ACETAMINOPHEN 650 MG: 325 TABLET ORAL at 06:31

## 2025-03-21 RX ADMIN — CEFEPIME 2000 MG: 2 INJECTION, POWDER, FOR SOLUTION INTRAVENOUS at 06:34

## 2025-03-21 ASSESSMENT — PAIN SCALES - GENERAL
PAINLEVEL_OUTOF10: 10
PAINLEVEL_OUTOF10: 10
PAINLEVEL_OUTOF10: 0
PAINLEVEL_OUTOF10: 10

## 2025-03-21 ASSESSMENT — PAIN DESCRIPTION - DESCRIPTORS
DESCRIPTORS: ACHING
DESCRIPTORS: ACHING;OTHER (COMMENT)
DESCRIPTORS: STABBING

## 2025-03-21 ASSESSMENT — PAIN DESCRIPTION - ORIENTATION
ORIENTATION: POSTERIOR
ORIENTATION: MID;LEFT;RIGHT;ANTERIOR;POSTERIOR
ORIENTATION: LEFT;RIGHT

## 2025-03-21 ASSESSMENT — PAIN DESCRIPTION - LOCATION
LOCATION: BUTTOCKS;NECK
LOCATION: BACK;NECK
LOCATION: NECK

## 2025-03-21 NOTE — PLAN OF CARE
Problem: Safety - Adult  Goal: Free from fall injury  3/21/2025 0039 by Sandy Lozano RN  Outcome: Progressing  3/20/2025 1152 by Sarah Cannon  Outcome: Progressing     Problem: Chronic Conditions and Co-morbidities  Goal: Patient's chronic conditions and co-morbidity symptoms are monitored and maintained or improved  3/21/2025 0039 by Sandy Lozano RN  Outcome: Progressing  Flowsheets (Taken 3/20/2025 2000)  Care Plan - Patient's Chronic Conditions and Co-Morbidity Symptoms are Monitored and Maintained or Improved: Monitor and assess patient's chronic conditions and comorbid symptoms for stability, deterioration, or improvement  3/20/2025 1152 by Sarah Cannon  Outcome: Progressing  Flowsheets (Taken 3/20/2025 1000 by Ann Lema RN)  Care Plan - Patient's Chronic Conditions and Co-Morbidity Symptoms are Monitored and Maintained or Improved: Monitor and assess patient's chronic conditions and comorbid symptoms for stability, deterioration, or improvement     Problem: Discharge Planning  Goal: Discharge to home or other facility with appropriate resources  3/21/2025 0039 by Sandy Lozano RN  Outcome: Progressing  Flowsheets (Taken 3/20/2025 2000)  Discharge to home or other facility with appropriate resources: Identify barriers to discharge with patient and caregiver  3/20/2025 1152 by Sarah Cannon  Outcome: Progressing  Flowsheets (Taken 3/20/2025 1000 by Ann Lema, RN)  Discharge to home or other facility with appropriate resources: Identify barriers to discharge with patient and caregiver     Problem: Pain  Goal: Verbalizes/displays adequate comfort level or baseline comfort level  3/21/2025 0039 by Sandy Lozano RN  Outcome: Progressing  Flowsheets (Taken 3/20/2025 1525 by Ann Lema RN)  Verbalizes/displays adequate comfort level or baseline comfort level:   Encourage patient to monitor pain and request assistance   Assess pain using appropriate pain scale  3/20/2025 1152  adequate comfort level or baseline comfort level:   Encourage patient to monitor pain and request assistance   Assess pain using appropriate pain scale

## 2025-03-21 NOTE — PROGRESS NOTES
RENAL  PROGRESS NOTE        Subjective:   Sitting in chair  Objective:   VITALS SIGNS:    /69   Pulse 80   Temp 98 °F (36.7 °C) (Oral)   Resp 15   Ht 1.575 m (5' 2\")   Wt 103.9 kg (229 lb)   SpO2 100%   BMI 41.88 kg/m²             Temp (24hrs), Av.8 °F (36.6 °C), Min:97.7 °F (36.5 °C), Max:98 °F (36.7 °C)         PHYSICAL EXAM:    NAD  DATA REVIEW:     INTAKE / OUTPUT:   Last shift:      No intake/output data recorded.  Last 3 shifts:  1901 -  0700  In: 1310.1 [P.O.:680; I.V.:33.9]  Out: 1520 [Urine:1520]    Intake/Output Summary (Last 24 hours) at 3/21/2025 1254  Last data filed at 3/21/2025 0038  Gross per 24 hour   Intake 990.12 ml   Output 300 ml   Net 690.12 ml         LABS:   LABS:  Recent Labs     25  0658 25  0657 25  1103   * 132* 129*   K 4.0 4.0 4.4    102 100   CO2 20* 19* 21   BUN 58* 45* 38*   CREATININE 2.51* 1.93* 1.81*   CALCIUM 8.8 8.9 9.5     Recent Labs     25  0657 25  1103   WBC 10.1 17.5*   HGB 10.9* 12.8   HCT 33.5* 39.3    256     No results for input(s): \"KU\", \"CLU\" in the last 720 hours.    Invalid input(s): \"CLAUDIA\", \"CREAU\", \"PROU\"      Assessment:     CKD-3b,small kidneys  AL,got IV dye on 3.19 in the setting of bacteremia: ATN  hyponatremia  HTN/DM  RP abscess,SA bacteremia  Edema     Creat up  Follow Vanco level  Renal U/S if not better  AB  Will follow  Saravanan Cadena MD

## 2025-03-21 NOTE — PROGRESS NOTES
0600H: Patient hasn't voided since last night. Bladder scan done >145ml. Made provider aware.     0800H: Bedside shift change report given to Roberta MORGAN (oncoming nurse) by Sandy MORGAN (offgoing nurse). Report included the following information Nurse Handoff Report.

## 2025-03-21 NOTE — PROGRESS NOTES
[facility] Adult  Hospitalist Group                                                                                          Hospitalist Progress Note  Chadwick Garcia MD  Answering service: [phone] OR [phone] from in house phone        Date of Service:  3/21/2025  NAME:  Nicci Hernandez  :  1960  MRN:  [id number]       Admission Summary:   Nicci Hernandez is a 64 y.o. female who presents with retropharyngeal vs prevertebral abscess.     This is a 64-year-old female with past medical history of CKD stage IV, diabetes, dyslipidemia, diabetic neuropathy, coronary artery disease status post CABG in , gastroparesis, hypertension, cervical fusion about 20 years ago, who comes in for the above.  The patient reports that she has been having neck pain for about 3 weeks.  She reports that she has been seen by outpatient orthopedic surgery and started on steroids, and she also went to other physicians.  The patient went to [facility] about 3 days ago where she was also evaluated for this pain.  She had previously on imaging, with a CT of her neck, that showed potential loosening of some of the screws of her previous fusion surgery.  At [facility] she was given pain medicine apparently discharge.  Patient went to [facility] yesterday with her spouse due to a fall that he had and was not so severe neck pain that she was placed on a c-collar.  The patient reports that this pain has been progressively worsening since about 3 weeks ago and that about 4 days ago she has become so weak that she has been unable to walk.  Patient then went to [facility] on 3/6 where she was also imaged with not much seen on imaging and was discharged.  Patient went again to the [facility] ER today where the CT of her soft tissue of her neck shows a prevertebral fluid collection associated with the anterior fusion plate which was suspicious for a possible retropharyngeal  5-40 mL  5-40 mL IntraVENous PRN    0.9 % sodium chloride infusion   IntraVENous PRN    ondansetron (ZOFRAN-ODT) disintegrating tablet 4 mg  4 mg Oral Q8H PRN    Or    ondansetron (ZOFRAN) injection 4 mg  4 mg IntraVENous Q6H PRN    polyethylene glycol (GLYCOLAX) packet 17 g  17 g Oral Daily PRN    acetaminophen (TYLENOL) tablet 650 mg  650 mg Oral Q6H PRN    Or    acetaminophen (TYLENOL) suppository 650 mg  650 mg Rectal Q6H PRN    metroNIDAZOLE (FLAGYL) 500 mg in 0.9% NaCl 100 mL IVPB premix  500 mg IntraVENous Q8H    glucose chewable tablet 16 g  4 tablet Oral PRN    dextrose bolus 10% 125 mL  125 mL IntraVENous PRN    Or    dextrose bolus 10% 250 mL  250 mL IntraVENous PRN    glucagon injection 1 mg  1 mg SubCUTAneous PRN    dextrose 10 % infusion   IntraVENous Continuous PRN    dextrose bolus 10% 250 mL  250 mL IntraVENous PRN    calcitRIOL (ROCALTROL) capsule 0.25 mcg  0.25 mcg Oral Daily    torsemide (DEMADEX) tablet 20 mg  20 mg Oral Daily    spironolactone (ALDACTONE) tablet 25 mg  25 mg Oral Daily    insulin lispro (HUMALOG,ADMELOG) injection vial 0-8 Units  0-8 Units SubCUTAneous 4x Daily AC & HS    dextrose bolus 10% 250 mL  250 mL IntraVENous PRN    albuterol (PROVENTIL) (2.5 MG/3ML) 0.083% nebulizer solution 2.5 mg  2.5 mg Nebulization 4x Daily PRN    carvedilol (COREG) tablet 6.25 mg  6.25 mg Oral BID    pregabalin (LYRICA) capsule 75 mg  75 mg Oral TID     ______________________________________________________________________  EXPECTED LENGTH OF STAY: Unable to retrieve estimated LOS  ACTUAL LENGTH OF STAY:          2                 Chadwick Garcia MD

## 2025-03-21 NOTE — CONSULTS
Infectious Disease Consult        I agree with the  infectious disease consult note in its entirety as authored by and discussed in detail with the nurse practitioner.   I have reviewed pertinent laboratory studies, microbiology cultures, radiologic reports with review of the consultations and progress notes as appropriate.   I agree with today's subjective findings, physical examination, assessment and plan of care as described above and discussed extensively with the nurse practitioner.   64-year-old female with history of CKD 4, diabetes type 2, CADs/p CABG, gastroparesis, cervical fusion,  20 years ago who presented with neck pain.  MRI+ for retropharyngeal abscess.  Impression    MRSA bacteremia  Blood culture 3/19+ probable MSSA 4/4-no site  (? All drawn from same site, if yes cannot consider it high-grade)  Mec a/c gene+ on BC ID panel, likely MRSA  For repeat blood cultures.  Echo-image quality fair, for JEFF.    Retroperitoneal /prevertebral abscess  Multilevel spondylitic changes, C4/5 chronic myelomalacia on MRI  No evidence of OM/discitis on CT soft tissue  H/o cervical neck fusion 20+ years ago.  H/o recent dental work( crown)  S/p drainage of abscess 3/19  Culture+ for  MRSA    Diabetic foot ulcer  S/p left foot wound prep, graft application 11/5/2024  For x-ray of foot    Diabetes type 2  A1c 6.2    Hyponatremia  S Sodium 128    AL on CKD 4  Cr 2.83    Morbid obesity  BMI 41.88    Plan  Change to daptomycin IV  No statin while on daptomycin  Recommend JEFF  X-ray left foot-evaluate for focus of infection  Oral cavity is unlikely source of MRSA infection  ID service to follow.    Please contact ID on-call with questions, concerns   A total time of 50 minutes was spent on today's encounter.  Greater than 50% of the time was spent on the following:  Preparing for visit and chart review.  Obtaining and/or reviewing separately obtained history  Performing a medically appropriate exam and/or  Brand 24   Madelyn Juares MD   insulin aspart (NOVOLOG) 100 UNIT/ML injection pen Inject 20 Units into the skin 3 times daily (before meals) With SSI. Max units daily: 100 24   Madelyn Juares MD   Blood Glucose Monitoring Suppl (ONETOUCH VERIO FLEX SYSTEM) w/Device KIT Use to check BG 3 times daily. Dx code E11.65 24   Madelyn Juares MD   blood glucose test strips (ONETOUCH VERIO) strip Use to check BG 3 times daily. Dx code E11.65 24   Madelyn Juares MD   Lancets (ONETOUCH DELICA PLUS FRGFVQ03K) MISC Use to check BG 3 times daily. Dx code E11.65 24   Madelyn Juares MD   methocarbamol (ROBAXIN) 750 MG tablet Take 1 tablet by mouth as needed 22   Provider, MD Sharon   ALPRAZolam (XANAX) 0.25 MG tablet Take 1 tablet by mouth 2 times daily as needed for Anxiety. 3/7/22   Automatic Reconciliation, Ar   aspirin 81 MG chewable tablet Take 1 tablet by mouth daily    Automatic Reconciliation, Ar   carvedilol (COREG) 3.125 MG tablet Take 1 tablet by mouth 2 times daily 20   Automatic Reconciliation, Ar   clopidogrel (PLAVIX) 75 MG tablet Take 1 tablet by mouth daily    Automatic Reconciliation, Ar   ferrous sulfate (IRON 325) 325 (65 Fe) MG tablet Take 1 tablet by mouth three times a week 11/15/22   Automatic Reconciliation, Ar   spironolactone (ALDACTONE) 25 MG tablet Take 1 tablet by mouth daily 22   Automatic Reconciliation, Ar     Allergies   Allergen Reactions    Atorvastatin Other (See Comments)     Reaction Type: Allergy; Severity: Severe; Reaction(s): myopathy  Reaction Type: Allergy; Severity: Severe; Reaction(s): myopathy        Review of Systems:  Pertinent items are noted in the History of Present Illness.    Objective:   Blood pressure 112/69, pulse 77, temperature 97.7 °F (36.5 °C), temperature source Oral, resp. rate 15, height 1.575 m (5' 2\"), weight 103.9 kg (229 lb), SpO2 99%.  Temp (24hrs), Av.8 °F (36.6 °C), Min:97.5 °F (36.4 °C),

## 2025-03-21 NOTE — PROGRESS NOTES
mg/dL  borderline high,  200-499 mg/dL high and   greater than or equal to 500 mg/dL very high.     07/16/2019 467 (H) 0 - 149 mg/dL Final       Recent Labs     03/19/25  2249 03/20/25  0832 03/20/25  1214 03/20/25  1735 03/20/25  2018 03/21/25  1050 03/21/25  1211   POCGLU 280* 283* 271* 294* 362* 268* 262*       Lab Results   Component Value Date/Time    LABA1C 6.2 11/22/2024 02:36 AM    LABA1C 11.4 11/18/2021 07:11 AM    LABA1C 7.7 03/02/2021 07:07 AM     11/22/2024 02:36 AM    NSS7DYTE 7.9 10/19/2020 08:44 AM         Vit D, 25-Hydroxy   Date Value Ref Range Status   11/28/2024 <9.0 (L) 30 - 100 ng/mL Final     Comment:     (NOTE)  Deficiency               <20 ng/mL  Insufficiency          20-30 ng/mL  Sufficient             ng/mL  Possible toxicity       >100 ng/mL    The Method used is Siemens Advia Centaur currently standardized to a   Center of Disease Control and Prevention (CDC) certified reference   method. Samples containing fluorescein dye can produce falsely   elevated values when tested with the ADVIA Centaur Vitamin D Assay.   It is recommended that results in the toxic range, >100 ng/mL, be   retested 72 hours post fluorescein exposure.         No components found for: \"NH4\"        Keke Hernandes RD  Available via [employer]

## 2025-03-21 NOTE — PROGRESS NOTES
Received consult for JEFF d/t MRSA high grade bacteremia with unclear etiology. Pt has hx of CAD with CABG, HFrEF with echo yesterday showing EF 20-25%. No evidence of endocarditis. She is reporting severe pain in her neck with any movement. Hard to turn her head. We will make her NPO after MN for Monday and re-eval then to see if she is going to be able to tolerate this procedure.

## 2025-03-21 NOTE — CARE COORDINATION
Care Management Initial Assessment       RUR: 19%  Readmission? No  1st IM letter given? Yes - 3/21/25  1st  letter given: No    Patient admitted for retropharyngeal vs prevertebral abscess.      Residence: 2 level home with entrance on 1st floor, bedrooms on 2nd floor, 3 steps to enter  ADL: independent, drives  DME: walker   Pharmacy: [facility]  PCP: Dr. Franco Song'  Verified Insurance: Medicare A and B, [place]  Emergency Contacts: Amena High [phone]  spouse Maurilio passed away yesterday 3/20/25  Previous rehab/services: none  Transportation: family    Nephrology following  ID following   Wound Care following     03/21/25 1194   Service Assessment   Patient Orientation Alert and Oriented   Cognition Alert   History Provided By Patient   Primary Caregiver Self   Support Systems Family Members   Patient's Healthcare Decision Maker is: Named in Scanned ACP Document   PCP Verified by CM Yes   Last Visit to PCP Within last 3 months   Prior Functional Level Independent in ADLs/IADLs   Current Functional Level Independent in ADLs/IADLs   Ability to make needs known: Good   Family able to assist with home care needs: Yes   Would you like for me to discuss the discharge plan with any other family members/significant others, and if so, who? No   Financial Resources Medicare   Social/Functional History   Lives With Alone  (spouse passed away 3/20/25)   Type of Home House   Home Layout One level   Home Access Ramped entrance;Stairs to enter with rails  (ramped entrance at front which she typically uses)   Entrance Stairs - Number of Steps 4   Entrance Stairs - Rails Left   Bathroom Shower/Tub Tub/Shower unit   Bathroom Equipment Tub transfer bench;Grab bars in shower;Grab bars around toilet   Home Equipment Walker - Rolling   Receives Help From Family   Prior Level of Assist for ADLs Independent   Prior Level of Assist for Transfers Independent   Active  Yes     Jennifer WIGGINSN, RN,

## 2025-03-21 NOTE — PROGRESS NOTES
0745  Report accepted from Candace MORGAN for continuation of care.     1215  Pt bladder scanned and showed that she was holding on to greater than 309 mL. RN notified MD and performed straight cath.     1920  RN notified by family that pt was not acting like herself and they were concerned about the way she was behaving. RN to assess pt. Pt A&OX4, VS stable on monitor, O2 in 100, RR between 14-18.. Pupils MAGNOLIA and +3. Pt responded to questions appropriately with the exception for occasional pauses between questions or stopping in the middle of responding to questions. Pt appears to be groggy at this time.     1940  Reached out to NP and asked for her to come and assess pt.     1950  RN provided NP with SBAR. NP Jay to pt's bedside. Pt and family concerns addressed by NP. Orders were update.     1945  Bedside shift report given to Melisa MORGAN for continuation of care. Pt and family updated on plan of care.      Problem: Safety - Adult  Goal: Free from fall injury  Outcome: Progressing     Problem: Chronic Conditions and Co-morbidities  Goal: Patient's chronic conditions and co-morbidity symptoms are monitored and maintained or improved  Outcome: Progressing  Flowsheets (Taken 3/21/2025 1000)  Care Plan - Patient's Chronic Conditions and Co-Morbidity Symptoms are Monitored and Maintained or Improved:   Monitor and assess patient's chronic conditions and comorbid symptoms for stability, deterioration, or improvement   Collaborate with multidisciplinary team to address chronic and comorbid conditions and prevent exacerbation or deterioration   Update acute care plan with appropriate goals if chronic or comorbid symptoms are exacerbated and prevent overall improvement and discharge     Problem: Discharge Planning  Goal: Discharge to home or other facility with appropriate resources  Outcome: Progressing  Flowsheets (Taken 3/21/2025 1000)  Discharge to home or other facility with appropriate resources: Identify barriers to

## 2025-03-21 NOTE — PROGRESS NOTES
Physical Therapy - defer  Attempted to see pt however she declined session stating she already worked w/ therapy.  Pt worked with OT earlier today.  Educated in the role of PT vs OT and the plan for this PT session.  Pt continued to decline participation.  Will continue to follow.

## 2025-03-21 NOTE — PLAN OF CARE
Problem: Occupational Therapy - Adult  Goal: By Discharge: Performs self-care activities at highest level of function for planned discharge setting.  See evaluation for individualized goals.  Description: FUNCTIONAL STATUS PRIOR TO ADMISSION:  Pt lives with her  and is typically Mod I for BADLs/IADLs. Pt has a ramp to enter her home and sits on a shower chair to bathe at baseline. Pt reporting her  had a fall and is currently at U.   Receives Help From: Family, Prior Level of Assist for ADLs: Independent,  ,  ,  ,  ,  ,  ,  , Prior Level of Assist for Transfers: Independent, Active : Yes     HOME SUPPORT: Patient lived at home with spouse but did not require assistance.    Occupational Therapy Goals:  Initiated 3/20/2025  1.  Patient will perform grooming standing at sink with Stand by Assist within 7 day(s).  2.  Patient will perform bathing with Minimal Assist within 7 day(s).  3.  Patient will perform lower body dressing with Moderate Assist within 7 day(s).  4.  Patient will perform toilet transfers with Supervision  within 7 day(s).  5.  Patient will perform all aspects of toileting with Supervision within 7 day(s).  6.  Patient will participate in upper extremity therapeutic exercise/activities with Modified Isabella for 10 minutes within 7 day(s).    7.  Patient will utilize energy conservation techniques during functional activities with verbal cues within 7 day(s).    3/21/2025 1346 by Nahomy Hartman OT  Outcome: Not Progressing  3/21/2025 1346 by Nahomy Hartman OT  Outcome: Progressing  3/21/2025 1346 by Nahomy Hartman OT  Outcome: Not Progressing     Problem: Occupational Therapy - Adult  Goal: By Discharge: Performs self-care activities at highest level of function for planned discharge setting.  See evaluation for individualized goals.  Description: FUNCTIONAL STATUS PRIOR TO ADMISSION:  Pt lives with her  and is typically Mod I for BADLs/IADLs. Pt has a ramp to enter   has recently passed away (2 days ago). Unclear level of support at home, and therefore Pt will benefit from rehab at discharge. Pt donned cervical collar totalA with Pt reporting comfort. Further activity deferred as friends arriving to console Pt about her late . Recommend AE training next session. Pt left seated in recliner with all needs met. Vitals stable throughout.       PLAN :  Patient continues to benefit from skilled intervention to address the above impairments.  Continue treatment per established plan of care to address goals.    Recommend with staff: OOB to chair 3x/day Travis using RW, t/f to BSC Travis using RW    Recommend next OT session: AE training for LB dressing.    Recommendation for discharge: (in order for the patient to meet his/her long term goals):   High intensity/comprehensive skilled occupational therapy in a multidisciplinary setting as patient is working towards tolerating up to 3 hours of therapy/day 5-7x/week    Other factors to consider for discharge: lives alone, high risk for falls, and concern for safely navigating or managing the home environment    IF patient discharges home will need the following DME: continuing to assess with progress     SUBJECTIVE:   Patient stated “My   on Wednesday.”    OBJECTIVE DATA SUMMARY:   Cognitive/Behavioral Status:   WFL  Pt emotional when talking about her .    Functional Mobility and Transfers for ADLs:  Bed Mobility:  Bed Mobility Training  Bed Mobility Training: Yes  Supine to Sit: Substantial/Maximal assistance  Scooting: Partial/Moderate assistance     Transfers:   Transfer Training  Transfer Training: Yes  Sit to Stand: Minimal assistance  Stand to Sit: Minimal assistance  Bed to Chair: Minimal assistance           Balance:     Balance  Sitting: Intact  Standing: Impaired  Standing - Static: Constant support;Good  Standing - Dynamic: Constant support;Fair      ADL Intervention:    LE Dressing: Maximum

## 2025-03-21 NOTE — PROGRESS NOTES
Pharmacy consult note: Day #3 of Vancomycin  Indication:  retropharyngeal abscess   -S/p Cervical fusion 20+ years ago, extensive hardware  Current regimen:  2250 mg x1, dose by level  Abx regimen:  cefepime, metronidazole, vanc      Recent Labs     25  1103 25  0657 25  0658   WBC 17.5* 10.1  --    CREATININE 1.81* 1.93* 2.51*   BUN 38* 45* 58*     Est CrCl: 25 ml/min; UO: n/a ml/kg/hr  Temp (24hrs), Av.8 °F (36.6 °C), Min:97.5 °F (36.4 °C), Max:97.9 °F (36.6 °C)    Cultures:   3/19 blood x2: gpc 2/ probable staph aureus- prelim           Biofire: staph aureus Resistant gene meca/c & mrej by PCR Detected  3/19 retropharyngeal wound: possible staph aureus    MRSA Swab ordered (if applicable)? N/A    Goal target range Trough 10-15 mcg/mL    Recent level history:  Date/Time Dose & Interval Measured Level (mcg/mL) Associated AUC/JULIAN Dose Adjustment    3/20 @ 0657 2250 mg loading dose 22.2 ~ 17.5 hrs p dose N/a hold   3/21 @ 0658  17.4 ~ 41.5 hrs p loading dose N/a 1000 mg x1 today                                    Plan: Vancomycin 1000 mg IV x1 today at 1800. Will continue to dose by level

## 2025-03-21 NOTE — PROGRESS NOTES
Pharmacy Renal Dosing/Monitoring Protocol  Medication: cefepime   Current regimen:  2g IV every 12 hr  Recent Labs     03/19/25  1103 03/20/25  0657 03/21/25  0658   CREATININE 1.81* 1.93* 2.51*   BUN 38* 45* 58*     Estimated CrCl:  25 ml/min  Plan: Change to 1g IV q12h for decline in SCR with CrCl now 11-29 ml/min

## 2025-03-22 ENCOUNTER — APPOINTMENT (OUTPATIENT)
Facility: HOSPITAL | Age: 65
DRG: 919 | End: 2025-03-22
Payer: MEDICARE

## 2025-03-22 LAB
ANION GAP SERPL CALC-SCNC: 10 MMOL/L (ref 2–12)
BACTERIA SPEC CULT: ABNORMAL
BUN SERPL-MCNC: 73 MG/DL (ref 6–20)
BUN/CREAT SERPL: 23 (ref 12–20)
CALCIUM SERPL-MCNC: 8.6 MG/DL (ref 8.5–10.1)
CHLORIDE SERPL-SCNC: 99 MMOL/L (ref 97–108)
CO2 SERPL-SCNC: 19 MMOL/L (ref 21–32)
CREAT SERPL-MCNC: 3.22 MG/DL (ref 0.55–1.02)
GLUCOSE BLD STRIP.AUTO-MCNC: 118 MG/DL (ref 65–117)
GLUCOSE BLD STRIP.AUTO-MCNC: 124 MG/DL (ref 65–117)
GLUCOSE BLD STRIP.AUTO-MCNC: 213 MG/DL (ref 65–117)
GLUCOSE SERPL-MCNC: 119 MG/DL (ref 65–100)
Lab: ABNORMAL
Lab: ABNORMAL
POTASSIUM SERPL-SCNC: 4.2 MMOL/L (ref 3.5–5.1)
SERVICE CMNT-IMP: ABNORMAL
SODIUM SERPL-SCNC: 128 MMOL/L (ref 136–145)

## 2025-03-22 PROCEDURE — 82962 GLUCOSE BLOOD TEST: CPT

## 2025-03-22 PROCEDURE — 2500000003 HC RX 250 WO HCPCS: Performed by: STUDENT IN AN ORGANIZED HEALTH CARE EDUCATION/TRAINING PROGRAM

## 2025-03-22 PROCEDURE — 6370000000 HC RX 637 (ALT 250 FOR IP): Performed by: STUDENT IN AN ORGANIZED HEALTH CARE EDUCATION/TRAINING PROGRAM

## 2025-03-22 PROCEDURE — 6360000002 HC RX W HCPCS

## 2025-03-22 PROCEDURE — 87040 BLOOD CULTURE FOR BACTERIA: CPT

## 2025-03-22 PROCEDURE — 6360000002 HC RX W HCPCS: Performed by: STUDENT IN AN ORGANIZED HEALTH CARE EDUCATION/TRAINING PROGRAM

## 2025-03-22 PROCEDURE — 80048 BASIC METABOLIC PNL TOTAL CA: CPT

## 2025-03-22 PROCEDURE — 76770 US EXAM ABDO BACK WALL COMP: CPT

## 2025-03-22 PROCEDURE — 2500000003 HC RX 250 WO HCPCS

## 2025-03-22 PROCEDURE — 6370000000 HC RX 637 (ALT 250 FOR IP): Performed by: INTERNAL MEDICINE

## 2025-03-22 PROCEDURE — 2060000000 HC ICU INTERMEDIATE R&B

## 2025-03-22 PROCEDURE — 87154 CUL TYP ID BLD PTHGN 6+ TRGT: CPT

## 2025-03-22 PROCEDURE — 87186 SC STD MICRODIL/AGAR DIL: CPT

## 2025-03-22 PROCEDURE — 51798 US URINE CAPACITY MEASURE: CPT

## 2025-03-22 PROCEDURE — 87077 CULTURE AEROBIC IDENTIFY: CPT

## 2025-03-22 RX ORDER — ACETAMINOPHEN 325 MG/1
650 TABLET ORAL 3 TIMES DAILY
Status: DISCONTINUED | OUTPATIENT
Start: 2025-03-22 | End: 2025-04-04 | Stop reason: HOSPADM

## 2025-03-22 RX ORDER — PREGABALIN 50 MG/1
50 CAPSULE ORAL 2 TIMES DAILY
Status: DISCONTINUED | OUTPATIENT
Start: 2025-03-22 | End: 2025-04-01

## 2025-03-22 RX ADMIN — PREGABALIN 50 MG: 50 CAPSULE ORAL at 22:43

## 2025-03-22 RX ADMIN — PREGABALIN 75 MG: 75 CAPSULE ORAL at 09:46

## 2025-03-22 RX ADMIN — SODIUM CHLORIDE, PRESERVATIVE FREE 10 ML: 5 INJECTION INTRAVENOUS at 22:50

## 2025-03-22 RX ADMIN — ACETAMINOPHEN 650 MG: 325 TABLET ORAL at 16:26

## 2025-03-22 RX ADMIN — TORSEMIDE 20 MG: 20 TABLET ORAL at 09:47

## 2025-03-22 RX ADMIN — CARVEDILOL 6.25 MG: 6.25 TABLET, FILM COATED ORAL at 09:47

## 2025-03-22 RX ADMIN — SODIUM CHLORIDE, PRESERVATIVE FREE 10 ML: 5 INJECTION INTRAVENOUS at 09:57

## 2025-03-22 RX ADMIN — ASPIRIN 81 MG CHEWABLE TABLET 81 MG: 81 TABLET CHEWABLE at 09:47

## 2025-03-22 RX ADMIN — METRONIDAZOLE 500 MG: 500 INJECTION, SOLUTION INTRAVENOUS at 02:04

## 2025-03-22 RX ADMIN — METRONIDAZOLE 500 MG: 500 INJECTION, SOLUTION INTRAVENOUS at 16:25

## 2025-03-22 RX ADMIN — INSULIN LISPRO 2 UNITS: 100 INJECTION, SOLUTION INTRAVENOUS; SUBCUTANEOUS at 22:44

## 2025-03-22 RX ADMIN — METRONIDAZOLE 500 MG: 500 INJECTION, SOLUTION INTRAVENOUS at 09:49

## 2025-03-22 RX ADMIN — CALCITRIOL CAPSULES 0.25 MCG 0.25 MCG: 0.25 CAPSULE ORAL at 09:46

## 2025-03-22 RX ADMIN — WATER 1000 MG: 1 INJECTION INTRAMUSCULAR; INTRAVENOUS; SUBCUTANEOUS at 13:23

## 2025-03-22 RX ADMIN — ACETAMINOPHEN 650 MG: 325 TABLET ORAL at 22:43

## 2025-03-22 RX ADMIN — INSULIN GLARGINE 40 UNITS: 100 INJECTION, SOLUTION SUBCUTANEOUS at 09:46

## 2025-03-22 RX ADMIN — CLOPIDOGREL BISULFATE 75 MG: 75 TABLET, FILM COATED ORAL at 09:46

## 2025-03-22 RX ADMIN — Medication: at 22:51

## 2025-03-22 RX ADMIN — Medication: at 09:57

## 2025-03-22 RX ADMIN — ACETAMINOPHEN 650 MG: 325 TABLET ORAL at 07:17

## 2025-03-22 RX ADMIN — SPIRONOLACTONE 25 MG: 25 TABLET ORAL at 09:47

## 2025-03-22 ASSESSMENT — PAIN SCALES - GENERAL
PAINLEVEL_OUTOF10: 8
PAINLEVEL_OUTOF10: 10
PAINLEVEL_OUTOF10: 10
PAINLEVEL_OUTOF10: 8
PAINLEVEL_OUTOF10: 10
PAINLEVEL_OUTOF10: 0

## 2025-03-22 ASSESSMENT — PAIN DESCRIPTION - ORIENTATION: ORIENTATION: POSTERIOR

## 2025-03-22 ASSESSMENT — PAIN DESCRIPTION - DESCRIPTORS: DESCRIPTORS: ACHING

## 2025-03-22 ASSESSMENT — PAIN DESCRIPTION - LOCATION
LOCATION: NECK

## 2025-03-22 NOTE — PROGRESS NOTES
[facility] Adult  Hospitalist Group                                                                                          Hospitalist Progress Note  Chadwick Garcia MD  Answering service: [phone] OR [phone] from in house phone        Date of Service:  3/22/2025  NAME:  Nicci Hernandez  :  1960  MRN:  [id number]       Admission Summary:   Nicci Hernandez is a 64 y.o. female who presents with retropharyngeal vs prevertebral abscess.     This is a 64-year-old female with past medical history of CKD stage IV, diabetes, dyslipidemia, diabetic neuropathy, coronary artery disease status post CABG in , gastroparesis, hypertension, cervical fusion about 20 years ago, who comes in for the above.  The patient reports that she has been having neck pain for about 3 weeks.  She reports that she has been seen by outpatient orthopedic surgery and started on steroids, and she also went to other physicians.  The patient went to [facility] about 3 days ago where she was also evaluated for this pain.  She had previously on imaging, with a CT of her neck, that showed potential loosening of some of the screws of her previous fusion surgery.  At [facility] she was given pain medicine apparently discharge.  Patient went to [facility] yesterday with her spouse due to a fall that he had and was not so severe neck pain that she was placed on a c-collar.  The patient reports that this pain has been progressively worsening since about 3 weeks ago and that about 4 days ago she has become so weak that she has been unable to walk.  Patient then went to [facility] on 3/6 where she was also imaged with not much seen on imaging and was discharged.  Patient went again to the [facility] ER today where the CT of her soft tissue of her neck shows a prevertebral fluid collection associated with the anterior fusion plate which was suspicious for a possible retropharyngeal  Pleasant mood and affect, CN 2-12 grossly intact, sensory grossly within normal limit, B/L LE 3/5. B/L UE 4/5.   Skin: Warm, dry, without rashes or lesions    Data Review:    Review and/or order of clinical lab test  Review and/or order of tests in the radiology section of C[facility]  Review and/or order of tests in the medicine section of [facility]      I have personally and independently reviewed all pertinent labs, diagnostic studies, imaging, and have provided independent interpretation of the same.     Labs:     Recent Labs     03/20/25  0657 03/21/25 2033   WBC 10.1 16.3*   HGB 10.9* 11.4*   HCT 33.5* 35.6    272     Recent Labs     03/21/25  0658 03/21/25 2033 03/22/25  0915   * 128* 128*   K 4.0 3.9 4.2    100 99   CO2 20* 18* 19*   BUN 58* 64* 73*     No results for input(s): \"ALT\", \"TP\", \"GLOB\", \"GGT\" in the last 72 hours.    Invalid input(s): \"SGOT\", \"GPT\", \"AP\", \"TBIL\", \"TBILI\", \"ALB\", \"AML\", \"AMYP\", \"LPSE\", \"HLPSE\"    No results for input(s): \"INR\", \"APTT\" in the last 72 hours.    Invalid input(s): \"PTP\"   No results for input(s): \"TIBC\" in the last 72 hours.    Invalid input(s): \"FE\", \"PSAT\", \"FERR\"   No results found for: \"RBCF\"   No results for input(s): \"PH\", \"PCO2\", \"PO2\" in the last 72 hours.  No results for input(s): \"CPK\" in the last 72 hours.    Invalid input(s): \"CPKMB\", \"CKNDX\", \"TROIQ\"  Lab Results   Component Value Date/Time    CHOL 147 11/22/2024 02:36 AM    HDL 25 11/22/2024 02:36 AM    .6 11/22/2024 02:36 AM    LDL Comment 07/16/2019 03:38 PM     No results found for: \"GLUCPOC\"  [unfilled]    Notes reviewed from all clinical/nonclinical/nursing services involved in patient's clinical care. Care coordination discussions were held with appropriate clinical/nonclinical/ nursing providers based on care coordination needs.         Patients current active Medications were reviewed, considered, added and adjusted based on the clinical condition today.      Home Medications were

## 2025-03-22 NOTE — PLAN OF CARE
Problem: Safety - Adult  Goal: Free from fall injury  3/22/2025 0427 by Sandy Lozano RN  Outcome: Progressing  3/22/2025 0427 by Sandy Lozano RN  Outcome: Progressing  3/21/2025 1918 by Roberta Mojica RN  Outcome: Progressing     Problem: Chronic Conditions and Co-morbidities  Goal: Patient's chronic conditions and co-morbidity symptoms are monitored and maintained or improved  3/22/2025 0427 by Sandy Lozano RN  Outcome: Progressing  Flowsheets (Taken 3/21/2025 2000)  Care Plan - Patient's Chronic Conditions and Co-Morbidity Symptoms are Monitored and Maintained or Improved: Monitor and assess patient's chronic conditions and comorbid symptoms for stability, deterioration, or improvement  3/21/2025 1918 by Roberta Mojica RN  Outcome: Progressing  Flowsheets (Taken 3/21/2025 1000)  Care Plan - Patient's Chronic Conditions and Co-Morbidity Symptoms are Monitored and Maintained or Improved:   Monitor and assess patient's chronic conditions and comorbid symptoms for stability, deterioration, or improvement   Collaborate with multidisciplinary team to address chronic and comorbid conditions and prevent exacerbation or deterioration   Update acute care plan with appropriate goals if chronic or comorbid symptoms are exacerbated and prevent overall improvement and discharge     Problem: Discharge Planning  Goal: Discharge to home or other facility with appropriate resources  3/22/2025 0427 by Sandy Lozano RN  Outcome: Progressing  Flowsheets (Taken 3/21/2025 2000)  Discharge to home or other facility with appropriate resources: Identify barriers to discharge with patient and caregiver  3/21/2025 1918 by Roberta Mojica RN  Outcome: Progressing  Flowsheets (Taken 3/21/2025 1000)  Discharge to home or other facility with appropriate resources: Identify barriers to discharge with patient and caregiver     Problem: Pain  Goal: Verbalizes/displays adequate comfort level or baseline comfort level  3/22/2025 0427 by  Blake, Sandy, RN  Outcome: Progressing  3/21/2025 1918 by Roberta Mojica RN  Outcome: Progressing     Problem: Skin/Tissue Integrity  Goal: Skin integrity remains intact  Description: 1.  Monitor for areas of redness and/or skin breakdown  2.  Assess vascular access sites hourly  3.  Every 4-6 hours minimum:  Change oxygen saturation probe site  4.  Every 4-6 hours:  If on nasal continuous positive airway pressure, respiratory therapy assess nares and determine need for appliance change or resting period  3/22/2025 0427 by Sandy Lozano RN  Outcome: Progressing  Flowsheets (Taken 3/21/2025 2000)  Skin Integrity Remains Intact: Monitor for areas of redness and/or skin breakdown  3/22/2025 0427 by Sandy Lozano RN  Outcome: Progressing  Flowsheets (Taken 3/21/2025 2000)  Skin Integrity Remains Intact: Monitor for areas of redness and/or skin breakdown  3/21/2025 1918 by Roberta Mojica RN  Outcome: Progressing  Flowsheets  Taken 3/21/2025 1000 by Roberta Mojica RN  Skin Integrity Remains Intact: Monitor for areas of redness and/or skin breakdown  Taken 3/21/2025 0836 by Ace Mistry  Skin Integrity Remains Intact: Monitor for areas of redness and/or skin breakdown     Problem: Nutrition Deficit:  Goal: Optimize nutritional status  3/22/2025 0427 by Sandy Lozano RN  Outcome: Progressing  3/22/2025 0427 by Sandy Lozano RN  Outcome: Progressing  3/21/2025 1918 by Roberta Mojica RN  Outcome: Progressing

## 2025-03-22 NOTE — PROGRESS NOTES
RENAL  PROGRESS NOTE        Subjective:   Seen earlier  resting  Objective:   VITALS SIGNS:    BP (!) 86/54 Comment: Primary RN Notified  Pulse 72   Temp 98.6 °F (37 °C) (Oral)   Resp 12   Ht 1.575 m (5' 2\")   Wt 104 kg (229 lb 4.5 oz)   SpO2 100%   BMI 41.94 kg/m²             Temp (24hrs), Av.8 °F (36.6 °C), Min:97.6 °F (36.4 °C), Max:98.6 °F (37 °C)         PHYSICAL EXAM:    NAD  DATA REVIEW:     INTAKE / OUTPUT:   Last shift:      701 - 1900  In: 389.9   Out: -   Last 3 shifts: 1901 -  07  In: 790.1 [P.O.:160; I.V.:33.9]  Out: 200 [Urine:200]    Intake/Output Summary (Last 24 hours) at 3/22/2025 1248  Last data filed at 3/22/2025 0716  Gross per 24 hour   Intake 389.91 ml   Output --   Net 389.91 ml         LABS:   LABS:  Recent Labs     25  0915 25  0658   * 128* 131*   K 4.2 3.9 4.0   CL 99 100 101   CO2 19* 18* 20*   BUN 73* 64* 58*   CREATININE 3.22* 2.83* 2.51*   CALCIUM 8.6 8.7 8.8     Recent Labs     25  0657 25  1103   WBC 16.3* 10.1 17.5*   HGB 11.4* 10.9* 12.8   HCT 35.6 33.5* 39.3    224 256     No results for input(s): \"KU\", \"CLU\" in the last 720 hours.    Invalid input(s): \"CLAUDIA\", \"CREAU\", \"PROU\"      Assessment:     CKD-3b,small kidneys  AL,got IV dye on 3.19 in the setting of bacteremia: ATN  hyponatremia  HTN/DM  RP abscess,SA bacteremia  Edema     Creat up  Follow [facility]  Renal U/S   AB  Will follow  Saravanan Cadena MD

## 2025-03-22 NOTE — PROGRESS NOTES
Bedside shift change report given to Indio RN (oncoming nurse) by Sandy RN (offgoing nurse). Report included the following information Nurse Handoff Report.

## 2025-03-23 LAB
ACB COMPLEX DNA BLD POS QL NAA+NON-PROBE: NOT DETECTED
ACCESSION NUMBER, LLC1M: ABNORMAL
ANION GAP SERPL CALC-SCNC: 14 MMOL/L (ref 2–12)
ARTERIAL PATENCY WRIST A: POSITIVE
B FRAGILIS DNA BLD POS QL NAA+NON-PROBE: NOT DETECTED
BASE DEFICIT BLD-SCNC: 9.2 MMOL/L
BDY SITE: ABNORMAL
BIOFIRE TEST COMMENT: ABNORMAL
BUN SERPL-MCNC: 81 MG/DL (ref 6–20)
BUN/CREAT SERPL: 24 (ref 12–20)
C ALBICANS DNA BLD POS QL NAA+NON-PROBE: NOT DETECTED
C AURIS DNA BLD POS QL NAA+NON-PROBE: NOT DETECTED
C GATTII+NEOFOR DNA BLD POS QL NAA+N-PRB: NOT DETECTED
C GLABRATA DNA BLD POS QL NAA+NON-PROBE: NOT DETECTED
C KRUSEI DNA BLD POS QL NAA+NON-PROBE: NOT DETECTED
C PARAP DNA BLD POS QL NAA+NON-PROBE: NOT DETECTED
C TROPICLS DNA BLD POS QL NAA+NON-PROBE: NOT DETECTED
CALCIUM SERPL-MCNC: 8.5 MG/DL (ref 8.5–10.1)
CHLORIDE SERPL-SCNC: 100 MMOL/L (ref 97–108)
CO2 SERPL-SCNC: 14 MMOL/L (ref 21–32)
COMMENT:: NORMAL
CREAT SERPL-MCNC: 3.37 MG/DL (ref 0.55–1.02)
E CLOAC COMP DNA BLD POS NAA+NON-PROBE: NOT DETECTED
E COLI DNA BLD POS QL NAA+NON-PROBE: NOT DETECTED
E FAECALIS DNA BLD POS QL NAA+NON-PROBE: NOT DETECTED
E FAECIUM DNA BLD POS QL NAA+NON-PROBE: NOT DETECTED
ENTEROBACTERALES DNA BLD POS NAA+N-PRB: NOT DETECTED
GAS FLOW.O2 O2 DELIVERY SYS: ABNORMAL
GLUCOSE BLD STRIP.AUTO-MCNC: 179 MG/DL (ref 65–117)
GLUCOSE BLD STRIP.AUTO-MCNC: 216 MG/DL (ref 65–117)
GLUCOSE BLD STRIP.AUTO-MCNC: 227 MG/DL (ref 65–117)
GLUCOSE BLD STRIP.AUTO-MCNC: 281 MG/DL (ref 65–117)
GLUCOSE SERPL-MCNC: 195 MG/DL (ref 65–100)
GP B STREP DNA BLD POS QL NAA+NON-PROBE: NOT DETECTED
HAEM INFLU DNA BLD POS QL NAA+NON-PROBE: NOT DETECTED
HCO3 BLD-SCNC: 15.5 MMOL/L (ref 21–28)
K OXYTOCA DNA BLD POS QL NAA+NON-PROBE: NOT DETECTED
KLEBSIELLA SP DNA BLD POS QL NAA+NON-PRB: NOT DETECTED
KLEBSIELLA SP DNA BLD POS QL NAA+NON-PRB: NOT DETECTED
L MONOCYTOG DNA BLD POS QL NAA+NON-PROBE: NOT DETECTED
MECA+MECC+MREJ ISLT/SPM QL: DETECTED
N MEN DNA BLD POS QL NAA+NON-PROBE: NOT DETECTED
P AERUGINOSA DNA BLD POS NAA+NON-PROBE: NOT DETECTED
PCO2 BLD: 29.3 MMHG (ref 35–48)
PH BLD: 7.33 (ref 7.35–7.45)
PO2 BLD: 114 MMHG (ref 83–108)
POTASSIUM SERPL-SCNC: ABNORMAL MMOL/L (ref 3.5–5.1)
PROTEUS SP DNA BLD POS QL NAA+NON-PROBE: NOT DETECTED
RESISTANT GENE TARGETS: ABNORMAL
S AUREUS DNA BLD POS QL NAA+NON-PROBE: DETECTED
S AUREUS+CONS DNA BLD POS NAA+NON-PROBE: DETECTED
S EPIDERMIDIS DNA BLD POS QL NAA+NON-PRB: NOT DETECTED
S LUGDUNENSIS DNA BLD POS QL NAA+NON-PRB: NOT DETECTED
S MALTOPHILIA DNA BLD POS QL NAA+NON-PRB: NOT DETECTED
S MARCESCENS DNA BLD POS NAA+NON-PROBE: NOT DETECTED
S PNEUM DNA BLD POS QL NAA+NON-PROBE: NOT DETECTED
S PYO DNA BLD POS QL NAA+NON-PROBE: NOT DETECTED
SALMONELLA DNA BLD POS QL NAA+NON-PROBE: NOT DETECTED
SAO2 % BLD: 98.2 % (ref 92–97)
SERVICE CMNT-IMP: ABNORMAL
SODIUM SERPL-SCNC: 128 MMOL/L (ref 136–145)
SPECIMEN HOLD: NORMAL
SPECIMEN TYPE: ABNORMAL
STREPTOCOCCUS DNA BLD POS NAA+NON-PROBE: NOT DETECTED

## 2025-03-23 PROCEDURE — 80048 BASIC METABOLIC PNL TOTAL CA: CPT

## 2025-03-23 PROCEDURE — 2500000003 HC RX 250 WO HCPCS

## 2025-03-23 PROCEDURE — 2500000003 HC RX 250 WO HCPCS: Performed by: STUDENT IN AN ORGANIZED HEALTH CARE EDUCATION/TRAINING PROGRAM

## 2025-03-23 PROCEDURE — 2060000000 HC ICU INTERMEDIATE R&B

## 2025-03-23 PROCEDURE — 82962 GLUCOSE BLOOD TEST: CPT

## 2025-03-23 PROCEDURE — 2580000003 HC RX 258

## 2025-03-23 PROCEDURE — 6360000002 HC RX W HCPCS: Performed by: STUDENT IN AN ORGANIZED HEALTH CARE EDUCATION/TRAINING PROGRAM

## 2025-03-23 PROCEDURE — 2580000003 HC RX 258: Performed by: INTERNAL MEDICINE

## 2025-03-23 PROCEDURE — 36600 WITHDRAWAL OF ARTERIAL BLOOD: CPT

## 2025-03-23 PROCEDURE — 6370000000 HC RX 637 (ALT 250 FOR IP): Performed by: INTERNAL MEDICINE

## 2025-03-23 PROCEDURE — 6370000000 HC RX 637 (ALT 250 FOR IP): Performed by: STUDENT IN AN ORGANIZED HEALTH CARE EDUCATION/TRAINING PROGRAM

## 2025-03-23 PROCEDURE — 2500000003 HC RX 250 WO HCPCS: Performed by: INTERNAL MEDICINE

## 2025-03-23 PROCEDURE — 82803 BLOOD GASES ANY COMBINATION: CPT

## 2025-03-23 PROCEDURE — 6360000002 HC RX W HCPCS

## 2025-03-23 RX ORDER — GUAIFENESIN 200 MG/10ML
200 LIQUID ORAL 3 TIMES DAILY
Status: DISCONTINUED | OUTPATIENT
Start: 2025-03-23 | End: 2025-04-04 | Stop reason: HOSPADM

## 2025-03-23 RX ADMIN — ACETAMINOPHEN 650 MG: 325 TABLET ORAL at 06:24

## 2025-03-23 RX ADMIN — WATER 1000 MG: 1 INJECTION INTRAMUSCULAR; INTRAVENOUS; SUBCUTANEOUS at 12:09

## 2025-03-23 RX ADMIN — PREGABALIN 50 MG: 50 CAPSULE ORAL at 21:17

## 2025-03-23 RX ADMIN — GUAIFENESIN 200 MG: 200 SOLUTION ORAL at 21:17

## 2025-03-23 RX ADMIN — DAPTOMYCIN 700 MG: 500 INJECTION, POWDER, LYOPHILIZED, FOR SUSPENSION INTRAVENOUS at 14:04

## 2025-03-23 RX ADMIN — SODIUM CHLORIDE, PRESERVATIVE FREE 10 ML: 5 INJECTION INTRAVENOUS at 21:18

## 2025-03-23 RX ADMIN — TORSEMIDE 20 MG: 20 TABLET ORAL at 09:49

## 2025-03-23 RX ADMIN — CALCITRIOL CAPSULES 0.25 MCG 0.25 MCG: 0.25 CAPSULE ORAL at 09:49

## 2025-03-23 RX ADMIN — Medication: at 09:48

## 2025-03-23 RX ADMIN — INSULIN LISPRO 2 UNITS: 100 INJECTION, SOLUTION INTRAVENOUS; SUBCUTANEOUS at 16:27

## 2025-03-23 RX ADMIN — ACETAMINOPHEN 650 MG: 325 TABLET ORAL at 09:49

## 2025-03-23 RX ADMIN — INSULIN LISPRO 2 UNITS: 100 INJECTION, SOLUTION INTRAVENOUS; SUBCUTANEOUS at 12:09

## 2025-03-23 RX ADMIN — PREGABALIN 50 MG: 50 CAPSULE ORAL at 09:49

## 2025-03-23 RX ADMIN — Medication: at 21:17

## 2025-03-23 RX ADMIN — INSULIN LISPRO 4 UNITS: 100 INJECTION, SOLUTION INTRAVENOUS; SUBCUTANEOUS at 21:18

## 2025-03-23 RX ADMIN — INSULIN GLARGINE 40 UNITS: 100 INJECTION, SOLUTION SUBCUTANEOUS at 09:50

## 2025-03-23 RX ADMIN — ACETAMINOPHEN 650 MG: 325 TABLET ORAL at 16:27

## 2025-03-23 RX ADMIN — ACETAMINOPHEN 650 MG: 325 TABLET ORAL at 21:17

## 2025-03-23 RX ADMIN — ASPIRIN 81 MG CHEWABLE TABLET 81 MG: 81 TABLET CHEWABLE at 09:49

## 2025-03-23 RX ADMIN — CLOPIDOGREL BISULFATE 75 MG: 75 TABLET, FILM COATED ORAL at 09:50

## 2025-03-23 RX ADMIN — METRONIDAZOLE 500 MG: 500 INJECTION, SOLUTION INTRAVENOUS at 16:36

## 2025-03-23 RX ADMIN — METRONIDAZOLE 500 MG: 500 INJECTION, SOLUTION INTRAVENOUS at 00:43

## 2025-03-23 RX ADMIN — SODIUM BICARBONATE: 84 INJECTION, SOLUTION INTRAVENOUS at 10:14

## 2025-03-23 RX ADMIN — METRONIDAZOLE 500 MG: 500 INJECTION, SOLUTION INTRAVENOUS at 10:13

## 2025-03-23 RX ADMIN — GUAIFENESIN 200 MG: 200 SOLUTION ORAL at 16:29

## 2025-03-23 ASSESSMENT — PAIN SCALES - GENERAL
PAINLEVEL_OUTOF10: 10
PAINLEVEL_OUTOF10: 9
PAINLEVEL_OUTOF10: 8
PAINLEVEL_OUTOF10: 10

## 2025-03-23 ASSESSMENT — PAIN DESCRIPTION - ORIENTATION: ORIENTATION: ANTERIOR;POSTERIOR

## 2025-03-23 ASSESSMENT — PAIN DESCRIPTION - LOCATION
LOCATION: NECK
LOCATION: NECK

## 2025-03-23 NOTE — PLAN OF CARE
Problem: Safety - Adult  Goal: Free from fall injury  Outcome: Progressing     Problem: Chronic Conditions and Co-morbidities  Goal: Patient's chronic conditions and co-morbidity symptoms are monitored and maintained or improved  Outcome: Progressing  Flowsheets (Taken 3/22/2025 2000)  Care Plan - Patient's Chronic Conditions and Co-Morbidity Symptoms are Monitored and Maintained or Improved: Monitor and assess patient's chronic conditions and comorbid symptoms for stability, deterioration, or improvement     Problem: Discharge Planning  Goal: Discharge to home or other facility with appropriate resources  Outcome: Progressing  Flowsheets (Taken 3/22/2025 2000)  Discharge to home or other facility with appropriate resources: Identify barriers to discharge with patient and caregiver     Problem: Pain  Goal: Verbalizes/displays adequate comfort level or baseline comfort level  Outcome: Progressing     Problem: Skin/Tissue Integrity  Goal: Skin integrity remains intact  Description: 1.  Monitor for areas of redness and/or skin breakdown  2.  Assess vascular access sites hourly  3.  Every 4-6 hours minimum:  Change oxygen saturation probe site  4.  Every 4-6 hours:  If on nasal continuous positive airway pressure, respiratory therapy assess nares and determine need for appliance change or resting period  Outcome: Progressing  Flowsheets (Taken 3/22/2025 2000)  Skin Integrity Remains Intact: Monitor for areas of redness and/or skin breakdown     Problem: Nutrition Deficit:  Goal: Optimize nutritional status  Outcome: Progressing

## 2025-03-23 NOTE — PROGRESS NOTES
[facility] Adult  Hospitalist Group                                                                                          Hospitalist Progress Note  Chadwick Garcia MD  Answering service: [phone] OR [phone] from in house phone        Date of Service:  3/23/2025  NAME:  Nicci Hernandez  :  1960  MRN:  [id number]       Admission Summary:   Nicci Hernandez is a 64 y.o. female who presents with retropharyngeal vs prevertebral abscess.     This is a 64-year-old female with past medical history of CKD stage IV, diabetes, dyslipidemia, diabetic neuropathy, coronary artery disease status post CABG in , gastroparesis, hypertension, cervical fusion about 20 years ago, who comes in for the above.  The patient reports that she has been having neck pain for about 3 weeks.  She reports that she has been seen by outpatient orthopedic surgery and started on steroids, and she also went to other physicians.  The patient went to [facility] about 3 days ago where she was also evaluated for this pain.  She had previously on imaging, with a CT of her neck, that showed potential loosening of some of the screws of her previous fusion surgery.  At [facility] she was given pain medicine apparently discharge.  Patient went to [facility] yesterday with her spouse due to a fall that he had and was not so severe neck pain that she was placed on a c-collar.  The patient reports that this pain has been progressively worsening since about 3 weeks ago and that about 4 days ago she has become so weak that she has been unable to walk.  Patient then went to [facility] on 3/6 where she was also imaged with not much seen on imaging and was discharged.  Patient went again to the [facility] ER today where the CT of her soft tissue of her neck shows a prevertebral fluid collection associated with the anterior fusion plate which was suspicious for a possible retropharyngeal  abscess.  The ER spoke to ENT who was apparently concerned due to the patient's cervical hardware and recommended transfer to a center with both NSGY and ENT.     Patient here in the ER is in a C-collar that was placed at [facility].  She reports severe neck pain with any movement.  Patient reports pain with swallowing.  She reports bilateral lower and upper extremity weakness that has been worsening for the last 4 days.  She denies any fever, chills, nausea, vomiting, diarrhea.  She reports a history of nasal congestion and possible sinusitis and reports that she had some teeth pulled for placement of implants but has had no tooth or gum infections.       Interval history / Subjective:   Patient was seen and examined.  She is getting progressively weaker.  She has trouble eating, complains of fullness in her throat and trouble swallowing.  She is not drooling, has no stridor on auscultation of the neck, is not regurgitating.  She has very little interest in food.  She was hypotensive yesterday, blood pressure medications held this morning.  She is acidotic, started on bicarbonate drip this morning and IV fluids.  Multiple family members at bedside, updated this afternoon.  Currently on IV daptomycin for MRSA bacteremia.  Scheduled for JEFF on Monday 3/24.  Duration of antibiotics to be determined upon completion of workup.     Assessment & Plan:           Retropharyngeal abscess vs prevertebral abscess associated with cervical fusion hardware  -Patient does not appear clinically septic  -Occasional elevated heart rate may be more due to pain  -Caution with IV fluids due to CKD and CHF  -Possible sinus source?  -No recent odontogenic infection  -No recent autogenic infection  -Start vancomycin, cefepime, metronidazole  -ENT was consulted: Status post drainage of the retropharyngeal abscess in ER, culture sent;  -Neurosurgery was consulted: No indication to remove hardware; recommend abscess drainage and IV

## 2025-03-23 NOTE — PROGRESS NOTES
Bedside shift change report given to Gonsalo RN (oncoming nurse) by Sandy RN (offgoing nurse). Report included the following information Nurse Handoff Report.

## 2025-03-23 NOTE — PROGRESS NOTES
RENAL  PROGRESS NOTE        Subjective:   Doing ok  Objective:   VITALS SIGNS:    /60   Pulse 77   Temp 97.6 °F (36.4 °C) (Axillary)   Resp 13   Ht 1.575 m (5' 2\")   Wt 105.7 kg (233 lb)   SpO2 100%   BMI 42.62 kg/m²             Temp (24hrs), Av.9 °F (36.6 °C), Min:97.6 °F (36.4 °C), Max:98.6 °F (37 °C)         PHYSICAL EXAM:  edema  NAD  DATA REVIEW:     INTAKE / OUTPUT:   Last shift:      No intake/output data recorded.  Last 3 shifts:  190 -  0700  In: 688.4   Out: 500 [Urine:500]    Intake/Output Summary (Last 24 hours) at 3/23/2025 1007  Last data filed at 3/23/2025 0630  Gross per 24 hour   Intake 298.53 ml   Output 500 ml   Net -201.47 ml         LABS:   LABS:  Recent Labs     25  0600 25  0915 25   * 128* 128*   K HEMOLYZED,RECOLLECT REQUESTED 4.2 3.9    99 100   CO2 14* 19* 18*   BUN 81* 73* 64*   CREATININE 3.37* 3.22* 2.83*   CALCIUM 8.5 8.6 8.7     Recent Labs     25  0657 25  1103   WBC 16.3* 10.1 17.5*   HGB 11.4* 10.9* 12.8   HCT 35.6 33.5* 39.3    224 256     No results for input(s): \"KU\", \"CLU\" in the last 720 hours.    Invalid input(s): \"CLAUDIA\", \"CREAU\", \"PROU\"      Assessment:     CKD-3b,small kidneys  AL,got IV dye on 3.19 in the setting of bacteremia: ATN               No hydro,but rt kidney 8.7 cm < lt kidney 13.1 cm ??? (Both kidney were 8.1 cm on )  hyponatremia  HTN/DM  Met acidosis mixed : non AG and high AG  RP abscess,SA bacteremia  Edema     Creat up  Follow [facility] level   AB  IVF + Bic  Walid Abou-Assi, MD

## 2025-03-23 NOTE — PLAN OF CARE
Problem: Safety - Adult  Goal: Free from fall injury  3/23/2025 1449 by Gonsalo Childers RN  Outcome: Progressing  3/23/2025 0132 by aSndy Lozano RN  Outcome: Progressing     Problem: Chronic Conditions and Co-morbidities  Goal: Patient's chronic conditions and co-morbidity symptoms are monitored and maintained or improved  3/23/2025 1449 by Gonsalo Childers RN  Outcome: Progressing  3/23/2025 0132 by Sandy Lozano RN  Outcome: Progressing  Flowsheets (Taken 3/22/2025 2000)  Care Plan - Patient's Chronic Conditions and Co-Morbidity Symptoms are Monitored and Maintained or Improved: Monitor and assess patient's chronic conditions and comorbid symptoms for stability, deterioration, or improvement     Problem: Discharge Planning  Goal: Discharge to home or other facility with appropriate resources  3/23/2025 1449 by Gonsalo Childers RN  Outcome: Progressing  3/23/2025 0132 by Sandy Lozano RN  Outcome: Progressing  Flowsheets (Taken 3/22/2025 2000)  Discharge to home or other facility with appropriate resources: Identify barriers to discharge with patient and caregiver     Problem: Pain  Goal: Verbalizes/displays adequate comfort level or baseline comfort level  3/23/2025 1449 by Gonsalo Childers RN  Outcome: Progressing  3/23/2025 0132 by Sandy Lozano RN  Outcome: Progressing     Problem: Skin/Tissue Integrity  Goal: Skin integrity remains intact  Description: 1.  Monitor for areas of redness and/or skin breakdown  2.  Assess vascular access sites hourly  3.  Every 4-6 hours minimum:  Change oxygen saturation probe site  4.  Every 4-6 hours:  If on nasal continuous positive airway pressure, respiratory therapy assess nares and determine need for appliance change or resting period  3/23/2025 1449 by Gonsalo Childers RN  Outcome: Progressing  3/23/2025 0132 by Sandy Lozano RN  Outcome: Progressing  Flowsheets (Taken 3/22/2025 2000)  Skin Integrity Remains Intact: Monitor for areas of redness and/or skin breakdown     Problem:

## 2025-03-24 ENCOUNTER — RESULTS FOLLOW-UP (OUTPATIENT)
Facility: HOSPITAL | Age: 65
End: 2025-03-24

## 2025-03-24 PROBLEM — M86.9 OSTEOMYELITIS OF LEFT FOOT (HCC): Status: ACTIVE | Noted: 2025-03-24

## 2025-03-24 PROBLEM — R13.10 DYSPHAGIA: Status: ACTIVE | Noted: 2025-03-24

## 2025-03-24 PROBLEM — R53.1 GENERALIZED WEAKNESS: Status: ACTIVE | Noted: 2025-03-24

## 2025-03-24 PROBLEM — H55.09 HORIZONTAL NYSTAGMUS: Status: ACTIVE | Noted: 2025-03-24

## 2025-03-24 LAB
ANION GAP SERPL CALC-SCNC: 13 MMOL/L (ref 2–12)
B-OH-BUTYR SERPL-SCNC: 0.16 MMOL/L
BUN SERPL-MCNC: 74 MG/DL (ref 6–20)
BUN/CREAT SERPL: 24 (ref 12–20)
CALCIUM SERPL-MCNC: 9 MG/DL (ref 8.5–10.1)
CHLORIDE SERPL-SCNC: 98 MMOL/L (ref 97–108)
CO2 SERPL-SCNC: 18 MMOL/L (ref 21–32)
CREAT SERPL-MCNC: 3.06 MG/DL (ref 0.55–1.02)
ECHO BSA: 1.98 M2
ERYTHROCYTE [DISTWIDTH] IN BLOOD BY AUTOMATED COUNT: 14.4 % (ref 11.5–14.5)
GLUCOSE BLD STRIP.AUTO-MCNC: 181 MG/DL (ref 65–117)
GLUCOSE BLD STRIP.AUTO-MCNC: 198 MG/DL (ref 65–117)
GLUCOSE BLD STRIP.AUTO-MCNC: 207 MG/DL (ref 65–117)
GLUCOSE BLD STRIP.AUTO-MCNC: 216 MG/DL (ref 65–117)
GLUCOSE SERPL-MCNC: 209 MG/DL (ref 65–100)
HCT VFR BLD AUTO: 38.2 % (ref 35–47)
HGB BLD-MCNC: 12.5 G/DL (ref 11.5–16)
MAGNESIUM SERPL-MCNC: 2.3 MG/DL (ref 1.6–2.4)
MCH RBC QN AUTO: 28.1 PG (ref 26–34)
MCHC RBC AUTO-ENTMCNC: 32.7 G/DL (ref 30–36.5)
MCV RBC AUTO: 85.8 FL (ref 80–99)
NRBC # BLD: 0 K/UL (ref 0–0.01)
NRBC BLD-RTO: 0 PER 100 WBC
PHOSPHATE SERPL-MCNC: 4.4 MG/DL (ref 2.6–4.7)
PLATELET # BLD AUTO: 363 K/UL (ref 150–400)
PMV BLD AUTO: 10.7 FL (ref 8.9–12.9)
POTASSIUM SERPL-SCNC: 3.5 MMOL/L (ref 3.5–5.1)
RBC # BLD AUTO: 4.45 M/UL (ref 3.8–5.2)
SERVICE CMNT-IMP: ABNORMAL
SODIUM SERPL-SCNC: 129 MMOL/L (ref 136–145)
WBC # BLD AUTO: 18.7 K/UL (ref 3.6–11)

## 2025-03-24 PROCEDURE — 6360000002 HC RX W HCPCS

## 2025-03-24 PROCEDURE — 6370000000 HC RX 637 (ALT 250 FOR IP): Performed by: STUDENT IN AN ORGANIZED HEALTH CARE EDUCATION/TRAINING PROGRAM

## 2025-03-24 PROCEDURE — 97530 THERAPEUTIC ACTIVITIES: CPT

## 2025-03-24 PROCEDURE — 85027 COMPLETE CBC AUTOMATED: CPT

## 2025-03-24 PROCEDURE — 2580000003 HC RX 258: Performed by: INTERNAL MEDICINE

## 2025-03-24 PROCEDURE — 6360000002 HC RX W HCPCS: Performed by: STUDENT IN AN ORGANIZED HEALTH CARE EDUCATION/TRAINING PROGRAM

## 2025-03-24 PROCEDURE — 82010 KETONE BODYS QUAN: CPT

## 2025-03-24 PROCEDURE — 83735 ASSAY OF MAGNESIUM: CPT

## 2025-03-24 PROCEDURE — 2580000003 HC RX 258: Performed by: NURSE PRACTITIONER

## 2025-03-24 PROCEDURE — 99223 1ST HOSP IP/OBS HIGH 75: CPT | Performed by: NURSE PRACTITIONER

## 2025-03-24 PROCEDURE — 2060000000 HC ICU INTERMEDIATE R&B

## 2025-03-24 PROCEDURE — 82962 GLUCOSE BLOOD TEST: CPT

## 2025-03-24 PROCEDURE — 6370000000 HC RX 637 (ALT 250 FOR IP): Performed by: INTERNAL MEDICINE

## 2025-03-24 PROCEDURE — 92610 EVALUATE SWALLOWING FUNCTION: CPT

## 2025-03-24 PROCEDURE — 80048 BASIC METABOLIC PNL TOTAL CA: CPT

## 2025-03-24 PROCEDURE — 6370000000 HC RX 637 (ALT 250 FOR IP): Performed by: CLINICAL NURSE SPECIALIST

## 2025-03-24 PROCEDURE — 2500000003 HC RX 250 WO HCPCS

## 2025-03-24 PROCEDURE — 2500000003 HC RX 250 WO HCPCS: Performed by: STUDENT IN AN ORGANIZED HEALTH CARE EDUCATION/TRAINING PROGRAM

## 2025-03-24 PROCEDURE — 84100 ASSAY OF PHOSPHORUS: CPT

## 2025-03-24 PROCEDURE — 2500000003 HC RX 250 WO HCPCS: Performed by: INTERNAL MEDICINE

## 2025-03-24 PROCEDURE — 6360000002 HC RX W HCPCS: Performed by: NURSE PRACTITIONER

## 2025-03-24 PROCEDURE — 99232 SBSQ HOSP IP/OBS MODERATE 35: CPT | Performed by: NURSE PRACTITIONER

## 2025-03-24 RX ORDER — INSULIN GLARGINE 100 [IU]/ML
25 INJECTION, SOLUTION SUBCUTANEOUS 2 TIMES DAILY
Status: DISCONTINUED | OUTPATIENT
Start: 2025-03-24 | End: 2025-03-24

## 2025-03-24 RX ORDER — INSULIN GLARGINE 100 [IU]/ML
48 INJECTION, SOLUTION SUBCUTANEOUS DAILY
Status: DISCONTINUED | OUTPATIENT
Start: 2025-03-24 | End: 2025-03-26

## 2025-03-24 RX ADMIN — INSULIN LISPRO 2 UNITS: 100 INJECTION, SOLUTION INTRAVENOUS; SUBCUTANEOUS at 17:00

## 2025-03-24 RX ADMIN — METRONIDAZOLE 500 MG: 500 INJECTION, SOLUTION INTRAVENOUS at 00:10

## 2025-03-24 RX ADMIN — GUAIFENESIN 200 MG: 200 SOLUTION ORAL at 10:05

## 2025-03-24 RX ADMIN — ACETAMINOPHEN 650 MG: 325 TABLET ORAL at 21:22

## 2025-03-24 RX ADMIN — ACETAMINOPHEN 650 MG: 325 TABLET ORAL at 16:08

## 2025-03-24 RX ADMIN — PREGABALIN 50 MG: 50 CAPSULE ORAL at 10:05

## 2025-03-24 RX ADMIN — ASPIRIN 81 MG CHEWABLE TABLET 81 MG: 81 TABLET CHEWABLE at 10:05

## 2025-03-24 RX ADMIN — PREGABALIN 50 MG: 50 CAPSULE ORAL at 21:23

## 2025-03-24 RX ADMIN — INSULIN LISPRO 2 UNITS: 100 INJECTION, SOLUTION INTRAVENOUS; SUBCUTANEOUS at 10:58

## 2025-03-24 RX ADMIN — INSULIN LISPRO 2 UNITS: 100 INJECTION, SOLUTION INTRAVENOUS; SUBCUTANEOUS at 13:13

## 2025-03-24 RX ADMIN — INSULIN GLARGINE 48 UNITS: 100 INJECTION, SOLUTION SUBCUTANEOUS at 10:52

## 2025-03-24 RX ADMIN — AMPICILLIN SODIUM AND SULBACTAM SODIUM 3000 MG: 2; 1 INJECTION, POWDER, FOR SOLUTION INTRAMUSCULAR; INTRAVENOUS at 16:14

## 2025-03-24 RX ADMIN — WATER 1000 MG: 1 INJECTION INTRAMUSCULAR; INTRAVENOUS; SUBCUTANEOUS at 13:13

## 2025-03-24 RX ADMIN — Medication: at 21:23

## 2025-03-24 RX ADMIN — GUAIFENESIN 200 MG: 200 SOLUTION ORAL at 21:23

## 2025-03-24 RX ADMIN — SODIUM BICARBONATE: 84 INJECTION, SOLUTION INTRAVENOUS at 16:47

## 2025-03-24 RX ADMIN — GUAIFENESIN 200 MG: 200 SOLUTION ORAL at 16:08

## 2025-03-24 RX ADMIN — ACETAMINOPHEN 650 MG: 325 TABLET ORAL at 10:04

## 2025-03-24 RX ADMIN — INSULIN LISPRO 2 UNITS: 100 INJECTION, SOLUTION INTRAVENOUS; SUBCUTANEOUS at 21:25

## 2025-03-24 RX ADMIN — SODIUM CHLORIDE, PRESERVATIVE FREE 10 ML: 5 INJECTION INTRAVENOUS at 10:10

## 2025-03-24 RX ADMIN — Medication: at 10:05

## 2025-03-24 RX ADMIN — METRONIDAZOLE 500 MG: 500 INJECTION, SOLUTION INTRAVENOUS at 10:09

## 2025-03-24 RX ADMIN — CALCITRIOL CAPSULES 0.25 MCG 0.25 MCG: 0.25 CAPSULE ORAL at 10:05

## 2025-03-24 RX ADMIN — SODIUM CHLORIDE, PRESERVATIVE FREE 10 ML: 5 INJECTION INTRAVENOUS at 21:23

## 2025-03-24 RX ADMIN — SODIUM BICARBONATE: 84 INJECTION, SOLUTION INTRAVENOUS at 01:31

## 2025-03-24 ASSESSMENT — PAIN SCALES - GENERAL: PAINLEVEL_OUTOF10: 3

## 2025-03-24 NOTE — PLAN OF CARE
Problem: SLP Adult - Impaired Swallowing  Goal: By Discharge: Advance to least restrictive diet without signs or symptoms of aspiration for planned discharge setting.  See evaluation for individualized goals.  Description: Speech pathology goals initiated 3/24/2025   1.patient will tolerate least restrictive diet free of s/s aspiration   Outcome: Progressing   Speech LAnguage Pathology EVALUATION    Patient: Nicci Hernandez (64 y.o. female)  Date: 3/24/2025  Primary Diagnosis: Retropharyngeal abscess [J39.0]  Neck abscess [L02.11]       Precautions:  Fall Risk                  ASSESSMENT :  Patient seen immediately following neurology evaluation. Patient drowsy, reporting difficulty keeping her eyes open. She was oriented x4. Speech noted to be mildly dysarthric, though intelligible.   Patient with difficulty maintaining consistent lip seal, occasionally with cheeks \"puffing out\" without overall unique presentation. She successfully drank from a straw and extracted bolus from spoon without anterior loss. No overt s/s aspiration with single straw sips of thin or puree. Given level of drowsiness, did not attempt solids this date.   Patient was reportedly on a regular diet this admission prior to being made NPO for JEFF today (though this did not happen). She is awaiting ENT prior to JEFF.     Given pharyngeal abscess and increased weakness reported, recommend careful continuation of PO with low threshold to hold and complete imaging if s/s aspiration noted.     Patient will benefit from skilled intervention to address the above impairments.     PLAN :  Recommendations and Planned Interventions:  Diet: Cautiously resume PO of Full liquid diet. Single straw sips  Strict upright positioning   Crush meds       Recommend next SLP session: diet tolerance, further trials as appropriate    Acute SLP Services: SLP Plan of Care: 3 times/week. Patient's rehabilitation potential is considered to be Fair.  Discharge  consistency    After treatment:   Patient left in no apparent distress in bed    COMMUNICATION/EDUCATION:   Patient was educated regarding dysphagia, pharyngeal absess.  She demonstrated Fair understanding as evidenced by Verbalizing understanding.     The patient's plan of care including recommendations, planned interventions, and recommended diet changes were discussed with: Registered nurse and Physician    Patient/family agree to work toward stated goals and plan of care    Thank you,  Leigh Brown, SLP    SLP Individual Minutes  Time In: 1530  Time Out: 1548  Minutes: 18

## 2025-03-24 NOTE — CONSULTS
Psychiatric consult attempted. Patient sleeping soundly and did not awaken despite name called numerous times.

## 2025-03-24 NOTE — PROGRESS NOTES
Spiritual Health History and Assessment/Progress Note  [facility]    Attempted Encounter,  ,  ,      Name: Nicci Hernandez MRN: [id number]    Age: 64 y.o.     Sex: female   Language: English   Protestant: Restoration   Neck abscess     Date: 3/24/2025            Total Time Calculated: 18 min              Spiritual Assessment began in [facility] 4W TELEMETRY        Referral/Consult From: Rounding   Encounter Overview/Reason: Attempted Encounter  Service Provided For: Patient    Abeba, Belief, Meaning:   Patient unable to assess at this time  Family/Friends No family/friends present      Importance and Influence:  Patient unable to assess at this time  Family/Friends No family/friends present    Community:  Patient Other: unable to assess at this time  Family/Friends No family/friends present    Assessment and Plan of Care:   Per consultation with staff, Nicci Hernandez is currently under strict contact precautions.    Patient Interventions include: Other: unable to assess at this time  Family/Friends Interventions include: No family/friends present    Patient Plan of Care: Other: unable to assess at this time  Family/Friends Plan of Care: No family/friends present    Electronically signed by REV. TSERING DIAZ M.Div, [facility] on 3/24/2025 at 12:32 PM

## 2025-03-24 NOTE — CONSULTS
mornings.\"    Admission . She received a one time dose of Dexamethasone 6 mg on admission. Primary care team started Lantus 30 units and correctional insulin. Primary care team reports they reduced basal/bolus dosing from PTA dosing due to decreased PO intake. Primary care team increased Lantus to 40 units on Friday. , will increase basal insulin and change POC to Q6hr considering her NPO status.     ** Spoke with attending, concern for DKA jia betahydrozybutyrate and BMP this AM. Will wait for labs and determine best course of action**    Blood glucose pattern      Significant diabetes-related events over the past 24-72 hours  A1C 6.2%  Fasting B  Pre-prandial: NPO  Basal: Lantus 48 units   Bolus: n/a  Correction: Q6hr   Total daily insulin dose in the last 24 hours: 48 units      Management Rationale Action Plan   Medication   Basal needs Using 0.4 units/kg/D based on weight  INCREASE Lantus by 20% to 48 units daily    Nutritional needs Covers carb load in meals NPO   Corrective insulin Using medium dose sensitivity based on weight  Q6hr for NPO status   Additional orders  Carb consistent diet (60g CHO/meal)       Diabetes Discharge Plan   Medication:  A1c is at goal 6.2%, recommend continuing current PTA regimen.      Referral  []        Outpatient diabetes education   Additional orders            Initial Presentation   Nicci Hrenandez is a 64 y.o. female who presented to the ED on 3/19 for C-collar placed by [facility] with reports of severe neck pain with any movement and pain with swallowing. She also reports worsening bilateral upper/lower extremity weakness for past 4 days    HX:   Past Medical History:   Diagnosis Date    Arthritis     Bilateral lower leg cellulitis     CAD (coronary artery disease)     Hx of 2 MI's and then CABG 2009    Cardiomyopathy, ischemic     CHF (congestive heart failure) (HCC)     CKD (chronic kidney disease)     Diabetes (HCC)     IDDM type 3    Diabetic  management  Factor Dose Comments   Nutrition:  Standard meals   60 grams/meal    Pain PRN     Infection Flagyl  Daptomycin  Rocephin    Kidney function CKD stage 4    Liver function Normal           Assessment and Nursing Intervention   Nursing Diagnosis Risk for unstable blood glucose pattern   Nursing Intervention Domain 5250 Decision-making Support   Nursing Interventions Examined current inpatient diabetes/blood glucose control   Explored factors facilitating and impeding inpatient management  Explored corrective strategies with patient and responsible inpatient provider   Informed patient of rational for insulin strategy while hospitalized     Nursing Diagnosis 37830 Ineffective Health Management   Nursing Intervention Domain 5250 Decision-making Support   Nursing Interventions Identified diabetes self-management practices impeding diabetes control  Discussed diabetes survival skills related to  Healthy Plate eating plan; given handouts  Role of physical activity in improving insulin sensitivity and action  Procedure for blood glucose monitoring & options for low-cost products  Medications plan at discharge     Billing Code(s)   [x] 88021 IP initial hospital care - 40 minutes   [] 64342 IP initial hospital care -55 minutes  [] 59112 IP initial hospital care -75 minutes  []        01295 IP subsequent hospital care - 50 minutes   [] 59614 IP subsequent hospital care - 35 minutes   [] 95703 IP subsequent hospital care - 25 minutes       Before making these care recommendations, I personally reviewed the hospitalization record, including notes, laboratory & diagnostic data and current medications, and examined the patient at the bedside.  Total minutes: 40 minutes     ILIA Alvares - CNS   Diabetes Clinical Nurse Specialist   [facility]  Access via [employer]

## 2025-03-24 NOTE — PROGRESS NOTES
injection 5-40 mL  5-40 mL IntraVENous PRN Bill Olsen MD        0.9 % sodium chloride infusion   IntraVENous PRN Bill Olsen MD   Stopped at 03/20/25 0932    ondansetron (ZOFRAN-ODT) disintegrating tablet 4 mg  4 mg Oral Q8H PRN Bill Olsen MD        Or    ondansetron (ZOFRAN) injection 4 mg  4 mg IntraVENous Q6H PRN Bill Olsen MD        polyethylene glycol (GLYCOLAX) packet 17 g  17 g Oral Daily PRN Bill Olsen MD        acetaminophen (TYLENOL) tablet 650 mg  650 mg Oral Q6H PRN Bill Olsen MD   650 mg at 03/23/25 0624    Or    acetaminophen (TYLENOL) suppository 650 mg  650 mg Rectal Q6H PRN Bill Olsen MD        metroNIDAZOLE (FLAGYL) 500 mg in 0.9% NaCl 100 mL IVPB premix  500 mg IntraVENous Q8H Bill Olsen MD   Stopped at 03/24/25 0118    glucose chewable tablet 16 g  4 tablet Oral PRN Bill Olsen MD        dextrose bolus 10% 125 mL  125 mL IntraVENous PRN Bill Olsen MD        Or    dextrose bolus 10% 250 mL  250 mL IntraVENous PRN Bill Olsen MD        glucagon injection 1 mg  1 mg SubCUTAneous PRN Bill Olsen MD        dextrose 10 % infusion   IntraVENous Continuous PRN Bill Olsen MD        dextrose bolus 10% 250 mL  250 mL IntraVENous PRN Bill Olsen MD        calcitRIOL (ROCALTROL) capsule 0.25 mcg  0.25 mcg Oral Daily Bill Olsen MD   0.25 mcg at 03/23/25 0949    [Held by provider] spironolactone (ALDACTONE) tablet 25 mg  25 mg Oral Daily Bill Olsen MD   25 mg at 03/22/25 0947    insulin lispro (HUMALOG,ADMELOG) injection vial 0-8 Units  0-8 Units SubCUTAneous 4x Daily AC & HS Bill Olsen MD   4 Units at 03/23/25 2118    dextrose bolus 10% 250 mL  250 mL IntraVENous PRN Bill Olsen MD        albuterol (PROVENTIL) (2.5 MG/3ML) 0.083% nebulizer solution 2.5 mg  2.5 mg Nebulization 4x Daily  RETROPERITONEAL COMPLETE INDICATION: Acute renal failure. COMPARISON: None. TECHNIQUE: Real-time sonography of the kidneys, retroperitoneum and bladder was performed with multiple static images obtained. The examination is compromised by bowel gas and body habitus. FINDINGS: RIGHT KIDNEY: The right kidney demonstrates cortical thinning. No hydronephrosis. The right kidney measures 8.7 cm in length. LEFT KIDNEY: The left kidney has normal echogenicity with no hydronephrosis. The left kidney measures 13.1 cm in length. RETROPERITONEUM: The abdominal aorta and common iliac artery bifurcation are not visualized due to bowel gas. The IVC is normal. No retroperitoneal mass is identified. BLADDER: The urinary bladder is not distended.     Cortical thinning right kidney compatible with medical renal disease. No hydronephrosis but otherwise limited exam. Electronically signed by LUIS CARLOS COCHRAN    CT HEAD WO CONTRAST  Result Date: 3/21/2025  EXAM:  CT HEAD WO CONTRAST INDICATION:   AMS COMPARISON: None. TECHNIQUE: Unenhanced CT of the head was performed using 5 mm images. Brain and bone windows were generated.  CT dose reduction was achieved through use of a standardized protocol tailored for this examination and automatic exposure control for dose modulation. FINDINGS: The ventricles are normal in size and position. Basilar cisterns are patent. No midline shift. There is no evidence of acute infarct, hemorrhage, or extraaxial fluid collection. The paranasal sinuses, mastoid air cells, and middle ears are clear. The orbital contents are within normal limits.  There are no significant osseous or extracranial soft tissue lesions.     1. No evidence of acute intracranial abnormality. Electronically signed by Jai Sharma    XR FOOT LEFT (2 VIEWS)  Result Date: 3/21/2025  EXAM: XR FOOT LEFT (2 VIEWS) INDICATION: eval for osteomyelitis. COMPARISON: None. FINDINGS: Two views of the left foot demonstrate bony destruction involving

## 2025-03-24 NOTE — CONSULTS
NEUROLOGY CONSULT NOTE    Name Nicci Hernandez Age 64 y.o.   MRN [id number]  1960     Consulting Physician: Chadwick Garcia MD      Chief Complaint:       Assessment:     Principal Problem:    Neck abscess  Active Problems:    Retropharyngeal abscess    MRSA bacteremia    MRSA infection    Controlled diabetes mellitus type 2 with complications (HCC)    AL (acute kidney injury)    CKD (chronic kidney disease) stage 4, GFR 15-29 ml/min (HCC)    Hyponatremia    Diabetic ulcer of left midfoot associated with type 2 diabetes mellitus, with muscle involvement without evidence of necrosis (HCC)    Osteomyelitis of left foot (HCC)  Resolved Problems:    * No resolved hospital problems. *    Patient is a 64 yof with h/o HTN, CAD s/p CABG, CHF, DM, CKD, and remote ACDF C3-C6 who presented on 3/19/25 after having 2-4 weeks of neck pain, generalized fatigue, and dysarthria. CT c-spine at OSH showed lifting off of the baseplate and loosening of the C3-C4 screws, but upon review by Neurosurgery instrumentation was felt to be not loose. MRI c-spine with clivus to C3 fluid signal focus concerning for retropharyngeal abscess which was drained by ENT. Wound cultures show 1+GPC and also with MRSA bacteremia. Plan for JEFF to eval for vegetations. Current exam, patient oriented with bilat horizontal gaze nystagmus, increased lethargy, mildly dysarthric and generalized weakness but reflexes present with also some possible functional overlay. Nystagmus does not seem to be medication effect per chart review. Need to r/o structural causes of nystagmus. History of symptom presentation difficult to obtain but does not seem to be neuromuscular at this point. WBC 16.3>>18.7.     Recommendations:   - Obtain MRI brain w/o contrast to eval for cerebellar or brainstem stroke  - Psychiatry consult pending  - PT/OT/SLP consults     Further plan pending MRI results. Please contact with any questions.  History of Present Illness:     CERVICAL SPINE WO CONTRAST 03/19/2025    Narrative  EXAM: MRI CERVICAL SPINE WO CONTRAST    INDICATION: TTP, S/P fusion \"20 years ago\", genrealized fatigued. Reason for  exam:->TTP, S/P fusion \"20 years ago\", genrealized fatigued    COMPARISON: CT cervical spine 3/6/2025    TECHNIQUE: MR imaging of the cervical spine was performed using the following  sequences: sagittal T1, T2, STIR;  axial T2, T1.    CONTRAST:  None.    FINDINGS:  C3-C6 ACDF. 4.8 cm x 2.6 cm x 1.0 cm mildly complex fluid signal focus in the  anterior and right prevertebral soft tissues spanning from the clivus to C3  level (201-11, 701-29). Prevertebral soft tissue edema and swelling from the  clivus through C6.    There is normal alignment of the cervical spine. Vertebral body heights are  maintained. Marrow signal is normal.    The craniocervical junction is intact. Patchy T2/STIR hyperintense signal in the  spinal cord at the C4-C5 level, compatible with chronic myelomalacia..    The paraspinal soft tissues are within normal limits.    C2-C3: Endplate osteophytes. Facet arthropathy. Ligamentum flavum thickening.  Moderate canal stenosis. Mild cord compression. No foraminal stenosis.    C3-C4: Disc bulge. Endplate osteophytes. Facet arthropathy. Uncovertebral  hypertrophy. Ligamentum flavum thickening. Severe canal stenosis. Moderate cord  compression. Moderate left and mild right foraminal stenosis.    C4-C5: Endplate osteophytes. Facet arthropathy. Uncovertebral hypertrophy.  Ligamentum flavum thickening. Severe canal stenosis. Severe cord compression.  Severe bilateral foraminal stenosis.    C5-C6: Endplate osteophytes. Facet arthropathy. Uncovertebral hypertrophy.  Ligamentum flavum thickening. Moderate canal stenosis. Moderate cord  compression. Severe left and moderate right foraminal stenosis.    C6-C7: Facet arthropathy. Uncovertebral perjury. No spinal stenosis. Mild  bilateral foraminal stenosis.    C7-T1: Disc bulge. Facet

## 2025-03-24 NOTE — PROGRESS NOTES
[facility] Adult  Hospitalist Group                                                                                          Hospitalist Progress Note  Chadwick Garcia MD  Answering service: [phone] OR [phone] from in house phone        Date of Service:  3/24/2025  NAME:  Nicci Hernandez  :  1960  MRN:  [id number]       Admission Summary:   Nicci Hernandez is a 64 y.o. female who presents with retropharyngeal vs prevertebral abscess.     This is a 64-year-old female with past medical history of CKD stage IV, diabetes, dyslipidemia, diabetic neuropathy, coronary artery disease status post CABG in , gastroparesis, hypertension, cervical fusion about 20 years ago, who comes in for the above.  The patient reports that she has been having neck pain for about 3 weeks.  She reports that she has been seen by outpatient orthopedic surgery and started on steroids, and she also went to other physicians.  The patient went to [facility] about 3 days ago where she was also evaluated for this pain.  She had previously on imaging, with a CT of her neck, that showed potential loosening of some of the screws of her previous fusion surgery.  At [facility] she was given pain medicine apparently discharge.  Patient went to [facility] yesterday with her spouse due to a fall that he had and was not so severe neck pain that she was placed on a c-collar.  The patient reports that this pain has been progressively worsening since about 3 weeks ago and that about 4 days ago she has become so weak that she has been unable to walk.  Patient then went to [facility] on 3/6 where she was also imaged with not much seen on imaging and was discharged.  Patient went again to [facility] ER today where the CT of her soft tissue of her neck shows a prevertebral fluid collection associated with the anterior fusion plate which was suspicious for a possible retropharyngeal  with an estimated EF of 20 to 25%, limited exam to evaluate for vegetations: Only mitral and aortic valves were well-visualized;   -Consult cardiology for JEFF, discussed with ID;     Neck pain due to the above  -Morphine for pain control  -PO oxycodone as needed;  -Scheduled Tylenol;   -Unable to use NSAIDs due to AL;  -soft cervical collar for support;      Leukocytosis  -Likely due to infection  -Patient has also been on steroids recently  -No fever  -Antibiotics as prescribed above     Weakness in UE and LE  -PT/OT  -Consult Neurology;      History of systolic CHF  Coronary artery disease status post CABG  -Last EF on 11/23/2024 was 15 to 20%  -Continue torsemide  -Continue Coreg  -Continue spironolactone  -Caution with fluids  -No respiratory symptoms, but peripheral edema present; holding on diuretics at this time due to AL;  -Resume aspirin and Plavix (initially held for drainage of the abscess);  -Echo:     Left Ventricle: Severely reduced left ventricular systolic function with a visually estimated EF of 20 - 25%. Left ventricle is severely dilated. Normal wall thickness. Global hypokinesis present.    Right Ventricle: Not well visualized.    Aortic Valve: Mildly thickened cusps. Mildly calcified cusps.    Mitral Valve: Mildly thickened leaflets.    Left Atrium: Left atrium is mildly dilated.    Image quality is fair.    Limited exam--only mitral and aortic valve well-visualized --no evidence of endocarditis     -Consult cardiology;     AL superimposed on CKD stage 3B  -At high risk of worsening renal function due to IV antibiotics and IV diuretics;  -avoid NSAIDs;   -Consult nephrology; patient follows up with Dr. Negro Ryder;  -3/21: Worsening creatinine; antibiotics switched from vancomycin to daptomycin;  -Renal ultrasound ordered;   -Acute component attributed to ATN from contrast dye;      Type 2 diabetes  -She is on 65 units daily of Tresiba.  Patient has not been eating for many days due to pain

## 2025-03-24 NOTE — PLAN OF CARE
Problem: Safety - Adult  Goal: Free from fall injury  Outcome: Progressing     Problem: Chronic Conditions and Co-morbidities  Goal: Patient's chronic conditions and co-morbidity symptoms are monitored and maintained or improved  Outcome: Progressing     Problem: Discharge Planning  Goal: Discharge to home or other facility with appropriate resources  Outcome: Progressing     Problem: Pain  Goal: Verbalizes/displays adequate comfort level or baseline comfort level  Outcome: Progressing     Problem: Nutrition Deficit:  Goal: Optimize nutritional status  Outcome: Progressing     Bedside and Verbal shift change report given to Darshana (oncoming nurse) by Ann (offgoing nurse). Report included the following information Nurse Handoff Report, Index, Intake/Output, MAR, and Cardiac Rhythm SR .

## 2025-03-24 NOTE — PROGRESS NOTES
Pt reports difficulty swallowing and reports sore throat.  D/w Dr. Harper as JEFF has been requested due to MRSA bacteremia (Limited echo TTE showed only mitral and aortic valve well visualized with no evidence of endocarditis). Per Dr. Harper, Will not proceed with JEFF today.  She will need documented ENT clearance for JEFF before proceeding with JEFF.

## 2025-03-24 NOTE — BSMART NOTE
[facility]     Section I - Integrated Summary    Reason for consult is: depression.    LOS:  5     Presenting problem/Summary:  Met with patient face to face after discussing chart with CATHY Juarez. CATHY Juarez shares that patient has been depressed lately and sleeping much of the day. Upon entering the room, the patient was seen sleeping in her bed. I knocked on her door x2 and called her name x3 before she was able to be woken. Throughout the conversation the patient appears disoriented and confused. I introduced myself and she initially did not respond. I asked if she felt like she needed to rest and talk tomorrow, and the patient appeared to nod her head. As I was leaving the room, she called out \"Where are you going?\" However, when I turned around to answer, she replied \"Oh, I thought you were my daughter, I'm sorry\". I again introduced myself, and the patient appeared a little more awake this time. When asked about the year, the patient initially states \"1995\" then corrects herself and says \"2025\". When asked where she is, the patient replies \"In the hospital with my \". She reports no thoughts to hurt herself or anyone else, nor does she report ever wanting to hurt herself. She states \"I have visions\" when asked about hallucinations, but is unable to describe what she is seeing aside from \"people from the past\". She drifts in and out of consciousness throughout the conversation, at times appearing to go to sleep before waking up briefly and attempting to answer questions. At one time she states \"I need my \" before drifting off, and another time she states \"Can you call my daughter\" after being told her daughter was already updated for the day.    Per the patient's chart, [facility]  who had fallen and hit his head. Patient began having worsening neck pain and was placed in a c-collar and admitted, eventually being transferred to [facility]. Since then,  discharge. The patient's source of income comes from social security.    The patient's greatest support comes from  and this person will not be involved with the treatment. The patient has not been in an event described as horrible or outside the realm of ordinary life experience either currently or in the past. The patient has not been a victim of sexual/physical abuse.    Section VII - Other Areas of Clinical Concern    The highest grade achieved is not assessed with the overall quality of school experience being described as unknown. The patient is currently unemployed and speaks English as a primary language.  The patient has no communication impairments affecting communication. The patient's preference for learning can be described as: can read and write adequately.  The patient's hearing is normal.  The patient's vision is normal.    The patient reports coping skills include: not shared    Madeleine Farrell MA, LPC intern

## 2025-03-24 NOTE — PLAN OF CARE
Problem: Safety - Adult  Goal: Free from fall injury  3/23/2025 2222 by Rui Michaels RN  Outcome: Progressing  Flowsheets (Taken 3/23/2025 2222)  Free From Fall Injury: Instruct family/caregiver on patient safety     Problem: Discharge Planning  Goal: Discharge to home or other facility with appropriate resources  3/23/2025 2222 by Rui Michaels RN  Outcome: Progressing  Flowsheets (Taken 3/23/2025 2222)  Discharge to home or other facility with appropriate resources:   Identify barriers to discharge with patient and caregiver   Identify discharge learning needs (meds, wound care, etc)   Refer to discharge planning if patient needs post-hospital services based on physician order or complex needs related to functional status, cognitive ability or social support system   Arrange for needed discharge resources and transportation as appropriate     Problem: Pain  Goal: Verbalizes/displays adequate comfort level or baseline comfort level  3/23/2025 2222 by Rui Michaels RN  Outcome: Progressing  Flowsheets (Taken 3/23/2025 2222)  Verbalizes/displays adequate comfort level or baseline comfort level:   Encourage patient to monitor pain and request assistance   Assess pain using appropriate pain scale   Administer analgesics based on type and severity of pain and evaluate response   Implement non-pharmacological measures as appropriate and evaluate response     Problem: Skin/Tissue Integrity  Goal: Skin integrity remains intact  Description: 1.  Monitor for areas of redness and/or skin breakdown  2.  Assess vascular access sites hourly  3.  Every 4-6 hours minimum:  Change oxygen saturation probe site  4.  Every 4-6 hours:  If on nasal continuous positive airway pressure, respiratory therapy assess nares and determine need for appliance change or resting period  3/23/2025 2222 by Rui Michaels RN  Outcome: Progressing  Flowsheets (Taken 3/23/2025  2000)  Skin Integrity Remains Intact: Monitor for areas of redness and/or skin breakdown

## 2025-03-24 NOTE — PLAN OF CARE
Problem: Physical Therapy - Adult  Goal: By Discharge: Performs mobility at highest level of function for planned discharge setting.  See evaluation for individualized goals.  Description: FUNCTIONAL STATUS PRIOR TO ADMISSION: Patient was modified independent, intermittently using a rolling walker for functional mobility.    HOME SUPPORT PRIOR TO ADMISSION: The patient lived with her  who she reports is also in the hospital (at [facility]) currently.    Physical Therapy Goals  Initiated 3/20/2025  1.  Patient will move from supine to sit and sit to supine and roll side to side in bed with modified independence within 7 day(s).    2.  Patient will perform sit to stand with modified independence within 7 day(s).  3.  Patient will transfer from bed to chair and chair to bed with modified independence using the least restrictive device within 7 day(s).  4.  Patient will ambulate with modified independence for 150 feet with the least restrictive device within 7 day(s).   Outcome: Not Progressing   PHYSICAL THERAPY TREATMENT    Patient: Nicci Hernandez (64 y.o. female)  Date: 3/24/2025  Diagnosis: Retropharyngeal abscess [J39.0]  Neck abscess [L02.11] Neck abscess      Precautions: Restrictions/Precautions  Restrictions/Precautions: Fall Risk            ASSESSMENT:  Patient continues to benefit from skilled PT services and is not progressing towards goals. Patient received semi-fowlers in bed on RA, appearing restless, cleared for activity by RN who was present in the room and requesting assist with sitting patient up so she may take pills. Patient demonstrates significant functional decline since last tx session, now requiring max A x 2 for bed mobility and constant trunk support to maintain static sitting balance at EOB 2/2 R lateral lean and apparent myoclonic jerking of bilateral UEs. Of note, while patient attempting to take sips of water for pills with RN, dribbling water out of R side of mouth and demonstrates

## 2025-03-24 NOTE — PROGRESS NOTES
Day #1 of unasyn  Indication:  bone and joint infection  Current regimen:  3 gm q6h  Abx regimen: unasyn  Recent Labs     25  0915 25  0600 25  0741   WBC 16.3*  --   --  18.7*   CREATININE 2.83* 3.22* 3.37* 3.06*   BUN 64* 73* 81* 74*     Est CrCl: 21 ml/min  Temp (24hrs), Av.9 °F (36.6 °C), Min:97.4 °F (36.3 °C), Max:98.5 °F (36.9 °C)        Plan: Change to 3.0 gm q12h per renal dosing protocol for creatinine clearance 15-29 ml/min

## 2025-03-24 NOTE — PROGRESS NOTES
RENAL  PROGRESS NOTE        Subjective:   Sleepy, but arousable    Objective:   VITALS SIGNS:    BP (!) 149/68   Pulse 85   Temp 98 °F (36.7 °C) (Oral)   Resp 13   Ht 1.575 m (5' 2\")   Wt 106 kg (233 lb 9.6 oz)   SpO2 100%   BMI 42.73 kg/m²             Temp (24hrs), Av.9 °F (36.6 °C), Min:97.4 °F (36.3 °C), Max:98.5 °F (36.9 °C)         PHYSICAL EXAM:  NAD  Clear  Regular rate rhythm  Trace edema  Mild drowsy    DATA REVIEW:     INTAKE / OUTPUT:   Last shift:      701 - 1900  In: -   Out: 775 [Urine:775]  Last 3 shifts: 1901 -  07  In: 448.5 [P.O.:150]  Out: 2200 [Urine:2200]    Intake/Output Summary (Last 24 hours) at 3/24/2025 1139  Last data filed at 3/24/2025 0827  Gross per 24 hour   Intake 150 ml   Output 2475 ml   Net -2325 ml         LABS:   LABS:  Recent Labs     25  0741 25  0600 25  0915   * 128* 128*   K 3.5 HEMOLYZED,RECOLLECT REQUESTED 4.2   CL 98 100 99   CO2 18* 14* 19*   BUN 74* 81* 73*   CREATININE 3.06* 3.37* 3.22*   CALCIUM 9.0 8.5 8.6   PHOS 4.4  --   --    MG 2.3  --   --      Recent Labs     25  0741 25  0657   WBC 18.7* 16.3* 10.1   HGB 12.5 11.4* 10.9*   HCT 38.2 35.6 33.5*    272 224       Assessment:     CKD-3b,small kidneys  AL,got IV dye on 3.19 in the setting of bacteremia: ATN               No hydro,but rt kidney 8.7 cm < lt kidney 13.1 cm ??? (Both kidney were 8.1 cm on )  hyponatremia  HTN/DM  Met acidosis mixed : non AG and high AG  RP abscess,SA bacteremia  Edema    Discussion-fair urine output.  AL is improving with CR down to 3.  Acidosis as well as hyponatremia are also better     Plan:  Continue bicarb drip at 75 mL/h  Continue holding Aldactone and Coreg  IV antibiotics as per primary team  Avoid nephrotoxins  Daily labs  Monitor urine output      Negro Ryder MD

## 2025-03-24 NOTE — PROGRESS NOTES
From an ENT standpoint and regarding the abscess drainage, there is no contraindication for the JEFF/ the abscess was a needle stick the date of the admission and will not inhibit a JEFF

## 2025-03-25 ENCOUNTER — APPOINTMENT (OUTPATIENT)
Facility: HOSPITAL | Age: 65
DRG: 919 | End: 2025-03-25
Payer: MEDICARE

## 2025-03-25 PROBLEM — D72.829 LEUKOCYTOSIS: Status: ACTIVE | Noted: 2025-03-25

## 2025-03-25 LAB
ANION GAP SERPL CALC-SCNC: 9 MMOL/L (ref 2–12)
BACTERIA SPEC CULT: ABNORMAL
BACTERIA SPEC CULT: ABNORMAL
BUN SERPL-MCNC: 59 MG/DL (ref 6–20)
BUN/CREAT SERPL: 22 (ref 12–20)
CALCIUM SERPL-MCNC: 8.8 MG/DL (ref 8.5–10.1)
CHLORIDE SERPL-SCNC: 97 MMOL/L (ref 97–108)
CO2 SERPL-SCNC: 29 MMOL/L (ref 21–32)
CREAT SERPL-MCNC: 2.69 MG/DL (ref 0.55–1.02)
CRP SERPL-MCNC: 11.4 MG/DL (ref 0–0.3)
ERYTHROCYTE [DISTWIDTH] IN BLOOD BY AUTOMATED COUNT: 14.7 % (ref 11.5–14.5)
GLUCOSE BLD STRIP.AUTO-MCNC: 115 MG/DL (ref 65–117)
GLUCOSE BLD STRIP.AUTO-MCNC: 140 MG/DL (ref 65–117)
GLUCOSE BLD STRIP.AUTO-MCNC: 182 MG/DL (ref 65–117)
GLUCOSE BLD STRIP.AUTO-MCNC: 191 MG/DL (ref 65–117)
GLUCOSE SERPL-MCNC: 157 MG/DL (ref 65–100)
HCT VFR BLD AUTO: 36.8 % (ref 35–47)
HGB BLD-MCNC: 11.8 G/DL (ref 11.5–16)
MAGNESIUM SERPL-MCNC: 2.1 MG/DL (ref 1.6–2.4)
MCH RBC QN AUTO: 27.6 PG (ref 26–34)
MCHC RBC AUTO-ENTMCNC: 32.1 G/DL (ref 30–36.5)
MCV RBC AUTO: 86 FL (ref 80–99)
NRBC # BLD: 0 K/UL (ref 0–0.01)
NRBC BLD-RTO: 0 PER 100 WBC
PHOSPHATE SERPL-MCNC: 3.8 MG/DL (ref 2.6–4.7)
PLATELET # BLD AUTO: 371 K/UL (ref 150–400)
PMV BLD AUTO: 10.6 FL (ref 8.9–12.9)
POTASSIUM SERPL-SCNC: 3.3 MMOL/L (ref 3.5–5.1)
RBC # BLD AUTO: 4.28 M/UL (ref 3.8–5.2)
SERVICE CMNT-IMP: ABNORMAL
SERVICE CMNT-IMP: NORMAL
SODIUM SERPL-SCNC: 135 MMOL/L (ref 136–145)
WBC # BLD AUTO: 14.6 K/UL (ref 3.6–11)

## 2025-03-25 PROCEDURE — 82962 GLUCOSE BLOOD TEST: CPT

## 2025-03-25 PROCEDURE — 87070 CULTURE OTHR SPECIMN AEROBIC: CPT

## 2025-03-25 PROCEDURE — 87205 SMEAR GRAM STAIN: CPT

## 2025-03-25 PROCEDURE — 6360000002 HC RX W HCPCS: Performed by: NURSE PRACTITIONER

## 2025-03-25 PROCEDURE — 2580000003 HC RX 258

## 2025-03-25 PROCEDURE — 6370000000 HC RX 637 (ALT 250 FOR IP): Performed by: CLINICAL NURSE SPECIALIST

## 2025-03-25 PROCEDURE — 2580000003 HC RX 258: Performed by: INTERNAL MEDICINE

## 2025-03-25 PROCEDURE — 92526 ORAL FUNCTION THERAPY: CPT

## 2025-03-25 PROCEDURE — 6360000002 HC RX W HCPCS: Performed by: INTERNAL MEDICINE

## 2025-03-25 PROCEDURE — 94640 AIRWAY INHALATION TREATMENT: CPT

## 2025-03-25 PROCEDURE — 84100 ASSAY OF PHOSPHORUS: CPT

## 2025-03-25 PROCEDURE — 86140 C-REACTIVE PROTEIN: CPT

## 2025-03-25 PROCEDURE — 97530 THERAPEUTIC ACTIVITIES: CPT

## 2025-03-25 PROCEDURE — 87147 CULTURE TYPE IMMUNOLOGIC: CPT

## 2025-03-25 PROCEDURE — 83735 ASSAY OF MAGNESIUM: CPT

## 2025-03-25 PROCEDURE — 6370000000 HC RX 637 (ALT 250 FOR IP): Performed by: STUDENT IN AN ORGANIZED HEALTH CARE EDUCATION/TRAINING PROGRAM

## 2025-03-25 PROCEDURE — 2580000003 HC RX 258: Performed by: NURSE PRACTITIONER

## 2025-03-25 PROCEDURE — 85027 COMPLETE CBC AUTOMATED: CPT

## 2025-03-25 PROCEDURE — 84425 ASSAY OF VITAMIN B-1: CPT

## 2025-03-25 PROCEDURE — 6370000000 HC RX 637 (ALT 250 FOR IP): Performed by: INTERNAL MEDICINE

## 2025-03-25 PROCEDURE — 80048 BASIC METABOLIC PNL TOTAL CA: CPT

## 2025-03-25 PROCEDURE — 6360000002 HC RX W HCPCS: Performed by: STUDENT IN AN ORGANIZED HEALTH CARE EDUCATION/TRAINING PROGRAM

## 2025-03-25 PROCEDURE — 2500000003 HC RX 250 WO HCPCS: Performed by: INTERNAL MEDICINE

## 2025-03-25 PROCEDURE — 2060000000 HC ICU INTERMEDIATE R&B

## 2025-03-25 PROCEDURE — 87040 BLOOD CULTURE FOR BACTERIA: CPT

## 2025-03-25 PROCEDURE — 73718 MRI LOWER EXTREMITY W/O DYE: CPT

## 2025-03-25 PROCEDURE — 87186 SC STD MICRODIL/AGAR DIL: CPT

## 2025-03-25 PROCEDURE — 2500000003 HC RX 250 WO HCPCS: Performed by: STUDENT IN AN ORGANIZED HEALTH CARE EDUCATION/TRAINING PROGRAM

## 2025-03-25 PROCEDURE — 70551 MRI BRAIN STEM W/O DYE: CPT

## 2025-03-25 PROCEDURE — 6370000000 HC RX 637 (ALT 250 FOR IP): Performed by: NURSE PRACTITIONER

## 2025-03-25 PROCEDURE — 87077 CULTURE AEROBIC IDENTIFY: CPT

## 2025-03-25 PROCEDURE — 6360000002 HC RX W HCPCS

## 2025-03-25 PROCEDURE — 99231 SBSQ HOSP IP/OBS SF/LOW 25: CPT

## 2025-03-25 PROCEDURE — 99232 SBSQ HOSP IP/OBS MODERATE 35: CPT | Performed by: NURSE PRACTITIONER

## 2025-03-25 PROCEDURE — 99222 1ST HOSP IP/OBS MODERATE 55: CPT | Performed by: SPECIALIST

## 2025-03-25 RX ORDER — THIAMINE HYDROCHLORIDE 100 MG/ML
100 INJECTION, SOLUTION INTRAMUSCULAR; INTRAVENOUS DAILY
Status: DISCONTINUED | OUTPATIENT
Start: 2025-03-25 | End: 2025-03-25

## 2025-03-25 RX ORDER — THIAMINE HYDROCHLORIDE 100 MG/ML
500 INJECTION, SOLUTION INTRAMUSCULAR; INTRAVENOUS EVERY 8 HOURS
Status: COMPLETED | OUTPATIENT
Start: 2025-03-25 | End: 2025-03-28

## 2025-03-25 RX ORDER — MULTIVITAMIN WITH IRON
1 TABLET ORAL DAILY
Status: DISCONTINUED | OUTPATIENT
Start: 2025-03-25 | End: 2025-04-04 | Stop reason: HOSPADM

## 2025-03-25 RX ADMIN — PREGABALIN 50 MG: 50 CAPSULE ORAL at 20:58

## 2025-03-25 RX ADMIN — Medication: at 21:07

## 2025-03-25 RX ADMIN — CALCITRIOL CAPSULES 0.25 MCG 0.25 MCG: 0.25 CAPSULE ORAL at 09:29

## 2025-03-25 RX ADMIN — OXYCODONE HYDROCHLORIDE 2.5 MG: 5 TABLET ORAL at 21:16

## 2025-03-25 RX ADMIN — Medication: at 09:42

## 2025-03-25 RX ADMIN — GUAIFENESIN 200 MG: 200 SOLUTION ORAL at 09:40

## 2025-03-25 RX ADMIN — PREGABALIN 50 MG: 50 CAPSULE ORAL at 09:29

## 2025-03-25 RX ADMIN — INSULIN LISPRO 2 UNITS: 100 INJECTION, SOLUTION INTRAVENOUS; SUBCUTANEOUS at 12:13

## 2025-03-25 RX ADMIN — THIAMINE HYDROCHLORIDE 500 MG: 100 INJECTION, SOLUTION INTRAMUSCULAR; INTRAVENOUS at 12:04

## 2025-03-25 RX ADMIN — THERA TABS 1 TABLET: TAB at 10:23

## 2025-03-25 RX ADMIN — ALBUTEROL SULFATE 2.5 MG: 2.5 SOLUTION RESPIRATORY (INHALATION) at 19:41

## 2025-03-25 RX ADMIN — SODIUM CHLORIDE, PRESERVATIVE FREE 10 ML: 5 INJECTION INTRAVENOUS at 09:31

## 2025-03-25 RX ADMIN — SODIUM BICARBONATE: 84 INJECTION, SOLUTION INTRAVENOUS at 07:52

## 2025-03-25 RX ADMIN — ACETAMINOPHEN 650 MG: 325 TABLET ORAL at 09:40

## 2025-03-25 RX ADMIN — ASPIRIN 81 MG CHEWABLE TABLET 81 MG: 81 TABLET CHEWABLE at 09:29

## 2025-03-25 RX ADMIN — THIAMINE HYDROCHLORIDE 100 MG: 100 INJECTION, SOLUTION INTRAMUSCULAR; INTRAVENOUS at 09:52

## 2025-03-25 RX ADMIN — AMPICILLIN SODIUM AND SULBACTAM SODIUM 3000 MG: 2; 1 INJECTION, POWDER, FOR SOLUTION INTRAMUSCULAR; INTRAVENOUS at 02:18

## 2025-03-25 RX ADMIN — CLOPIDOGREL BISULFATE 75 MG: 75 TABLET, FILM COATED ORAL at 09:52

## 2025-03-25 RX ADMIN — INSULIN LISPRO 2 UNITS: 100 INJECTION, SOLUTION INTRAVENOUS; SUBCUTANEOUS at 09:30

## 2025-03-25 RX ADMIN — ACETAMINOPHEN 650 MG: 325 TABLET ORAL at 16:25

## 2025-03-25 RX ADMIN — GUAIFENESIN 200 MG: 200 SOLUTION ORAL at 16:25

## 2025-03-25 RX ADMIN — SODIUM CHLORIDE, PRESERVATIVE FREE 10 ML: 5 INJECTION INTRAVENOUS at 20:59

## 2025-03-25 RX ADMIN — OXYCODONE HYDROCHLORIDE 2.5 MG: 5 TABLET ORAL at 12:04

## 2025-03-25 RX ADMIN — AMPICILLIN SODIUM AND SULBACTAM SODIUM 3000 MG: 2; 1 INJECTION, POWDER, FOR SOLUTION INTRAMUSCULAR; INTRAVENOUS at 16:24

## 2025-03-25 RX ADMIN — DAPTOMYCIN 700 MG: 500 INJECTION, POWDER, LYOPHILIZED, FOR SUSPENSION INTRAVENOUS at 12:14

## 2025-03-25 RX ADMIN — GUAIFENESIN 200 MG: 200 SOLUTION ORAL at 20:58

## 2025-03-25 RX ADMIN — THIAMINE HYDROCHLORIDE 500 MG: 100 INJECTION, SOLUTION INTRAMUSCULAR; INTRAVENOUS at 21:00

## 2025-03-25 RX ADMIN — ACETAMINOPHEN 650 MG: 325 TABLET ORAL at 20:58

## 2025-03-25 RX ADMIN — INSULIN GLARGINE 48 UNITS: 100 INJECTION, SOLUTION SUBCUTANEOUS at 09:30

## 2025-03-25 ASSESSMENT — PAIN SCALES - GENERAL
PAINLEVEL_OUTOF10: 9
PAINLEVEL_OUTOF10: 10
PAINLEVEL_OUTOF10: 10
PAINLEVEL_OUTOF10: 5

## 2025-03-25 ASSESSMENT — PAIN DESCRIPTION - LOCATION: LOCATION: HEAD

## 2025-03-25 NOTE — PROGRESS NOTES
RENAL  PROGRESS NOTE        Subjective:   More alert awake  Taking PO    Objective:   VITALS SIGNS:    BP (!) 140/68   Pulse 90   Temp 98 °F (36.7 °C) (Oral)   Resp 18   Ht 1.575 m (5' 2\")   Wt 105.9 kg (233 lb 7.5 oz)   SpO2 97%   BMI 42.69 kg/m²             Temp (24hrs), Av.9 °F (36.6 °C), Min:97.3 °F (36.3 °C), Max:98.3 °F (36.8 °C)         PHYSICAL EXAM:  NAD  Trace edema  AOx3    DATA REVIEW:     INTAKE / OUTPUT:   Last shift:      No intake/output data recorded.  Last 3 shifts:  1901 -  0700  In: 220 [P.O.:220]  Out: 3675 [Urine:3675]    Intake/Output Summary (Last 24 hours) at 3/25/2025 1441  Last data filed at 3/25/2025 0400  Gross per 24 hour   Intake 70 ml   Output 1200 ml   Net -1130 ml         LABS:   LABS:  Recent Labs     25  0638 25  0741 25  0600   * 129* 128*   K 3.3* 3.5 HEMOLYZED,RECOLLECT REQUESTED   CL 97 98 100   CO2 29 18* 14*   BUN 59* 74* 81*   CREATININE 2.69* 3.06* 3.37*   CALCIUM 8.8 9.0 8.5   PHOS 3.8 4.4  --    MG 2.1 2.3  --      Recent Labs     25  0638 25  0741 25   WBC 14.6* 18.7* 16.3*   HGB 11.8 12.5 11.4*   HCT 36.8 38.2 35.6    363 272       Assessment:     CKD-3b,small kidneys  AL,got IV dye on 3.19 in the setting of bacteremia: ATN               No hydro,but rt kidney 8.7 cm < lt kidney 13.1 cm ??? (Both kidney were 8.1 cm on )  hyponatremia  HTN  DM-2  Met acidosis mixed : non AG and high AG  RP abscess,SA bacteremia  Edema    Discussion-fair urine output.  AL is improving with CR down to 3 to 2.7.  acidosis is resolved. Na also much better     Plan:  D/C HCO3 drip  Encourage increase oral hydration/intake  Continue holding Aldactone and Coreg  IV antibiotics as per primary team  Avoid nephrotoxins  Daily labs  Monitor urine output    D/W Pt      Negro Ryder MD

## 2025-03-25 NOTE — PLAN OF CARE
Speech LAnguage Pathology TREATMENT    Patient: Nicci Hernandez (64 y.o. female)  Date: 3/25/2025  Primary Diagnosis: Retropharyngeal abscess [J39.0]  Neck abscess [L02.11]       Precautions:  Fall Risk                  ASSESSMENT :  Patient seen for follow-up regarding swallowing. Patient alert this date compared to initial evaluation. No appreciable dysarthria. Accepted trials of thin liquids, self-limiting in sip size, unable vs unwilling to take sequential sips from the straw. Somewhat prolonged mastication of regular solid dipped in puree, but patient ultimately demonstrates good oral clearance. After solid trials, patient states she feels she needs to \"blow out\", repeatedly puffing out her cheeks. She denies globus sensation, but states swallowing does not currently feel \"normal\".     In reviewing CT soft tissue neck, there is evidence of compression of the right pyriform sinus due to mass effect (unclear if this has resolved, repeat CT soft tissue not done). This may be impacting patient's sensation during the swallow, however efficiency/safety does not appear to be impacted at bedside. Recommending advancement to solid diet this date, low threshold to hold po and complete objective imaging (MBS>FEES) if patient with obvious distress during meals.     Patient will benefit from skilled intervention to address the above impairments.     PLAN :  Recommendations and Planned Interventions:  Diet: Soft and bite sized and thin liquids  - General aspiration precautions: upright for all oral intake, small bites/sips, slow rate of intake  - Oral meds per patient preference (likely whole with thin liquids vs whole in applesauce, avoid crushing unless absolutely necessary)  - Oral care BID (encourage self-care with toothbrush, left in room)     Recommend next SLP session: diet tolerance, need for MBS?    Acute SLP Services: Yes, SLP will continue to follow per plan of care.  Discharge Recommendations: Continue to assess  nurse    Patient understands intent and goals of therapy, but is neutral about his/her participation    Thank you,  Nadeen Ferguson, SLP    SLP Individual Minutes  Time In: 1137  Time Out: 1155  Minutes: 18     Problem: SLP Adult - Impaired Swallowing  Goal: By Discharge: Advance to least restrictive diet without signs or symptoms of aspiration for planned discharge setting.  See evaluation for individualized goals.  Description: Speech pathology goals initiated 3/24/2025   1.patient will tolerate least restrictive diet free of s/s aspiration   Outcome: Progressing

## 2025-03-25 NOTE — PLAN OF CARE
Problem: Safety - Adult  Goal: Free from fall injury  3/25/2025 1140 by Kingston Archibald RN  Outcome: Progressing     Problem: Chronic Conditions and Co-morbidities  Goal: Patient's chronic conditions and co-morbidity symptoms are monitored and maintained or improved  Outcome: Progressing     Problem: Discharge Planning  Goal: Discharge to home or other facility with appropriate resources  3/25/2025 1140 by Kingston Archibald RN  Outcome: Progressing     Problem: Pain  Goal: Verbalizes/displays adequate comfort level or baseline comfort level  3/25/2025 1140 by Kingston Archibald RN  Outcome: Progressing     Problem: Skin/Tissue Integrity  Goal: Skin integrity remains intact  Description: 1.  Monitor for areas of redness and/or skin breakdown  2.  Assess vascular access sites hourly  3.  Every 4-6 hours minimum:  Change oxygen saturation probe site  4.  Every 4-6 hours:  If on nasal continuous positive airway pressure, respiratory therapy assess nares and determine need for appliance change or resting period  Outcome: Progressing     Problem: Nutrition Deficit:  Goal: Optimize nutritional status  Outcome: Progressing     Bedside shift change report given to Sandy (oncoming nurse) by Ann (offgoing nurse). Report included the following information Nurse Handoff Report, Index, Intake/Output, MAR, and Cardiac Rhythm SR .

## 2025-03-25 NOTE — PROGRESS NOTES
Infectious Disease Progress     Impression:   MRSA high-grade bacteremia  Retropharyngeal abscess  Prevertebral abscess  Left medial diabetic foot wound  leukocytosis  - afebrile, wbc 14.6    Blood cx (3/19, 3/22) MRSA    Wound cx (3/19) MRSA    MRI of C-spine; C3-C6 ACDF. Complex fluid signal focus in the prevertebral soft tissue  spanning from the clivus to C3, with associated soft tissue edema and swelling;  correlate for signs of infection such as retropharyngeal abscess. Multilevel spondylitic changes with associated canal stenoses, chronic cord compressions, and foraminal stenoses as outlined above. C4-C5 chronic myelomalacia.      CKD  Neck pain  DM II  - management per primary team  Plan:     - continue IV daptomycin (ck 15) & Unasyn     No plan for JEFF    Will need at least 6-8 weeks of daptomycin from the negative blood cx    Recommend to complete Unasyn for 7 days, end date 3/31     Will provide final abx therapy order once we confirmed the negative blood cx      Appreciate Podiatry input; no signs of infection, open wound to be treated with wound care    Plan of care d/w pt, Dr. Garcia, and Dr. Blake                 History of Present Illness   3/21/2025  \"Patient is a 64 y.o. female with past medical Hx of CKD stage IV, diabetes with neuropathy, CAD s/p CABG, gastroparesis, cervical fusion about 20 years ago who presented to the OSH ED for neck pain with concerns for retropharyngeal abscess and was transferred to [facility] for ENT evaluation.      Patient was seen by OSH ED for neck pain with imaging showing concerns for loosening of hardware of fusion. Seen outpatient by orthopedic surgery for neck pain and prescribed Medrol pack. Presented to [facility] for worsening neck pain. CT imaging showed prevertebral fluid collection associated with the anterior fusion plate which was suspicious for a possible retropharyngeal abscess. ED workup with WBC 17.5, CRP 18.50, afebrile. CT imaging  fatigued. Reason for exam:->TTP, S/P fusion \"20 years ago\", genrealized fatigued COMPARISON: CT cervical spine 3/6/2025 TECHNIQUE: MR imaging of the cervical spine was performed using the following sequences: sagittal T1, T2, STIR;  axial T2, T1. CONTRAST:  None. FINDINGS: C3-C6 ACDF. 4.8 cm x 2.6 cm x 1.0 cm mildly complex fluid signal focus in the anterior and right prevertebral soft tissues spanning from the clivus to C3 level (201-11, 701-29). Prevertebral soft tissue edema and swelling from the clivus through C6. There is normal alignment of the cervical spine. Vertebral body heights are maintained. Marrow signal is normal. The craniocervical junction is intact. Patchy T2/STIR hyperintense signal in the spinal cord at the C4-C5 level, compatible with chronic myelomalacia..  The paraspinal soft tissues are within normal limits. C2-C3: Endplate osteophytes. Facet arthropathy. Ligamentum flavum thickening. Moderate canal stenosis. Mild cord compression. No foraminal stenosis. C3-C4: Disc bulge. Endplate osteophytes. Facet arthropathy. Uncovertebral hypertrophy. Ligamentum flavum thickening. Severe canal stenosis. Moderate cord compression. Moderate left and mild right foraminal stenosis. C4-C5: Endplate osteophytes. Facet arthropathy. Uncovertebral hypertrophy. Ligamentum flavum thickening. Severe canal stenosis. Severe cord compression. Severe bilateral foraminal stenosis. C5-C6: Endplate osteophytes. Facet arthropathy. Uncovertebral hypertrophy. Ligamentum flavum thickening. Moderate canal stenosis. Moderate cord compression. Severe left and moderate right foraminal stenosis. C6-C7: Facet arthropathy. Uncovertebral perjury. No spinal stenosis. Mild bilateral foraminal stenosis.  C7-T1: Disc bulge. Facet arthropathy. Uncovertebral hypertrophy. No stenosis.     1.  C3-C6 ACDF. Complex fluid signal focus in the prevertebral soft tissue spanning from the clivus to C3, with associated soft tissue edema and swelling;

## 2025-03-25 NOTE — PLAN OF CARE
Problem: Occupational Therapy - Adult  Goal: By Discharge: Performs self-care activities at highest level of function for planned discharge setting.  See evaluation for individualized goals.  Description: FUNCTIONAL STATUS PRIOR TO ADMISSION:  Pt lives with her  and is typically Mod I for BADLs/IADLs. Pt has a ramp to enter her home and sits on a shower chair to bathe at baseline. Pt reporting her  had a fall and is currently at U.   Receives Help From: Family, Prior Level of Assist for ADLs: Independent,  ,  ,  ,  ,  ,  ,  , Prior Level of Assist for Transfers: Independent, Active : Yes     HOME SUPPORT: Patient lived at home with spouse but did not require assistance.    Occupational Therapy Goals:  Initiated 3/20/2025  1.  Patient will perform grooming standing at sink with Stand by Assist within 7 day(s).  2.  Patient will perform bathing with Minimal Assist within 7 day(s).  3.  Patient will perform lower body dressing with Moderate Assist within 7 day(s).  4.  Patient will perform toilet transfers with Supervision  within 7 day(s).  5.  Patient will perform all aspects of toileting with Supervision within 7 day(s).  6.  Patient will participate in upper extremity therapeutic exercise/activities with Modified Stone for 10 minutes within 7 day(s).    7.  Patient will utilize energy conservation techniques during functional activities with verbal cues within 7 day(s).    Outcome: Progressing   OCCUPATIONAL THERAPY TREATMENT  Patient: Nicci Hernandez (64 y.o. female)  Date: 3/25/2025  Primary Diagnosis: Retropharyngeal abscess [J39.0]  Neck abscess [L02.11]       Precautions: Fall Risk                Chart, occupational therapy assessment, plan of care, and goals were reviewed.    ASSESSMENT  Patient continues to benefit from skilled OT services and is slowly progressing towards goals. Pt presented semi davidson in bed agreeable to therapy. Pt demonstrating improved alertness from  previous session, fair sitting balance, and less trunk ataxia. Pt demonstrates full AROM of BUE but unable to maintain position against gravity, arms just falling down at any time. Pt continues to require totalA for self feeding 2/2 BUE movements and up to Travis for unsupported sitting balance. Pt returned to bed at end of session with all needs met. Pt remains far below her baseline of Mod I and would benefit from intensive rehab to maximize return to PLOF.       PLAN :  Patient continues to benefit from skilled intervention to address the above impairments.  Continue treatment per established plan of care to address goals.    Recommend with staff: Bed in chair position 3x/day  Recommend next OT session: Trial stedy to commode    Recommendation for discharge: (in order for the patient to meet his/her long term goals):   High intensity/comprehensive skilled occupational therapy in a multidisciplinary setting as patient is working towards tolerating up to 3 hours of therapy/day 5-7x/week    Other factors to consider for discharge: lives alone, patient's current support system is unable to meet their requirements for physical assistance, impaired cognition, high risk for falls, not safe to be alone, and concern for safely navigating or managing the home environment    IF patient discharges home will need the following DME: continuing to assess with progress     SUBJECTIVE:   Patient stated “I feel the same as yesterday.”    OBJECTIVE DATA SUMMARY:   Cognitive/Behavioral Status:  Orientation  Overall Orientation Status: Within Normal Limits  Cognition  Overall Cognitive Status: Exceptions  Arousal/Alertness: Appears intact  Following Commands: Follows one step commands with repetition  Attention Span: Attends with cues to redirect  Safety Judgement: Decreased awareness of need for safety  Problem Solving: Assistance required to correct errors made  Insights: Decreased awareness of deficits  Initiation: Requires cues for

## 2025-03-25 NOTE — DIABETES MGMT
03/19/2025    ALKPHOS 76 03/19/2025    BILITOT 0.9 03/19/2025     No results found for: \"TSH\", \"TSHFT4\", \"TSHELE\", \"PKA7DAD\", \"TSHHS\"      Factors impacting BG management  Factor Dose Comments   Nutrition:  Standard meals   60 grams/meal    Pain PRN     Infection Flagyl  Daptomycin  Rocephin    Kidney function CKD stage 4    Liver function Normal           Assessment and Nursing Intervention   Nursing Diagnosis Risk for unstable blood glucose pattern   Nursing Intervention Domain 5250 Decision-making Support   Nursing Interventions Examined current inpatient diabetes/blood glucose control   Explored factors facilitating and impeding inpatient management  Explored corrective strategies with patient and responsible inpatient provider   Informed patient of rational for insulin strategy while hospitalized     Nursing Diagnosis 01374 Ineffective Health Management   Nursing Intervention Domain 5250 Decision-making Support   Nursing Interventions Identified diabetes self-management practices impeding diabetes control  Discussed diabetes survival skills related to  Healthy Plate eating plan; given handouts  Role of physical activity in improving insulin sensitivity and action  Procedure for blood glucose monitoring & options for low-cost products  Medications plan at discharge     Billing Code(s)   [] 30478 IP initial hospital care - 40 minutes   [] 10196 IP initial hospital care -55 minutes  [] 57507 IP initial hospital care -75 minutes  []        43952 IP subsequent hospital care - 50 minutes   [] 39524 IP subsequent hospital care - 35 minutes   [x] 30553 IP subsequent hospital care - 25 minutes       Before making these care recommendations, I personally reviewed the hospitalization record, including notes, laboratory & diagnostic data and current medications, and examined the patient at the bedside.  Total minutes: 25 minutes     ILIA Alvares - CNS   Diabetes Clinical Nurse Specialist   Program for Diabetes  Health  Access via [employer]

## 2025-03-25 NOTE — CONSULTS
[facility] CARDIOLOGY  Cardiology Care Note                  [x]Initial visit     []Established visit     Patient Name: Nicci Hernandez - :1960 - MRN:[id number]  Primary Cardiologist: [facility]  Consulting Cardiologist: Omar Harper MD     Reason for initial visit:new HFrEF    HPI:   Patient is a 64 year old female with a medical hx of CAD s/p CABG in  and PCI of RCA  with VIRI x3 and PTCA to LCx , [facility] ISR of OM1 with balloon angioplasty , NSTEMI 2020,HFrEF EF down to 10% at one time, most recent echo prior to admission  EF 20%. Hx DM, HTN, HLD, CKD stage 4, gastroparesis, and cervical fusion. She was admitted with neck pain, fatigue, and dysarthria. She was found to have a retropharyngeal abscess which was drained by ENT. Diabetic ulcer left foot Wound cultures positive for MRSA bacteremia. She has been noted to have nystagmus so MRI brain has been ordered. Psych consult is pending. Echo completed 3/20/25 showed EF 20-25% without evidence of endocarditis, but only mitral and aortic valves well visualized.       SUBJECTIVE:  Pt with continued severe neck pain, unable to move head with the pain. Denies having SOB or CP at this time or since admission.        Assessment and Plan   Patient seen on the day of progress note and examined  and agree with Advance Practice Provider (VIRA, NP,PA)  assessment and plans.  1.  Cardiomyopathy: Clinically she appears to be compensated she is in no particular shortness of breath she is able to lay flat and no chest pains reported.    Last ejection fraction of 20 to 25%.    Unable to implement fully guideline directed medical therapy for now continue Coreg.  Torsemide and spironolactone on hold given renal dysfunction.  Receiving IV fluids for now but continue to follow with I's and O's she may require some IV diuretics soon.    Patient is also on midodrine for low  in her paternal grandmother.  Allergies   Allergen Reactions    Atorvastatin Other (See Comments)     Reaction Type: Allergy; Severity: Severe; Reaction(s): myopathy  Reaction Type: Allergy; Severity: Severe; Reaction(s): myopathy          OBJECTIVE:  Wt Readings from Last 3 Encounters:   03/25/25 105.9 kg (233 lb 7.5 oz)   03/24/25 106 kg (233 lb 11 oz)   03/19/25 89.4 kg (197 lb)     Net IO Since Admission: -3,676.44 mL [03/25/25 0949]     Physical Exam:    Vitals:   Vitals:    03/25/25 0400 03/25/25 0409 03/25/25 0600 03/25/25 0745   BP:  (!) 140/68     Pulse: 87  89    Resp:       Temp:  98.3 °F (36.8 °C)  98 °F (36.7 °C)   TempSrc:  Oral  Oral   SpO2:       Weight:  105.9 kg (233 lb 7.5 oz)     Height:         Telemetry: normal sinus rhythm    Gen: Well-developed, obese, in no acute distress  Neck: Supple, No JVD, No Carotid Bruit  Resp: No accessory muscle use, Clear breath sounds, No rales or rhonchi  Card: Regular Rate,Rhythm, Normal S1, S2, No murmurs, rubs or gallop. No thrills.   Abd:   Soft, non-tender, non-distended, BS+   MSK: No cyanosis  Skin: No rashes    Neuro: Moving all four extremities, follows commands appropriately  Psych:  Oriented to person, place, alert, Nml Affect  LE: + niru edema. Left foot wrapped          Data Review:     Radiology:   XR Results (most recent):  CT Result (most recent):  CT HEAD WO CONTRAST 03/21/2025    Narrative  EXAM:  CT HEAD WO CONTRAST    INDICATION:   AMS    COMPARISON: None.    TECHNIQUE:  Unenhanced CT of the head was performed using 5 mm images. Brain and bone  windows were generated.  CT dose reduction was achieved through use of a  standardized protocol tailored for this examination and automatic exposure  control for dose modulation.    FINDINGS:  The ventricles are normal in size and position. Basilar cisterns are patent. No  midline shift. There is no evidence of acute infarct, hemorrhage, or extraaxial  fluid collection.    The paranasal sinuses, mastoid

## 2025-03-25 NOTE — PLAN OF CARE
2215 - confirm from Mri department if it will be possible for the procedure to be done; said they will call back if they can make it tonight as they have patients lined up.    0645 - Confirmed from mri what time is it possible for them to do it, said not probably during the day but will definitely do it today.    0655 - Update given to her daughter.    Problem: Safety - Adult  Goal: Free from fall injury  3/24/2025 2236 by Rui Michaels RN  Outcome: Progressing  Flowsheets (Taken 3/24/2025 2236)  Free From Fall Injury: Instruct family/caregiver on patient safety     Problem: Discharge Planning  Goal: Discharge to home or other facility with appropriate resources  3/24/2025 2236 by Rui Michaels RN  Outcome: Progressing  Flowsheets (Taken 3/24/2025 2236)  Discharge to home or other facility with appropriate resources:   Identify barriers to discharge with patient and caregiver   Identify discharge learning needs (meds, wound care, etc)   Refer to discharge planning if patient needs post-hospital services based on physician order or complex needs related to functional status, cognitive ability or social support system   Arrange for needed discharge resources and transportation as appropriate     Problem: Pain  Goal: Verbalizes/displays adequate comfort level or baseline comfort level  3/24/2025 2236 by Rui Michaels RN  Outcome: Progressing  Flowsheets (Taken 3/24/2025 2236)  Verbalizes/displays adequate comfort level or baseline comfort level:   Encourage patient to monitor pain and request assistance   Assess pain using appropriate pain scale   Administer analgesics based on type and severity of pain and evaluate response   Implement non-pharmacological measures as appropriate and evaluate response   Consider cultural and social influences on pain and pain management     Problem: Skin/Tissue Integrity  Goal: Skin integrity remains intact  Description: 1.  Monitor  for areas of redness and/or skin breakdown  2.  Assess vascular access sites hourly  3.  Every 4-6 hours minimum:  Change oxygen saturation probe site  4.  Every 4-6 hours:  If on nasal continuous positive airway pressure, respiratory therapy assess nares and determine need for appliance change or resting period  Recent Flowsheet Documentation  Taken 3/24/2025 2000 by Rui Michaels RN  Skin Integrity Remains Intact: Monitor for areas of redness and/or skin breakdown

## 2025-03-25 NOTE — CONSULTS
2008      Family History   Problem Relation Age of Onset    Heart Disease Paternal Grandfather     Stroke Paternal Grandmother     Heart Disease Paternal Grandmother     Heart Disease Father          of heart disease at age 63    Diabetes Mother     Diabetes Paternal Grandfather      Social History     Socioeconomic History    Marital status:    Tobacco Use    Smoking status: Never    Smokeless tobacco: Never   Vaping Use    Vaping status: Never Used   Substance and Sexual Activity    Alcohol use: Yes     Comment: occasionally    Drug use: No   Social History Narrative    Lives at home with family, independent with activity     Social Drivers of Health     Food Insecurity: No Food Insecurity (2024)    Hunger Vital Sign     Worried About Running Out of Food in the Last Year: Never true     Ran Out of Food in the Last Year: Never true   Transportation Needs: No Transportation Needs (2024)    PRAPARE - Transportation     Lack of Transportation (Medical): No     Lack of Transportation (Non-Medical): No   Housing Stability: Low Risk  (2024)    Housing Stability Vital Sign     Unable to Pay for Housing in the Last Year: No     Number of Times Moved in the Last Year: 0     Homeless in the Last Year: No      Current Facility-Administered Medications   Medication Dose Route Frequency Provider Last Rate Last Admin    insulin glargine (LANTUS) injection vial 48 Units  48 Units SubCUTAneous Daily Tricia Sanford APRN - CNS   48 Units at 25 1052    ampicillin-sulbactam (UNASYN) 3,000 mg in sodium chloride 0.9 % 100 mL IVPB (addEASE)  3,000 mg IntraVENous Q12H Vicente Blackman APRN - NP   Stopped at 25 0330    sodium bicarbonate 150 mEq in dextrose 5 % 1,000 mL infusion   IntraVENous Continuous Chadwick Garcia MD 75 mL/hr at 25 0752 New Bag at 25 0752    guaiFENesin (ROBITUSSIN) 100 MG/5ML liquid 200 mg  200 mg Oral TID Chadwick Garcia MD   200 mg at 25 9997

## 2025-03-25 NOTE — PROGRESS NOTES
[facility] Adult  Hospitalist Group                                                                                          Hospitalist Progress Note  Chadwick Garcia MD  Answering service: [phone] OR [phone] from in house phone        Date of Service:  3/25/2025  NAME:  Nicci Hernandez  :  1960  MRN:  [id number]       Admission Summary:   Nicci Hernandez is a 64 y.o. female who presents with retropharyngeal vs prevertebral abscess.     This is a 64-year-old female with past medical history of CKD stage IV, diabetes, dyslipidemia, diabetic neuropathy, coronary artery disease status post CABG in , gastroparesis, hypertension, cervical fusion about 20 years ago, who comes in for the above.  The patient reports that she has been having neck pain for about 3 weeks.  She reports that she has been seen by outpatient orthopedic surgery and started on steroids, and she also went to other physicians.  The patient went to [facility] about 3 days ago where she was also evaluated for this pain.  She had previously on imaging, with a CT of her neck, that showed potential loosening of some of the screws of her previous fusion surgery.  At [facility] she was given pain medicine apparently discharge.  Patient went to [facility] yesterday with her spouse due to a fall that he had and was not so severe neck pain that she was placed on a c-collar.  The patient reports that this pain has been progressively worsening since about 3 weeks ago and that about 4 days ago she has become so weak that she has been unable to walk.  Patient then went to [facility] on 3/6 where she was also imaged with not much seen on imaging and was discharged.  Patient went again to [facility] today where the CT of her soft tissue of her neck shows a prevertebral fluid collection associated with the anterior fusion plate which was suspicious for a possible retropharyngeal  daily with meals of short acting insulin.  Will use medium dose sliding scale for now as she is not eating regular meals  -Patient has also been on steroids  -Consult diabetes management team;     ODALYS not on CPAP     Bilateral lower extremity edema:  -Patient on torsemide at home, will switch to IV Lasix while inpatient;  -Monitor BMP daily;  -Consult nephrology;    Right foot diabetic wound:  -X-ray to check for osteomyelitis;    Pain management:  -Scheduled Tylenol 3 times daily;  -Patient declined lidocaine patch;   -No opioids due to hypotension;     Generalized weakness;  Poor appetite/poor p.o. intake;  Mourning the death of her  (a few days ago while patient was in the hospital):  -consult Psychiatry;       Code status: Full  Prophylaxis: Heparin Sc;   Care Plan discussed with: patient, several family members, including sister, daughter and son in the room, RN, nephrology consultant;     Anticipated Disposition: TBD;  Inpatient  Cardiac monitoring: Telemetry    Patient's condition is critical and at high risk of deterioration due to multiorgan failure;      CRITICAL CARE ATTESTATION:  I had a face to face encounter with the patient, reviewed and interpreted patient data including clinical events, labs, images, vital signs, I/O's, and examined patient.  I have discussed the case and the plan and management of the patient's care with the consulting services, the bedside nurses and necessary ancillary providers.       NOTE OF PERSONAL INVOLVEMENT IN CARE   This patient has a high probability of imminent, clinically significant deterioration, which requires the highest level of preparedness to intervene urgently. I participated in the decision-making and personally managed or directed the management of the following life and organ supporting interventions that required my frequent assessment to treat or prevent imminent deterioration.     I personally spent 45 minutes of critical care time.  This is time

## 2025-03-26 LAB
COMMENT:: NORMAL
GLUCOSE BLD STRIP.AUTO-MCNC: 116 MG/DL (ref 65–117)
GLUCOSE BLD STRIP.AUTO-MCNC: 119 MG/DL (ref 65–117)
GLUCOSE BLD STRIP.AUTO-MCNC: 187 MG/DL (ref 65–117)
GLUCOSE BLD STRIP.AUTO-MCNC: 54 MG/DL (ref 65–117)
GLUCOSE BLD STRIP.AUTO-MCNC: 54 MG/DL (ref 65–117)
GLUCOSE BLD STRIP.AUTO-MCNC: 58 MG/DL (ref 65–117)
GLUCOSE BLD STRIP.AUTO-MCNC: 87 MG/DL (ref 65–117)
HBV SURFACE AG SER QL: <0.1 INDEX
HBV SURFACE AG SER QL: NEGATIVE
HCV AB SER IA-ACNC: 0.18 INDEX
HCV AB SERPL QL IA: NONREACTIVE
HIV1 P24 AG SERPL QL IA: NONREACTIVE
HIV1+2 AB SERPL QL IA: NONREACTIVE
SERVICE CMNT-IMP: ABNORMAL
SERVICE CMNT-IMP: NORMAL
SERVICE CMNT-IMP: NORMAL
SPECIMEN HOLD: NORMAL

## 2025-03-26 PROCEDURE — 6370000000 HC RX 637 (ALT 250 FOR IP): Performed by: CLINICAL NURSE SPECIALIST

## 2025-03-26 PROCEDURE — 99231 SBSQ HOSP IP/OBS SF/LOW 25: CPT | Performed by: SPECIALIST

## 2025-03-26 PROCEDURE — 82962 GLUCOSE BLOOD TEST: CPT

## 2025-03-26 PROCEDURE — 2580000003 HC RX 258: Performed by: NURSE PRACTITIONER

## 2025-03-26 PROCEDURE — 97530 THERAPEUTIC ACTIVITIES: CPT

## 2025-03-26 PROCEDURE — 6370000000 HC RX 637 (ALT 250 FOR IP): Performed by: INTERNAL MEDICINE

## 2025-03-26 PROCEDURE — 99232 SBSQ HOSP IP/OBS MODERATE 35: CPT | Performed by: NURSE PRACTITIONER

## 2025-03-26 PROCEDURE — 6370000000 HC RX 637 (ALT 250 FOR IP): Performed by: NURSE PRACTITIONER

## 2025-03-26 PROCEDURE — 97535 SELF CARE MNGMENT TRAINING: CPT

## 2025-03-26 PROCEDURE — 6360000002 HC RX W HCPCS: Performed by: NURSE PRACTITIONER

## 2025-03-26 PROCEDURE — 6370000000 HC RX 637 (ALT 250 FOR IP): Performed by: STUDENT IN AN ORGANIZED HEALTH CARE EDUCATION/TRAINING PROGRAM

## 2025-03-26 PROCEDURE — 2500000003 HC RX 250 WO HCPCS: Performed by: STUDENT IN AN ORGANIZED HEALTH CARE EDUCATION/TRAINING PROGRAM

## 2025-03-26 PROCEDURE — 2580000003 HC RX 258: Performed by: STUDENT IN AN ORGANIZED HEALTH CARE EDUCATION/TRAINING PROGRAM

## 2025-03-26 PROCEDURE — 2060000000 HC ICU INTERMEDIATE R&B

## 2025-03-26 PROCEDURE — 99231 SBSQ HOSP IP/OBS SF/LOW 25: CPT

## 2025-03-26 PROCEDURE — 2500000003 HC RX 250 WO HCPCS: Performed by: INTERNAL MEDICINE

## 2025-03-26 PROCEDURE — 92526 ORAL FUNCTION THERAPY: CPT

## 2025-03-26 RX ORDER — INSULIN GLARGINE 100 [IU]/ML
38 INJECTION, SOLUTION SUBCUTANEOUS DAILY
Status: DISCONTINUED | OUTPATIENT
Start: 2025-03-26 | End: 2025-03-27

## 2025-03-26 RX ADMIN — THERA TABS 1 TABLET: TAB at 08:34

## 2025-03-26 RX ADMIN — DEXTROSE 125 ML: 10 SOLUTION INTRAVENOUS at 08:28

## 2025-03-26 RX ADMIN — ACETAMINOPHEN 650 MG: 325 TABLET ORAL at 22:09

## 2025-03-26 RX ADMIN — INSULIN LISPRO 2 UNITS: 100 INJECTION, SOLUTION INTRAVENOUS; SUBCUTANEOUS at 22:08

## 2025-03-26 RX ADMIN — AMPICILLIN SODIUM AND SULBACTAM SODIUM 3000 MG: 2; 1 INJECTION, POWDER, FOR SOLUTION INTRAMUSCULAR; INTRAVENOUS at 16:47

## 2025-03-26 RX ADMIN — CARVEDILOL 6.25 MG: 6.25 TABLET, FILM COATED ORAL at 22:08

## 2025-03-26 RX ADMIN — ACETAMINOPHEN 650 MG: 325 TABLET ORAL at 04:32

## 2025-03-26 RX ADMIN — CLOPIDOGREL BISULFATE 75 MG: 75 TABLET, FILM COATED ORAL at 08:34

## 2025-03-26 RX ADMIN — OXYCODONE HYDROCHLORIDE 2.5 MG: 5 TABLET ORAL at 13:17

## 2025-03-26 RX ADMIN — THIAMINE HYDROCHLORIDE 500 MG: 100 INJECTION, SOLUTION INTRAMUSCULAR; INTRAVENOUS at 12:46

## 2025-03-26 RX ADMIN — CARVEDILOL 6.25 MG: 6.25 TABLET, FILM COATED ORAL at 08:34

## 2025-03-26 RX ADMIN — AMPICILLIN SODIUM AND SULBACTAM SODIUM 3000 MG: 2; 1 INJECTION, POWDER, FOR SOLUTION INTRAMUSCULAR; INTRAVENOUS at 03:43

## 2025-03-26 RX ADMIN — ACETAMINOPHEN 650 MG: 325 TABLET ORAL at 08:34

## 2025-03-26 RX ADMIN — PREGABALIN 50 MG: 50 CAPSULE ORAL at 22:08

## 2025-03-26 RX ADMIN — THIAMINE HYDROCHLORIDE 500 MG: 100 INJECTION, SOLUTION INTRAMUSCULAR; INTRAVENOUS at 03:39

## 2025-03-26 RX ADMIN — Medication: at 22:10

## 2025-03-26 RX ADMIN — GUAIFENESIN 200 MG: 200 SOLUTION ORAL at 16:45

## 2025-03-26 RX ADMIN — SODIUM CHLORIDE, PRESERVATIVE FREE 10 ML: 5 INJECTION INTRAVENOUS at 22:10

## 2025-03-26 RX ADMIN — GUAIFENESIN 200 MG: 200 SOLUTION ORAL at 08:35

## 2025-03-26 RX ADMIN — CALCITRIOL CAPSULES 0.25 MCG 0.25 MCG: 0.25 CAPSULE ORAL at 08:34

## 2025-03-26 RX ADMIN — ACETAMINOPHEN 650 MG: 325 TABLET ORAL at 16:45

## 2025-03-26 RX ADMIN — THIAMINE HYDROCHLORIDE 500 MG: 100 INJECTION, SOLUTION INTRAMUSCULAR; INTRAVENOUS at 22:06

## 2025-03-26 RX ADMIN — ASPIRIN 81 MG CHEWABLE TABLET 81 MG: 81 TABLET CHEWABLE at 08:35

## 2025-03-26 RX ADMIN — PREGABALIN 50 MG: 50 CAPSULE ORAL at 08:34

## 2025-03-26 RX ADMIN — SODIUM CHLORIDE, PRESERVATIVE FREE 10 ML: 5 INJECTION INTRAVENOUS at 08:35

## 2025-03-26 RX ADMIN — INSULIN GLARGINE 38 UNITS: 100 INJECTION, SOLUTION SUBCUTANEOUS at 12:45

## 2025-03-26 RX ADMIN — GUAIFENESIN 200 MG: 200 SOLUTION ORAL at 22:08

## 2025-03-26 RX ADMIN — Medication: at 08:35

## 2025-03-26 ASSESSMENT — PAIN DESCRIPTION - LOCATION
LOCATION: BACK;NECK
LOCATION: NECK
LOCATION: NECK
LOCATION: HEAD
LOCATION: NECK

## 2025-03-26 ASSESSMENT — PAIN SCALES - GENERAL
PAINLEVEL_OUTOF10: 10
PAINLEVEL_OUTOF10: 9
PAINLEVEL_OUTOF10: 4
PAINLEVEL_OUTOF10: 10
PAINLEVEL_OUTOF10: 5
PAINLEVEL_OUTOF10: 5
PAINLEVEL_OUTOF10: 10

## 2025-03-26 ASSESSMENT — PAIN DESCRIPTION - ORIENTATION
ORIENTATION: MID
ORIENTATION: POSTERIOR
ORIENTATION: POSTERIOR
ORIENTATION: POSTERIOR;MID

## 2025-03-26 ASSESSMENT — PAIN DESCRIPTION - DESCRIPTORS
DESCRIPTORS: ACHING

## 2025-03-26 NOTE — PROGRESS NOTES
NEUROLOGY PROGESS NOTE    Name Nicci Hernandez Age 64 y.o.   MRN [id number]  1960     Consulting Physician: Chadwick Garcia MD      Chief Complaint:  nystagmus      Assessment:     Principal Problem:    Neck abscess  Active Problems:    Retropharyngeal abscess    MRSA bacteremia    MRSA infection    Controlled diabetes mellitus type 2 with complications (HCC)    AL (acute kidney injury)    CKD (chronic kidney disease) stage 4, GFR 15-29 ml/min (HCC)    Hyponatremia    Diabetic ulcer of left midfoot associated with type 2 diabetes mellitus, with muscle involvement without evidence of necrosis (HCC)    Osteomyelitis of left foot (HCC)    Generalized weakness    Horizontal nystagmus    Dysphagia    Leukocytosis  Resolved Problems:    * No resolved hospital problems. *    Patient is a 64 yof with h/o HTN, CAD s/p CABG, CHF, DM, CKD, and remote ACDF C3-C6 who presented on 3/19/25 after having 2-4 weeks of neck pain, generalized fatigue, and dysarthria. CT c-spine at OSH showed lifting off of the baseplate and loosening of the C3-C4 screws, but upon review by Neurosurgery instrumentation was felt to be not loose. MRI c-spine with clivus to C3 fluid signal focus concerning for retropharyngeal abscess which was drained by ENT. Wound cultures show 1+GPC and also with MRSA bacteremia. Plan for JEFF to eval for vegetations. Current exam, patient oriented with bilat horizontal gaze nystagmus, increased lethargy, mildly dysarthric and generalized weakness but reflexes present with also some possible functional overlay. Nystagmus does not seem to be medication effect per chart review. Need to r/o structural causes of nystagmus. History of symptom presentation difficult to obtain but does not seem to be neuromuscular at this point. WBC 16.3>>18.7.     3/26/25:  MRI brain negative for acute infarct; chronic R PCA infarct. Started high-dose thiamine yesterday and multivitamin due to likely deficiency in setting of  Provider, MD Sharon   vitamin B-6 (PYRIDOXINE) 100 MG tablet Take 1 tablet by mouth daily    ProviderSharon MD   TRESIBA FLEXTOUCH 200 UNIT/ML SOPN Inject 65 Units into the skin nightly Brand  Patient taking differently: Inject 65 Units into the skin daily Brand 9/4/24   Madelyn Juares MD   insulin aspart (NOVOLOG) 100 UNIT/ML injection pen Inject 20 Units into the skin 3 times daily (before meals) With SSI. Max units daily: 100 8/19/24   Madelyn Juares MD   Blood Glucose Monitoring Suppl (ONETOUCH VERIO FLEX SYSTEM) w/Device KIT Use to check BG 3 times daily. Dx code E11.65 8/19/24   Madelyn Juares MD   blood glucose test strips (ONETOUCH VERIO) strip Use to check BG 3 times daily. Dx code E11.65 8/19/24   Madelyn Juares MD   Lancets (ONETOUCH DELICA PLUS [id number]) MISC Use to check BG 3 times daily. Dx code E11.65 8/19/24   Madelyn Juares MD   methocarbamol (ROBAXIN) 750 MG tablet Take 1 tablet by mouth as needed 12/31/22   ProviderSharon MD   ALPRAZolam (XANAX) 0.25 MG tablet Take 1 tablet by mouth 2 times daily as needed for Anxiety. 3/7/22   Automatic Reconciliation, Ar   aspirin 81 MG chewable tablet Take 1 tablet by mouth daily    Automatic Reconciliation, Ar   carvedilol (COREG) 3.125 MG tablet Take 1 tablet by mouth 2 times daily 6/16/20   Automatic Reconciliation, Ar   clopidogrel (PLAVIX) 75 MG tablet Take 1 tablet by mouth daily    Automatic Reconciliation, Ar   ferrous sulfate (IRON 325) 325 (65 Fe) MG tablet Take 1 tablet by mouth three times a week 11/15/22   Automatic Reconciliation, Ar   spironolactone (ALDACTONE) 25 MG tablet Take 1 tablet by mouth daily 6/28/22   Automatic Reconciliation, Ar       Past Medical History:   Diagnosis Date    Arthritis     Bilateral lower leg cellulitis     CAD (coronary artery disease) 2009    Hx of 2 MI's and then CABG 5/2009    Cardiomyopathy, ischemic     CHF (congestive heart failure) (HCC)     CKD (chronic kidney

## 2025-03-26 NOTE — PLAN OF CARE
spouse but did not require assistance.    Occupational Therapy Goals:  Initiated 3/20/2025  1.  Patient will perform grooming standing at sink with Stand by Assist within 7 day(s).  2.  Patient will perform bathing with Minimal Assist within 7 day(s).  3.  Patient will perform lower body dressing with Moderate Assist within 7 day(s).  4.  Patient will perform toilet transfers with Supervision  within 7 day(s).  5.  Patient will perform all aspects of toileting with Supervision within 7 day(s).  6.  Patient will participate in upper extremity therapeutic exercise/activities with Modified [place] for 10 minutes within 7 day(s).    7.  Patient will utilize energy conservation techniques during functional activities with verbal cues within 7 day(s).    3/25/2025 1538 by Nahomy Hartman, NAUN  Outcome: Progressing     Problem: Skin/Tissue Integrity  Goal: Skin integrity remains intact  Description: 1.  Monitor for areas of redness and/or skin breakdown  2.  Assess vascular access sites hourly  3.  Every 4-6 hours minimum:  Change oxygen saturation probe site  4.  Every 4-6 hours:  If on nasal continuous positive airway pressure, respiratory therapy assess nares and determine need for appliance change or resting period  3/26/2025 0007 by Sandy Lozano RN  Outcome: Progressing  Flowsheets (Taken 3/25/2025 2000)  Skin Integrity Remains Intact: Monitor for areas of redness and/or skin breakdown  3/25/2025 1140 by Kingston Archibald RN  Outcome: Progressing     Problem: Nutrition Deficit:  Goal: Optimize nutritional status  3/26/2025 0007 by Sandy Lozano RN  Outcome: Progressing  3/25/2025 1140 by Kingston Archibald RN  Outcome: Progressing     Problem: SLP Adult - Impaired Swallowing  Goal: By Discharge: Advance to least restrictive diet without signs or symptoms of aspiration for planned discharge setting.  See evaluation for individualized goals.  Description: Speech pathology goals initiated 3/24/2025   1.patient will tolerate least  restrictive diet free of s/s aspiration   3/25/2025 1337 by Nadeen Ferguson, SLP  Outcome: Progressing

## 2025-03-26 NOTE — PROGRESS NOTES
Bedside shift change report given to Breann RN (oncoming nurse) by Sandy RN (offgoing nurse). Report included the following information Nurse Handoff Report.

## 2025-03-26 NOTE — PLAN OF CARE
Problem: Occupational Therapy - Adult  Goal: By Discharge: Performs self-care activities at highest level of function for planned discharge setting.  See evaluation for individualized goals.  Description: FUNCTIONAL STATUS PRIOR TO ADMISSION:  Pt lives with her  and is typically Mod I for BADLs/IADLs. Pt has a ramp to enter her home and sits on a shower chair to bathe at baseline. Pt reporting her  had a fall and is currently at U.   Receives Help From: Family, Prior Level of Assist for ADLs: Independent,  ,  ,  ,  ,  ,  ,  , Prior Level of Assist for Transfers: Independent, Active : Yes     HOME SUPPORT: Patient lived at home with spouse but did not require assistance.    Occupational Therapy Goals:  Initiated 3/20/2025  1.  Patient will perform grooming standing at sink with Stand by Assist within 7 day(s).  2.  Patient will perform bathing with Minimal Assist within 7 day(s).  3.  Patient will perform lower body dressing with Moderate Assist within 7 day(s).  4.  Patient will perform toilet transfers with Supervision  within 7 day(s).  5.  Patient will perform all aspects of toileting with Supervision within 7 day(s).  6.  Patient will participate in upper extremity therapeutic exercise/activities with Modified [place] for 10 minutes within 7 day(s).    7.  Patient will utilize energy conservation techniques during functional activities with verbal cues within 7 day(s).    Outcome: Progressing   OCCUPATIONAL THERAPY TREATMENT  Patient: Nicci Hernandez (64 y.o. female)  Date: 3/26/2025  Primary Diagnosis: Retropharyngeal abscess [J39.0]  Neck abscess [L02.11]       Precautions: Fall Risk                Chart, occupational therapy assessment, plan of care, and goals were reviewed.    ASSESSMENT  Patient continues to benefit from skilled OT services and is progressing towards goals. Pt presented semi davidson in bed agreeable to therapy. Pt demonstrating progress with motor control of BUE to  assist with grooming tasks and self feeding, static sitting balance, and improving activity tolerance. Pt remains limited by requiring totalA at bed level for frequent loose bowel movements, requiring two person assist for sit to stands, and overall low standing tolerance during BADLs. Pt left seated in the chair with all needs met.       PLAN :  Patient continues to benefit from skilled intervention to address the above impairments.  Continue treatment per established plan of care to address goals.    Recommend with staff: lizzy stedy to chair 2x/day Ax2, BSC for toileting if continent.     Recommendation for discharge: (in order for the patient to meet his/her long term goals):   High intensity/comprehensive skilled occupational therapy in a multidisciplinary setting as patient is working towards tolerating up to 3 hours of therapy/day 5-7x/week    Other factors to consider for discharge: lives alone, patient's current support system is unable to meet their requirements for physical assistance, and concern for safely navigating or managing the home environment    IF patient discharges home will need the following DME: continuing to assess with progress     SUBJECTIVE:   Patient stated “How long should I sit up?”    OBJECTIVE DATA SUMMARY:   Cognitive/Behavioral Status:   WFL    Functional Mobility and Transfers for ADLs:  Bed Mobility:  Bed Mobility Training  Bed Mobility Training: Yes  Supine to Sit: Substantial/Maximal assistance;2 Person assistance  Scootin Person assistance;Substantial/Maximal assistance     Transfers:   Transfer Training  Transfer Training: Yes  Sit to Stand: Partial/Moderate assistance;2 Person assistance (use of lizzy stedy)  Stand to Sit: Partial/Moderate assistance;2 Person assistance (use of lizzy stedy)  Bed to Chair: Dependent/Total           Balance:     Balance  Sitting: Impaired  Sitting - Static: Fair (occasional)  Standing: Impaired  Standing - Static: Constant

## 2025-03-26 NOTE — BSMART NOTE
BSMART Liaison Team Note     LOS:  7     Patient goal(s) for today: take medications as prescribed, make needs known in an appropriate manner   BSMART Liaison team focus/goals: access needs, provide supportive counseling and education and coordinate care     Progress note:   Liaison attempted to meet with pt she was meeting with OT. Liaison returned and pt was sleeping in bed. Pt did wake upon name being called. Pt was alert to self and location. Pt had difficulty recalling date and why she was in the hospital. Pt took a long time to answer questions at times. Pt denied SI/HI. When asked if she was still having hallucinations pt did not respond. Pt closed her eyes. Liaison asked pt if she would like her to come back at another time and she nodded.     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 Defer to medical documentation. Liaison will monitor and support.   Follow up

## 2025-03-26 NOTE — PROGRESS NOTES
Infectious Disease Progress     Impression:   MRSA high-grade bacteremia  Retropharyngeal abscess  Prevertebral abscess  Left medial diabetic foot wound  leukocytosis  - afebrile, wbc 14.6    Blood cx (3/19, 3/22) MRSA, (3/25) no growth so far    Wound cx (3/19) MRSA    MRI of C-spine; C3-C6 ACDF. Complex fluid signal focus in the prevertebral soft tissue  spanning from the clivus to C3, with associated soft tissue edema and swelling;  correlate for signs of infection such as retropharyngeal abscess. Multilevel spondylitic changes with associated canal stenoses, chronic cord compressions, and foraminal stenoses as outlined above. C4-C5 chronic myelomalacia.      CKD  Neck pain  DM II  - management per primary team  Plan:     - continue IV daptomycin (ck 15) & Unasyn     No plan for JEFF per cardiology team    Will need at least 6-8 weeks of daptomycin from the negative blood cx. Pt may need lifelong suppressive therapy upon completion of scheduled abx therapy    Recommend to complete Unasyn for 7 days, end date 3/31     Will provide final abx therapy order once we confirmed the negative blood cx      Appreciate Podiatry input; no signs of infection, open wound to be treated with wound care    Plan of care d/w pt, Dr. Garcia, and Dr. Blake                 History of Present Illness   3/21/2025  \"Patient is a 64 y.o. female with past medical Hx of CKD stage IV, diabetes with neuropathy, CAD s/p CABG, gastroparesis, cervical fusion about 20 years ago who presented to the OSH ED for neck pain with concerns for retropharyngeal abscess and was transferred to [facility] for ENT evaluation.      Patient was seen by OSH ED for neck pain with imaging showing concerns for loosening of hardware of fusion. Seen outpatient by orthopedic surgery for neck pain and prescribed Medrol pack. Presented to [facility] for worsening neck pain. CT imaging showed prevertebral fluid collection associated with the anterior fusion  disease     Presence of stent in coronary artery in patient with coronary artery disease     S/P CABG x 3     Type 2 diabetes mellitus with hyperglycemia, with long-term current use of insulin (HCC)     Unspecified sleep apnea     no cpap repeat study sched for 9/20/13       Allergies   Allergen Reactions    Atorvastatin Other (See Comments)     Reaction Type: Allergy; Severity: Severe; Reaction(s): myopathy  Reaction Type: Allergy; Severity: Severe; Reaction(s): myopathy       Current Facility-Administered Medications   Medication Dose Route Frequency Provider Last Rate Last Admin    thiamine (B-1) injection 500 mg  500 mg IntraVENous q8h Barbara Murray APRN - NP   500 mg at 03/26/25 0339    multivitamin 1 tablet  1 tablet Oral Daily Barbara Murray APRN - NP   1 tablet at 03/25/25 1023    insulin glargine (LANTUS) injection vial 48 Units  48 Units SubCUTAneous Daily Tricia Sanford APRN - CNS   48 Units at 03/25/25 0930    ampicillin-sulbactam (UNASYN) 3,000 mg in sodium chloride 0.9 % 100 mL IVPB (addEASE)  3,000 mg IntraVENous Q12H Vicente Blackman APRN - NP   Stopped at 03/26/25 0413    guaiFENesin (ROBITUSSIN) 100 MG/5ML liquid 200 mg  200 mg Oral TID Chadwick Garcia MD   200 mg at 03/25/25 2058    pregabalin (LYRICA) capsule 50 mg  50 mg Oral BID Saravanan Cadena MD   50 mg at 03/25/25 2058    acetaminophen (TYLENOL) tablet 650 mg  650 mg Oral TID Chadwick Garcia MD   650 mg at 03/25/25 2058    DAPTOmycin (CUBICIN) 700 mg in sodium chloride (PF) 0.9 % 14 mL IV syringe  700 mg IntraVENous Q48H Rosalinda Negron APRN - NP   700 mg at 03/25/25 1214    aspirin chewable tablet 81 mg  81 mg Oral Daily Chadwick Garcia MD   81 mg at 03/25/25 0929    clopidogrel (PLAVIX) tablet 75 mg  75 mg Oral Daily Chadwick Garcia MD   75 mg at 03/25/25 0952    oxyCODONE (ROXICODONE) immediate release tablet 2.5 mg  2.5 mg Oral Q4H PRN Chadwick Garcia MD   2.5 mg at 03/25/25 2116    balsum peru-castor oil (VENELEX) ointment

## 2025-03-26 NOTE — PROGRESS NOTES
HYPOGLYCEMIC EPISODE DOCUMENTATION    Patient with hypoglycemic episode(s) at 0825 on 3/26/25.   BG value(s) pre-treatment 54    Was patient symptomatic? [x] yes, [] no  Patient was treated with the following rescue medications/treatments: [x] D10 125 mL bolus                [] Glucose tablets                [] Glucagon                [x] 4oz juice                [] 6oz reg soda                [] 8oz low fat milk    BG value post-treatment: 116  Once BG treated and value greater than 80mg/dl, pt was provided with the following:  [x] 4oz juice   [] meal  Name of MD notified: Dr. Garcia at 0832  The following orders were received: AM Lantus and SSI held.

## 2025-03-26 NOTE — PLAN OF CARE
Speech LAnguage Pathology TREATMENT    Patient: Nicci Hernandez (64 y.o. female)  Date: 3/26/2025  Primary Diagnosis: Retropharyngeal abscess [J39.0]  Neck abscess [L02.11]       Precautions:  Fall Risk                  ASSESSMENT :  Patient seen upright in chair for swallowing treatment.  RN notes that patient's oral intake has been poor due to appetite for solids but with no difficulty for thin liquids.  Patient observed with thin liquids in sips and successive sips with no overt s/s aspiration.  She was observed with puree, minced and moist and regular consistency with slow but adequate mastication, bolus formation with full oral clearance.  She notes that her concern is with how long it takes to chew meat, which is why she doesn't like meat.  Would recommend regular diet + thin liquids based on findings, however MD just adapted diet to full liquid for patient to increase oral intake.  Will follow up x1 with aim to discharge if tolerating diet. Patient notes she does not have any pain and/or globus sensation.     Patient will benefit from skilled intervention to address the above impairments.     PLAN :  Recommendations and Planned Interventions:  Diet: Regular and thin liquids -- however will defer to MD recommendations of full liquids  --meds as tolerated     Recommend next SLP session: diet toleration    Acute SLP Services: Yes, SLP will continue to follow per plan of care.  Discharge Recommendations: Continue to assess pending progress     SUBJECTIVE:   Patient stated, “I don't like meat.”    OBJECTIVE:     Past Medical History:   Diagnosis Date    Arthritis     Bilateral lower leg cellulitis     CAD (coronary artery disease) 2009    Hx of 2 MI's and then CABG 5/2009    Cardiomyopathy, ischemic     CHF (congestive heart failure) (HCC)     CKD (chronic kidney disease)     Diabetes (HCC)     IDDM type 3    Diabetic neuropathy, painful (HCC)     Dyslipidemia     Elevated uric acid in blood     Gastroparalysis  to least restrictive diet without signs or symptoms of aspiration for planned discharge setting.  See evaluation for individualized goals.  Description: Speech pathology goals initiated 3/24/2025   1.patient will tolerate least restrictive diet free of s/s aspiration   Outcome: Progressing

## 2025-03-26 NOTE — PROGRESS NOTES
RENAL  PROGRESS NOTE        Subjective:   Resting.  Do not feel good, but no localizing symptoms    Objective:   VITALS SIGNS:    BP (!) 129/58   Pulse 84   Temp 97.9 °F (36.6 °C) (Oral)   Resp 14   Ht 1.575 m (5' 2\")   Wt 105 kg (231 lb 7.7 oz)   SpO2 97%   BMI 42.33 kg/m²             Temp (24hrs), Av °F (36.7 °C), Min:97.8 °F (36.6 °C), Max:98.6 °F (37 °C)         PHYSICAL EXAM:  NAD  Trace edema  Resting    DATA REVIEW:     INTAKE / OUTPUT:   Last shift:      701 - 1900  In: 100 [P.O.:100]  Out: -   Last 3 shifts: 1901 -  0700  In: 3916 [P.O.:70; I.V.:3446]  Out: 1450 [Urine:1450]    Intake/Output Summary (Last 24 hours) at 3/26/2025 1040  Last data filed at 3/26/2025 0800  Gross per 24 hour   Intake 3946.02 ml   Output 800 ml   Net 3146.02 ml         LABS:   LABS:  Recent Labs     25  0638 25  0741 25  0600   * 129* 128*   K 3.3* 3.5 HEMOLYZED,RECOLLECT REQUESTED   CL 97 98 100   CO2 29 18* 14*   BUN 59* 74* 81*   CREATININE 2.69* 3.06* 3.37*   CALCIUM 8.8 9.0 8.5   PHOS 3.8 4.4  --    MG 2.1 2.3  --      Recent Labs     25  0638 25  0741 25   WBC 14.6* 18.7* 16.3*   HGB 11.8 12.5 11.4*   HCT 36.8 38.2 35.6    363 272       Assessment:     CKD-3b,small kidneys  AL,got IV dye on 3.19 in the setting of bacteremia: ATN               No hydro,but rt kidney 8.7 cm < lt kidney 13.1 cm ??? (Both kidney were 8.1 cm on )  hyponatremia  HTN  DM-2  Met acidosis mixed : non AG and high AG  RP abscess,SA bacteremia  Edema    Discussion-fair urine output.  AL is improving with CR down to 3 to 2.7.  No labs today-difficult draw.  Off bicarb drip since the 3/25. Acidosis is resolved. Na also much better     Plan:  Encourage increase oral hydration/intake  Continue holding Aldactone and Coreg  IV antibiotics as per primary team  Avoid nephrotoxins  Labs in a.m.   monitor urine output    D/W Pt      Negro Ryder MD

## 2025-03-26 NOTE — DIABETES MGMT
[facility]  DIABETES MANAGEMENT CONSULT    Consulted by Provider for advanced nursing evaluation and care for inpatient blood glucose management.    Evaluation and Action Plan   Nicci Hernandez is a 63 yo female with a history of DMT2, CKD stage 4, diabetic neuropathy, CAD s/p CABG 2009, gastroparesis, HTN, HLD, and cervical fusion who presented to the ED with C-collar placed by [facility] with reports of severe neck pain with any movement and pain with swallowing. She also reports worsening bilateral upper/lower extremity weakness for past 4 days. She was seen by outpatient ortho surgery as well as [facility] doctors several days prior to arriving to ER. She was prescribed steroids by ortho surgery. She was admitted for treatment and further evaluation. Diabetes health was consulted for inpatient blood glucose management.     Ms. Hernandez has been a diabetic for 9 years. She is currently taking Tresiba 65 units and Novolog 20 units. She reports taking Metformin in the past but has not been on any oral medications for a while. Her dispense report shows she has not picked up any of her insulin prescriptions since last year.  A1c is 6.2% which is at goal of 7%. She reports she has not been eating much over the last 5 weeks due to the neck pain and pain with swallowing she been experiencing. She checks her BG with glucometer. She is followed and managed by endocrinologist Dr. Juares last seen Oct 2024 with reports of  \"She reports fluctuating blood sugar levels and is currently on Tresiba 65. She has been unable to obtain a new glucometer due to insurance issues. Her nephrologist, Dr. Ryder, recently conducted an A1c test, but the results are not yet available. She has attempted to use a Chas device, but it does not adhere properly. She takes NovoLog 20 units in the morning and forgets to take it at lunchtime. She has noticed that her blood sugar tends to drop after eating in the              351-400 15  Units                  Diabetes self-management practices:   Eating pattern - DEFERRED   [] Eating a carbohydrate-controlled meal plan   [] Breakfast    [] Lunch     [] Dinner       [] Snacks    [] Beverages    [] Dentition status   Physical activity pattern   [] Employing a physical activity program to control BG    Monitoring pattern   [x] Testing BGs sufficiently to inform self-management adjustments - FSBG with avg 's     Taking medications pattern  [x] Consistent administration  [x] Affordable  Reducing risks  [x] Influenza:   01/07/2021     Social determinants of health impacting diabetes self-management practices    Concerned that you need to know more about how to stay healthy with diabetes    Overall evaluation:    [x] Achieving A1c target with drug therapy & self-care practices    Subjective   ”I am okay”     Objective   Physical exam  General Obese female in no acute distress/ill-appearing. Conversant and cooperative  Neuro  Alert, oriented   Vital Signs   Vitals:    03/26/25 0833   BP:    Pulse: 89   Resp: 17   Temp:    SpO2: (!) 86%     Skin  Warm and dry. No acanthosis noted along neckline. No lipohypertrophy or lipoatrophy noted at injection sites   Extremities Left foot graft surgery 11/5/2024    Laboratory  Recent Labs     03/24/25  0741 03/25/25  0638   WBC 18.7* 14.6*   HGB 12.5 11.8   HCT 38.2 36.8   MCV 85.8 86.0    371     Recent Labs     03/24/25  0741 03/25/25  0638   * 135*   K 3.5 3.3*   CL 98 97   CO2 18* 29   PHOS 4.4 3.8   BUN 74* 59*   CREATININE 3.06* 2.69*     Lab Results   Component Value Date    ALT 12 03/19/2025    AST 34 03/19/2025    ALKPHOS 76 03/19/2025    BILITOT 0.9 03/19/2025     No results found for: \"TSH\", \"TSHFT4\", \"TSHELE\", \"CHG8SHI\", \"TSHHS\"      Factors impacting BG management  Factor Dose Comments   Nutrition:  Standard meals   60 grams/meal    Pain PRN     Infection Flagyl  Daptomycin  Rocephin    Kidney function CKD stage 4

## 2025-03-26 NOTE — PLAN OF CARE
Problem: Safety - Adult  Goal: Free from fall injury  3/26/2025 1031 by Celina Griffin  Outcome: Progressing  3/26/2025 0007 by Sandy Lozano RN  Outcome: Progressing     Problem: Chronic Conditions and Co-morbidities  Goal: Patient's chronic conditions and co-morbidity symptoms are monitored and maintained or improved  3/26/2025 1031 by Celina Griffin  Outcome: Progressing  3/26/2025 0007 by Sandy Lozano RN  Outcome: Progressing  Flowsheets (Taken 3/25/2025 2000)  Care Plan - Patient's Chronic Conditions and Co-Morbidity Symptoms are Monitored and Maintained or Improved: Monitor and assess patient's chronic conditions and comorbid symptoms for stability, deterioration, or improvement     Problem: Discharge Planning  Goal: Discharge to home or other facility with appropriate resources  3/26/2025 1031 by Celina Griffin  Outcome: Progressing  3/26/2025 0007 by Sandy Lozano RN  Outcome: Progressing  Flowsheets (Taken 3/25/2025 2000)  Discharge to home or other facility with appropriate resources: Identify barriers to discharge with patient and caregiver     Problem: Pain  Goal: Verbalizes/displays adequate comfort level or baseline comfort level  3/26/2025 1031 by Celina Griffin  Outcome: Progressing  3/26/2025 0007 by Sandy Lozano RN  Outcome: Progressing     Problem: Skin/Tissue Integrity  Goal: Skin integrity remains intact  Description: 1.  Monitor for areas of redness and/or skin breakdown  2.  Assess vascular access sites hourly  3.  Every 4-6 hours minimum:  Change oxygen saturation probe site  4.  Every 4-6 hours:  If on nasal continuous positive airway pressure, respiratory therapy assess nares and determine need for appliance change or resting period  3/26/2025 1031 by Celina Griffin  Outcome: Progressing  3/26/2025 0007 by Sandy Lozano RN  Outcome: Progressing  Flowsheets (Taken 3/25/2025 2000)  Skin Integrity Remains Intact: Monitor for areas of redness and/or skin breakdown     Problem:  Nutrition Deficit:  Goal: Optimize nutritional status  3/26/2025 1031 by Celina Griffin  Outcome: Progressing  3/26/2025 0007 by Sandy Lozano RN  Outcome: Progressing

## 2025-03-26 NOTE — PROGRESS NOTES
abscess.  The ER spoke to ENT who was apparently concerned due to the patient's cervical hardware and recommended transfer to a center with both NSGY and ENT.     Patient here in the ER is in a C-collar that was placed at [facility].  She reports severe neck pain with any movement.  Patient reports pain with swallowing.  She reports bilateral lower and upper extremity weakness that has been worsening for the last 4 days.  She denies any fever, chills, nausea, vomiting, diarrhea.  She reports a history of nasal congestion and possible sinusitis and reports that she had some teeth pulled for placement of implants but has had no tooth or gum infections.       Interval history / Subjective:   Patient was seen and examined.  She is slightly more awake today, more interactive.  Vitals stable, restarted Coreg at low-dose.  Discussed with cardiology: Not much to add at this point, outpatient follow-up with [facility].  Patient worked with PT/OT and sat up in her chair today.       Assessment & Plan:           Retropharyngeal abscess vs prevertebral abscess associated with cervical fusion hardware  -Patient does not appear clinically septic  -Occasional elevated heart rate may be more due to pain  -Caution with IV fluids due to CKD and CHF  -Possible sinus source?  -No recent odontogenic infection  -No recent autogenic infection  -Start vancomycin, cefepime, metronidazole  -ENT was consulted: Status post drainage of the retropharyngeal abscess in ER, culture sent;  -Neurosurgery was consulted: No indication to remove hardware; recommend abscess drainage and IV antibiotics;  -Morphine for pain control  -Blood culture 3/19: Staphylococcus aureus in 2 out of 4 bottles;  -Wound culture: 1+ gram-positive cocci in clusters;  -Consulted ID;  -Echo: Severely reduced LV function with an estimated EF of 20 to 25%, limited exam to evaluate for vegetations: Only mitral and aortic valves were well-visualized;   -Consult cardiology for JEFF, discussed  NSAIDs;   -Consult nephrology; patient follows up with Dr. Negro Ryder;  -3/21: Worsening creatinine; antibiotics switched from vancomycin to daptomycin;  -Renal ultrasound ordered;   -Acute component attributed to ATN from contrast dye;   -Metabolic acidosis: Was on bicarb drip, since 3/25, acidosis resolved;  -Restart diuretics per nephrology;    Metabolic acidosis, high anion gap:  -Not in DKA;  -Resolved with IV fluids and bicarb drip;    Hyponatremia:  -Resolved;      Type 2 diabetes  -She is on 65 units daily of Tresiba.  Patient has not been eating for many days due to pain so I will cut this back to 30 units daily for now  -Patient is on 10 units 3 times daily with meals of short acting insulin.  Will use medium dose sliding scale for now as she is not eating regular meals  -Patient has also been on steroids  -Consult diabetes management team;     ODALYS not on CPAP     Bilateral lower extremity edema:  -Patient on torsemide at home, will switch to IV Lasix while inpatient;  -Monitor BMP daily;  -Diuretics per nephrology    Right foot diabetic wound;  Acute osteomyelitis of the fifth metatarsal head;  -MRI to check for osteomyelitis:   1. Soft tissue ulcer plantar to the distal first metatarsal bone with a small  amount of fluid extending to the bone. Acute osteomyelitis is present in the  distal half of the first metatarsal bone.  2. Dorsal dislocation of the first and second MTP joints.  3. Small soft tissue wound lateral to the fifth metatarsal head with a small  fluid collection. Acute osteomyelitis is present of the fifth metatarsal head.  -Podiatry consulted: Status post nonexcisional debridement; culture sent ; continue wound care;     Pain management:  -Scheduled Tylenol 3 times daily;  -Patient declined lidocaine patch;   -No opioids due to hypotension;     Generalized weakness;  Poor appetite/poor p.o. intake;  Mourning the death of her  (a few days ago while patient was in the

## 2025-03-26 NOTE — PROGRESS NOTES
FUNCTIONAL STATUS PRIOR TO ADMISSION: Patient was modified independent, intermittently using a rolling walker for functional mobility.    HOME SUPPORT PRIOR TO ADMISSION: The patient lived with her  who she reports is also in the hospital (at [facility]) currently.    Physical Therapy Goals  Initiated 3/20/2025  1.  Patient will move from supine to sit and sit to supine and roll side to side in bed with modified independence within 7 day(s).    2.  Patient will perform sit to stand with modified independence within 7 day(s).  3.  Patient will transfer from bed to chair and chair to bed with modified independence using the least restrictive device within 7 day(s).  4.  Patient will ambulate with modified independence for 150 feet with the least restrictive device within 7 day(s).     PHYSICAL THERAPY TREATMENT    Patient: Nicci Hernandez (64 y.o. female)  Date: 3/25/2025  Diagnosis: Retropharyngeal abscess [J39.0]  Neck abscess [L02.11] Neck abscess      Precautions: Restrictions/Precautions  Restrictions/Precautions: Fall Risk            ASSESSMENT:  Received pt in bed amenable to therapy session.   She requires max A x2 mobilizing, sat EOB w/ fair to poor sitting balance while OT assisted w/ ADL task. She completed two sit<->stand trials w/ RW, demonstrates sudden onset of knee giving way after brief time standing requiring max Ax2 to safely resume sitting EOB and prevent falls.  Pt demonstrates improvement in alertness, has less trunk ataxia today and increased activity tolerance.     Patient continues to benefit from skilled PT services and is demonstrating progress towards goals.   She remains below her functional baseline and a high risk for falls. Acute therapy will continue to follow.            PLAN:  Patient continues to benefit from skilled intervention to address the above impairments.  Continue treatment per established plan of care.    Recommend for next PT session: sit to stand transfers and bed to              Activity Tolerance:   Fair  and requires rest breaks    After treatment:   Patient left in no apparent distress in bed, Call bell within reach, Bed/ chair alarm activated, and Side rails x3      COMMUNICATION/EDUCATION:   The patient's plan of care was discussed with: occupational therapist and registered nurse    Patient Education  Education Given To: Patient  Education Provided: Plan of Care;Transfer Training;Mobility Training;Fall Prevention Strategies;Equipment  Education Method: Verbal  Barriers to Learning: Cognition  Education Outcome: Continued education needed      Gisela Del Castillo, PT  Minutes: 24

## 2025-03-26 NOTE — PROGRESS NOTES
Comprehensive Nutrition Assessment    Type and Reason for Visit: Reassess    Nutrition Recommendations/Plan:     Adjusted diet to full liquids as pt is not interested in any solid foods at this time (solids are being wasted and she is not getting many items she would prefer at this time).  Will continue consistent CHO 60 gm/meal diet, lacto-ovo restriction.  Update and honor preferences as able.  When pt is more ready for solid foods, recommend adjusting back to soft and bite sized OR per latest SLP recommendations.      Since pt continues to only wants liquids she can drink via straw/ majority nutrition is from ONS, will:  Increase to 2 Glucerna/ meal = 16 oz, 52 gm CHO, 440 kcal, 20 gm pro    Once we have high kcal/high protein ONS back in stock, will adjust ONS to 1 high kcal/high protein ONS/ meal = 8 oz, 41 gm CHO, 350 kcal, 20 gm pro    Continue to adjust ONS based on PO intake    Pt instructed to consume 1 Glucerna shake as HS snack to help with AM hypoglycemia (OK for alternative snack option if she is open to this, but include a protein!)           Malnutrition Assessment:  Malnutrition Status:  At risk for malnutrition (if ongoing decreased intakes) (03/21/25 8521)    Context:  Acute Illness          Nutrition Assessment:    PMHx includes CKD stage 3b, diabetes, dyslipidemia, diabetic neuropathy, CAD s/p CABG in 2009, gastroparesis, hypertension, cervical fusion about 20 years ago.    Pt admitted with Retropharyngeal abscess [J39.0]  Neck abscess [L02.11]  Further noted for AL d/t IV dye 3/19 and bacteremia.  Edematous.          3/26: follow-up.  Chart reviewed/ events noted.  Diabetes now following with ongoing insulin adjustments/ recommendations (hypoglycemic this AM).  Treated with D10 bolus and juice.  Breakfast tray witnessed, pt only consumed 1 Glucerna shake for this meal.  She is not feeling hungry and continues to complain of throat discomfort and pain from her back and sciatica during visit.   She remained very still, continues to have limited mobility due to pain.    Progressively getting weaker, neuro eval and MRI brain ordered, nothing acute but noted chronic infarct.  Noted to be placed on high dose IV thiamine.  SLP now involved, placed on full liquids 3/24 and then soft and bite sized diet 3/25.  Pt today tells me she barely gets any liquids on tray and thought plan was to keep the full liquid diet in place.   Unfortunately, just having preferences for full liquids is not resulting in getting majority full liquids on tray.    Pt liked the idea of 1 Ensure shake with high kcal/pro content, however at this time, we cannot guarantee this stock and therefore, she is agreeable to 2 Glucerna/ meal and adjusting diet to just full liquids for now.  Pt instructed to consume 1 Glucerna shake as an HS snack to help with AM hypoglycemia.        3/21:BPA received for wound.  Noted above, pt c/o sore throat, but denies swallowing difficulty.  Discussed soft foods like yogurt and pudding which she likes, however she requests only liquids she can drink through a straw right now.  She further states if the soup isn't already in a cup she can hold, she won't be able to pour it from a bowl into anything.  Reviewed some soup options and she opted for tomato.  Did not care about ONS flavor, just that she needed them/ more of them.  She likes milk, but states she won't drink if comes on tray as it won't be cold enough for her.  Encouraged her to ask for milk from nourishment room when tray arrives.  Currently she has limited neck movement d/t pain from above.  Soft cervical collar PRN for support.  BG are not well controlled, which suspect is d/t infection or stress.  A1C 6.2% in Nov 2024 indicative of good control, although hx of A1C 11.4% in 2021.    We could possibly send 2 Glucerna with meals, would like BG to be better.  2 Glucerna = 52 gm CHO, 440 kcal, 20 gm pro.    1 Ensure Plus HP = ~41 gm CHO, 350 kcal, 20 gm

## 2025-03-26 NOTE — PROGRESS NOTES
She is doing well.    She denies any particular chest pain or shortness of breath.    Mostly she complains of being tired and exhausted.  Still some throat discomfort is reported.    I have discussed with her need for follow-up in cardiology clinic at [facility] in a short period of time after discharge.    Restart guideline directed medical therapy her blood pressure seems to be improved at this point we will defer to primary team.    I have discussed with her the reasoning behind AICD and she tells me the infection has been unfortunately holding her back from AICD placement.  Of course I will defer this to her regular cardiologist at [facility].    I will sign off for now but of course remain available as needed.  Please call back if any question or problems arise.

## 2025-03-27 ENCOUNTER — TELEPHONE (OUTPATIENT)
Facility: HOSPITAL | Age: 65
End: 2025-03-27

## 2025-03-27 LAB
ANION GAP SERPL CALC-SCNC: 7 MMOL/L (ref 2–12)
BACTERIA SPEC CULT: ABNORMAL
BACTERIA SPEC CULT: ABNORMAL
BUN SERPL-MCNC: 42 MG/DL (ref 6–20)
BUN/CREAT SERPL: 20 (ref 12–20)
CALCIUM SERPL-MCNC: 8.8 MG/DL (ref 8.5–10.1)
CHLORIDE SERPL-SCNC: 96 MMOL/L (ref 97–108)
CO2 SERPL-SCNC: 30 MMOL/L (ref 21–32)
COMMENT:: NORMAL
CREAT SERPL-MCNC: 2.13 MG/DL (ref 0.55–1.02)
ERYTHROCYTE [DISTWIDTH] IN BLOOD BY AUTOMATED COUNT: 15.1 % (ref 11.5–14.5)
GLUCOSE BLD STRIP.AUTO-MCNC: 140 MG/DL (ref 65–117)
GLUCOSE BLD STRIP.AUTO-MCNC: 71 MG/DL (ref 65–117)
GLUCOSE BLD STRIP.AUTO-MCNC: 73 MG/DL (ref 65–117)
GLUCOSE BLD STRIP.AUTO-MCNC: 94 MG/DL (ref 65–117)
GLUCOSE SERPL-MCNC: 82 MG/DL (ref 65–100)
HCT VFR BLD AUTO: 36.5 % (ref 35–47)
HGB BLD-MCNC: 11.2 G/DL (ref 11.5–16)
MAGNESIUM SERPL-MCNC: 2.4 MG/DL (ref 1.6–2.4)
MCH RBC QN AUTO: 27.6 PG (ref 26–34)
MCHC RBC AUTO-ENTMCNC: 30.7 G/DL (ref 30–36.5)
MCV RBC AUTO: 89.9 FL (ref 80–99)
NRBC # BLD: 0 K/UL (ref 0–0.01)
NRBC BLD-RTO: 0 PER 100 WBC
PHOSPHATE SERPL-MCNC: 3 MG/DL (ref 2.6–4.7)
PLATELET # BLD AUTO: 297 K/UL (ref 150–400)
PMV BLD AUTO: 10.6 FL (ref 8.9–12.9)
POTASSIUM SERPL-SCNC: 3.4 MMOL/L (ref 3.5–5.1)
RBC # BLD AUTO: 4.06 M/UL (ref 3.8–5.2)
SERVICE CMNT-IMP: ABNORMAL
SERVICE CMNT-IMP: NORMAL
SODIUM SERPL-SCNC: 133 MMOL/L (ref 136–145)
SPECIMEN HOLD: NORMAL
WBC # BLD AUTO: 9.8 K/UL (ref 3.6–11)

## 2025-03-27 PROCEDURE — 80048 BASIC METABOLIC PNL TOTAL CA: CPT

## 2025-03-27 PROCEDURE — 97530 THERAPEUTIC ACTIVITIES: CPT

## 2025-03-27 PROCEDURE — 6370000000 HC RX 637 (ALT 250 FOR IP): Performed by: INTERNAL MEDICINE

## 2025-03-27 PROCEDURE — 84100 ASSAY OF PHOSPHORUS: CPT

## 2025-03-27 PROCEDURE — 92526 ORAL FUNCTION THERAPY: CPT

## 2025-03-27 PROCEDURE — 6360000002 HC RX W HCPCS: Performed by: NURSE PRACTITIONER

## 2025-03-27 PROCEDURE — 94760 N-INVAS EAR/PLS OXIMETRY 1: CPT

## 2025-03-27 PROCEDURE — 83735 ASSAY OF MAGNESIUM: CPT

## 2025-03-27 PROCEDURE — 97535 SELF CARE MNGMENT TRAINING: CPT

## 2025-03-27 PROCEDURE — 6370000000 HC RX 637 (ALT 250 FOR IP): Performed by: NURSE PRACTITIONER

## 2025-03-27 PROCEDURE — 6370000000 HC RX 637 (ALT 250 FOR IP): Performed by: CLINICAL NURSE SPECIALIST

## 2025-03-27 PROCEDURE — 85027 COMPLETE CBC AUTOMATED: CPT

## 2025-03-27 PROCEDURE — 2580000003 HC RX 258: Performed by: NURSE PRACTITIONER

## 2025-03-27 PROCEDURE — 6360000002 HC RX W HCPCS

## 2025-03-27 PROCEDURE — 2500000003 HC RX 250 WO HCPCS: Performed by: STUDENT IN AN ORGANIZED HEALTH CARE EDUCATION/TRAINING PROGRAM

## 2025-03-27 PROCEDURE — 2500000003 HC RX 250 WO HCPCS: Performed by: INTERNAL MEDICINE

## 2025-03-27 PROCEDURE — 2700000000 HC OXYGEN THERAPY PER DAY

## 2025-03-27 PROCEDURE — 2580000003 HC RX 258

## 2025-03-27 PROCEDURE — 82962 GLUCOSE BLOOD TEST: CPT

## 2025-03-27 PROCEDURE — 6370000000 HC RX 637 (ALT 250 FOR IP): Performed by: STUDENT IN AN ORGANIZED HEALTH CARE EDUCATION/TRAINING PROGRAM

## 2025-03-27 PROCEDURE — 99231 SBSQ HOSP IP/OBS SF/LOW 25: CPT

## 2025-03-27 PROCEDURE — 99232 SBSQ HOSP IP/OBS MODERATE 35: CPT | Performed by: NURSE PRACTITIONER

## 2025-03-27 PROCEDURE — 2060000000 HC ICU INTERMEDIATE R&B

## 2025-03-27 RX ORDER — INSULIN GLARGINE 100 [IU]/ML
30 INJECTION, SOLUTION SUBCUTANEOUS DAILY
Status: DISCONTINUED | OUTPATIENT
Start: 2025-03-27 | End: 2025-03-28

## 2025-03-27 RX ADMIN — Medication: at 09:41

## 2025-03-27 RX ADMIN — Medication: at 22:07

## 2025-03-27 RX ADMIN — THIAMINE HYDROCHLORIDE 500 MG: 100 INJECTION, SOLUTION INTRAMUSCULAR; INTRAVENOUS at 22:00

## 2025-03-27 RX ADMIN — THIAMINE HYDROCHLORIDE 500 MG: 100 INJECTION, SOLUTION INTRAMUSCULAR; INTRAVENOUS at 03:58

## 2025-03-27 RX ADMIN — AMPICILLIN SODIUM AND SULBACTAM SODIUM 3000 MG: 2; 1 INJECTION, POWDER, FOR SOLUTION INTRAMUSCULAR; INTRAVENOUS at 04:02

## 2025-03-27 RX ADMIN — OXYCODONE HYDROCHLORIDE 2.5 MG: 5 TABLET ORAL at 22:06

## 2025-03-27 RX ADMIN — ACETAMINOPHEN 650 MG: 325 TABLET ORAL at 09:42

## 2025-03-27 RX ADMIN — PREGABALIN 50 MG: 50 CAPSULE ORAL at 22:06

## 2025-03-27 RX ADMIN — GUAIFENESIN 200 MG: 200 SOLUTION ORAL at 22:09

## 2025-03-27 RX ADMIN — ACETAMINOPHEN 650 MG: 325 TABLET ORAL at 22:05

## 2025-03-27 RX ADMIN — GUAIFENESIN 200 MG: 200 SOLUTION ORAL at 17:13

## 2025-03-27 RX ADMIN — THERA TABS 1 TABLET: TAB at 09:42

## 2025-03-27 RX ADMIN — AMPICILLIN SODIUM AND SULBACTAM SODIUM 3000 MG: 2; 1 INJECTION, POWDER, FOR SOLUTION INTRAMUSCULAR; INTRAVENOUS at 17:17

## 2025-03-27 RX ADMIN — CALCITRIOL CAPSULES 0.25 MCG 0.25 MCG: 0.25 CAPSULE ORAL at 09:42

## 2025-03-27 RX ADMIN — CLOPIDOGREL BISULFATE 75 MG: 75 TABLET, FILM COATED ORAL at 09:42

## 2025-03-27 RX ADMIN — CARVEDILOL 6.25 MG: 6.25 TABLET, FILM COATED ORAL at 22:06

## 2025-03-27 RX ADMIN — CARVEDILOL 6.25 MG: 6.25 TABLET, FILM COATED ORAL at 09:43

## 2025-03-27 RX ADMIN — SODIUM CHLORIDE, PRESERVATIVE FREE 10 ML: 5 INJECTION INTRAVENOUS at 09:43

## 2025-03-27 RX ADMIN — DAPTOMYCIN 700 MG: 500 INJECTION, POWDER, LYOPHILIZED, FOR SUSPENSION INTRAVENOUS at 12:25

## 2025-03-27 RX ADMIN — ACETAMINOPHEN 650 MG: 325 TABLET ORAL at 17:13

## 2025-03-27 RX ADMIN — THIAMINE HYDROCHLORIDE 500 MG: 100 INJECTION, SOLUTION INTRAMUSCULAR; INTRAVENOUS at 12:25

## 2025-03-27 RX ADMIN — PREGABALIN 50 MG: 50 CAPSULE ORAL at 09:42

## 2025-03-27 RX ADMIN — SODIUM CHLORIDE, PRESERVATIVE FREE 10 ML: 5 INJECTION INTRAVENOUS at 22:09

## 2025-03-27 RX ADMIN — ASPIRIN 81 MG CHEWABLE TABLET 81 MG: 81 TABLET CHEWABLE at 09:42

## 2025-03-27 RX ADMIN — INSULIN GLARGINE 30 UNITS: 100 INJECTION, SOLUTION SUBCUTANEOUS at 09:43

## 2025-03-27 RX ADMIN — GUAIFENESIN 200 MG: 200 SOLUTION ORAL at 09:43

## 2025-03-27 RX ADMIN — OXYCODONE HYDROCHLORIDE 2.5 MG: 5 TABLET ORAL at 12:47

## 2025-03-27 RX ADMIN — POTASSIUM BICARBONATE 20 MEQ: 782 TABLET, EFFERVESCENT ORAL at 12:25

## 2025-03-27 ASSESSMENT — PAIN SCALES - GENERAL
PAINLEVEL_OUTOF10: 3
PAINLEVEL_OUTOF10: 4
PAINLEVEL_OUTOF10: 8
PAINLEVEL_OUTOF10: 6
PAINLEVEL_OUTOF10: 6

## 2025-03-27 ASSESSMENT — PAIN DESCRIPTION - ORIENTATION
ORIENTATION: MID
ORIENTATION: POSTERIOR;RIGHT

## 2025-03-27 ASSESSMENT — PAIN DESCRIPTION - LOCATION
LOCATION: NECK
LOCATION: HEAD;NECK
LOCATION: NECK
LOCATION: NECK

## 2025-03-27 ASSESSMENT — PAIN DESCRIPTION - DESCRIPTORS
DESCRIPTORS: ACHING

## 2025-03-27 NOTE — PROGRESS NOTES
RENAL  PROGRESS NOTE        Subjective:   Resting.  Feels okay.  Difficult stick for blood draws  No nausea or vomiting.  Taking some p.o.    Objective:   VITALS SIGNS:    /64   Pulse 91   Temp 97.8 °F (36.6 °C) (Oral)   Resp 17   Ht 1.575 m (5' 2\")   Wt 105.6 kg (232 lb 12.9 oz)   SpO2 99%   BMI 42.58 kg/m²             Temp (24hrs), Av °F (36.7 °C), Min:97.4 °F (36.3 °C), Max:99.2 °F (37.3 °C)         PHYSICAL EXAM:  NAD  +leg  edema  AOx3    DATA REVIEW:     INTAKE / OUTPUT:   Last shift:      No intake/output data recorded.  Last 3 shifts: 1901 -  0700  In: 4275.2 [P.O.:300; I.V.:3571.1]  Out: 2200 [Urine:2200]    Intake/Output Summary (Last 24 hours) at 3/27/2025 1156  Last data filed at 3/27/2025 0432  Gross per 24 hour   Intake 329.15 ml   Output 1400 ml   Net -1070.85 ml         LABS:   LABS:  Recent Labs     25  0945 25  0638 25  0741   * 135* 129*   K 3.4* 3.3* 3.5   CL 96* 97 98   CO2 30 29 18*   BUN 42* 59* 74*   CREATININE 2.13* 2.69* 3.06*   CALCIUM 8.8 8.8 9.0   PHOS 3.0 3.8 4.4   MG 2.4 2.1 2.3     Recent Labs     25  0945 25  0638 25  0741   WBC 9.8 14.6* 18.7*   HGB 11.2* 11.8 12.5   HCT 36.5 36.8 38.2    371 363       Assessment:     CKD-3b,small kidneys  AL,got IV dye on 3.19 in the setting of bacteremia: ATN               No hydro,but rt kidney 8.7 cm < lt kidney 13.1 cm ??? (Both kidney were 8.1 cm on )  hyponatremia  HTN  DM-2  Met acidosis mixed : non AG and high AG  RP abscess,SA bacteremia  Edema  Hypokalemia- mild    Discussion-fair urine output.  AL is improving with CR down to 3 to 2.7 to 2.1.       Plan:  Oral KCL 20 meq x 1  Encourage increase oral hydration/intake  Continue holding Aldactone and Coreg  IV antibiotics as per primary team  Avoid nephrotoxins  Labs in a.m.   monitor urine output    D/W Pt, RN      Negro Ryder MD

## 2025-03-27 NOTE — PLAN OF CARE
Problem: Safety - Adult  Goal: Free from fall injury  3/27/2025 1129 by Breann Argueta RN  Outcome: Progressing  3/27/2025 0022 by Claire Lobato RN  Outcome: Progressing  Flowsheets (Taken 3/26/2025 2000)  Free From Fall Injury: Instruct family/caregiver on patient safety     Problem: Chronic Conditions and Co-morbidities  Goal: Patient's chronic conditions and co-morbidity symptoms are monitored and maintained or improved  3/27/2025 1129 by Breann Argueta RN  Outcome: Progressing  Flowsheets (Taken 3/27/2025 0800)  Care Plan - Patient's Chronic Conditions and Co-Morbidity Symptoms are Monitored and Maintained or Improved: Monitor and assess patient's chronic conditions and comorbid symptoms for stability, deterioration, or improvement  3/27/2025 0022 by Claire Lobato RN  Outcome: Progressing  Flowsheets (Taken 3/26/2025 2000)  Care Plan - Patient's Chronic Conditions and Co-Morbidity Symptoms are Monitored and Maintained or Improved: Monitor and assess patient's chronic conditions and comorbid symptoms for stability, deterioration, or improvement     Problem: Discharge Planning  Goal: Discharge to home or other facility with appropriate resources  3/27/2025 1129 by Breann Argueta RN  Outcome: Progressing  3/27/2025 0022 by Claire Lobato RN  Outcome: Progressing  Flowsheets (Taken 3/26/2025 2000)  Discharge to home or other facility with appropriate resources: Identify barriers to discharge with patient and caregiver     Problem: Pain  Goal: Verbalizes/displays adequate comfort level or baseline comfort level  3/27/2025 1129 by Breann Argueta RN  Outcome: Progressing  3/27/2025 0022 by Claire Lobato RN  Outcome: Progressing     Problem: Skin/Tissue Integrity  Goal: Skin integrity remains intact  Description: 1.  Monitor for areas of redness and/or skin breakdown  2.  Assess vascular access sites hourly  3.  Every 4-6 hours minimum:  Change oxygen saturation probe site  4.  Every 4-6 hours:  If on nasal continuous

## 2025-03-27 NOTE — PROGRESS NOTES
Hospitalist Progress Note  Seamus Rodriguez MD  Answering service: [phone]        Date of Service:  3/27/2025  NAME:  Nicci Hernandez  :  1960  MRN:  [id number]      Admission Summary:     Patient transferred from [facility] with retropharyngeal abscess.    Interval history / Subjective:     Still with some throat pain, unable to tolerate solid foods but tolerating Glucerna well.     Assessment & Plan:     Neck abscess  -CT soft tissue on presentation shows increased size of prevertebral fluid collection associated with anterior fusion plate  -Follow-up MRI shows complex fluid focus in the prevertebral soft tissue associated with tissue edema and swelling, correlate for signs of infection such as retropharyngeal abscess  -S/p I&D of the abscess by ENT at bedside on 3/19, culture of the abscess showed MRSA  -Patient is s/p prior cervical fusion  -Neurosurgery also evaluated the patient and recommended continuing IV antibiotics, no indication of hardware removal  -On daptomycin and Unasyn, ID following    Bacteremia  -Blood cultures 3/19, 3/22 and 3/25 growing MRSA  -Cardiology consulted for JEFF but recommended holding off on it since it may not be safe with retropharyngeal abscess  -ID recommending 6 to 8 weeks of IV antibiotics from negative blood culture, final antibiotic recommendation pending    AL  -AL on CKD stage IIIb, likely ATN in the setting of infection and also RUTH from dye  -Ultrasound negative for hydronephrosis  -Off IV fluid and creatinine is improving  -Nephrology following    Metabolic acidosis  -This is due to renal insufficiency  -Off of bicarb drip    Hyponatremia  -Likely related to hypovolemia  -Hyponatremia has resolved with IV fluids    Hypertension  -Continue to hold Aldactone due to AL, continue Coreg  -Monitor blood pressure    Diabetes type 2  -On Lantus and sliding scale  SubCUTAneous 4x Daily AC & HS    dextrose bolus 10% 250 mL  250 mL IntraVENous PRN    albuterol (PROVENTIL) (2.5 MG/3ML) 0.083% nebulizer solution 2.5 mg  2.5 mg Nebulization 4x Daily PRN    carvedilol (COREG) tablet 6.25 mg  6.25 mg Oral BID     ______________________________________________________________________  EXPECTED LENGTH OF STAY: 12  ACTUAL LENGTH OF STAY:          8                 Seamus Rodriguez MD

## 2025-03-27 NOTE — PROGRESS NOTES
pain, left     Hypercholesterolemia     Hypertension     Hypertension, diastolic, benign     Hypoalbuminemia     Peripheral vascular disease     Presence of stent in coronary artery in patient with coronary artery disease     S/P CABG x 3     Type 2 diabetes mellitus with hyperglycemia, with long-term current use of insulin (HCC)     Unspecified sleep apnea     no cpap repeat study sched for 9/20/13       Allergies   Allergen Reactions    Atorvastatin Other (See Comments)     Reaction Type: Allergy; Severity: Severe; Reaction(s): myopathy  Reaction Type: Allergy; Severity: Severe; Reaction(s): myopathy       Current Facility-Administered Medications   Medication Dose Route Frequency Provider Last Rate Last Admin    insulin glargine (LANTUS) injection vial 38 Units  38 Units SubCUTAneous Daily Tricia Sanford APRN - CNS   38 Units at 03/26/25 1245    thiamine (B-1) injection 500 mg  500 mg IntraVENous q8h Barbara Murray APRN - NP   500 mg at 03/27/25 0358    multivitamin 1 tablet  1 tablet Oral Daily Barbara Murray APRN - NP   1 tablet at 03/26/25 0834    ampicillin-sulbactam (UNASYN) 3,000 mg in sodium chloride 0.9 % 100 mL IVPB (addEASE)  3,000 mg IntraVENous Q12H Vicente Blackman APRN - NP   Stopped at 03/27/25 0432    guaiFENesin (ROBITUSSIN) 100 MG/5ML liquid 200 mg  200 mg Oral TID Chadwick Garcia MD   200 mg at 03/26/25 2208    pregabalin (LYRICA) capsule 50 mg  50 mg Oral BID Saravanan Cadena MD   50 mg at 03/26/25 2208    acetaminophen (TYLENOL) tablet 650 mg  650 mg Oral TID Chadwick Garcia MD   650 mg at 03/26/25 2209    DAPTOmycin (CUBICIN) 700 mg in sodium chloride (PF) 0.9 % 14 mL IV syringe  700 mg IntraVENous Q48H Rosalinda Negron APRN - NP   700 mg at 03/25/25 1214    aspirin chewable tablet 81 mg  81 mg Oral Daily Chadwick Garcia MD   81 mg at 03/26/25 0835    clopidogrel (PLAVIX) tablet 75 mg  75 mg Oral Daily Chadwick Garcia MD   75 mg at 03/26/25 0834    oxyCODONE (ROXICODONE) immediate  Collected: 03/19/25 1846    Order Status: Canceled Specimen: Abscess     Culture, Wound (with Gram Stain) [[id number]]  (Abnormal)  (Susceptibility) Collected: 03/19/25 1846    Order Status: Completed Updated: 03/21/25 1413     Special Requests NO SPECIAL REQUESTS        Gram Stain 1+ WBCS SEEN               1+ Gram positive cocci IN CLUSTERS           Culture       HEAVY Methicillin Resistant Staphylococcus aureus            (NOTE) MRSA CALLED TO AND READ BACK BY CATHY NICK ON 3/21/25 AT 79784.JS    Susceptibility        Methicillin-Resistant Staphylococcus aureus      BACTERIAL SUSCEPTIBILITY PANEL JULIAN      ciprofloxacin >=8 ug/mL Resistant      clindamycin >=4 ug/mL Resistant      DAPTOmycin 0.25 ug/mL Sensitive      doxycycline 1 ug/mL Sensitive      erythromycin >=8 ug/mL Resistant      gentamicin <=0.5 ug/mL Sensitive      levofloxacin >=8 ug/mL Resistant      linezolid 4 ug/mL Sensitive      oxacillin >=4 ug/mL Resistant      rifampin <=0.5 ug/mL Sensitive  [1]       tetracycline 2 ug/mL Sensitive      trimethoprim-sulfamethoxazole <=10 ug/mL Sensitive      vancomycin 1 ug/mL Sensitive                   [1]  Rifampin is not to be used for mono-therapy.                    Blood Culture 1 [[id number]]  (Abnormal)  (Susceptibility) Collected: 03/19/25 1103    Order Status: Completed Specimen: Blood Updated: 03/22/25 0633     Special Requests No Special Requests        Culture       Methicillin Resistant Staphylococcus aureus growing in both bottles drawn No Site Indicated            (NOTE) [facility] CALLED TO AND READ BACK BY YA MORGAN AT 0910 ON 3/20/2025 JH    Susceptibility        Methicillin-Resistant Staphylococcus aureus      BACTERIAL SUSCEPTIBILITY PANEL JULIAN      ciprofloxacin >=8 ug/mL Resistant      clindamycin >=4 ug/mL Resistant      DAPTOmycin 0.25 ug/mL Sensitive      doxycycline 1 ug/mL Sensitive      erythromycin >=8 ug/mL Resistant      gentamicin <=0.5 ug/mL Sensitive      Inducible

## 2025-03-27 NOTE — PLAN OF CARE
Problem: Safety - Adult  Goal: Free from fall injury  3/27/2025 0022 by Claire Lobato RN  Outcome: Progressing  Flowsheets (Taken 3/26/2025 2000)  Free From Fall Injury: Instruct family/caregiver on patient safety  3/26/2025 1031 by Celina Griffin  Outcome: Progressing     Problem: Chronic Conditions and Co-morbidities  Goal: Patient's chronic conditions and co-morbidity symptoms are monitored and maintained or improved  3/27/2025 0022 by Claire Lobato RN  Outcome: Progressing  Flowsheets (Taken 3/26/2025 2000)  Care Plan - Patient's Chronic Conditions and Co-Morbidity Symptoms are Monitored and Maintained or Improved: Monitor and assess patient's chronic conditions and comorbid symptoms for stability, deterioration, or improvement  3/26/2025 1031 by Celina Griffin  Outcome: Progressing     Problem: Discharge Planning  Goal: Discharge to home or other facility with appropriate resources  3/27/2025 0022 by Claire Lobato RN  Outcome: Progressing  Flowsheets (Taken 3/26/2025 2000)  Discharge to home or other facility with appropriate resources: Identify barriers to discharge with patient and caregiver  3/26/2025 1031 by Celina Griffin  Outcome: Progressing     Problem: Pain  Goal: Verbalizes/displays adequate comfort level or baseline comfort level  3/27/2025 0022 by Claire Lobato RN  Outcome: Progressing  3/26/2025 1031 by Celina Griffin  Outcome: Progressing     Problem: Occupational Therapy - Adult  Goal: By Discharge: Performs self-care activities at highest level of function for planned discharge setting.  See evaluation for individualized goals.  Description: FUNCTIONAL STATUS PRIOR TO ADMISSION:  Pt lives with her  and is typically Mod I for BADLs/IADLs. Pt has a ramp to enter her home and sits on a shower chair to bathe at baseline. Pt reporting her  had a fall and is currently at [facility].   Receives Help From: Family, Prior Level of Assist for ADLs: Independent,  ,  ,  ,  ,  ,  ,  , Prior  for individualized goals.  Description: Speech pathology goals initiated 3/24/2025   1.patient will tolerate least restrictive diet free of s/s aspiration   3/26/2025 1234 by Pooja Strickland, SLP  Outcome: Progressing

## 2025-03-27 NOTE — WOUND CARE
WOCN Note:     Follow up assessment.  Left foot wound followed by Dr Hernandez, podiatry.    Assessed in 440/01.    Nicci Hernandez is a 64 y.o. y/o female who presented for Retropharyngeal abscess [J39.0]  Neck abscess [L02.11]  Admitted on 3/19/2025; LOS: 8    Lab Results   Component Value Date/Time    WBC 9.8 03/27/2025 09:45 AM    LABA1C 6.2 (H) 11/22/2024 02:36 AM    POCGLU 94 03/27/2025 11:03 AM    POCGLU 71 03/27/2025 08:53 AM    HGB 11.2 (L) 03/27/2025 09:45 AM    HCT 36.5 03/27/2025 09:45 AM     03/27/2025 09:45 AM      Lab Results   Component Value Date/Time    WBC 9.8 03/27/2025 09:45 AM       ADULT DIET; Full Liquid; Vegetarian (Lacto-Ovo); likes yogurt, ice cream (vanilla), italian ice, soup/broth in mug, mashed potatoes  ADULT ORAL NUTRITION SUPPLEMENT; Breakfast, Lunch, Dinner; Diabetic Oral Supplement  DIET ONE TIME MESSAGE;     Assessment:   Patient is A&O x 3, communicative and requires assist with repositioning.    Bed: marcelle gel  GI/: external catheter  Patient reports no pain.    Patient repositioned on right side with pillow.  Heels offloaded with pillows.  Sacrum and buttocks intact with blanching erythema.  Protected with foam dressing.      Wound Assessment  POA Left foot dressing dry and intact.  2. POA Proximal back, pressure injury stage 2 : 2 x 2.4 x 0.1 cm  100% slow blanching red and pink. no exudate; no odor. Radha wound without erythema.   TX: applied venelex and foam  4.  POA Distal back, pressure injury stage 2:  2.5 x 3 x 0.1 cm; 100% slow blanching red and pink; no exudate; no odor.  Radha wound without erythema.  Tx:  applied venelex and foam            4.  Right heel, blanching red erythema. 1 x 1 x 0 cm  Tx: applied venelex    Wound, Pressure Prevention & Skin Care Recommendations:    Minimize layers of linen/pads under patient to optimize support surface.    2.  Turn/reposition approximately every 2 hours and offload heels.   3.  Manage moisture/ Keep skin folds clean

## 2025-03-27 NOTE — PLAN OF CARE
Cognitive/Behavioral Status:     Cognition  Overall Cognitive Status: Exceptions  Arousal/Alertness: Appears intact  Following Commands: Follows one step commands with repetition  Attention Span: Unable to maintain attention  Safety Judgement: Impaired  Problem Solving: Impaired  Insights: Decreased awareness of deficits  Initiation: Requires cues for some  Sequencing: Requires cues for some    Functional Mobility and Transfers for ADLs:  Bed Mobility:  Bed Mobility Training  Bed Mobility Training: Yes  Supine to Sit: Substantial/Maximal assistance;2 Person assistance  Sit to Supine: Substantial/Maximal assistance;2 Person assistance  Scooting: Substantial/Maximal assistance     Transfers:   Transfer Training  Transfer Training: Yes  Sit to Stand: Substantial/Maximal assistance;2 Person assistance (lizzy stedy)  Stand to Sit: Substantial/Maximal assistance;2 Person assistance (lizzy stedy)  Bed to Chair: Dependent/Total    Balance:     Balance  Sitting: Impaired  Sitting - Static: Fair (occasional)  Standing: Impaired  Standing - Static: Constant support;Poor    ADL Intervention:    Feeding: Moderate assistance  Feeding Skilled Clinical Factors: Pt able to self feed italian ice using RUE while holding cup in L hand. Pt requiring up to ModA due to quick to fatigue.    Grooming: Maximum assistance;Based on clinical judgement     UE Bathing: Maximum assistance;Based on clinical judgement     LE Bathing: Dependent/Total;Based on clinical judgement     UE Dressing: Maximum assistance;Based on clinical judgement     LE Dressing: Dependent/Total     Toileting: Dependent/Total     Functional Mobility: Dependent/Total        Product Used : Bath wipes    [facility] AM-PACTM \"6 Clicks\"                                                       Daily Activity Inpatient Short Form  How much help from another person does the patient currently need... Total; A Lot A Little None   1.  Putting on and taking off regular lower body  clothing? [x]  1 []  2 []  3 []  4   2.  Bathing (including washing, rinsing, drying)? [x]  1 []  2 []  3 []  4   3.  Toileting, which includes using toilet, bedpan or urinal? [x] 1 []  2 []  3 []  4   4.  Putting on and taking off regular upper body clothing? []  1 [x]  2 []  3 []  4   5.  Taking care of personal grooming such as brushing teeth? []  1 [x]  2 []  3 []  4   6.  Eating meals? []  1 [x]  2 []  3 []  4   © 2007, Trustees of [facility], under license to [employer]. All rights reserved     Score: 9/24     Interpretation of Tool:  Represents clinically-significant functional categories (i.e. Activities of daily living).    Cutoff score 39.4 (19) correlates to a good likelihood of discharging home versus a facility  Cheyenne Leslie, Liz Medel, Ze Short, Caren Kang, Jean-Paul Mead, Timo Leslie, AM-PAC “6-Clicks” Functional Assessment Scores Predict Acute Care Hospital Discharge Destination, Physical Therapy, Volume 94, Issue 9, 1 September 2014, Pages 7775-3037, [email or web address]    Pain Rating:  Pt reporting headache and R hip  Pain Intervention(s):   nursing notified and repositioning    Activity Tolerance:   Fair   Please refer to the flowsheet for vital signs taken during this treatment.    After treatment:   Patient left in no apparent distress in bed, Call bell within reach, Bed/ chair alarm activated, Caregiver / family present, and Patient offloaded in partial L side lying for pressure relief    COMMUNICATION/EDUCATION:   The patient's plan of care was discussed with: physical therapist and registered nurse    Patient Education  Education Given To: Patient  Education Provided: Role of Therapy;Plan of Care;Transfer Training;ADL Adaptive Strategies  Education Method: Verbal  Barriers to Learning: Other (Comment) (pain)  Education Outcome: Continued education needed    Thank you for this referral.  Nahomy Hartman OT  Minutes: 39

## 2025-03-27 NOTE — PLAN OF CARE
Speech LAnguage Pathology TREATMENT/DISCHARGE    Patient: Nicci Hrenandez (64 y.o. female)  Date: 3/27/2025  Primary Diagnosis: Retropharyngeal abscess [J39.0]  Neck abscess [L02.11]       Precautions:  Fall Risk                  ASSESSMENT :    Patient with no s/sx dysphagia at bedside and in absence of objective imaging. Patient appears safe to continue with full liquid per MD order however patient may advance diet to regular textured solids per MD with no concerns for aspiration risk at this time.     Patient will be discharged from skilled speech-language pathology services at this time.     PLAN :  Recommendations and Planned Interventions:  Diet: Full liquid per MD order  May advance to regular textured solids as appropriate    Acute SLP Services: No, patient will be discharged from acute skilled speech-language pathology at this time.  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

## 2025-03-27 NOTE — DIABETES MGMT
[facility]  DIABETES MANAGEMENT CONSULT    Consulted by Provider for advanced nursing evaluation and care for inpatient blood glucose management.    Evaluation and Action Plan   Nicci Hernandez is a 63 yo female with a history of DMT2, CKD stage 4, diabetic neuropathy, CAD s/p CABG 2009, gastroparesis, HTN, HLD, and cervical fusion who presented to the ED with C-collar placed by [facility] with reports of severe neck pain with any movement and pain with swallowing. She also reports worsening bilateral upper/lower extremity weakness for past 4 days. She was seen by outpatient ortho surgery as well as [facility] doctors several days prior to arriving to ER. She was prescribed steroids by ortho surgery. She was admitted for treatment and further evaluation. Diabetes health was consulted for inpatient blood glucose management.     Ms. Hernandez has been a diabetic for 9 years. She is currently taking Tresiba 65 units and Novolog 20 units. She reports taking Metformin in the past but has not been on any oral medications for a while. Her dispense report shows she has not picked up any of her insulin prescriptions since last year.  A1c is 6.2% which is at goal of 7%. She reports she has not been eating much over the last 5 weeks due to the neck pain and pain with swallowing she been experiencing. She checks her BG with glucometer. She is followed and managed by endocrinologist Dr. Juares last seen Oct 2024 with reports of  \"She reports fluctuating blood sugar levels and is currently on Tresiba 65. She has been unable to obtain a new glucometer due to insurance issues. Her nephrologist, Dr. Ryder, recently conducted an A1c test, but the results are not yet available. She has attempted to use a Chas device, but it does not adhere properly. She takes NovoLog 20 units in the morning and forgets to take it at lunchtime. She has noticed that her blood sugar tends to drop after eating in the  mornings.\"    3/27- FBG 71, likely from her not eating solid foods and only tolerating drinking Glucerna shakes and liquids r/t pain when swallowing. Will reduce basal dosing by 20%, anticipate may have to reduce Lantus more significantly if FBG continue to be below target range from decreased PO intake. Preprandial BG , will continue to hold mealtime insulin and use correctional for any hyperglycemia spikes. Will continue to follow along with you.     Blood glucose pattern      Significant diabetes-related events over the past 24-72 hours  A1C 6.2%  Fasting B  Pre-prandial: n/a  Basal: Lantus 30 units   Bolus: n/a  Correction: Q6hr   Total daily insulin dose in the last 24 hours: 40 units      Management Rationale Action Plan   Medication   Basal needs Using 0.4 units/kg/D based on weight  REDUCE Lantus by 20% to 30 units daily    Nutritional needs Covers carb load in meals Will continue to HOLD as she is not eating solid foods   Corrective insulin Using medium dose sensitivity based on weight  ACHS    Additional orders  Carb consistent diet (60g CHO/meal) - Full liquid diet with no concentrated sweets       Diabetes Discharge Plan   Medication:  A1c is at goal 6.2%, recommend continuing current PTA regimen.      Referral  []        Outpatient diabetes education   Additional orders            Initial Presentation   Nicci Hernandez is a 64 y.o. female who presented to the ED on 3/19 for C-collar placed by [facility] with reports of severe neck pain with any movement and pain with swallowing. She also reports worsening bilateral upper/lower extremity weakness for past 4 days    HX:   Past Medical History:   Diagnosis Date    Arthritis     Bilateral lower leg cellulitis     CAD (coronary artery disease)     Hx of 2 MI's and then CABG 2009    Cardiomyopathy, ischemic     CHF (congestive heart failure) (HCC)     CKD (chronic kidney disease)     Diabetes (HCC)     IDDM type 3    Diabetic neuropathy, painful

## 2025-03-27 NOTE — PROGRESS NOTES
and of level of assist and transfer technique for return to bed.      COMMUNICATION/EDUCATION:   The patient's plan of care was discussed with: occupational therapist and registered nurse    Patient Education  Education Given To: Patient  Education Provided: Plan of Care;Transfer Training;Mobility Training;Equipment  Education Method: Verbal;Demonstration  Barriers to Learning: None  Education Outcome: Demonstrated understanding;Continued education needed      Gisela Del Castillo, PT  Minutes: 46

## 2025-03-27 NOTE — PLAN OF CARE
Discharge IV Antibiotic Order  [facility]  [address] [facility] [address] [place] [id number]  TEL: [phone]  FAX: [phone]      1.  Diagnosis:  MRSA bacteremia, prevertebral abscess  2.  Antibiotic:  IV Daptomycin 700 mg every 24 hours       END DATE: 5/5/2025  3.  Routine PICC/ Julia/ Portacath Care including PRN catheter flow management  4.  Weekly labs:   [x] CBC/diff/platelets   [x] BUN/Creatinine    [x] CPK. Please hold your cholesterol medication (name ends with STATIN) while you are taking daily Daptomycin.    [x]  AST/Total bilirubin/Alkaline Phosphatase    [x] CRP   []Trough Vancomycin level goal 15-20. Drawn every Monday and Thursday. Clinical pharmacy consult for Vancomycin dosing according to the trough level.   5.  Fax lab to [phone].  Call Critical Lab Values to [phone]  6.  May send to IR for line evaluation or replacement [phone]  7.  Home Health to pull PICC line at end of therapy or send to IR for Julia removal  8.  Allergies:    Allergies   Allergen Reactions    Atorvastatin Other (See Comments)     Reaction Type: Allergy; Severity: Severe; Reaction(s): myopathy  Reaction Type: Allergy; Severity: Severe; Reaction(s): myopathy     9.  Recommend to follow up within 3-4 weeks        ILIA Long NP

## 2025-03-27 NOTE — PROGRESS NOTES
Occupational Therapy  03/27/25    Chart reviewed and spoke with MD and RN in prep to see Pt. Pt with wound care at bedside. Will defer and follow up as able and appropriate.    Thank you!  Nahomy Hartman, OT

## 2025-03-28 LAB
ANION GAP SERPL CALC-SCNC: 6 MMOL/L (ref 2–12)
BACTERIA SPEC CULT: ABNORMAL
BACTERIA SPEC CULT: ABNORMAL
BUN SERPL-MCNC: 40 MG/DL (ref 6–20)
BUN/CREAT SERPL: 19 (ref 12–20)
CALCIUM SERPL-MCNC: 9 MG/DL (ref 8.5–10.1)
CHLORIDE SERPL-SCNC: 98 MMOL/L (ref 97–108)
CO2 SERPL-SCNC: 29 MMOL/L (ref 21–32)
COMMENT:: NORMAL
CREAT SERPL-MCNC: 2.08 MG/DL (ref 0.55–1.02)
GLUCOSE BLD STRIP.AUTO-MCNC: 114 MG/DL (ref 65–117)
GLUCOSE BLD STRIP.AUTO-MCNC: 169 MG/DL (ref 65–117)
GLUCOSE BLD STRIP.AUTO-MCNC: 293 MG/DL (ref 65–117)
GLUCOSE BLD STRIP.AUTO-MCNC: 316 MG/DL (ref 65–117)
GLUCOSE BLD STRIP.AUTO-MCNC: 45 MG/DL (ref 65–117)
GLUCOSE BLD STRIP.AUTO-MCNC: 53 MG/DL (ref 65–117)
GLUCOSE BLD STRIP.AUTO-MCNC: 90 MG/DL (ref 65–117)
GLUCOSE SERPL-MCNC: 50 MG/DL (ref 65–100)
GRAM STN SPEC: ABNORMAL
GRAM STN SPEC: ABNORMAL
POTASSIUM SERPL-SCNC: 3.5 MMOL/L (ref 3.5–5.1)
SERVICE CMNT-IMP: ABNORMAL
SERVICE CMNT-IMP: NORMAL
SERVICE CMNT-IMP: NORMAL
SODIUM SERPL-SCNC: 133 MMOL/L (ref 136–145)
SPECIMEN HOLD: NORMAL
VIT B1 BLD-SCNC: 118.1 NMOL/L (ref 66.5–200)

## 2025-03-28 PROCEDURE — 6370000000 HC RX 637 (ALT 250 FOR IP): Performed by: FAMILY MEDICINE

## 2025-03-28 PROCEDURE — 6370000000 HC RX 637 (ALT 250 FOR IP): Performed by: STUDENT IN AN ORGANIZED HEALTH CARE EDUCATION/TRAINING PROGRAM

## 2025-03-28 PROCEDURE — 6370000000 HC RX 637 (ALT 250 FOR IP): Performed by: INTERNAL MEDICINE

## 2025-03-28 PROCEDURE — 2500000003 HC RX 250 WO HCPCS: Performed by: STUDENT IN AN ORGANIZED HEALTH CARE EDUCATION/TRAINING PROGRAM

## 2025-03-28 PROCEDURE — 2580000003 HC RX 258: Performed by: NURSE PRACTITIONER

## 2025-03-28 PROCEDURE — 80048 BASIC METABOLIC PNL TOTAL CA: CPT

## 2025-03-28 PROCEDURE — 99231 SBSQ HOSP IP/OBS SF/LOW 25: CPT

## 2025-03-28 PROCEDURE — 6370000000 HC RX 637 (ALT 250 FOR IP): Performed by: NURSE PRACTITIONER

## 2025-03-28 PROCEDURE — 2500000003 HC RX 250 WO HCPCS: Performed by: INTERNAL MEDICINE

## 2025-03-28 PROCEDURE — 82962 GLUCOSE BLOOD TEST: CPT

## 2025-03-28 PROCEDURE — 2060000000 HC ICU INTERMEDIATE R&B

## 2025-03-28 PROCEDURE — 6360000002 HC RX W HCPCS: Performed by: NURSE PRACTITIONER

## 2025-03-28 PROCEDURE — 97530 THERAPEUTIC ACTIVITIES: CPT

## 2025-03-28 RX ORDER — INSULIN GLARGINE 100 [IU]/ML
15 INJECTION, SOLUTION SUBCUTANEOUS DAILY
Status: DISCONTINUED | OUTPATIENT
Start: 2025-03-28 | End: 2025-04-04 | Stop reason: HOSPADM

## 2025-03-28 RX ADMIN — THERA TABS 1 TABLET: TAB at 09:24

## 2025-03-28 RX ADMIN — INSULIN GLARGINE 15 UNITS: 100 INJECTION, SOLUTION SUBCUTANEOUS at 09:23

## 2025-03-28 RX ADMIN — GUAIFENESIN 200 MG: 200 SOLUTION ORAL at 09:24

## 2025-03-28 RX ADMIN — AMPICILLIN SODIUM AND SULBACTAM SODIUM 3000 MG: 2; 1 INJECTION, POWDER, FOR SOLUTION INTRAMUSCULAR; INTRAVENOUS at 04:54

## 2025-03-28 RX ADMIN — AMPICILLIN SODIUM AND SULBACTAM SODIUM 3000 MG: 2; 1 INJECTION, POWDER, FOR SOLUTION INTRAMUSCULAR; INTRAVENOUS at 20:46

## 2025-03-28 RX ADMIN — CLOPIDOGREL BISULFATE 75 MG: 75 TABLET, FILM COATED ORAL at 09:24

## 2025-03-28 RX ADMIN — GUAIFENESIN 200 MG: 200 SOLUTION ORAL at 20:35

## 2025-03-28 RX ADMIN — DAPTOMYCIN 700 MG: 500 INJECTION, POWDER, LYOPHILIZED, FOR SUSPENSION INTRAVENOUS at 17:10

## 2025-03-28 RX ADMIN — Medication: at 09:27

## 2025-03-28 RX ADMIN — CALCITRIOL CAPSULES 0.25 MCG 0.25 MCG: 0.25 CAPSULE ORAL at 09:24

## 2025-03-28 RX ADMIN — THIAMINE HYDROCHLORIDE 500 MG: 100 INJECTION, SOLUTION INTRAMUSCULAR; INTRAVENOUS at 04:53

## 2025-03-28 RX ADMIN — ACETAMINOPHEN 650 MG: 325 TABLET ORAL at 20:42

## 2025-03-28 RX ADMIN — Medication 16 G: at 04:43

## 2025-03-28 RX ADMIN — Medication: at 20:35

## 2025-03-28 RX ADMIN — INSULIN LISPRO 6 UNITS: 100 INJECTION, SOLUTION INTRAVENOUS; SUBCUTANEOUS at 21:02

## 2025-03-28 RX ADMIN — CARVEDILOL 6.25 MG: 6.25 TABLET, FILM COATED ORAL at 09:26

## 2025-03-28 RX ADMIN — AMPICILLIN SODIUM AND SULBACTAM SODIUM 3000 MG: 2; 1 INJECTION, POWDER, FOR SOLUTION INTRAMUSCULAR; INTRAVENOUS at 17:10

## 2025-03-28 RX ADMIN — PREGABALIN 50 MG: 50 CAPSULE ORAL at 09:24

## 2025-03-28 RX ADMIN — INSULIN LISPRO 4 UNITS: 100 INJECTION, SOLUTION INTRAVENOUS; SUBCUTANEOUS at 18:16

## 2025-03-28 RX ADMIN — SODIUM CHLORIDE, PRESERVATIVE FREE 10 ML: 5 INJECTION INTRAVENOUS at 09:27

## 2025-03-28 RX ADMIN — GUAIFENESIN 200 MG: 200 SOLUTION ORAL at 17:07

## 2025-03-28 RX ADMIN — PREGABALIN 50 MG: 50 CAPSULE ORAL at 20:35

## 2025-03-28 RX ADMIN — ACETAMINOPHEN 650 MG: 325 TABLET ORAL at 17:06

## 2025-03-28 RX ADMIN — ASPIRIN 81 MG CHEWABLE TABLET 81 MG: 81 TABLET CHEWABLE at 09:23

## 2025-03-28 RX ADMIN — SODIUM CHLORIDE, PRESERVATIVE FREE 10 ML: 5 INJECTION INTRAVENOUS at 20:38

## 2025-03-28 RX ADMIN — ACETAMINOPHEN 650 MG: 325 TABLET ORAL at 09:23

## 2025-03-28 ASSESSMENT — PAIN SCALES - GENERAL
PAINLEVEL_OUTOF10: 2
PAINLEVEL_OUTOF10: 0
PAINLEVEL_OUTOF10: 0

## 2025-03-28 ASSESSMENT — PAIN DESCRIPTION - LOCATION: LOCATION: NECK

## 2025-03-28 NOTE — PLAN OF CARE
Problem: Safety - Adult  Goal: Free from fall injury  3/28/2025 1117 by Celina Griffin  Outcome: Progressing  3/27/2025 2328 by Claire Lobato RN  Outcome: Progressing  Flowsheets (Taken 3/27/2025 2000)  Free From Fall Injury: Instruct family/caregiver on patient safety     Problem: Chronic Conditions and Co-morbidities  Goal: Patient's chronic conditions and co-morbidity symptoms are monitored and maintained or improved  3/28/2025 1117 by Celina Griffin  Outcome: Progressing  3/27/2025 2328 by Claire Lobato RN  Outcome: Progressing  Flowsheets (Taken 3/27/2025 2000)  Care Plan - Patient's Chronic Conditions and Co-Morbidity Symptoms are Monitored and Maintained or Improved: Monitor and assess patient's chronic conditions and comorbid symptoms for stability, deterioration, or improvement     Problem: Discharge Planning  Goal: Discharge to home or other facility with appropriate resources  3/28/2025 1117 by Celina Griffin  Outcome: Progressing  3/27/2025 2328 by Claire Lobato RN  Outcome: Progressing  Flowsheets (Taken 3/27/2025 2000)  Discharge to home or other facility with appropriate resources: Identify barriers to discharge with patient and caregiver     Problem: Pain  Goal: Verbalizes/displays adequate comfort level or baseline comfort level  3/28/2025 1117 by Celina Griffin  Outcome: Progressing  3/27/2025 2328 by Claire Lobato RN  Outcome: Progressing     Problem: Skin/Tissue Integrity  Goal: Skin integrity remains intact  Description: 1.  Monitor for areas of redness and/or skin breakdown  2.  Assess vascular access sites hourly  3.  Every 4-6 hours minimum:  Change oxygen saturation probe site  4.  Every 4-6 hours:  If on nasal continuous positive airway pressure, respiratory therapy assess nares and determine need for appliance change or resting period  3/28/2025 1117 by Celina Griffin  Outcome: Progressing  3/27/2025 2328 by Claire Lobato RN  Outcome: Progressing  Flowsheets (Taken 3/27/2025  2000)  Skin Integrity Remains Intact: Monitor for areas of redness and/or skin breakdown     Problem: Nutrition Deficit:  Goal: Optimize nutritional status  3/28/2025 1117 by Celina Griffin  Outcome: Progressing  3/27/2025 2328 by Claire Lobato, RN  Outcome: Progressing

## 2025-03-28 NOTE — DIABETES MGMT
[facility]  [facility] FOR DI[facility]ETES [facility]ALTH  DIABETES MANAGEMENT CONSULT    Consulted by Provider for advanced nursing evaluation and care for inpatient blood glucose management.    Evaluation and Action Plan   Nicci Hernandez is a 63 yo female with a history of DMT2, CKD stage 4, diabetic neuropathy, CAD s/p CABG 2009, gastroparesis, HTN, HLD, and cervical fusion who presented to the ED with C-collar placed by [facility] with reports of severe neck pain with any movement and pain with swallowing. She also reports worsening bilateral upper/lower extremity weakness for past 4 days. She was seen by outpatient ortho surgery as well as [facility] doctors several days prior to arriving to ER. She was prescribed steroids by ortho surgery. She was admitted for treatment and further evaluation. Diabetes health was consulted for inpatient blood glucose management.     Ms. Hernandez has been a diabetic for 9 years. She is currently taking Tresiba 65 units and Novolog 20 units. She reports taking Metformin in the past but has not been on any oral medications for a while. Her dispense report shows she has not picked up any of her insulin prescriptions since last year.  A1c is 6.2% which is at goal of 7%. She reports she has not been eating much over the last 5 weeks due to the neck pain and pain with swallowing she been experiencing. She checks her BG with glucometer. She is followed and managed by endocrinologist Dr. Juares last seen Oct 2024 with reports of  \"She reports fluctuating blood sugar levels and is currently on Tresiba 65. She has been unable to obtain a new glucometer due to insurance issues. Her nephrologist, Dr. Ryder, recently conducted an A1c test, but the results are not yet available. She has attempted to use a Chas device, but it does not adhere properly. She takes NovoLog 20 units in the morning and forgets to take it at lunchtime. She has noticed that her blood sugar tends to drop after eating in the  personally reviewed the hospitalization record, including notes, laboratory & diagnostic data and current medications, and examined the patient at the bedside.  Total minutes: 25 minutes     ILIA Alvares - CNS   Diabetes Clinical Nurse Specialist   Program [facility] Diabetes [facility]  Access via [employer]

## 2025-03-28 NOTE — PROGRESS NOTES
[facility]NAL  PROGRESS NOTE        Subjective:   Resting. No acute c/o  Had hypoglycemia overnight    Objective:   VITALS SIGNS:    /60   Pulse 84   Temp 97.4 °F (36.3 °C) (Oral)   Resp 16   Ht 1.575 m (5' 2\")   Wt 105.7 kg (233 lb 0.4 oz)   SpO2 94%   BMI 42.62 kg/m²             Temp (24hrs), Av.1 °F (36.7 °C), Min:97.4 °F (36.3 °C), Max:98.6 °F (37 °C)         PHYSICAL EXAM:  Frail in NAD  +leg  edema  Resting     DATA REVIEW:     INTAKE / OUTPUT:   Last shift:      701 - 1900  In: 50 [P.O.:50]  Out: -   Last 3 shifts: 1901 -  0700  In: 329.2 [P.O.:200; I.V.:125.2]  Out: 1150 [Urine:1150]    Intake/Output Summary (Last 24 hours) at 3/28/2025 1129  Last data filed at 3/28/2025 0800  Gross per 24 hour   Intake 250 ml   Output 500 ml   Net -250 ml         LABS:   LABS:  Recent Labs     25  0412 25  0945 25  0638 25  0741   * 133* 135* 129*   K 3.5 3.4* 3.3* 3.5   CL 98 96* 97 98   CO2 29 30 29 18*   BUN 40* 42* 59* 74*   CREATININE 2.08* 2.13* 2.69* 3.06*   CALCIUM 9.0 8.8 8.8 9.0   PHOS  --  3.0 3.8 4.4   MG  --  2.4 2.1 2.3     Recent Labs     25  0945 25  0638 25  0741   WBC 9.8 14.6* 18.7*   HGB 11.2* 11.8 12.5   HCT 36.5 36.8 38.2    371 363       Assessment:     CKD-3b,small kidneys  AL,got IV dye on 3.19 in the setting of bacteremia: ATN               No hydro,but rt kidney 8.7 cm < lt kidney 13.1 cm ??? (Both kidney were 8.1 cm on )  hyponatremia  HTN  DM-2  Met acidosis mixed : non AG and high AG- RESOLVED  RP abscess/SA bacteremia  Edema  Hypokalemia- mild    Discussion-fair urine output.  AL is improving with CR down to 3 to 2.7 to 2.1 to 2      Plan:  Her home dose Torsemide and Aldactone are on hold  Can resume Torsemide at lower dose (QD) at time of d/c + Aldactone  Encourage increase oral hydration/intake  Coreg resumed- watch BP  IV antibiotics as per primary team  Avoid nephrotoxins  Labs in a.m.   monitor

## 2025-03-28 NOTE — PROGRESS NOTES
Hospitalist Progress Note  Seamus Rodriguez MD  Answering service: [phone]        Date of Service:  3/28/2025  NAME:  Nicci Hernandez  :  1960  MRN:  [id number]      Admission Summary:     Patient transferred from [facility] with retropharyngeal abscess.    Interval history / Subjective:     Still with some throat pain, unable to tolerate solid foods but tolerating Glucerna well.     Assessment & Plan:     Neck abscess  -CT soft tissue on presentation shows increased size of prevertebral fluid collection associated with anterior fusion plate  -Follow-up MRI shows complex fluid focus in the prevertebral soft tissue associated with tissue edema and swelling, correlate for signs of infection such as retropharyngeal abscess  -S/p I&D of the abscess by ENT at bedside on 3/19, culture of the abscess showed MRSA  -Patient is s/p prior cervical fusion  -Neurosurgery also evaluated the patient and recommended continuing IV antibiotics, no indication of hardware removal  -On daptomycin and Unasyn, on discharge, patient to continue with daptomycin 700 mg every 24 hour through 2025  -ID previously evaluated the patient    Bacteremia  -Blood cultures 3/19, 3/22 and 3/25 growing MRSA  -Cardiology consulted for JEFF but recommended holding off on it since it may not be safe with retropharyngeal abscess  -ID recommending daptomycin 700 mg every 24 hours through 2025    AL  -AL on CKD stage IIIb, likely ATN in the setting of infection and also RUTH from dye  -Ultrasound negative for hydronephrosis  -Off IV fluid and creatinine is improving  -Nephrology following    Metabolic acidosis  -This is due to renal insufficiency  -Off of bicarb drip    Hyponatremia  -Likely related to hypovolemia  -Hyponatremia has resolved with IV fluids    Hypertension  -Continue to hold Aldactone due to AL, continue Coreg  -Monitor  Medications   Medication Dose Route Frequency    insulin glargine (LANTUS) injection vial 15 Units  15 Units SubCUTAneous Daily    multivitamin 1 tablet  1 tablet Oral Daily    ampicillin-sulbactam (UNASYN) 3,000 mg in sodium chloride 0.9 % 100 mL IVPB (addEASE)  3,000 mg IntraVENous Q12H    guaiFENesin (ROBITUSSIN) 100 MG/5ML liquid 200 mg  200 mg Oral TID    pregabalin (LYRICA) capsule 50 mg  50 mg Oral BID    acetaminophen (TYLENOL) tablet 650 mg  650 mg Oral TID    DAPTOmycin (CUBICIN) 700 mg in sodium chloride (PF) 0.9 % 14 mL IV syringe  700 mg IntraVENous Q48H    aspirin chewable tablet 81 mg  81 mg Oral Daily    clopidogrel (PLAVIX) tablet 75 mg  75 mg Oral Daily    oxyCODONE (ROXICODONE) immediate release tablet 2.5 mg  2.5 mg Oral Q4H PRN    balsum peru-castor oil (VENELEX) ointment   Topical BID    [Held by provider] morphine (PF) injection 1 mg  1 mg IntraVENous Q4H PRN    sodium chloride flush 0.9 % injection 5-40 mL  5-40 mL IntraVENous 2 times per day    sodium chloride flush 0.9 % injection 5-40 mL  5-40 mL IntraVENous PRN    0.9 % sodium chloride infusion   IntraVENous PRN    ondansetron (ZOFRAN-ODT) disintegrating tablet 4 mg  4 mg Oral Q8H PRN    Or    ondansetron (ZOFRAN) injection 4 mg  4 mg IntraVENous Q6H PRN    polyethylene glycol (GLYCOLAX) packet 17 g  17 g Oral Daily PRN    acetaminophen (TYLENOL) tablet 650 mg  650 mg Oral Q6H PRN    Or    acetaminophen (TYLENOL) suppository 650 mg  650 mg Rectal Q6H PRN    glucose chewable tablet 16 g  4 tablet Oral PRN    dextrose bolus 10% 125 mL  125 mL IntraVENous PRN    Or    dextrose bolus 10% 250 mL  250 mL IntraVENous PRN    glucagon injection 1 mg  1 mg SubCUTAneous PRN    dextrose 10 % infusion   IntraVENous Continuous PRN    dextrose bolus 10% 250 mL  250 mL IntraVENous PRN    calcitRIOL (ROCALTROL) capsule 0.25 mcg  0.25 mcg Oral Daily    [Held by provider] spironolactone (ALDACTONE) tablet 25 mg  25 mg Oral Daily    insulin lispro

## 2025-03-28 NOTE — PLAN OF CARE
Problem: Physical Therapy - Adult  Goal: By Discharge: Performs mobility at highest level of function for planned discharge setting.  See evaluation for individualized goals.  Description: FUNCTIONAL STATUS PRIOR TO ADMISSION: Patient was modified independent, intermittently using a rolling walker for functional mobility.    HOME SUPPORT PRIOR TO ADMISSION: The patient lived with her  who she reports is also in the hospital (at [facility]) currently.    Physical Therapy Goals  Weekly Reassessment 03/27/2025 (goals down graded)  1.  Patient will move from supine to sit and sit to supine in bed with moderate assistance within 7 day(s).    2.  Patient will perform sit to stand with moderate assistance within 7 day(s).  3.  Patient will transfer from bed to chair and chair to bed with moderate assistance using the least restrictive device within 7 day(s).  4.  Patient will ambulate with moderate assistance for 5 feet with the least restrictive device within 7 day(s).     Initiated 3/20/2025  1.  Patient will move from supine to sit and sit to supine and roll side to side in bed with modified independence within 7 day(s).    2.  Patient will perform sit to stand with modified independence within 7 day(s).  3.  Patient will transfer from bed to chair and chair to bed with modified independence using the least restrictive device within 7 day(s).  4.  Patient will ambulate with modified independence for 150 feet with the least restrictive device within 7 day(s).             Outcome: Progressing     PHYSICAL THERAPY TREATMENT    Patient: Nicci Hernandez (64 y.o. female)  Date: 3/28/2025  Diagnosis: Retropharyngeal abscess [J39.0]  Neck abscess [L02.11] Neck abscess      Precautions: Restrictions/Precautions  Restrictions/Precautions: Fall Risk            ASSESSMENT:  Received pt in bed w/ family and friends in the room.  She is amenable to therapy session, states she feels better today, wants to work on transfers to the  Normal Limits       Functional Mobility Training:  Bed Mobility:  Bed Mobility Training  Interventions: Tactile cues;Verbal cues;Manual cues;Weight shifting training/pressure relief;Safety awareness training  Supine to Sit w/ HOB raised: 2 Person assistance;Partial/Moderate assistance (Increased pt participation using bedrail, requires assist to release when near upright)  Scooting: Partial/Moderate assistance (Sitting EOB, worked on lateral weight shifts to clear buttocks w/ pt demonstrating some ability to unweight though does not clear the bed)  Transfers:  Transfer Training  Transfer Training: Yes  Interventions: Safety awareness training;Verbal cues;Manual cues;Weight shifting training/pressure relief  Sit to Stand: Minimal assistance;2 Person assistance;Partial/Moderate assistance (to Tasia Valadez - Mod A from bed; Min A from higher surface/ wings)  Stand to Sit: Minimal assistance;2 Person assistance (from [place])  Bed to Chair: Dependent/Total (Tasia Valadez)  Balance:  Balance  Sitting: Impaired  Standing: Impaired  Standing - Static: Fair;Constant support  Standing - Dynamic: Not tested   Ambulation/Gait Training:  no                         Activity Tolerance:   Fair  and requires rest breaks    After treatment:   Patient left in no apparent distress sitting up in chair, Call bell within reach, Bed/ chair alarm activated, Caregiver / family present, and RN aware of transfer technique for return to bed and to iam depends.      COMMUNICATION/EDUCATION:   The patient's plan of care was discussed with: registered nurse    Patient Education  Education Given To: Patient  Education Provided: Plan of Care;Transfer Training;Mobility Training;Equipment;Fall Prevention Strategies  Education Method: Verbal  Barriers to Learning: Cognition  Education Outcome: Continued education needed      Gisela Del Castillo, PT  Minutes: 42

## 2025-03-28 NOTE — PROGRESS NOTES
FUNCTIONAL STATUS PRIOR TO ADMISSION: Patient was modified independent, intermittently using a rolling walker for functional mobility.    HOME SUPPORT PRIOR TO ADMISSION: The patient lived with her  who she reports is also in the hospital (at [facility]) currently.    Physical Therapy Goals  Weekly Reassessment 03/27/2025 (goals down graded)  1.  Patient will move from supine to sit and sit to supine in bed with moderate assistance within 7 day(s).    2.  Patient will perform sit to stand with moderate assistance within 7 day(s).  3.  Patient will transfer from bed to chair and chair to bed with moderate assistance using the least restrictive device within 7 day(s).  4.  Patient will ambulate with moderate assistance for 5 feet with the least restrictive device within 7 day(s).     Initiated 3/20/2025  1.  Patient will move from supine to sit and sit to supine and roll side to side in bed with modified independence within 7 day(s).    2.  Patient will perform sit to stand with modified independence within 7 day(s).  3.  Patient will transfer from bed to chair and chair to bed with modified independence using the least restrictive device within 7 day(s).  4.  Patient will ambulate with modified independence for 150 feet with the least restrictive device within 7 day(s).     PHYSICAL THERAPY TREATMENT: WEEKLY REASSESSMENT    Patient: Nicci Hernandez (64 y.o. female)  Date: 3/27/2025  Primary Diagnosis: Retropharyngeal abscess [J39.0]  Neck abscess [L02.11]       Precautions: Restrictions/Precautions  Restrictions/Precautions: Fall Risk          ASSESSMENT :  Patient continues to benefit from skilled PT services though did not demonstrate progress towards goals today.  She is limited by weakness, R hip pain (new today), lethargy, posture and activity tolerance.     Received in bed sleeping, wakes to voice, had a difficult time keeping her eyes open throughout this session.  Noted increased swelling in niru hands and  legs.  Pt required greater assist for all mobility today and unable to maintain upright sitting posture EOB and in the SaraStedy despite max cues, encouragement and manual assist.  She completed two sit<->stand trials w/ the Tasia Stedy, started crying when positioned for chair transfer, requested to return to bed.  Assisted pt back to bed per her request.  Pt w/ increased R hip pain rolling in the bed, made RN aware and of swelling.  Pt was left in the bed w/ visitors in the room assisting w/ lunch.      Based on the objective information above the patient remains far below baseline and will benefit from continued therapy.      Patient's progression toward goals since last assessment: goals downgraded    Functional Outcome Measure:  The patient scored 9/24 on the [facility] outcome measure. Cutoff score <=171,2,3 had higher odds of discharging home with home health or need of SNF/IPR.         PLAN :  Goals have been updated based on progression since last assessment.  Patient continues to benefit from skilled intervention to address the above impairments.    Recommendations and Planned Interventions:   bed mobility training, transfer training, gait training, therapeutic exercises, patient and family training/education, and therapeutic activities      Frequency/Duration: Patient will be followed by physical therapy to address goals, PT Plan of Care: 5 times/week  per day/week to address goals.    Recommend for next PT session: sit to stand transfers and bed to bedside chair transfers    Recommendation for discharge: (in order for the patient to meet his/her long term goals):   Hopeful for high intensity/comprehensive skilled physical therapy in a multidisciplinary setting as patient was previously indep and is working towards tolerating up to 3 hours of therapy/day 5-7x/week.    Other factors to consider for discharge: patient's current support system is unable to meet their requirements for physical assistance, poor

## 2025-03-28 NOTE — PROGRESS NOTES
Nutrition    Discussed in IDRs.  Noted to have another hypoglycemic event early this morning.  Per RN, PO intake very minimal and not drinking 2 Glucerna/ meal, just 1.  Visited with pt.  Agreeable to Ensure Plus ONS (now back in stock) with each meal + 1 as HS snack in attempts to prevent early AM hypoglycemia.  ONS order updated and discussed with RN as well.    Plan:    -Adjusted ONS to high kcal/high protein option with all meals + HS snack (Ensure Plus or Ensure Plus HP)  -Continue full liquid, lacto-ovo diet as pt preference.  However, OK for regular/ thins per SLP as of 3/26.  -Continue lacto-ovo restriction with diet advancement, pt doesn't like to chew meat.    *4 Ensure Plus/ Ensure Plus HP ONS provide 1400 kcal and 64-80 gm pro, which meets >75% of kcal/pro needs respectively.    Ensure Plus = 350 kcal, 16 gm pro, 48 gm CHO/bottle  Ensure Plus HP = 350 kcal, 20 gm pro, 40 gm CHO/bottle  *product delivered will depend on availability*    RD following.  Please see complete assessment from 3/26 for additional details, goals, and monitoring/evaluation.      Recent Labs     03/27/25  0945 03/28/25  0412   GLUCOSE 82 50*   BUN 42* 40*   CREATININE 2.13* 2.08*   * 133*   K 3.4* 3.5   CL 96* 98   CO2 30 29   CALCIUM 8.8 9.0   PHOS 3.0  --    MG 2.4  --      Recent Labs     03/25/25  2045 03/26/25  0814 03/26/25  0815 03/26/25  0825 03/26/25  0845 03/26/25  1207 03/26/25  1622 03/26/25  2043 03/27/25  0853 03/27/25  1103 03/27/25  1621 03/27/25  2046 03/28/25  0406 03/28/25  0430 03/28/25  0458 03/28/25  0756   POCGLU 115 58* 54* 54* 116 119* 87 187* 71 94 140* 73 45* 53* 114 90       Estimated Daily Nutrient Needs:  Energy Requirements Based On: Kcal/kg  Weight Used for Energy Requirements: Adjusted (~60 kg)  Energy (kcal/day): 7338-4336 (25-30 kcal/kg adj BW)  Weight Used for Protein Requirements: Adjusted  Protein (g/day): 70-85 (1.2-1.4 gm/kg adj BW - monitoring renal fx)     Fluid (ml/day): 1  mL/kcal      Keke Hernandes RD  Available via [employer]

## 2025-03-28 NOTE — PROGRESS NOTES
0400 - patient lethargic, but able to wake and answer questions appropriately. Glucose 45, recheck glucose was 53 with venous blood. Morning labs sent. Patient given 16g of chewable glucose. Recheck, glucose 114.

## 2025-03-28 NOTE — PLAN OF CARE
Problem: Safety - Adult  Goal: Free from fall injury  3/27/2025 2328 by Claire Lobato RN  Outcome: Progressing  Flowsheets (Taken 3/27/2025 2000)  Free From Fall Injury: Instruct family/caregiver on patient safety  3/27/2025 1129 by Breann Argueta RN  Outcome: Progressing     Problem: Chronic Conditions and Co-morbidities  Goal: Patient's chronic conditions and co-morbidity symptoms are monitored and maintained or improved  3/27/2025 2328 by Claire Lobato RN  Outcome: Progressing  Flowsheets (Taken 3/27/2025 2000)  Care Plan - Patient's Chronic Conditions and Co-Morbidity Symptoms are Monitored and Maintained or Improved: Monitor and assess patient's chronic conditions and comorbid symptoms for stability, deterioration, or improvement  3/27/2025 1129 by Breann Argueta RN  Outcome: Progressing  Flowsheets (Taken 3/27/2025 0800)  Care Plan - Patient's Chronic Conditions and Co-Morbidity Symptoms are Monitored and Maintained or Improved: Monitor and assess patient's chronic conditions and comorbid symptoms for stability, deterioration, or improvement     Problem: Discharge Planning  Goal: Discharge to home or other facility with appropriate resources  3/27/2025 2328 by Claire Lobato RN  Outcome: Progressing  Flowsheets (Taken 3/27/2025 2000)  Discharge to home or other facility with appropriate resources: Identify barriers to discharge with patient and caregiver  3/27/2025 1129 by Breann Argueta RN  Outcome: Progressing     Problem: Pain  Goal: Verbalizes/displays adequate comfort level or baseline comfort level  3/27/2025 2328 by Claire Lobato RN  Outcome: Progressing  3/27/2025 1129 by Breann Argueta RN  Outcome: Progressing     Problem: Skin/Tissue Integrity  Goal: Skin integrity remains intact  Description: 1.  Monitor for areas of redness and/or skin breakdown  2.  Assess vascular access sites hourly  3.  Every 4-6 hours minimum:  Change oxygen saturation probe site  4.  Every 4-6 hours:  If on nasal continuous

## 2025-03-29 ENCOUNTER — APPOINTMENT (OUTPATIENT)
Facility: HOSPITAL | Age: 65
DRG: 919 | End: 2025-03-29
Attending: PODIATRIST
Payer: MEDICARE

## 2025-03-29 LAB
ANION GAP SERPL CALC-SCNC: 5 MMOL/L (ref 2–12)
BUN SERPL-MCNC: 36 MG/DL (ref 6–20)
BUN/CREAT SERPL: 18 (ref 12–20)
CALCIUM SERPL-MCNC: 9.2 MG/DL (ref 8.5–10.1)
CHLORIDE SERPL-SCNC: 96 MMOL/L (ref 97–108)
CK SERPL-CCNC: 25 U/L (ref 26–192)
CO2 SERPL-SCNC: 30 MMOL/L (ref 21–32)
CREAT SERPL-MCNC: 1.98 MG/DL (ref 0.55–1.02)
GLUCOSE BLD STRIP.AUTO-MCNC: 119 MG/DL (ref 65–117)
GLUCOSE BLD STRIP.AUTO-MCNC: 177 MG/DL (ref 65–117)
GLUCOSE BLD STRIP.AUTO-MCNC: 184 MG/DL (ref 65–117)
GLUCOSE BLD STRIP.AUTO-MCNC: 192 MG/DL (ref 65–117)
GLUCOSE SERPL-MCNC: 185 MG/DL (ref 65–100)
POTASSIUM SERPL-SCNC: 3.6 MMOL/L (ref 3.5–5.1)
SERVICE CMNT-IMP: ABNORMAL
SODIUM SERPL-SCNC: 131 MMOL/L (ref 136–145)

## 2025-03-29 PROCEDURE — 6370000000 HC RX 637 (ALT 250 FOR IP): Performed by: NURSE PRACTITIONER

## 2025-03-29 PROCEDURE — 80048 BASIC METABOLIC PNL TOTAL CA: CPT

## 2025-03-29 PROCEDURE — 2060000000 HC ICU INTERMEDIATE R&B

## 2025-03-29 PROCEDURE — 6360000002 HC RX W HCPCS: Performed by: NURSE PRACTITIONER

## 2025-03-29 PROCEDURE — 82962 GLUCOSE BLOOD TEST: CPT

## 2025-03-29 PROCEDURE — 93922 UPR/L XTREMITY ART 2 LEVELS: CPT

## 2025-03-29 PROCEDURE — 82550 ASSAY OF CK (CPK): CPT

## 2025-03-29 PROCEDURE — 6370000000 HC RX 637 (ALT 250 FOR IP): Performed by: STUDENT IN AN ORGANIZED HEALTH CARE EDUCATION/TRAINING PROGRAM

## 2025-03-29 PROCEDURE — 6370000000 HC RX 637 (ALT 250 FOR IP): Performed by: INTERNAL MEDICINE

## 2025-03-29 PROCEDURE — 2500000003 HC RX 250 WO HCPCS: Performed by: INTERNAL MEDICINE

## 2025-03-29 PROCEDURE — 2580000003 HC RX 258: Performed by: NURSE PRACTITIONER

## 2025-03-29 PROCEDURE — 6370000000 HC RX 637 (ALT 250 FOR IP): Performed by: FAMILY MEDICINE

## 2025-03-29 PROCEDURE — 2500000003 HC RX 250 WO HCPCS: Performed by: FAMILY MEDICINE

## 2025-03-29 PROCEDURE — 2500000003 HC RX 250 WO HCPCS: Performed by: STUDENT IN AN ORGANIZED HEALTH CARE EDUCATION/TRAINING PROGRAM

## 2025-03-29 RX ORDER — SODIUM CHLORIDE 0.9 % (FLUSH) 0.9 %
5-40 SYRINGE (ML) INJECTION EVERY 12 HOURS SCHEDULED
Status: DISCONTINUED | OUTPATIENT
Start: 2025-03-29 | End: 2025-04-04 | Stop reason: HOSPADM

## 2025-03-29 RX ORDER — SODIUM CHLORIDE 9 MG/ML
INJECTION, SOLUTION INTRAVENOUS PRN
Status: DISCONTINUED | OUTPATIENT
Start: 2025-03-29 | End: 2025-04-04 | Stop reason: HOSPADM

## 2025-03-29 RX ORDER — SODIUM CHLORIDE 0.9 % (FLUSH) 0.9 %
5-40 SYRINGE (ML) INJECTION PRN
Status: DISCONTINUED | OUTPATIENT
Start: 2025-03-29 | End: 2025-04-04 | Stop reason: HOSPADM

## 2025-03-29 RX ORDER — LIDOCAINE HYDROCHLORIDE 10 MG/ML
50 INJECTION, SOLUTION EPIDURAL; INFILTRATION; INTRACAUDAL; PERINEURAL ONCE
Status: DISCONTINUED | OUTPATIENT
Start: 2025-03-29 | End: 2025-04-04 | Stop reason: HOSPADM

## 2025-03-29 RX ADMIN — ACETAMINOPHEN 650 MG: 325 TABLET ORAL at 20:42

## 2025-03-29 RX ADMIN — CALCITRIOL CAPSULES 0.25 MCG 0.25 MCG: 0.25 CAPSULE ORAL at 10:02

## 2025-03-29 RX ADMIN — ASPIRIN 81 MG CHEWABLE TABLET 81 MG: 81 TABLET CHEWABLE at 09:59

## 2025-03-29 RX ADMIN — AMPICILLIN SODIUM AND SULBACTAM SODIUM 3000 MG: 2; 1 INJECTION, POWDER, FOR SOLUTION INTRAMUSCULAR; INTRAVENOUS at 20:51

## 2025-03-29 RX ADMIN — GUAIFENESIN 200 MG: 200 SOLUTION ORAL at 10:01

## 2025-03-29 RX ADMIN — INSULIN LISPRO 2 UNITS: 100 INJECTION, SOLUTION INTRAVENOUS; SUBCUTANEOUS at 17:23

## 2025-03-29 RX ADMIN — INSULIN LISPRO 2 UNITS: 100 INJECTION, SOLUTION INTRAVENOUS; SUBCUTANEOUS at 13:23

## 2025-03-29 RX ADMIN — AMPICILLIN SODIUM AND SULBACTAM SODIUM 3000 MG: 2; 1 INJECTION, POWDER, FOR SOLUTION INTRAMUSCULAR; INTRAVENOUS at 09:58

## 2025-03-29 RX ADMIN — ACETAMINOPHEN 650 MG: 325 TABLET ORAL at 15:51

## 2025-03-29 RX ADMIN — INSULIN GLARGINE 15 UNITS: 100 INJECTION, SOLUTION SUBCUTANEOUS at 10:00

## 2025-03-29 RX ADMIN — Medication: at 10:03

## 2025-03-29 RX ADMIN — OXYCODONE HYDROCHLORIDE 2.5 MG: 5 TABLET ORAL at 09:59

## 2025-03-29 RX ADMIN — SODIUM CHLORIDE, PRESERVATIVE FREE 10 ML: 5 INJECTION INTRAVENOUS at 20:43

## 2025-03-29 RX ADMIN — GUAIFENESIN 200 MG: 200 SOLUTION ORAL at 20:41

## 2025-03-29 RX ADMIN — SODIUM CHLORIDE, PRESERVATIVE FREE 10 ML: 5 INJECTION INTRAVENOUS at 10:03

## 2025-03-29 RX ADMIN — Medication: at 20:42

## 2025-03-29 RX ADMIN — CLOPIDOGREL BISULFATE 75 MG: 75 TABLET, FILM COATED ORAL at 10:01

## 2025-03-29 RX ADMIN — CARVEDILOL 6.25 MG: 6.25 TABLET, FILM COATED ORAL at 20:42

## 2025-03-29 RX ADMIN — GUAIFENESIN 200 MG: 200 SOLUTION ORAL at 15:51

## 2025-03-29 RX ADMIN — AMPICILLIN SODIUM AND SULBACTAM SODIUM 3000 MG: 2; 1 INJECTION, POWDER, FOR SOLUTION INTRAMUSCULAR; INTRAVENOUS at 03:03

## 2025-03-29 RX ADMIN — PREGABALIN 50 MG: 50 CAPSULE ORAL at 20:42

## 2025-03-29 RX ADMIN — OXYCODONE HYDROCHLORIDE 2.5 MG: 5 TABLET ORAL at 04:08

## 2025-03-29 RX ADMIN — CARVEDILOL 6.25 MG: 6.25 TABLET, FILM COATED ORAL at 10:02

## 2025-03-29 RX ADMIN — DAPTOMYCIN 700 MG: 500 INJECTION, POWDER, LYOPHILIZED, FOR SUSPENSION INTRAVENOUS at 17:23

## 2025-03-29 RX ADMIN — THERA TABS 1 TABLET: TAB at 10:02

## 2025-03-29 RX ADMIN — ACETAMINOPHEN 650 MG: 325 TABLET ORAL at 10:05

## 2025-03-29 RX ADMIN — PREGABALIN 50 MG: 50 CAPSULE ORAL at 10:02

## 2025-03-29 RX ADMIN — AMPICILLIN SODIUM AND SULBACTAM SODIUM 3000 MG: 2; 1 INJECTION, POWDER, FOR SOLUTION INTRAMUSCULAR; INTRAVENOUS at 15:59

## 2025-03-29 ASSESSMENT — PAIN SCALES - GENERAL
PAINLEVEL_OUTOF10: 8
PAINLEVEL_OUTOF10: 4
PAINLEVEL_OUTOF10: 10
PAINLEVEL_OUTOF10: 7
PAINLEVEL_OUTOF10: 10

## 2025-03-29 ASSESSMENT — PAIN DESCRIPTION - DESCRIPTORS: DESCRIPTORS: ACHING

## 2025-03-29 ASSESSMENT — PAIN DESCRIPTION - LOCATION
LOCATION: HEAD;NECK
LOCATION: HEAD;NECK
LOCATION: NECK;HEAD
LOCATION: NECK
LOCATION: NECK

## 2025-03-29 ASSESSMENT — PAIN DESCRIPTION - ORIENTATION: ORIENTATION: POSTERIOR

## 2025-03-29 NOTE — PROGRESS NOTES
Hospitalist Progress Note  Seamus oRdriguez MD  Answering service: [phone]        Date of Service:  3/29/2025  NAME:  Nicci Hernandez  :  1960  MRN:  [id number]      Admission Summary:     Patient transferred from [facility] with retropharyngeal abscess.    Interval history / Subjective:     Still with some throat pain, unable to tolerate solid foods but tolerating Glucerna well.     Assessment & Plan:     Neck abscess  -CT soft tissue on presentation shows increased size of prevertebral fluid collection associated with anterior fusion plate  -Follow-up MRI shows complex fluid focus in the prevertebral soft tissue associated with tissue edema and swelling, correlate for signs of infection such as retropharyngeal abscess  -S/p I&D of the abscess by ENT at bedside on 3/19, culture of the abscess showed MRSA  -Patient is s/p prior cervical fusion  -Neurosurgery also evaluated the patient and recommended continuing IV antibiotics, no indication of hardware removal  -On daptomycin and Unasyn, on discharge, patient to continue with daptomycin 700 mg every 24 hour through 2025  -ID previously evaluated the patient    Bacteremia  -Blood cultures 3/19, 3/22 and 3/25 growing MRSA  -Cardiology consulted for JEFF but recommended holding off on it since it may not be safe with retropharyngeal abscess  -ID recommending daptomycin 700 mg every 24 hours through 2025    AL  -AL on CKD stage IIIb, likely ATN in the setting of infection and also RUTH from dye  -Ultrasound negative for hydronephrosis  -Off IV fluid and creatinine is improving  -Nephrology following    Metabolic acidosis  -This is due to renal insufficiency  -Off of bicarb drip    Hyponatremia  -Likely related to hypovolemia  -Hyponatremia has resolved with IV fluids    Hypertension  -Continue to hold Aldactone due to AL, continue Coreg  -Monitor  blood pressure    Diabetes type 2  -On Lantus and sliding scale insulin coverage  -Lantus dose adjusted and hypoglycemia has resolved  -Diabetes management team following    Diabetic neuropathy  -On Lyrica    CHF  -Patient with chronic HFrEF with last echo showing EF of 20 to 25%  -Continue Coreg, holding Aldactone due to AL  -Patient to follow-up with [facility] cardiology    Coronary artery disease  -Patient is s/p CABG  -Continue Plavix    Diabetic right foot ulcer with osteomyelitis  -Patient with known history of diabetic foot ulcer with acute osteomyelitis of the fifth metatarsal head  -MRI shows acute osteomyelitis in the distal half of the first metatarsal bone and also acute osteomyelitis of the fifth metatarsal head  -Per podiatry, no signs of infection  -Podiatry and ID previously evaluated the patient, continue antibiotics as above    Weakness of upper and lower extremities  -MRI shows no acute infarct, noted chronic infarct along the right PCA territory  -Patient started on high-dose thiamine and multivitamin due to likely deficiency in the setting of decreased p.o. intake  -Continue Plavix  -Neurology previously evaluated the patient, continue PT and OT    Obstructive sleep apnea  -Patient not using CPAP     Code status: Full  Prophylaxis: SCDs  Care Plan discussed with: Patient  Anticipated Disposition: Plan for placement to SNF today after PICC line placement.  Central Line:   None             Review of Systems:   Pertinent items are noted in HPI.         Vital Signs:    Last 24hrs VS reviewed since prior progress note. Most recent are:  Vitals:    03/29/25 1100   BP: 117/65   Pulse:    Resp:    Temp: 97.9 °F (36.6 °C)   SpO2:          Intake/Output Summary (Last 24 hours) at 3/29/2025 1303  Last data filed at 3/29/2025 0400  Gross per 24 hour   Intake 200 ml   Output 1150 ml   Net -950 ml        Physical Examination:     I had a face to face encounter with this patient and independently examined them on

## 2025-03-29 NOTE — PLAN OF CARE
Problem: Safety - Adult  Goal: Free from fall injury  3/28/2025 2248 by Rui Michaels RN  Outcome: Progressing  Flowsheets (Taken 3/28/2025 2248)  Free From Fall Injury: Instruct family/caregiver on patient safety     Problem: Discharge Planning  Goal: Discharge to home or other facility with appropriate resources  3/28/2025 2248 by Rui Michaels RN  Outcome: Progressing  Flowsheets (Taken 3/28/2025 2248)  Discharge to home or other facility with appropriate resources:   Identify barriers to discharge with patient and caregiver   Identify discharge learning needs (meds, wound care, etc)   Refer to discharge planning if patient needs post-hospital services based on physician order or complex needs related to functional status, cognitive ability or social support system   Arrange for needed discharge resources and transportation as appropriate     Problem: Pain  Goal: Verbalizes/displays adequate comfort level or baseline comfort level  3/28/2025 2248 by Rui Michaels RN  Outcome: Progressing  Flowsheets (Taken 3/28/2025 2248)  Verbalizes/displays adequate comfort level or baseline comfort level:   Encourage patient to monitor pain and request assistance   Assess pain using appropriate pain scale   Administer analgesics based on type and severity of pain and evaluate response   Implement non-pharmacological measures as appropriate and evaluate response     Problem: Skin/Tissue Integrity  Goal: Skin integrity remains intact  Description: 1.  Monitor for areas of redness and/or skin breakdown  2.  Assess vascular access sites hourly  3.  Every 4-6 hours minimum:  Change oxygen saturation probe site  4.  Every 4-6 hours:  If on nasal continuous positive airway pressure, respiratory therapy assess nares and determine need for appliance change or resting period  3/28/2025 2248 by Rui Michaels RN  Outcome: Progressing

## 2025-03-29 NOTE — PROGRESS NOTES
Consult received for PICC. Pt is CKD. Per Lori MORGAN, nephrology advises against PICC placement.  Recommend Julia Catheter for Long term abx for discharge.

## 2025-03-29 NOTE — PROGRESS NOTES
1345: PICC team called inquiring about pt's qualification for a PICC line.     1346: MD notified about nephrology consenting for PICC line. Per provider reach out to nephrology.     1401: RN reached out to nephrology. Per nephrology, avoiding PICC line is recommended. MD notified.   - Picc team at bedside, recommended johan catheter. MD notified.

## 2025-03-29 NOTE — CARE COORDINATION
Transition of Care Plan:    RUR: 19%  Prior Level of Functioning:   Disposition: To [facility]  RAMA: 3/30/25  If SNF or IPR: Date FOC offered:   Date FOC received:   Accepting facility:   Date authorization started with reference number:   Date authorization received and expires:   Follow up appointments:   DME needed:   Transportation at discharge: BLS versus TBD  IM/IMM Medicare/ letter given: 1st 3/21/25  Is patient a  and connected with [facility]?    If yes, was [place] transfer form completed and [facility] notified?   Caregiver Contact: spouse Maurilio Hernandez  Discharge Caregiver contacted prior to discharge?   Care Conference needed?   Barriers to discharge:     CM was advised that patient is stable for discharge, and that a PICC Line must be placed prior to discharge.    ASHLEE spoke with, [facility] Liaison [phone], who confirmed that patient is accepted. that bed is available today, and plan for PICC Line insertion.    CM received update from staff nurse that Nephrology did not recommend PICC Line due to CKD. Nephrology recommended Julia catheter for long term antibiotics.    Per staff nurse, Julia cathter placement was not able to be placed today..  CM gave update to Dwain Piedra [facility] Liaison.

## 2025-03-29 NOTE — PLAN OF CARE
Problem: Safety - Adult  Goal: Free from fall injury  3/29/2025 1117 by Chrystal Kat, RN  Outcome: Progressing  3/28/2025 2248 by Rui Michaels RN  Outcome: Progressing  Flowsheets (Taken 3/28/2025 2248)  Free From Fall Injury: Instruct family/caregiver on patient safety     Problem: Chronic Conditions and Co-morbidities  Goal: Patient's chronic conditions and co-morbidity symptoms are monitored and maintained or improved  Outcome: Progressing     Problem: Discharge Planning  Goal: Discharge to home or other facility with appropriate resources  3/29/2025 1117 by Chrystal Kat RN  Outcome: Progressing  3/28/2025 2248 by Rui Michaels RN  Outcome: Progressing  Flowsheets (Taken 3/28/2025 2248)  Discharge to home or other facility with appropriate resources:   Identify barriers to discharge with patient and caregiver   Identify discharge learning needs (meds, wound care, etc)   Refer to discharge planning if patient needs post-hospital services based on physician order or complex needs related to functional status, cognitive ability or social support system   Arrange for needed discharge resources and transportation as appropriate     Problem: Pain  Goal: Verbalizes/displays adequate comfort level or baseline comfort level  3/29/2025 1117 by Chrystal Kat RN  Outcome: Progressing  3/28/2025 2248 by Rui Michaels RN  Outcome: Progressing  Flowsheets (Taken 3/28/2025 2248)  Verbalizes/displays adequate comfort level or baseline comfort level:   Encourage patient to monitor pain and request assistance   Assess pain using appropriate pain scale   Administer analgesics based on type and severity of pain and evaluate response   Implement non-pharmacological measures as appropriate and evaluate response     Problem: Skin/Tissue Integrity  Goal: Skin integrity remains intact  Description: 1.  Monitor for areas of redness and/or skin breakdown  2.  Assess vascular

## 2025-03-30 ENCOUNTER — APPOINTMENT (OUTPATIENT)
Facility: HOSPITAL | Age: 65
DRG: 919 | End: 2025-03-30
Attending: FAMILY MEDICINE
Payer: MEDICARE

## 2025-03-30 LAB
GLUCOSE BLD STRIP.AUTO-MCNC: 111 MG/DL (ref 65–117)
GLUCOSE BLD STRIP.AUTO-MCNC: 141 MG/DL (ref 65–117)
GLUCOSE BLD STRIP.AUTO-MCNC: 221 MG/DL (ref 65–117)
GLUCOSE BLD STRIP.AUTO-MCNC: 233 MG/DL (ref 65–117)
GLUCOSE BLD STRIP.AUTO-MCNC: 264 MG/DL (ref 65–117)
SERVICE CMNT-IMP: ABNORMAL
SERVICE CMNT-IMP: NORMAL

## 2025-03-30 PROCEDURE — 2700000000 HC OXYGEN THERAPY PER DAY

## 2025-03-30 PROCEDURE — 2060000000 HC ICU INTERMEDIATE R&B

## 2025-03-30 PROCEDURE — 6370000000 HC RX 637 (ALT 250 FOR IP): Performed by: NURSE PRACTITIONER

## 2025-03-30 PROCEDURE — 6360000002 HC RX W HCPCS: Performed by: NURSE PRACTITIONER

## 2025-03-30 PROCEDURE — 2500000003 HC RX 250 WO HCPCS: Performed by: FAMILY MEDICINE

## 2025-03-30 PROCEDURE — 6370000000 HC RX 637 (ALT 250 FOR IP): Performed by: INTERNAL MEDICINE

## 2025-03-30 PROCEDURE — 2500000003 HC RX 250 WO HCPCS: Performed by: STUDENT IN AN ORGANIZED HEALTH CARE EDUCATION/TRAINING PROGRAM

## 2025-03-30 PROCEDURE — 2500000003 HC RX 250 WO HCPCS: Performed by: INTERNAL MEDICINE

## 2025-03-30 PROCEDURE — 2580000003 HC RX 258: Performed by: NURSE PRACTITIONER

## 2025-03-30 PROCEDURE — 93926 LOWER EXTREMITY STUDY: CPT

## 2025-03-30 PROCEDURE — 6370000000 HC RX 637 (ALT 250 FOR IP): Performed by: FAMILY MEDICINE

## 2025-03-30 PROCEDURE — 82962 GLUCOSE BLOOD TEST: CPT

## 2025-03-30 PROCEDURE — 6370000000 HC RX 637 (ALT 250 FOR IP): Performed by: STUDENT IN AN ORGANIZED HEALTH CARE EDUCATION/TRAINING PROGRAM

## 2025-03-30 RX ADMIN — Medication: at 09:31

## 2025-03-30 RX ADMIN — THERA TABS 1 TABLET: TAB at 09:26

## 2025-03-30 RX ADMIN — SODIUM CHLORIDE, PRESERVATIVE FREE 10 ML: 5 INJECTION INTRAVENOUS at 09:32

## 2025-03-30 RX ADMIN — PREGABALIN 50 MG: 50 CAPSULE ORAL at 21:06

## 2025-03-30 RX ADMIN — GUAIFENESIN 200 MG: 200 SOLUTION ORAL at 09:26

## 2025-03-30 RX ADMIN — AMPICILLIN SODIUM AND SULBACTAM SODIUM 3000 MG: 2; 1 INJECTION, POWDER, FOR SOLUTION INTRAMUSCULAR; INTRAVENOUS at 21:11

## 2025-03-30 RX ADMIN — GUAIFENESIN 200 MG: 200 SOLUTION ORAL at 21:06

## 2025-03-30 RX ADMIN — ACETAMINOPHEN 650 MG: 325 TABLET ORAL at 06:02

## 2025-03-30 RX ADMIN — SODIUM CHLORIDE, PRESERVATIVE FREE 10 ML: 5 INJECTION INTRAVENOUS at 21:09

## 2025-03-30 RX ADMIN — Medication: at 21:09

## 2025-03-30 RX ADMIN — CLOPIDOGREL BISULFATE 75 MG: 75 TABLET, FILM COATED ORAL at 09:26

## 2025-03-30 RX ADMIN — AMPICILLIN SODIUM AND SULBACTAM SODIUM 3000 MG: 2; 1 INJECTION, POWDER, FOR SOLUTION INTRAMUSCULAR; INTRAVENOUS at 14:41

## 2025-03-30 RX ADMIN — CALCITRIOL CAPSULES 0.25 MCG 0.25 MCG: 0.25 CAPSULE ORAL at 09:26

## 2025-03-30 RX ADMIN — AMPICILLIN SODIUM AND SULBACTAM SODIUM 3000 MG: 2; 1 INJECTION, POWDER, FOR SOLUTION INTRAMUSCULAR; INTRAVENOUS at 09:35

## 2025-03-30 RX ADMIN — SODIUM CHLORIDE, PRESERVATIVE FREE 10 ML: 5 INJECTION INTRAVENOUS at 21:08

## 2025-03-30 RX ADMIN — CARVEDILOL 6.25 MG: 6.25 TABLET, FILM COATED ORAL at 09:27

## 2025-03-30 RX ADMIN — ASPIRIN 81 MG CHEWABLE TABLET 81 MG: 81 TABLET CHEWABLE at 09:27

## 2025-03-30 RX ADMIN — ACETAMINOPHEN 650 MG: 325 TABLET ORAL at 14:42

## 2025-03-30 RX ADMIN — DAPTOMYCIN 700 MG: 500 INJECTION, POWDER, LYOPHILIZED, FOR SUSPENSION INTRAVENOUS at 18:04

## 2025-03-30 RX ADMIN — SODIUM CHLORIDE, PRESERVATIVE FREE 10 ML: 5 INJECTION INTRAVENOUS at 09:31

## 2025-03-30 RX ADMIN — GUAIFENESIN 200 MG: 200 SOLUTION ORAL at 14:42

## 2025-03-30 RX ADMIN — INSULIN LISPRO 2 UNITS: 100 INJECTION, SOLUTION INTRAVENOUS; SUBCUTANEOUS at 21:20

## 2025-03-30 RX ADMIN — INSULIN LISPRO 4 UNITS: 100 INJECTION, SOLUTION INTRAVENOUS; SUBCUTANEOUS at 18:06

## 2025-03-30 RX ADMIN — PREGABALIN 50 MG: 50 CAPSULE ORAL at 09:26

## 2025-03-30 RX ADMIN — CARVEDILOL 6.25 MG: 6.25 TABLET, FILM COATED ORAL at 21:06

## 2025-03-30 RX ADMIN — INSULIN GLARGINE 15 UNITS: 100 INJECTION, SOLUTION SUBCUTANEOUS at 09:27

## 2025-03-30 RX ADMIN — OXYCODONE HYDROCHLORIDE 2.5 MG: 5 TABLET ORAL at 09:40

## 2025-03-30 RX ADMIN — ACETAMINOPHEN 650 MG: 325 TABLET ORAL at 21:06

## 2025-03-30 RX ADMIN — AMPICILLIN SODIUM AND SULBACTAM SODIUM 3000 MG: 2; 1 INJECTION, POWDER, FOR SOLUTION INTRAMUSCULAR; INTRAVENOUS at 02:57

## 2025-03-30 RX ADMIN — OXYCODONE HYDROCHLORIDE 2.5 MG: 5 TABLET ORAL at 19:03

## 2025-03-30 ASSESSMENT — PAIN DESCRIPTION - LOCATION
LOCATION: EAR
LOCATION: EAR;NECK
LOCATION: HEAD

## 2025-03-30 ASSESSMENT — PAIN SCALES - GENERAL
PAINLEVEL_OUTOF10: 10
PAINLEVEL_OUTOF10: 0
PAINLEVEL_OUTOF10: 2
PAINLEVEL_OUTOF10: 10
PAINLEVEL_OUTOF10: 0
PAINLEVEL_OUTOF10: 10

## 2025-03-30 ASSESSMENT — PAIN DESCRIPTION - DESCRIPTORS
DESCRIPTORS: SORE
DESCRIPTORS: SHARP
DESCRIPTORS: SORE

## 2025-03-30 ASSESSMENT — PAIN DESCRIPTION - ORIENTATION
ORIENTATION: ANTERIOR;POSTERIOR
ORIENTATION: RIGHT;LEFT
ORIENTATION: RIGHT;LEFT

## 2025-03-30 NOTE — PLAN OF CARE
Problem: Safety - Adult  Goal: Free from fall injury  3/29/2025 2140 by Rui Michaels RN  Outcome: Progressing  Flowsheets (Taken 3/29/2025 2140)  Free From Fall Injury: Instruct family/caregiver on patient safety     Problem: Discharge Planning  Goal: Discharge to home or other facility with appropriate resources  3/29/2025 2140 by Rui Michaels RN  Outcome: Progressing  Flowsheets (Taken 3/29/2025 2140)  Discharge to home or other facility with appropriate resources:   Identify barriers to discharge with patient and caregiver   Identify discharge learning needs (meds, wound care, etc)   Refer to discharge planning if patient needs post-hospital services based on physician order or complex needs related to functional status, cognitive ability or social support system   Arrange for needed discharge resources and transportation as appropriate     Problem: Pain  Goal: Verbalizes/displays adequate comfort level or baseline comfort level  3/29/2025 2140 by Rui Michaels RN  Outcome: Progressing  Flowsheets (Taken 3/29/2025 2140)  Verbalizes/displays adequate comfort level or baseline comfort level:   Encourage patient to monitor pain and request assistance   Assess pain using appropriate pain scale   Administer analgesics based on type and severity of pain and evaluate response   Implement non-pharmacological measures as appropriate and evaluate response     Problem: Skin/Tissue Integrity  Goal: Skin integrity remains intact  Description: 1.  Monitor for areas of redness and/or skin breakdown  2.  Assess vascular access sites hourly  3.  Every 4-6 hours minimum:  Change oxygen saturation probe site  4.  Every 4-6 hours:  If on nasal continuous positive airway pressure, respiratory therapy assess nares and determine need for appliance change or resting period  3/29/2025 2140 by Rui Michaels RN  Outcome: Progressing

## 2025-03-30 NOTE — PROGRESS NOTES
Hospitalist Progress Note  Seamus Rodriguez MD  Answering service: [phone]        Date of Service:  3/30/2025  NAME:  Nicci Hernandez  :  1960  MRN:  [id number]      Admission Summary:     Patient transferred from [facility] with retropharyngeal abscess.    Interval history / Subjective:     Still with some throat pain, but tolerating some solid foods now.     Assessment & Plan:     Neck abscess  -CT soft tissue on presentation shows increased size of prevertebral fluid collection associated with anterior fusion plate  -Follow-up MRI shows complex fluid focus in the prevertebral soft tissue associated with tissue edema and swelling, correlate for signs of infection such as retropharyngeal abscess  -S/p I&D of the abscess by ENT at bedside on 3/19, culture of the abscess showed MRSA  -Patient is s/p prior cervical fusion  -Neurosurgery also evaluated the patient and recommended continuing IV antibiotics, no indication of hardware removal  -On daptomycin and Unasyn, on discharge, patient to continue with daptomycin 700 mg every 24 hour through 2025  -ID previously evaluated the patient  -Nephrology recommended against PICC placement, plan for Julia's catheter placement with IR in a.m.    Bacteremia  -Blood cultures 3/19, 3/22 and 3/25 growing MRSA  -Cardiology consulted for JEFF but recommended holding off on it since it may not be safe with retropharyngeal abscess  -ID recommending daptomycin 700 mg every 24 hours through 2025    AL  -AL on CKD stage IIIb, likely ATN in the setting of infection and also RUTH from dye  -Ultrasound negative for hydronephrosis  -Off IV fluid and creatinine is improving  -Nephrology following    Metabolic acidosis  -This is due to renal insufficiency  -Off of bicarb drip    Hyponatremia  -Likely related to hypovolemia  -Hyponatremia has resolved with IV

## 2025-03-30 NOTE — PROGRESS NOTES
1632: Nurse notified that patient L big toe was purple, Nurse at beside. L toe purple and cool. L foot bandage removed (dressing was applied loosely), Patient has +1 pulses on L foot, good capillary refill. Patient has good sensation and no complaints of pain/numbness. Provider made aware of situation.     1640: Toe color turning more purple. Another nurse at bedside. No pulse noted, doppler used, no pulse noted. Rapid nurse at bedside. Heat compact applied. Orders placed.     1643: Duplex tech notified of order.   -consult called    1657: Duplex tech at bedside.     1744: Duplex tech recommenced a CTA as patient arteries are calcified, Tech stated they got pulses but stated they are low. Provider aware.     1811: Per podiatry they believe it is due to circulation. Toe no longer purple. Provider made aware.

## 2025-03-30 NOTE — CONSULTS
Foot and Ankle Surgery Consult    Subjective:      Nicci Hernandez is a 64 y.o. 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)     Gastroparesis     Hip pain, left     Hypercholesterolemia     Hypertension     Hypertension, diastolic, benign     Hypoalbuminemia     Peripheral vascular disease     Presence of stent in coronary artery in patient with coronary artery disease     S/P CABG x 3     Type 2 diabetes mellitus with hyperglycemia, with long-term current use of insulin ([facility])     Unspecified sleep apnea     no cpap repeat study sched for 9/20/13     Past Surgical History:   Procedure Laterality Date    ANTERIOR CERVICAL DISCECTOMY W/ FUSION  2003    ANTERIOR CERVICAL FUSION INSTRUMENTATION    BACK SURGERY  12/09/2022    Back surgery/plate in disc 3 -disc 6    CARDIAC CATHETERIZATION  1/2021, 2/2021    with stents placed.     CERVICAL LAMINECTOMY      C3 and C4    CHOLECYSTECTOMY  06/2012    CORONARY ANGIOPLASTY WITH STENT PLACEMENT  06/2020    CORONARY ARTERY BYPASS GRAFT  2009    triple bypass    ECHO 2D ADULT  04/2010    LVEF 45%, akinetic apex and septum.  HK mid-anterior wall    ECHO 2D ADULT  06/27/2012    LVEF about 50%.  There is possible HK of distal ant/septal wall and apical walls (technically difficult study).      FOOT DEBRIDEMENT Left 11/5/2024    LEFT FOOT PREP WOUND, APPLY GRAFT performed by Irma Hernandez DPM at [facility] MAIN OR    HAND TENDON SURGERY  10/2019    hand tendon repair    HYSTERECTOMY (CERVIX STATUS UNKNOWN)  2005    SHOULDER ARTHROSCOPY  3,000 mg IntraVENous Q6H Vicente Blackman APRN - DA   Stopped at 03/30/25 1533    DAPTOmycin (CUBICIN) 700 mg in sodium chloride (PF) 0.9 % 14 mL IV syringe  700 mg IntraVENous Q24H Vicente Blackman APRN - NP   700 mg at 03/29/25 1723    multivitamin 1 tablet  1 tablet Oral Daily Barbara MurrayILIA - NP   1 tablet at 03/30/25 0926    guaiFENesin (ROBITUSSIN) 100 MG/5ML liquid 200 mg  200 mg Oral TID Chadwick Garcia MD   200 mg at 03/30/25 1442    pregabalin (LYRICA) capsule 50 mg  50 mg Oral BID Saravanan Cadena MD   50 mg at 03/30/25 0926    acetaminophen (TYLENOL) tablet 650 mg  650 mg Oral TID Chadwick Garcia MD   650 mg at 03/30/25 1442    aspirin chewable tablet 81 mg  81 mg Oral Daily Chadwick Garcia MD   81 mg at 03/30/25 0927    clopidogrel (PLAVIX) tablet 75 mg  75 mg Oral Daily Chadwick Garcia MD   75 mg at 03/30/25 0926    oxyCODONE (ROXICODONE) immediate release tablet 2.5 mg  2.5 mg Oral Q4H PRN Chadwick Garcia MD   2.5 mg at 03/30/25 0940    balsum peru-castor oil (VENELEX) ointment   Topical BID Chadwick Garcia MD   Given at 03/30/25 0931    [Held by provider] morphine (PF) injection 1 mg  1 mg IntraVENous Q4H PRN Bill Olsen MD   1 mg at 03/21/25 1616    sodium chloride flush 0.9 % injection 5-40 mL  5-40 mL IntraVENous 2 times per day Bill Olsen MD   10 mL at 03/30/25 0931    sodium chloride flush 0.9 % injection 5-40 mL  5-40 mL IntraVENous PRN Bill Olsen MD        0.9 % sodium chloride infusion   IntraVENous PRN Bill Olsen MD   Stopped at 03/20/25 0932    ondansetron (ZOFRAN-ODT) disintegrating tablet 4 mg  4 mg Oral Q8H PRN Bill Olsen MD        Or    ondansetron (ZOFRAN) injection 4 mg  4 mg IntraVENous Q6H PRN Bill Olsen MD        polyethylene glycol (GLYCOLAX) packet 17 g  17 g Oral Daily PRN Bill Olsen MD        acetaminophen (TYLENOL) tablet 650 mg  650 mg Oral Q6H PRN Bill Olsen MD   650 mg

## 2025-03-30 NOTE — PLAN OF CARE
Problem: Safety - Adult  Goal: Free from fall injury  Outcome: Progressing     Problem: Chronic Conditions and Co-morbidities  Goal: Patient's chronic conditions and co-morbidity symptoms are monitored and maintained or improved  Outcome: Progressing     Problem: Discharge Planning  Goal: Discharge to home or other facility with appropriate resources  Outcome: Progressing     Problem: Pain  Goal: Verbalizes/displays adequate comfort level or baseline comfort level  Outcome: Progressing     Problem: Skin/Tissue Integrity  Goal: Skin integrity remains intact  Description: 1.  Monitor for areas of redness and/or skin breakdown  2.  Assess vascular access sites hourly  3.  Every 4-6 hours minimum:  Change oxygen saturation probe site  4.  Every 4-6 hours:  If on nasal continuous positive airway pressure, respiratory therapy assess nares and determine need for appliance change or resting period  Outcome: Progressing     Problem: Nutrition Deficit:  Goal: Optimize nutritional status  Outcome: Progressing

## 2025-03-31 ENCOUNTER — HOSPITAL ENCOUNTER (INPATIENT)
Facility: HOSPITAL | Age: 65
Discharge: HOME | DRG: 919 | End: 2025-04-03
Attending: FAMILY MEDICINE
Payer: MEDICARE

## 2025-03-31 VITALS
RESPIRATION RATE: 14 BRPM | OXYGEN SATURATION: 92 % | SYSTOLIC BLOOD PRESSURE: 119 MMHG | TEMPERATURE: 98 F | DIASTOLIC BLOOD PRESSURE: 74 MMHG | HEART RATE: 78 BPM

## 2025-03-31 LAB
ANION GAP SERPL CALC-SCNC: 2 MMOL/L (ref 2–12)
BUN SERPL-MCNC: 39 MG/DL (ref 6–20)
BUN/CREAT SERPL: 22 (ref 12–20)
CALCIUM SERPL-MCNC: 9.6 MG/DL (ref 8.5–10.1)
CHLORIDE SERPL-SCNC: 96 MMOL/L (ref 97–108)
CO2 SERPL-SCNC: 33 MMOL/L (ref 21–32)
CREAT SERPL-MCNC: 1.81 MG/DL (ref 0.55–1.02)
ECHO BSA: 2.15 M2
ECHO BSA: 2.15 M2
GLUCOSE BLD STRIP.AUTO-MCNC: 146 MG/DL (ref 65–117)
GLUCOSE BLD STRIP.AUTO-MCNC: 160 MG/DL (ref 65–117)
GLUCOSE BLD STRIP.AUTO-MCNC: 160 MG/DL (ref 65–117)
GLUCOSE BLD STRIP.AUTO-MCNC: 191 MG/DL (ref 65–117)
GLUCOSE SERPL-MCNC: 159 MG/DL (ref 65–100)
POTASSIUM SERPL-SCNC: 4.1 MMOL/L (ref 3.5–5.1)
SERVICE CMNT-IMP: ABNORMAL
SODIUM SERPL-SCNC: 131 MMOL/L (ref 136–145)
VAS LEFT ABI: 1.26
VAS LEFT ARM BP: 144 MMHG
VAS LEFT ATA DIST PSV: 18.6 CM/S
VAS LEFT ATA PROX PSV: 107.6 CM/S
VAS LEFT CFA PROX PSV: 72.8 CM/S
VAS LEFT DORSALIS PEDIS BP: 182 MMHG
VAS LEFT PERONEAL DIST PSV: 31.4 CM/S
VAS LEFT PFA PROX PSV: 61.8 CM/S
VAS LEFT POP A DIST PSV: 42.7 CM/S
VAS LEFT POP A PROX PSV: 46.5 CM/S
VAS LEFT POP A PROX VEL RATIO: 0.51
VAS LEFT PTA DIST PSV: 20.6 CM/S
VAS LEFT PTA PROX PSV: 21.8 CM/S
VAS LEFT SFA DIST PSV: 91.2 CM/S
VAS LEFT SFA DIST VEL RATIO: 1.25
VAS LEFT SFA MID PSV: 72.8 CM/S
VAS LEFT SFA MID VEL RATIO: 1.03
VAS LEFT SFA PROX PSV: 70.6 CM/S
VAS LEFT SFA PROX VEL RATIO: 0.97
VAS RIGHT ABI: 1.13
VAS RIGHT DORSALIS PEDIS BP: 162 MMHG

## 2025-03-31 PROCEDURE — 82962 GLUCOSE BLOOD TEST: CPT

## 2025-03-31 PROCEDURE — 2500000003 HC RX 250 WO HCPCS: Performed by: FAMILY MEDICINE

## 2025-03-31 PROCEDURE — 6370000000 HC RX 637 (ALT 250 FOR IP): Performed by: STUDENT IN AN ORGANIZED HEALTH CARE EDUCATION/TRAINING PROGRAM

## 2025-03-31 PROCEDURE — 80048 BASIC METABOLIC PNL TOTAL CA: CPT

## 2025-03-31 PROCEDURE — 05HM33Z INSERTION OF INFUSION DEVICE INTO RIGHT INTERNAL JUGULAR VEIN, PERCUTANEOUS APPROACH: ICD-10-PCS | Performed by: STUDENT IN AN ORGANIZED HEALTH CARE EDUCATION/TRAINING PROGRAM

## 2025-03-31 PROCEDURE — 6360000002 HC RX W HCPCS

## 2025-03-31 PROCEDURE — 2500000003 HC RX 250 WO HCPCS: Performed by: INTERNAL MEDICINE

## 2025-03-31 PROCEDURE — 2500000003 HC RX 250 WO HCPCS: Performed by: STUDENT IN AN ORGANIZED HEALTH CARE EDUCATION/TRAINING PROGRAM

## 2025-03-31 PROCEDURE — C1751 CATH, INF, PER/CENT/MIDLINE: HCPCS

## 2025-03-31 PROCEDURE — 2060000000 HC ICU INTERMEDIATE R&B

## 2025-03-31 PROCEDURE — 6370000000 HC RX 637 (ALT 250 FOR IP): Performed by: INTERNAL MEDICINE

## 2025-03-31 PROCEDURE — 6370000000 HC RX 637 (ALT 250 FOR IP): Performed by: FAMILY MEDICINE

## 2025-03-31 PROCEDURE — 2580000003 HC RX 258: Performed by: NURSE PRACTITIONER

## 2025-03-31 PROCEDURE — 0JH63XZ INSERTION OF TUNNELED VASCULAR ACCESS DEVICE INTO CHEST SUBCUTANEOUS TISSUE AND FASCIA, PERCUTANEOUS APPROACH: ICD-10-PCS | Performed by: STUDENT IN AN ORGANIZED HEALTH CARE EDUCATION/TRAINING PROGRAM

## 2025-03-31 PROCEDURE — 2700000000 HC OXYGEN THERAPY PER DAY

## 2025-03-31 PROCEDURE — 76937 US GUIDE VASCULAR ACCESS: CPT

## 2025-03-31 PROCEDURE — 6360000002 HC RX W HCPCS: Performed by: NURSE PRACTITIONER

## 2025-03-31 PROCEDURE — 2709999900 IR TUNNELED CVC PLACE WO SQ PORT/PUMP > 5 YEARS

## 2025-03-31 PROCEDURE — 94760 N-INVAS EAR/PLS OXIMETRY 1: CPT

## 2025-03-31 PROCEDURE — 6370000000 HC RX 637 (ALT 250 FOR IP): Performed by: NURSE PRACTITIONER

## 2025-03-31 RX ORDER — LIDOCAINE HYDROCHLORIDE 10 MG/ML
INJECTION, SOLUTION EPIDURAL; INFILTRATION; INTRACAUDAL; PERINEURAL PRN
Status: COMPLETED | OUTPATIENT
Start: 2025-03-31 | End: 2025-03-31

## 2025-03-31 RX ORDER — CARVEDILOL 6.25 MG/1
6.25 TABLET ORAL 2 TIMES DAILY
Qty: 60 TABLET | Refills: 3 | Status: SHIPPED
Start: 2025-03-31

## 2025-03-31 RX ORDER — TORSEMIDE 20 MG/1
10 TABLET ORAL DAILY
Qty: 30 TABLET | Refills: 3 | Status: SHIPPED
Start: 2025-03-31

## 2025-03-31 RX ORDER — OXYCODONE HYDROCHLORIDE 5 MG/1
2.5 TABLET ORAL EVERY 4 HOURS PRN
Qty: 15 TABLET | Refills: 0 | Status: SHIPPED | OUTPATIENT
Start: 2025-03-31 | End: 2025-04-03

## 2025-03-31 RX ORDER — INSULIN LISPRO 100 [IU]/ML
0-8 INJECTION, SOLUTION INTRAVENOUS; SUBCUTANEOUS
Qty: 1 EACH | Refills: 0 | Status: SHIPPED
Start: 2025-03-31

## 2025-03-31 RX ORDER — CASTOR OIL AND BALSAM, PERU 788; 87 MG/G; MG/G
OINTMENT TOPICAL 2 TIMES DAILY
Qty: 1 EACH | Refills: 0 | Status: SHIPPED
Start: 2025-03-31

## 2025-03-31 RX ORDER — INSULIN GLARGINE 100 [IU]/ML
15 INJECTION, SOLUTION SUBCUTANEOUS DAILY
Qty: 10 ML | Refills: 3 | Status: SHIPPED
Start: 2025-04-01

## 2025-03-31 RX ADMIN — GUAIFENESIN 200 MG: 200 SOLUTION ORAL at 20:53

## 2025-03-31 RX ADMIN — Medication: at 09:05

## 2025-03-31 RX ADMIN — Medication: at 20:54

## 2025-03-31 RX ADMIN — CARVEDILOL 6.25 MG: 6.25 TABLET, FILM COATED ORAL at 08:57

## 2025-03-31 RX ADMIN — ACETAMINOPHEN 650 MG: 325 TABLET ORAL at 08:56

## 2025-03-31 RX ADMIN — AMPICILLIN SODIUM AND SULBACTAM SODIUM 3000 MG: 2; 1 INJECTION, POWDER, FOR SOLUTION INTRAMUSCULAR; INTRAVENOUS at 09:02

## 2025-03-31 RX ADMIN — SODIUM CHLORIDE, PRESERVATIVE FREE 10 ML: 5 INJECTION INTRAVENOUS at 20:54

## 2025-03-31 RX ADMIN — INSULIN GLARGINE 15 UNITS: 100 INJECTION, SOLUTION SUBCUTANEOUS at 08:55

## 2025-03-31 RX ADMIN — OXYCODONE HYDROCHLORIDE 2.5 MG: 5 TABLET ORAL at 16:53

## 2025-03-31 RX ADMIN — CALCITRIOL CAPSULES 0.25 MCG 0.25 MCG: 0.25 CAPSULE ORAL at 08:56

## 2025-03-31 RX ADMIN — LIDOCAINE HYDROCHLORIDE 15 ML: 10 INJECTION, SOLUTION EPIDURAL; INFILTRATION; INTRACAUDAL; PERINEURAL at 14:58

## 2025-03-31 RX ADMIN — GUAIFENESIN 200 MG: 200 SOLUTION ORAL at 16:53

## 2025-03-31 RX ADMIN — THERA TABS 1 TABLET: TAB at 08:56

## 2025-03-31 RX ADMIN — PREGABALIN 50 MG: 50 CAPSULE ORAL at 08:56

## 2025-03-31 RX ADMIN — OXYCODONE HYDROCHLORIDE 2.5 MG: 5 TABLET ORAL at 06:03

## 2025-03-31 RX ADMIN — AMPICILLIN SODIUM AND SULBACTAM SODIUM 3000 MG: 2; 1 INJECTION, POWDER, FOR SOLUTION INTRAMUSCULAR; INTRAVENOUS at 03:17

## 2025-03-31 RX ADMIN — CARVEDILOL 6.25 MG: 6.25 TABLET, FILM COATED ORAL at 20:56

## 2025-03-31 RX ADMIN — ASPIRIN 81 MG CHEWABLE TABLET 81 MG: 81 TABLET CHEWABLE at 09:02

## 2025-03-31 RX ADMIN — SODIUM CHLORIDE, PRESERVATIVE FREE 10 ML: 5 INJECTION INTRAVENOUS at 09:03

## 2025-03-31 RX ADMIN — ACETAMINOPHEN 650 MG: 325 TABLET ORAL at 16:53

## 2025-03-31 RX ADMIN — ACETAMINOPHEN 650 MG: 325 TABLET ORAL at 20:53

## 2025-03-31 RX ADMIN — GUAIFENESIN 200 MG: 200 SOLUTION ORAL at 08:56

## 2025-03-31 RX ADMIN — DAPTOMYCIN 700 MG: 500 INJECTION, POWDER, LYOPHILIZED, FOR SUSPENSION INTRAVENOUS at 18:47

## 2025-03-31 RX ADMIN — PREGABALIN 50 MG: 50 CAPSULE ORAL at 20:53

## 2025-03-31 RX ADMIN — INSULIN LISPRO 2 UNITS: 100 INJECTION, SOLUTION INTRAVENOUS; SUBCUTANEOUS at 20:54

## 2025-03-31 ASSESSMENT — PAIN SCALES - GENERAL
PAINLEVEL_OUTOF10: 5
PAINLEVEL_OUTOF10: 7
PAINLEVEL_OUTOF10: 10
PAINLEVEL_OUTOF10: 8

## 2025-03-31 ASSESSMENT — PAIN DESCRIPTION - LOCATION: LOCATION: NECK

## 2025-03-31 ASSESSMENT — PAIN DESCRIPTION - ORIENTATION: ORIENTATION: MID;POSTERIOR

## 2025-03-31 ASSESSMENT — PAIN DESCRIPTION - DESCRIPTORS: DESCRIPTORS: ACHING

## 2025-03-31 ASSESSMENT — PAIN - FUNCTIONAL ASSESSMENT: PAIN_FUNCTIONAL_ASSESSMENT: ACTIVITIES ARE NOT PREVENTED

## 2025-03-31 NOTE — PLAN OF CARE
Problem: Safety - Adult  Goal: Free from fall injury  3/30/2025 2219 by Claire Lobato RN  Outcome: Progressing  Flowsheets (Taken 3/30/2025 2100)  Free From Fall Injury: Instruct family/caregiver on patient safety  3/30/2025 1213 by Chrystal Kat RN  Outcome: Progressing     Problem: Chronic Conditions and Co-morbidities  Goal: Patient's chronic conditions and co-morbidity symptoms are monitored and maintained or improved  3/30/2025 2219 by Claire Lobato RN  Outcome: Progressing  Flowsheets (Taken 3/30/2025 2000)  Care Plan - Patient's Chronic Conditions and Co-Morbidity Symptoms are Monitored and Maintained or Improved: Monitor and assess patient's chronic conditions and comorbid symptoms for stability, deterioration, or improvement  3/30/2025 1213 by Chrystal Kat RN  Outcome: Progressing     Problem: Discharge Planning  Goal: Discharge to home or other facility with appropriate resources  3/30/2025 2219 by Claire Lobato RN  Outcome: Progressing  Flowsheets (Taken 3/30/2025 2000)  Discharge to home or other facility with appropriate resources: Identify barriers to discharge with patient and caregiver  3/30/2025 1213 by Chrystal Kat RN  Outcome: Progressing     Problem: Pain  Goal: Verbalizes/displays adequate comfort level or baseline comfort level  3/30/2025 2219 by Claire Lobato RN  Outcome: Progressing  3/30/2025 1213 by Chrystal Kat RN  Outcome: Progressing     Problem: Skin/Tissue Integrity  Goal: Skin integrity remains intact  Description: 1.  Monitor for areas of redness and/or skin breakdown  2.  Assess vascular access sites hourly  3.  Every 4-6 hours minimum:  Change oxygen saturation probe site  4.  Every 4-6 hours:  If on nasal continuous positive airway pressure, respiratory therapy assess nares and determine need for appliance change or resting period  3/30/2025 2219 by Claire Lobato RN  Outcome: Progressing  Flowsheets  Taken 3/30/2025 2100  Skin Integrity Remains Intact:  Monitor for areas of redness and/or skin breakdown  Taken 3/30/2025 2000  Skin Integrity Remains Intact: Monitor for areas of redness and/or skin breakdown  3/30/2025 1213 by Chrystal Kat, RN  Outcome: Progressing     Problem: Nutrition Deficit:  Goal: Optimize nutritional status  3/30/2025 2219 by Claire Lobato, RN  Outcome: Progressing  3/30/2025 1213 by Chrystal Kat, RN  Outcome: Progressing

## 2025-03-31 NOTE — DISCHARGE SUMMARY
Discharge Summary       PATIENT ID: Nicci Hernandez  MRN: [id number]   YOB: 1960    DATE OF ADMISSION: 3/19/2025  3:05 PM    DATE OF DISCHARGE: 3/31/2025   PRIMARY CARE PROVIDER: Franco Ross MD     ATTENDING PHYSICIAN: Seamus Rodriguez  DISCHARGING PROVIDER: Seamus Rodriguez MD      CONSULTATIONS: IP CONSULT TO NEUROSURGERY  IP CONSULT TO OTOLARYNGOLOGY  IP CONSULT TO PHARMACY  IP CONSULT TO INFECTIOUS DISEASES  IP CONSULT TO NEPHROLOGY  IP CONSULT TO CARDIOLOGY  IP CONSULT TO DIABETES MANAGEMENT  IP CONSULT TO NEUROLOGY  IP CONSULT TO PSYCHIATRY  IP CONSULT TO PODIATRY  IP CONSULT TO OTOLARYNGOLOGY  IP CONSULT TO CASE MANAGEMENT  IP CONSULT TO VASCULAR ACCESS TEAM  IP CONSULT TO INTERVENTIONAL RADIOLOGY  IP CONSULT TO VASCULAR SURGERY    PROCEDURES/SURGERIES: * No surgery found *    ADMISSION SUMMARY AND HOSPITAL COURSE:     Nicci Hernandez is a 64 y.o. female who presents with retropharyngeal vs prevertebral abscess. This is a 64-year-old female with past medical history of CKD stage IV, diabetes, dyslipidemia, diabetic neuropathy, coronary artery disease status post CABG in 2009, gastroparesis, hypertension, cervical fusion about 20 years ago, who comes in for the above.  The patient reports that she has been having neck pain for about 3 weeks.  She reports that she has been seen by outpatient orthopedic surgery and started on steroids, and she also went to other physicians.  The patient went to [facility] about 3 days ago where she was also evaluated for this pain.  She had previously on imaging, with a CT of her neck, that showed potential loosening of some of the screws of her previous fusion surgery.  At [facility] she was given pain medicine apparently discharge.  Patient went to [facility] yesterday with her spouse due to a fall that he had and was not so severe neck pain that she was placed on a c-collar.  The patient reports that this pain has been progressively worsening since about 3 weeks ago and  w/Device Kit  Use to check BG 3 times daily. Dx code E11.65     pregabalin 75 MG capsule  Commonly known as: LYRICA     spironolactone 25 MG tablet  Commonly known as: ALDACTONE     vitamin B-6 100 MG tablet  Commonly known as: PYRIDOXINE            STOP taking these medications      ALPRAZolam 0.25 MG tablet  Commonly known as: XANAX     insulin aspart 100 UNIT/ML injection pen  Commonly known as: NovoLOG     ketorolac 10 MG tablet  Commonly known as: TORADOL     midodrine 10 MG tablet  Commonly known as: PROAMATINE     OneTouch Verio strip  Generic drug: blood glucose test strips     Tresiba FlexTouch 200 UNIT/ML Sopn  Generic drug: Insulin Degludec               Where to Get Your Medications        Information about where to get these medications is not yet available    Ask your nurse or doctor about these medications  balsum peru-castor oil Oint ointment  carvedilol 6.25 MG tablet  insulin glargine 100 UNIT/ML injection vial  insulin lispro 100 UNIT/ML Soln injection vial  oxyCODONE 5 MG immediate release tablet  torsemide 20 MG tablet       Addendum to discharge med list  -Daptomycin 700 mg every 24 through 5/5/2025    DISPOSITION:    Home With:   OT  PT  HH  RN      * Long term SNF/Inpatient Rehab    Independent/assisted living    Hospice    Other:     PATIENT CONDITION AT DISCHARGE:     Functional status    Poor    * Deconditioned     Independent      Cognition    * Lucid     Forgetful     Dementia      Catheters/lines (plus indication)    Osborne     PICC     PEG    * None      Code status    * Full code     DNR      PHYSICAL EXAMINATION AT DISCHARGE:    General:          Alert, cooperative, no distress, appears stated age.     HEENT:           Atraumatic, anicteric sclerae, pink conjunctivae                          No oral ulcers, mucosa moist, throat clear, dentition fair  Neck:               Supple, symmetrical  Lungs:             Clear to auscultation bilaterally.  No Wheezing or Rhonchi. No rales.  Chest

## 2025-03-31 NOTE — BSMART NOTE
to provide support as needed.   Follow up Psych Consult placed? No .   Psychiatrist updated? No        Participating treatment team members: Nicci Hernandez, Madeleine Farrell MA, LPC intern

## 2025-03-31 NOTE — PLAN OF CARE
Problem: Safety - Adult  Goal: Free from fall injury  3/31/2025 1943 by Kingston Archibald RN  Outcome: Progressing  3/31/2025 1154 by Kingston Archibald RN  Outcome: Progressing     Problem: Chronic Conditions and Co-morbidities  Goal: Patient's chronic conditions and co-morbidity symptoms are monitored and maintained or improved  3/31/2025 1943 by Kingston Archibald RN  Outcome: Progressing     Problem: Discharge Planning  Goal: Discharge to home or other facility with appropriate resources  3/31/2025 1943 by Kingston Archibald RN  Outcome: Progressing     Problem: Pain  Goal: Verbalizes/displays adequate comfort level or baseline comfort level  3/31/2025 1943 by Kingston Archibald RN  Outcome: Progressing     Bedside and Verbal shift change report given to Darshana (oncoming nurse) by Ann (offgoing nurse). Report included the following information Nurse Handoff Report, Index, Intake/Output, MAR, and Cardiac Rhythm SR .

## 2025-03-31 NOTE — PROGRESS NOTES
1625 Blood oozing from johan cath site, changed dressing x2, pressing site >10mins, notified aleksander ARRIETA order cancelled.

## 2025-03-31 NOTE — PROGRESS NOTES
RENAL  PROGRESS NOTE        Subjective:   Still c/o neck pain. Difficult lab draw. RN at bedside.      Objective:   VITALS SIGNS:    /88   Pulse 82   Temp 97.8 °F (36.6 °C) (Oral)   Resp 17   Ht 1.575 m (5' 2\")   Wt 109 kg (240 lb 4.8 oz)   SpO2 98%   BMI 43.95 kg/m²             Temp (24hrs), Av.7 °F (36.5 °C), Min:97 °F (36.1 °C), Max:98 °F (36.7 °C)         PHYSICAL EXAM:  Frail in NAD  +leg  edema  Resting     DATA REVIEW:     INTAKE / OUTPUT:   Last shift:      No intake/output data recorded.  Last 3 shifts:  1901 -  0700  In: 1168.6 [P.O.:180]  Out: 850 [Urine:850]    Intake/Output Summary (Last 24 hours) at 3/31/2025 1006  Last data filed at 3/31/2025 0400  Gross per 24 hour   Intake 988.56 ml   Output 650 ml   Net 338.56 ml         LABS:   LABS:  Recent Labs     25  0528 25  0412 25  0945 25  0638 25  0741   * 133* 133* 135* 129*   K 3.6 3.5 3.4* 3.3* 3.5   CL 96* 98 96* 97 98   CO2 30 29 30 29 18*   BUN 36* 40* 42* 59* 74*   CREATININE 1.98* 2.08* 2.13* 2.69* 3.06*   CALCIUM 9.2 9.0 8.8 8.8 9.0   PHOS  --   --  3.0 3.8 4.4   MG  --   --  2.4 2.1 2.3     Recent Labs     25  0945 25  0638 25  0741   WBC 9.8 14.6* 18.7*   HGB 11.2* 11.8 12.5   HCT 36.5 36.8 38.2    371 363       Assessment:     CKD-3b,small kidneys  AL,got IV dye on 3.19 in the setting of bacteremia: ATN               No hydro,but rt kidney 8.7 cm < lt kidney 13.1 cm ??? (Both kidney were 8.1 cm on )  hyponatremia  HTN  DM-2  Met acidosis mixed : non AG and high AG- RESOLVED  RP abscess/SA bacteremia  Edema  Hypokalemia- mild    Discussion-fair urine output.  AL is improving with CR down to 3 to 2.7 to 2.1 to 2      Plan:  Need lab work drawn  Her home dose Torsemide and Aldactone are on hold  Can resume Torsemide at lower dose (QD) at time of d/c + Aldactone  Encourage increase oral hydration/intake  Coreg resumed- watch BP  IV antibiotics as per

## 2025-03-31 NOTE — DIABETES MGMT
[facility]  PROGRAM FOR DIABETES HEALTH  DIABETES MANAGEMENT CONSULT    Consulted by Provider for advanced nursing evaluation and care for inpatient blood glucose management.    Evaluation and Action Plan   Nicci Hernandez is a 65 yo female with a history of DMT2, CKD stage 4, diabetic neuropathy, CAD s/p CABG 2009, gastroparesis, HTN, HLD, and cervical fusion who presented to the ED with C-collar placed by [facility] with reports of severe neck pain with any movement and pain with swallowing. She also reports worsening bilateral upper/lower extremity weakness for past 4 days. She was seen by outpatient ortho surgery as well as [facility] doctors several days prior to arriving to ER. She was prescribed steroids by ortho surgery. She was admitted for treatment and further evaluation. Diabetes health was consulted for inpatient blood glucose management.     Ms. Hernandez has been a diabetic for 9 years. She is currently taking Tresiba 65 units and Novolog 20 units. She reports taking Metformin in the past but has not been on any oral medications for a while. Her dispense report shows she has not picked up any of her insulin prescriptions since last year.  A1c is 6.2% which is at goal of 7%. She reports she has not been eating much over the last 5 weeks due to the neck pain and pain with swallowing she been experiencing. She checks her BG with glucometer. She is followed and managed by endocrinologist Dr. Juares last seen Oct 2024 with reports of  \"She reports fluctuating blood sugar levels and is currently on Tresiba 65. She has been unable to obtain a new glucometer due to insurance issues. Her nephrologist, Dr. Ryder, recently conducted an A1c test, but the results are not yet available. She has attempted to use a Chas device, but it does not adhere properly. She takes NovoLog 20 units in the morning and forgets to take it at lunchtime. She has noticed that her blood sugar tends to drop after eating in the  found for: \"TSH\", \"TSHFT4\", \"TSHELE\", \"[id number]\", \"TSHHS\"      Factors impacting BG management  Factor Dose Comments   Nutrition:  Standard meals   60 grams/meal    Pain PRN     Infection Flagyl  Daptomycin  Rocephin    Kidney function CKD stage 4    Liver function Normal           Assessment and Nursing Intervention   Nursing Diagnosis Risk for unstable blood glucose pattern   Nursing Intervention Domain 5250 Decision-making Support   Nursing Interventions Examined current inpatient diabetes/blood glucose control   Explored factors facilitating and impeding inpatient management  Explored corrective strategies with patient and responsible inpatient provider   Informed patient of rational for insulin strategy while hospitalized     Nursing Diagnosis 04946 Ineffective Health Management   Nursing Intervention Domain 5250 Decision-making Support   Nursing Interventions Identified diabetes self-management practices impeding diabetes control  Discussed diabetes survival skills related to  Healthy Plate eating plan; given handouts  Role of physical activity in improving insulin sensitivity and action  Procedure for blood glucose monitoring & options for low-cost products  Medications plan at discharge     Billing Code(s)   [] 37851 IP initial hospital care - 40 minutes   [] 42354 IP initial hospital care -55 minutes  [] 13074 IP initial hospital care -75 minutes  []        91946 IP subsequent hospital care - 50 minutes   [] 67902 IP subsequent hospital care - 35 minutes   [] 96839 IP subsequent hospital care - 25 minutes       Before making these care recommendations, I personally reviewed the hospitalization record, including notes, laboratory & diagnostic data and current medications, and examined the patient at the bedside.  Total minutes: No charge     ILIA Alvares - CNS   Diabetes Clinical Nurse Specialist   Program for Diabetes Health  Access via [employer]

## 2025-03-31 NOTE — CARE COORDINATION
Brief Note:  patient approved for admit to [facility] ([place]) with admission planned for today.      Barrier: Patient awaiting placement of  Johan Catheter 1:06pm.   2:30pm  patient is off the floor for johan catheter placement.     Transition of Care Plan:    RUR: 19%  Prior Level of Functioning: independent  Disposition: d/c to [facility] ([place])  RAMA: today  If SNF or IPR: Date FOC offered:   Date FOC received:   Accepting facility:   Date authorization started with reference number: n/a  Date authorization received and expires: n/a  Follow up appointments:   DME needed: facility to determine dme needs  Transportation at discharge:   IM/IMM Medicare/ letter given:   1st 3/21/25    Caregiver Contact: dter/s-i-l  Discharge Caregiver contacted prior to discharge? yes  Care Conference needed?   Barriers to discharge:  none  [facility] transport requested for 5pm; next available transport time is  6:30pm    4:27pm  CM informed by Ann that patient is complaining of ear pain and that Johan catheter is leaking/oozing.  Dr Rodriguez  and Monika liaison ([place] [phone]) notified and d/c cancelled for today.      [facility] notified of d/c cancellation.     Cheyenne Odell, ROSALIEW, CRM  [phone]

## 2025-03-31 NOTE — PROGRESS NOTES
TRANSFER - OUT REPORT:    Verbal report given to Ann RN on Nicci BENJAMIN Hernandez  being transferred to  for routine progression of patient care       Report consisted of patient's Situation, Background, Assessment and   Recommendations(SBAR).     Information from the following report(s) Nurse Handoff Report was reviewed with the receiving nurse.           Lines:   Extended Dwell Peripherial IV 03/21/25 Right Brachial (Active)   Site Assessment Clean, dry & intact 03/31/25 1130   Phlebitis Assessment No symptoms 03/31/25 0859   Infiltration Assessment 0 03/31/25 0859   Line Status Capped 03/31/25 0859   Line Care Connections checked and tightened 03/31/25 0859   Alcohol Cap Used Yes 03/31/25 0859   Dressing Type Transparent 03/31/25 0859   Dressing Status Clean, dry & intact 03/31/25 0859        Opportunity for questions and clarification was provided.      Patient transported with:  Tech

## 2025-03-31 NOTE — PROGRESS NOTES
After Julia's catheter was placed, the site noted to be oozing.  Per IR, patient may need dressing change few times overnight.  Discharge canceled.  Reevaluate in a.m.

## 2025-03-31 NOTE — PROGRESS NOTES
Hospitalist Progress Note  Seamus Rodriguez MD  Answering service: [phone]        Date of Service:  3/31/2025  NAME:  Nicci Hernandez  :  1960  MRN:  [id number]      Admission Summary:     Patient transferred from [facility] with retropharyngeal abscess.    Interval history / Subjective:     Still with some throat pain, but tolerating some solid foods now.     Assessment & Plan:     Neck abscess  -CT soft tissue on presentation shows increased size of prevertebral fluid collection associated with anterior fusion plate  -Follow-up MRI shows complex fluid focus in the prevertebral soft tissue associated with tissue edema and swelling, correlate for signs of infection such as retropharyngeal abscess  -S/p I&D of the abscess by ENT at bedside on 3/19, culture of the abscess showed MRSA  -Patient is s/p prior cervical fusion  -Neurosurgery also evaluated the patient and recommended continuing IV antibiotics, no indication of hardware removal  -On daptomycin and Unasyn, on discharge, patient to continue with daptomycin 700 mg every 24 hour through 2025  -ID previously evaluated the patient  -Nephrology recommended against PICC placement, plan for Julia's catheter placement today    Bacteremia  -Blood cultures 3/19, 3/22 and 3/25 growing MRSA  -Cardiology consulted for JEFF but recommended holding off on it since it may not be safe with retropharyngeal abscess  -ID recommending daptomycin 700 mg every 24 hours through 2025    AL  -AL on CKD stage IIIb, likely ATN in the setting of infection and also RUTH from dye  -Ultrasound negative for hydronephrosis  -Off IV fluid and creatinine is improving  -Nephrology following    Metabolic acidosis  -This is due to renal insufficiency  -Off of bicarb drip    Hyponatremia  -Likely related to hypovolemia  -Hyponatremia has resolved with IV

## 2025-04-01 PROBLEM — E44.0 MODERATE PROTEIN-CALORIE MALNUTRITION: Status: ACTIVE | Noted: 2025-04-01

## 2025-04-01 LAB
ALBUMIN SERPL-MCNC: 1.7 G/DL (ref 3.5–5)
ALBUMIN/GLOB SERPL: 0.4 (ref 1.1–2.2)
ALP SERPL-CCNC: 52 U/L (ref 45–117)
ALT SERPL-CCNC: 13 U/L (ref 12–78)
ANION GAP SERPL CALC-SCNC: 10 MMOL/L (ref 2–12)
AST SERPL-CCNC: 22 U/L (ref 15–37)
BILIRUB SERPL-MCNC: 0.7 MG/DL (ref 0.2–1)
BUN SERPL-MCNC: 37 MG/DL (ref 6–20)
BUN/CREAT SERPL: 21 (ref 12–20)
CALCIUM SERPL-MCNC: 8.9 MG/DL (ref 8.5–10.1)
CHLORIDE SERPL-SCNC: 101 MMOL/L (ref 97–108)
CO2 SERPL-SCNC: 23 MMOL/L (ref 21–32)
CREAT SERPL-MCNC: 1.74 MG/DL (ref 0.55–1.02)
ERYTHROCYTE [DISTWIDTH] IN BLOOD BY AUTOMATED COUNT: 15 % (ref 11.5–14.5)
GLOBULIN SER CALC-MCNC: 4.4 G/DL (ref 2–4)
GLUCOSE BLD STRIP.AUTO-MCNC: 127 MG/DL (ref 65–117)
GLUCOSE BLD STRIP.AUTO-MCNC: 167 MG/DL (ref 65–117)
GLUCOSE BLD STRIP.AUTO-MCNC: 196 MG/DL (ref 65–117)
GLUCOSE BLD STRIP.AUTO-MCNC: 227 MG/DL (ref 65–117)
GLUCOSE SERPL-MCNC: 111 MG/DL (ref 65–100)
HCT VFR BLD AUTO: 34.6 % (ref 35–47)
HGB BLD-MCNC: 10 G/DL (ref 11.5–16)
MAGNESIUM SERPL-MCNC: 2.2 MG/DL (ref 1.6–2.4)
MCH RBC QN AUTO: 27.7 PG (ref 26–34)
MCHC RBC AUTO-ENTMCNC: 28.9 G/DL (ref 30–36.5)
MCV RBC AUTO: 95.8 FL (ref 80–99)
NRBC # BLD: 0 K/UL (ref 0–0.01)
NRBC BLD-RTO: 0 PER 100 WBC
PHOSPHATE SERPL-MCNC: 3.9 MG/DL (ref 2.6–4.7)
PLATELET # BLD AUTO: 155 K/UL (ref 150–400)
PMV BLD AUTO: 11 FL (ref 8.9–12.9)
POTASSIUM SERPL-SCNC: 4.4 MMOL/L (ref 3.5–5.1)
PROT SERPL-MCNC: 6.1 G/DL (ref 6.4–8.2)
RBC # BLD AUTO: 3.61 M/UL (ref 3.8–5.2)
SERVICE CMNT-IMP: ABNORMAL
SODIUM SERPL-SCNC: 134 MMOL/L (ref 136–145)
WBC # BLD AUTO: 8.4 K/UL (ref 3.6–11)

## 2025-04-01 PROCEDURE — 80053 COMPREHEN METABOLIC PANEL: CPT

## 2025-04-01 PROCEDURE — 6370000000 HC RX 637 (ALT 250 FOR IP): Performed by: STUDENT IN AN ORGANIZED HEALTH CARE EDUCATION/TRAINING PROGRAM

## 2025-04-01 PROCEDURE — 85027 COMPLETE CBC AUTOMATED: CPT

## 2025-04-01 PROCEDURE — 82962 GLUCOSE BLOOD TEST: CPT

## 2025-04-01 PROCEDURE — 97535 SELF CARE MNGMENT TRAINING: CPT

## 2025-04-01 PROCEDURE — 2700000000 HC OXYGEN THERAPY PER DAY

## 2025-04-01 PROCEDURE — 97530 THERAPEUTIC ACTIVITIES: CPT

## 2025-04-01 PROCEDURE — 6370000000 HC RX 637 (ALT 250 FOR IP): Performed by: NURSE PRACTITIONER

## 2025-04-01 PROCEDURE — 2580000003 HC RX 258: Performed by: FAMILY MEDICINE

## 2025-04-01 PROCEDURE — 87040 BLOOD CULTURE FOR BACTERIA: CPT

## 2025-04-01 PROCEDURE — 6370000000 HC RX 637 (ALT 250 FOR IP): Performed by: FAMILY MEDICINE

## 2025-04-01 PROCEDURE — 2060000000 HC ICU INTERMEDIATE R&B

## 2025-04-01 PROCEDURE — 84100 ASSAY OF PHOSPHORUS: CPT

## 2025-04-01 PROCEDURE — 6370000000 HC RX 637 (ALT 250 FOR IP): Performed by: INTERNAL MEDICINE

## 2025-04-01 PROCEDURE — 2500000003 HC RX 250 WO HCPCS: Performed by: STUDENT IN AN ORGANIZED HEALTH CARE EDUCATION/TRAINING PROGRAM

## 2025-04-01 PROCEDURE — 83735 ASSAY OF MAGNESIUM: CPT

## 2025-04-01 PROCEDURE — 6360000002 HC RX W HCPCS: Performed by: NURSE PRACTITIONER

## 2025-04-01 PROCEDURE — 94760 N-INVAS EAR/PLS OXIMETRY 1: CPT

## 2025-04-01 PROCEDURE — 2500000003 HC RX 250 WO HCPCS: Performed by: INTERNAL MEDICINE

## 2025-04-01 PROCEDURE — 2500000003 HC RX 250 WO HCPCS: Performed by: FAMILY MEDICINE

## 2025-04-01 PROCEDURE — 2580000003 HC RX 258: Performed by: NURSE PRACTITIONER

## 2025-04-01 RX ORDER — PREGABALIN 50 MG/1
50 CAPSULE ORAL 3 TIMES DAILY
Status: DISCONTINUED | OUTPATIENT
Start: 2025-04-01 | End: 2025-04-04 | Stop reason: HOSPADM

## 2025-04-01 RX ADMIN — GUAIFENESIN 200 MG: 200 SOLUTION ORAL at 08:42

## 2025-04-01 RX ADMIN — ACETAMINOPHEN 650 MG: 325 TABLET ORAL at 15:29

## 2025-04-01 RX ADMIN — INSULIN LISPRO 2 UNITS: 100 INJECTION, SOLUTION INTRAVENOUS; SUBCUTANEOUS at 12:28

## 2025-04-01 RX ADMIN — CEFTAROLINE FOSAMIL 400 MG: 400 POWDER, FOR SOLUTION INTRAVENOUS at 15:35

## 2025-04-01 RX ADMIN — OXYCODONE HYDROCHLORIDE 2.5 MG: 5 TABLET ORAL at 12:30

## 2025-04-01 RX ADMIN — INSULIN LISPRO 2 UNITS: 100 INJECTION, SOLUTION INTRAVENOUS; SUBCUTANEOUS at 21:13

## 2025-04-01 RX ADMIN — SODIUM CHLORIDE: 0.9 INJECTION, SOLUTION INTRAVENOUS at 15:34

## 2025-04-01 RX ADMIN — ACETAMINOPHEN 650 MG: 325 TABLET ORAL at 03:57

## 2025-04-01 RX ADMIN — THERA TABS 1 TABLET: TAB at 08:44

## 2025-04-01 RX ADMIN — SODIUM CHLORIDE: 0.9 INJECTION, SOLUTION INTRAVENOUS at 22:32

## 2025-04-01 RX ADMIN — SODIUM CHLORIDE, PRESERVATIVE FREE 10 ML: 5 INJECTION INTRAVENOUS at 08:46

## 2025-04-01 RX ADMIN — INSULIN GLARGINE 15 UNITS: 100 INJECTION, SOLUTION SUBCUTANEOUS at 08:42

## 2025-04-01 RX ADMIN — CLOPIDOGREL BISULFATE 75 MG: 75 TABLET, FILM COATED ORAL at 08:42

## 2025-04-01 RX ADMIN — CARVEDILOL 6.25 MG: 6.25 TABLET, FILM COATED ORAL at 08:41

## 2025-04-01 RX ADMIN — CALCITRIOL CAPSULES 0.25 MCG 0.25 MCG: 0.25 CAPSULE ORAL at 08:43

## 2025-04-01 RX ADMIN — PREGABALIN 50 MG: 50 CAPSULE ORAL at 15:28

## 2025-04-01 RX ADMIN — ASPIRIN 81 MG CHEWABLE TABLET 81 MG: 81 TABLET CHEWABLE at 08:41

## 2025-04-01 RX ADMIN — GUAIFENESIN 200 MG: 200 SOLUTION ORAL at 15:29

## 2025-04-01 RX ADMIN — CEFTAROLINE FOSAMIL 400 MG: 400 POWDER, FOR SOLUTION INTRAVENOUS at 22:33

## 2025-04-01 RX ADMIN — SODIUM CHLORIDE, PRESERVATIVE FREE 10 ML: 5 INJECTION INTRAVENOUS at 21:19

## 2025-04-01 RX ADMIN — Medication: at 08:47

## 2025-04-01 RX ADMIN — ACETAMINOPHEN 650 MG: 325 TABLET ORAL at 08:39

## 2025-04-01 RX ADMIN — PREGABALIN 50 MG: 50 CAPSULE ORAL at 21:14

## 2025-04-01 RX ADMIN — PREGABALIN 50 MG: 50 CAPSULE ORAL at 08:44

## 2025-04-01 RX ADMIN — DAPTOMYCIN 700 MG: 500 INJECTION, POWDER, LYOPHILIZED, FOR SUSPENSION INTRAVENOUS at 17:28

## 2025-04-01 RX ADMIN — GUAIFENESIN 200 MG: 200 SOLUTION ORAL at 21:14

## 2025-04-01 RX ADMIN — CARVEDILOL 6.25 MG: 6.25 TABLET, FILM COATED ORAL at 21:14

## 2025-04-01 RX ADMIN — ACETAMINOPHEN 650 MG: 325 TABLET ORAL at 21:14

## 2025-04-01 RX ADMIN — Medication: at 21:18

## 2025-04-01 ASSESSMENT — PAIN SCALES - GENERAL
PAINLEVEL_OUTOF10: 3
PAINLEVEL_OUTOF10: 8
PAINLEVEL_OUTOF10: 10
PAINLEVEL_OUTOF10: 8
PAINLEVEL_OUTOF10: 3
PAINLEVEL_OUTOF10: 8

## 2025-04-01 ASSESSMENT — PAIN DESCRIPTION - LOCATION
LOCATION: EAR
LOCATION: NECK;EAR
LOCATION: LEG;EAR
LOCATION: NECK
LOCATION: EAR;NECK
LOCATION: NECK

## 2025-04-01 ASSESSMENT — PAIN DESCRIPTION - DESCRIPTORS
DESCRIPTORS: ACHING
DESCRIPTORS: PRESSURE;ACHING
DESCRIPTORS: ACHING;PRESSURE
DESCRIPTORS: ACHING;STABBING
DESCRIPTORS: ACHING;BURNING

## 2025-04-01 ASSESSMENT — PAIN DESCRIPTION - ORIENTATION
ORIENTATION: RIGHT;LEFT
ORIENTATION: POSTERIOR
ORIENTATION: RIGHT;LEFT
ORIENTATION: POSTERIOR
ORIENTATION: RIGHT;LEFT

## 2025-04-01 ASSESSMENT — PAIN DESCRIPTION - FREQUENCY: FREQUENCY: CONTINUOUS

## 2025-04-01 NOTE — PROGRESS NOTES
1000am: RN notified by Phlebotomy that only one set of blood cultures could be collected on patient due to patient being difficult stick/ limited visible veins.     1030am: RN notified Michelle TRIANA and Tatiana Jarrell RN of difficulty in collecting blood cultures. Tatiana Jarrell RN verbalized to RN that Dr. Logan responded in rounds to message Infectious Disease MD and ask if the patient can just have one set drawn.     1046am: RN messaged Infectious Disease MD Dr. Nuñez and let them know that phlebotomy was only able to draw one set of blood cultures on this patient. RN asked MD if it is fine to only have one set and told her that the patient is a very hard stick/ limited visible veins. The patient may beed vascular access team consulted.    1049am: MD responded that it is fine to have one set of blood cultures.

## 2025-04-01 NOTE — DIABETES MGMT
Diabetes Management Team to sign off at this point as patient's blood glucose remains stable. Her blood glucose pattern is tolerating reduced dosing well. Not requiring much correctional insulin or bolus insulin due to decreased PO intake.     Diabetes Discharge Plan   Medication:  A1c is at goal 6.2%, given her blood glucose pattern is running low due to decrease in PO intake, recommend continuing hospital dose Lantus upon discharge to SNF.   Continue Lantus insulin pen 15 units daily   Pause Humalog 4 units TID with meals until eating pattern improves and preprandial BG is requiring coverage   Continue SSI while at [facility] for any hyperglycemia correction      Additional orders  Follow up with PCP      Please re-consult us if patient needs change.  Thank you for including us in their care.    ILIA Alvares - CNS   Diabetes Clinical Nurse Specialist   Program for Diabetes Health  Access via [email or web address]

## 2025-04-01 NOTE — PROGRESS NOTES
intake.  No further hypoglycemic events.  Visited with pt, she endorses improving appetite / PO intake.  Ate nearly 100% of oatmeal this morning (using a straw for some of it), some eggs and biscuit crumbled up together.  We discussed increasing protein intake, especially without meat.  She would like ONS resumed at all meals, liquids are still easier for her.  Vanilla.  Opting for Ensure HP for higher protein content over Glucerna.    Encouraged her to eat HS snack or consume OBS before bedtime especially if she did not eat a good dinner meal.        3/28 (from brief): Discussed in IDRs.  Noted to have another hypoglycemic event early this morning.  Per RN, PO intake very minimal and not drinking 2 Glucerna/ meal, just 1.  Visited with pt.  Agreeable to Ensure Plus ONS (now back in stock) with each meal + 1 as HS snack in attempts to prevent early AM hypoglycemia.  ONS order updated and discussed with RN as well.     Plan:     -Adjusted ONS to high kcal/high protein option with all meals + HS snack (Ensure Plus or Ensure Plus HP)  -Continue full liquid, lacto-ovo diet as pt preference.  However, OK for regular/ thins per SLP as of 3/26.  -Continue lacto-ovo restriction with diet advancement, pt doesn't like to chew meat.     *4 Ensure Plus/ Ensure Plus HP ONS provide 1400 kcal and 64-80 gm pro, which meets >75% of kcal/pro needs respectively.     Ensure Plus = 350 kcal, 16 gm pro, 48 gm CHO/bottle  Ensure Plus HP = 350 kcal, 20 gm pro, 40 gm CHO/bottle  *product delivered will depend on availability*     RD following.  Please see complete assessment from 3/26 for additional details, goals, and monitoring/evaluation.      3/26: follow-up.  Chart reviewed/ events noted.  Diabetes now following with ongoing insulin adjustments/ recommendations (hypoglycemic this AM).  Treated with D10 bolus and juice.  Breakfast tray witnessed, pt only consumed 1 Glucerna shake for this meal.  She is not feeling hungry and continues    12/01/24 94.2 kg (207 lb 10.8 oz)   11/05/24 98.8 kg (217 lb 13 oz)   10/15/24 98 kg (216 lb 1.6 oz)   08/19/24 97.4 kg (214 lb 11.2 oz)   08/13/24 94.3 kg (208 lb)   04/20/23 91.2 kg (201 lb)       Nutrition Diagnosis:   Inadequate oral intake related to swallowing difficulty, psychological cause or life stress (painful swallow, limited neck movement,  recently passed) as evidenced by variable po intake (but overall averaging <50%)    Nutrition Interventions:   Food and/or Nutrient Delivery: Continue Current Diet, Start Oral Nutrition Supplement  Nutrition Education/Counseling: Education/Counseling completed  Coordination of Nutrition Care: Continue to monitor while inpatient, Interdisciplinary Rounds       Goals:  Previous Goal Met: Progressing toward Goal(s)  Goals: Other  Specify Other Goals: PO intake >/=50% of meals with 1-3 ONS daily by next RD assessment    Nutrition Monitoring and Evaluation:   Behavioral-Environmental Outcomes: None Identified  Food/Nutrient Intake Outcomes: Progression of Nutrition, Food and Nutrient Intake, Supplement Intake  Physical Signs/Symptoms Outcomes: Biochemical Data, Chewing or Swallowing, GI Status, Fluid Status or Edema, Meal Time Behavior, Weight    Discharge Planning:    Continue current diet, Continue Oral Nutrition Supplement     Recent Labs     03/31/25  0947 04/01/25  0702   GLUCOSE 159* 111*   BUN 39* 37*   CREATININE 1.81* 1.74*   * 134*   K 4.1 4.4   CL 96* 101   CO2 33* 23   CALCIUM 9.6 8.9   PHOS  --  3.9   MG  --  2.2       Recent Labs     04/01/25  0702   ALT 13   AST 22   ALKPHOS 52   BILITOT 0.7   GLOB 4.4*         Lactic Acid   Date Value Ref Range Status   04/05/2022 3.1 (HH) 0.4 - 2.0 mmol/L Final     Comment:     CALLED TO AND READ BACK BY MD ROSALIE AT 1726 BY GY       Recent Labs     04/01/25  0702   WBC 8.4   HGB 10.0*   HCT 34.6*          No results found for: \"PREALB\"    Triglycerides   Date Value Ref Range Status   11/22/2024 82

## 2025-04-01 NOTE — CARE COORDINATION
Transition of Care Plan:    RUR: 20%  Prior Level of Functioning: independent  Disposition:  d/c to [facility] ([place])   RAMA: Thursday 4/3/25  If SNF or IPR: Date FOC offered:   Date FOC received:   Accepting facility:   Date authorization started with reference number:   Date authorization received and expires:   Follow up appointments:   DME needed: facility to determine dme needs   Transportation at discharge: [facility]  IM/IMM Medicare/ letter given:   3/21/25  Caregiver Contact: dter/s-I-l  Discharge Caregiver contacted prior to discharge? yes  Care Conference needed?   Barriers to discharge:  now medically stability johan catheter may have to be replaced    CM continuing to follow.  KURT Mcintosh, CRM  [phone]

## 2025-04-01 NOTE — PLAN OF CARE
Problem: Safety - Adult  Goal: Free from fall injury  4/1/2025 0128 by Rui Michaels RN  Outcome: Progressing  Flowsheets (Taken 4/1/2025 0128)  Free From Fall Injury: Instruct family/caregiver on patient safety     Problem: Discharge Planning  Goal: Discharge to home or other facility with appropriate resources  4/1/2025 0128 by Rui Michaels RN  Outcome: Progressing  Flowsheets (Taken 4/1/2025 0128)  Discharge to home or other facility with appropriate resources:   Identify barriers to discharge with patient and caregiver   Arrange for needed discharge resources and transportation as appropriate   Identify discharge learning needs (meds, wound care, etc)     Problem: Pain  Goal: Verbalizes/displays adequate comfort level or baseline comfort level  4/1/2025 0128 by Rui Michaels RN  Outcome: Progressing  Flowsheets (Taken 4/1/2025 0128)  Verbalizes/displays adequate comfort level or baseline comfort level:   Encourage patient to monitor pain and request assistance   Assess pain using appropriate pain scale   Administer analgesics based on type and severity of pain and evaluate response   Implement non-pharmacological measures as appropriate and evaluate response     Problem: Skin/Tissue Integrity  Goal: Skin integrity remains intact  Description: 1.  Monitor for areas of redness and/or skin breakdown  2.  Assess vascular access sites hourly  3.  Every 4-6 hours minimum:  Change oxygen saturation probe site  4.  Every 4-6 hours:  If on nasal continuous positive airway pressure, respiratory therapy assess nares and determine need for appliance change or resting period  Outcome: Progressing  Flowsheets (Taken 3/31/2025 2000)  Skin Integrity Remains Intact: Monitor for areas of redness and/or skin breakdown

## 2025-04-01 NOTE — PLAN OF CARE
Problem: Safety - Adult  Goal: Free from fall injury  4/1/2025 1047 by Sarah Cannon  Outcome: Progressing  4/1/2025 0128 by Rui Michaels RN  Outcome: Progressing  Flowsheets (Taken 4/1/2025 0128)  Free From Fall Injury: Instruct family/caregiver on patient safety     Problem: Chronic Conditions and Co-morbidities  Goal: Patient's chronic conditions and co-morbidity symptoms are monitored and maintained or improved  Outcome: Progressing     Problem: Discharge Planning  Goal: Discharge to home or other facility with appropriate resources  4/1/2025 0128 by Rui Michaels RN  Outcome: Progressing  Flowsheets (Taken 4/1/2025 0128)  Discharge to home or other facility with appropriate resources:   Identify barriers to discharge with patient and caregiver   Arrange for needed discharge resources and transportation as appropriate   Identify discharge learning needs (meds, wound care, etc)     Problem: Pain  Goal: Verbalizes/displays adequate comfort level or baseline comfort level  4/1/2025 1047 by Sarah Cannon  Outcome: Progressing  4/1/2025 0128 by Rui Michaels RN  Outcome: Progressing  Flowsheets (Taken 4/1/2025 0128)  Verbalizes/displays adequate comfort level or baseline comfort level:   Encourage patient to monitor pain and request assistance   Assess pain using appropriate pain scale   Administer analgesics based on type and severity of pain and evaluate response   Implement non-pharmacological measures as appropriate and evaluate response     Problem: Skin/Tissue Integrity  Goal: Skin integrity remains intact  Description: 1.  Monitor for areas of redness and/or skin breakdown  2.  Assess vascular access sites hourly  3.  Every 4-6 hours minimum:  Change oxygen saturation probe site  4.  Every 4-6 hours:  If on nasal continuous positive airway pressure, respiratory therapy assess nares and determine need for appliance change or resting period  4/1/2025 1047

## 2025-04-01 NOTE — PLAN OF CARE
Problem: Physical Therapy - Adult  Goal: By Discharge: Performs mobility at highest level of function for planned discharge setting.  See evaluation for individualized goals.  Description: FUNCTIONAL STATUS PRIOR TO ADMISSION: Patient was modified independent, intermittently using a rolling walker for functional mobility.    HOME SUPPORT PRIOR TO ADMISSION: The patient lived with her  who she reports is also in the hospital (at [facility]) currently.    Physical Therapy Goals  Weekly Reassessment 03/27/2025 (goals down graded)  1.  Patient will move from supine to sit and sit to supine in bed with moderate assistance within 7 day(s).    2.  Patient will perform sit to stand with moderate assistance within 7 day(s).  3.  Patient will transfer from bed to chair and chair to bed with moderate assistance using the least restrictive device within 7 day(s).  4.  Patient will ambulate with moderate assistance for 5 feet with the least restrictive device within 7 day(s).     Initiated 3/20/2025  1.  Patient will move from supine to sit and sit to supine and roll side to side in bed with modified independence within 7 day(s).    2.  Patient will perform sit to stand with modified independence within 7 day(s).  3.  Patient will transfer from bed to chair and chair to bed with modified independence using the least restrictive device within 7 day(s).  4.  Patient will ambulate with modified independence for 150 feet with the least restrictive device within 7 day(s).             Outcome: Progressing     PHYSICAL THERAPY TREATMENT    Patient: Nicci Hernandez (64 y.o. female)  Date: 4/1/2025  Diagnosis: Retropharyngeal abscess [J39.0]  Neck abscess [L02.11] Neck abscess      Precautions: Restrictions/Precautions  Restrictions/Precautions: Fall Risk            ASSESSMENT:  Patient continues to benefit from skilled PT services and is progressing towards goals. Today she required less overall assist and less time to complete tasks.  Training: Yes  Interventions: Safety awareness training;Verbal cues;Manual cues;Weight shifting training/pressure relief  Sit to Stand: Partial/Moderate assistance  Stand to Sit: Minimal assistance  Stand Pivot Transfers: Dependent/Total (via lizzy steady)  Bed to Chair: Dependent/Total  Balance:  Balance  Sitting: Impaired  Sitting - Static: Good (unsupported);Fair (occasional)  Sitting - Dynamic: Fair (occasional)  Standing: Impaired  Standing - Static: Fair;Constant support  Standing - Dynamic: Fair;Constant support   Ambulation/Gait Training:  Unable     Therapeutic Activity: assisted w/ hygiene and depends change d/t bowel incontinence while working to improve rolling.  Improves w/ multi-modal cues for LE position and hand placement on bed rail.           Pain Rating:  None indicated      Activity Tolerance:   Fair  and requires rest breaks    After treatment:   Patient left in no apparent distress sitting up in chair, Call bell within reach, and Caregiver / family present      COMMUNICATION/EDUCATION:   The patient's plan of care was discussed with: occupational therapist and registered nurse    Patient Education  Education Given To: Patient  Education Provided: Plan of Care;Transfer Training;Mobility Training;Equipment;Fall Prevention Strategies  Education Provided Comments: plan for next session: sit<->stand w/ RW  Education Method: Verbal  Barriers to Learning: None  Education Outcome: Continued education needed;Verbalized understanding;Demonstrated understanding      Gisela Del Castillo, PT  Minutes: 32

## 2025-04-01 NOTE — PLAN OF CARE
Problem: Occupational Therapy - Adult  Goal: By Discharge: Performs self-care activities at highest level of function for planned discharge setting.  See evaluation for individualized goals.  Description: FUNCTIONAL STATUS PRIOR TO ADMISSION:  Pt lives with her  and is typically Mod I for BADLs/IADLs. Pt has a ramp to enter her home and sits on a shower chair to bathe at baseline. Pt reporting her  had a fall and is currently at [facility].   Receives Help From: Family, Prior Level of Assist for ADLs: Independent,  ,  ,  ,  ,  ,  ,  , Prior Level of Assist for Transfers: Independent, Active : Yes     HOME SUPPORT: Patient lived at home with spouse but did not require assistance.    Occupational Therapy Goals:  Weekly Re-Assessment 3/17/25: goals reviewed and updated  Initiated 3/20/2025  1.  Patient will perform grooming standing at sink with Stand by Assist within 7 day(s). DOWNGRADE, Pt will comb hair with S/U while seated EOB.  2.  Patient will perform bathing with Minimal Assist within 7 day(s). DOWNGRADE to MaxA  3.  Patient will perform lower body dressing with Moderate Assist within 7 day(s). DOWNGRADE to MaxA  4.  Patient will perform toilet transfers with Supervision  within 7 day(s). DOWNGRADE to MaxA x2  5.  Patient will perform all aspects of toileting with Supervision within 7 day(s). DOWNGRADE to MaxA  6.  Patient will participate in upper extremity therapeutic exercise/activities with Modified [place] for 10 minutes within 7 day(s).  DOWNGRADE to [place]  7.  Patient will utilize energy conservation techniques during functional activities with verbal cues within 7 day(s).    Outcome: Progressing   OCCUPATIONAL THERAPY TREATMENT  Patient: Nicci Hernandez (64 y.o. female)  Date: 4/1/2025  Primary Diagnosis: Retropharyngeal abscess [J39.0]  Neck abscess [L02.11]       Precautions: Fall Risk                Chart, occupational therapy assessment, plan of care, and goals were  Training  Transfer Training: Yes  Interventions: Safety awareness training;Verbal cues;Manual cues;Weight shifting training/pressure relief  Sit to Stand: Partial/Moderate assistance  Stand to Sit: Minimal assistance  Stand Pivot Transfers: Dependent/Total (via lizzy steady)  Bed to Chair: Dependent/Total           Balance:     Balance  Sitting: Impaired  Sitting - Static: Good (unsupported);Fair (occasional)  Sitting - Dynamic: Fair (occasional)  Standing: Impaired  Standing - Static: Fair;Constant support  Standing - Dynamic: Fair;Constant support      ADL Intervention:  Feeding: .  - Feeding Assistance : Set-up Assistance and Seated in Chair        Toileting: .  - Bowel Hygiene : Maximal Assistance and Bed level  - Bladder Hygiene : Maximal Assistance and Bed level         Lower Extremity Dressing: .  - Socks : Maximal Assistance          Activity Tolerance:   Poor and requires rest breaks  Please refer to the flowsheet for vital signs taken during this treatment.    After treatment:   Patient left in no apparent distress sitting up in chair, Call bell within reach, and Bed/ chair alarm activated    COMMUNICATION/EDUCATION:   The patient's plan of care was discussed with: physical therapist and registered nurse         Thank you for this referral.  Asya Nava OT  Minutes: 34

## 2025-04-01 NOTE — PROGRESS NOTES
Day #1 of Ceftaroline  Indication:  Persistent MRSA bacteremia  Current regimen:  600mg q12h  Abx regimen: daptomycin + ceftaroline  Recent Labs     25  0947 25  0702   WBC  --  8.4   CREATININE 1.81* 1.74*   BUN 39* 37*     Est CrCl: 37 ml/min; UO: -- ml/kg/hr  Temp (24hrs), Av.6 °F (36.4 °C), Min:97.2 °F (36.2 °C), Max:98 °F (36.7 °C)    Cultures:  3/19 blood x2: MRSA in /, final  3/19 wound: Heavy MRSA, final  3/22 blood x2: MRSA in , final  3/25 blood x2: MRSA in , final  3/25 foot wound: scant MRSA, scant diphtheroids, final   blood: NG, prelim      Plan: Change to 400mg IV q8h per [facility] P&T protocol with respect to renal function and indication.      Sandrine Lang, DanieD, BCPS

## 2025-04-01 NOTE — PROGRESS NOTES
Infectious Disease Progress     Impression:   Persistent MRSA high-grade bacteremia  Retropharyngeal abscess  Prevertebral abscess  Left medial diabetic foot wound  leukocytosis  - afebrile, wbc 8.4    Blood cx (3/19, 3/22, 3/25) MRSA, (4/1) to be done    Wound cx (3/19) MRSA    MRI of C-spine; C3-C6 ACDF. Complex fluid signal focus in the prevertebral soft tissue  spanning from the clivus to C3, with associated soft tissue edema and swelling;  correlate for signs of infection such as retropharyngeal abscess. Multilevel spondylitic changes with associated canal stenoses, chronic cord compressions, and foraminal stenoses as outlined above. C4-C5 chronic myelomalacia.      CKD  Neck pain  DM II  - management per primary team  Plan:     - continue IV daptomycin (ck 15->25), add Ceftaroline as synergy effect for persistent MRSA bacteremia    S/p johan catheter placement; poor access issue    Will continue to follow up on today's blood cx      No plan for JEFF per cardiology team    Pt may need lifelong suppressive therapy upon completion of scheduled abx therapy    Pt will be following up with ID team in 3 weeks upon discharge      Appreciate Podiatry input; no signs of infection, open wound to be treated with wound care    Plan of care d/w pt, , and Dr. Nuñez                   History of Present Illness   3/21/2025  \"Patient is a 64 y.o. female with past medical Hx of CKD stage IV, diabetes with neuropathy, CAD s/p CABG, gastroparesis, cervical fusion about 20 years ago who presented to the OSH ED for neck pain with concerns for retropharyngeal abscess and was transferred to [facility] for ENT evaluation.      Patient was seen by OSH ED for neck pain with imaging showing concerns for loosening of hardware of fusion. Seen outpatient by orthopedic surgery for neck pain and prescribed Medrol pack. Presented to [facility] for worsening neck pain. CT imaging showed prevertebral fluid collection  ug/mL Resistant      linezolid 2 ug/mL Sensitive      oxacillin >=4 ug/mL Resistant      rifampin <=0.5 ug/mL Sensitive  [1]       tetracycline 2 ug/mL Sensitive      trimethoprim-sulfamethoxazole <=10 ug/mL Sensitive      vancomycin <=0.5 ug/mL Sensitive                   [1]  Rifampin is not to be used for mono-therapy.                    Culture, Blood 2 [[id number]]  (Abnormal) Collected: 03/22/25 0915    Order Status: Completed Specimen: Blood Updated: 03/25/25 1207     Special Requests NO SPECIAL REQUESTS        Culture       Methicillin Resistant Staphylococcus aureus GROWING IN BOTH BOTTLES DRAWN No Site Indicated REFER [id number] FOR SENSITIVITIES          Culture, Blood, PCR ID Panel [[id number]]  (Abnormal) Collected: 03/22/25 0915    Order Status: Completed Specimen: Blood Updated: 03/23/25 0956     Accession Number [id number]     Enterococcus faecalis by PCR Not detected        Enterococcus faecium by PCR Not detected        Listeria monocytogenes by PCR Not detected        STAPHYLOCOCCUS Detected        Staphylococcus Aureus Detected        Staphylococcus epidermidis by PCR Not detected        Staphylococcus lugdunensis by PCR Not detected        STREPTOCOCCUS Not detected        Streptococcus agalactiae (Group B) Not detected        Strep pneumoniae Not detected        Strep pyogenes,(Grp. A) Not detected        Acinetobacter calcoac baumannii complex by PCR Not detected        Bacteroides fragilis by PCR Not detected        Enterobacteriaceae by PCR Not detected        Enterobacter cloacae complex by PCR Not detected        Escherichia Coli Not detected        Klebsiella aerogenes by PCR Not detected        Klebsiella oxytoca by PCR Not detected        Klebsiella pneumoniae group by PCR Not detected        Proteus by PCR Not detected        Salmonella species by PCR Not detected        Serratia marcescens by PCR Not detected        Haemophilus Influenzae by PCR Not detected        Neisseria meningitidis

## 2025-04-01 NOTE — PROGRESS NOTES
RENAL  PROGRESS NOTE        Subjective:   Still c/o neck pain. Getting changed by RN staff.       Objective:   VITALS SIGNS:    /65   Pulse 68   Temp 97.7 °F (36.5 °C) (Oral)   Resp 12   Ht 1.575 m (5' 2\")   Wt 108.9 kg (240 lb 1.3 oz)   SpO2 100%   BMI 43.91 kg/m²             Temp (24hrs), Av.6 °F (36.4 °C), Min:97.2 °F (36.2 °C), Max:98 °F (36.7 °C)         PHYSICAL EXAM:  Frail in NAD  +leg  edema  Resting     DATA REVIEW:     INTAKE / OUTPUT:   Last shift:      No intake/output data recorded.  Last 3 shifts: 1901 -  0700  In: 1348.6 [P.O.:360]  Out: 1200 [Urine:1200]    Intake/Output Summary (Last 24 hours) at 2025 1250  Last data filed at 2025 0407  Gross per 24 hour   Intake 360 ml   Output 550 ml   Net -190 ml         LABS:   LABS:  Recent Labs     25  0702 25  0947 25  0528 25  0412 25  0945 25  0638   * 131* 131*   < > 133* 135*   K 4.4 4.1 3.6   < > 3.4* 3.3*    96* 96*   < > 96* 97   CO2 23 33* 30   < > 30 29   BUN 37* 39* 36*   < > 42* 59*   CREATININE 1.74* 1.81* 1.98*   < > 2.13* 2.69*   CALCIUM 8.9 9.6 9.2   < > 8.8 8.8   PHOS 3.9  --   --   --  3.0 3.8   MG 2.2  --   --   --  2.4 2.1    < > = values in this interval not displayed.     Recent Labs     25  0702 25  0945 25  0638   WBC 8.4 9.8 14.6*   HGB 10.0* 11.2* 11.8   HCT 34.6* 36.5 36.8    297 371       Assessment:     CKD-3b,small kidneys  AL,got IV dye on 3.19 in the setting of bacteremia: ATN               No hydro,but rt kidney 8.7 cm < lt kidney 13.1 cm ??? (Both kidney were 8.1 cm on )  hyponatremia  HTN  DM-2  Met acidosis mixed : non AG and high AG- RESOLVED  RP abscess/SA bacteremia  Edema  Hypokalemia- mild    Discussion-fair urine output.  AL is improving with CR down to 3 to 2.7 to 2.1 to 2 to 1.8 to 1.7  Serum Na up to 134 today.    S/p Julia catheter yesterday     Plan:  Ct current management  Her home dose Torsemide  and Aldactone are on hold  Can resume Torsemide at lower dose (QD) at time of discharge. Would hold Aldactone until we see more renal recovery  Encourage increase oral hydration/intake  IV antibiotics as per primary team  Avoid nephrotoxins  Labs in a.m.   monitor urine output      D/W Pt and RN      Mikhail Ryder MD  NSP[facility]

## 2025-04-02 LAB
ALBUMIN SERPL-MCNC: 1.6 G/DL (ref 3.5–5)
ALBUMIN/GLOB SERPL: 0.4 (ref 1.1–2.2)
ALP SERPL-CCNC: 56 U/L (ref 45–117)
ALT SERPL-CCNC: 12 U/L (ref 12–78)
ANION GAP SERPL CALC-SCNC: 4 MMOL/L (ref 2–12)
AST SERPL-CCNC: 22 U/L (ref 15–37)
BILIRUB SERPL-MCNC: 0.4 MG/DL (ref 0.2–1)
BUN SERPL-MCNC: 39 MG/DL (ref 6–20)
BUN/CREAT SERPL: 23 (ref 12–20)
CALCIUM SERPL-MCNC: 9 MG/DL (ref 8.5–10.1)
CHLORIDE SERPL-SCNC: 97 MMOL/L (ref 97–108)
CO2 SERPL-SCNC: 33 MMOL/L (ref 21–32)
CREAT SERPL-MCNC: 1.67 MG/DL (ref 0.55–1.02)
CRP SERPL-MCNC: 6.77 MG/DL (ref 0–0.3)
ERYTHROCYTE [DISTWIDTH] IN BLOOD BY AUTOMATED COUNT: 15.1 % (ref 11.5–14.5)
GLOBULIN SER CALC-MCNC: 4.4 G/DL (ref 2–4)
GLUCOSE BLD STRIP.AUTO-MCNC: 168 MG/DL (ref 65–117)
GLUCOSE BLD STRIP.AUTO-MCNC: 183 MG/DL (ref 65–117)
GLUCOSE BLD STRIP.AUTO-MCNC: 199 MG/DL (ref 65–117)
GLUCOSE BLD STRIP.AUTO-MCNC: 228 MG/DL (ref 65–117)
GLUCOSE SERPL-MCNC: 208 MG/DL (ref 65–100)
HCT VFR BLD AUTO: 31.4 % (ref 35–47)
HGB BLD-MCNC: 9.4 G/DL (ref 11.5–16)
MAGNESIUM SERPL-MCNC: 2.4 MG/DL (ref 1.6–2.4)
MCH RBC QN AUTO: 27.5 PG (ref 26–34)
MCHC RBC AUTO-ENTMCNC: 29.9 G/DL (ref 30–36.5)
MCV RBC AUTO: 91.8 FL (ref 80–99)
NRBC # BLD: 0 K/UL (ref 0–0.01)
NRBC BLD-RTO: 0 PER 100 WBC
PHOSPHATE SERPL-MCNC: 3.9 MG/DL (ref 2.6–4.7)
PLATELET # BLD AUTO: 238 K/UL (ref 150–400)
PMV BLD AUTO: 10.8 FL (ref 8.9–12.9)
POTASSIUM SERPL-SCNC: 4 MMOL/L (ref 3.5–5.1)
PROT SERPL-MCNC: 6 G/DL (ref 6.4–8.2)
RBC # BLD AUTO: 3.42 M/UL (ref 3.8–5.2)
SERVICE CMNT-IMP: ABNORMAL
SODIUM SERPL-SCNC: 134 MMOL/L (ref 136–145)
WBC # BLD AUTO: 8.6 K/UL (ref 3.6–11)

## 2025-04-02 PROCEDURE — 6370000000 HC RX 637 (ALT 250 FOR IP): Performed by: STUDENT IN AN ORGANIZED HEALTH CARE EDUCATION/TRAINING PROGRAM

## 2025-04-02 PROCEDURE — 51798 US URINE CAPACITY MEASURE: CPT

## 2025-04-02 PROCEDURE — 97530 THERAPEUTIC ACTIVITIES: CPT

## 2025-04-02 PROCEDURE — 2700000000 HC OXYGEN THERAPY PER DAY

## 2025-04-02 PROCEDURE — 84100 ASSAY OF PHOSPHORUS: CPT

## 2025-04-02 PROCEDURE — 94760 N-INVAS EAR/PLS OXIMETRY 1: CPT

## 2025-04-02 PROCEDURE — 83735 ASSAY OF MAGNESIUM: CPT

## 2025-04-02 PROCEDURE — 6360000002 HC RX W HCPCS: Performed by: NURSE PRACTITIONER

## 2025-04-02 PROCEDURE — 2580000003 HC RX 258: Performed by: NURSE PRACTITIONER

## 2025-04-02 PROCEDURE — 6370000000 HC RX 637 (ALT 250 FOR IP): Performed by: INTERNAL MEDICINE

## 2025-04-02 PROCEDURE — 6370000000 HC RX 637 (ALT 250 FOR IP): Performed by: FAMILY MEDICINE

## 2025-04-02 PROCEDURE — 6370000000 HC RX 637 (ALT 250 FOR IP): Performed by: OTOLARYNGOLOGY

## 2025-04-02 PROCEDURE — 2580000003 HC RX 258: Performed by: FAMILY MEDICINE

## 2025-04-02 PROCEDURE — 82962 GLUCOSE BLOOD TEST: CPT

## 2025-04-02 PROCEDURE — 6370000000 HC RX 637 (ALT 250 FOR IP): Performed by: NURSE PRACTITIONER

## 2025-04-02 PROCEDURE — 2500000003 HC RX 250 WO HCPCS: Performed by: STUDENT IN AN ORGANIZED HEALTH CARE EDUCATION/TRAINING PROGRAM

## 2025-04-02 PROCEDURE — 2500000003 HC RX 250 WO HCPCS: Performed by: FAMILY MEDICINE

## 2025-04-02 PROCEDURE — 2500000003 HC RX 250 WO HCPCS: Performed by: INTERNAL MEDICINE

## 2025-04-02 PROCEDURE — 2060000000 HC ICU INTERMEDIATE R&B

## 2025-04-02 PROCEDURE — 80053 COMPREHEN METABOLIC PANEL: CPT

## 2025-04-02 PROCEDURE — 85027 COMPLETE CBC AUTOMATED: CPT

## 2025-04-02 PROCEDURE — 97535 SELF CARE MNGMENT TRAINING: CPT

## 2025-04-02 PROCEDURE — 86140 C-REACTIVE PROTEIN: CPT

## 2025-04-02 RX ORDER — CETIRIZINE HYDROCHLORIDE 10 MG/1
10 TABLET ORAL DAILY
Status: DISCONTINUED | OUTPATIENT
Start: 2025-04-02 | End: 2025-04-04 | Stop reason: HOSPADM

## 2025-04-02 RX ADMIN — SODIUM CHLORIDE, PRESERVATIVE FREE 10 ML: 5 INJECTION INTRAVENOUS at 20:29

## 2025-04-02 RX ADMIN — PREGABALIN 50 MG: 50 CAPSULE ORAL at 14:03

## 2025-04-02 RX ADMIN — CEFTAROLINE FOSAMIL 400 MG: 400 POWDER, FOR SOLUTION INTRAVENOUS at 07:02

## 2025-04-02 RX ADMIN — GUAIFENESIN 200 MG: 200 SOLUTION ORAL at 08:24

## 2025-04-02 RX ADMIN — CETIRIZINE HYDROCHLORIDE 10 MG: 10 TABLET, FILM COATED ORAL at 11:18

## 2025-04-02 RX ADMIN — CARVEDILOL 6.25 MG: 6.25 TABLET, FILM COATED ORAL at 20:29

## 2025-04-02 RX ADMIN — GUAIFENESIN 200 MG: 200 SOLUTION ORAL at 20:28

## 2025-04-02 RX ADMIN — CARVEDILOL 6.25 MG: 6.25 TABLET, FILM COATED ORAL at 08:24

## 2025-04-02 RX ADMIN — ACETAMINOPHEN 650 MG: 325 TABLET ORAL at 20:28

## 2025-04-02 RX ADMIN — SODIUM CHLORIDE, PRESERVATIVE FREE 10 ML: 5 INJECTION INTRAVENOUS at 08:25

## 2025-04-02 RX ADMIN — DAPTOMYCIN 700 MG: 500 INJECTION, POWDER, LYOPHILIZED, FOR SUSPENSION INTRAVENOUS at 17:00

## 2025-04-02 RX ADMIN — INSULIN GLARGINE 15 UNITS: 100 INJECTION, SOLUTION SUBCUTANEOUS at 08:25

## 2025-04-02 RX ADMIN — Medication: at 08:24

## 2025-04-02 RX ADMIN — PREGABALIN 50 MG: 50 CAPSULE ORAL at 08:24

## 2025-04-02 RX ADMIN — ASPIRIN 81 MG CHEWABLE TABLET 81 MG: 81 TABLET CHEWABLE at 08:23

## 2025-04-02 RX ADMIN — INSULIN LISPRO 2 UNITS: 100 INJECTION, SOLUTION INTRAVENOUS; SUBCUTANEOUS at 17:00

## 2025-04-02 RX ADMIN — ACETAMINOPHEN 650 MG: 325 TABLET ORAL at 14:03

## 2025-04-02 RX ADMIN — SODIUM CHLORIDE, PRESERVATIVE FREE 10 ML: 5 INJECTION INTRAVENOUS at 20:31

## 2025-04-02 RX ADMIN — INSULIN LISPRO 2 UNITS: 100 INJECTION, SOLUTION INTRAVENOUS; SUBCUTANEOUS at 20:30

## 2025-04-02 RX ADMIN — ACETAMINOPHEN 650 MG: 325 TABLET ORAL at 08:23

## 2025-04-02 RX ADMIN — CLOPIDOGREL BISULFATE 75 MG: 75 TABLET, FILM COATED ORAL at 08:24

## 2025-04-02 RX ADMIN — CALCITRIOL CAPSULES 0.25 MCG 0.25 MCG: 0.25 CAPSULE ORAL at 08:24

## 2025-04-02 RX ADMIN — Medication: at 20:31

## 2025-04-02 RX ADMIN — CEFTAROLINE FOSAMIL 400 MG: 400 POWDER, FOR SOLUTION INTRAVENOUS at 23:16

## 2025-04-02 RX ADMIN — SODIUM CHLORIDE: 0.9 INJECTION, SOLUTION INTRAVENOUS at 15:19

## 2025-04-02 RX ADMIN — SODIUM CHLORIDE: 0.9 INJECTION, SOLUTION INTRAVENOUS at 20:37

## 2025-04-02 RX ADMIN — THERA TABS 1 TABLET: TAB at 08:24

## 2025-04-02 RX ADMIN — GUAIFENESIN 200 MG: 200 SOLUTION ORAL at 14:04

## 2025-04-02 RX ADMIN — INSULIN LISPRO 2 UNITS: 100 INJECTION, SOLUTION INTRAVENOUS; SUBCUTANEOUS at 12:07

## 2025-04-02 RX ADMIN — CEFTAROLINE FOSAMIL 400 MG: 400 POWDER, FOR SOLUTION INTRAVENOUS at 15:19

## 2025-04-02 ASSESSMENT — PAIN SCALES - GENERAL
PAINLEVEL_OUTOF10: 0
PAINLEVEL_OUTOF10: 0
PAINLEVEL_OUTOF10: 8
PAINLEVEL_OUTOF10: 8
PAINLEVEL_OUTOF10: 0
PAINLEVEL_OUTOF10: 8
PAINLEVEL_OUTOF10: 3

## 2025-04-02 ASSESSMENT — PAIN DESCRIPTION - LOCATION
LOCATION: NECK
LOCATION: NECK;EAR
LOCATION: NECK;BACK
LOCATION: EAR

## 2025-04-02 ASSESSMENT — PAIN DESCRIPTION - ORIENTATION
ORIENTATION: POSTERIOR;RIGHT;LEFT
ORIENTATION: POSTERIOR
ORIENTATION: RIGHT;LEFT
ORIENTATION: RIGHT;LEFT

## 2025-04-02 ASSESSMENT — PAIN DESCRIPTION - DESCRIPTORS
DESCRIPTORS: ACHING;PRESSURE
DESCRIPTORS: ACHING
DESCRIPTORS: PRESSURE
DESCRIPTORS: ACHING

## 2025-04-02 NOTE — PLAN OF CARE
Problem: Occupational Therapy - Adult  Goal: By Discharge: Performs self-care activities at highest level of function for planned discharge setting.  See evaluation for individualized goals.  Description: FUNCTIONAL STATUS PRIOR TO ADMISSION:  Pt lives with her  and is typically Mod I for BADLs/IADLs. Pt has a ramp to enter her home and sits on a shower chair to bathe at baseline. Pt reporting her  had a fall and is currently at [facility].   Receives Help From: Family, Prior Level of Assist for ADLs: Independent,  ,  ,  ,  ,  ,  ,  , Prior Level of Assist for Transfers: Independent, Active : Yes     HOME SUPPORT: Patient lived at home with spouse but did not require assistance.    Occupational Therapy Goals:  Weekly Re-Assessment 3/17/25: goals reviewed and updated  Initiated 3/20/2025; Continue 4/2/25    1.  Patient will perform grooming standing at sink with Stand by Assist within 7 day(s). DOWNGRADE, Pt will comb hair with S/U while seated EOB.  2.  Patient will perform bathing with Minimal Assist within 7 day(s). DOWNGRADE to MaxA  3.  Patient will perform lower body dressing with Moderate Assist within 7 day(s). DOWNGRADE to MaxA  4.  Patient will perform toilet transfers with Supervision  within 7 day(s). DOWNGRADE to MaxA x2  5.  Patient will perform all aspects of toileting with Supervision within 7 day(s). DOWNGRADE to MaxA  6.  Patient will participate in upper extremity therapeutic exercise/activities with Modified [place] for 10 minutes within 7 day(s).  DOWNGRADE to [place]  7.  Patient will utilize energy conservation techniques during functional activities with verbal cues within 7 day(s).    Outcome: Progressing   OCCUPATIONAL THERAPY TREATMENT: WEEKLY REASSESSMENT    Patient: Nicci Hernandez (64 y.o. female)  Date: 4/2/2025  Primary Diagnosis: Retropharyngeal abscess [J39.0]  Neck abscess [L02.11]       Precautions: Fall Risk                Chart, occupational therapy assessment,  plan of care, and goals were reviewed.    ASSESSMENT  Patient continues to benefit from skilled OT services and is progressing towards goals. Pt received semi-reclined in bed, agreeable to therapy, cleared by RN. Pt endorsed having BM, Mod A for rolling in bed, Max A for bed level hygiene. Pt was Mod x2 for supine<>sit with fair sitting balance. Mod A for sit<>stand with RW able to take lateral steps HOB. Completed sit<>sit stand via lizzy steady and dependent for transfer to chair. Vss, all needs met, call bell in reach. Will continue to follow in acute setting.             PLAN  Goals have been updated based on progression since last assessment.  Patient continues to benefit from skilled intervention to address the above impairments.    Recommendations and Planned Interventions:   self care training, therapeutic activities, functional mobility training, balance training, therapeutic exercise, endurance activities, patient education, home safety training, and family training/education    Frequency/Duration: OT Plan of Care: 5 times/week    Recommend with staff: oob to chair via lizzy steady for all meals    Recommend next OT session: bsc transfer     Recommendation for discharge: (in order for the patient to meet his/her long term goals):   High intensity/comprehensive skilled occupational therapy in a multidisciplinary setting as patient is working towards tolerating up to 3 hours of therapy/day 5-7x/week    Other factors to consider for discharge: patient's current support system is unable to meet their requirements for physical assistance, poor safety awareness, high risk for falls, not safe to be alone, and concern for safely navigating or managing the home environment    IF patient discharges home will need the following DME: continuing to assess with progress     SUBJECTIVE:   Patient stated “I'm always pooping.”    OBJECTIVE DATA SUMMARY:   Cognitive/Behavioral Status:          Functional Mobility and

## 2025-04-02 NOTE — PROGRESS NOTES
Nutrition Contact    Pt not receiving preferred flavor (vanilla) Ensure High Protein ONS.  Does not like the alternative flavors. We do have vanilla Glucerna in stock, so adjusting to this with all meals until vanilla Ensure HP back in stock.  Pt verbalized understanding.    Keke Hernandes RD  Available via [employer]

## 2025-04-02 NOTE — PROGRESS NOTES
Hospitalist Progress Note  Felix Logan MD  Answering service: [phone]        Date of Service:  2025  NAME:  Nicci Hernandez  :  1960  MRN:  [id number]      Admission Summary:     Patient transferred from [facility] with retropharyngeal abscess.    Interval history / Subjective:     Patient seen & examined today , her daughter at bedside   C/o BL ear pain & pressure   Julia cath placed yesterday          Assessment & Plan:     Neck abscess  -CT soft tissue on presentation shows increased size of prevertebral fluid collection associated with anterior fusion plate  -Follow-up MRI shows complex fluid focus in the prevertebral soft tissue associated with tissue edema and swelling, correlate for signs of infection such as retropharyngeal abscess  -S/p I&D of the abscess by ENT at bedside on 3/19, culture of the abscess showed MRSA  -Patient is s/p prior cervical fusion  -Neurosurgery also evaluated the patient and recommended continuing IV antibiotics, no indication of hardware removal  -On daptomycin and Unasyn, on discharge, patient to continue with daptomycin 700 mg every 24 hour through 2025  -ID following , repeat Bcx today   -Nephrology recommended against PICC placement, Julia's cath placed     Bacteremia  -Blood cultures 3/19, 3/22 and 3/25 growing MRSA  -Cardiology consulted for JEFF but recommended holding off on it since it may not be safe with retropharyngeal abscess  -ID recommending daptomycin 700 mg every 24 hours through 2025, started on ceftaroline for persistent bacteremia     AL  -AL on CKD stage IIIb, likely ATN in the setting of infection and also RUTH from dye  -Ultrasound negative for hydronephrosis  -Off IV fluid and creatinine is improving  -Nephrology following    Metabolic acidosis  -This is due to renal insufficiency  -Off of bicarb

## 2025-04-02 NOTE — PROGRESS NOTES
at lower dose at the time of discharge & continue to hold aldactone until AL improves    Metabolic acidosis  -This is due to renal insufficiency  -Off of bicarb drip    Hyponatremia  -Likely related to hypovolemia  -Hyponatremia has resolved with IV fluids    Hypertension  -Continue to hold Aldactone due to AL, continue Coreg  -Monitor blood pressure    Diabetes type 2  -On Lantus and sliding scale insulin coverage  -Lantus dose adjusted and hypoglycemia has resolved  -Diabetes management team following    Diabetic neuropathy  -On Lyrica    CHF  -Patient with chronic HFrEF with last echo showing EF of 20 to 25%  -Continue Coreg, holding Aldactone due to AL  -Patient to follow-up with [facility] cardiology    Coronary artery disease  -Patient is s/p CABG  -Continue Plavix    Diabetic right foot ulcer with osteomyelitis  -Patient with known history of diabetic foot ulcer with acute osteomyelitis of the fifth metatarsal head  -MRI shows acute osteomyelitis in the distal half of the first metatarsal bone and also acute osteomyelitis of the fifth metatarsal head  -Per podiatry, no signs of infection  -Podiatry and ID previously evaluated the patient, continue antibiotics as above    Peripheral vascular disease  -Reported some purple discoloration of the left first toe  -Unable to obtain OMKAR due to noncompressible posterior tibial vessels from diabetic arterial calcification, PVR waveforms shows mild arterial obstruction in the right lower extremity and mild to moderate in the left lower extremity  -Arterial Doppler shows no evidence of appreciable stenosis on color Doppler increased to ultrasound imaging  -Vascular surgery consulted    Weakness of upper and lower extremities  -MRI shows no acute infarct, noted chronic infarct along the right PCA territory  -Patient started on high-dose thiamine and multivitamin due to likely deficiency in the setting of decreased p.o. intake  -Continue Plavix  -Neurology previously evaluated  mg  75 mg Oral Daily    oxyCODONE (ROXICODONE) immediate release tablet 2.5 mg  2.5 mg Oral Q4H PRN    balsum peru-castor oil (VENELEX) ointment   Topical BID    [Held by provider] morphine (PF) injection 1 mg  1 mg IntraVENous Q4H PRN    sodium chloride flush 0.9 % injection 5-40 mL  5-40 mL IntraVENous 2 times per day    sodium chloride flush 0.9 % injection 5-40 mL  5-40 mL IntraVENous PRN    0.9 % sodium chloride infusion   IntraVENous PRN    ondansetron (ZOFRAN-ODT) disintegrating tablet 4 mg  4 mg Oral Q8H PRN    Or    ondansetron (ZOFRAN) injection 4 mg  4 mg IntraVENous Q6H PRN    polyethylene glycol (GLYCOLAX) packet 17 g  17 g Oral Daily PRN    acetaminophen (TYLENOL) tablet 650 mg  650 mg Oral Q6H PRN    Or    acetaminophen (TYLENOL) suppository 650 mg  650 mg Rectal Q6H PRN    glucose chewable tablet 16 g  4 tablet Oral PRN    dextrose bolus 10% 125 mL  125 mL IntraVENous PRN    Or    dextrose bolus 10% 250 mL  250 mL IntraVENous PRN    glucagon injection 1 mg  1 mg SubCUTAneous PRN    dextrose 10 % infusion   IntraVENous Continuous PRN    dextrose bolus 10% 250 mL  250 mL IntraVENous PRN    calcitRIOL (ROCALTROL) capsule 0.25 mcg  0.25 mcg Oral Daily    [Held by provider] spironolactone (ALDACTONE) tablet 25 mg  25 mg Oral Daily    insulin lispro (HUMALOG,ADMELOG) injection vial 0-8 Units  0-8 Units SubCUTAneous 4x Daily AC & HS    dextrose bolus 10% 250 mL  250 mL IntraVENous PRN    albuterol (PROVENTIL) (2.5 MG/3ML) 0.083% nebulizer solution 2.5 mg  2.5 mg Nebulization 4x Daily PRN    carvedilol (COREG) tablet 6.25 mg  6.25 mg Oral BID     ______________________________________________________________________  EXPECTED LENGTH OF STAY: 16  ACTUAL LENGTH OF STAY:          14                 Felix Logan MD

## 2025-04-02 NOTE — PLAN OF CARE
Problem: Safety - Adult  Goal: Free from fall injury  4/1/2025 2152 by Nadeen Thomas RN  Outcome: Progressing  4/1/2025 1047 by Sarah Cannon  Outcome: Progressing     Problem: Chronic Conditions and Co-morbidities  Goal: Patient's chronic conditions and co-morbidity symptoms are monitored and maintained or improved  4/1/2025 2152 by Nadeen Thomas RN  Outcome: Progressing  Flowsheets (Taken 4/1/2025 2000)  Care Plan - Patient's Chronic Conditions and Co-Morbidity Symptoms are Monitored and Maintained or Improved: Monitor and assess patient's chronic conditions and comorbid symptoms for stability, deterioration, or improvement  4/1/2025 1047 by Sarah Cannon  Outcome: Progressing  Flowsheets (Taken 4/1/2025 1022 by Ann Lema RN)  Care Plan - Patient's Chronic Conditions and Co-Morbidity Symptoms are Monitored and Maintained or Improved: Monitor and assess patient's chronic conditions and comorbid symptoms for stability, deterioration, or improvement     Problem: Discharge Planning  Goal: Discharge to home or other facility with appropriate resources  Outcome: Progressing  Flowsheets  Taken 4/1/2025 2000 by Nadeen Thomas RN  Discharge to home or other facility with appropriate resources: Identify barriers to discharge with patient and caregiver  Taken 4/1/2025 1022 by Ann Lema RN  Discharge to home or other facility with appropriate resources: Identify barriers to discharge with patient and caregiver     Problem: Pain  Goal: Verbalizes/displays adequate comfort level or baseline comfort level  4/1/2025 2152 by Nadeen Thomas RN  Outcome: Progressing  Flowsheets  Taken 4/1/2025 1859 by Ann Lema RN  Verbalizes/displays adequate comfort level or baseline comfort level:   Encourage patient to monitor pain and request assistance   Assess pain using appropriate pain scale  Taken 4/1/2025 1526 by Ann Lema RN  Verbalizes/displays adequate comfort level or

## 2025-04-02 NOTE — PROGRESS NOTES
RENAL  PROGRESS NOTE        Subjective:   Sleeping soundly. Chart reviewed      Objective:   VITALS SIGNS:    /61   Pulse 69   Temp 97.6 °F (36.4 °C) (Axillary)   Resp 13   Ht 1.575 m (5' 2\")   Wt 109.7 kg (241 lb 12.8 oz)   SpO2 100%   BMI 44.23 kg/m²             Temp (24hrs), Av.7 °F (36.5 °C), Min:97.5 °F (36.4 °C), Max:98.4 °F (36.9 °C)         PHYSICAL EXAM:  Sleeping.  Trace leg  edema  Resting     DATA REVIEW:     INTAKE / OUTPUT:   Last shift:      701 - 1900  In: 150 [P.O.:150]  Out: 100 [Urine:100]  Last 3 shifts: 1901 -  0700  In: 766.2 [P.O.:710; I.V.:8.9]  Out: 1150 [Urine:1150]    Intake/Output Summary (Last 24 hours) at 2025 1209  Last data filed at 2025 1115  Gross per 24 hour   Intake 356.21 ml   Output 700 ml   Net -343.79 ml         LABS:   LABS:  Recent Labs     25  0013 25  0702 25  0947 25  0412 25  0945   * 134* 131*   < > 133*   K 4.0 4.4 4.1   < > 3.4*   CL 97 101 96*   < > 96*   CO2 33* 23 33*   < > 30   BUN 39* 37* 39*   < > 42*   CREATININE 1.67* 1.74* 1.81*   < > 2.13*   CALCIUM 9.0 8.9 9.6   < > 8.8   PHOS 3.9 3.9  --   --  3.0   MG 2.4 2.2  --   --  2.4    < > = values in this interval not displayed.     Recent Labs     25  0013 25  0702 25  0945   WBC 8.6 8.4 9.8   HGB 9.4* 10.0* 11.2*   HCT 31.4* 34.6* 36.5    155 297       Assessment:     CKD-3b,small kidneys  AL,got IV dye on 3.19 in the setting of bacteremia: ATN               No hydro,but rt kidney 8.7 cm < lt kidney 13.1 cm ??? (Both kidney were 8.1 cm on )  hyponatremia  HTN  DM-2  Met acidosis mixed : non AG and high AG- RESOLVED  RP abscess/SA bacteremia  Edema  Hypokalemia- mild    Discussion-fair urine output.  AL is improving with CR down to 3 to 2.7 to 2.1 to 2 to 1.8 to 1.7 to 1.67mg/dl.   Serum Na up to and holding at134 today.    S/p Julia catheter 3/31     Plan:  Ct current management  Her home

## 2025-04-02 NOTE — PROGRESS NOTES
Infectious Disease Progress     Impression:   Persistent MRSA high-grade bacteremia  Retropharyngeal abscess  Prevertebral abscess  Left medial diabetic foot wound  leukocytosis  - afebrile, wbc 8.4    Blood cx (3/19, 3/22, 3/25) MRSA, (4/1) no growth so far    Wound cx (3/19) MRSA    MRI of C-spine; C3-C6 ACDF. Complex fluid signal focus in the prevertebral soft tissue  spanning from the clivus to C3, with associated soft tissue edema and swelling;  correlate for signs of infection such as retropharyngeal abscess. Multilevel spondylitic changes with associated canal stenoses, chronic cord compressions, and foraminal stenoses as outlined above. C4-C5 chronic myelomalacia.      CKD  Neck pain  DM II  - management per primary team  Plan:     - continue IV daptomycin (ck 15->25) and Ceftaroline (was added for synergy effect for persistent MRSA bacteremia)    S/p johan catheter placement (3/31); poor access issue      No plan for JEFF per cardiology team    Pt may need lifelong suppressive therapy upon completion of scheduled abx therapy    Final abx therapy will be provided once we confirmed sterile blood cx      Appreciate Podiatry input; no signs of infection, open wound to be treated with wound care    Plan of care d/w pt and Dr. Nuñez                   History of Present Illness   3/21/2025  \"Patient is a 64 y.o. female with past medical Hx of CKD stage IV, diabetes with neuropathy, CAD s/p CABG, gastroparesis, cervical fusion about 20 years ago who presented to the OSH ED for neck pain with concerns for retropharyngeal abscess and was transferred to [facility] for ENT evaluation.      Patient was seen by OSH ED for neck pain with imaging showing concerns for loosening of hardware of fusion. Seen outpatient by orthopedic surgery for neck pain and prescribed Medrol pack. Presented to [facility] for worsening neck pain. CT imaging showed prevertebral fluid collection associated with the anterior      Klebsiella aerogenes by PCR Not detected        Klebsiella oxytoca by PCR Not detected        Klebsiella pneumoniae group by PCR Not detected        Proteus by PCR Not detected        Salmonella species by PCR Not detected        Serratia marcescens by PCR Not detected        Haemophilus Influenzae by PCR Not detected        Neisseria meningitidis by PCR Not detected        Pseudomonas aeruginosa Not detected        Stenotrophomonas maltophilia by PCR Not detected        Candida albicans by PCR Not detected        Candida auris by PCR Not detected        Candida glabrata Not detected        Candida krusei by PCR Not detected        Candida parapsilosis by PCR Not detected        Candida tropicalis by PCR Not detected        Cryptococcus neoformans/gattii by PCR Not detected        Resistant gene targets          Resistant gene meca/c & mrej by PCR Detected        [employer] test comment       False positive results may rarely occur. Correlate with clinical,epidemiologic, and other laboratory findings           Comment: Please see BCID Interpretation Guide in EPIC Links       Culture, Anaerobic [[id number]] Collected: 03/19/25 1847    Order Status: Canceled Specimen: Abscess     Culture, Anaerobic [[id number]] Collected: 03/19/25 1846    Order Status: Canceled Specimen: Abscess     Culture, Wound (with Gram Stain) [[id number]]  (Abnormal)  (Susceptibility) Collected: 03/19/25 1846    Order Status: Completed Updated: 03/21/25 1413     Special Requests NO SPECIAL REQUESTS        Gram Stain 1+ WBCS SEEN               1+ Gram positive cocci IN CLUSTERS           Culture       HEAVY Methicillin Resistant Staphylococcus aureus            (NOTE) MRSA CALLED TO AND READ BACK BY CATHY NICK ON 3/21/25 AT 71166.JS    Susceptibility        Methicillin-Resistant Staphylococcus aureus      BACTERIAL SUSCEPTIBILITY PANEL JULIAN      ciprofloxacin >=8 ug/mL Resistant      clindamycin >=4 ug/mL Resistant      DAPTOmycin 0.25 ug/mL

## 2025-04-02 NOTE — CONSULTS
Department of Otolaryngology      Reason for Consult: Otalgia  Requesting Physician:  Felix Logan MD    CHIEF COMPLAINT:   Bilateral ear discomfort, pressure.    History Obtained From:  patient, electronic medical record    HISTORY OF PRESENT ILLNESS:                The patient is a 64 y.o. female with significant past medical history of cervical spine surgery complicated by recent MRSA infection.  Patient describes bilateral mild variable ear discomfort at times seeming to arise from her neck. Patient reports there has been no noticeable hearing change or vertigo symptoms. Denies history of otitis media/otitis externa. Denies history of ear fullness, roaring or history of Meniere's Disease. No prior difficulty with TMJ.disorder. Denies sore throat or previous thrush difficulty. Patient has a history of allergic rhinitis in the past with post nasal drainage which is seasonal.      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)     Gastroparesis     Hip pain, left     Hypercholesterolemia     Hypertension     Hypertension, diastolic, benign     Hypoalbuminemia     Peripheral vascular disease     Presence of stent in coronary artery in patient with coronary artery disease     S/P CABG x 3     Type 2 diabetes mellitus with hyperglycemia, with long-term current use of insulin ([facility])     Unspecified sleep apnea     no cpap repeat study sched for 9/20/13     Past Surgical History:        Procedure Laterality Date    ANTERIOR CERVICAL DISCECTOMY W/ FUSION  2003    ANTERIOR CERVICAL FUSION INSTRUMENTATION    BACK SURGERY  12/09/2022  PM    EOSABS 0.10 03/21/2025 08:33 PM    BASOSABS 0.03 03/21/2025 08:33 PM    DIFFTYPE SMEAR SCANNED 03/21/2025 08:33 PM     BMP:    Lab Results   Component Value Date/Time     04/02/2025 12:13 AM    K 4.0 04/02/2025 12:13 AM    CL 97 04/02/2025 12:13 AM    CO2 33 04/02/2025 12:13 AM    BUN 39 04/02/2025 12:13 AM    CREATININE 1.67 04/02/2025 12:13 AM    CALCIUM 9.0 04/02/2025 12:13 AM    GFRAA 47 05/03/2022 05:00 AM    LABGLOM 34 04/02/2025 12:13 AM    LABGLOM 44 12/21/2022 07:35 AM    GLUCOSE 208 04/02/2025 12:13 AM       IMPRESSION/RECOMMENDATIONS:      63 yo female with recent onset bilateral ear discomfort. Today's examination shows no visible otopathology. Hearing appears symmetrical, intact. Suspect patient's otalgia relates to her cervical spine difficulty with recent abscess involving c-spine hardware vs allergic rhinitis with mild eustachian tube dysfunction. Recommend add Cetirizine 10 mg PO daily for allergy symptoms (ordered).

## 2025-04-02 NOTE — PLAN OF CARE
Problem: Physical Therapy - Adult  Goal: By Discharge: Performs mobility at highest level of function for planned discharge setting.  See evaluation for individualized goals.  Description: FUNCTIONAL STATUS PRIOR TO ADMISSION: Patient was modified independent, intermittently using a rolling walker for functional mobility.    HOME SUPPORT PRIOR TO ADMISSION: The patient lived with her  who she reports is also in the hospital (at [facility]) currently.    Physical Therapy Goals  Weekly Reassessment 03/27/2025 (goals down graded)  1.  Patient will move from supine to sit and sit to supine in bed with moderate assistance within 7 day(s).    2.  Patient will perform sit to stand with moderate assistance within 7 day(s).  3.  Patient will transfer from bed to chair and chair to bed with moderate assistance using the least restrictive device within 7 day(s).  4.  Patient will ambulate with moderate assistance for 5 feet with the least restrictive device within 7 day(s).     Initiated 3/20/2025  1.  Patient will move from supine to sit and sit to supine and roll side to side in bed with modified independence within 7 day(s).    2.  Patient will perform sit to stand with modified independence within 7 day(s).  3.  Patient will transfer from bed to chair and chair to bed with modified independence using the least restrictive device within 7 day(s).  4.  Patient will ambulate with modified independence for 150 feet with the least restrictive device within 7 day(s).     Outcome: Progressing      PHYSICAL THERAPY TREATMENT    Patient: Nicci Hernandez (64 y.o. female)  Date: 4/2/2025  Diagnosis: Retropharyngeal abscess [J39.0]  Neck abscess [L02.11] Neck abscess      Precautions: Restrictions/Precautions  Restrictions/Precautions: Fall Risk            ASSESSMENT:  Patient continues to benefit from skilled PT services and is progressing towards goals.  Received in bed, just cleaned by RN, now w/ another episode of bowel  discussed with: occupational therapist and registered nurse    Patient Education  Education Given To: Patient  Education Provided: Plan of Care;Transfer Training;Mobility Training;Equipment;Fall Prevention Strategies  Education Method: Verbal  Barriers to Learning: None  Education Outcome: Continued education needed;Verbalized understanding;Demonstrated understanding      Gisela Del Castillo, PT  Minutes: 33

## 2025-04-02 NOTE — PROGRESS NOTES
End of Shift Note    Bedside shift change report given to CATHY Juarez (oncoming nurse) by Nadeen Thomas RN (offgoing nurse).  Report included the following information SBAR    Shift worked: 2183-1816     Shift summary and any significant changes:    Low urine output overnight (350ml), bladder scan showed 33 ml. Wound care completed. No bleeding from Julia cath site.     Concerns for physician to address: Low urine output         Nadeen Thomas RN

## 2025-04-02 NOTE — PLAN OF CARE
Problem: Safety - Adult  Goal: Free from fall injury  4/2/2025 1008 by Ann Lema RN  Outcome: Progressing  4/1/2025 2152 by Nadeen Thomas RN  Outcome: Progressing     Problem: Chronic Conditions and Co-morbidities  Goal: Patient's chronic conditions and co-morbidity symptoms are monitored and maintained or improved  4/2/2025 1008 by Ann Lema RN  Outcome: Progressing  Flowsheets (Taken 4/2/2025 0800)  Care Plan - Patient's Chronic Conditions and Co-Morbidity Symptoms are Monitored and Maintained or Improved: Monitor and assess patient's chronic conditions and comorbid symptoms for stability, deterioration, or improvement  4/1/2025 2152 by Nadeen Thomas RN  Outcome: Progressing  Flowsheets (Taken 4/1/2025 2000)  Care Plan - Patient's Chronic Conditions and Co-Morbidity Symptoms are Monitored and Maintained or Improved: Monitor and assess patient's chronic conditions and comorbid symptoms for stability, deterioration, or improvement     Problem: Discharge Planning  Goal: Discharge to home or other facility with appropriate resources  4/2/2025 1008 by Ann Lema RN  Outcome: Progressing  Flowsheets (Taken 4/2/2025 0800)  Discharge to home or other facility with appropriate resources: Identify barriers to discharge with patient and caregiver  4/1/2025 2152 by Nadeen Thomas RN  Outcome: Progressing  Flowsheets  Taken 4/1/2025 2000 by Nadeen Thomas RN  Discharge to home or other facility with appropriate resources: Identify barriers to discharge with patient and caregiver  Taken 4/1/2025 1022 by Ann Lema RN  Discharge to home or other facility with appropriate resources: Identify barriers to discharge with patient and caregiver     Problem: Pain  Goal: Verbalizes/displays adequate comfort level or baseline comfort level  4/2/2025 1008 by Ann Lema RN  Outcome: Progressing  Flowsheets (Taken 4/2/2025 0823)  Verbalizes/displays adequate comfort level or

## 2025-04-02 NOTE — CARE COORDINATION
Brief Note:  Per IDR, RAMA is Thursday 4/3/25 pending ID clearance.      CM called Crystal ([facility] liaison [phone]) and provided update.    CM continuing to follow.  Cheyenne Odell, ROSALIEW, CRM  [phone]

## 2025-04-03 PROBLEM — Z98.1 HISTORY OF FUSION OF CERVICAL SPINE: Status: ACTIVE | Noted: 2025-04-03

## 2025-04-03 LAB
ALBUMIN SERPL-MCNC: 1.7 G/DL (ref 3.5–5)
ALBUMIN/GLOB SERPL: 0.4 (ref 1.1–2.2)
ALP SERPL-CCNC: 55 U/L (ref 45–117)
ALT SERPL-CCNC: 13 U/L (ref 12–78)
ANION GAP SERPL CALC-SCNC: 6 MMOL/L (ref 2–12)
AST SERPL-CCNC: 24 U/L (ref 15–37)
BILIRUB SERPL-MCNC: 0.4 MG/DL (ref 0.2–1)
BUN SERPL-MCNC: 39 MG/DL (ref 6–20)
BUN/CREAT SERPL: 24 (ref 12–20)
CALCIUM SERPL-MCNC: 9.1 MG/DL (ref 8.5–10.1)
CHLORIDE SERPL-SCNC: 100 MMOL/L (ref 97–108)
CO2 SERPL-SCNC: 29 MMOL/L (ref 21–32)
CREAT SERPL-MCNC: 1.64 MG/DL (ref 0.55–1.02)
ERYTHROCYTE [DISTWIDTH] IN BLOOD BY AUTOMATED COUNT: 15 % (ref 11.5–14.5)
GLOBULIN SER CALC-MCNC: 4.3 G/DL (ref 2–4)
GLUCOSE BLD STRIP.AUTO-MCNC: 150 MG/DL (ref 65–117)
GLUCOSE BLD STRIP.AUTO-MCNC: 175 MG/DL (ref 65–117)
GLUCOSE BLD STRIP.AUTO-MCNC: 196 MG/DL (ref 65–117)
GLUCOSE BLD STRIP.AUTO-MCNC: 204 MG/DL (ref 65–117)
GLUCOSE SERPL-MCNC: 179 MG/DL (ref 65–100)
HCT VFR BLD AUTO: 31.7 % (ref 35–47)
HGB BLD-MCNC: 9.5 G/DL (ref 11.5–16)
MAGNESIUM SERPL-MCNC: 2.4 MG/DL (ref 1.6–2.4)
MCH RBC QN AUTO: 27.9 PG (ref 26–34)
MCHC RBC AUTO-ENTMCNC: 30 G/DL (ref 30–36.5)
MCV RBC AUTO: 93.2 FL (ref 80–99)
NRBC # BLD: 0 K/UL (ref 0–0.01)
NRBC BLD-RTO: 0 PER 100 WBC
PHOSPHATE SERPL-MCNC: 4 MG/DL (ref 2.6–4.7)
PLATELET # BLD AUTO: 211 K/UL (ref 150–400)
PMV BLD AUTO: 10.9 FL (ref 8.9–12.9)
POTASSIUM SERPL-SCNC: 4 MMOL/L (ref 3.5–5.1)
PROT SERPL-MCNC: 6 G/DL (ref 6.4–8.2)
RBC # BLD AUTO: 3.4 M/UL (ref 3.8–5.2)
SERVICE CMNT-IMP: ABNORMAL
SODIUM SERPL-SCNC: 135 MMOL/L (ref 136–145)
WBC # BLD AUTO: 7.4 K/UL (ref 3.6–11)

## 2025-04-03 PROCEDURE — 2700000000 HC OXYGEN THERAPY PER DAY

## 2025-04-03 PROCEDURE — 97110 THERAPEUTIC EXERCISES: CPT

## 2025-04-03 PROCEDURE — 82962 GLUCOSE BLOOD TEST: CPT

## 2025-04-03 PROCEDURE — 6360000002 HC RX W HCPCS: Performed by: NURSE PRACTITIONER

## 2025-04-03 PROCEDURE — 6370000000 HC RX 637 (ALT 250 FOR IP): Performed by: OTOLARYNGOLOGY

## 2025-04-03 PROCEDURE — 99232 SBSQ HOSP IP/OBS MODERATE 35: CPT | Performed by: NURSE PRACTITIONER

## 2025-04-03 PROCEDURE — 97530 THERAPEUTIC ACTIVITIES: CPT

## 2025-04-03 PROCEDURE — 85027 COMPLETE CBC AUTOMATED: CPT

## 2025-04-03 PROCEDURE — 97535 SELF CARE MNGMENT TRAINING: CPT

## 2025-04-03 PROCEDURE — 2580000003 HC RX 258: Performed by: NURSE PRACTITIONER

## 2025-04-03 PROCEDURE — 94760 N-INVAS EAR/PLS OXIMETRY 1: CPT

## 2025-04-03 PROCEDURE — 80053 COMPREHEN METABOLIC PANEL: CPT

## 2025-04-03 PROCEDURE — 6370000000 HC RX 637 (ALT 250 FOR IP): Performed by: FAMILY MEDICINE

## 2025-04-03 PROCEDURE — 6370000000 HC RX 637 (ALT 250 FOR IP): Performed by: STUDENT IN AN ORGANIZED HEALTH CARE EDUCATION/TRAINING PROGRAM

## 2025-04-03 PROCEDURE — 6370000000 HC RX 637 (ALT 250 FOR IP): Performed by: INTERNAL MEDICINE

## 2025-04-03 PROCEDURE — 84100 ASSAY OF PHOSPHORUS: CPT

## 2025-04-03 PROCEDURE — 2500000003 HC RX 250 WO HCPCS: Performed by: FAMILY MEDICINE

## 2025-04-03 PROCEDURE — 2500000003 HC RX 250 WO HCPCS: Performed by: STUDENT IN AN ORGANIZED HEALTH CARE EDUCATION/TRAINING PROGRAM

## 2025-04-03 PROCEDURE — 2500000003 HC RX 250 WO HCPCS: Performed by: INTERNAL MEDICINE

## 2025-04-03 PROCEDURE — 6370000000 HC RX 637 (ALT 250 FOR IP): Performed by: NURSE PRACTITIONER

## 2025-04-03 PROCEDURE — 83735 ASSAY OF MAGNESIUM: CPT

## 2025-04-03 PROCEDURE — 2060000000 HC ICU INTERMEDIATE R&B

## 2025-04-03 RX ADMIN — CLOPIDOGREL BISULFATE 75 MG: 75 TABLET, FILM COATED ORAL at 08:59

## 2025-04-03 RX ADMIN — ACETAMINOPHEN 650 MG: 325 TABLET ORAL at 15:24

## 2025-04-03 RX ADMIN — GUAIFENESIN 200 MG: 200 SOLUTION ORAL at 08:59

## 2025-04-03 RX ADMIN — CEFTAROLINE FOSAMIL 400 MG: 400 POWDER, FOR SOLUTION INTRAVENOUS at 06:59

## 2025-04-03 RX ADMIN — SODIUM CHLORIDE, PRESERVATIVE FREE 10 ML: 5 INJECTION INTRAVENOUS at 09:00

## 2025-04-03 RX ADMIN — PREGABALIN 50 MG: 50 CAPSULE ORAL at 15:24

## 2025-04-03 RX ADMIN — ACETAMINOPHEN 650 MG: 325 TABLET ORAL at 08:59

## 2025-04-03 RX ADMIN — DAPTOMYCIN 700 MG: 500 INJECTION, POWDER, LYOPHILIZED, FOR SUSPENSION INTRAVENOUS at 17:32

## 2025-04-03 RX ADMIN — Medication: at 20:40

## 2025-04-03 RX ADMIN — PREGABALIN 50 MG: 50 CAPSULE ORAL at 08:59

## 2025-04-03 RX ADMIN — CARVEDILOL 6.25 MG: 6.25 TABLET, FILM COATED ORAL at 08:59

## 2025-04-03 RX ADMIN — CARVEDILOL 6.25 MG: 6.25 TABLET, FILM COATED ORAL at 20:41

## 2025-04-03 RX ADMIN — INSULIN LISPRO 2 UNITS: 100 INJECTION, SOLUTION INTRAVENOUS; SUBCUTANEOUS at 17:35

## 2025-04-03 RX ADMIN — SODIUM CHLORIDE, PRESERVATIVE FREE 10 ML: 5 INJECTION INTRAVENOUS at 20:40

## 2025-04-03 RX ADMIN — CEFTAROLINE FOSAMIL 400 MG: 400 POWDER, FOR SOLUTION INTRAVENOUS at 15:26

## 2025-04-03 RX ADMIN — SODIUM CHLORIDE, PRESERVATIVE FREE 10 ML: 5 INJECTION INTRAVENOUS at 20:39

## 2025-04-03 RX ADMIN — CETIRIZINE HYDROCHLORIDE 10 MG: 10 TABLET, FILM COATED ORAL at 08:59

## 2025-04-03 RX ADMIN — CALCITRIOL CAPSULES 0.25 MCG 0.25 MCG: 0.25 CAPSULE ORAL at 08:59

## 2025-04-03 RX ADMIN — OXYCODONE HYDROCHLORIDE 2.5 MG: 5 TABLET ORAL at 17:38

## 2025-04-03 RX ADMIN — THERA TABS 1 TABLET: TAB at 08:59

## 2025-04-03 RX ADMIN — ASPIRIN 81 MG CHEWABLE TABLET 81 MG: 81 TABLET CHEWABLE at 08:59

## 2025-04-03 RX ADMIN — ACETAMINOPHEN 650 MG: 325 TABLET ORAL at 20:39

## 2025-04-03 RX ADMIN — GUAIFENESIN 200 MG: 200 SOLUTION ORAL at 15:24

## 2025-04-03 RX ADMIN — CEFTAROLINE FOSAMIL 400 MG: 400 POWDER, FOR SOLUTION INTRAVENOUS at 23:07

## 2025-04-03 RX ADMIN — INSULIN LISPRO 2 UNITS: 100 INJECTION, SOLUTION INTRAVENOUS; SUBCUTANEOUS at 20:50

## 2025-04-03 RX ADMIN — GUAIFENESIN 200 MG: 200 SOLUTION ORAL at 20:39

## 2025-04-03 RX ADMIN — INSULIN GLARGINE 15 UNITS: 100 INJECTION, SOLUTION SUBCUTANEOUS at 08:59

## 2025-04-03 RX ADMIN — PREGABALIN 50 MG: 50 CAPSULE ORAL at 20:39

## 2025-04-03 RX ADMIN — Medication: at 09:00

## 2025-04-03 ASSESSMENT — PAIN SCALES - GENERAL
PAINLEVEL_OUTOF10: 0
PAINLEVEL_OUTOF10: 8
PAINLEVEL_OUTOF10: 0
PAINLEVEL_OUTOF10: 4

## 2025-04-03 ASSESSMENT — PAIN DESCRIPTION - LOCATION
LOCATION: HEAD
LOCATION: FOOT;NECK

## 2025-04-03 ASSESSMENT — PAIN DESCRIPTION - ORIENTATION: ORIENTATION: RIGHT;LEFT

## 2025-04-03 ASSESSMENT — PAIN DESCRIPTION - DESCRIPTORS
DESCRIPTORS: SHARP
DESCRIPTORS: ACHING

## 2025-04-03 NOTE — PROGRESS NOTES
Infectious Disease Progress     Impression:   Persistent MRSA high-grade bacteremia  Retropharyngeal abscess  Prevertebral abscess  Left medial diabetic foot wound  leukocytosis  - afebrile, wbc 8.4    Blood cx (3/19, 3/22, 3/25) MRSA, (4/1) no growth so far    Wound cx (3/19) MRSA    MRI of C-spine; C3-C6 ACDF. Complex fluid signal focus in the prevertebral soft tissue  spanning from the clivus to C3, with associated soft tissue edema and swelling;  correlate for signs of infection such as retropharyngeal abscess. Multilevel spondylitic changes with associated canal stenoses, chronic cord compressions, and foraminal stenoses as outlined above. C4-C5 chronic myelomalacia.      CKD; nephrology following  Neck pain  DM II  - management per primary team  Plan:     - continue IV daptomycin (ck 15->25) and Ceftaroline (was added for synergy effect for persistent MRSA bacteremia on 4/1)    S/p johan catheter placement (3/31); poor access issue      No plan for JEFF per cardiology team    Pt may need lifelong suppressive therapy upon completion of scheduled abx therapy    Final abx therapy will be provided once we confirmed sterile blood cx      Appreciate Podiatry input; no signs of infection, open wound to be treated with wound care    Plan of care d/w pt and Dr. Nuñez                   History of Present Illness   3/21/2025  \"Patient is a 64 y.o. female with past medical Hx of CKD stage IV, diabetes with neuropathy, CAD s/p CABG, gastroparesis, cervical fusion about 20 years ago who presented to the OSH ED for neck pain with concerns for retropharyngeal abscess and was transferred to [facility] for ENT evaluation.      Patient was seen by OSH ED for neck pain with imaging showing concerns for loosening of hardware of fusion. Seen outpatient by orthopedic surgery for neck pain and prescribed Medrol pack. Presented to [facility] for worsening neck pain. CT imaging showed prevertebral fluid collection  osteomyelitis.    COMPARISON: None.    FINDINGS: Two views of the left foot demonstrate bony destruction involving the  first metatarsal head, with an underlying soft tissue ulcer. There is chronic  appearing deformity of the fifth metatarsal head and base of the fifth proximal  phalanx.    Impression  Bony destruction involving the first metatarsal head, with  underlying soft tissue ulcer; findings suggest acute osteomyelitis. Chronic  appearing deformity involving the fifth metatarsal head and base of the fifth  proximal phalanx may represent old trauma or chronic osteomyelitis.    Electronically signed by Maksim MONROY TUNNELED CVC PLACE WO SQ PORT/PUMP > 5 YEARS  Result Date: 3/31/2025  PROCEDURE:  ULTRASOUND AND FLUOROSCOPIC GUIDED TUNNELED CENTRAL VENOUS CATHETER PLACEMENT HISTORY: SU HILARIO is a 64 years old Female with need for tunneled venous access. :  Leah Carey NP ATTENDING:  Scar Cedillo MD CONSENT:  After full discussion of the procedure, including risks, benefits and alternatives, both verbal and written consent were obtained. TECHNIQUE: A timeout was called to verify the correct patient, procedure, site and allergies. Preliminary ultrasound imaging of the right neck demonstrated a patent and compressible internal jugular vein.  Images were archived to PACS.  The skin was prepped and draped utilizing maximal sterile barrier technique.  1% lidocaine was utilized for local anesthesia.  The right internal jugular vein was accessed under direct sonographic guidance using a 21 gauge micropuncture needle.  A 0.018 inch guidewire was advanced through the needle into the vein.  The needle was then exchanged for a peel-away sheath. Attention was next directed to the right upper chest.  1% lidocaine with epinephrine was utilized for local anesthesia.  A small dermatotomy was made inferior to the clavicle.  A dual lumen cuffed central venous catheter was tunneled from the dermatotomy site

## 2025-04-03 NOTE — PROGRESS NOTES
Hospitalist Progress Note  Grisel Raymundo MD  Answering service: [phone]        Date of Service:  4/3/2025  NAME:  Nicci Hernandez  :  1960  MRN:  [id number]      Admission Summary:     Patient transferred from [facility] with retropharyngeal abscess.    Interval history / Subjective:     Patient seen & examined today    No acute complaints , comfortable  BL ear pain & pressure / evaluated by ENT   Julia cath placed    Awaiting final ABX order and than placement to [facility] tomorrow       Assessment & Plan:     Neck abscess  -CT soft tissue on presentation shows increased size of prevertebral fluid collection associated with anterior fusion plate  -Follow-up MRI shows complex fluid focus in the prevertebral soft tissue associated with tissue edema and swelling, correlate for signs of infection such as retropharyngeal abscess  -S/p I&D of the abscess by ENT at bedside on 3/19, culture of the abscess showed MRSA  -Patient is s/p prior cervical fusion  -Neurosurgery also evaluated the patient and recommended continuing IV antibiotics, no indication of hardware removal  -On daptomycin and Unasyn, on discharge, patient to continue with daptomycin 700 mg every 24 hour through 2025  -ID following , follow repeat Bcx from  / negative so far   -Nephrology recommended against PICC placement, Julia's cath placed     Bacteremia  -Blood cultures 3/19, 3/22 and 3/25 growing MRSA  -Cardiology consulted for JEFF but recommended holding off on it since it may not be safe with retropharyngeal abscess  -ID recommending daptomycin 700 mg every 24 hours through 2025, started on ceftaroline for persistent bacteremia     AL  -AL on CKD stage IIIb, likely ATN in the setting of infection and also RUTH from dye  -Ultrasound negative for hydronephrosis  -Off IV fluid and creatinine is

## 2025-04-03 NOTE — PLAN OF CARE
Problem: Occupational Therapy - Adult  Goal: By Discharge: Performs self-care activities at highest level of function for planned discharge setting.  See evaluation for individualized goals.  Description: FUNCTIONAL STATUS PRIOR TO ADMISSION:  Pt lives with her  and is typically Mod I for BADLs/IADLs. Pt has a ramp to enter her home and sits on a shower chair to bathe at baseline. Pt reporting her  had a fall and is currently at [facility].   Receives Help From: Family, Prior Level of Assist for ADLs: Independent,  ,  ,  ,  ,  ,  ,  , Prior Level of Assist for Transfers: Independent, Active : Yes     HOME SUPPORT: Patient lived at home with spouse but did not require assistance.    Occupational Therapy Goals:  Weekly Re-Assessment 3/17/25: goals reviewed and updated  Initiated 3/20/2025; Continue 4/2/25    1.  Patient will perform grooming standing at sink with Stand by Assist within 7 day(s). DOWNGRADE, Pt will comb hair with S/U while seated EOB.  2.  Patient will perform bathing with Minimal Assist within 7 day(s). DOWNGRADE to MaxA  3.  Patient will perform lower body dressing with Moderate Assist within 7 day(s). DOWNGRADE to MaxA  4.  Patient will perform toilet transfers with Supervision  within 7 day(s). DOWNGRADE to MaxA x2  5.  Patient will perform all aspects of toileting with Supervision within 7 day(s). DOWNGRADE to MaxA  6.  Patient will participate in upper extremity therapeutic exercise/activities with Modified [place] for 10 minutes within 7 day(s).  DOWNGRADE to [place]  7.  Patient will utilize energy conservation techniques during functional activities with verbal cues within 7 day(s).    Outcome: Progressing   OCCUPATIONAL THERAPY TREATMENT  Patient: Nicci Hernandez (64 y.o. female)  Date: 4/3/2025  Primary Diagnosis: Retropharyngeal abscess [J39.0]  Neck abscess [L02.11]       Precautions: Fall Risk                Chart, occupational therapy assessment, plan of care, and goals

## 2025-04-03 NOTE — PROGRESS NOTES
Physician Progress Note      PATIENT:               SU HILARIO  CSN #:                  [id number]  :                       1960  ADMIT DATE:       3/19/2025 3:05 PM  DISCH DATE:  RESPONDING  PROVIDER #:        Grisel Raymundo MD          QUERY TEXT:    Moderate Malnutrition is documented in the medical record by Dietitian RONDA Hernandes, RD on 25.  Please specify the degree/type:    The clinical indicators include:  Nutrition Recommendations/Plan:    1. Continue regular, lacto-ovo vegetarian diet  2. Added Ensure HP with meals (160 kcal, 16 gm pro, 19 gm CHO/ bottle)      Malnutrition Assessment:  Malnutrition Status:  Moderate malnutrition (25 1058)  Context:  Acute Illness  Findings of the 6 clinical characteristics of malnutrition:  Energy Intake:  75% or less of estimated energy requirements for 7 or more   days  Weight Loss:  Unable to assess (d/t edema, 3rd spacing)  Body Fat Loss:  No body fat loss  Muscle Mass Loss:  No muscle mass loss  Fluid Accumulation:  Mild Extremities, Generalized   Strength:  Not Performed      Nutrition Assessment:  PMHx includes CKD stage 3b, diabetes, dyslipidemia, diabetic neuropathy, CAD   s/p CABG in , gastroparesis, hypertension, cervical fusion about 20 years   ago.  Options provided:  -- Moderate Malnutrition  -- Other - I will add my own diagnosis  -- Disagree - Not applicable / Not valid  -- Disagree - Clinically unable to determine / Unknown  -- Refer to Clinical Documentation Reviewer    PROVIDER RESPONSE TEXT:    This patient has moderate malnutrition.    Query created by: Nancy Lima on 2025 4:21 PM      Electronically signed by:  Grisel Raymundo MD 4/3/2025 11:13 AM

## 2025-04-03 NOTE — PROGRESS NOTES
Foot and Ankle Surgery Progress Note    Subjective:      Nicci Hernandez is a 64 y.o. 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)     Gastroparesis     Hip pain, left     Hypercholesterolemia     Hypertension     Hypertension, diastolic, benign     Hypoalbuminemia     Peripheral vascular disease     Presence of stent in coronary artery in patient with coronary artery disease     S/P CABG x 3     Type 2 diabetes mellitus with hyperglycemia, with long-term current use of insulin ([facility])     Unspecified sleep apnea     no cpap repeat study sched for 9/20/13     Past Surgical History:   Procedure Laterality Date    ANTERIOR CERVICAL DISCECTOMY W/ FUSION  2003    ANTERIOR CERVICAL FUSION INSTRUMENTATION    BACK SURGERY  12/09/2022    Back surgery/plate in disc 3 -disc 6    CARDIAC CATHETERIZATION  1/2021, 2/2021    with stents placed.     CERVICAL LAMINECTOMY      C3 and C4    CHOLECYSTECTOMY  06/2012    CORONARY ANGIOPLASTY WITH STENT PLACEMENT  06/2020    CORONARY ARTERY BYPASS GRAFT  2009    triple bypass    ECHO 2D ADULT  04/2010    LVEF 45%, akinetic apex and septum.  HK mid-anterior wall    ECHO 2D ADULT  06/27/2012    LVEF about 50%.  There is possible HK of distal ant/septal wall and apical walls (technically difficult study).      FOOT DEBRIDEMENT Left 11/5/2024    LEFT FOOT PREP WOUND, APPLY GRAFT performed by Irma Hernandez DPM at [facility] MAIN OR    HAND TENDON SURGERY  10/2019    hand tendon repair    HYSTERECTOMY (CERVIX STATUS UNKNOWN)  2005    IR TUNNELED CVC PLACE WO SQ PORT/PUMP > 5 YEARS

## 2025-04-03 NOTE — PLAN OF CARE
Assess pain using appropriate pain scale     Problem: Skin/Tissue Integrity  Goal: Skin integrity remains intact  Description: 1.  Monitor for areas of redness and/or skin breakdown  2.  Assess vascular access sites hourly  3.  Every 4-6 hours minimum:  Change oxygen saturation probe site  4.  Every 4-6 hours:  If on nasal continuous positive airway pressure, respiratory therapy assess nares and determine need for appliance change or resting period  4/3/2025 0928 by Chrystal Kat, RN  Outcome: Progressing  4/2/2025 2123 by Nadeen Thomas RN  Outcome: Progressing  Flowsheets (Taken 4/2/2025 2000)  Skin Integrity Remains Intact: Monitor for areas of redness and/or skin breakdown     Problem: Nutrition Deficit:  Goal: Optimize nutritional status  4/3/2025 0928 by Chrystal Kat, RN  Outcome: Progressing  4/2/2025 2123 by Nadeen Thomas, RN  Outcome: Progressing

## 2025-04-03 NOTE — PROGRESS NOTES
End of Shift Note    Bedside shift change report given to CATHY Jarrell (oncoming nurse) by Nadeen Thomas RN (offgoing nurse).  Report included the following information SBAR    Shift worked: [id number]     Shift summary and any significant changes:    Lyrica held due to lethargy, pt falling asleep mid conversation.     Concerns for physician to address: Increased lethargy         Nadeen Thomas RN

## 2025-04-03 NOTE — WOUND CARE
WOCN Note:     Follow up assessment.  Left foot wound followed by podiatry.    Assessed in 440/01.    Nicci Hernandez is a 64 y.o. y/o female who presented for Retropharyngeal abscess [J39.0]  Neck abscess [L02.11]  Admitted on 3/19/2025; LOS: 15    Lab Results   Component Value Date/Time    WBC 7.4 04/03/2025 01:44 AM    LABA1C 6.2 (H) 11/22/2024 02:36 AM    POCGLU 175 (H) 04/03/2025 12:02 PM    POCGLU 150 (H) 04/03/2025 08:14 AM    HGB 9.5 (L) 04/03/2025 01:44 AM    HCT 31.7 (L) 04/03/2025 01:44 AM     04/03/2025 01:44 AM      Lab Results   Component Value Date/Time    WBC 7.4 04/03/2025 01:44 AM       ADULT DIET; Regular; Vegetarian (Lacto-Ovo)  DIET ONE TIME MESSAGE;  ADULT ORAL NUTRITION SUPPLEMENT; Breakfast, Lunch, Dinner; Diabetic Oral Supplement     Assessment:   Patient is A&O x 3, communicative and requires assist with repositioning.    Bed: marcelle gel  GI/: external catheter  Patient reports no pain.    Patient repositioned on right side with pillow.  Heels offloaded with pillows.  Sacrum and buttocks intact with blanching erythema.   Protected with foam dressing.  Radha anal area denuded, MASD.  Applied triad wound cream        Wound Assessment  POA Left foot dressing dry and intact.    2. POA Proximal back, pressure injury stage 3 : 2 x 2.4 x 0.1 cm  100% tan/brown;. no exudate; no odor. Radha wound without erythema.   4.  POA Distal back, pressure injury stage 3:  2.5 x 3 x 0.1 cm; 50% red 50% yellow; no exudate; no odor.  Radha wound without erythema.  Tx:  applied triad wound cream and foam            4.  Right heel, blanching red erythema. 1 x 1 x 0 cm  5.  Left heel, blanching red erythema  Tx:  applied venelex      Wound, Pressure Prevention & Skin Care Recommendations:    Minimize layers of linen/pads under patient to optimize support surface.    2.  Turn/reposition approximately every 2 hours and offload heels.   3.  Manage moisture/ Keep skin folds clean and dry/minimize brief

## 2025-04-03 NOTE — PLAN OF CARE
Problem: Safety - Adult  Goal: Free from fall injury  4/2/2025 2123 by Nadeen Thomas RN  Outcome: Progressing  4/2/2025 1008 by Ann Lema RN  Outcome: Progressing     Problem: Chronic Conditions and Co-morbidities  Goal: Patient's chronic conditions and co-morbidity symptoms are monitored and maintained or improved  4/2/2025 2123 by Nadeen Thomas RN  Outcome: Progressing  Flowsheets (Taken 4/2/2025 2000)  Care Plan - Patient's Chronic Conditions and Co-Morbidity Symptoms are Monitored and Maintained or Improved: Monitor and assess patient's chronic conditions and comorbid symptoms for stability, deterioration, or improvement  4/2/2025 1008 by Ann Lema RN  Outcome: Progressing  Flowsheets (Taken 4/2/2025 0800)  Care Plan - Patient's Chronic Conditions and Co-Morbidity Symptoms are Monitored and Maintained or Improved: Monitor and assess patient's chronic conditions and comorbid symptoms for stability, deterioration, or improvement     Problem: Discharge Planning  Goal: Discharge to home or other facility with appropriate resources  4/2/2025 2123 by Nadeen Thomas RN  Outcome: Progressing  Flowsheets (Taken 4/2/2025 2000)  Discharge to home or other facility with appropriate resources: Identify barriers to discharge with patient and caregiver  4/2/2025 1008 by Ann Lema RN  Outcome: Progressing  Flowsheets (Taken 4/2/2025 0800)  Discharge to home or other facility with appropriate resources: Identify barriers to discharge with patient and caregiver     Problem: Pain  Goal: Verbalizes/displays adequate comfort level or baseline comfort level  4/2/2025 2123 by Nadeen Thomas RN  Outcome: Progressing  Flowsheets  Taken 4/2/2025 1900 by Ann Lema RN  Verbalizes/displays adequate comfort level or baseline comfort level:   Encourage patient to monitor pain and request assistance   Assess pain using appropriate pain scale  Taken 4/2/2025 1508 by Ann Lema

## 2025-04-03 NOTE — PROGRESS NOTES
RENAL  PROGRESS NOTE        Subjective:   Patient was seen at bedside, she is resting comfortably was able to answer simple questions.  Did not appear to be in any distress.      Objective:   VITALS SIGNS:    BP (!) 107/54   Pulse 70   Temp 97.7 °F (36.5 °C) (Oral)   Resp 13   Ht 1.575 m (5' 2\")   Wt 109.7 kg (241 lb 13.5 oz)   SpO2 100%   BMI 44.23 kg/m²             Temp (24hrs), Av.6 °F (36.4 °C), Min:97.5 °F (36.4 °C), Max:98 °F (36.7 °C)         PHYSICAL EXAM:  Sleeping.  Trace leg  edema  Resting     DATA REVIEW:     INTAKE / OUTPUT:   Last shift:      No intake/output data recorded.  Last 3 shifts: 1901 -  0700  In: 578.3 [P.O.:300; I.V.:81.6]  Out: 950 [Urine:950]    Intake/Output Summary (Last 24 hours) at 4/3/2025 0737  Last data filed at 4/3/2025 0400  Gross per 24 hour   Intake 472.08 ml   Output 600 ml   Net -127.92 ml         LABS:   LABS:  Recent Labs     25  0144 25  0013 25  0702   * 134* 134*   K 4.0 4.0 4.4    97 101   CO2 29 33* 23   BUN 39* 39* 37*   CREATININE 1.64* 1.67* 1.74*   CALCIUM 9.1 9.0 8.9   PHOS 4.0 3.9 3.9   MG 2.4 2.4 2.2     Recent Labs     25  0144 25  0013 25  0702   WBC 7.4 8.6 8.4   HGB 9.5* 9.4* 10.0*   HCT 31.7* 31.4* 34.6*    238 155       Assessment:     CKD-3b,small kidneys  AL,got IV dye on 3.19 in the setting of bacteremia - neck abscess: ATN               No hydro,but rt kidney 8.7 cm < lt kidney 13.1 cm ??? (Both kidney were 8.1 cm on )  hyponatremia  HTN  DM-2  Met acidosis mixed : non AG and high AG- RESOLVED  RP abscess/MRSA bacteremia - on IV Daptomycin  Edema  Hypokalemia- mild    Discussion-fair urine output.  AL is improving with CR down to 3 to 2.7 to 2.1 to 2 to 1.8 to 1.7 to 1.67mg/dl.   Serum Na up to and holding at 135 today.    S/p Julia catheter 3/31     Plan:  Ct current management  Her home doseTorsemide and Aldactone remain on hold  Can resume Torsemide at lower dose  (QD) at time of discharge. Would hold Aldactone until we see more renal recovery  Encourage increase oral hydration/intake  IV antibiotics as per primary team  Avoid nephrotoxins  Labs in a.m.   monitor urine output  Discharge planning    Corie Baumann MD  [facility]

## 2025-04-04 VITALS
RESPIRATION RATE: 11 BRPM | SYSTOLIC BLOOD PRESSURE: 123 MMHG | BODY MASS INDEX: 44.51 KG/M2 | HEIGHT: 62 IN | HEART RATE: 66 BPM | OXYGEN SATURATION: 100 % | TEMPERATURE: 97.2 F | WEIGHT: 241.9 LBS | DIASTOLIC BLOOD PRESSURE: 60 MMHG

## 2025-04-04 LAB
ALBUMIN SERPL-MCNC: 1.7 G/DL (ref 3.5–5)
ALBUMIN/GLOB SERPL: 0.4 (ref 1.1–2.2)
ALP SERPL-CCNC: 56 U/L (ref 45–117)
ALT SERPL-CCNC: 14 U/L (ref 12–78)
ANION GAP SERPL CALC-SCNC: 4 MMOL/L (ref 2–12)
AST SERPL-CCNC: 23 U/L (ref 15–37)
BASOPHILS # BLD: 0.07 K/UL (ref 0–0.1)
BASOPHILS NFR BLD: 1.1 % (ref 0–1)
BILIRUB SERPL-MCNC: 0.5 MG/DL (ref 0.2–1)
BUN SERPL-MCNC: 41 MG/DL (ref 6–20)
BUN/CREAT SERPL: 25 (ref 12–20)
CALCIUM SERPL-MCNC: 8.9 MG/DL (ref 8.5–10.1)
CHLORIDE SERPL-SCNC: 100 MMOL/L (ref 97–108)
CK SERPL-CCNC: 32 U/L (ref 26–192)
CK SERPL-CCNC: 52 U/L (ref 26–192)
CO2 SERPL-SCNC: 30 MMOL/L (ref 21–32)
CREAT SERPL-MCNC: 1.64 MG/DL (ref 0.55–1.02)
DIFFERENTIAL METHOD BLD: ABNORMAL
EOSINOPHIL # BLD: 0.24 K/UL (ref 0–0.4)
EOSINOPHIL NFR BLD: 3.7 % (ref 0–7)
ERYTHROCYTE [DISTWIDTH] IN BLOOD BY AUTOMATED COUNT: 15.1 % (ref 11.5–14.5)
GLOBULIN SER CALC-MCNC: 4.3 G/DL (ref 2–4)
GLUCOSE BLD STRIP.AUTO-MCNC: 119 MG/DL (ref 65–117)
GLUCOSE BLD STRIP.AUTO-MCNC: 185 MG/DL (ref 65–117)
GLUCOSE SERPL-MCNC: 154 MG/DL (ref 65–100)
HCT VFR BLD AUTO: 30.5 % (ref 35–47)
HGB BLD-MCNC: 9.2 G/DL (ref 11.5–16)
IMM GRANULOCYTES # BLD AUTO: 0.03 K/UL (ref 0–0.04)
IMM GRANULOCYTES NFR BLD AUTO: 0.5 % (ref 0–0.5)
LYMPHOCYTES # BLD: 0.71 K/UL (ref 0.8–3.5)
LYMPHOCYTES NFR BLD: 10.8 % (ref 12–49)
MCH RBC QN AUTO: 27.7 PG (ref 26–34)
MCHC RBC AUTO-ENTMCNC: 30.2 G/DL (ref 30–36.5)
MCV RBC AUTO: 91.9 FL (ref 80–99)
MONOCYTES # BLD: 0.51 K/UL (ref 0–1)
MONOCYTES NFR BLD: 7.8 % (ref 5–13)
NEUTS SEG # BLD: 5.04 K/UL (ref 1.8–8)
NEUTS SEG NFR BLD: 76.1 % (ref 32–75)
NRBC # BLD: 0 K/UL (ref 0–0.01)
NRBC BLD-RTO: 0 PER 100 WBC
PLATELET # BLD AUTO: 207 K/UL (ref 150–400)
PMV BLD AUTO: 10.9 FL (ref 8.9–12.9)
POTASSIUM SERPL-SCNC: 4 MMOL/L (ref 3.5–5.1)
PROT SERPL-MCNC: 6 G/DL (ref 6.4–8.2)
RBC # BLD AUTO: 3.32 M/UL (ref 3.8–5.2)
RBC MORPH BLD: ABNORMAL
SERVICE CMNT-IMP: ABNORMAL
SERVICE CMNT-IMP: ABNORMAL
SODIUM SERPL-SCNC: 134 MMOL/L (ref 136–145)
WBC # BLD AUTO: 6.6 K/UL (ref 3.6–11)

## 2025-04-04 PROCEDURE — 85025 COMPLETE CBC W/AUTO DIFF WBC: CPT

## 2025-04-04 PROCEDURE — 6370000000 HC RX 637 (ALT 250 FOR IP): Performed by: FAMILY MEDICINE

## 2025-04-04 PROCEDURE — 80053 COMPREHEN METABOLIC PANEL: CPT

## 2025-04-04 PROCEDURE — 2580000003 HC RX 258: Performed by: NURSE PRACTITIONER

## 2025-04-04 PROCEDURE — 6360000002 HC RX W HCPCS: Performed by: NURSE PRACTITIONER

## 2025-04-04 PROCEDURE — 6370000000 HC RX 637 (ALT 250 FOR IP): Performed by: STUDENT IN AN ORGANIZED HEALTH CARE EDUCATION/TRAINING PROGRAM

## 2025-04-04 PROCEDURE — 99232 SBSQ HOSP IP/OBS MODERATE 35: CPT | Performed by: NURSE PRACTITIONER

## 2025-04-04 PROCEDURE — 6370000000 HC RX 637 (ALT 250 FOR IP): Performed by: NURSE PRACTITIONER

## 2025-04-04 PROCEDURE — 2500000003 HC RX 250 WO HCPCS: Performed by: INTERNAL MEDICINE

## 2025-04-04 PROCEDURE — 82550 ASSAY OF CK (CPK): CPT

## 2025-04-04 PROCEDURE — 82962 GLUCOSE BLOOD TEST: CPT

## 2025-04-04 PROCEDURE — 2500000003 HC RX 250 WO HCPCS: Performed by: STUDENT IN AN ORGANIZED HEALTH CARE EDUCATION/TRAINING PROGRAM

## 2025-04-04 PROCEDURE — 6370000000 HC RX 637 (ALT 250 FOR IP): Performed by: INTERNAL MEDICINE

## 2025-04-04 PROCEDURE — 94760 N-INVAS EAR/PLS OXIMETRY 1: CPT

## 2025-04-04 PROCEDURE — 2500000003 HC RX 250 WO HCPCS: Performed by: FAMILY MEDICINE

## 2025-04-04 PROCEDURE — 6370000000 HC RX 637 (ALT 250 FOR IP): Performed by: OTOLARYNGOLOGY

## 2025-04-04 PROCEDURE — 2700000000 HC OXYGEN THERAPY PER DAY

## 2025-04-04 RX ADMIN — ASPIRIN 81 MG CHEWABLE TABLET 81 MG: 81 TABLET CHEWABLE at 08:41

## 2025-04-04 RX ADMIN — GUAIFENESIN 200 MG: 200 SOLUTION ORAL at 15:09

## 2025-04-04 RX ADMIN — SODIUM CHLORIDE, PRESERVATIVE FREE 10 ML: 5 INJECTION INTRAVENOUS at 08:47

## 2025-04-04 RX ADMIN — ACETAMINOPHEN 650 MG: 325 TABLET ORAL at 08:41

## 2025-04-04 RX ADMIN — PREGABALIN 50 MG: 50 CAPSULE ORAL at 08:42

## 2025-04-04 RX ADMIN — INSULIN LISPRO 2 UNITS: 100 INJECTION, SOLUTION INTRAVENOUS; SUBCUTANEOUS at 12:53

## 2025-04-04 RX ADMIN — GUAIFENESIN 200 MG: 200 SOLUTION ORAL at 08:41

## 2025-04-04 RX ADMIN — Medication: at 12:53

## 2025-04-04 RX ADMIN — CLOPIDOGREL BISULFATE 75 MG: 75 TABLET, FILM COATED ORAL at 08:42

## 2025-04-04 RX ADMIN — THERA TABS 1 TABLET: TAB at 08:42

## 2025-04-04 RX ADMIN — ACETAMINOPHEN 650 MG: 325 TABLET ORAL at 15:09

## 2025-04-04 RX ADMIN — PREGABALIN 50 MG: 50 CAPSULE ORAL at 15:09

## 2025-04-04 RX ADMIN — INSULIN GLARGINE 15 UNITS: 100 INJECTION, SOLUTION SUBCUTANEOUS at 08:44

## 2025-04-04 RX ADMIN — CALCITRIOL CAPSULES 0.25 MCG 0.25 MCG: 0.25 CAPSULE ORAL at 08:42

## 2025-04-04 RX ADMIN — CETIRIZINE HYDROCHLORIDE 10 MG: 10 TABLET, FILM COATED ORAL at 08:42

## 2025-04-04 RX ADMIN — CEFTAROLINE FOSAMIL 400 MG: 400 POWDER, FOR SOLUTION INTRAVENOUS at 07:07

## 2025-04-04 RX ADMIN — OXYCODONE HYDROCHLORIDE 2.5 MG: 5 TABLET ORAL at 09:53

## 2025-04-04 ASSESSMENT — PAIN DESCRIPTION - ORIENTATION: ORIENTATION: RIGHT

## 2025-04-04 ASSESSMENT — PAIN DESCRIPTION - DESCRIPTORS: DESCRIPTORS: ACHING

## 2025-04-04 ASSESSMENT — PAIN SCALES - GENERAL
PAINLEVEL_OUTOF10: 8
PAINLEVEL_OUTOF10: 0
PAINLEVEL_OUTOF10: 8
PAINLEVEL_OUTOF10: 0

## 2025-04-04 ASSESSMENT — PAIN DESCRIPTION - LOCATION: LOCATION: NECK

## 2025-04-04 NOTE — PLAN OF CARE
Problem: Safety - Adult  Goal: Free from fall injury  4/3/2025 2012 by Nadeen Thomas RN  Outcome: Progressing  4/3/2025 0928 by Chrystal Kat RN  Outcome: Progressing     Problem: Chronic Conditions and Co-morbidities  Goal: Patient's chronic conditions and co-morbidity symptoms are monitored and maintained or improved  4/3/2025 2012 by Nadeen Thomas RN  Outcome: Progressing  Flowsheets (Taken 4/3/2025 2000)  Care Plan - Patient's Chronic Conditions and Co-Morbidity Symptoms are Monitored and Maintained or Improved: Monitor and assess patient's chronic conditions and comorbid symptoms for stability, deterioration, or improvement  4/3/2025 0928 by Chrystal Kat RN  Outcome: Progressing     Problem: Discharge Planning  Goal: Discharge to home or other facility with appropriate resources  4/3/2025 2012 by Nadeen Thomas RN  Outcome: Progressing  Flowsheets (Taken 4/3/2025 2000)  Discharge to home or other facility with appropriate resources: Identify barriers to discharge with patient and caregiver  4/3/2025 0928 by Chrystal Kat RN  Outcome: Progressing     Problem: Pain  Goal: Verbalizes/displays adequate comfort level or baseline comfort level  4/3/2025 2012 by Nadeen Thomas RN  Outcome: Progressing  4/3/2025 0928 by Chrystal Kat RN  Outcome: Progressing     Problem: Occupational Therapy - Adult  Goal: By Discharge: Performs self-care activities at highest level of function for planned discharge setting.  See evaluation for individualized goals.  Description: FUNCTIONAL STATUS PRIOR TO ADMISSION:  Pt lives with her  and is typically Mod I for BADLs/IADLs. Pt has a ramp to enter her home and sits on a shower chair to bathe at baseline. Pt reporting her  had a fall and is currently at [facility].   Receives Help From: Family, Prior Level of Assist for ADLs: Independent,  ,  ,  ,  ,  ,  ,  , Prior Level of Assist for Transfers: Independent, Active :  Nadeen Thomas, RN  Outcome: Progressing  4/3/2025 0928 by Chrystal Kat, RN  Outcome: Progressing

## 2025-04-04 NOTE — DISCHARGE INSTRUCTIONS
Discharge Instructions       PATIENT ID: Nicci Hernandez  MRN: [id number]   YOB: 1960    DATE OF ADMISSION: 3/19/2025   DATE OF DISCHARGE: 4/4/2025    PRIMARY CARE PROVIDER: Franco Ross     ATTENDING PHYSICIAN: Grisel Raymundo MD   DISCHARGING PROVIDER: Grisel Raymundo MD    To contact this individual call [phone] and ask the  to page.   If unavailable ask to be transferred the Adult Hospitalist Department.    DISCHARGE DIAGNOSES Neck abscess    CONSULTATIONS: [unfilled]    PROCEDURES/SURGERIES: * No surgery found *    PENDING TEST RESULTS:   At the time of discharge the following test results are still pending:     FOLLOW UP APPOINTMENTS:   [unfilled]     ADDITIONAL CARE RECOMMENDATIONS:   Discharge IV Antibiotic Order  [facility]  [address] [place] [address]  TEL: [phone]  FAX: [phone]        1.  Diagnosis:  Persistent MRSA bacteremia, prevertebral abscess  2.  Antibiotic:  IV Daptomycin 700 mg every 24 hours       END DATE:  5/12/2025  3.  Routine PICC/ Julia/ Portacath Care including PRN catheter flow management  4.  Weekly labs:              [x] CBC/diff/platelets              [x] BUN/Creatinine               [x] CPK. Please hold your cholesterol medication (name ends with STATIN) while you are taking daily Daptomycin.               [x]  AST/Total bilirubin/Alkaline Phosphatase               [x] CRP              []Trough Vancomycin level goal 15-20. Drawn every Monday and Thursday. Clinical pharmacy consult for Vancomycin dosing according to the trough level.   5.  Fax lab to [phone].  Call Critical Lab Values to [phone]  6.  May send to IR for line evaluation or replacement [phone]  7.  Home Health to pull PICC line at end of therapy or send to IR for Julia removal  8.  Allergies:    Allergies         Allergies   Allergen Reactions    Atorvastatin Other (See  Comments)       Reaction Type: Allergy; Severity: Severe; Reaction(s): myopathy  Reaction Type: Allergy; Severity: Severe; Reaction(s): myopathy         9.  Recommend to follow up with Dr. Goodman within 3-4 weeks           ILIA Long NP                                  DIET: cardiac diet and diabetic diet  Oral Nutritional Supplements:     ACTIVITY: activity as tolerated    WOUND CARE: as instructed for foot    EQUIPMENT needed:       DISCHARGE MEDICATIONS:   See Medication Reconciliation Form    It is important that you take the medication exactly as they are prescribed.   Keep your medication in the bottles provided by the pharmacist and keep a list of the medication names, dosages, and times to be taken in your wallet.   Do not take other medications without consulting your doctor.       NOTIFY YOUR PHYSICIAN FOR ANY OF THE FOLLOWING:   Fever over 101 degrees for 24 hours.   Chest pain, shortness of breath, fever, chills, nausea, vomiting, diarrhea, change in mentation, falling, weakness, bleeding. Severe pain or pain not relieved by medications.  Or, any other signs or symptoms that you may have questions about.      DISPOSITION:    Home With:   OT  PT  HH  RN      x SNF/Inpatient Rehab/LTAC    Independent/assisted living    Hospice    Other:     CDMP Checked:   Yes x     PROBLEM LIST Updated:  Yes x       Signed:   Grisel Raymundo MD  4/4/2025  8:51 AM

## 2025-04-04 NOTE — PLAN OF CARE
Discharge IV Antibiotic Order  [facility]  [address] [place] [address]  TEL: [phone]  FAX: [phone]      1.  Diagnosis:  Persistent MRSA bacteremia, prevertebral abscess  2.  Antibiotic:  IV Daptomycin 700 mg every 24 hours       END DATE:  5/12/2025  3.  Routine PICC/ Julia/ Portacath Care including PRN catheter flow management  4.  Weekly labs:   [x] CBC/diff/platelets   [x] BUN/Creatinine    [x] CPK. Please hold your cholesterol medication (name ends with STATIN) while you are taking daily Daptomycin.    [x]  AST/Total bilirubin/Alkaline Phosphatase    [x] CRP   []Trough Vancomycin level goal 15-20. Drawn every Monday and Thursday. Clinical pharmacy consult for Vancomycin dosing according to the trough level.   5.  Fax lab to [phone].  Call Critical Lab Values to [phone]  6.  May send to IR for line evaluation or replacement [phone]  7.  Home Health to pull PICC line at end of therapy or send to IR for Julia removal  8.  Allergies:    Allergies   Allergen Reactions    Atorvastatin Other (See Comments)     Reaction Type: Allergy; Severity: Severe; Reaction(s): myopathy  Reaction Type: Allergy; Severity: Severe; Reaction(s): myopathy     9.  Recommend to follow up with Dr. Goodman within 3-4 weeks        ILIA Long NP

## 2025-04-04 NOTE — CARE COORDINATION
Transition of Care Plan:    RUR: 21%  Prior Level of Functioning:   Disposition:   RAMA:   If SNF or IPR: Date FOC offered:   Date FOC received:   Accepting facility:   Date authorization started with reference number:   Date authorization received and expires:   Follow up appointments:   DME needed:   Transportation at discharge:   [facility] Medicare/ letter given:   Is patient a [place] and connected with [facility]?    If yes, was  transfer form completed and [facility] notified?   Caregiver Contact:   Discharge Caregiver contacted prior to discharge?   Care Conference needed?   Barriers to discharge:

## 2025-04-04 NOTE — PROGRESS NOTES
chair. She fatigues and is fearful of falls, requires assist for weight shifts to off load LE's for stepping.  She was not able to complete transfer to the chair w/ RW, ultimately requiring the Tasia Valadez to the chair.           PLAN:  Patient continues to benefit from skilled intervention to address the above impairments.  Continue treatment per established plan of care.    Recommend for next PT session: sit to stand w/ RW working towards bed to chair w/ RW.  May need the Tasia Rory    Recommendation for discharge: (in order for the patient to meet his/her long term goals):   High intensity/comprehensive skilled physical therapy in a multidisciplinary setting as patient is working towards tolerating up to 3 hours of therapy/day 5-7x/week       SUBJECTIVE:   Patient stated, \"I can't.\"  (Tearful)    OBJECTIVE DATA SUMMARY:   Critical Behavior:  Orientation  Overall Orientation Status: Within Normal Limits       Functional Mobility Training:  Bed Mobility:  Bed Mobility Training  Bed Mobility Training: Yes  Interventions: Tactile cues;Verbal cues;Manual cues;Weight shifting training/pressure relief;Safety awareness training  Rolling: Partial/Moderate assistance;2 Person assistance  Supine to Sit: 2 Person assistance;Partial/Moderate assistance  Scooting: Minimal assistance;Partial/Moderate assistance  Transfers:  Transfer Training  Transfer Training: Yes  Interventions: Safety awareness training;Verbal cues;Manual cues;Weight shifting training/pressure relief  Sit to Stand: Partial/Moderate assistance  Stand to Sit: Minimal assistance  Stand Pivot Transfers: Dependent/Total  Bed to Chair: Dependent/Total  Balance:  Balance  Sitting: Impaired  Sitting - Static: Good (unsupported);Fair (occasional)  Sitting - Dynamic: Fair (occasional)  Standing: Impaired  Standing - Static: Fair;Constant support  Standing - Dynamic: Fair;Constant support   Ambulation/Gait Training:     Gait  Gait Training: Yes (pre gait activity:  weight shift, side stepping)  Overall Level of Assistance: Partial/Moderate assistance;2 Person assistance  Distance (ft): 0 Feet  Assistive Device: Gait belt;Walker, rolling  Interventions: Safety awareness training;Verbal cues;Weight shifting training/pressure relief  Base of Support: Widened  Speed/Alicia: Shuffled;Slow            Activity Tolerance:   Fair  and requires frequent rest breaks, bowel incontinence    After treatment:   Patient left in no apparent distress sitting up in chair, Call bell within reach, and Bed/ chair alarm activated      COMMUNICATION/EDUCATION:   The patient's plan of care was discussed with: occupational therapist and registered nurse    Patient Education  Education Given To: Patient  Education Provided: Mobility Training;Equipment;Transfer Training;Home Exercise Program  Education Method: Verbal  Barriers to Learning: None  Education Outcome: Continued education needed;Verbalized understanding;Demonstrated understanding      Gisela Del Castillo, PT  Minutes: 40

## 2025-04-04 NOTE — DISCHARGE SUMMARY
Oint ointment  Apply topically 2 times daily     insulin glargine 100 UNIT/ML injection vial  Commonly known as: LANTUS  Inject 15 Units into the skin daily     insulin lispro 100 UNIT/ML Soln injection vial  Commonly known as: HUMALOG,ADMELOG  Inject 0-8 Units into the skin 4 times daily (before meals and nightly)            CHANGE how you take these medications      carvedilol 6.25 MG tablet  Commonly known as: COREG  Take 1 tablet by mouth 2 times daily  What changed:   medication strength  how much to take     torsemide 20 MG tablet  Commonly known as: DEMADEX  Take 0.5 tablets by mouth daily  What changed: how much to take            CONTINUE taking these medications      albuterol sulfate  (90 Base) MCG/ACT inhaler  Commonly known as: Ventolin HFA  Inhale 2 puffs into the lungs 4 times daily as needed for Wheezing     aspirin 81 MG chewable tablet     calcitRIOL 0.25 MCG capsule  Commonly known as: ROCALTROL  Take 1 capsule by mouth daily     clopidogrel 75 MG tablet  Commonly known as: PLAVIX     ferrous sulfate 325 (65 Fe) MG tablet  Commonly known as: IRON 325     guaiFENesin 600 MG extended release tablet  Commonly known as: MUCINEX  Take 1 tablet by mouth 2 times daily     methocarbamol 750 MG tablet  Commonly known as: ROBAXIN     OneTouch Delica Plus [id number] Misc  Use to check BG 3 times daily. Dx code E11.65     OneTouch Verio Flex System w/Device Kit  Use to check BG 3 times daily. Dx code E11.65     pregabalin 75 MG capsule  Commonly known as: LYRICA     spironolactone 25 MG tablet  Commonly known as: ALDACTONE     vitamin B-6 100 MG tablet  Commonly known as: PYRIDOXINE            STOP taking these medications      ALPRAZolam 0.25 MG tablet  Commonly known as: XANAX     insulin aspart 100 UNIT/ML injection pen  Commonly known as: NovoLOG     ketorolac 10 MG tablet  Commonly known as: TORADOL     midodrine 10 MG tablet  Commonly known as: PROAMATINE     OneTouch Verio strip  Generic drug:  blood glucose test strips     Tresiba FlexTouch 200 UNIT/ML Sopn  Generic drug: Insulin Degludec            ASK your doctor about these medications      oxyCODONE 5 MG immediate release tablet  Commonly known as: ROXICODONE  Take 0.5 tablets by mouth every 4 hours as needed for Pain for up to 3 days. Max Daily Amount: 15 mg  Ask about: Should I take this medication?               Where to Get Your Medications        Information about where to get these medications is not yet available    Ask your nurse or doctor about these medications  balsum peru-castor oil Oint ointment  carvedilol 6.25 MG tablet  insulin glargine 100 UNIT/ML injection vial  insulin lispro 100 UNIT/ML Soln injection vial  oxyCODONE 5 MG immediate release tablet  torsemide 20 MG tablet           NOTIFY YOUR PHYSICIAN FOR ANY OF THE FOLLOWING:   Fever over 101 degrees for 24 hours.   Chest pain, shortness of breath, fever, chills, nausea, vomiting, diarrhea, change in mentation, falling, weakness, bleeding. Severe pain or pain not relieved by medications.  Or, any other signs or symptoms that you may have questions about.    DISPOSITION:    Home With:   OT  PT  HH  RN      x Long term SNF/Inpatient Rehab    Independent/assisted living    Hospice    Other:       PATIENT CONDITION AT DISCHARGE:     Functional status   x Poor     Deconditioned     Independent      Cognition    x Lucid     Forgetful     Dementia      Catheters/lines (plus indication)    Osborne    x PICC     PEG     None      Code status    x Full code     DNR      PHYSICAL EXAMINATION AT DISCHARGE:    General : alert x 3, awake, no acute distress,   HEENT: PEERL, EOMI, moist mucus membrane, TM clear  Neck: supple, no JVD, no meningeal signs  Chest: Clear to auscultation bilaterally   CVS: S1 S2 heard, Capillary refill less than 2 seconds  Abd: soft/ non tender, non distended, BS physiological,   Ext: no clubbing, no cyanosis, no edema, brisk 2+ DP pulses  Neuro/Psych: pleasant mood and

## 2025-04-04 NOTE — PROGRESS NOTES
Infectious Disease Progress     Impression:   Persistent MRSA high-grade bacteremia  Retropharyngeal abscess  Prevertebral abscess  Left medial diabetic foot wound  leukocytosis  - afebrile, wbc 8.4    Blood cx (3/19, 3/22, 3/25) MRSA, (4/1) no growth so far    Wound cx (3/19) MRSA    MRI of C-spine; C3-C6 ACDF. Complex fluid signal focus in the prevertebral soft tissue  spanning from the clivus to C3, with associated soft tissue edema and swelling;  correlate for signs of infection such as retropharyngeal abscess. Multilevel spondylitic changes with associated canal stenoses, chronic cord compressions, and foraminal stenoses as outlined above. C4-C5 chronic myelomalacia.      CKD; nephrology following  Neck pain  DM II  - management per primary team  Plan:     - continue IV daptomycin (ck 15->25) and Ceftaroline (may stop Ceftaroline upon discharge)      Discharge IV abx order in place; Daptomycin only, end date 5/12/2025    Pt is clear to discharge in ID stand point.    Please follow up with Dr. Goodman in 3-4 weeks for possible suppressive therapy        S/p johan catheter placement (3/31); poor access issue      No plan for JEFF per cardiology team    Pt may need lifelong suppressive therapy upon completion of scheduled abx therapy    Final abx therapy will be provided once we confirmed sterile blood cx      Appreciate Podiatry input; no signs of infection, open wound to be treated with wound care    Plan of care d/w pt, Dr. Raymundo, and Dr. Nuñez                   History of Present Illness   3/21/2025  \"Patient is a 64 y.o. female with past medical Hx of CKD stage IV, diabetes with neuropathy, CAD s/p CABG, gastroparesis, cervical fusion about 20 years ago who presented to the OSH ED for neck pain with concerns for retropharyngeal abscess and was transferred to [facility] for ENT evaluation.      Patient was seen by OSH ED for neck pain with imaging showing concerns for loosening of hardware of fusion. Seen  STREPTOCOCCUS Not detected        Streptococcus agalactiae (Group B) Not detected        Strep pneumoniae Not detected        Strep pyogenes,(Grp. A) Not detected        Acinetobacter calcoac baumannii complex by PCR Not detected        Bacteroides fragilis by PCR Not detected        Enterobacteriaceae by PCR Not detected        Enterobacter cloacae complex by PCR Not detected        Escherichia Coli Not detected        Klebsiella aerogenes by PCR Not detected        Klebsiella oxytoca by PCR Not detected        Klebsiella pneumoniae group by PCR Not detected        Proteus by PCR Not detected        Salmonella species by PCR Not detected        Serratia marcescens by PCR Not detected        Haemophilus Influenzae by PCR Not detected        Neisseria meningitidis by PCR Not detected        Pseudomonas aeruginosa Not detected        Stenotrophomonas maltophilia by PCR Not detected        Candida albicans by PCR Not detected        Candida auris by PCR Not detected        Candida glabrata Not detected        Candida krusei by PCR Not detected        Candida parapsilosis by PCR Not detected        Candida tropicalis by PCR Not detected        Cryptococcus neoformans/gattii by PCR Not detected        Resistant gene targets          Resistant gene meca/c & mrej by PCR Detected        Biofire test comment       False positive results may rarely occur. Correlate with clinical,epidemiologic, and other laboratory findings           Comment: Please see BCID Interpretation Guide in EPIC Links                Labs:   Labs:   Lab Results   Component Value Date/Time    WBC 6.6 04/04/2025 12:28 AM    HGB 9.2 04/04/2025 12:28 AM    HCT 30.5 04/04/2025 12:28 AM     04/04/2025 12:28 AM    MCV 91.9 04/04/2025 12:28 AM     Lab Results   Component Value Date/Time     04/04/2025 12:28 AM    K 4.0 04/04/2025 12:28 AM     04/04/2025 12:28 AM    CO2 30 04/04/2025 12:28 AM    BUN 41 04/04/2025 12:28 AM    GFRAA 47

## 2025-04-04 NOTE — PLAN OF CARE
1610  Pt DC with [facility] to [facility].     Problem: Safety - Adult  Goal: Free from fall injury  Outcome: Progressing     Problem: Chronic Conditions and Co-morbidities  Goal: Patient's chronic conditions and co-morbidity symptoms are monitored and maintained or improved  Outcome: Progressing  Flowsheets (Taken 4/4/2025 0830)  Care Plan - Patient's Chronic Conditions and Co-Morbidity Symptoms are Monitored and Maintained or Improved:   Monitor and assess patient's chronic conditions and comorbid symptoms for stability, deterioration, or improvement   Collaborate with multidisciplinary team to address chronic and comorbid conditions and prevent exacerbation or deterioration   Update acute care plan with appropriate goals if chronic or comorbid symptoms are exacerbated and prevent overall improvement and discharge     Problem: Discharge Planning  Goal: Discharge to home or other facility with appropriate resources  Outcome: Progressing  Flowsheets (Taken 4/4/2025 0830)  Discharge to home or other facility with appropriate resources: Identify barriers to discharge with patient and caregiver     Problem: Pain  Goal: Verbalizes/displays adequate comfort level or baseline comfort level  Outcome: Progressing     Problem: Skin/Tissue Integrity  Goal: Skin integrity remains intact  Description: 1.  Monitor for areas of redness and/or skin breakdown  2.  Assess vascular access sites hourly  3.  Every 4-6 hours minimum:  Change oxygen saturation probe site  4.  Every 4-6 hours:  If on nasal continuous positive airway pressure, respiratory therapy assess nares and determine need for appliance change or resting period  Outcome: Progressing  Flowsheets (Taken 4/4/2025 0830)  Skin Integrity Remains Intact: Monitor for areas of redness and/or skin breakdown     Problem: Nutrition Deficit:  Goal: Optimize nutritional status  Outcome: Progressing

## 2025-04-04 NOTE — PROGRESS NOTES
RENAL  PROGRESS NOTE        Subjective:   Patient was seen at bedside, she is resting comfortably was able to answer simple questions.  Awaiting discharge later today      Objective:   VITALS SIGNS:    /80   Pulse 62   Temp 97.6 °F (36.4 °C) (Oral)   Resp 12   Ht 1.575 m (5' 2\")   Wt 109.7 kg (241 lb 14.4 oz)   SpO2 98%   BMI 44.24 kg/m²             Temp (24hrs), Av.7 °F (36.5 °C), Min:97.6 °F (36.4 °C), Max:98 °F (36.7 °C)         PHYSICAL EXAM:  Awake/Obese/NAD  Trace leg  edema      DATA REVIEW:     INTAKE / OUTPUT:   Last shift:      701 - 1900  In: 280 [P.O.:280]  Out: -   Last 3 shifts: 1901 -  0700  In: 615.4 [P.O.:150; I.V.:117.9]  Out: 700 [Urine:700]    Intake/Output Summary (Last 24 hours) at 2025 1246  Last data filed at 2025 0830  Gross per 24 hour   Intake 437.38 ml   Output 400 ml   Net 37.38 ml         LABS:   LABS:  Recent Labs     25  0028 25  0144 25  0013 25  0702   * 135* 134* 134*   K 4.0 4.0 4.0 4.4    100 97 101   CO2 30 29 33* 23   BUN 41* 39* 39* 37*   CREATININE 1.64* 1.64* 1.67* 1.74*   CALCIUM 8.9 9.1 9.0 8.9   PHOS  --  4.0 3.9 3.9   MG  --  2.4 2.4 2.2     Recent Labs     25  0028 25  0144 25  0013   WBC 6.6 7.4 8.6   HGB 9.2* 9.5* 9.4*   HCT 30.5* 31.7* 31.4*    211 238       Assessment:     CKD-3b,small kidneys. Followed by Dr. FIDE Ryder  AL,got IV dye on 3.19 in the setting of bacteremia - neck abscess: ATN               No hydro,but rt kidney 8.7 cm < lt kidney 13.1 cm ??? (Both kidney were 8.1 cm on )  hyponatremia  HTN  DM-2  Met acidosis mixed : non AG and high AG- RESOLVED  RP abscess/MRSA bacteremia - on IV Daptomycin  Edema  Hypokalemia- mild    Discussion-fair urine output.  AL is improving with CR down to 3 to 2.7 to 2.1 to 2 to 1.8 to 1.7 to 1.67mg/dl and holding  Serum Na up to and holding around 134 today.    S/p Julia catheter 3/31     Plan:  Ct current  management  Can resume Torsemide at lower dose (QD) at time of discharge. Would hold Aldactone until we see more renal recovery  Encourage increase oral hydration/intake  IV antibiotics as per primary team  On calcitriol-> watch for hypercalcemia  Avoid nephrotoxins    Discharge planning-> follow up with Dr. FIDE Ryder in 3-4wks    Mikhail Ryder MD  [facility]

## 2025-04-06 LAB
BACTERIA SPEC CULT: NORMAL
SERVICE CMNT-IMP: NORMAL

## 2025-04-06 PROCEDURE — 87324 CLOSTRIDIUM AG IA: CPT

## 2025-04-06 PROCEDURE — 87449 NOS EACH ORGANISM AG IA: CPT

## 2025-04-07 ENCOUNTER — HOSPITAL ENCOUNTER (INPATIENT)
Facility: HOSPITAL | Age: 65
LOS: 2 days | Discharge: NURSING, REHAB OR LONG-TERM CARE | DRG: 683 | End: 2025-04-09
Attending: INTERNAL MEDICINE | Admitting: INTERNAL MEDICINE
Payer: MEDICARE

## 2025-04-07 ENCOUNTER — APPOINTMENT (OUTPATIENT)
Facility: HOSPITAL | Age: 65
DRG: 683 | End: 2025-04-07
Payer: MEDICARE

## 2025-04-07 ENCOUNTER — HOSPITAL ENCOUNTER (OUTPATIENT)
Facility: HOSPITAL | Age: 65
Setting detail: SPECIMEN
Discharge: HOME | End: 2025-04-10

## 2025-04-07 DIAGNOSIS — E87.5 HYPERKALEMIA: ICD-10-CM

## 2025-04-07 DIAGNOSIS — R41.0 DELIRIUM: ICD-10-CM

## 2025-04-07 DIAGNOSIS — N17.9 AKI (ACUTE KIDNEY INJURY): Primary | ICD-10-CM

## 2025-04-07 PROBLEM — G93.41 ACUTE METABOLIC ENCEPHALOPATHY: Status: ACTIVE | Noted: 2025-04-07

## 2025-04-07 LAB
ALBUMIN SERPL-MCNC: 1.5 G/DL (ref 3.5–5)
ALBUMIN/GLOB SERPL: 0.3 (ref 1.1–2.2)
ALP SERPL-CCNC: 64 U/L (ref 45–117)
ALT SERPL-CCNC: 18 U/L (ref 12–78)
AMMONIA PLAS-SCNC: 19 UMOL/L
ANION GAP SERPL CALC-SCNC: 4 MMOL/L (ref 2–12)
ANION GAP SERPL CALC-SCNC: 5 MMOL/L (ref 2–12)
APPEARANCE UR: ABNORMAL
AST SERPL W P-5'-P-CCNC: 57 U/L (ref 15–37)
BACTERIA URNS QL MICRO: NEGATIVE /HPF
BASOPHILS # BLD: 0.06 K/UL (ref 0–0.1)
BASOPHILS NFR BLD: 0.9 % (ref 0–1)
BILIRUB SERPL-MCNC: 0.8 MG/DL (ref 0.2–1)
BILIRUB UR QL: NEGATIVE
BUN SERPL-MCNC: 47 MG/DL (ref 6–20)
BUN SERPL-MCNC: 48 MG/DL (ref 6–20)
BUN/CREAT SERPL: 21 (ref 12–20)
BUN/CREAT SERPL: 22 (ref 12–20)
C DIFF GDH STL QL: NEGATIVE
C DIFF GDH STL QL: NEGATIVE
C DIFF TOX A+B STL QL IA: NEGATIVE
C DIFF TOX A+B STL QL IA: NEGATIVE
C DIFF TOXIN INTERPRETATION: NORMAL
C DIFF TOXIN INTERPRETATION: NORMAL
CA-I BLD-MCNC: 8.6 MG/DL (ref 8.5–10.1)
CA-I BLD-MCNC: 8.8 MG/DL (ref 8.5–10.1)
CHLORIDE SERPL-SCNC: 103 MMOL/L (ref 97–108)
CHLORIDE SERPL-SCNC: 104 MMOL/L (ref 97–108)
CK SERPL-CCNC: 34 U/L (ref 26–192)
CO2 SERPL-SCNC: 23 MMOL/L (ref 21–32)
CO2 SERPL-SCNC: 29 MMOL/L (ref 21–32)
COLOR UR: ABNORMAL
CREAT SERPL-MCNC: 2.16 MG/DL (ref 0.55–1.02)
CREAT SERPL-MCNC: 2.24 MG/DL (ref 0.55–1.02)
DIFFERENTIAL METHOD BLD: ABNORMAL
EOSINOPHIL # BLD: 0.15 K/UL (ref 0–0.4)
EOSINOPHIL NFR BLD: 2.3 % (ref 0–7)
EPITH CASTS URNS QL MICRO: ABNORMAL /LPF
ERYTHROCYTE [DISTWIDTH] IN BLOOD BY AUTOMATED COUNT: 16.4 % (ref 11.5–14.5)
ERYTHROCYTE [DISTWIDTH] IN BLOOD BY AUTOMATED COUNT: 16.5 % (ref 11.5–14.5)
FLUAV RNA SPEC QL NAA+PROBE: NOT DETECTED
FLUBV RNA SPEC QL NAA+PROBE: NOT DETECTED
GLOBULIN SER CALC-MCNC: 5.3 G/DL (ref 2–4)
GLUCOSE BLD STRIP.AUTO-MCNC: 72 MG/DL (ref 65–100)
GLUCOSE BLD STRIP.AUTO-MCNC: 84 MG/DL (ref 65–100)
GLUCOSE SERPL-MCNC: 61 MG/DL (ref 65–100)
GLUCOSE SERPL-MCNC: 66 MG/DL (ref 65–100)
GLUCOSE UR STRIP.AUTO-MCNC: NEGATIVE MG/DL
HCT VFR BLD AUTO: 32.4 % (ref 35–47)
HCT VFR BLD AUTO: 32.8 % (ref 35–47)
HGB BLD-MCNC: 9.6 G/DL (ref 11.5–16)
HGB BLD-MCNC: 9.8 G/DL (ref 11.5–16)
HGB UR QL STRIP: NEGATIVE
IMM GRANULOCYTES # BLD AUTO: 0.04 K/UL (ref 0–0.04)
IMM GRANULOCYTES NFR BLD AUTO: 0.6 % (ref 0–0.5)
KETONES UR QL STRIP.AUTO: NEGATIVE MG/DL
LACTATE SERPL-SCNC: 1.4 MMOL/L (ref 0.4–2)
LEUKOCYTE ESTERASE UR QL STRIP.AUTO: ABNORMAL
LIPASE SERPL-CCNC: 19 U/L (ref 13–75)
LYMPHOCYTES # BLD: 0.64 K/UL (ref 0.8–3.5)
LYMPHOCYTES NFR BLD: 9.8 % (ref 12–49)
MCH RBC QN AUTO: 28.1 PG (ref 26–34)
MCH RBC QN AUTO: 28.1 PG (ref 26–34)
MCHC RBC AUTO-ENTMCNC: 29.6 G/DL (ref 30–36.5)
MCHC RBC AUTO-ENTMCNC: 29.9 G/DL (ref 30–36.5)
MCV RBC AUTO: 94 FL (ref 80–99)
MCV RBC AUTO: 94.7 FL (ref 80–99)
MONOCYTES # BLD: 0.56 K/UL (ref 0–1)
MONOCYTES NFR BLD: 8.6 % (ref 5–13)
NEUTS SEG # BLD: 5.07 K/UL (ref 1.8–8)
NEUTS SEG NFR BLD: 77.8 % (ref 32–75)
NITRITE UR QL STRIP.AUTO: NEGATIVE
NRBC # BLD: 0 K/UL (ref 0–0.01)
NRBC # BLD: 0 K/UL (ref 0–0.01)
NRBC BLD-RTO: 0 PER 100 WBC
NRBC BLD-RTO: 0 PER 100 WBC
PERFORMED BY:: NORMAL
PERFORMED BY:: NORMAL
PH UR STRIP: 5 (ref 5–8)
PLATELET # BLD AUTO: 232 K/UL (ref 150–400)
PLATELET # BLD AUTO: 235 K/UL (ref 150–400)
PMV BLD AUTO: 11 FL (ref 8.9–12.9)
PMV BLD AUTO: 11.4 FL (ref 8.9–12.9)
POTASSIUM SERPL-SCNC: 4.4 MMOL/L (ref 3.5–5.1)
POTASSIUM SERPL-SCNC: 6 MMOL/L (ref 3.5–5.1)
PROT SERPL-MCNC: 6.8 G/DL (ref 6.4–8.2)
PROT UR STRIP-MCNC: NEGATIVE MG/DL
RBC # BLD AUTO: 3.42 M/UL (ref 3.8–5.2)
RBC # BLD AUTO: 3.49 M/UL (ref 3.8–5.2)
RBC #/AREA URNS HPF: ABNORMAL /HPF (ref 0–5)
SARS-COV-2 RNA RESP QL NAA+PROBE: NOT DETECTED
SODIUM SERPL-SCNC: 131 MMOL/L (ref 136–145)
SODIUM SERPL-SCNC: 137 MMOL/L (ref 136–145)
SP GR UR REFRACTOMETRY: 1.01 (ref 1–1.03)
URINE CULTURE IF INDICATED: ABNORMAL
UROBILINOGEN UR QL STRIP.AUTO: 0.1 EU/DL (ref 0.1–1)
WBC # BLD AUTO: 6 K/UL (ref 3.6–11)
WBC # BLD AUTO: 6.5 K/UL (ref 3.6–11)
WBC CASTS URNS QL MICRO: PRESENT
WBC URNS QL MICRO: ABNORMAL /HPF (ref 0–4)
YEAST URNS QL MICRO: ABNORMAL

## 2025-04-07 PROCEDURE — 87324 CLOSTRIDIUM AG IA: CPT

## 2025-04-07 PROCEDURE — 6370000000 HC RX 637 (ALT 250 FOR IP): Performed by: NURSE PRACTITIONER

## 2025-04-07 PROCEDURE — 6360000002 HC RX W HCPCS: Performed by: INTERNAL MEDICINE

## 2025-04-07 PROCEDURE — 85027 COMPLETE CBC AUTOMATED: CPT

## 2025-04-07 PROCEDURE — 71045 X-RAY EXAM CHEST 1 VIEW: CPT

## 2025-04-07 PROCEDURE — 96361 HYDRATE IV INFUSION ADD-ON: CPT

## 2025-04-07 PROCEDURE — 85025 COMPLETE CBC W/AUTO DIFF WBC: CPT

## 2025-04-07 PROCEDURE — 80053 COMPREHEN METABOLIC PANEL: CPT

## 2025-04-07 PROCEDURE — 1100000000 HC RM PRIVATE

## 2025-04-07 PROCEDURE — 6370000000 HC RX 637 (ALT 250 FOR IP): Performed by: INTERNAL MEDICINE

## 2025-04-07 PROCEDURE — 93005 ELECTROCARDIOGRAM TRACING: CPT | Performed by: NURSE PRACTITIONER

## 2025-04-07 PROCEDURE — 96360 HYDRATION IV INFUSION INIT: CPT

## 2025-04-07 PROCEDURE — 2580000003 HC RX 258: Performed by: NURSE PRACTITIONER

## 2025-04-07 PROCEDURE — 80048 BASIC METABOLIC PNL TOTAL CA: CPT

## 2025-04-07 PROCEDURE — 87449 NOS EACH ORGANISM AG IA: CPT

## 2025-04-07 PROCEDURE — 87040 BLOOD CULTURE FOR BACTERIA: CPT

## 2025-04-07 PROCEDURE — 83605 ASSAY OF LACTIC ACID: CPT

## 2025-04-07 PROCEDURE — 82550 ASSAY OF CK (CPK): CPT

## 2025-04-07 PROCEDURE — 87154 CUL TYP ID BLD PTHGN 6+ TRGT: CPT

## 2025-04-07 PROCEDURE — 82140 ASSAY OF AMMONIA: CPT

## 2025-04-07 PROCEDURE — 82962 GLUCOSE BLOOD TEST: CPT

## 2025-04-07 PROCEDURE — 87636 SARSCOV2 & INF A&B AMP PRB: CPT

## 2025-04-07 PROCEDURE — 81001 URINALYSIS AUTO W/SCOPE: CPT

## 2025-04-07 PROCEDURE — 2500000003 HC RX 250 WO HCPCS: Performed by: INTERNAL MEDICINE

## 2025-04-07 PROCEDURE — 36415 COLL VENOUS BLD VENIPUNCTURE: CPT

## 2025-04-07 PROCEDURE — 83690 ASSAY OF LIPASE: CPT

## 2025-04-07 PROCEDURE — 99285 EMERGENCY DEPT VISIT HI MDM: CPT

## 2025-04-07 PROCEDURE — 70450 CT HEAD/BRAIN W/O DYE: CPT

## 2025-04-07 PROCEDURE — 2580000003 HC RX 258: Performed by: INTERNAL MEDICINE

## 2025-04-07 PROCEDURE — 94761 N-INVAS EAR/PLS OXIMETRY MLT: CPT

## 2025-04-07 RX ORDER — SODIUM CHLORIDE 9 MG/ML
INJECTION, SOLUTION INTRAVENOUS CONTINUOUS
Status: DISPENSED | OUTPATIENT
Start: 2025-04-07 | End: 2025-04-08

## 2025-04-07 RX ORDER — ACETAMINOPHEN 650 MG/1
650 SUPPOSITORY RECTAL EVERY 6 HOURS PRN
Status: DISCONTINUED | OUTPATIENT
Start: 2025-04-07 | End: 2025-04-09 | Stop reason: HOSPADM

## 2025-04-07 RX ORDER — 0.9 % SODIUM CHLORIDE 0.9 %
1000 INTRAVENOUS SOLUTION INTRAVENOUS ONCE
Status: COMPLETED | OUTPATIENT
Start: 2025-04-07 | End: 2025-04-07

## 2025-04-07 RX ORDER — SODIUM CHLORIDE 0.9 % (FLUSH) 0.9 %
5-40 SYRINGE (ML) INJECTION PRN
Status: DISCONTINUED | OUTPATIENT
Start: 2025-04-07 | End: 2025-04-09 | Stop reason: HOSPADM

## 2025-04-07 RX ORDER — INSULIN LISPRO 100 [IU]/ML
0-4 INJECTION, SOLUTION INTRAVENOUS; SUBCUTANEOUS
Status: DISCONTINUED | OUTPATIENT
Start: 2025-04-07 | End: 2025-04-09 | Stop reason: HOSPADM

## 2025-04-07 RX ORDER — POLYETHYLENE GLYCOL 3350 17 G/17G
17 POWDER, FOR SOLUTION ORAL DAILY PRN
Status: DISCONTINUED | OUTPATIENT
Start: 2025-04-07 | End: 2025-04-09 | Stop reason: HOSPADM

## 2025-04-07 RX ORDER — SODIUM CHLORIDE 0.9 % (FLUSH) 0.9 %
5-40 SYRINGE (ML) INJECTION EVERY 12 HOURS SCHEDULED
Status: DISCONTINUED | OUTPATIENT
Start: 2025-04-07 | End: 2025-04-09 | Stop reason: HOSPADM

## 2025-04-07 RX ORDER — ONDANSETRON 4 MG/1
4 TABLET, ORALLY DISINTEGRATING ORAL EVERY 8 HOURS PRN
Status: DISCONTINUED | OUTPATIENT
Start: 2025-04-07 | End: 2025-04-09 | Stop reason: HOSPADM

## 2025-04-07 RX ORDER — ONDANSETRON 2 MG/ML
4 INJECTION INTRAMUSCULAR; INTRAVENOUS EVERY 6 HOURS PRN
Status: DISCONTINUED | OUTPATIENT
Start: 2025-04-07 | End: 2025-04-09 | Stop reason: HOSPADM

## 2025-04-07 RX ORDER — ACETAMINOPHEN 325 MG/1
650 TABLET ORAL EVERY 6 HOURS PRN
Status: DISCONTINUED | OUTPATIENT
Start: 2025-04-07 | End: 2025-04-09 | Stop reason: HOSPADM

## 2025-04-07 RX ORDER — SODIUM CHLORIDE 9 MG/ML
INJECTION, SOLUTION INTRAVENOUS PRN
Status: DISCONTINUED | OUTPATIENT
Start: 2025-04-07 | End: 2025-04-09 | Stop reason: HOSPADM

## 2025-04-07 RX ORDER — DEXTROSE MONOHYDRATE 100 MG/ML
INJECTION, SOLUTION INTRAVENOUS CONTINUOUS PRN
Status: DISCONTINUED | OUTPATIENT
Start: 2025-04-07 | End: 2025-04-09 | Stop reason: HOSPADM

## 2025-04-07 RX ORDER — ENOXAPARIN SODIUM 100 MG/ML
30 INJECTION SUBCUTANEOUS EVERY 24 HOURS
Status: DISCONTINUED | OUTPATIENT
Start: 2025-04-07 | End: 2025-04-09 | Stop reason: HOSPADM

## 2025-04-07 RX ORDER — GLUCAGON 1 MG/ML
1 KIT INJECTION PRN
Status: DISCONTINUED | OUTPATIENT
Start: 2025-04-07 | End: 2025-04-09 | Stop reason: HOSPADM

## 2025-04-07 RX ADMIN — SODIUM CHLORIDE 1000 ML: 9 INJECTION, SOLUTION INTRAVENOUS at 16:00

## 2025-04-07 RX ADMIN — SODIUM CHLORIDE: 0.9 INJECTION, SOLUTION INTRAVENOUS at 20:58

## 2025-04-07 RX ADMIN — SODIUM ZIRCONIUM CYCLOSILICATE 10 G: 10 POWDER, FOR SUSPENSION ORAL at 15:40

## 2025-04-07 RX ADMIN — ENOXAPARIN SODIUM 30 MG: 100 INJECTION SUBCUTANEOUS at 21:01

## 2025-04-07 RX ADMIN — SODIUM CHLORIDE, PRESERVATIVE FREE 10 ML: 5 INJECTION INTRAVENOUS at 20:56

## 2025-04-07 RX ADMIN — Medication 16 G: at 20:55

## 2025-04-07 ASSESSMENT — PAIN SCALES - GENERAL
PAINLEVEL_OUTOF10: 0
PAINLEVEL_OUTOF10: 10

## 2025-04-07 ASSESSMENT — PAIN - FUNCTIONAL ASSESSMENT: PAIN_FUNCTIONAL_ASSESSMENT: 0-10

## 2025-04-07 NOTE — H&P
Hypertension     Hypertension, diastolic, benign     Hypoalbuminemia     Peripheral vascular disease     Presence of stent in coronary artery in patient with coronary artery disease     S/P CABG x 3     Type 2 diabetes mellitus with hyperglycemia, with long-term current use of insulin (HCC)     Unspecified sleep apnea     no cpap repeat study sched for 9/20/13        Past Surgical History:   Procedure Laterality Date    ANTERIOR CERVICAL DISCECTOMY W/ FUSION  2003    ANTERIOR CERVICAL FUSION INSTRUMENTATION    BACK SURGERY  12/09/2022    Back surgery/plate in disc 3 -disc 6    CARDIAC CATHETERIZATION  1/2021, 2/2021    with stents placed.     CERVICAL LAMINECTOMY      C3 and C4    CHOLECYSTECTOMY  06/2012    CORONARY ANGIOPLASTY WITH STENT PLACEMENT  06/2020    CORONARY ARTERY BYPASS GRAFT  2009    triple bypass    ECHO 2D ADULT  04/2010    LVEF 45%, akinetic apex and septum.  HK mid-anterior wall    ECHO 2D ADULT  06/27/2012    LVEF about 50%.  There is possible HK of distal ant/septal wall and apical walls (technically difficult study).      FOOT DEBRIDEMENT Left 11/5/2024    LEFT FOOT PREP WOUND, APPLY GRAFT performed by Irma Hernandez DPM at [facility] MAIN OR    HAND TENDON SURGERY  10/2019    hand tendon repair    HYSTERECTOMY (CERVIX STATUS UNKNOWN)  2005    IR TUNNELED CVC PLACE WO SQ PORT/PUMP > 5 YEARS  3/31/2025    IR TUNNELED CVC PLACE WO SQ PORT/PUMP > 5 YEARS 3/31/2025 Leah Carey, ILIA - NP [facility] RAD ANGIO IR    SHOULDER ARTHROSCOPY Right 2008       Prior to Admission medications    Medication Sig Start Date End Date Taking? Authorizing Provider   insulin glargine (LANTUS) 100 UNIT/ML injection vial Inject 15 Units into the skin daily 4/1/25   Seamus Rodriguez MD   insulin lispro (HUMALOG,ADMELOG) 100 UNIT/ML SOLN injection vial Inject 0-8 Units into the skin 4 times daily (before meals and nightly) 3/31/25   Seamus Rodriguez MD   carvedilol (COREG) 6.25 MG tablet Take 1 tablet by mouth 2 times daily 3/31/25  bolus **OR** dextrose bolus, glucagon (rDNA), dextrose       CODE STATUS: FULL   DVT PPX: lovenox  Social determinants of health: none  Home medications were reviewed    Signed By: James Seipp, DO     April 7, 2025

## 2025-04-07 NOTE — ED PROVIDER NOTES
[facility] EMERGENCY DEPT  EMERGENCY DEPARTMENT HISTORY AND PHYSICAL EXAM      Date of evaluation: 4/7/2025  Patient Name: Nicci Hernandez  Birthdate 1960  MRN: [id number]  ED Provider: ILIA Rhodes CNP   Note Started: 6:17 PM EDT 4/7/25    HISTORY OF PRESENT ILLNESS     Chief Complaint   Patient presents with    Altered Mental Status       History Provided By: Patient,  physician from [facility], daughter      HPI: Nicci Hernandez is a 64 y.o. female presents to the emergency department chief complaint of altered mental status.  Per chart review patient was brought by EMS from [facility] due to altered mental status.  The patient is able to tell me that \"they said that I am not waking up enough\" she does have an open area on the left foot just under the great toe on the plantar surface with black wound bed, no drainage noted, there is a smaller open area on the lateral aspect of the left foot also without drainage, black wound bed.  She has noted to have upper arm midline as well as right upper chest wall double-lumen PICC line in place.  She has been on antibiotics for MRSA in the left foot wound.  Patient is unable to tell me if she is ambulatory, per EMS report she has bed bound.  She is able to tell me her name, birthday, she initially thought that she was at [facility] but was easily redirectable, she was able to tell me the year.  She is overall weak however  strength is equal bilaterally, pupils are equally round and reactive to light and accommodation, cranial nerve I through XII intact.  No sensory or motor deficit was noted.  She states that she \"feels like there is an elephant sitting on me\" when I asked if she was talking about her chest, abdomen, or more specific area of the body she stated \"just all over\"    PAST MEDICAL HISTORY   Past Medical History:  Past Medical History:   Diagnosis Date    Arthritis     Bilateral lower leg cellulitis     CAD (coronary artery disease) 2009    Hx of

## 2025-04-07 NOTE — ED TRIAGE NOTES
EMS called to pt coming from [facility]. Increased AMS, pt has MRSA on left foot. Pt had abx, bed bound. BG was 92 with EMS.

## 2025-04-07 NOTE — ED NOTES
IV infiltrated. HCP notified. No new orders received.   Writer asked if midline or extended dwell IV could be used, verbal orders received that it is okay to utilize at this time.

## 2025-04-08 LAB
ACCESSION NUMBER, LLC1M: NORMAL
ACINETOBACTER CALCOAC BAUMANNII COMPLEX BY PCR: NOT DETECTED
ALBUMIN SERPL-MCNC: 1.9 G/DL (ref 3.5–5)
ALBUMIN/GLOB SERPL: 0.4 (ref 1.1–2.2)
ALP SERPL-CCNC: 69 U/L (ref 45–117)
ALT SERPL-CCNC: 16 U/L (ref 12–78)
ANION GAP SERPL CALC-SCNC: 5 MMOL/L (ref 2–12)
ANION GAP SERPL CALC-SCNC: 7 MMOL/L (ref 2–12)
AST SERPL W P-5'-P-CCNC: 28 U/L (ref 15–37)
BACTEROIDES FRAGILIS BY PCR: NOT DETECTED
BASOPHILS # BLD: 0.06 K/UL (ref 0–0.1)
BASOPHILS NFR BLD: 0.9 % (ref 0–1)
BILIRUB SERPL-MCNC: 0.6 MG/DL (ref 0.2–1)
BIOFIRE TEST COMMENT: NORMAL
BUN SERPL-MCNC: 43 MG/DL (ref 6–20)
BUN SERPL-MCNC: 44 MG/DL (ref 6–20)
BUN/CREAT SERPL: 20 (ref 12–20)
BUN/CREAT SERPL: 20 (ref 12–20)
CA-I BLD-MCNC: 8.7 MG/DL (ref 8.5–10.1)
CA-I BLD-MCNC: 8.9 MG/DL (ref 8.5–10.1)
CANDIDA ALBICANS BY PCR: NOT DETECTED
CANDIDA AURIS BY PCR: NOT DETECTED
CANDIDA GLABRATA: NOT DETECTED
CANDIDA KRUSEI BY PCR: NOT DETECTED
CANDIDA PARAPSILOSIS BY PCR: NOT DETECTED
CANDIDA TROPICALIS BY PCR: NOT DETECTED
CHLORIDE SERPL-SCNC: 104 MMOL/L (ref 97–108)
CHLORIDE SERPL-SCNC: 104 MMOL/L (ref 97–108)
CHLORIDE UR-SCNC: 10 MMOL/L
CK SERPL-CCNC: 37 U/L (ref 26–192)
CO2 SERPL-SCNC: 27 MMOL/L (ref 21–32)
CO2 SERPL-SCNC: 28 MMOL/L (ref 21–32)
CREAT SERPL-MCNC: 2.13 MG/DL (ref 0.55–1.02)
CREAT SERPL-MCNC: 2.19 MG/DL (ref 0.55–1.02)
CREAT UR-MCNC: 62 MG/DL
CRYPTOCOCCUS NEOFORMANS/GATTII BY PCR: NOT DETECTED
DIFFERENTIAL METHOD BLD: ABNORMAL
EKG ATRIAL RATE: 65 BPM
EKG DIAGNOSIS: NORMAL
EKG P AXIS: 51 DEGREES
EKG P-R INTERVAL: 186 MS
EKG Q-T INTERVAL: 486 MS
EKG QRS DURATION: 142 MS
EKG QTC CALCULATION (BAZETT): 505 MS
EKG R AXIS: -43 DEGREES
EKG T AXIS: 151 DEGREES
EKG VENTRICULAR RATE: 65 BPM
ENTEROBACTER CLOACAE COMPLEX BY PCR: NOT DETECTED
ENTEROBACTERALES BY PCR: NOT DETECTED
ENTEROCOCCUS FAECALIS BY PCR: NOT DETECTED
ENTEROCOCCUS FAECIUM BY PCR: NOT DETECTED
EOSINOPHIL # BLD: 0.19 K/UL (ref 0–0.4)
EOSINOPHIL NFR BLD: 2.7 % (ref 0–7)
ERYTHROCYTE [DISTWIDTH] IN BLOOD BY AUTOMATED COUNT: 16.8 % (ref 11.5–14.5)
ESCHERICHIA COLI: NOT DETECTED
GLOBULIN SER CALC-MCNC: 4.3 G/DL (ref 2–4)
GLUCOSE BLD STRIP.AUTO-MCNC: 116 MG/DL (ref 65–100)
GLUCOSE BLD STRIP.AUTO-MCNC: 128 MG/DL (ref 65–100)
GLUCOSE BLD STRIP.AUTO-MCNC: 76 MG/DL (ref 65–100)
GLUCOSE BLD STRIP.AUTO-MCNC: 77 MG/DL (ref 65–100)
GLUCOSE SERPL-MCNC: 64 MG/DL (ref 65–100)
GLUCOSE SERPL-MCNC: 67 MG/DL (ref 65–100)
HAEM INFLU DNA BLD POS QL NAA+NON-PROBE: NOT DETECTED
HCT VFR BLD AUTO: 23.7 % (ref 35–47)
HGB BLD-MCNC: 9.9 G/DL (ref 11.5–16)
KLEBSIELLA AEROGENES BY PCR: NOT DETECTED
KLEBSIELLA OXYTOCA BY PCR: NOT DETECTED
KLEBSIELLA PNEUMONIAE GROUP BY PCR: NOT DETECTED
LACTATE SERPL-SCNC: 1.3 MMOL/L (ref 0.4–2)
LISTERIA MONOCYTOGENES BY PCR: NOT DETECTED
LYMPHOCYTES # BLD: 0.54 K/UL (ref 0.8–3.5)
LYMPHOCYTES NFR BLD: 7.7 % (ref 12–49)
MAGNESIUM SERPL-MCNC: 2.2 MG/DL (ref 1.6–2.4)
MCH RBC QN AUTO: 28.5 PG (ref 26–34)
MCHC RBC AUTO-ENTMCNC: 30.3 G/DL (ref 30–36.5)
MCV RBC AUTO: 94.2 FL (ref 80–99)
MONOCYTES # BLD: 0.45 K/UL (ref 0–1)
MONOCYTES NFR BLD: 6.5 % (ref 5–13)
NEISSERIA MENINGITIDIS BY PCR: NOT DETECTED
NEUTS SEG # BLD: 5.66 K/UL (ref 1.8–8)
NEUTS SEG NFR BLD: 81.2 % (ref 32–75)
NRBC BLD-RTO: 0 PER 100 WBC
PERFORMED BY:: ABNORMAL
PERFORMED BY:: ABNORMAL
PERFORMED BY:: NORMAL
PERFORMED BY:: NORMAL
PLATELET # BLD AUTO: 261 K/UL (ref 150–400)
PMV BLD AUTO: 11.4 FL (ref 8.9–12.9)
POTASSIUM SERPL-SCNC: 4.4 MMOL/L (ref 3.5–5.1)
POTASSIUM SERPL-SCNC: 4.5 MMOL/L (ref 3.5–5.1)
PROT SERPL-MCNC: 6.2 G/DL (ref 6.4–8.2)
PROT UR-MCNC: 54 MG/DL (ref 0–11.9)
PROT/CREAT UR-RTO: 0.9
PROTEUS BY PCR: NOT DETECTED
PSEUDOMONAS AERUGINOSA, PSAEP: NOT DETECTED
RBC # BLD AUTO: 3.47 M/UL (ref 3.8–5.2)
RESISTANT GENE TARGETS: NORMAL
SALMONELLA DNA BLD POS QL NAA+NON-PROBE: NOT DETECTED
SERRATIA MARCESCENS BY PCR: NOT DETECTED
SODIUM SERPL-SCNC: 137 MMOL/L (ref 136–145)
SODIUM SERPL-SCNC: 138 MMOL/L (ref 136–145)
SODIUM UR-SCNC: 24 MMOL/L
STAPHYLOCOCCUS AUREUS: NOT DETECTED
STAPHYLOCOCCUS EPIDERMIDIS BY PCR: NOT DETECTED
STAPHYLOCOCCUS LUGDUNENSIS BY PCR: NOT DETECTED
STAPHYLOCOCCUS: NOT DETECTED
STENOTROPHOMONAS MALTOPHILIA BY PCR: NOT DETECTED
STREPTOCOCCUS AGALACTIAE (GROUP B): NOT DETECTED
STREPTOCOCCUS PNEUMONIAE , SPNP: NOT DETECTED
STREPTOCOCCUS PYOGENES (GROUP A), SPYOP: NOT DETECTED
STREPTOCOCCUS: NOT DETECTED
WBC # BLD AUTO: 7 K/UL (ref 3.6–11)

## 2025-04-08 PROCEDURE — 83605 ASSAY OF LACTIC ACID: CPT

## 2025-04-08 PROCEDURE — 83735 ASSAY OF MAGNESIUM: CPT

## 2025-04-08 PROCEDURE — 82550 ASSAY OF CK (CPK): CPT

## 2025-04-08 PROCEDURE — 2500000003 HC RX 250 WO HCPCS: Performed by: INTERNAL MEDICINE

## 2025-04-08 PROCEDURE — 84156 ASSAY OF PROTEIN URINE: CPT

## 2025-04-08 PROCEDURE — 6360000002 HC RX W HCPCS: Performed by: INTERNAL MEDICINE

## 2025-04-08 PROCEDURE — 36415 COLL VENOUS BLD VENIPUNCTURE: CPT

## 2025-04-08 PROCEDURE — 80053 COMPREHEN METABOLIC PANEL: CPT

## 2025-04-08 PROCEDURE — 85025 COMPLETE CBC W/AUTO DIFF WBC: CPT

## 2025-04-08 PROCEDURE — 82962 GLUCOSE BLOOD TEST: CPT

## 2025-04-08 PROCEDURE — 80048 BASIC METABOLIC PNL TOTAL CA: CPT

## 2025-04-08 PROCEDURE — 1100000000 HC RM PRIVATE

## 2025-04-08 PROCEDURE — 6360000002 HC RX W HCPCS

## 2025-04-08 PROCEDURE — 84300 ASSAY OF URINE SODIUM: CPT

## 2025-04-08 PROCEDURE — 82436 ASSAY OF URINE CHLORIDE: CPT

## 2025-04-08 PROCEDURE — 2700000000 HC OXYGEN THERAPY PER DAY

## 2025-04-08 PROCEDURE — 2580000003 HC RX 258: Performed by: INTERNAL MEDICINE

## 2025-04-08 PROCEDURE — 82570 ASSAY OF URINE CREATININE: CPT

## 2025-04-08 PROCEDURE — 6370000000 HC RX 637 (ALT 250 FOR IP): Performed by: INTERNAL MEDICINE

## 2025-04-08 PROCEDURE — 94761 N-INVAS EAR/PLS OXIMETRY MLT: CPT

## 2025-04-08 RX ORDER — CLOPIDOGREL BISULFATE 75 MG/1
75 TABLET ORAL DAILY
Status: DISCONTINUED | OUTPATIENT
Start: 2025-04-08 | End: 2025-04-09 | Stop reason: HOSPADM

## 2025-04-08 RX ORDER — ASPIRIN 81 MG/1
81 TABLET, CHEWABLE ORAL DAILY
Status: DISCONTINUED | OUTPATIENT
Start: 2025-04-08 | End: 2025-04-09 | Stop reason: HOSPADM

## 2025-04-08 RX ORDER — CALCITRIOL 0.25 UG/1
0.25 CAPSULE, LIQUID FILLED ORAL DAILY
Status: DISCONTINUED | OUTPATIENT
Start: 2025-04-08 | End: 2025-04-09 | Stop reason: HOSPADM

## 2025-04-08 RX ORDER — FERROUS SULFATE 325(65) MG
325 TABLET ORAL
Status: DISCONTINUED | OUTPATIENT
Start: 2025-04-09 | End: 2025-04-09 | Stop reason: HOSPADM

## 2025-04-08 RX ORDER — ALBUTEROL SULFATE 90 UG/1
2 INHALANT RESPIRATORY (INHALATION) 4 TIMES DAILY PRN
Status: DISCONTINUED | OUTPATIENT
Start: 2025-04-08 | End: 2025-04-09 | Stop reason: HOSPADM

## 2025-04-08 RX ORDER — INSULIN GLARGINE 100 [IU]/ML
15 INJECTION, SOLUTION SUBCUTANEOUS DAILY
Status: DISCONTINUED | OUTPATIENT
Start: 2025-04-08 | End: 2025-04-09 | Stop reason: HOSPADM

## 2025-04-08 RX ORDER — LANOLIN ALCOHOL/MO/W.PET/CERES
100 CREAM (GRAM) TOPICAL DAILY
Status: DISCONTINUED | OUTPATIENT
Start: 2025-04-08 | End: 2025-04-09 | Stop reason: HOSPADM

## 2025-04-08 RX ORDER — CARVEDILOL 3.12 MG/1
6.25 TABLET ORAL 2 TIMES DAILY
Status: DISCONTINUED | OUTPATIENT
Start: 2025-04-08 | End: 2025-04-09 | Stop reason: HOSPADM

## 2025-04-08 RX ADMIN — EPOETIN ALFA-EPBX 10000 UNITS: 10000 INJECTION, SOLUTION INTRAVENOUS; SUBCUTANEOUS at 16:49

## 2025-04-08 RX ADMIN — CALCITRIOL CAPSULES 0.25 MCG 0.25 MCG: 0.25 CAPSULE ORAL at 12:07

## 2025-04-08 RX ADMIN — SODIUM CHLORIDE, PRESERVATIVE FREE 10 ML: 5 INJECTION INTRAVENOUS at 21:52

## 2025-04-08 RX ADMIN — DAPTOMYCIN 700 MG: 500 INJECTION, POWDER, LYOPHILIZED, FOR SOLUTION INTRAVENOUS at 09:34

## 2025-04-08 RX ADMIN — ASPIRIN 81 MG: 81 TABLET, CHEWABLE ORAL at 12:07

## 2025-04-08 RX ADMIN — CARVEDILOL 6.25 MG: 3.12 TABLET, FILM COATED ORAL at 21:49

## 2025-04-08 RX ADMIN — CARVEDILOL 6.25 MG: 3.12 TABLET, FILM COATED ORAL at 12:08

## 2025-04-08 RX ADMIN — PYRIDOXINE HCL TAB 50 MG 100 MG: 50 TAB at 12:07

## 2025-04-08 RX ADMIN — SODIUM CHLORIDE, PRESERVATIVE FREE 10 ML: 5 INJECTION INTRAVENOUS at 08:26

## 2025-04-08 RX ADMIN — CLOPIDOGREL BISULFATE 75 MG: 75 TABLET, FILM COATED ORAL at 12:07

## 2025-04-08 RX ADMIN — SODIUM CHLORIDE: 0.9 INJECTION, SOLUTION INTRAVENOUS at 09:32

## 2025-04-08 RX ADMIN — ENOXAPARIN SODIUM 30 MG: 100 INJECTION SUBCUTANEOUS at 21:49

## 2025-04-08 ASSESSMENT — PAIN SCALES - GENERAL
PAINLEVEL_OUTOF10: 0

## 2025-04-08 NOTE — PROGRESS NOTES
0715: Bedside and Verbal shift change report given to Payton Flaherty RN (oncoming nurse) by Radha West RN (offgoing nurse). Report included the following information Nurse Handoff Report, Index, ED Encounter Summary, Adult Overview, Intake/Output, MAR, Recent Results, Cardiac Rhythm SR, Alarm Parameters, Quality Measures, and Neuro Assessment.     0800: Assessment completed. Pt repositioned in bed.     1000: Pt repositioned in bed.     1200: Reassessment completed. Pt repositioned in bed.     1400: Pt repositioned in bed.     1500: Notified rosalva Rojo RN, that same day surgery and PACU were called and they do not have anyone who can perform ultrasound guided IV. Pt is not candidate for midline as she has Nephrology consulted and is AL on CKD. rosalva Rojo RN, advised she will find someone to place ultrasound IV.     1600: Reassessment completed. Pt repositioned in bed.     1800: Pt repositioned in bed.     1926: Bedside and Verbal shift change report given to Obdulia Beach RN (oncoming nurse) by Payton Flaherty RN (offgoing nurse). Report included the following information Nurse Handoff Report, Index, ED Encounter Summary, Adult Overview, Intake/Output, MAR, Recent Results, Cardiac Rhythm SR, Alarm Parameters, Quality Measures, and Neuro Assessment.

## 2025-04-08 NOTE — ED NOTES
ED TO INPATIENT SBAR HANDOFF    Patient Name: Nicci Hernandez   Preferred Name: Nicci  : 1960  64 y.o.   Family/Caregiver Present: no   Code Status Order: Full Code  PO Status: NPO:No  Telemetry Order:   C-SSRS: Risk of Suicide: No Risk  Sitter no     Restraints:     Sepsis Risk Score      Situation  Chief Complaint   Patient presents with    Altered Mental Status     Brief Description of Patient's Condition: Came from [facility] for AMS. Pt drowsy but arousable to voice. Pt able to tell you name, , year, president, etc., but will randomly talk about things like \"finding my pen\" and \"I was telling my dad to stop using the microwave\". Incontinent x2.   Mental Status: disoriented and able to concentrate and follow conversation  [facility]  Imaging:   CT HEAD WO CONTRAST   Final Result   No acute intracranial process identified            Electronically signed by Arabella Koch      XR CHEST PORTABLE   Final Result   1. Enlarged cardiac silhouette, otherwise no acute disease            Electronically signed by Juan Thomas        Abnormal labs:   Abnormal Labs Reviewed   CBC WITH AUTO DIFFERENTIAL - Abnormal; Notable for the following components:       Result Value    RBC 3.42 (*)     Hemoglobin 9.6 (*)     Hematocrit 32.4 (*)     MCHC 29.6 (*)     RDW 16.5 (*)     Neutrophils % 77.8 (*)     Lymphocytes % 9.8 (*)     Immature Granulocytes % 0.6 (*)     Lymphocytes Absolute 0.64 (*)     All other components within normal limits   COMPREHENSIVE METABOLIC PANEL - Abnormal; Notable for the following components:    Sodium 131 (*)     Potassium 6.0 (*)     BUN 47 (*)     Creatinine 2.24 (*)     BUN/Creatinine Ratio 21 (*)     Est, Glom Filt Rate 24 (*)     AST 57 (*)     Albumin 1.5 (*)     Globulin 5.3 (*)     Albumin/Globulin Ratio 0.3 (*)     All other components within normal limits   URINALYSIS WITH REFLEX TO CULTURE - Abnormal; Notable for the following components:    Appearance Turbid  Oral    SpO2:  100% 100% 98%   Weight:       Height:         Deterioration Index (DI): Deterioration Index: 45.03  Deterioration Index (DI) Interventions Performed:    O2 Flow Rate: O2 Flow Rate (L/min): 2 L/min  O2 Device: O2 Device: Nasal cannula  Cardiac Rhythm:    Critical Lab Results: [unfilled]  Cultures: Cultures:Blood, Urine, Flu, and Covid  NIH Score: NIH     Active LDA's:   Extended Dwell Peripherial IV 03/21/25 Right Brachial (Active)     Active Central Lines:                          Active Wounds:    Active Osborne's:    Active Feeding Tubes:      Administered Medications:   Medications   sodium chloride flush 0.9 % injection 5-40 mL (10 mLs IntraVENous Given 4/7/25 2056)   sodium chloride flush 0.9 % injection 5-40 mL (has no administration in time range)   0.9 % sodium chloride infusion (has no administration in time range)   enoxaparin Sodium (LOVENOX) injection 30 mg (30 mg SubCUTAneous Given 4/7/25 2101)   ondansetron (ZOFRAN-ODT) disintegrating tablet 4 mg (has no administration in time range)     Or   ondansetron (ZOFRAN) injection 4 mg (has no administration in time range)   polyethylene glycol (GLYCOLAX) packet 17 g (has no administration in time range)   acetaminophen (TYLENOL) tablet 650 mg (has no administration in time range)     Or   acetaminophen (TYLENOL) suppository 650 mg (has no administration in time range)   0.9 % sodium chloride infusion ( IntraVENous New Bag 4/7/25 2058)   glucose chewable tablet 16 g (16 g Oral Given 4/7/25 2055)   dextrose bolus 10% 125 mL (has no administration in time range)     Or   dextrose bolus 10% 250 mL (has no administration in time range)   glucagon injection 1 mg (has no administration in time range)   dextrose 10 % infusion (has no administration in time range)   insulin lispro (HUMALOG,ADMELOG) injection vial 0-4 Units (0 Units SubCUTAneous Held 4/7/25 2056)   sodium zirconium cyclosilicate (LOKELMA) oral suspension 10 g (has no administration in time

## 2025-04-08 NOTE — PROGRESS NOTES
4 Eyes Skin Assessment     NAME:  Nicci Hernandez  YOB: 1960  MEDICAL RECORD NUMBER:  [id number]    The patient is being assessed for  Admission    I agree that at least one RN has performed a thorough Head to Toe Skin Assessment on the patient. ALL assessment sites listed below have been assessed.      Areas assessed by both nurses:    Head, Face, Ears, Shoulders, Back, Chest, Arms, Elbows, Hands, Sacrum. Buttock, Coccyx, Ischium, Legs. Feet and Heels, Under Medical Devices , and Other .        Does the Patient have a Wound? Yes wound(s) were present on assessment. LDA wound assessment was Initiated and completed by RN       Rupert Prevention initiated by RN: Yes  Wound Care Orders initiated by RN: Yes    Pressure Injury (Stage 3,4, Unstageable, DTI, NWPT, and Complex wounds) if present, place Wound referral order by RN under : Yes    New Ostomies, if present place, Ostomy referral order under : No     Nurse 1 eSignature: Electronically signed by Radha West RN on 4/8/25 at 2:46 AM EDT    **SHARE this note so that the co-signing nurse can place an eSignature**    Nurse 2 eSignature: Electronically signed by Aviva Flaherty RN on 4/8/25 at 08:00 EDT

## 2025-04-08 NOTE — PLAN OF CARE
Problem: Chronic Conditions and Co-morbidities  Goal: Patient's chronic conditions and co-morbidity symptoms are monitored and maintained or improved  4/8/2025 1540 by Aviva Flaherty RN  Outcome: Progressing  Flowsheets (Taken 4/8/2025 0800)  Care Plan - Patient's Chronic Conditions and Co-Morbidity Symptoms are Monitored and Maintained or Improved:   Monitor and assess patient's chronic conditions and comorbid symptoms for stability, deterioration, or improvement   Collaborate with multidisciplinary team to address chronic and comorbid conditions and prevent exacerbation or deterioration   Update acute care plan with appropriate goals if chronic or comorbid symptoms are exacerbated and prevent overall improvement and discharge  4/8/2025 0612 by Radha West RN  Outcome: Progressing     Problem: Discharge Planning  Goal: Discharge to home or other facility with appropriate resources  4/8/2025 1540 by Aviva Flaherty RN  Outcome: Progressing  Flowsheets (Taken 4/8/2025 0800)  Discharge to home or other facility with appropriate resources:   Identify barriers to discharge with patient and caregiver   Arrange for needed discharge resources and transportation as appropriate   Identify discharge learning needs (meds, wound care, etc)   Refer to discharge planning if patient needs post-hospital services based on physician order or complex needs related to functional status, cognitive ability or social support system  4/8/2025 0612 by Radha West, RN  Outcome: Progressing     Problem: Skin/Tissue Integrity  Goal: Skin integrity remains intact  Description: 1.  Monitor for areas of redness and/or skin breakdown  2.  Assess vascular access sites hourly  3.  Every 4-6 hours minimum:  Change oxygen saturation probe site  4.  Every 4-6 hours:  If on nasal continuous positive airway pressure, respiratory therapy assess nares and determine need for appliance change or resting period  4/8/2025 1540 by Aviva Flaherty

## 2025-04-08 NOTE — CARE COORDINATION
04/08/25 0049   Service Assessment   Patient Orientation Alert and Oriented   Cognition Alert   History Provided By Patient   Primary Caregiver Self   Support Systems Children;None   Patient's Healthcare Decision Maker is: Named in Scanned ACP Document   PCP Verified by CM Yes   Prior Functional Level Assistance with the following:;Bathing;Dressing;Toileting;Feeding;Cooking   Current Functional Level Assistance with the following:;Bathing;Dressing;Toileting;Feeding;Cooking   Can patient return to prior living arrangement Yes   Ability to make needs known: Poor   Family able to assist with home care needs: Yes   Would you like for me to discuss the discharge plan with any other family members/significant others, and if so, who? Yes   Social/Functional History   Lives With Alone   Type of Home House   Home Layout One level   Discharge Planning   Type of Residence Skilled Nursing Facility   Living Arrangements Other (Comment)  ([facility])   Current Services Prior To Admission Skilled Nursing Facility   Potential Assistance Needed Skilled Nursing Facility   Type of Home Care Services None   Patient expects to be discharged to: Skilled nursing facility   One/Two Story Residence One story     CM spoke with patient but was unable to get pertaining information d/t cognition. CM called daughter and was able to verify all demographics were correct.Daughter states that she wants her to go back to [facility] after cleared for dc. Choice letter complete; Desire to return back to [facility]    PCP: Duncan Gamez    Pharmacy: [facility]     Support from family     Will require medical transport prior to dc.    Lives in a house alone - one story house with ramp in the front of home    DME at home: shower chair, walker    No home o2

## 2025-04-08 NOTE — CONSULTS
NAME:  Nicci Hernandez   :   1960   MRN:   [id number]     ATTENDING: James Seipp, DO  PCP:  Franco Ross MD    Date/Time:  2025       Subjective:   REQUESTING PHYSICIAN:  Dr. Elmer Brady  REASON FOR CONSULT:   AL on CKD    History of presenting illness:    Nicci Hernandez is a 64-year-old female with past medical history including CKD, HTN, diabetes mellitus with neuropathy, CAD s/p CABG, CHF, cardiomyopathy, hx of stroke, gastroparesis, PVD, ODALYS, HLD, cervical fusion about 20 years ago who presented to the emergency room for evaluation of altered mental status. Patient currently undergoing rehab at [facility] after a prolonged hospitalization at [facility] for treatment bacteremia and retropharyngeal abscess secondary to cervical fusion hardware. Of note, previous hospitalization was complicated by AL due to RUTH.  Patient presented with a creatinine of 2.1 prompting a nephrology consultation.  Patient was seen and examined at bedside.  She is drowsy, confused, unable to provide any past medical history.  Medical history obtained from family member and medical records.  Currently appears comfortable, no acute distress.  On 2 L nasal cannula, swelling in extremities present.  Repeat labs show slightly improved creatinine of 2.1 today.  Chest x-ray without pulmonary edema.  Baseline creatinine is 1.6-1.7.   Torsemide and spironolactone noted on home med list. No recent exposure to IV contrast or hypotensive events noted.     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

## 2025-04-08 NOTE — PROGRESS NOTES
Hospitalist Progress Note               Daily Progress Note: 4/8/2025      Hospital Day: 2     Chief complaint:   Chief Complaint   Patient presents with    Altered Mental Status        Subjective:   Hospital course to date:    This is a 64-year-old female with past medical history of CKD stage IV, diabetes, dyslipidemia, diabetic neuropathy, coronary artery disease status post CABG in 2009, gastroparesis, hypertension, cervical fusion about 20 years ago who had a very long hospitalization from 3/19/2025 through 4/4/2025 at [facility] for retropharyngeal vs prevertebral abscess. S/p I&D of the abscess by ENT at bedside on 3/19, culture of the abscess showed MRSA. ID recommending daptomycin 700 mg every 24 hours through 5/5/2025, started on ceftaroline for persistent bacteremia. Nephrology recommended against PICC placement, Julia's cath placed 03/31. Patient was discharged and presented on 4/7/25 for AMS. On presentation, the patient is unable to tell if she is ambulatory, per EMS report she has bed bound. She is able to tell her name, birthday, she initially thought that she was at [facility] but was easily redirectable, she was able to tell the year. Blood cultures obtained on 4/7.    --------  Patient is seen today for follow-up. Discussed overnight issues with the RN. Patient is sleepy awakens with verbal command. She reports feeling fatigued. She denies any neck or left foot pain. Also denies fever, chills, UTI symptoms, SOB, or cough.    Medications reviewed  Current Facility-Administered Medications   Medication Dose Route Frequency    sodium chloride flush 0.9 % injection 5-40 mL  5-40 mL IntraVENous 2 times per day    sodium chloride flush 0.9 % injection 5-40 mL  5-40 mL IntraVENous PRN    0.9 % sodium chloride infusion   IntraVENous PRN    enoxaparin Sodium (LOVENOX) injection 30 mg  30 mg SubCUTAneous Q24H    ondansetron (ZOFRAN-ODT) disintegrating tablet 4 mg  4 mg Oral Q8H PRN  Value Ref Range    Magnesium 2.2 1.6 - 2.4 mg/dL   CK    Collection Time: 04/08/25  1:58 AM   Result Value Ref Range    Total CK 37 26 - 192 U/L   Basic Metabolic Panel    Collection Time: 04/08/25  1:58 AM   Result Value Ref Range    Sodium 137 136 - 145 mmol/L    Potassium 4.4 3.5 - 5.1 mmol/L    Chloride 104 97 - 108 mmol/L    CO2 28 21 - 32 mmol/L    Anion Gap 5 2 - 12 mmol/L    Glucose 67 65 - 100 mg/dL    BUN 44 (H) 6 - 20 mg/dL    Creatinine 2.19 (H) 0.55 - 1.02 mg/dL    BUN/Creatinine Ratio 20 12 - 20      Est, Glom Filt Rate 24 (L) >60 ml/min/1.73m2    Calcium 8.9 8.5 - 10.1 mg/dL   Lactic Acid    Collection Time: 04/08/25  1:58 AM   Result Value Ref Range    Lactic Acid, Plasma 1.3 0.4 - 2.0 mmol/L       CT HEAD WO CONTRAST   Final Result   No acute intracranial process identified            Electronically signed by Arabella Koch      XR CHEST PORTABLE   Final Result   1. Enlarged cardiac silhouette, otherwise no acute disease            Electronically signed by Juan Thomas             Discussion/MDM:     [x] High (any 2)    A. Problems (any 1)  [x] Acute/Chronic Illness/injury posing threat to life or bodily function:    [] Severe exacerbation of chronic illness:    ---------------------------------------------------------------------  B. Risk of Treatment (any 1)   [x] Drugs/treatments that require intensive monitoring for toxicity include:    [x] IV ABX requiring serial renal monitoring for nephrotoxicity:     [] IV Narcotic analgesia for adverse drug reaction  [] Aggressive IV diuresis requiring serial monitoring for renal impairment and electrolyte derangements  [] Critical electrolyte abnormalities requiring IV replacement and close serial monitoring  [] SQ Insulin SS- monitoring serial FSBS for Hypoglycemic adverse drug reaction  [] Other -   [] Change in code status:    [] Decision to escalate care:    [] Major surgery/procedure with associated risk factors:

## 2025-04-09 VITALS
HEART RATE: 72 BPM | TEMPERATURE: 97.7 F | WEIGHT: 215 LBS | OXYGEN SATURATION: 92 % | DIASTOLIC BLOOD PRESSURE: 75 MMHG | RESPIRATION RATE: 16 BRPM | BODY MASS INDEX: 39.56 KG/M2 | HEIGHT: 62 IN | SYSTOLIC BLOOD PRESSURE: 103 MMHG

## 2025-04-09 LAB
ALBUMIN SERPL-MCNC: 1.8 G/DL (ref 3.5–5)
ALBUMIN/GLOB SERPL: 0.4 (ref 1.1–2.2)
ALP SERPL-CCNC: 68 U/L (ref 45–117)
ALT SERPL-CCNC: 15 U/L (ref 12–78)
ANION GAP SERPL CALC-SCNC: 8 MMOL/L (ref 2–12)
AST SERPL W P-5'-P-CCNC: 28 U/L (ref 15–37)
BASOPHILS # BLD: 0.04 K/UL (ref 0–0.1)
BASOPHILS NFR BLD: 0.7 % (ref 0–1)
BILIRUB SERPL-MCNC: 0.6 MG/DL (ref 0.2–1)
BUN SERPL-MCNC: 38 MG/DL (ref 6–20)
BUN/CREAT SERPL: 21 (ref 12–20)
CA-I BLD-MCNC: 8.7 MG/DL (ref 8.5–10.1)
CHLORIDE SERPL-SCNC: 106 MMOL/L (ref 97–108)
CO2 SERPL-SCNC: 25 MMOL/L (ref 21–32)
CREAT SERPL-MCNC: 1.78 MG/DL (ref 0.55–1.02)
DIFFERENTIAL METHOD BLD: ABNORMAL
EOSINOPHIL # BLD: 0.17 K/UL (ref 0–0.4)
EOSINOPHIL NFR BLD: 3.1 % (ref 0–7)
ERYTHROCYTE [DISTWIDTH] IN BLOOD BY AUTOMATED COUNT: 17 % (ref 11.5–14.5)
FERRITIN SERPL-MCNC: 206 NG/ML (ref 8–252)
GLOBULIN SER CALC-MCNC: 5 G/DL (ref 2–4)
GLUCOSE BLD STRIP.AUTO-MCNC: 108 MG/DL (ref 65–100)
GLUCOSE BLD STRIP.AUTO-MCNC: 117 MG/DL (ref 65–100)
GLUCOSE BLD STRIP.AUTO-MCNC: 152 MG/DL (ref 65–100)
GLUCOSE SERPL-MCNC: 93 MG/DL (ref 65–100)
HCT VFR BLD AUTO: 31 % (ref 35–47)
HGB BLD-MCNC: 9.3 G/DL (ref 11.5–16)
IMM GRANULOCYTES # BLD AUTO: 0.03 K/UL (ref 0–0.04)
IMM GRANULOCYTES NFR BLD AUTO: 0.5 % (ref 0–0.5)
IRON SATN MFR SERPL: 15 % (ref 20–50)
IRON SERPL-MCNC: 38 UG/DL (ref 35–150)
LYMPHOCYTES # BLD: 0.45 K/UL (ref 0.8–3.5)
LYMPHOCYTES NFR BLD: 8.2 % (ref 12–49)
MAGNESIUM SERPL-MCNC: 2.3 MG/DL (ref 1.6–2.4)
MCH RBC QN AUTO: 27.8 PG (ref 26–34)
MCHC RBC AUTO-ENTMCNC: 30 G/DL (ref 30–36.5)
MCV RBC AUTO: 92.8 FL (ref 80–99)
MONOCYTES # BLD: 0.46 K/UL (ref 0–1)
MONOCYTES NFR BLD: 8.4 % (ref 5–13)
NEUTS SEG # BLD: 4.33 K/UL (ref 1.8–8)
NEUTS SEG NFR BLD: 79.1 % (ref 32–75)
NRBC # BLD: 0 K/UL (ref 0–0.01)
NRBC BLD-RTO: 0 PER 100 WBC
PERFORMED BY:: ABNORMAL
PLATELET # BLD AUTO: 243 K/UL (ref 150–400)
PMV BLD AUTO: 10.8 FL (ref 8.9–12.9)
POTASSIUM SERPL-SCNC: 4 MMOL/L (ref 3.5–5.1)
PROT SERPL-MCNC: 6.8 G/DL (ref 6.4–8.2)
RBC # BLD AUTO: 3.34 M/UL (ref 3.8–5.2)
SODIUM SERPL-SCNC: 139 MMOL/L (ref 136–145)
TIBC SERPL-MCNC: 258 UG/DL (ref 250–450)
URATE SERPL-MCNC: 5.7 MG/DL (ref 2.6–6)
WBC # BLD AUTO: 5.5 K/UL (ref 3.6–11)

## 2025-04-09 PROCEDURE — 83540 ASSAY OF IRON: CPT

## 2025-04-09 PROCEDURE — 6360000002 HC RX W HCPCS

## 2025-04-09 PROCEDURE — 85025 COMPLETE CBC W/AUTO DIFF WBC: CPT

## 2025-04-09 PROCEDURE — 2580000003 HC RX 258: Performed by: STUDENT IN AN ORGANIZED HEALTH CARE EDUCATION/TRAINING PROGRAM

## 2025-04-09 PROCEDURE — 82728 ASSAY OF FERRITIN: CPT

## 2025-04-09 PROCEDURE — 6370000000 HC RX 637 (ALT 250 FOR IP)

## 2025-04-09 PROCEDURE — 80053 COMPREHEN METABOLIC PANEL: CPT

## 2025-04-09 PROCEDURE — 36415 COLL VENOUS BLD VENIPUNCTURE: CPT

## 2025-04-09 PROCEDURE — 82306 VITAMIN D 25 HYDROXY: CPT

## 2025-04-09 PROCEDURE — 6370000000 HC RX 637 (ALT 250 FOR IP): Performed by: INTERNAL MEDICINE

## 2025-04-09 PROCEDURE — 82962 GLUCOSE BLOOD TEST: CPT

## 2025-04-09 PROCEDURE — 97530 THERAPEUTIC ACTIVITIES: CPT

## 2025-04-09 PROCEDURE — 2580000003 HC RX 258

## 2025-04-09 PROCEDURE — 83970 ASSAY OF PARATHORMONE: CPT

## 2025-04-09 PROCEDURE — 83735 ASSAY OF MAGNESIUM: CPT

## 2025-04-09 PROCEDURE — 2500000003 HC RX 250 WO HCPCS: Performed by: INTERNAL MEDICINE

## 2025-04-09 PROCEDURE — 84550 ASSAY OF BLOOD/URIC ACID: CPT

## 2025-04-09 PROCEDURE — 97166 OT EVAL MOD COMPLEX 45 MIN: CPT

## 2025-04-09 PROCEDURE — 97162 PT EVAL MOD COMPLEX 30 MIN: CPT

## 2025-04-09 RX ORDER — TORSEMIDE 10 MG/1
10 TABLET ORAL DAILY
Status: DISCONTINUED | OUTPATIENT
Start: 2025-04-09 | End: 2025-04-09 | Stop reason: HOSPADM

## 2025-04-09 RX ADMIN — SODIUM CHLORIDE, PRESERVATIVE FREE 10 ML: 5 INJECTION INTRAVENOUS at 08:08

## 2025-04-09 RX ADMIN — CARVEDILOL 6.25 MG: 3.12 TABLET, FILM COATED ORAL at 08:25

## 2025-04-09 RX ADMIN — CLOPIDOGREL BISULFATE 75 MG: 75 TABLET, FILM COATED ORAL at 08:03

## 2025-04-09 RX ADMIN — ASPIRIN 81 MG: 81 TABLET, CHEWABLE ORAL at 08:03

## 2025-04-09 RX ADMIN — CALCITRIOL CAPSULES 0.25 MCG 0.25 MCG: 0.25 CAPSULE ORAL at 08:03

## 2025-04-09 RX ADMIN — INSULIN GLARGINE 15 UNITS: 100 INJECTION, SOLUTION SUBCUTANEOUS at 08:05

## 2025-04-09 RX ADMIN — ACETAMINOPHEN 650 MG: 325 TABLET ORAL at 14:39

## 2025-04-09 RX ADMIN — SODIUM CHLORIDE 700 MG: 900 INJECTION, SOLUTION INTRAVENOUS at 12:16

## 2025-04-09 RX ADMIN — TORSEMIDE 10 MG: 10 TABLET ORAL at 14:39

## 2025-04-09 RX ADMIN — ACETAMINOPHEN 650 MG: 325 TABLET ORAL at 08:25

## 2025-04-09 RX ADMIN — PYRIDOXINE HCL TAB 50 MG 100 MG: 50 TAB at 08:03

## 2025-04-09 ASSESSMENT — PAIN SCALES - GENERAL
PAINLEVEL_OUTOF10: 3
PAINLEVEL_OUTOF10: 3
PAINLEVEL_OUTOF10: 7
PAINLEVEL_OUTOF10: 8

## 2025-04-09 ASSESSMENT — PAIN DESCRIPTION - ORIENTATION
ORIENTATION: MID
ORIENTATION: MID

## 2025-04-09 ASSESSMENT — PAIN DESCRIPTION - LOCATION
LOCATION: BACK;NECK
LOCATION: BACK;NECK

## 2025-04-09 ASSESSMENT — PAIN DESCRIPTION - DESCRIPTORS
DESCRIPTORS: THROBBING
DESCRIPTORS: THROBBING;CRAMPING

## 2025-04-09 NOTE — PLAN OF CARE
Problem: Chronic Conditions and Co-morbidities  Goal: Patient's chronic conditions and co-morbidity symptoms are monitored and maintained or improved  4/9/2025 1053 by Kandy Mcmillan RN  Outcome: Progressing  Flowsheets (Taken 4/9/2025 0825)  Care Plan - Patient's Chronic Conditions and Co-Morbidity Symptoms are Monitored and Maintained or Improved: Monitor and assess patient's chronic conditions and comorbid symptoms for stability, deterioration, or improvement  4/9/2025 0251 by Obdulia Beach RN  Outcome: Progressing  Flowsheets (Taken 4/8/2025 2035)  Care Plan - Patient's Chronic Conditions and Co-Morbidity Symptoms are Monitored and Maintained or Improved:   Monitor and assess patient's chronic conditions and comorbid symptoms for stability, deterioration, or improvement   Collaborate with multidisciplinary team to address chronic and comorbid conditions and prevent exacerbation or deterioration   Update acute care plan with appropriate goals if chronic or comorbid symptoms are exacerbated and prevent overall improvement and discharge     Problem: Discharge Planning  Goal: Discharge to home or other facility with appropriate resources  4/9/2025 1053 by Kandy Mcmillan RN  Outcome: Progressing  Flowsheets (Taken 4/9/2025 0825)  Discharge to home or other facility with appropriate resources: Arrange for needed discharge resources and transportation as appropriate  4/9/2025 0251 by Obdulia Beach RN  Outcome: Progressing  Flowsheets (Taken 4/8/2025 2035)  Discharge to home or other facility with appropriate resources:   Identify barriers to discharge with patient and caregiver   Arrange for needed discharge resources and transportation as appropriate   Identify discharge learning needs (meds, wound care, etc)     Problem: Skin/Tissue Integrity  Goal: Skin integrity remains intact  Description: 1.  Monitor for areas of redness and/or skin breakdown  2.  Assess vascular access sites hourly  3.  Every 4-6  provider's orders  4/9/2025 0251 by Obdulia Beach RN  Outcome: Progressing  Goal: Return ADL status to a safe level of function  4/9/2025 1053 by Kandy Mcmillan RN  Outcome: Progressing  Flowsheets (Taken 4/9/2025 0825)  Return ADL Status to a Safe Level of Function: Administer medication as ordered  4/9/2025 0251 by Obdulia Beach RN  Outcome: Progressing     Problem: Gastrointestinal - Adult  Goal: Minimal or absence of nausea and vomiting  4/9/2025 1053 by Kandy Mcmillan RN  Outcome: Progressing  Flowsheets (Taken 4/9/2025 0825)  Minimal or absence of nausea and vomiting: Administer IV fluids as ordered to ensure adequate hydration  4/9/2025 0251 by Obdulia Beach RN  Outcome: Progressing  Goal: Maintains or returns to baseline bowel function  4/9/2025 1053 by Kandy Mcmillan RN  Outcome: Progressing  Flowsheets (Taken 4/9/2025 0825)  Maintains or returns to baseline bowel function: Encourage oral fluids to ensure adequate hydration  4/9/2025 0251 by Obdulia Beach RN  Outcome: Progressing  Goal: Maintains adequate nutritional intake  4/9/2025 1053 by Kandy Mcmillan RN  Outcome: Progressing  Flowsheets (Taken 4/9/2025 0825)  Maintains adequate nutritional intake: Monitor percentage of each meal consumed  4/9/2025 0251 by Obdulia Beach RN  Outcome: Progressing     Problem: Genitourinary - Adult  Goal: Absence of urinary retention  4/9/2025 1053 by Kandy Mcmillan RN  Outcome: Progressing  Flowsheets (Taken 4/9/2025 0825)  Absence of urinary retention: Assess patient’s ability to void and empty bladder  4/9/2025 0251 by Obdulia Beach RN  Outcome: Progressing     Problem: Metabolic/Fluid and Electrolytes - Adult  Goal: Electrolytes maintained within normal limits  4/9/2025 1053 by Kandy Mcmillan RN  Outcome: Progressing  Flowsheets (Taken 4/9/2025 0825)  Electrolytes maintained within normal limits: Monitor labs and assess patient for signs and symptoms of electrolyte

## 2025-04-09 NOTE — PROGRESS NOTES
Renal Progress Note    Patient: Nicci Hernandez MRN: [id number]  SSN: [id number]    YOB: 1960  Age: 64 y.o.  Sex: female      Admit Date: 4/7/2025    LOS: 2 days     Subjective:   Patient seen at bedside.  Sister-in-law present.  Patient is drowsy, awakes to answer questions appropriately, but drifts quickly back to sleep.  Significant edema.    Current Facility-Administered Medications   Medication Dose Route Frequency    DAPTOmycin (CUBICIN) 700 mg in sodium chloride 0.9 % 50 mL IVPB  700 mg IntraVENous Q24H    aspirin chewable tablet 81 mg  81 mg Oral Daily    albuterol sulfate HFA (PROVENTIL;VENTOLIN;PROAIR) 108 (90 Base) MCG/ACT inhaler 2 puff  2 puff Inhalation 4x Daily PRN    calcitRIOL (ROCALTROL) capsule 0.25 mcg  0.25 mcg Oral Daily    clopidogrel (PLAVIX) tablet 75 mg  75 mg Oral Daily    carvedilol (COREG) tablet 6.25 mg  6.25 mg Oral BID    ferrous sulfate (IRON 325) tablet 325 mg  325 mg Oral Once per day on Monday Wednesday Friday    insulin glargine (LANTUS) injection vial 15 Units  15 Units SubCUTAneous Daily    vitamin B-6 (PYRIDOXINE) tablet 100 mg  100 mg Oral Daily    epoetin cara-epbx (RETACRIT) injection 10,000 Units  10,000 Units SubCUTAneous Weekly    sodium chloride flush 0.9 % injection 5-40 mL  5-40 mL IntraVENous 2 times per day    sodium chloride flush 0.9 % injection 5-40 mL  5-40 mL IntraVENous PRN    0.9 % sodium chloride infusion   IntraVENous PRN    enoxaparin Sodium (LOVENOX) injection 30 mg  30 mg SubCUTAneous Q24H    ondansetron (ZOFRAN-ODT) disintegrating tablet 4 mg  4 mg Oral Q8H PRN    Or    ondansetron (ZOFRAN) injection 4 mg  4 mg IntraVENous Q6H PRN    polyethylene glycol (GLYCOLAX) packet 17 g  17 g Oral Daily PRN    acetaminophen (TYLENOL) tablet 650 mg  650 mg Oral Q6H PRN    Or    acetaminophen (TYLENOL) suppository 650 mg  650 mg Rectal Q6H PRN    glucose chewable tablet 16 g  4 tablet Oral PRN    dextrose bolus 10% 125 mL  125 mL IntraVENous PRN    Or  treatments reviewed  Plan discussed with patient and NP  Reviewed NP's  notes, amended and agree with assessment and plan     Camilla Zuniga MD

## 2025-04-09 NOTE — PROGRESS NOTES
1928: RN received critical result off patient's blood cultures. 1 out of 2 bottles were positive for gram positive cocci in clusters. RN informed provider rory at this time. No new orders.

## 2025-04-09 NOTE — CARE COORDINATION
Transition of Care Plan:    RUR: 24%  Prior Level of Functioning: Need assist w adls  Disposition: [facility] ( [address] [place] [address]) - Nurse call report number:   TIME is 7  RAMA: 4/9/25  If SNF or IPR: Date FOC offered: Yes   Date FOC received: 4/8/25  Accepting facility: [facility]  Date authorization started with reference number: n/a   Date authorization received and expires: n/a  Follow up appointments: n/a  DME needed: n/a  Transportation at discharge: Medical Transport  IM/IMM Medicare/ letter given: 4/8/25  Is patient a  and connected with [facility]? no   If yes, was [place] transfer form completed and [facility] notified? N/a  Caregiver Contact:   Narcisa High Home:  Mobile:    [phone]     Discharge Caregiver contacted prior to discharge? Yes  Care Conference needed? no  Barriers to discharge: none    CM made daughter aware of dc to [facility]. Daughter voiced no concerns.

## 2025-04-09 NOTE — PROGRESS NOTES
Discharge plan of care/case management plan validated with provider discharge order.     Nurse attempted to call report to [facility]. IV removed. Discharge paperwork provided to patient. Transportation provided by [facility].

## 2025-04-09 NOTE — PLAN OF CARE
PHYSICAL THERAPY EVALUATION  Patient: Nicci Hernandez (64 y.o. female)  Date: 2025  Primary Diagnosis: Hyperkalemia [E87.5]  Delirium [R41.0]  AL (acute kidney injury) [N17.9]  Acute metabolic encephalopathy [G93.41]       Precautions: Fall Risk, Contact Precautions             Spinal Precautions: No Bending, No Lifting, No Twisting        Recommendations for nursing mobility: Frequent repositioning to prevent skin breakdown, Use of bed/chair alarm for safety, and Assist x2    In place during session: External Catheter and EKG/telemetry     ASSESSMENT  Pt is a 64 y.o. female admitted on 2025 for AMS; pt currently being treated for hyperkalemia, delirium, AL, acute metabolic encephalopathy . Pt lying in semisupine position upon PT arrival, agreeable to evaluation. Pt A&O x 3.  Patient is not oriented to place. Patient's sister-in-law present for evaluation. To note, patient's  passed away 3 weeks ago- there has not been a  to date.     Based on the objective data described below, the patient currently presents with impaired functional mobility, decreased ROM, impaired strength, poor body mechanics, decreased activity tolerance, poor safety awareness, impaired cognition, and impaired balance. (See below for objective details and assist levels).     Overall pt tolerated session poorly today with PT evaluation. Pt required max/total assist for bed mobility. When began functional evaluation patient stated that she was having a bowel movement. Patient rolled L<>R with max assist x 1 for cleaning of patient. This writer cleaned patient while patient held herself in sidelying with min/mod assist x 1. Patient logrolled for supine>sit transfer. Patient transfer sidelying to sit with total assist x 1. Patient sat at eob ~15 seconds with total assist x 1 . Noted no trunk control of patient. Encouraged patient to lean forward as patient has a posterior lean. Patient repeatedly stated, \" I can't\" . Patient      Thank you for this referral.  Flora Whelan, PT  Minutes: 48

## 2025-04-09 NOTE — PLAN OF CARE
Problem: Chronic Conditions and Co-morbidities  Goal: Patient's chronic conditions and co-morbidity symptoms are monitored and maintained or improved  4/9/2025 0251 by Obdulia Beach RN  Outcome: Progressing  Flowsheets (Taken 4/8/2025 2035)  Care Plan - Patient's Chronic Conditions and Co-Morbidity Symptoms are Monitored and Maintained or Improved:   Monitor and assess patient's chronic conditions and comorbid symptoms for stability, deterioration, or improvement   Collaborate with multidisciplinary team to address chronic and comorbid conditions and prevent exacerbation or deterioration   Update acute care plan with appropriate goals if chronic or comorbid symptoms are exacerbated and prevent overall improvement and discharge  4/8/2025 1540 by Aviva Flaherty RN  Outcome: Progressing  Flowsheets (Taken 4/8/2025 0800)  Care Plan - Patient's Chronic Conditions and Co-Morbidity Symptoms are Monitored and Maintained or Improved:   Monitor and assess patient's chronic conditions and comorbid symptoms for stability, deterioration, or improvement   Collaborate with multidisciplinary team to address chronic and comorbid conditions and prevent exacerbation or deterioration   Update acute care plan with appropriate goals if chronic or comorbid symptoms are exacerbated and prevent overall improvement and discharge     Problem: Discharge Planning  Goal: Discharge to home or other facility with appropriate resources  4/9/2025 0251 by Obdulia Beach RN  Outcome: Progressing  Flowsheets (Taken 4/8/2025 2035)  Discharge to home or other facility with appropriate resources:   Identify barriers to discharge with patient and caregiver   Arrange for needed discharge resources and transportation as appropriate   Identify discharge learning needs (meds, wound care, etc)  4/8/2025 1540 by Aviva Flaherty RN  Outcome: Progressing  Flowsheets (Taken 4/8/2025 0800)  Discharge to home or other facility with appropriate

## 2025-04-09 NOTE — PLAN OF CARE
OCCUPATIONAL THERAPY EVALUATION  Patient: Nicci Hernandez (64 y.o. female)  Date: 4/9/2025  Primary Diagnosis: Hyperkalemia [E87.5]  Delirium [R41.0]  AL (acute kidney injury) [N17.9]  Acute metabolic encephalopathy [G93.41]       Precautions:        General Precautions, Contact Precautions, and Fall Risk      Recommendations for nursing mobility: Frequent repositioning to prevent skin breakdown, Assist x1, and Assist x2    In place during session:External Catheter and EKG/telemetry   ASSESSMENT  Ms Hrenandez is a 64 y.o. female presenting to [facility] due to AMS was admitted 4/7/2025 and currently being treated for Hyperkalemia. She was being seen by PT who was concluding their session and the was semi-supine in bed upon arrival, A & O x 4, and agreeable to OT evaluation.     Based on current observations, she presents with decreased  functional mobility, independence in ADLs, activity tolerance (see below for objective details and assist levels).     Overall, she tolerated session with additional time and adhered to verbal instructions consistently. Due to intermittent report of neck pain, she was limited during today's session and currently requires assistance for the following: Bed Mobility and Toileting.  Not able to address upper and lower body dressing due pain had decreased to 0/10 when placed in sidelying position. She will benefit from continued skilled OT services to address current impairments and improve IND and safety with self cares and functional transfers/mobility. Current OT d/c recommendation Moderate intensity short-term skilled occupational therapy up to 5x/week once medically appropriate.    GOALS:    Problem: Occupational Therapy - Adult  Goal: By Discharge: Performs self-care activities at highest level of function for planned discharge setting.  See evaluation for individualized goals.  Description: FUNCTIONAL STATUS PRIOR TO ADMISSION:  Patient was ambulatory and actively engaged in the following  treatment patient left in no apparent distress:    Patient left in no apparent distress in bed, Call bell within reach, Bed/ chair alarm activated, Caregiver / family present, and Side rails x3, bed locked and in lowest position    COMMUNICATION/EDUCATION:   The patient’s plan of care was discussed with: Physical therapist, RN, and .    Patient Education  Education Given To: Patient;Family  Education Provided: Role of Therapy;Plan of Care;Family Education;Mobility Training;Equipment  Education Method: Verbal  Barriers to Learning: None  Education Outcome: Verbalized understanding;Demonstrated understanding    The supervising occupational therapist and treating occupational therapist assistant have met to review this patient’s progress and plan of care.    This patient’s plan of care is appropriate for delegation to \[facility] [place] [facility]\"".     Thank you for this referral,  William Harrington OT  Minutes: 25

## 2025-04-09 NOTE — DISCHARGE INSTRUCTIONS
HOSPITALIST DISCHARGE INSTRUCTIONS    It is important that you take the medication exactly as they are prescribed.   Keep your medication in the bottles provided by the pharmacist and keep a list of the medication names, dosages, and times to be taken in your wallet.   Do not take other medications without consulting your doctor.     What to do at Home  Recommended diet:   ADULT DIET; Regular; 4 carb choices (60 gm/meal); Low Fat/Low Chol/High Fiber/2 gm Na; Low Potassium (Less than 3000 mg/day)    Recommended activity:   activity as tolerated    If you have questions regarding the hospital related prescriptions or hospital related issues please call [facility] office at . You can always direct your questions to your primary care doctor if you are unable to reach your hospital physician; your PCP works as an extension of your hospital doctor just like your hospital doctor is an extension of your PCP for your time at the hospital ([facility])    If you experience any of the following symptoms then please call your primary care physician or return to the emergency room if you cannot get hold of your doctor:    Fever, chills, nausea, vomiting, or persistent diarrhea  Worsening weakness or new problems with your speech or balance  Dark stools or visible blood in your stools  New Leg swelling or shortness of breath as these could be signs of a clot    Additional Instructions:    Please follow up with your PCP in one week.   Bring these papers with you to your follow up appointments. The papers will help your doctors be sure to continue the care plan from the hospital.        Information obtained by :  I understand that if any problems occur once I am at home I am to contact my physician.    I understand and acknowledge receipt of the instructions indicated above.                                                                                                                                            Physician's or R.N.'s Signature                                                                  Date/Time                                                                                                                                              Patient or Representative Signature

## 2025-04-09 NOTE — PROGRESS NOTES
4 Eyes Skin Assessment     NAME:  Nicci Hernandez  YOB: 1960  MEDICAL RECORD NUMBER:  [id number]    The patient is being assessed for  Other Weekly    I agree that at least one RN has performed a thorough Head to Toe Skin Assessment on the patient. ALL assessment sites listed below have been assessed.      Areas assessed by both nurses:    Head, Face, Ears, Shoulders, Back, Chest, Arms, Elbows, Hands, Sacrum. Buttock, Coccyx, Ischium, Legs. Feet and Heels, and Under Medical Devices         Does the Patient have a Wound? Yes wound(s) were present on assessment. LDA wound assessment was Initiated and completed by RN       Rueprt Prevention initiated by RN: Yes  Wound Care Orders initiated by RN: Yes    Pressure Injury (Stage 3,4, Unstageable, DTI, NWPT, and Complex wounds) if present, place Wound referral order by RN under : Yes    New Ostomies, if present place, Ostomy referral order under : No     Nurse 1 eSignature: Electronically signed by Obdulia Beach RN on 4/9/25 at 2:09 AM EDT    **SHARE this note so that the co-signing nurse can place an eSignature**    Nurse 2 eSignature: Electronically signed by Hilda Miller RN on 4/9/25 at 2:16 AM EDT

## 2025-04-09 NOTE — DISCHARGE SUMMARY
Physician Discharge Summary     Patient ID:    Nicci Hernandez  [id number]  64 y.o.  1960    Admit date: 4/7/2025    Discharge date : 4/9/2025      Final Diagnoses:   Hyperkalemia [E87.5]  Delirium [R41.0]  AL (acute kidney injury) [N17.9]  Acute metabolic encephalopathy [G93.41]      Reason for Hospitalization:    Acute metabolic encephalopathy - resolved   Neck abscess   Bacteremia   Diabetic right foot ulcer with osteomyelitis     Hospital Course:     This is a 64-year-old female with past medical history of CKD stage IV, diabetes, dyslipidemia, diabetic neuropathy, coronary artery disease status post CABG in 2009, gastroparesis, hypertension, cervical fusion about 20 years ago who had a very long hospitalization from 3/19/2025 through 4/4/2025 at [facility] for retropharyngeal vs prevertebral abscess. S/p I&D of the abscess by ENT at bedside on 3/19, culture of the abscess showed MRSA. ID recommending daptomycin 700 mg every 24 hours through 5/5/2025, started on ceftaroline for persistent bacteremia. Nephrology recommended against PICC placement, Julia's cath placed 03/31. Patient was discharged and presented on 4/7/25 for AMS. On presentation, the patient is unable to tell if she is ambulatory, per EMS report she has bed bound. She is able to tell her name, birthday, she initially thought that she was at [facility] but was easily redirectable, she was able to tell the year.     CT head was unremarkable.  Creatinine was 2.16 on present patient and now is back to baseline 1.78.  Blood cultures obtained on 4/7.  Blood cultures 1 out of 2 bottles is growing gram-positive patient.  Will continue daptomycin 700 mg IV daily.  Patient was seen examined bedside today.  No fever, white count is within normal limits, and vital signs are stable.  Case discussed with the director at [facility] and agreed to transfer the patient back to [facility] today.  Spoke to the family members  mmol/L)  4/8/2025: BUN 43 mg/dL (H; Ref range: 6 - 20 mg/dL); BUN 44 mg/dL (H; Ref range: 6 - 20 mg/dL); Calcium 8.7 mg/dL (Ref range: 8.5 - 10.1 mg/dL); Calcium 8.9 mg/dL (Ref range: 8.5 - 10.1 mg/dL); Chloride 104 mmol/L (Ref range: 97 - 108 mmol/L); Chloride 104 mmol/L (Ref range: 97 - 108 mmol/L); CO2 27 mmol/L (Ref range: 21 - 32 mmol/L); CO2 28 mmol/L (Ref range: 21 - 32 mmol/L); Creatinine 2.13 mg/dL (H; Ref range: 0.55 - 1.02 mg/dL); Creatinine 2.19 mg/dL (H; Ref range: 0.55 - 1.02 mg/dL); Glucose 64 mg/dL (L; Ref range: 65 - 100 mg/dL); Glucose 67 mg/dL (Ref range: 65 - 100 mg/dL); Hematocrit 23.7 % (L; Ref range: 35.0 - 47.0 %); Hemoglobin 9.9 g/dL (L; Ref range: 11.5 - 16.0 g/dL); Potassium 4.5 mmol/L (Ref range: 3.5 - 5.1 mmol/L); Potassium 4.4 mmol/L (Ref range: 3.5 - 5.1 mmol/L); Sodium 138 mmol/L (Ref range: 136 - 145 mmol/L); Sodium 137 mmol/L (Ref range: 136 - 145 mmol/L)  Recent Labs     04/08/25 0158 04/09/25 0616   WBC 7.0 5.5   HGB 9.9* 9.3*   HCT 23.7* 31.0*    243     Recent Labs     04/07/25  1754 04/08/25 0158 04/09/25 0616    138  137 139   K 4.4 4.5  4.4 4.0    104  104 106   CO2 29 27  28 25   BUN 48* 43*  44* 38*   CREATININE 2.16* 2.13*  2.19* 1.78*   GLUCOSE 61* 64*  67 93   CALCIUM 8.6 8.7  8.9 8.7   MG  --  2.2 2.3     Recent Labs     04/07/25  1320 04/08/25  0158 04/09/25  0616   AST 57* 28 28   ALT 18 16 15   ALKPHOS 64 69 68   BILITOT 0.8 0.6 0.6   GLOB 5.3* 4.3* 5.0*   LIPASE 19  --   --      No results for input(s): \"INR\", \"PROTIME\", \"APTT\" in the last 72 hours.   No results for input(s): \"IRON\", \"TIBC\" in the last 72 hours.    Invalid input(s): \"PSAT\", \"FERR\"   No results for input(s): \"PH\", \"PCO2\", \"PO2\" in the last 72 hours.  Recent Labs     04/07/25  1754 04/08/25  0158   CKTOTAL 34 37     Lab Results   Component Value Date/Time    POCGLU 152 04/09/2025 09:49 AM    POCGLU 108 04/09/2025 07:38 AM    POCGLU 116 04/08/2025 09:21 PM    POCGLU 128

## 2025-04-10 ENCOUNTER — HOSPITAL ENCOUNTER (OUTPATIENT)
Facility: HOSPITAL | Age: 65
Setting detail: SPECIMEN
Discharge: HOME | End: 2025-04-13

## 2025-04-10 LAB
ALBUMIN SERPL-MCNC: 1.1 G/DL (ref 3.5–5)
ALBUMIN/GLOB SERPL: 0.4 (ref 1.1–2.2)
ALP SERPL-CCNC: 41 U/L (ref 45–117)
ALT SERPL-CCNC: 9 U/L (ref 12–78)
ANION GAP SERPL CALC-SCNC: 6 MMOL/L (ref 2–12)
AST SERPL W P-5'-P-CCNC: 18 U/L (ref 15–37)
BACTERIA SPEC CULT: NORMAL
BASOPHILS # BLD: 0.03 K/UL (ref 0–0.1)
BASOPHILS NFR BLD: 0.7 % (ref 0–1)
BILIRUB SERPL-MCNC: 0.4 MG/DL (ref 0.2–1)
BUN SERPL-MCNC: 23 MG/DL (ref 6–20)
BUN/CREAT SERPL: 21 (ref 12–20)
CA-I BLD-MCNC: 5.7 MG/DL (ref 8.5–10.1)
CHLORIDE SERPL-SCNC: 121 MMOL/L (ref 97–108)
CK SERPL-CCNC: 25 U/L (ref 26–192)
CO2 SERPL-SCNC: 20 MMOL/L (ref 21–32)
CREAT SERPL-MCNC: 1.09 MG/DL (ref 0.55–1.02)
DIFFERENTIAL METHOD BLD: ABNORMAL
EOSINOPHIL # BLD: 0.12 K/UL (ref 0–0.4)
EOSINOPHIL NFR BLD: 2.8 % (ref 0–7)
ERYTHROCYTE [DISTWIDTH] IN BLOOD BY AUTOMATED COUNT: 18 % (ref 11.5–14.5)
GLOBULIN SER CALC-MCNC: 3.1 G/DL (ref 2–4)
GLUCOSE SERPL-MCNC: 86 MG/DL (ref 65–100)
HCT VFR BLD AUTO: 30.9 % (ref 35–47)
HGB BLD-MCNC: 9.3 G/DL (ref 11.5–16)
IMM GRANULOCYTES # BLD AUTO: 0.02 K/UL (ref 0–0.04)
IMM GRANULOCYTES NFR BLD AUTO: 0.5 % (ref 0–0.5)
LYMPHOCYTES # BLD: 0.46 K/UL (ref 0.8–3.5)
LYMPHOCYTES NFR BLD: 10.9 % (ref 12–49)
Lab: NORMAL
MCH RBC QN AUTO: 28.5 PG (ref 26–34)
MCHC RBC AUTO-ENTMCNC: 30.1 G/DL (ref 30–36.5)
MCV RBC AUTO: 94.8 FL (ref 80–99)
MONOCYTES # BLD: 0.37 K/UL (ref 0–1)
MONOCYTES NFR BLD: 8.7 % (ref 5–13)
NEUTS SEG # BLD: 3.23 K/UL (ref 1.8–8)
NEUTS SEG NFR BLD: 76.4 % (ref 32–75)
NRBC # BLD: 0 K/UL (ref 0–0.01)
NRBC BLD-RTO: 0 PER 100 WBC
PLATELET # BLD AUTO: 212 K/UL (ref 150–400)
PMV BLD AUTO: 10.9 FL (ref 8.9–12.9)
POTASSIUM SERPL-SCNC: 2.5 MMOL/L (ref 3.5–5.1)
PROT SERPL-MCNC: 4.2 G/DL (ref 6.4–8.2)
RBC # BLD AUTO: 3.26 M/UL (ref 3.8–5.2)
SODIUM SERPL-SCNC: 147 MMOL/L (ref 136–145)
WBC # BLD AUTO: 4.2 K/UL (ref 3.6–11)

## 2025-04-10 PROCEDURE — 82550 ASSAY OF CK (CPK): CPT

## 2025-04-10 PROCEDURE — 80053 COMPREHEN METABOLIC PANEL: CPT

## 2025-04-10 PROCEDURE — 85025 COMPLETE CBC W/AUTO DIFF WBC: CPT

## 2025-04-11 ENCOUNTER — HOSPITAL ENCOUNTER (OUTPATIENT)
Facility: HOSPITAL | Age: 65
Setting detail: SPECIMEN
Discharge: HOME | End: 2025-04-14

## 2025-04-11 LAB
25(OH)D3 SERPL-MCNC: 32.6 NG/ML (ref 30–100)
ALBUMIN SERPL-MCNC: 2 G/DL (ref 3.5–5)
ANION GAP SERPL CALC-SCNC: 6 MMOL/L (ref 2–12)
BUN SERPL-MCNC: 31 MG/DL (ref 6–20)
BUN/CREAT SERPL: 17 (ref 12–20)
CA-I BLD-MCNC: 8.5 MG/DL (ref 8.5–10.1)
CA-I BLD-MCNC: 9 MG/DL (ref 8.5–10.1)
CHLORIDE SERPL-SCNC: 108 MMOL/L (ref 97–108)
CO2 SERPL-SCNC: 26 MMOL/L (ref 21–32)
CREAT SERPL-MCNC: 1.8 MG/DL (ref 0.55–1.02)
GLUCOSE SERPL-MCNC: 93 MG/DL (ref 65–100)
PHOSPHATE SERPL-MCNC: 2.6 MG/DL (ref 2.6–4.7)
POTASSIUM SERPL-SCNC: 4.9 MMOL/L (ref 3.5–5.1)
PTH-INTACT SERPL-MCNC: 69.7 PG/ML (ref 18.4–88)
SODIUM SERPL-SCNC: 140 MMOL/L (ref 136–145)

## 2025-04-11 PROCEDURE — 80069 RENAL FUNCTION PANEL: CPT

## 2025-04-11 NOTE — PROGRESS NOTES
Physician Progress Note      PATIENT:               SU HILARIO  CSN #:                  [id number]  :                       1960  ADMIT DATE:       2025 11:54 AM  DISCH DATE:        2025 5:21 PM  RESPONDING  PROVIDER #:        Elmer Brady MD          QUERY TEXT:    The attending physician is required to clarify conflicting documentation in   the medical record. Noted documentation of CKD 4 in  H&P,  IM   Progress Note and  DC summary and CKD 3b in  Nephrology consult.    The clinical indicators include:  from  H&P,  IM Progress Note and  DC Summary: \"CKD stage IV\"    from  Nephrology Consult note: \"Chronic Kidney Disease stage 3b chronic   kidney disease likely related to diabetic nephropathy-Baseline creatinine is   1.6-1.7\"    25 13:20 Creatinine: 2.24, 25 17:54 Creatinine: 2.16, 25   01:58 Creatinine: 2.13     25 16:56 Protein, Urine, Random: 54  Options provided:  -- CKD 4 confirmed and?CKD 3b ruled out  -- CKD 3b confirmed and?CKD 4 ruled out  -- Other - I will add my own diagnosis  -- Disagree - Not applicable / Not valid  -- Disagree - Clinically unable to determine / Unknown  -- Refer to Clinical Documentation Reviewer    PROVIDER RESPONSE TEXT:    After study, CKD 3b confirmed and?CKD 4 ruled out.    Query created by: Haylee Grant on 4/10/2025 7:10 AM      QUERY TEXT:    Acute metabolic encephalopathy is documented in the medical record in    H&P, IM Progress Note dated  and DC Summary dated . Please provide   additional clinical indicators supportive of your documentation. Or please   document if the diagnosis of acute metabolic encephalopathy has been ruled out   after study.    The clinical indicators include:  from  ED Provider note: \"She is overall weak however  strength is   equal bilaterally, pupils are equally round and reactive to light and   accommodation, cranial nerve I through  XII intact.  No sensory or motor   deficit was noted.\" and \" Mental Status: She is lethargic.\"    from 04/07 H&P: \"Neurologic: CNII-XII intact. No motor or sensory deficits\"   and \"Acute metabolic encephalopathy \"    from 04/08 IM Progress Note: \"Acute metabolic encephalopathy - resolved   Patient is A&O x4. She is very sleepy, but awakens upon verbal command. She is   poor historian.-Will continue to monitor.''    from 04/09 DC Summary: \"Neurologic: CNII-XII intact.  No gross focal deficits\"   and \"Acute metabolic encephalopathy - resolved \"    04/07 Head CT: \"No acute intracranial process identified\"  Options provided:  -- Acute metabolic encephalopathy present as evidenced by, Please document   evidence.  -- Acute metabolic encephalopathy was ruled out  -- Other - I will add my own diagnosis  -- Disagree - Not applicable / Not valid  -- Disagree - Clinically unable to determine / Unknown  -- Refer to Clinical Documentation Reviewer    PROVIDER RESPONSE TEXT:    Acute metabolic encephalopathy was ruled out after study.    Query created by: Haylee Grant on 4/9/2025 2:38 PM      Electronically signed by:  Elmer Brady MD 4/10/2025 9:25 PM

## 2025-04-13 ENCOUNTER — HOSPITAL ENCOUNTER (OUTPATIENT)
Facility: HOSPITAL | Age: 65
Setting detail: SPECIMEN
Discharge: HOME | End: 2025-04-16

## 2025-04-13 LAB
ALBUMIN SERPL-MCNC: 1.8 G/DL (ref 3.5–5)
ANION GAP SERPL CALC-SCNC: 4 MMOL/L (ref 2–12)
BACTERIA SPEC CULT: NORMAL
BASOPHILS # BLD: 0.05 K/UL (ref 0–0.1)
BASOPHILS NFR BLD: 0.8 % (ref 0–1)
BUN SERPL-MCNC: 28 MG/DL (ref 6–20)
BUN/CREAT SERPL: 16 (ref 12–20)
CA-I BLD-MCNC: 8.9 MG/DL (ref 8.5–10.1)
CHLORIDE SERPL-SCNC: 109 MMOL/L (ref 97–108)
CO2 SERPL-SCNC: 29 MMOL/L (ref 21–32)
CREAT SERPL-MCNC: 1.8 MG/DL (ref 0.55–1.02)
DIFFERENTIAL METHOD BLD: ABNORMAL
EOSINOPHIL # BLD: 0.21 K/UL (ref 0–0.4)
EOSINOPHIL NFR BLD: 3.4 % (ref 0–7)
ERYTHROCYTE [DISTWIDTH] IN BLOOD BY AUTOMATED COUNT: 19.2 % (ref 11.5–14.5)
GLUCOSE SERPL-MCNC: 92 MG/DL (ref 65–100)
HCT VFR BLD AUTO: 33.4 % (ref 35–47)
HGB BLD-MCNC: 9.9 G/DL (ref 11.5–16)
IMM GRANULOCYTES # BLD AUTO: 0.04 K/UL (ref 0–0.04)
IMM GRANULOCYTES NFR BLD AUTO: 0.6 % (ref 0–0.5)
LYMPHOCYTES # BLD: 0.62 K/UL (ref 0.8–3.5)
LYMPHOCYTES NFR BLD: 10 % (ref 12–49)
Lab: NORMAL
MAGNESIUM SERPL-MCNC: 1.7 MG/DL (ref 1.6–2.4)
MCH RBC QN AUTO: 28.1 PG (ref 26–34)
MCHC RBC AUTO-ENTMCNC: 29.6 G/DL (ref 30–36.5)
MCV RBC AUTO: 94.9 FL (ref 80–99)
MONOCYTES # BLD: 0.58 K/UL (ref 0–1)
MONOCYTES NFR BLD: 9.3 % (ref 5–13)
NEUTS SEG # BLD: 4.72 K/UL (ref 1.8–8)
NEUTS SEG NFR BLD: 75.9 % (ref 32–75)
NRBC # BLD: 0 K/UL (ref 0–0.01)
NRBC BLD-RTO: 0 PER 100 WBC
PHOSPHATE SERPL-MCNC: 2.8 MG/DL (ref 2.6–4.7)
PLATELET # BLD AUTO: 231 K/UL (ref 150–400)
PMV BLD AUTO: 10.5 FL (ref 8.9–12.9)
POTASSIUM SERPL-SCNC: 4.1 MMOL/L (ref 3.5–5.1)
RBC # BLD AUTO: 3.52 M/UL (ref 3.8–5.2)
SODIUM SERPL-SCNC: 142 MMOL/L (ref 136–145)
WBC # BLD AUTO: 6.2 K/UL (ref 3.6–11)

## 2025-04-13 PROCEDURE — 80069 RENAL FUNCTION PANEL: CPT

## 2025-04-13 PROCEDURE — 85025 COMPLETE CBC W/AUTO DIFF WBC: CPT

## 2025-04-13 PROCEDURE — 83735 ASSAY OF MAGNESIUM: CPT

## 2025-04-14 LAB
BACTERIA SPEC CULT: NORMAL
Lab: NORMAL

## 2025-04-15 NOTE — CONSULTS
CONSULTATION NOTE      Subjective:         Date of Consultation: April 09, 2025     Referring Physician: Elmer Brady MD     Patient is a 64 y.o. female who is being seen for a right foot ulcer.   Patient has a pmhx as noted below. Pt presented to the ED on 04/07/2025 due to AMS. Noted to have bilateral foot ulcers. States she follows with a local podiatrist. Has undergone surgery in the past for the issues. Denies any recent pedal trauma. Admits to limited mobility. Denies any other LE complaints      Patient Active Problem List    Diagnosis Date Noted    Acute metabolic encephalopathy 04/07/2025    History of fusion of cervical spine 04/03/2025    Moderate protein-calorie malnutrition 04/01/2025    Leukocytosis 03/25/2025    Osteomyelitis of left foot (HCC) 03/24/2025    Generalized weakness 03/24/2025    Horizontal nystagmus 03/24/2025    Dysphagia 03/24/2025    Retropharyngeal abscess 03/21/2025    MRSA bacteremia 03/21/2025    MRSA infection 03/21/2025    Controlled diabetes mellitus type 2 with complications (HCC) 03/21/2025    AL (acute kidney injury) 03/21/2025    CKD (chronic kidney disease) stage 4, GFR 15-29 ml/min (HCC) 03/21/2025    Hyponatremia 03/21/2025    Diabetic ulcer of left midfoot associated with type 2 diabetes mellitus, with muscle involvement without evidence of necrosis 03/21/2025    Neck abscess 03/19/2025    Decompensated heart failure (HCC) 11/21/2024    Body mass index [BMI] 39.0-39.9, adult (Z68.39) 10/15/2024    Polyneuropathy 08/05/2024    Left hip pain 12/17/2022    Elevated uric acid in blood 12/17/2022    Cardiomyopathy, ischemic 12/17/2022    Hypoalbuminemia 12/17/2022    Presence of stent in coronary artery in patient with coronary artery disease 12/17/2022    Hypercholesterolemia 12/17/2022    PVD (peripheral vascular disease) 12/17/2022    Anxiety 11/14/2022    Carpal tunnel syndrome 11/14/2022    Edema 11/14/2022    Stage 3b chronic kidney disease (HCC) 07/17/2022

## 2025-05-16 ENCOUNTER — APPOINTMENT (OUTPATIENT)
Facility: HOSPITAL | Age: 65
End: 2025-05-16
Payer: MEDICARE

## 2025-05-16 ENCOUNTER — HOSPITAL ENCOUNTER (INPATIENT)
Facility: HOSPITAL | Age: 65
LOS: 15 days | Discharge: NURSING, REHAB OR LONG-TERM CARE | End: 2025-05-31
Attending: EMERGENCY MEDICINE | Admitting: FAMILY MEDICINE
Payer: MEDICARE

## 2025-05-16 DIAGNOSIS — N17.9 AKI (ACUTE KIDNEY INJURY): Primary | ICD-10-CM

## 2025-05-16 DIAGNOSIS — R41.82 ALTERED MENTAL STATUS, UNSPECIFIED ALTERED MENTAL STATUS TYPE: ICD-10-CM
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
## 2025-05-16 LAB
ABO + RH BLD: NORMAL
ALBUMIN SERPL-MCNC: 2.9 G/DL (ref 3.5–5)
ALBUMIN/GLOB SERPL: 0.5 (ref 1.1–2.2)
ALP SERPL-CCNC: 84 U/L (ref 45–117)
ALT SERPL-CCNC: 15 U/L (ref 12–78)
ANION GAP SERPL CALC-SCNC: 8 MMOL/L (ref 2–12)
APPEARANCE UR: CLEAR
APTT PPP: 40.4 SEC (ref 21.2–34.1)
AST SERPL W P-5'-P-CCNC: 27 U/L (ref 15–37)
BACTERIA URNS QL MICRO: NEGATIVE /HPF
BASOPHILS # BLD: 0.07 K/UL (ref 0–0.1)
BASOPHILS NFR BLD: 1.3 % (ref 0–1)
BILIRUB SERPL-MCNC: 1.2 MG/DL (ref 0.2–1)
BILIRUB UR QL: NEGATIVE
BLOOD GROUP ANTIBODIES SERPL: NEGATIVE
BUN SERPL-MCNC: 82 MG/DL (ref 6–20)
BUN/CREAT SERPL: 30 (ref 12–20)
CA-I BLD-MCNC: 10.1 MG/DL (ref 8.5–10.1)
CHLORIDE SERPL-SCNC: 92 MMOL/L (ref 97–108)
CO2 SERPL-SCNC: 36 MMOL/L (ref 21–32)
COLOR UR: ABNORMAL
CREAT SERPL-MCNC: 2.74 MG/DL (ref 0.55–1.02)
DIFFERENTIAL METHOD BLD: ABNORMAL
EOSINOPHIL # BLD: 0.34 K/UL (ref 0–0.4)
EOSINOPHIL NFR BLD: 6.2 % (ref 0–7)
EPITH CASTS URNS QL MICRO: ABNORMAL /LPF
ERYTHROCYTE [DISTWIDTH] IN BLOOD BY AUTOMATED COUNT: 16 % (ref 11.5–14.5)
GLOBULIN SER CALC-MCNC: 5.6 G/DL (ref 2–4)
GLUCOSE BLD STRIP.AUTO-MCNC: 58 MG/DL (ref 65–100)
GLUCOSE BLD STRIP.AUTO-MCNC: 63 MG/DL (ref 65–100)
GLUCOSE BLD STRIP.AUTO-MCNC: 72 MG/DL (ref 65–100)
GLUCOSE BLD STRIP.AUTO-MCNC: 81 MG/DL (ref 65–100)
GLUCOSE SERPL-MCNC: 242 MG/DL (ref 65–100)
GLUCOSE UR STRIP.AUTO-MCNC: NEGATIVE MG/DL
HCT VFR BLD AUTO: 37.9 % (ref 35–47)
HGB BLD-MCNC: 11.5 G/DL (ref 11.5–16)
HGB UR QL STRIP: NEGATIVE
HYALINE CASTS URNS QL MICRO: ABNORMAL /LPF (ref 0–5)
IMM GRANULOCYTES # BLD AUTO: 0.01 K/UL (ref 0–0.04)
IMM GRANULOCYTES NFR BLD AUTO: 0.2 % (ref 0–0.5)
INR PPP: 1.3 (ref 0.9–1.1)
KETONES UR QL STRIP.AUTO: NEGATIVE MG/DL
LEUKOCYTE ESTERASE UR QL STRIP.AUTO: ABNORMAL
LIPASE SERPL-CCNC: 63 U/L (ref 13–75)
LYMPHOCYTES # BLD: 0.88 K/UL (ref 0.8–3.5)
LYMPHOCYTES NFR BLD: 16 % (ref 12–49)
MCH RBC QN AUTO: 28.3 PG (ref 26–34)
MCHC RBC AUTO-ENTMCNC: 30.3 G/DL (ref 30–36.5)
MCV RBC AUTO: 93.3 FL (ref 80–99)
MONOCYTES # BLD: 0.2 K/UL (ref 0–1)
MONOCYTES NFR BLD: 3.6 % (ref 5–13)
NEUTS SEG # BLD: 3.99 K/UL (ref 1.8–8)
NEUTS SEG NFR BLD: 72.7 % (ref 32–75)
NITRITE UR QL STRIP.AUTO: NEGATIVE
NRBC # BLD: 0 K/UL (ref 0–0.01)
NRBC BLD-RTO: 0 PER 100 WBC
PERFORMED BY:: ABNORMAL
PERFORMED BY:: ABNORMAL
PERFORMED BY:: NORMAL
PERFORMED BY:: NORMAL
PH UR STRIP: 8 (ref 5–8)
PLATELET # BLD AUTO: 216 K/UL (ref 150–400)
PMV BLD AUTO: 11.2 FL (ref 8.9–12.9)
POTASSIUM SERPL-SCNC: 3.8 MMOL/L (ref 3.5–5.1)
PROT SERPL-MCNC: 8.5 G/DL (ref 6.4–8.2)
PROT UR STRIP-MCNC: NEGATIVE MG/DL
PROTHROMBIN TIME: 16.3 SEC (ref 11.9–14.6)
RBC # BLD AUTO: 4.06 M/UL (ref 3.8–5.2)
RBC #/AREA URNS HPF: ABNORMAL /HPF (ref 0–5)
SODIUM SERPL-SCNC: 136 MMOL/L (ref 136–145)
SP GR UR REFRACTOMETRY: 1.01 (ref 1–1.03)
SPECIMEN EXP DATE BLD: NORMAL
THERAPEUTIC RANGE: ABNORMAL SEC (ref 82–109)
UROBILINOGEN UR QL STRIP.AUTO: 0.1 EU/DL (ref 0.1–1)
WBC # BLD AUTO: 5.5 K/UL (ref 3.6–11)
WBC URNS QL MICRO: ABNORMAL /HPF (ref 0–4)

## 2025-05-16 PROCEDURE — 72125 CT NECK SPINE W/O DYE: CPT

## 2025-05-16 PROCEDURE — 87040 BLOOD CULTURE FOR BACTERIA: CPT

## 2025-05-16 PROCEDURE — 83690 ASSAY OF LIPASE: CPT

## 2025-05-16 PROCEDURE — 80053 COMPREHEN METABOLIC PANEL: CPT

## 2025-05-16 PROCEDURE — 1100000000 HC RM PRIVATE

## 2025-05-16 PROCEDURE — 81001 URINALYSIS AUTO W/SCOPE: CPT

## 2025-05-16 PROCEDURE — 6370000000 HC RX 637 (ALT 250 FOR IP): Performed by: PHYSICIAN ASSISTANT

## 2025-05-16 PROCEDURE — 2580000003 HC RX 258: Performed by: PHYSICIAN ASSISTANT

## 2025-05-16 PROCEDURE — 85730 THROMBOPLASTIN TIME PARTIAL: CPT

## 2025-05-16 PROCEDURE — 71045 X-RAY EXAM CHEST 1 VIEW: CPT

## 2025-05-16 PROCEDURE — 73620 X-RAY EXAM OF FOOT: CPT

## 2025-05-16 PROCEDURE — 93005 ELECTROCARDIOGRAM TRACING: CPT | Performed by: EMERGENCY MEDICINE

## 2025-05-16 PROCEDURE — 85025 COMPLETE CBC W/AUTO DIFF WBC: CPT

## 2025-05-16 PROCEDURE — 86900 BLOOD TYPING SEROLOGIC ABO: CPT

## 2025-05-16 PROCEDURE — 86901 BLOOD TYPING SEROLOGIC RH(D): CPT

## 2025-05-16 PROCEDURE — 70450 CT HEAD/BRAIN W/O DYE: CPT

## 2025-05-16 PROCEDURE — 2580000003 HC RX 258: Performed by: FAMILY MEDICINE

## 2025-05-16 PROCEDURE — 85610 PROTHROMBIN TIME: CPT

## 2025-05-16 PROCEDURE — 82962 GLUCOSE BLOOD TEST: CPT

## 2025-05-16 PROCEDURE — 76770 US EXAM ABDO BACK WALL COMP: CPT

## 2025-05-16 PROCEDURE — 86850 RBC ANTIBODY SCREEN: CPT

## 2025-05-16 PROCEDURE — 36415 COLL VENOUS BLD VENIPUNCTURE: CPT

## 2025-05-16 PROCEDURE — 82140 ASSAY OF AMMONIA: CPT

## 2025-05-16 PROCEDURE — 99285 EMERGENCY DEPT VISIT HI MDM: CPT

## 2025-05-16 RX ORDER — ONDANSETRON 4 MG/1
4 TABLET, FILM COATED ORAL EVERY 8 HOURS PRN
COMMUNITY

## 2025-05-16 RX ORDER — SODIUM CHLORIDE 9 MG/ML
INJECTION, SOLUTION INTRAVENOUS PRN
Status: DISCONTINUED | OUTPATIENT
Start: 2025-05-16 | End: 2025-05-31 | Stop reason: HOSPADM

## 2025-05-16 RX ORDER — ONDANSETRON 2 MG/ML
4 INJECTION INTRAMUSCULAR; INTRAVENOUS EVERY 6 HOURS PRN
Status: DISCONTINUED | OUTPATIENT
Start: 2025-05-16 | End: 2025-05-31 | Stop reason: HOSPADM

## 2025-05-16 RX ORDER — SPIRONOLACTONE 25 MG/1
25 TABLET ORAL DAILY
Status: DISCONTINUED | OUTPATIENT
Start: 2025-05-17 | End: 2025-05-31 | Stop reason: HOSPADM

## 2025-05-16 RX ORDER — FLUTICASONE PROPIONATE 50 MCG
2 SPRAY, SUSPENSION (ML) NASAL DAILY PRN
Status: DISCONTINUED | OUTPATIENT
Start: 2025-05-16 | End: 2025-05-31 | Stop reason: HOSPADM

## 2025-05-16 RX ORDER — SODIUM CHLORIDE 9 MG/ML
INJECTION, SOLUTION INTRAVENOUS CONTINUOUS
Status: CANCELLED | OUTPATIENT
Start: 2025-05-16

## 2025-05-16 RX ORDER — CARVEDILOL 3.12 MG/1
6.25 TABLET ORAL 2 TIMES DAILY
Status: DISCONTINUED | OUTPATIENT
Start: 2025-05-17 | End: 2025-05-24

## 2025-05-16 RX ORDER — ACETAMINOPHEN 325 MG/1
650 TABLET ORAL EVERY 6 HOURS PRN
Status: DISCONTINUED | OUTPATIENT
Start: 2025-05-16 | End: 2025-05-31 | Stop reason: HOSPADM

## 2025-05-16 RX ORDER — SODIUM CHLORIDE 9 MG/ML
INJECTION, SOLUTION INTRAVENOUS CONTINUOUS
Status: DISCONTINUED | OUTPATIENT
Start: 2025-05-16 | End: 2025-05-16

## 2025-05-16 RX ORDER — DEXTROSE MONOHYDRATE 100 MG/ML
INJECTION, SOLUTION INTRAVENOUS ONCE
Status: COMPLETED | OUTPATIENT
Start: 2025-05-16 | End: 2025-05-16

## 2025-05-16 RX ORDER — XYLITOL/YERBA SANTA
10 AEROSOL, SPRAY WITH PUMP (ML) MUCOUS MEMBRANE EVERY 4 HOURS PRN
COMMUNITY

## 2025-05-16 RX ORDER — CLOPIDOGREL BISULFATE 75 MG/1
75 TABLET ORAL DAILY
Status: DISCONTINUED | OUTPATIENT
Start: 2025-05-17 | End: 2025-05-31 | Stop reason: HOSPADM

## 2025-05-16 RX ORDER — METOLAZONE 2.5 MG/1
2.5 TABLET ORAL DAILY
Status: ON HOLD | COMMUNITY
End: 2025-05-28 | Stop reason: HOSPADM

## 2025-05-16 RX ORDER — DULOXETIN HYDROCHLORIDE 30 MG/1
30 CAPSULE, DELAYED RELEASE ORAL DAILY
Status: DISCONTINUED | OUTPATIENT
Start: 2025-05-17 | End: 2025-05-27

## 2025-05-16 RX ORDER — ACETAMINOPHEN 650 MG/1
650 SUPPOSITORY RECTAL EVERY 6 HOURS PRN
Status: DISCONTINUED | OUTPATIENT
Start: 2025-05-16 | End: 2025-05-31 | Stop reason: HOSPADM

## 2025-05-16 RX ORDER — PANTOPRAZOLE SODIUM 40 MG/1
40 TABLET, DELAYED RELEASE ORAL
Status: DISCONTINUED | OUTPATIENT
Start: 2025-05-17 | End: 2025-05-31 | Stop reason: HOSPADM

## 2025-05-16 RX ORDER — ONDANSETRON 4 MG/1
4 TABLET, ORALLY DISINTEGRATING ORAL EVERY 8 HOURS PRN
Status: DISCONTINUED | OUTPATIENT
Start: 2025-05-16 | End: 2025-05-31 | Stop reason: HOSPADM

## 2025-05-16 RX ORDER — LANOLIN ALCOHOL/MO/W.PET/CERES
400 CREAM (GRAM) TOPICAL DAILY
Status: DISCONTINUED | OUTPATIENT
Start: 2025-05-17 | End: 2025-05-21

## 2025-05-16 RX ORDER — CARVEDILOL 3.12 MG/1
6.25 TABLET ORAL 2 TIMES DAILY
Status: DISCONTINUED | OUTPATIENT
Start: 2025-05-16 | End: 2025-05-16

## 2025-05-16 RX ORDER — DEXTROSE MONOHYDRATE AND SODIUM CHLORIDE 5; .9 G/100ML; G/100ML
INJECTION, SOLUTION INTRAVENOUS CONTINUOUS
Status: DISCONTINUED | OUTPATIENT
Start: 2025-05-16 | End: 2025-05-17

## 2025-05-16 RX ORDER — FLUTICASONE PROPIONATE 50 MCG
2 SPRAY, SUSPENSION (ML) NASAL DAILY PRN
COMMUNITY

## 2025-05-16 RX ORDER — SODIUM CHLORIDE 0.9 % (FLUSH) 0.9 %
5-40 SYRINGE (ML) INJECTION PRN
Status: DISCONTINUED | OUTPATIENT
Start: 2025-05-16 | End: 2025-05-31 | Stop reason: HOSPADM

## 2025-05-16 RX ORDER — BUSPIRONE HYDROCHLORIDE 5 MG/1
5 TABLET ORAL 2 TIMES DAILY
COMMUNITY

## 2025-05-16 RX ORDER — POLYETHYLENE GLYCOL 3350 17 G/17G
17 POWDER, FOR SOLUTION ORAL DAILY PRN
Status: DISCONTINUED | OUTPATIENT
Start: 2025-05-16 | End: 2025-05-31 | Stop reason: HOSPADM

## 2025-05-16 RX ORDER — FUROSEMIDE 40 MG/1
60 TABLET ORAL 2 TIMES DAILY
Status: DISCONTINUED | OUTPATIENT
Start: 2025-05-16 | End: 2025-05-16

## 2025-05-16 RX ORDER — LORATADINE 10 MG/1
10 TABLET ORAL DAILY
COMMUNITY

## 2025-05-16 RX ORDER — INSULIN LISPRO 100 [IU]/ML
0-8 INJECTION, SOLUTION INTRAVENOUS; SUBCUTANEOUS
Status: DISCONTINUED | OUTPATIENT
Start: 2025-05-16 | End: 2025-05-31 | Stop reason: HOSPADM

## 2025-05-16 RX ORDER — POLYETHYLENE GLYCOL 3350 17 G/17G
17 POWDER, FOR SOLUTION ORAL DAILY PRN
Status: ON HOLD | COMMUNITY
End: 2025-05-28 | Stop reason: HOSPADM

## 2025-05-16 RX ORDER — ACETAMINOPHEN 325 MG/1
650 TABLET ORAL EVERY 6 HOURS PRN
COMMUNITY

## 2025-05-16 RX ORDER — LANOLIN ALCOHOL/MO/W.PET/CERES
400 CREAM (GRAM) TOPICAL DAILY
COMMUNITY

## 2025-05-16 RX ORDER — AMOXICILLIN 250 MG
1 CAPSULE ORAL 2 TIMES DAILY
Status: ON HOLD | COMMUNITY
End: 2025-05-28 | Stop reason: HOSPADM

## 2025-05-16 RX ORDER — SODIUM CHLORIDE 0.9 % (FLUSH) 0.9 %
5-40 SYRINGE (ML) INJECTION EVERY 12 HOURS SCHEDULED
Status: DISCONTINUED | OUTPATIENT
Start: 2025-05-16 | End: 2025-05-31 | Stop reason: HOSPADM

## 2025-05-16 RX ORDER — DEXTROSE MONOHYDRATE AND SODIUM CHLORIDE 5; .9 G/100ML; G/100ML
INJECTION, SOLUTION INTRAVENOUS CONTINUOUS
Status: DISCONTINUED | OUTPATIENT
Start: 2025-05-16 | End: 2025-05-16

## 2025-05-16 RX ORDER — METOLAZONE 5 MG/1
2.5 TABLET ORAL DAILY
Status: DISCONTINUED | OUTPATIENT
Start: 2025-05-17 | End: 2025-05-16

## 2025-05-16 RX ORDER — MENTHOL 40 MG/ML
GEL TOPICAL 3 TIMES DAILY
COMMUNITY

## 2025-05-16 RX ORDER — DULOXETIN HYDROCHLORIDE 30 MG/1
30 CAPSULE, DELAYED RELEASE ORAL DAILY
COMMUNITY

## 2025-05-16 RX ORDER — FUROSEMIDE 20 MG/1
60 TABLET ORAL 2 TIMES DAILY
Status: ON HOLD | COMMUNITY
End: 2025-05-28 | Stop reason: HOSPADM

## 2025-05-16 RX ORDER — CETIRIZINE HYDROCHLORIDE 10 MG/1
5 TABLET ORAL DAILY
Status: DISCONTINUED | OUTPATIENT
Start: 2025-05-17 | End: 2025-05-31 | Stop reason: HOSPADM

## 2025-05-16 RX ORDER — POTASSIUM CHLORIDE 1500 MG/1
20 TABLET, EXTENDED RELEASE ORAL ONCE
COMMUNITY

## 2025-05-16 RX ADMIN — DEXTROSE: 10 SOLUTION INTRAVENOUS at 18:13

## 2025-05-16 RX ADMIN — DEXTROSE AND SODIUM CHLORIDE: 5; 900 INJECTION, SOLUTION INTRAVENOUS at 20:16

## 2025-05-16 RX ADMIN — PREGABALIN 75 MG: 50 CAPSULE ORAL at 20:18

## 2025-05-16 RX ADMIN — SODIUM CHLORIDE: 0.9 INJECTION, SOLUTION INTRAVENOUS at 17:10

## 2025-05-16 RX ADMIN — DEXTROSE AND SODIUM CHLORIDE: 5; 900 INJECTION, SOLUTION INTRAVENOUS at 17:35

## 2025-05-16 ASSESSMENT — PAIN SCALES - GENERAL
PAINLEVEL_OUTOF10: 8
PAINLEVEL_OUTOF10: 0
PAINLEVEL_OUTOF10: 0

## 2025-05-16 ASSESSMENT — PAIN - FUNCTIONAL ASSESSMENT: PAIN_FUNCTIONAL_ASSESSMENT: 0-10

## 2025-05-16 NOTE — ED NOTES
ED TO INPATIENT SBAR HANDOFF    Patient Name: Nicci Hernandez   Preferred Name: Nicci  : 1960  64 y.o.   Family/Caregiver Present: no   Code Status Order: Prior  PO Status: NPO:No  Telemetry Order: Yes  C-SSRS: Risk of Suicide: No Risk  Sitter no   Restraints:     Sepsis Risk Score Sepsis V2 Risk Score: 52.2    Situation  Chief Complaint   Patient presents with    Altered Mental Status     Brief Description of Patient's Condition: fall struck head reports headache, renal function decreased compared to 1 month prior  Mental Status: disoriented and alert  Arrived from:Rehabilitation Facility  Imaging:   XR CHEST 1 VIEW   Final Result   No acute cardiopulmonary disease.         Electronically signed by Ramon Navarro MD      XR FOOT LEFT (2 VIEWS)   Final Result   1. Persistent cortical irregularity at the distal first metatarsal, without   definite acute cortical destruction, and with improved soft tissue swelling and   ulceration medial to the first MTP since the prior study.   2. Persistent bony deformity of the left fifth metatarsal distally, where   osteomyelitis has been shown.      Electronically signed by FUENTES LIVINGSTON      CT HEAD WO CONTRAST   Final Result   No acute intracranial hemorrhage, mass or infarct.                INDICATION: neck pain, fall, hx of abscess       Exam: Noncontrast CT of the cervical spine is performed with 2.5 mm collimation.   Sagittal and coronal reformatted images were also performed.      CT dose reduction was achieved through use of a standardized protocol tailored   for this examination and automatic exposure control for dose modulation.      Direct comparison is made to prior CT cervical spine dated 2025.      FINDINGS: There is no acute fracture or subluxation. C3-C6 anterior cervical   fusion postoperative changes are noted. The baseplate is anteriorly located and   there is loosening of the C3-C4 screws as seen on prior CT dated 2025.   Stable  16.3 (*)     INR 1.3 (*)     All other components within normal limits   APTT - Abnormal; Notable for the following components:    APTT 40.4 (*)     All other components within normal limits       Background  Allergies:   Allergies   Allergen Reactions    Atorvastatin Other (See Comments)     Reaction Type: Allergy; Severity: Severe; Reaction(s): myopathy  Reaction Type: Allergy; Severity: Severe; Reaction(s): myopathy     History:   Past Medical History:   Diagnosis Date    Arthritis     Bilateral lower leg cellulitis     CAD (coronary artery disease) 2009    Hx of 2 MI's and then CABG 5/2009    Cardiomyopathy, ischemic     CHF (congestive heart failure) ([facility])     CKD (chronic kidney disease)     Diabetes ([facility])     IDDM type 3    Diabetic neuropathy, painful ([facility])     Dyslipidemia     Elevated uric acid in blood     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[facility])     Gastroparesis     Hip pain, left     Hypercholesterolemia     Hypertension     Hypertension, diastolic, benign     Hypoalbuminemia     Peripheral vascular disease     Presence of stent in coronary artery in patient with coronary artery disease     S/P CABG x 3     Type 2 diabetes mellitus with hyperglycemia, with long-term current use of insulin ([facility])     Unspecified sleep apnea     no cpap repeat study sched for 9/20/13       Assessment  Vitals:    Level of Consciousness: New confusion or agitation (1)   Vitals:    05/16/25 1430 05/16/25 1500 05/16/25 1530 05/16/25 1600   BP: 124/65 112/68 115/84 125/76   Pulse: 74 70 72 71   Resp: 17 19 20 17   Temp:       TempSrc:       SpO2:   96% 95%   Weight:       Height:         Deterioration Index (DI):    Deterioration Index (DI) Interventions Performed:    O2 Flow Rate:    O2 Device:    Cardiac Rhythm:    Critical Lab Results: [unfilled]  Cultures: Cultures: 1 culture sent from PICC line  NIH Score: NIH     Active LDA's:   Peripheral IV 05/16/25 Distal;Left Forearm (Active)     Active Central Lines:

## 2025-05-16 NOTE — ED TRIAGE NOTES
Pt had fall at 0600.  Pt is usually alert and oriented with gcs of 15. Reported to strike head during fall this am. Pt has become progressively more altered throughout the day. Pt having slurred speech. Reports feeling sleepy and sluggish with headache.   Takes Plavix.    Chronic MRSA wound to lt foot.     Trauma Bravo.

## 2025-05-16 NOTE — PROGRESS NOTES
Received Order for Telemetry     Nicci Hernandez   1960   [id number]   AL (acute kidney injury) [N17.9]  Altered mental status, unspecified altered mental status type [R41.82]  AMS (altered mental status) [R41.82]   Wilder Blake MD     Tele Box # [id number] placed on patient at  1733 pm  ER Room # TRAUMA 1  Admitting to Room 566  Transferring Nurse WILL  Verified with Primary Nurse *** at {Time:[id number]}

## 2025-05-16 NOTE — H&P
Hospitalist Admission Note    NAME: Nicci Hernandez   :  1960   MRN:  [id number]     Date/Time:  2025 6:20 PM    Patient PCP: Franco Ross MD    ______________________________________________________________________  Given the patient's current clinical presentation, I have a high level of concern for decompensation if discharged from the emergency department.  Complex decision making was performed, which includes reviewing the patient's available past medical records, laboratory results, and x-ray films.       My assessment of this patient's clinical condition and my plan of care is as follows.    Assessment / Plan:  AL on CKD stage IIIb  Non anion gap metabolic acidosis  - ABG ordered  - Continue IV fluids at low rate for 1 day given HFrEF, D5 half-normal saline  - Renal ultrasound  - Avoid nephrotoxic agents, contrast  - Likely prerenal given elevated BUN/creatinine ratio, poor oral intake    UTI  Acute cystitis  - Ceftriaxone empirically  - Follow urine cultures  - Draw blood culture  - Trend WBC, Pro-Frankie, CRP  - Afebrile, no leukocytosis    DM type II  Hypoglycemia  Anorexia  - D5 for now until mental status improves  - Dietitian consult  - SLP consult  - Sliding scale insulin for now in case of hyperglycemia  - Monitor A1c    AMS, acute on chronic  Head trauma  Possible metabolic encephalopathy  -CT noncon r/o acute head bleed   -Urine drug screen ordered   -ammonia ordered   -UA consistent with UTI  -hold plavix and aspirin for 24 hours  -Teleneuro consult  - MRI brain without contrast  - Neurochecks every 4 hrs  - No appreciable neurodeficit, facial droop, tongue deviation  - Consider EEG    Retropharyngeal abscess   -CT cervical spine wo contrast shows no evidence of abscess although done without contrast  -Monitor for any changes in neck, fever, enlargement    Left bundle branch block  HFrEF  - Consistent with baseline  - Regular patient follow-up with cardiology  - Echo

## 2025-05-16 NOTE — PROGRESS NOTES
1800: Pt arrived on the unit AAO x 3 with D5NS infusing and family at bedside. VSS. Glucose rechecked as patient was hypoglycemic prior to arrival on unit. Glucose still low, so this nurse called Dr. Blake. Orders for 250mL bolus of D10. Reassessment of glucose WNL.

## 2025-05-16 NOTE — H&P
Hospitalist Admission Note    NAME: Nicci Hernandez   :  1960   MRN:  [id number]     Date/Time:  2025 5:07 PM    Patient PCP: Franco Ross MD    ______________________________________________________________________  Given the patient's current clinical presentation, I have a high level of concern for decompensation if discharged from the emergency department.  Complex decision making was performed, which includes reviewing the patient's available past medical records, laboratory results, and x-ray films.       My assessment of this patient's clinical condition and my plan of care is as follows.    Assessment / Plan:  AL  CKD stage IIIb  Acute cystitis   -ABG ordered  -Will start on ceftriaxone   -Will start on normal saline    Head trauma 2/2 AMS   -CT noncon r/o acute head bleed   -Urine drug screen ordered   -ammonia ordered   -hold plavix   -hold ASA     DMTII with neuropathy   Hyperglycemia   -POC glucose 242  -continue insulin sliding scale   -consider HA1C     Retropharyngeal abscess   -CT cervical spine wo contrast shows     -consider MRI       Code Status: Full code  DVT Prophylaxis: ***  GI Prophylaxis:***    ---------------------------------------------------------------------  [] High (any 2)    A. Problems (any 1)  [] Acute/Chronic Illness/injury posing threat to life or bodily function:    [] Severe exacerbation of chronic illness:    ---------------------------------------------------------------------  B. Risk of Treatment (any 1)   [] Drugs/treatments that require intensive monitoring for toxicity include:    [] IV ABX requiring serial renal monitoring for nephrotoxicity:     [] IV Narcotic analgesia for adverse drug reaction  [] Aggressive IV diuresis requiring serial monitoring for renal impairment and electrolyte derangements  [] Critical electrolyte abnormalities requiring IV replacement and close serial monitoring  [] Insulin - monitoring serial FSBS for Hypoglycemic

## 2025-05-16 NOTE — ED PROVIDER NOTES
[facility] EMERGENCY DEPT  EMERGENCY DEPARTMENT HISTORY AND PHYSICAL EXAM      Date of evaluation: 5/16/2025  Patient Name: Nicci Hernandez  Birthdate 1960  MRN: [id number]  ED Provider: Evans Carrizales MD   Note Started: 2:04 PM EDT 5/16/25    HISTORY OF PRESENT ILLNESS     Chief Complaint   Patient presents with   • Altered Mental Status       History Provided By: Patient, only     HPI: Nicci Hernandez is a 64 y.o. female who fell out of her bed early this morning hit the right side of her head.  She was awake alert and deferred any treatment at that point in time.  She is bedbound.  Patient caregiver said that she has had increasingly altered mental status getting progressively worse since that point in time.  She denies any fever, chills, nausea, vomiting, chest pain, shortness of breath, rash, diarrhea, headache, night sweats.    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[facility])    • Gastroparesis    • Hip pain, left    • Hypercholesterolemia    • Hypertension    • Hypertension, diastolic, benign    • Hypoalbuminemia    • Peripheral vascular disease    • Presence of stent in coronary artery in patient with coronary artery disease    • S/P CABG x 3    • Type 2 diabetes mellitus with hyperglycemia, with long-term current use of insulin ([facility])    • Unspecified sleep apnea     no cpap repeat study sched for 9/20/13       Past Surgical History:  Past Surgical History:   Procedure Laterality Date   • ANTERIOR CERVICAL DISCECTOMY W/ FUSION  2003    ANTERIOR CERVICAL FUSION

## 2025-05-17 LAB
-: NORMAL
ALBUMIN SERPL-MCNC: 2.7 G/DL (ref 3.5–5)
ALBUMIN/GLOB SERPL: 0.5 (ref 1.1–2.2)
ALP SERPL-CCNC: 78 U/L (ref 45–117)
ALT SERPL-CCNC: 12 U/L (ref 12–78)
AMMONIA PLAS-SCNC: 65 UMOL/L
AMPHET UR QL SCN: NEGATIVE
ANION GAP SERPL CALC-SCNC: 9 MMOL/L (ref 2–12)
AST SERPL W P-5'-P-CCNC: 27 U/L (ref 15–37)
BARBITURATES UR QL SCN: NEGATIVE
BASOPHILS # BLD: 0.06 K/UL (ref 0–0.1)
BASOPHILS NFR BLD: 1.1 % (ref 0–1)
BENZODIAZ UR QL: POSITIVE
BILIRUB SERPL-MCNC: 1.2 MG/DL (ref 0.2–1)
BUN SERPL-MCNC: 74 MG/DL (ref 6–20)
BUN/CREAT SERPL: 31 (ref 12–20)
CA-I BLD-MCNC: 9.6 MG/DL (ref 8.5–10.1)
CANNABINOIDS UR QL SCN: NEGATIVE
CHLORIDE SERPL-SCNC: 96 MMOL/L (ref 97–108)
CO2 SERPL-SCNC: 31 MMOL/L (ref 21–32)
COCAINE UR QL SCN: NEGATIVE
CREAT SERPL-MCNC: 2.36 MG/DL (ref 0.55–1.02)
CRP SERPL-MCNC: 1.77 MG/DL (ref 0–0.3)
DIFFERENTIAL METHOD BLD: ABNORMAL
EOSINOPHIL # BLD: 0.36 K/UL (ref 0–0.4)
EOSINOPHIL NFR BLD: 6.3 % (ref 0–7)
ERYTHROCYTE [DISTWIDTH] IN BLOOD BY AUTOMATED COUNT: 15.9 % (ref 11.5–14.5)
GLOBULIN SER CALC-MCNC: 5.3 G/DL (ref 2–4)
GLUCOSE BLD STRIP.AUTO-MCNC: 190 MG/DL (ref 65–100)
GLUCOSE BLD STRIP.AUTO-MCNC: 208 MG/DL (ref 65–100)
GLUCOSE BLD STRIP.AUTO-MCNC: 209 MG/DL (ref 65–100)
GLUCOSE BLD STRIP.AUTO-MCNC: 227 MG/DL (ref 65–100)
GLUCOSE BLD STRIP.AUTO-MCNC: 236 MG/DL (ref 65–100)
GLUCOSE SERPL-MCNC: 169 MG/DL (ref 65–100)
HAV IGM SER QL: NONREACTIVE
HBV CORE IGM SER QL: NONREACTIVE
HBV SURFACE AG SER QL: <0.1 INDEX
HBV SURFACE AG SER QL: NEGATIVE
HCT VFR BLD AUTO: 38.7 % (ref 35–47)
HCV AB SER IA-ACNC: 0.06 INDEX
HCV AB SERPL QL IA: NONREACTIVE
HGB BLD-MCNC: 11.6 G/DL (ref 11.5–16)
IMM GRANULOCYTES # BLD AUTO: 0.02 K/UL (ref 0–0.04)
IMM GRANULOCYTES NFR BLD AUTO: 0.4 % (ref 0–0.5)
LYMPHOCYTES # BLD: 0.99 K/UL (ref 0.8–3.5)
LYMPHOCYTES NFR BLD: 17.4 % (ref 12–49)
Lab: ABNORMAL
MCH RBC QN AUTO: 28.2 PG (ref 26–34)
MCHC RBC AUTO-ENTMCNC: 30 G/DL (ref 30–36.5)
MCV RBC AUTO: 93.9 FL (ref 80–99)
METHADONE UR QL: NEGATIVE
MONOCYTES # BLD: 0.51 K/UL (ref 0–1)
MONOCYTES NFR BLD: 9 % (ref 5–13)
NEUTS SEG # BLD: 3.74 K/UL (ref 1.8–8)
NEUTS SEG NFR BLD: 65.8 % (ref 32–75)
NRBC # BLD: 0 K/UL (ref 0–0.01)
NRBC BLD-RTO: 0 PER 100 WBC
OPIATES UR QL: NEGATIVE
PCP UR QL: NEGATIVE
PERFORMED BY:: ABNORMAL
PLATELET # BLD AUTO: 202 K/UL (ref 150–400)
PMV BLD AUTO: 10.7 FL (ref 8.9–12.9)
POTASSIUM SERPL-SCNC: 3.6 MMOL/L (ref 3.5–5.1)
PROCALCITONIN SERPL-MCNC: 0.28 NG/ML
PROT SERPL-MCNC: 8 G/DL (ref 6.4–8.2)
RBC # BLD AUTO: 4.12 M/UL (ref 3.8–5.2)
SODIUM SERPL-SCNC: 136 MMOL/L (ref 136–145)
WBC # BLD AUTO: 5.7 K/UL (ref 3.6–11)

## 2025-05-17 PROCEDURE — 86140 C-REACTIVE PROTEIN: CPT

## 2025-05-17 PROCEDURE — 85025 COMPLETE CBC W/AUTO DIFF WBC: CPT

## 2025-05-17 PROCEDURE — 36415 COLL VENOUS BLD VENIPUNCTURE: CPT

## 2025-05-17 PROCEDURE — 82962 GLUCOSE BLOOD TEST: CPT

## 2025-05-17 PROCEDURE — 6370000000 HC RX 637 (ALT 250 FOR IP): Performed by: SPECIALIST

## 2025-05-17 PROCEDURE — 87086 URINE CULTURE/COLONY COUNT: CPT

## 2025-05-17 PROCEDURE — 6370000000 HC RX 637 (ALT 250 FOR IP): Performed by: PHYSICIAN ASSISTANT

## 2025-05-17 PROCEDURE — 2500000003 HC RX 250 WO HCPCS: Performed by: PHYSICIAN ASSISTANT

## 2025-05-17 PROCEDURE — 2580000003 HC RX 258: Performed by: PHYSICIAN ASSISTANT

## 2025-05-17 PROCEDURE — 80074 ACUTE HEPATITIS PANEL: CPT

## 2025-05-17 PROCEDURE — 1100000000 HC RM PRIVATE

## 2025-05-17 PROCEDURE — 80307 DRUG TEST PRSMV CHEM ANLYZR: CPT

## 2025-05-17 PROCEDURE — 80053 COMPREHEN METABOLIC PANEL: CPT

## 2025-05-17 PROCEDURE — 84145 PROCALCITONIN (PCT): CPT

## 2025-05-17 PROCEDURE — G0426 INPT/ED TELECONSULT50: HCPCS | Performed by: SPECIALIST

## 2025-05-17 RX ORDER — LACTULOSE 10 G/15ML
20 SOLUTION ORAL 3 TIMES DAILY
Status: DISCONTINUED | OUTPATIENT
Start: 2025-05-17 | End: 2025-05-18

## 2025-05-17 RX ORDER — LEVETIRACETAM 250 MG/1
250 TABLET ORAL 2 TIMES DAILY
Status: DISCONTINUED | OUTPATIENT
Start: 2025-05-17 | End: 2025-05-21

## 2025-05-17 RX ADMIN — LEVETIRACETAM 250 MG: 250 TABLET, FILM COATED ORAL at 13:36

## 2025-05-17 RX ADMIN — Medication 400 MG: at 09:33

## 2025-05-17 RX ADMIN — PREGABALIN 75 MG: 50 CAPSULE ORAL at 21:17

## 2025-05-17 RX ADMIN — PREGABALIN 75 MG: 50 CAPSULE ORAL at 09:32

## 2025-05-17 RX ADMIN — CETIRIZINE HYDROCHLORIDE 5 MG: 10 TABLET, FILM COATED ORAL at 09:31

## 2025-05-17 RX ADMIN — ACETAMINOPHEN 650 MG: 325 TABLET ORAL at 17:11

## 2025-05-17 RX ADMIN — LACTULOSE 20 G: 20 SOLUTION ORAL at 15:53

## 2025-05-17 RX ADMIN — LEVETIRACETAM 250 MG: 250 TABLET, FILM COATED ORAL at 21:17

## 2025-05-17 RX ADMIN — INSULIN LISPRO 2 UNITS: 100 INJECTION, SOLUTION INTRAVENOUS; SUBCUTANEOUS at 09:31

## 2025-05-17 RX ADMIN — INSULIN LISPRO 2 UNITS: 100 INJECTION, SOLUTION INTRAVENOUS; SUBCUTANEOUS at 17:20

## 2025-05-17 RX ADMIN — CARVEDILOL 6.25 MG: 3.12 TABLET, FILM COATED ORAL at 21:17

## 2025-05-17 RX ADMIN — CARVEDILOL 6.25 MG: 3.12 TABLET, FILM COATED ORAL at 09:32

## 2025-05-17 RX ADMIN — DEXTROSE AND SODIUM CHLORIDE: 5; 900 INJECTION, SOLUTION INTRAVENOUS at 09:51

## 2025-05-17 RX ADMIN — SODIUM CHLORIDE, PRESERVATIVE FREE 10 ML: 5 INJECTION INTRAVENOUS at 09:52

## 2025-05-17 RX ADMIN — DULOXETINE 30 MG: 30 CAPSULE, DELAYED RELEASE ORAL at 09:32

## 2025-05-17 RX ADMIN — INSULIN LISPRO 2 UNITS: 100 INJECTION, SOLUTION INTRAVENOUS; SUBCUTANEOUS at 21:17

## 2025-05-17 RX ADMIN — LACTULOSE 20 G: 20 SOLUTION ORAL at 09:32

## 2025-05-17 RX ADMIN — INSULIN LISPRO 2 UNITS: 100 INJECTION, SOLUTION INTRAVENOUS; SUBCUTANEOUS at 12:16

## 2025-05-17 RX ADMIN — PANTOPRAZOLE SODIUM 40 MG: 40 TABLET, DELAYED RELEASE ORAL at 06:12

## 2025-05-17 RX ADMIN — ACETAMINOPHEN 650 MG: 325 TABLET ORAL at 01:10

## 2025-05-17 RX ADMIN — SODIUM CHLORIDE, PRESERVATIVE FREE 10 ML: 5 INJECTION INTRAVENOUS at 21:20

## 2025-05-17 ASSESSMENT — PAIN DESCRIPTION - LOCATION
LOCATION: HEAD
LOCATION: HEAD

## 2025-05-17 ASSESSMENT — PAIN SCALES - GENERAL
PAINLEVEL_OUTOF10: 0
PAINLEVEL_OUTOF10: 8
PAINLEVEL_OUTOF10: 4

## 2025-05-17 ASSESSMENT — PAIN - FUNCTIONAL ASSESSMENT: PAIN_FUNCTIONAL_ASSESSMENT: PREVENTS OR INTERFERES SOME ACTIVE ACTIVITIES AND ADLS

## 2025-05-17 ASSESSMENT — PAIN DESCRIPTION - DESCRIPTORS
DESCRIPTORS: ACHING;DISCOMFORT
DESCRIPTORS: ACHING

## 2025-05-17 ASSESSMENT — PAIN DESCRIPTION - ORIENTATION: ORIENTATION: POSTERIOR

## 2025-05-17 NOTE — PLAN OF CARE
Problem: Nutrition Deficit:  Goal: Optimize nutritional status  Flowsheets (Taken 5/17/2025 0917)  Nutrient intake appropriate for improving, restoring, or maintaining nutritional needs:   Assess nutritional status and recommend course of action   Monitor oral intake, labs, and treatment plans   Recommend appropriate diets, oral nutritional supplements, and vitamin/mineral supplements

## 2025-05-17 NOTE — PLAN OF CARE
Problem: Chronic Conditions and Co-morbidities  Goal: Patient's chronic conditions and co-morbidity symptoms are monitored and maintained or improved  5/17/2025 1202 by Sarah Smith RN  Outcome: Progressing  5/16/2025 2231 by Monica Mancera RN  Outcome: Progressing  Flowsheets  Taken 5/16/2025 2231  Care Plan - Patient's Chronic Conditions and Co-Morbidity Symptoms are Monitored and Maintained or Improved: Monitor and assess patient's chronic conditions and comorbid symptoms for stability, deterioration, or improvement  Taken 5/16/2025 1951  Care Plan - Patient's Chronic Conditions and Co-Morbidity Symptoms are Monitored and Maintained or Improved: Monitor and assess patient's chronic conditions and comorbid symptoms for stability, deterioration, or improvement     Problem: Discharge Planning  Goal: Discharge to home or other facility with appropriate resources  5/17/2025 1202 by Sarah Smith RN  Outcome: Progressing  5/16/2025 2231 by Monica Mancera RN  Outcome: Progressing  Flowsheets  Taken 5/16/2025 2231  Discharge to home or other facility with appropriate resources: Identify barriers to discharge with patient and caregiver  Taken 5/16/2025 1951  Discharge to home or other facility with appropriate resources: Identify barriers to discharge with patient and caregiver     Problem: Pain  Goal: Verbalizes/displays adequate comfort level or baseline comfort level  5/17/2025 1202 by Sarah Smith RN  Outcome: Progressing  5/16/2025 2231 by Monica Mancera RN  Outcome: Progressing  Flowsheets (Taken 5/16/2025 2231)  Verbalizes/displays adequate comfort level or baseline comfort level: Encourage patient to monitor pain and request assistance     Problem: Skin/Tissue Integrity  Goal: Skin integrity remains intact  Description: 1.  Monitor for areas of redness and/or skin breakdown2.  Assess vascular access sites hourly3.  Every 4-6 hours minimum:  Change oxygen saturation probe

## 2025-05-17 NOTE — CONSULTS
Comprehensive Nutrition Assessment    Type and Reason for Visit:  Initial, Consult    Nutrition Recommendations/Plan:   Consider upgrading diet to Reg/thin liqs to promote improved PO intakes  Pt prefers salads, grilled cheese w/ tomato soup, chix salad w/ crackers  Add Ensure HP 1x/d   Add Chris BID (180kcal, 5gpro)  Manage BG w/ sliding scale to 140-180  Monitor/document wt, BM, PO+ONS%, fluid status in I/O     Malnutrition Assessment:  Malnutrition Status:  Moderate malnutrition (05/17/25 1307)    Context:  Acute Illness     Findings of the 6 clinical characteristics of malnutrition:  Energy Intake:  75% or less of estimated energy requirements for 7 or more days  Weight Loss:  Greater than 5% over 1 month (2/2 fluid status)     Body Fat Loss:  No body fat loss     Muscle Mass Loss:  No muscle mass loss    Fluid Accumulation:  Mild Extremities   Strength:  Not Performed    Nutrition Assessment:    Admit for AMS. Consulted for poor PO/appetite >5 days pta. Recent admit x1mo ago where RD followed t/o admit d/t poor PO intakes. Prev rec'd ONS 4x/d d/t pt report of difficulty chewing 2/2 pain from neck, back. Spoke w/ pt and daughter who reported appetite beginning to improve. Stated appetite poor since March d/t illness. No notable wt changes for pt/daughter; per EMR, ~45# wt loss x1mo 2/2 fluid shifts. Pt now back to UBW, still w/ mild edema. Fluid likely masking true wt loss. Provided diet edu on current SBS diet order- pt prefers to be switched back to Reg/thins; provided diet preferences of salads, grilled cheese w/ tomato soup, chix salad w/ crackers. Reported consuming 100% bfast this AM. Observed eating lunch- stated chix salad dry. Plans to add ONS while appetite continues to improve to meet EERs, promote wound healing. Labs: Cl 96, BUN 74, Cre 2.36, eGFR 22, -227, Alb, 2.7. Meds: carvdelilol, humalog, lactulose, MagOx, protonix    Nutrition Related Findings:    NFPE deferred d/t contact iso. Pt

## 2025-05-17 NOTE — PROGRESS NOTES
4 Eyes Skin Assessment     NAME:  Nicci Hernandez  YOB: 1960  MEDICAL RECORD NUMBER:  [id number]    The patient is being assessed for  Admission    I agree that at least one RN has performed a thorough Head to Toe Skin Assessment on the patient. ALL assessment sites listed below have been assessed.      Areas assessed by both nurses:    Head, Face, Ears, Shoulders, Back, Chest, Arms, Elbows, Hands, Sacrum. Buttock, Coccyx, Ischium, and Legs. Feet and Heels        Does the Patient have a Wound? No noted wound(s)       Rupert Prevention initiated by RN: Yes  Wound Care Orders initiated by RN: Yes    Pressure Injury (Stage 3,4, Unstageable, DTI, NWPT, and Complex wounds) if present, place Wound referral order by RN under : Yes    New Ostomies, if present place, Ostomy referral order under : No     Nurse 1 eSignature: Electronically signed by Monica Mancera RN on 5/16/25 at 11:38 PM EDT    **SHARE this note so that the co-signing nurse can place an eSignature**    Nurse 2 eSignature: Electronically signed by Tremaine Hartman RN on 5/17/25 at 12:51 AM EDT

## 2025-05-17 NOTE — PLAN OF CARE
Problem: Chronic Conditions and Co-morbidities  Goal: Patient's chronic conditions and co-morbidity symptoms are monitored and maintained or improved  Outcome: Progressing  Flowsheets (Taken 5/16/2025 2231)  Care Plan - Patient's Chronic Conditions and Co-Morbidity Symptoms are Monitored and Maintained or Improved: Monitor and assess patient's chronic conditions and comorbid symptoms for stability, deterioration, or improvement     Problem: Discharge Planning  Goal: Discharge to home or other facility with appropriate resources  Outcome: Progressing  Flowsheets (Taken 5/16/2025 2231)  Discharge to home or other facility with appropriate resources: Identify barriers to discharge with patient and caregiver     Problem: Pain  Goal: Verbalizes/displays adequate comfort level or baseline comfort level  Outcome: Progressing  Flowsheets (Taken 5/16/2025 2231)  Verbalizes/displays adequate comfort level or baseline comfort level: Encourage patient to monitor pain and request assistance     Problem: Skin/Tissue Integrity  Goal: Skin integrity remains intact  Description: 1.  Monitor for areas of redness and/or skin breakdown2.  Assess vascular access sites hourly3.  Every 4-6 hours minimum:  Change oxygen saturation probe site4.  Every 4-6 hours:  If on nasal continuous positive airway pressure, respiratory therapy assess nares and determine need for appliance change or resting period  Outcome: Progressing  Flowsheets (Taken 5/16/2025 2231)  Skin Integrity Remains Intact: Monitor for areas of redness and/or skin breakdown     Problem: Safety - Adult  Goal: Free from fall injury  Outcome: Progressing  Flowsheets (Taken 5/16/2025 2231)  Free From Fall Injury: Instruct family/caregiver on patient safety     Problem: ABCDS Injury Assessment  Goal: Absence of physical injury  Outcome: Progressing  Flowsheets (Taken 5/16/2025 2231)  Absence of Physical Injury: Implement safety measures based on patient assessment

## 2025-05-17 NOTE — PROGRESS NOTES
Pulmonary effort is normal. No respiratory distress.      Breath sounds: Normal breath sounds. No wheezing or rales.   Abdominal:      General: Abdomen is flat. Bowel sounds are normal. There is no distension.      Palpations: Abdomen is soft.      Tenderness: There is no abdominal tenderness. There is no guarding.   Musculoskeletal:         General: Swelling present.      Cervical back: Normal range of motion and neck supple. Tenderness present.      Comments: Left foot/lower leg swelling secondary to ulcer on the 1st toe that is healing.   Feet:      Right foot:      Skin integrity: Erythema present.      Left foot:      Skin integrity: Ulcer and erythema present.   Skin:     General: Skin is warm and dry.   Neurological:      General: No focal deficit present.      Mental Status: She is alert and oriented to person, place, and time.      Motor: Weakness present.   Psychiatric:         Mood and Affect: Mood normal.         Behavior: Behavior normal.         Thought Content: Thought content normal.         Judgment: Judgment normal.          Reviewed most current lab test results and cultures  YES  Reviewed most current radiology test results   YES  Review and summation of old records today    NO  Reviewed patient's current orders and MAR    YES  PMH/SH reviewed - no change compared to H&P  ________________________________________________________________________  Care Plan discussed with:    Comments   Patient x    Family      RN x    Care Manager     Consultant                        Multidiciplinary team rounds were held today with , nursing, pharmacist and clinical coordinator.  Patient's plan of care was discussed; medications were reviewed and discharge planning was addressed.     ________________________________________________________________________  Total NON critical care TIME:  35  Minutes    Total CRITICAL CARE TIME Spent:   Minutes non procedure based      Comments   >50% of visit spent in  counseling and coordination of care     ________________________________________________________________________  Ethan Palm     Procedures: see electronic medical records for all procedures/Xrays and details which were not copied into this note but were reviewed prior to creation of Plan.      LABS:  I reviewed today's most current labs and imaging studies.  Pertinent labs include:  Recent Labs     05/16/25  1344 05/17/25  0550   WBC 5.5 5.7   HGB 11.5 11.6   HCT 37.9 38.7    202     Recent Labs     05/16/25  1344 05/17/25  0550    136   K 3.8 3.6   CL 92* 96*   CO2 36* 31   BUN 82* 74*   ALT 15 12   INR 1.3*  --        Signed: Ethan Palm

## 2025-05-17 NOTE — CONSULTS
Name: Nicci Hernandez  : 1960  MRN: [id number]  CSN: [id number]    Consult Date:  2025  Referring Physician:  Flores Talavera MD           [facility] TELE-NEUROLOGY CONSULTATION      IMPRESSION:    Altered mental status: This appears to be the result of systemic issues including medication side effects, metabolic encephalopathy and infectious processes.  I do not see strong evidence to suggest there has been a primary neurologic insult in the last day.  Indeed, the patient's cognitive and behavioral problems have actually been present for almost a year and have simply worsened over the last 2 months.      RECOMMENDATIONS:    MRI is pending.  Please call the teleneurology service with any findings of acute or potentially progressive lesions  Patient needs to follow-up with a neurologist to see if there is an underlying neurodegenerative process      My impressions and recommendations were discussed with the patient and her family.  I answered questions to the best of my ability until they expressed satisfaction.    There are no further neurological recommendations at this time.  If there are any changes in clinical history or examination please do not hesitate to call the teleneurology consultation service for routine reconsultation.  Thank you for allowing me to participate in the care of this patient.      ========================================  Reason for Consult  I have been asked to see this patient in neurological consultation to render advice and opinion regarding altered mental status      History of Present Illness  I reviewed the medical records. I have spoken with the patient and her daughter and son-in-law who are at her bedside for further history.    This 64-year-old woman has a pharyngeal abscess and infected left foot.  She was admitted due to altered mental status and a fall.    The patient and her family say that her mental status has been a problem for 2 or 3 months.  Since at  Reaction(s): myopathy  Reaction Type: Allergy; Severity: Severe; Reaction(s): myopathy         Examination  /67   Pulse 72   Temp 97.5 °F (36.4 °C) (Oral)   Resp 16   Ht 1.575 m (5' 2.01\")   Wt 89.4 kg (197 lb)   SpO2 95%   BMI 36.02 kg/m²     NEUROLOGICAL EXAMINATION:    This examination was undertaking with the assistance of a trained teleneurology liaison, Jacey Schaefer RN    MENTAL STATUS:  Alert and oriented to person, place  and time. Obeyed simple and complex commands.  Immediate recall, short term  and long term memory all examined and were normal.  LANGUAGE:  Intact to comprehension, fluency, naming, repetition  CRANIAL NERVES:  Pupils equal, round and reactive to light   Extraocular muscles intact.  Visual fields full.     Face symmetric. Face normal sensation.  No Dysarthria.    Normal hearing.   Normal neck  flexion strength.  Normal shoulder shrug.  Tongue midline.  MOTOR:  Normal bulk and tone.  No atrophy or fasciculations.  Strength normal  in all extremities at 5/5.  DEEP TENDON REFLEXES:  Normal and symmetric bilaterally.  No Babinski's  present.  COORDINATION:  Finger-to-nose, rapid alternating movement and heel-to-shin  normal bilaterally.  SENSORY:  Intact to light touch  GAIT: not assessed due to patient safety.       As above with the following exceptions:  She knew she was in [facility] and it was 2025 but believed the month was April.  She had a little bit of trouble remembering a few words, but on direct aphasia testing did quite well.  She felt there was slightly decreased sensation in the right calf compared to the left.  There was drift in both lower extremities bilaterally.    Lab Review  Recent Results (from the past 24 hours)   Urinalysis with Microscopic    Collection Time: 05/16/25  4:09 PM   Result Value Ref Range    Color, UA Yellow/Straw      Appearance Clear Clear      Specific Gravity, UA 1.009 1.003 - 1.030      pH, Urine 8.0 5.0 - 8.0

## 2025-05-17 NOTE — CARE COORDINATION
25 1017   Service Assessment   Patient Orientation Alert and Oriented   Cognition Alert   History Provided By Patient   Primary Caregiver Self   Accompanied By/Relationship alone   Support Systems Family Members;Children   Patient's Healthcare Decision Maker is: Legal Next of Kin   PCP Verified by CM Yes  (Dr. Song last seen about 4 months ago)   Last Visit to PCP Within last 6 months   Prior Functional Level Assistance with the following:;Mobility;Independent in ADLs/IADLs   Current Functional Level Assistance with the following:;Mobility;Independent in ADLs/IADLs   Can patient return to prior living arrangement Yes   Ability to make needs known: Good   Family able to assist with home care needs: Yes   Would you like for me to discuss the discharge plan with any other family members/significant others, and if so, who? Yes  (daughter)   Financial Resources Medicare   Community Resources None   CM/SW Referral Other (see comment)  (dc planning)   Social/Functional History   Lives With Alone  (patient reports  buried 2025)   Type of Home House   Home Layout One level   Bathroom Equipment None   Home Equipment Walker - Rolling   Discharge Planning   Patient expects to be discharged to: House     CM met with pt at bedside to verify demographics on facesheet were correct.     Pt lives alone in a single story house with ramped entrance.   Pt reports she needs assistance with ADLs at  baseline.     DME: rw  Pt reports currently on services with [facility] , but also reports she is at [facility].     RX: [place] home delivery    Advance Care Planning   Healthcare Decision Maker:    Primary Decision Maker: Maurilio Hernandez-DO NOT CALL, - Spouse - [phone]    Secondary Decision Maker: Narcisa High - Child - [phone]    Click here to complete Healthcare Decision Makers including selection of the Healthcare Decision Maker Relationship (ie \"Primary\").

## 2025-05-18 ENCOUNTER — HOSPITAL ENCOUNTER (INPATIENT)
Facility: HOSPITAL | Age: 65
Discharge: HOME | End: 2025-05-21
Payer: MEDICARE

## 2025-05-18 LAB
ALBUMIN SERPL-MCNC: 2.8 G/DL (ref 3.5–5)
ALBUMIN/GLOB SERPL: 0.5 (ref 1.1–2.2)
ALP SERPL-CCNC: 85 U/L (ref 45–117)
ALT SERPL-CCNC: 16 U/L (ref 12–78)
AMMONIA PLAS-SCNC: 21 UMOL/L
ANION GAP SERPL CALC-SCNC: 8 MMOL/L (ref 2–12)
AST SERPL W P-5'-P-CCNC: 30 U/L (ref 15–37)
BASOPHILS # BLD: 0.05 K/UL (ref 0–0.1)
BASOPHILS NFR BLD: 0.8 % (ref 0–1)
BILIRUB SERPL-MCNC: 1.2 MG/DL (ref 0.2–1)
BNP SERPL-MCNC: 6016 PG/ML
BUN SERPL-MCNC: 68 MG/DL (ref 6–20)
BUN/CREAT SERPL: 27 (ref 12–20)
CA-I BLD-MCNC: 9.8 MG/DL (ref 8.5–10.1)
CHLORIDE SERPL-SCNC: 97 MMOL/L (ref 97–108)
CHLORIDE UR-SCNC: 70 MMOL/L
CO2 SERPL-SCNC: 29 MMOL/L (ref 21–32)
CREAT SERPL-MCNC: 2.5 MG/DL (ref 0.55–1.02)
DIFFERENTIAL METHOD BLD: ABNORMAL
EKG ATRIAL RATE: 70 BPM
EKG DIAGNOSIS: NORMAL
EKG P AXIS: 48 DEGREES
EKG P-R INTERVAL: 192 MS
EKG Q-T INTERVAL: 480 MS
EKG QRS DURATION: 148 MS
EKG QTC CALCULATION (BAZETT): 518 MS
EKG R AXIS: -53 DEGREES
EKG T AXIS: 138 DEGREES
EKG VENTRICULAR RATE: 70 BPM
EOSINOPHIL # BLD: 0.33 K/UL (ref 0–0.4)
EOSINOPHIL NFR BLD: 5.2 % (ref 0–7)
ERYTHROCYTE [DISTWIDTH] IN BLOOD BY AUTOMATED COUNT: 16 % (ref 11.5–14.5)
GLOBULIN SER CALC-MCNC: 5.6 G/DL (ref 2–4)
GLUCOSE BLD STRIP.AUTO-MCNC: 180 MG/DL (ref 65–100)
GLUCOSE BLD STRIP.AUTO-MCNC: 188 MG/DL (ref 65–100)
GLUCOSE BLD STRIP.AUTO-MCNC: 194 MG/DL (ref 65–100)
GLUCOSE BLD STRIP.AUTO-MCNC: 278 MG/DL (ref 65–100)
GLUCOSE SERPL-MCNC: 179 MG/DL (ref 65–100)
HCT VFR BLD AUTO: 40.9 % (ref 35–47)
HGB BLD-MCNC: 12 G/DL (ref 11.5–16)
IMM GRANULOCYTES # BLD AUTO: 0.03 K/UL (ref 0–0.04)
IMM GRANULOCYTES NFR BLD AUTO: 0.5 % (ref 0–0.5)
LYMPHOCYTES # BLD: 0.91 K/UL (ref 0.8–3.5)
LYMPHOCYTES NFR BLD: 14.2 % (ref 12–49)
MCH RBC QN AUTO: 28.2 PG (ref 26–34)
MCHC RBC AUTO-ENTMCNC: 29.3 G/DL (ref 30–36.5)
MCV RBC AUTO: 96 FL (ref 80–99)
MONOCYTES # BLD: 0.39 K/UL (ref 0–1)
MONOCYTES NFR BLD: 6.1 % (ref 5–13)
NEUTS SEG # BLD: 4.68 K/UL (ref 1.8–8)
NEUTS SEG NFR BLD: 73.2 % (ref 32–75)
NRBC # BLD: 0 K/UL (ref 0–0.01)
NRBC BLD-RTO: 0 PER 100 WBC
PERFORMED BY:: ABNORMAL
PLATELET # BLD AUTO: 201 K/UL (ref 150–400)
PMV BLD AUTO: 11.2 FL (ref 8.9–12.9)
POTASSIUM SERPL-SCNC: 3.7 MMOL/L (ref 3.5–5.1)
PROCALCITONIN SERPL-MCNC: 0.26 NG/ML
PROT SERPL-MCNC: 8.4 G/DL (ref 6.4–8.2)
RBC # BLD AUTO: 4.26 M/UL (ref 3.8–5.2)
SODIUM SERPL-SCNC: 134 MMOL/L (ref 136–145)
SODIUM UR-SCNC: 78 MMOL/L
WBC # BLD AUTO: 6.4 K/UL (ref 3.6–11)

## 2025-05-18 PROCEDURE — 97161 PT EVAL LOW COMPLEX 20 MIN: CPT

## 2025-05-18 PROCEDURE — 6360000002 HC RX W HCPCS: Performed by: PHYSICIAN ASSISTANT

## 2025-05-18 PROCEDURE — 80053 COMPREHEN METABOLIC PANEL: CPT

## 2025-05-18 PROCEDURE — 83880 ASSAY OF NATRIURETIC PEPTIDE: CPT

## 2025-05-18 PROCEDURE — 97530 THERAPEUTIC ACTIVITIES: CPT

## 2025-05-18 PROCEDURE — 6370000000 HC RX 637 (ALT 250 FOR IP): Performed by: PHYSICIAN ASSISTANT

## 2025-05-18 PROCEDURE — 85025 COMPLETE CBC W/AUTO DIFF WBC: CPT

## 2025-05-18 PROCEDURE — 84300 ASSAY OF URINE SODIUM: CPT

## 2025-05-18 PROCEDURE — 82962 GLUCOSE BLOOD TEST: CPT

## 2025-05-18 PROCEDURE — 82140 ASSAY OF AMMONIA: CPT

## 2025-05-18 PROCEDURE — 1100000000 HC RM PRIVATE

## 2025-05-18 PROCEDURE — 2580000003 HC RX 258: Performed by: PHYSICIAN ASSISTANT

## 2025-05-18 PROCEDURE — 84145 PROCALCITONIN (PCT): CPT

## 2025-05-18 PROCEDURE — 36415 COLL VENOUS BLD VENIPUNCTURE: CPT

## 2025-05-18 PROCEDURE — 82436 ASSAY OF URINE CHLORIDE: CPT

## 2025-05-18 PROCEDURE — 70551 MRI BRAIN STEM W/O DYE: CPT

## 2025-05-18 PROCEDURE — 6370000000 HC RX 637 (ALT 250 FOR IP): Performed by: SPECIALIST

## 2025-05-18 PROCEDURE — 6370000000 HC RX 637 (ALT 250 FOR IP): Performed by: INTERNAL MEDICINE

## 2025-05-18 PROCEDURE — 2500000003 HC RX 250 WO HCPCS: Performed by: PHYSICIAN ASSISTANT

## 2025-05-18 RX ORDER — SODIUM CHLORIDE 9 MG/ML
INJECTION, SOLUTION INTRAVENOUS CONTINUOUS
Status: DISPENSED | OUTPATIENT
Start: 2025-05-18 | End: 2025-05-19

## 2025-05-18 RX ORDER — MIDODRINE HYDROCHLORIDE 5 MG/1
5 TABLET ORAL
Status: DISCONTINUED | OUTPATIENT
Start: 2025-05-18 | End: 2025-05-24

## 2025-05-18 RX ORDER — LACTULOSE 10 G/15ML
10 SOLUTION ORAL 2 TIMES DAILY
Status: DISCONTINUED | OUTPATIENT
Start: 2025-05-18 | End: 2025-05-18

## 2025-05-18 RX ADMIN — PREGABALIN 75 MG: 50 CAPSULE ORAL at 09:39

## 2025-05-18 RX ADMIN — LEVETIRACETAM 250 MG: 250 TABLET, FILM COATED ORAL at 21:27

## 2025-05-18 RX ADMIN — SODIUM CHLORIDE: 0.9 INJECTION, SOLUTION INTRAVENOUS at 09:59

## 2025-05-18 RX ADMIN — DULOXETINE 30 MG: 30 CAPSULE, DELAYED RELEASE ORAL at 09:39

## 2025-05-18 RX ADMIN — Medication 400 MG: at 09:39

## 2025-05-18 RX ADMIN — CEFEPIME 1000 MG: 1 INJECTION, POWDER, FOR SOLUTION INTRAMUSCULAR; INTRAVENOUS at 10:36

## 2025-05-18 RX ADMIN — INSULIN LISPRO 2 UNITS: 100 INJECTION, SOLUTION INTRAVENOUS; SUBCUTANEOUS at 12:02

## 2025-05-18 RX ADMIN — PANTOPRAZOLE SODIUM 40 MG: 40 TABLET, DELAYED RELEASE ORAL at 06:24

## 2025-05-18 RX ADMIN — SODIUM CHLORIDE, PRESERVATIVE FREE 10 ML: 5 INJECTION INTRAVENOUS at 21:27

## 2025-05-18 RX ADMIN — CARVEDILOL 6.25 MG: 3.12 TABLET, FILM COATED ORAL at 09:39

## 2025-05-18 RX ADMIN — INSULIN LISPRO 4 UNITS: 100 INJECTION, SOLUTION INTRAVENOUS; SUBCUTANEOUS at 17:41

## 2025-05-18 RX ADMIN — MIDODRINE HYDROCHLORIDE 5 MG: 5 TABLET ORAL at 17:41

## 2025-05-18 RX ADMIN — SODIUM CHLORIDE, PRESERVATIVE FREE 10 ML: 5 INJECTION INTRAVENOUS at 12:02

## 2025-05-18 RX ADMIN — INSULIN LISPRO 2 UNITS: 100 INJECTION, SOLUTION INTRAVENOUS; SUBCUTANEOUS at 09:39

## 2025-05-18 RX ADMIN — LEVETIRACETAM 250 MG: 250 TABLET, FILM COATED ORAL at 09:39

## 2025-05-18 RX ADMIN — CARVEDILOL 6.25 MG: 3.12 TABLET, FILM COATED ORAL at 21:27

## 2025-05-18 RX ADMIN — PREGABALIN 75 MG: 50 CAPSULE ORAL at 21:27

## 2025-05-18 RX ADMIN — CETIRIZINE HYDROCHLORIDE 5 MG: 10 TABLET, FILM COATED ORAL at 09:39

## 2025-05-18 RX ADMIN — INSULIN LISPRO 2 UNITS: 100 INJECTION, SOLUTION INTRAVENOUS; SUBCUTANEOUS at 21:32

## 2025-05-18 ASSESSMENT — PAIN SCALES - GENERAL: PAINLEVEL_OUTOF10: 10

## 2025-05-18 NOTE — PROGRESS NOTES
Spiritual Health History and Assessment/Progress Note  [facility]    Loneliness/Social Isolation,  ,  ,      Name: Nicci Hernandez MRN: [id number]    Age: 64 y.o.     Sex: female   Language: English   Mandaeism: Orthodoxy   AMS (altered mental status)     Date: 5/18/2025            Total Time Calculated: 12 min              Spiritual Assessment began in [facility]        Referral/Consult From: Rounding   Encounter Overview/Reason: Loneliness/Social Isolation  Service Provided For: Patient    Abeba, Belief, Meaning:   Patient unable to assess at this time  Family/Friends No family/friends present      Importance and Influence:  Patient unable to assess at this time  Family/Friends No family/friends present    Community:  Patient Other: unable to assess  Family/Friends No family/friends present    Assessment and Plan of Care:     Patient Interventions include: Other: unable to assess  Family/Friends Interventions include: No family/friends present    Patient Plan of Care: Spiritual Care available upon further referral  Family/Friends Plan of Care: Spiritual Care available upon further referral     is attempting an initial spiritual assessment with the patient in 566. She was sleeping soundly at the time. She did not awaken to the 's prompts.  left a pastoral care note card providing assurance of support and  availability.     Electronically signed by KENNEY SKELTON on 5/18/2025 at 11:51 AM

## 2025-05-18 NOTE — PLAN OF CARE
Problem: Chronic Conditions and Co-morbidities  Goal: Patient's chronic conditions and co-morbidity symptoms are monitored and maintained or improved  5/18/2025 1128 by Sarah Smith RN  Outcome: Progressing  5/17/2025 2219 by Kate Villalta RN  Outcome: Progressing  Flowsheets (Taken 5/17/2025 1945)  Care Plan - Patient's Chronic Conditions and Co-Morbidity Symptoms are Monitored and Maintained or Improved:   Monitor and assess patient's chronic conditions and comorbid symptoms for stability, deterioration, or improvement   Collaborate with multidisciplinary team to address chronic and comorbid conditions and prevent exacerbation or deterioration     Problem: Discharge Planning  Goal: Discharge to home or other facility with appropriate resources  5/18/2025 1128 by Sarah Smith RN  Outcome: Progressing  5/17/2025 2219 by Kate Villalta RN  Outcome: Progressing  Flowsheets (Taken 5/17/2025 1945)  Discharge to home or other facility with appropriate resources:   Identify barriers to discharge with patient and caregiver   Arrange for needed discharge resources and transportation as appropriate   Identify discharge learning needs (meds, wound care, etc)     Problem: Pain  Goal: Verbalizes/displays adequate comfort level or baseline comfort level  5/18/2025 1128 by Sarah Smith RN  Outcome: Progressing  5/17/2025 2219 by Kate Villalta RN  Outcome: Progressing     Problem: Skin/Tissue Integrity  Goal: Skin integrity remains intact  Description: 1.  Monitor for areas of redness and/or skin breakdown2.  Assess vascular access sites hourly3.  Every 4-6 hours minimum:  Change oxygen saturation probe site4.  Every 4-6 hours:  If on nasal continuous positive airway pressure, respiratory therapy assess nares and determine need for appliance change or resting period  5/18/2025 1128 by Sarah Smith RN  Outcome: Progressing  5/17/2025 2219 by Kate Villalta

## 2025-05-18 NOTE — CONSULTS
NAME:  Nicci Hernandez   :   1960   MRN:   [id number]     ATTENDING: Wilder Blake MD  PCP:  Franco Ross MD    Date/Time:  2025       Subjective:   REQUESTING PHYSICIAN: Basil Whelan  REASON FOR CONSULT:    AL on CKD, h/o HFrEF    History of presenting illness:  64-year-old female with a past medical history significant for CKD 3B, type 2 diabetes mellitus, CAD s/p CABG, hypertension, and hyperlipidemia, cardiomyopathy, history of stroke, gastroparesis, PVD, ODALYS, history of cervical fusion about 20 years ago.    Presented to ED on  with altered mental status acute on chronic following ground-level fall on 2025.  Creatinine in 2025 was 1.57, most recently on 2025 creatinine was 1.8.  Similar presentation and hospitalization last month.    In ED she was hemodynamically stable.  Labs showed a creatinine of 2.74, improved initially yesterday to 2.36 up to 2.5 today.  For which nephrology service consulted.     She received multiple noncontrasted studies since admission including head CT and CT cervical spine.  Kidney ultrasound without hydronephrosis bilaterally right kidney appears smaller at 8.2 cm, left kidney 11.6 cm.  She was started this morning on normal saline at 75 cc an hour.    I saw patient in her room this afternoon.  She was sleeping with food in front of her untouched.  Awakened to voice.  Reported feeling well.  Reported having diarrhea all day yesterday from lactulose which is currently discontinued.  She denied nausea, vomiting, abdominal pain.      Past Medical History:   Diagnosis Date    Arthritis     Bilateral lower leg cellulitis     CAD (coronary artery disease)     Hx of 2 MI's and then CABG 2009    Cardiomyopathy, ischemic     CHF (congestive heart failure) (HCC)     CKD (chronic kidney disease)     Diabetes (HCC)     IDDM type 3    Diabetic neuropathy, painful (HCC)     Dyslipidemia     Elevated uric acid in blood     Gastroparalysis due to  Potassium 3.7 3.5 - 5.1 mmol/L    Chloride 97 97 - 108 mmol/L    CO2 29 21 - 32 mmol/L    Anion Gap 8 2 - 12 mmol/L    Glucose 179 (H) 65 - 100 mg/dL    BUN 68 (H) 6 - 20 mg/dL    Creatinine 2.50 (H) 0.55 - 1.02 mg/dL    BUN/Creatinine Ratio 27 (H) 12 - 20      Est, Glom Filt Rate 21 (L) >60 ml/min/1.73m2    Calcium 9.8 8.5 - 10.1 mg/dL    Total Bilirubin 1.2 (H) 0.2 - 1.0 mg/dL    AST 30 15 - 37 U/L    ALT 16 12 - 78 U/L    Alk Phosphatase 85 45 - 117 U/L    Total Protein 8.4 (H) 6.4 - 8.2 g/dL    Albumin 2.8 (L) 3.5 - 5.0 g/dL    Globulin 5.6 (H) 2.0 - 4.0 g/dL    Albumin/Globulin Ratio 0.5 (L) 1.1 - 2.2     Procalcitonin    Collection Time: 05/18/25  6:42 AM   Result Value Ref Range    Procalcitonin 0.26 (H) 0 ng/mL   Ammonia    Collection Time: 05/18/25  6:42 AM   Result Value Ref Range    Ammonia 21 <32 umol/L   Brain Natriuretic Peptide    Collection Time: 05/18/25  6:42 AM   Result Value Ref Range    NT Pro-BNP 6,016 (H) <125 pg/mL   POCT Glucose    Collection Time: 05/18/25  9:02 AM   Result Value Ref Range    POC Glucose 194 (H) 65 - 100 mg/dL    Performed by: BRONWYN REDDY    POCT Glucose    Collection Time: 05/18/25 11:13 AM   Result Value Ref Range    POC Glucose 180 (H) 65 - 100 mg/dL    Performed by: BRONWYN REDDY        Assessment and plan:   64-year-old female with a past medical history significant for CKD 3B, type 2 diabetes mellitus, CAD s/p CABG, hypertension, and hyperlipidemia, cardiomyopathy, history of stroke, gastroparesis, PVD, ODALYS, history of cervical fusion about 20 years ago.  Presented to ED on 5/16 with altered mental status acute following ground-level fall. Nephrology consulted for AL on CKD.    AL on CKD 3B  -Creatinine admission at 2.74-- > 2.36---> 2.50 today  - Most likely prerenal in etiology, bump in creatinine today most likely due to diarrhea she experienced yesterday following lactulose -patient reported having diarrhea \"all day\" plus poor p.o. intake given AMS on

## 2025-05-18 NOTE — THERAPY EVALUATION
PHYSICAL THERAPY EVALUATION  Patient: Nicci Hernandez (64 y.o. female)  Date: 5/18/2025  Primary Diagnosis: AL (acute kidney injury) [N17.9]  Altered mental status, unspecified altered mental status type [R41.82]  AMS (altered mental status) [R41.82]       Precautions: Restrictions/Precautions  Restrictions/Precautions: Fall Risk, Contact Precautions     Recommendations for nursing mobility: Out of bed to chair for meals, Use of BSC for toileting , AD and gt belt for bed to chair , and Assist x1    In place during session: External Catheter and EKG/telemetry     ASSESSMENT  Pt is a 64 y.o. female admitted on 5/16/2025 for AMS with GLF; pt currently being treated for AL on CKD, UTI, acute cystitis, DMII, hypoglycemia, anorexia, AMS acute on chronic, head trauma, metabolic encephalopathy, retropharyngeal abscess, left foot chronic infection, HTN, peripheral neuropathy. Head CT showed no acute IC hemorrhage, mass or infarct. CT cervical spine showed no acute fracture or subluxation. Pt semi-supine in bed upon PT arrival, agreeable to evaluation. Pt A&O x 4.     Based on the objective data described below, the patient currently presents with impaired functional mobility, decreased independence in ADLs, decreased ROM, impaired strength, impaired sensation, decreased activity tolerance, impaired balance, and impaired posture. (See below for objective details and assist levels).     Overall pt tolerated session fair today with no c/o pain throughout session. Pt required mod to max A with additional time for bed mobility and transfers. Pt amb 2 feet sidestepping to HOB with RW, gt belt and mod to max A; demonstrates NBOS with short, shuffling gt pattern with left knee buckling noted with left stance phase. Pt demonstrates intact and equal coordination bilateral UE and LE, right LE MMT grossly 4/5, left LE MMT grossly 3+/5, sensation intact light touch LE, UE and face but reports paresthesia tips of all fingers, intact and

## 2025-05-18 NOTE — PLAN OF CARE
Problem: Chronic Conditions and Co-morbidities  Goal: Patient's chronic conditions and co-morbidity symptoms are monitored and maintained or improved  5/17/2025 2219 by Kate Villalta RN  Outcome: Progressing  Flowsheets (Taken 5/17/2025 1945)  Care Plan - Patient's Chronic Conditions and Co-Morbidity Symptoms are Monitored and Maintained or Improved:   Monitor and assess patient's chronic conditions and comorbid symptoms for stability, deterioration, or improvement   Collaborate with multidisciplinary team to address chronic and comorbid conditions and prevent exacerbation or deterioration  5/17/2025 1202 by Sarah Smith RN  Outcome: Progressing     Problem: Discharge Planning  Goal: Discharge to home or other facility with appropriate resources  5/17/2025 2219 by Kate Villalta RN  Outcome: Progressing  Flowsheets (Taken 5/17/2025 1945)  Discharge to home or other facility with appropriate resources:   Identify barriers to discharge with patient and caregiver   Arrange for needed discharge resources and transportation as appropriate   Identify discharge learning needs (meds, wound care, etc)  5/17/2025 1202 by Sarah Smith RN  Outcome: Progressing     Problem: Pain  Goal: Verbalizes/displays adequate comfort level or baseline comfort level  5/17/2025 2219 by Kate Villalta RN  Outcome: Progressing  5/17/2025 1202 by Sarah Smith RN  Outcome: Progressing     Problem: Skin/Tissue Integrity  Goal: Skin integrity remains intact  Description: 1.  Monitor for areas of redness and/or skin breakdown2.  Assess vascular access sites hourly3.  Every 4-6 hours minimum:  Change oxygen saturation probe site4.  Every 4-6 hours:  If on nasal continuous positive airway pressure, respiratory therapy assess nares and determine need for appliance change or resting period  5/17/2025 2219 by Kate Villalta, RN  Outcome: Progressing  Flowsheets (Taken 5/17/2025 1945)  Skin

## 2025-05-18 NOTE — PROGRESS NOTES
Hospitalist Progress Note    NAME:   Nicci Hernandez   : 1960   MRN: [id number]     Date/Time: 2025 10:26 AM  Patient PCP: Franco Ross MD      Hospital course:  Nicci Hernandez is a 64 y.o. female with PMHx significant for CAD, HFrEF, CKD, diabetes, HLD, HTN, who has been recurrently hospitalized since March who presents to the ED for altered mental status, acute on chronic, that began following a ground-level fall on 2025. She has been on extensive antibiotics as an outpatient for a retropharyngeal abscess as well as for a chronic left foot infection. She was brought to the ED and initial workup revealed unremarkable CBC, lipase negative, CMP revealed chloride 92, CO2 36, glucose 242, creatinine 2.74, T. bili 1.2. Initial imaging including CT head, CT C-spine, CXR unremarkable. XR left foot revealed nonspecific findings that did not appear acute compared to previous, however will have podiatry consult. UA showed UTI. Blood and urine cultures drawn. Started on ceftriaxone. MRI brain without contrast ordered for AMS. Renal US shows medical renal disease, without hydronephrosis and trace debris in the bladder. 2025 labs showed elevated ammonia and Tbili, US Gallbladder RUQ and Hepatitis panel ordered. 2025 Urine cultures positive for gram negative rods. Switched antibiotic to cefepime. BNP: 6016, nephrology consult placed for fluid management with AL and CKD with history of HFrEF.    Assessment / Plan:    AL on CKD stage IIIb  Non anion gap metabolic acidosis  Consult Nephrology due to AL on CKD with hx of HFrEF  Renal ultrasound revealed medical renal disease, without hydronephrosis and trace debris in the bladder  Avoid nephrotoxic agents, contrast  Creatinine worsened from 2.36 to 2.5, baseline around 1.8     UTI  Acute cystitis  Start Cefepime  Follow urine cultures, currently positive for gram negative rods  Follow blood culture  Trend WBC (6.4), Pro-Frankie (0.26), CRP  pharmacist and clinical coordinator.  Patient's plan of care was discussed; medications were reviewed and discharge planning was addressed.     ________________________________________________________________________  Total NON critical care TIME:  35  Minutes    Total CRITICAL CARE TIME Spent:   Minutes non procedure based      Comments   >50% of visit spent in counseling and coordination of care     ________________________________________________________________________  Ethan Palm     Procedures: see electronic medical records for all procedures/Xrays and details which were not copied into this note but were reviewed prior to creation of Plan.      LABS:  I reviewed today's most current labs and imaging studies.  Pertinent labs include:  Recent Labs     05/16/25  1344 05/17/25  0550 05/18/25  0642   WBC 5.5 5.7 6.4   HGB 11.5 11.6 12.0   HCT 37.9 38.7 40.9    202 201     Recent Labs     05/16/25  1344 05/17/25  0550 05/18/25  0642    136 134*   K 3.8 3.6 3.7   CL 92* 96* 97   CO2 36* 31 29   BUN 82* 74* 68*   ALT 15 12 16   INR 1.3*  --   --        Signed: Ethan Palm

## 2025-05-19 ENCOUNTER — HOSPITAL ENCOUNTER (INPATIENT)
Facility: HOSPITAL | Age: 65
Discharge: HOME | End: 2025-05-22
Payer: MEDICARE

## 2025-05-19 LAB
ALBUMIN SERPL-MCNC: 2.9 G/DL (ref 3.5–5)
ALBUMIN/GLOB SERPL: 0.6 (ref 1.1–2.2)
ALP SERPL-CCNC: 84 U/L (ref 45–117)
ALT SERPL-CCNC: 15 U/L (ref 12–78)
AMMONIA PLAS-SCNC: 36 UMOL/L
ANION GAP SERPL CALC-SCNC: 11 MMOL/L (ref 2–12)
AST SERPL W P-5'-P-CCNC: 28 U/L (ref 15–37)
BACTERIA SPEC CULT: NORMAL
BASOPHILS # BLD: 0.05 K/UL (ref 0–0.1)
BASOPHILS NFR BLD: 1.3 % (ref 0–1)
BILIRUB SERPL-MCNC: 1 MG/DL (ref 0.2–1)
BUN SERPL-MCNC: 64 MG/DL (ref 6–20)
BUN/CREAT SERPL: 31 (ref 12–20)
CA-I BLD-MCNC: 8.9 MG/DL (ref 8.5–10.1)
CHLORIDE SERPL-SCNC: 101 MMOL/L (ref 97–108)
CO2 SERPL-SCNC: 25 MMOL/L (ref 21–32)
COLONY COUNT, CNT: NORMAL
CREAT SERPL-MCNC: 2.07 MG/DL (ref 0.55–1.02)
DIFFERENTIAL METHOD BLD: ABNORMAL
EOSINOPHIL # BLD: 0.32 K/UL (ref 0–0.4)
EOSINOPHIL NFR BLD: 8.1 % (ref 0–7)
ERYTHROCYTE [DISTWIDTH] IN BLOOD BY AUTOMATED COUNT: 16 % (ref 11.5–14.5)
GLOBULIN SER CALC-MCNC: 4.8 G/DL (ref 2–4)
GLUCOSE BLD STRIP.AUTO-MCNC: 152 MG/DL (ref 65–100)
GLUCOSE BLD STRIP.AUTO-MCNC: 177 MG/DL (ref 65–100)
GLUCOSE BLD STRIP.AUTO-MCNC: 184 MG/DL (ref 65–100)
GLUCOSE BLD STRIP.AUTO-MCNC: 228 MG/DL (ref 65–100)
GLUCOSE SERPL-MCNC: 203 MG/DL (ref 65–100)
HCT VFR BLD AUTO: 39.9 % (ref 35–47)
HGB BLD-MCNC: 11.4 G/DL (ref 11.5–16)
IMM GRANULOCYTES # BLD AUTO: 0.01 K/UL (ref 0–0.04)
IMM GRANULOCYTES NFR BLD AUTO: 0.3 % (ref 0–0.5)
LYMPHOCYTES # BLD: 0.71 K/UL (ref 0.8–3.5)
LYMPHOCYTES NFR BLD: 17.7 % (ref 12–49)
Lab: NORMAL
MCH RBC QN AUTO: 28.6 PG (ref 26–34)
MCHC RBC AUTO-ENTMCNC: 28.6 G/DL (ref 30–36.5)
MCV RBC AUTO: 100 FL (ref 80–99)
MONOCYTES # BLD: 0.24 K/UL (ref 0–1)
MONOCYTES NFR BLD: 6.1 % (ref 5–13)
NEUTS SEG # BLD: 2.67 K/UL (ref 1.8–8)
NEUTS SEG NFR BLD: 66.5 % (ref 32–75)
NRBC # BLD: 0 K/UL (ref 0–0.01)
NRBC BLD-RTO: 0 PER 100 WBC
PERFORMED BY:: ABNORMAL
PHOSPHATE SERPL-MCNC: 3.2 MG/DL (ref 2.6–4.7)
PLATELET # BLD AUTO: 176 K/UL (ref 150–400)
PMV BLD AUTO: 11.6 FL (ref 8.9–12.9)
POTASSIUM SERPL-SCNC: 3.8 MMOL/L (ref 3.5–5.1)
PROCALCITONIN SERPL-MCNC: 0.16 NG/ML
PROT SERPL-MCNC: 7.7 G/DL (ref 6.4–8.2)
RBC # BLD AUTO: 3.99 M/UL (ref 3.8–5.2)
RBC MORPH BLD: ABNORMAL
RBC MORPH BLD: ABNORMAL
SODIUM SERPL-SCNC: 137 MMOL/L (ref 136–145)
WBC # BLD AUTO: 4 K/UL (ref 3.6–11)

## 2025-05-19 PROCEDURE — 6360000002 HC RX W HCPCS: Performed by: PHYSICIAN ASSISTANT

## 2025-05-19 PROCEDURE — 6370000000 HC RX 637 (ALT 250 FOR IP): Performed by: INTERNAL MEDICINE

## 2025-05-19 PROCEDURE — 2580000003 HC RX 258

## 2025-05-19 PROCEDURE — 92610 EVALUATE SWALLOWING FUNCTION: CPT

## 2025-05-19 PROCEDURE — 1100000000 HC RM PRIVATE

## 2025-05-19 PROCEDURE — 6370000000 HC RX 637 (ALT 250 FOR IP): Performed by: STUDENT IN AN ORGANIZED HEALTH CARE EDUCATION/TRAINING PROGRAM

## 2025-05-19 PROCEDURE — 82962 GLUCOSE BLOOD TEST: CPT

## 2025-05-19 PROCEDURE — 97530 THERAPEUTIC ACTIVITIES: CPT

## 2025-05-19 PROCEDURE — 84100 ASSAY OF PHOSPHORUS: CPT

## 2025-05-19 PROCEDURE — 2500000003 HC RX 250 WO HCPCS: Performed by: PHYSICIAN ASSISTANT

## 2025-05-19 PROCEDURE — 83036 HEMOGLOBIN GLYCOSYLATED A1C: CPT

## 2025-05-19 PROCEDURE — 51798 US URINE CAPACITY MEASURE: CPT

## 2025-05-19 PROCEDURE — 6370000000 HC RX 637 (ALT 250 FOR IP): Performed by: PHYSICIAN ASSISTANT

## 2025-05-19 PROCEDURE — 97166 OT EVAL MOD COMPLEX 45 MIN: CPT

## 2025-05-19 PROCEDURE — 87086 URINE CULTURE/COLONY COUNT: CPT

## 2025-05-19 PROCEDURE — 76705 ECHO EXAM OF ABDOMEN: CPT

## 2025-05-19 PROCEDURE — 6370000000 HC RX 637 (ALT 250 FOR IP): Performed by: SPECIALIST

## 2025-05-19 PROCEDURE — 84145 PROCALCITONIN (PCT): CPT

## 2025-05-19 PROCEDURE — 82140 ASSAY OF AMMONIA: CPT

## 2025-05-19 PROCEDURE — 2580000003 HC RX 258: Performed by: PHYSICIAN ASSISTANT

## 2025-05-19 PROCEDURE — 80053 COMPREHEN METABOLIC PANEL: CPT

## 2025-05-19 PROCEDURE — 85025 COMPLETE CBC W/AUTO DIFF WBC: CPT

## 2025-05-19 PROCEDURE — 36415 COLL VENOUS BLD VENIPUNCTURE: CPT

## 2025-05-19 PROCEDURE — 87186 SC STD MICRODIL/AGAR DIL: CPT

## 2025-05-19 PROCEDURE — 87088 URINE BACTERIA CULTURE: CPT

## 2025-05-19 RX ORDER — MAGNESIUM HYDROXIDE/ALUMINUM HYDROXICE/SIMETHICONE 120; 1200; 1200 MG/30ML; MG/30ML; MG/30ML
30 SUSPENSION ORAL EVERY 6 HOURS PRN
Status: DISCONTINUED | OUTPATIENT
Start: 2025-05-19 | End: 2025-05-31 | Stop reason: HOSPADM

## 2025-05-19 RX ORDER — TRAMADOL HYDROCHLORIDE 50 MG/1
50 TABLET ORAL EVERY 6 HOURS PRN
Status: DISCONTINUED | OUTPATIENT
Start: 2025-05-19 | End: 2025-05-21

## 2025-05-19 RX ORDER — LIDOCAINE 4 G/G
1 PATCH TOPICAL DAILY
Status: DISCONTINUED | OUTPATIENT
Start: 2025-05-19 | End: 2025-05-31 | Stop reason: HOSPADM

## 2025-05-19 RX ORDER — SODIUM CHLORIDE 9 MG/ML
INJECTION, SOLUTION INTRAVENOUS CONTINUOUS
Status: DISCONTINUED | OUTPATIENT
Start: 2025-05-19 | End: 2025-05-20

## 2025-05-19 RX ORDER — ASPIRIN 81 MG/1
81 TABLET ORAL DAILY
Status: DISCONTINUED | OUTPATIENT
Start: 2025-05-20 | End: 2025-05-31 | Stop reason: HOSPADM

## 2025-05-19 RX ORDER — CYCLOBENZAPRINE HCL 10 MG
10 TABLET ORAL 3 TIMES DAILY PRN
Status: DISCONTINUED | OUTPATIENT
Start: 2025-05-19 | End: 2025-05-31 | Stop reason: HOSPADM

## 2025-05-19 RX ADMIN — SODIUM CHLORIDE, PRESERVATIVE FREE 10 ML: 5 INJECTION INTRAVENOUS at 20:40

## 2025-05-19 RX ADMIN — SODIUM CHLORIDE: 0.9 INJECTION, SOLUTION INTRAVENOUS at 16:45

## 2025-05-19 RX ADMIN — ACETAMINOPHEN 650 MG: 325 TABLET ORAL at 10:01

## 2025-05-19 RX ADMIN — INSULIN LISPRO 2 UNITS: 100 INJECTION, SOLUTION INTRAVENOUS; SUBCUTANEOUS at 20:38

## 2025-05-19 RX ADMIN — CEFEPIME 1000 MG: 1 INJECTION, POWDER, FOR SOLUTION INTRAMUSCULAR; INTRAVENOUS at 11:24

## 2025-05-19 RX ADMIN — PREGABALIN 75 MG: 50 CAPSULE ORAL at 20:38

## 2025-05-19 RX ADMIN — LEVETIRACETAM 250 MG: 250 TABLET, FILM COATED ORAL at 10:01

## 2025-05-19 RX ADMIN — CARVEDILOL 6.25 MG: 3.12 TABLET, FILM COATED ORAL at 20:38

## 2025-05-19 RX ADMIN — PREGABALIN 75 MG: 50 CAPSULE ORAL at 10:00

## 2025-05-19 RX ADMIN — Medication 400 MG: at 10:00

## 2025-05-19 RX ADMIN — LEVETIRACETAM 250 MG: 250 TABLET, FILM COATED ORAL at 20:38

## 2025-05-19 RX ADMIN — CYCLOBENZAPRINE 10 MG: 10 TABLET, FILM COATED ORAL at 16:42

## 2025-05-19 RX ADMIN — SODIUM CHLORIDE, PRESERVATIVE FREE 10 ML: 5 INJECTION INTRAVENOUS at 10:02

## 2025-05-19 RX ADMIN — CETIRIZINE HYDROCHLORIDE 5 MG: 10 TABLET, FILM COATED ORAL at 10:01

## 2025-05-19 RX ADMIN — CARVEDILOL 6.25 MG: 3.12 TABLET, FILM COATED ORAL at 10:00

## 2025-05-19 RX ADMIN — PANTOPRAZOLE SODIUM 40 MG: 40 TABLET, DELAYED RELEASE ORAL at 06:34

## 2025-05-19 RX ADMIN — MIDODRINE HYDROCHLORIDE 5 MG: 5 TABLET ORAL at 10:00

## 2025-05-19 RX ADMIN — DULOXETINE 30 MG: 30 CAPSULE, DELAYED RELEASE ORAL at 10:00

## 2025-05-19 ASSESSMENT — PAIN DESCRIPTION - LOCATION
LOCATION: NECK;SHOULDER;HEAD
LOCATION: HEAD;NECK

## 2025-05-19 ASSESSMENT — PAIN DESCRIPTION - ORIENTATION: ORIENTATION: LEFT

## 2025-05-19 ASSESSMENT — PAIN SCALES - GENERAL
PAINLEVEL_OUTOF10: 6
PAINLEVEL_OUTOF10: 0
PAINLEVEL_OUTOF10: 10

## 2025-05-19 ASSESSMENT — PAIN DESCRIPTION - DESCRIPTORS: DESCRIPTORS: THROBBING;ACHING

## 2025-05-19 NOTE — PROGRESS NOTES
Renal Progress Note    Patient: Nicci Hernandez MRN: [id number]  SSN: [id number]    YOB: 1960  Age: 64 y.o.  Sex: female      Admit Date: 5/16/2025    LOS: 3 days     Subjective:   Patient seen at bedside. Alert and awake, no acute distress.   Denies any shortness of breath of swelling in extremities.  Repeat labs pending.    Current Facility-Administered Medications   Medication Dose Route Frequency    aluminum & magnesium hydroxide-simethicone (MAALOX PLUS) 200-200-20 MG/5ML suspension 30 mL  30 mL Oral Q6H PRN    traMADol (ULTRAM) tablet 50 mg  50 mg Oral Q6H PRN    cyclobenzaprine (FLEXERIL) tablet 10 mg  10 mg Oral TID PRN    0.9 % sodium chloride infusion   IntraVENous Continuous    [START ON 5/20/2025] aspirin EC tablet 81 mg  81 mg Oral Daily    lidocaine 4 % external patch 1 patch  1 patch TransDERmal Daily    cefepime (MAXIPIME) 1,000 mg in sodium chloride 0.9 % 100 mL IVPB (addEASE)  1,000 mg IntraVENous Q24H    midodrine (PROAMATINE) tablet 5 mg  5 mg Oral TID WC    levETIRAcetam (KEPPRA) tablet 250 mg  250 mg Oral BID    insulin lispro (HUMALOG,ADMELOG) injection vial 0-8 Units  0-8 Units SubCUTAneous 4x Daily AC & HS    sodium chloride flush 0.9 % injection 5-40 mL  5-40 mL IntraVENous 2 times per day    sodium chloride flush 0.9 % injection 5-40 mL  5-40 mL IntraVENous PRN    0.9 % sodium chloride infusion   IntraVENous PRN    ondansetron (ZOFRAN-ODT) disintegrating tablet 4 mg  4 mg Oral Q8H PRN    Or    ondansetron (ZOFRAN) injection 4 mg  4 mg IntraVENous Q6H PRN    polyethylene glycol (GLYCOLAX) packet 17 g  17 g Oral Daily PRN    acetaminophen (TYLENOL) tablet 650 mg  650 mg Oral Q6H PRN    Or    acetaminophen (TYLENOL) suppository 650 mg  650 mg Rectal Q6H PRN    clopidogrel (PLAVIX) tablet 75 mg  75 mg Oral Daily    DULoxetine (CYMBALTA) extended release capsule 30 mg  30 mg Oral Daily    fluticasone (FLONASE) 50 MCG/ACT nasal spray 2 spray  2 spray Each Nostril Daily PRN

## 2025-05-19 NOTE — THERAPY EVALUATION
Speech LAnguage Pathology Dysphagia EVALUATION/DISCHARGE    Patient: Nicci Hernandez (64 y.o. female)  Date: 5/19/2025  Primary Diagnosis: AL (acute kidney injury) [N17.9]  Altered mental status, unspecified altered mental status type [R41.82]  AMS (altered mental status) [R41.82]       Precautions: aspiration Fall Risk, Contact Precautions                  Time In: 0940  Time Out: 0954    DIET RECOMMENDATIONS: Easy to chew and thin liquids    SWALLOW SAFETY PRECAUTIONS: 1:1 assistance with ALL PO intake, STRICT aspiration and GERD precautions, monitor pt closely for s/s aspiration, meds whole with applesauce or pudding, FEED ONLY IF AWAKE AND ALERT.      ASSESSMENT :  Based on the objective data described below, the patient's oropharyngeal swallow is WFL at this time. Recommend easy to chew diet, thin liquids.   Compensatory swallow strategies of small bites and sips, slow rate.     Patient w/ PMHx significant for CAD, HFrEF, CKD, diabetes, HLD, HTN, who has been recurrently hospitalized since March who presents to the ED for altered mental status, acute on chronic, that began following a ground-level fall on 5/16/2025. She has been on extensive antibiotics as an outpatient for a retropharyngeal abscess as well as for a chronic left foot infection.    Patient denies any swallowing pain s/t retropharyngeal abscess. She does report pain behind her ears 10/10. She is consuming a regular diet and thin liquids at this time.   She reports cognition improved and aware of her AMS. MRI negative and patient was dx w/ UTI.     Oral motor WFL.   Patient tolerated thin liquids w/o clinical indicators of penetration or aspiration. Swallow initiation timely.   Mastication timely for hard solid. No pharyngeal difficulty or overt s/sx of aspiration.     Patient will be discharged from skilled speech-language pathology services at this time.  Re-consult if AMS does not resolve.     GOALS:  N/a       PLAN :  Recommendations and

## 2025-05-19 NOTE — CONSULTS
Podiatry Consult Note    Subjective:         Date of Consultation: May 19, 2025     Referring Physician: Donovan Espinoza MD     Patient is a 64 y.o.  female who is being seen for chronic ulcer to the left foot.  Patient states that she follows Dr. Hernandez outpatient and has had multiple surgeries to her left foot.   Patient has a hx of CAD, HFrEF, CKD, diabetes, HLD, HTN, who has been recurrently hospitalized since March who presents to the ED for altered mental status, acute on chronic, that began following a ground-level fall this morning .  Patient states that ulcer has been close for the last 3 weeks.  I was consulted to evaluate her left foot after x-ray results.    Patient Active Problem List    Diagnosis Date Noted    AMS (altered mental status) 05/16/2025    Fall 05/16/2025    Acute metabolic encephalopathy 04/07/2025    History of fusion of cervical spine 04/03/2025    Moderate protein-calorie malnutrition 04/01/2025    Leukocytosis 03/25/2025    Osteomyelitis of left foot (HCC) 03/24/2025    Generalized weakness 03/24/2025    Horizontal nystagmus 03/24/2025    Dysphagia 03/24/2025    Retropharyngeal abscess 03/21/2025    MRSA bacteremia 03/21/2025    MRSA infection 03/21/2025    Controlled diabetes mellitus type 2 with complications (HCC) 03/21/2025    AL (acute kidney injury) 03/21/2025    CKD (chronic kidney disease) stage 4, GFR 15-29 ml/min (HCC) 03/21/2025    Hyponatremia 03/21/2025    Diabetic ulcer of left midfoot associated with type 2 diabetes mellitus, with muscle involvement without evidence of necrosis (HCC) 03/21/2025    Neck abscess 03/19/2025    Decompensated heart failure (HCC) 11/21/2024    Body mass index [BMI] 39.0-39.9, adult (Z68.39) 10/15/2024    Polyneuropathy 08/05/2024    Left hip pain 12/17/2022    Elevated uric acid in blood 12/17/2022    Cardiomyopathy, ischemic 12/17/2022    Hypoalbuminemia 12/17/2022    Presence of stent in coronary artery in patient with coronary artery

## 2025-05-19 NOTE — PLAN OF CARE
Problem: Chronic Conditions and Co-morbidities  Goal: Patient's chronic conditions and co-morbidity symptoms are monitored and maintained or improved  Outcome: Progressing     Problem: Pain  Goal: Verbalizes/displays adequate comfort level or baseline comfort level  Outcome: Progressing     Problem: Skin/Tissue Integrity  Goal: Skin integrity remains intact  Description: 1.  Monitor for areas of redness and/or skin breakdown2.  Assess vascular access sites hourly3.  Every 4-6 hours minimum:  Change oxygen saturation probe site4.  Every 4-6 hours:  If on nasal continuous positive airway pressure, respiratory therapy assess nares and determine need for appliance change or resting period  Outcome: Progressing     Problem: Safety - Adult  Goal: Free from fall injury  Outcome: Progressing     Problem: ABCDS Injury Assessment  Goal: Absence of physical injury  Outcome: Progressing     Problem: Occupational Therapy - Adult  Goal: By Discharge: Performs self-care activities at highest level of function for planned discharge setting.  See evaluation for individualized goals.  Description: FUNCTIONAL STATUS PRIOR TO ADMISSION:  Patient admitted from [facility] and with recent IR[facility] stay. Reports working with therapy at SNF.     Occupational Therapy Goals:  Initiated 5/19/2025  1.  Patient will perform grooming sitting EOB with Set-up within 7 day(s).  2.  Patient will perform lower body dressing using AD PRN with Maximal Assist within 7 day(s).  3.  Patient will perform anterior bathing from neck to thighs with Set-up within 7 day(s).  4.  Patient will perform toilet transfers to [facility] with Moderate Assist  within 7 day(s).  5.  Patient will perform all aspects of toileting with Moderate Assist within 7 day(s).  6.  Patient will participate in upper extremity therapeutic exercise/activities with Supervision for 10 minutes within 7 day(s).    7.  Patient will utilize energy conservation techniques during functional activities

## 2025-05-19 NOTE — PROGRESS NOTES
Collateral history obtained from:      Code Status: Full  DVT Prophylaxis: SCD  GI Prophylaxis: Protonix    Subjective:     Chief Complaint / Reason for Visit  Patient evaluated bedside.  Patient awake, alert, oriented x 3.  Patient is unable to tell me details related to her fall prompting admission. At this time patient complaining of persistent neck pain, overall is chronic and reports decreased ROM from previous surgery. She denies fevers, chills. Patient with urinary retention, 500cc in bladder per RN. Patient was able to urinate w/o straight cath. Discussed with RN events overnight.       Objective:     VITALS:   Last 24hrs VS reviewed since prior progress note. Most recent are:  Patient Vitals for the past 24 hrs:   BP Temp Temp src Pulse Resp SpO2   05/19/25 0945 110/84 -- -- 71 -- --   05/19/25 0740 117/69 97.7 °F (36.5 °C) Oral 68 18 93 %   05/19/25 0658 -- -- -- 69 -- --   05/19/25 0003 -- -- -- 70 -- --   05/18/25 2345 118/66 97.3 °F (36.3 °C) Oral 82 18 98 %   05/18/25 2125 111/62 97.5 °F (36.4 °C) Oral 69 18 100 %   05/18/25 1730 115/73 -- -- -- -- --   05/18/25 1630 115/73 97.3 °F (36.3 °C) Oral 70 16 96 %   05/18/25 1531 -- -- -- 76 -- --       No intake or output data in the 24 hours ending 05/19/25 1441     I had a face to face encounter and independently examined this patient on 5/19/2025, as outlined below:    Review of Systems   Constitutional:  Negative for chills and fever.   Eyes:  Negative for visual disturbance.   Respiratory:  Negative for cough and shortness of breath.    Cardiovascular:  Negative for chest pain and palpitations.   Gastrointestinal:  Negative for abdominal pain, diarrhea, nausea and vomiting.   Genitourinary:  Negative for difficulty urinating and dysuria.   Musculoskeletal:  Positive for neck pain. Negative for back pain.   Skin:  Negative for rash.   Neurological:  Negative for weakness, numbness and headaches.   Psychiatric/Behavioral:  Negative for confusion.      planning was addressed.     ________________________________________________________________________  Total NON critical care TIME:  35  Minutes    Total CRITICAL CARE TIME Spent:   Minutes non procedure based      Comments   >50% of visit spent in counseling and coordination of care     ________________________________________________________________________  Madiha Aguilar PA-C     Procedures: see electronic medical records for all procedures/Xrays and details which were not copied into this note but were reviewed prior to creation of Plan.      LABS:  I reviewed today's most current labs and imaging studies.  Pertinent labs include:  Recent Labs     05/17/25  0550 05/18/25  0642   WBC 5.7 6.4   HGB 11.6 12.0   HCT 38.7 40.9    201     Recent Labs     05/17/25  0550 05/18/25  0642    134*   K 3.6 3.7   CL 96* 97   CO2 31 29   BUN 74* 68*   ALT 12 16       Signed: Madiha Aguilar PA-C

## 2025-05-19 NOTE — PROGRESS NOTES
Patient refusing scds and pillows to float bilateral heels at this time; patient educated about the need for both; voices understanding at this time

## 2025-05-19 NOTE — PLAN OF CARE
Problem: Chronic Conditions and Co-morbidities  Goal: Patient's chronic conditions and co-morbidity symptoms are monitored and maintained or improved  5/19/2025 0059 by Kate Villalta RN  Outcome: Progressing  5/18/2025 1128 by Sarah Smith RN  Outcome: Progressing     Problem: Discharge Planning  Goal: Discharge to home or other facility with appropriate resources  5/19/2025 0059 by Kate Villalta RN  Outcome: Progressing  5/18/2025 1128 by Sarah Smith RN  Outcome: Progressing     Problem: Pain  Goal: Verbalizes/displays adequate comfort level or baseline comfort level  5/19/2025 0059 by Kate Villalta RN  Outcome: Progressing  5/18/2025 1128 by Sarah Smith RN  Outcome: Progressing     Problem: Skin/Tissue Integrity  Goal: Skin integrity remains intact  Description: 1.  Monitor for areas of redness and/or skin breakdown2.  Assess vascular access sites hourly3.  Every 4-6 hours minimum:  Change oxygen saturation probe site4.  Every 4-6 hours:  If on nasal continuous positive airway pressure, respiratory therapy assess nares and determine need for appliance change or resting period  5/19/2025 0059 by Kate Villalta RN  Outcome: Progressing  5/18/2025 1128 by Sarah Smith RN  Outcome: Progressing     Problem: Safety - Adult  Goal: Free from fall injury  5/19/2025 0059 by Kate Villalta RN  Outcome: Progressing  5/18/2025 1128 by Sarah Smith RN  Outcome: Progressing     Problem: ABCDS Injury Assessment  Goal: Absence of physical injury  5/19/2025 0059 by Kate Villalta RN  Outcome: Progressing  5/18/2025 1128 by Sarah Smith RN  Outcome: Progressing     Problem: Nutrition Deficit:  Goal: Optimize nutritional status  5/18/2025 1128 by Sarah Smith RN  Outcome: Progressing     Problem: Physical Therapy - Adult  Goal: By Discharge: Performs mobility at highest level of function for planned discharge

## 2025-05-19 NOTE — CARE COORDINATION
CM reviewed chart. CM spoke to daughter, Narcisa obtain choice for [facility], referral sent. No backup at this time. CMT will continue to follow.

## 2025-05-19 NOTE — PLAN OF CARE
OCCUPATIONAL THERAPY EVALUATION  Patient: Nicci Hernandez (64 y.o. female)  Date: 5/19/2025  Primary Diagnosis: AL (acute kidney injury) [N17.9]  Altered mental status, unspecified altered mental status type [R41.82]  AMS (altered mental status) [R41.82]       Precautions: Fall Risk, Contact Precautions                Recommendations for nursing mobility: Use of bed/chair alarm for safety, Tasia Stedy for bed to chair transfers , and Assist x1    In place during session:Central Venous Line and External Catheter  ASSESSMENT  Pt is a 64 y.o. female presenting to [facility] with c/o ground level fall, hit her head and worsening mentation, admitted 5/16/2025 and currently being treated for AMS. CT and MRI of head negative for acute process. Pt received semi-supine in bed upon arrival, AXO x4, and agreeable to OT evaluation.     Based on current observations, pt presents with decreased  functional mobility, independence in ADLs, high-level IADLs, strength, activity tolerance, endurance, command following, balance, increased pain levels (see below for objective details and assist levels).     Overall, pt tolerates session fairly with neck pain at rest. Patient transferred to EOB and with fair overall sitting balance, requiring intermittent assist for sitting balance. Patient stood and attempted side stepping towards head of bed but difficulty advancing LLE. Sat on EOB and scooted to HOB before returning to supine. Patient then requesting assist to brush hair in bed so rolled for therapist to help with back of head. Patient repositioned in bed for comfort, and left with all needs within reach. Pt will benefit from continued skilled OT services to address current impairments and improve IND and safety with self cares and functional transfers/mobility. Current OT d/c recommendation Moderate intensity short-term skilled occupational therapy up to 5x/week once medically appropriate.     Start of Session End of Session   SPO2 (%) 96

## 2025-05-20 ENCOUNTER — APPOINTMENT (OUTPATIENT)
Facility: HOSPITAL | Age: 65
End: 2025-05-20
Payer: MEDICARE

## 2025-05-20 LAB
ALBUMIN SERPL-MCNC: 2.7 G/DL (ref 3.5–5)
ANION GAP SERPL CALC-SCNC: 8 MMOL/L (ref 2–12)
BASOPHILS # BLD: 0.04 K/UL (ref 0–0.1)
BASOPHILS NFR BLD: 0.7 % (ref 0–1)
BUN SERPL-MCNC: 59 MG/DL (ref 6–20)
BUN/CREAT SERPL: 29 (ref 12–20)
CA-I BLD-MCNC: 9.1 MG/DL (ref 8.5–10.1)
CHLORIDE SERPL-SCNC: 100 MMOL/L (ref 97–108)
CO2 SERPL-SCNC: 28 MMOL/L (ref 21–32)
CREAT SERPL-MCNC: 2.06 MG/DL (ref 0.55–1.02)
DIFFERENTIAL METHOD BLD: ABNORMAL
EOSINOPHIL # BLD: 0.36 K/UL (ref 0–0.4)
EOSINOPHIL NFR BLD: 6.7 % (ref 0–7)
ERYTHROCYTE [DISTWIDTH] IN BLOOD BY AUTOMATED COUNT: 15.7 % (ref 11.5–14.5)
EST. AVERAGE GLUCOSE BLD GHB EST-MCNC: 163 MG/DL
GLUCOSE BLD STRIP.AUTO-MCNC: 176 MG/DL (ref 65–100)
GLUCOSE BLD STRIP.AUTO-MCNC: 190 MG/DL (ref 65–100)
GLUCOSE BLD STRIP.AUTO-MCNC: 191 MG/DL (ref 65–100)
GLUCOSE BLD STRIP.AUTO-MCNC: 194 MG/DL (ref 65–100)
GLUCOSE SERPL-MCNC: 192 MG/DL (ref 65–100)
HBA1C MFR BLD: 7.3 % (ref 4–5.6)
HCT VFR BLD AUTO: 38.7 % (ref 35–47)
HGB BLD-MCNC: 11.4 G/DL (ref 11.5–16)
IMM GRANULOCYTES # BLD AUTO: 0.01 K/UL (ref 0–0.04)
IMM GRANULOCYTES NFR BLD AUTO: 0.2 % (ref 0–0.5)
LYMPHOCYTES # BLD: 0.64 K/UL (ref 0.8–3.5)
LYMPHOCYTES NFR BLD: 11.9 % (ref 12–49)
MCH RBC QN AUTO: 27.9 PG (ref 26–34)
MCHC RBC AUTO-ENTMCNC: 29.5 G/DL (ref 30–36.5)
MCV RBC AUTO: 94.9 FL (ref 80–99)
MONOCYTES # BLD: 0.39 K/UL (ref 0–1)
MONOCYTES NFR BLD: 7.2 % (ref 5–13)
NEUTS SEG # BLD: 3.94 K/UL (ref 1.8–8)
NEUTS SEG NFR BLD: 73.3 % (ref 32–75)
NRBC # BLD: 0 K/UL (ref 0–0.01)
NRBC BLD-RTO: 0 PER 100 WBC
PERFORMED BY:: ABNORMAL
PHOSPHATE SERPL-MCNC: 2.9 MG/DL (ref 2.6–4.7)
PLATELET # BLD AUTO: 174 K/UL (ref 150–400)
PMV BLD AUTO: 11.6 FL (ref 8.9–12.9)
POTASSIUM SERPL-SCNC: 3.8 MMOL/L (ref 3.5–5.1)
RBC # BLD AUTO: 4.08 M/UL (ref 3.8–5.2)
SODIUM SERPL-SCNC: 136 MMOL/L (ref 136–145)
WBC # BLD AUTO: 5.4 K/UL (ref 3.6–11)

## 2025-05-20 PROCEDURE — 82962 GLUCOSE BLOOD TEST: CPT

## 2025-05-20 PROCEDURE — 2500000003 HC RX 250 WO HCPCS: Performed by: PHYSICIAN ASSISTANT

## 2025-05-20 PROCEDURE — 6370000000 HC RX 637 (ALT 250 FOR IP): Performed by: INTERNAL MEDICINE

## 2025-05-20 PROCEDURE — 6370000000 HC RX 637 (ALT 250 FOR IP)

## 2025-05-20 PROCEDURE — 97530 THERAPEUTIC ACTIVITIES: CPT

## 2025-05-20 PROCEDURE — 70450 CT HEAD/BRAIN W/O DYE: CPT

## 2025-05-20 PROCEDURE — 80069 RENAL FUNCTION PANEL: CPT

## 2025-05-20 PROCEDURE — 6360000002 HC RX W HCPCS: Performed by: PHYSICIAN ASSISTANT

## 2025-05-20 PROCEDURE — 6370000000 HC RX 637 (ALT 250 FOR IP): Performed by: PHYSICIAN ASSISTANT

## 2025-05-20 PROCEDURE — 36415 COLL VENOUS BLD VENIPUNCTURE: CPT

## 2025-05-20 PROCEDURE — 51701 INSERT BLADDER CATHETER: CPT

## 2025-05-20 PROCEDURE — 6370000000 HC RX 637 (ALT 250 FOR IP): Performed by: STUDENT IN AN ORGANIZED HEALTH CARE EDUCATION/TRAINING PROGRAM

## 2025-05-20 PROCEDURE — 2580000003 HC RX 258: Performed by: PHYSICIAN ASSISTANT

## 2025-05-20 PROCEDURE — 85025 COMPLETE CBC W/AUTO DIFF WBC: CPT

## 2025-05-20 PROCEDURE — 1100000000 HC RM PRIVATE

## 2025-05-20 PROCEDURE — 6370000000 HC RX 637 (ALT 250 FOR IP): Performed by: SPECIALIST

## 2025-05-20 PROCEDURE — 51798 US URINE CAPACITY MEASURE: CPT

## 2025-05-20 RX ORDER — LACTULOSE 10 G/15ML
20 SOLUTION ORAL 3 TIMES DAILY
Status: DISCONTINUED | OUTPATIENT
Start: 2025-05-20 | End: 2025-05-20

## 2025-05-20 RX ORDER — LACTULOSE 10 G/15ML
20 SOLUTION ORAL 3 TIMES DAILY
Status: DISPENSED | OUTPATIENT
Start: 2025-05-20 | End: 2025-05-22

## 2025-05-20 RX ORDER — TAMSULOSIN HYDROCHLORIDE 0.4 MG/1
0.4 CAPSULE ORAL DAILY
Status: DISCONTINUED | OUTPATIENT
Start: 2025-05-20 | End: 2025-05-31 | Stop reason: HOSPADM

## 2025-05-20 RX ADMIN — CETIRIZINE HYDROCHLORIDE 5 MG: 10 TABLET, FILM COATED ORAL at 08:11

## 2025-05-20 RX ADMIN — MIDODRINE HYDROCHLORIDE 5 MG: 5 TABLET ORAL at 17:17

## 2025-05-20 RX ADMIN — INSULIN LISPRO 2 UNITS: 100 INJECTION, SOLUTION INTRAVENOUS; SUBCUTANEOUS at 08:12

## 2025-05-20 RX ADMIN — LACTULOSE 20 G: 20 SOLUTION ORAL at 08:12

## 2025-05-20 RX ADMIN — PANTOPRAZOLE SODIUM 40 MG: 40 TABLET, DELAYED RELEASE ORAL at 06:13

## 2025-05-20 RX ADMIN — CARVEDILOL 6.25 MG: 3.12 TABLET, FILM COATED ORAL at 21:45

## 2025-05-20 RX ADMIN — MIDODRINE HYDROCHLORIDE 5 MG: 5 TABLET ORAL at 12:44

## 2025-05-20 RX ADMIN — TAMSULOSIN HYDROCHLORIDE 0.4 MG: 0.4 CAPSULE ORAL at 16:22

## 2025-05-20 RX ADMIN — LEVETIRACETAM 250 MG: 250 TABLET, FILM COATED ORAL at 21:45

## 2025-05-20 RX ADMIN — CARVEDILOL 6.25 MG: 3.12 TABLET, FILM COATED ORAL at 08:11

## 2025-05-20 RX ADMIN — LEVETIRACETAM 250 MG: 250 TABLET, FILM COATED ORAL at 08:12

## 2025-05-20 RX ADMIN — ASPIRIN 81 MG: 81 TABLET, COATED ORAL at 08:12

## 2025-05-20 RX ADMIN — LACTULOSE 20 G: 20 SOLUTION ORAL at 14:35

## 2025-05-20 RX ADMIN — MIDODRINE HYDROCHLORIDE 5 MG: 5 TABLET ORAL at 08:11

## 2025-05-20 RX ADMIN — Medication 400 MG: at 08:11

## 2025-05-20 RX ADMIN — PREGABALIN 75 MG: 50 CAPSULE ORAL at 08:11

## 2025-05-20 RX ADMIN — CLOPIDOGREL BISULFATE 75 MG: 75 TABLET, FILM COATED ORAL at 08:12

## 2025-05-20 RX ADMIN — SODIUM CHLORIDE, PRESERVATIVE FREE 10 ML: 5 INJECTION INTRAVENOUS at 21:45

## 2025-05-20 RX ADMIN — CEFEPIME 1000 MG: 1 INJECTION, POWDER, FOR SOLUTION INTRAMUSCULAR; INTRAVENOUS at 10:47

## 2025-05-20 RX ADMIN — INSULIN LISPRO 2 UNITS: 100 INJECTION, SOLUTION INTRAVENOUS; SUBCUTANEOUS at 17:15

## 2025-05-20 RX ADMIN — ACETAMINOPHEN 650 MG: 325 TABLET ORAL at 06:16

## 2025-05-20 RX ADMIN — INSULIN LISPRO 2 UNITS: 100 INJECTION, SOLUTION INTRAVENOUS; SUBCUTANEOUS at 21:45

## 2025-05-20 RX ADMIN — DULOXETINE 30 MG: 30 CAPSULE, DELAYED RELEASE ORAL at 08:11

## 2025-05-20 ASSESSMENT — PAIN SCALES - GENERAL
PAINLEVEL_OUTOF10: 10
PAINLEVEL_OUTOF10: 0

## 2025-05-20 ASSESSMENT — PAIN DESCRIPTION - LOCATION: LOCATION: HEAD

## 2025-05-20 ASSESSMENT — PAIN DESCRIPTION - ORIENTATION: ORIENTATION: POSTERIOR

## 2025-05-20 NOTE — PLAN OF CARE
PHYSICAL THERAPY TREATMENT     Patient: Nicci Hernandez (64 y.o. female)  Date: 5/20/2025  Diagnosis: AL (acute kidney injury) [N17.9]  Altered mental status, unspecified altered mental status type [R41.82]  AMS (altered mental status) [R41.82] AMS (altered mental status)      Precautions: Fall Risk, Contact Precautions     Recommendations for nursing mobility: Out of bed to chair for meals, Encourage HEP in prep for ADLs/mobility; see handout for details, Use of bed/chair alarm for safety, Tasia Stedy for bed to chair transfers , and Assist x2 OOB    In place during session: Peripheral IV and EKG/telemetry   Chart, physical therapy assessment, plan of care and goals were reviewed.  ASSESSMENT  Patient continues with skilled PT services and is slowly progressing towards goals. Pt in bed upon PT arrival, agreeable to session. Pt A&O x 4. (See below for objective details and assist levels).     Overall pt tolerated session fair today. Pt oriented but seemed slightly confused at times when discussing recent hospital stays. Patient req min A for bed mobility and demos good sitting balance however increased difficulty with STS transfers today. Patient completed STS x3 reps with max A and able to get up but only maintain a few seconds prior to sitting back EOB. Pt completed a few LE therex well at EOB prior to returning to supine. Able to scoot up along EOB to HOB with mod A prior to laying down.  Will benefit from Ax2 OOB next visit. Will continue to benefit from skilled PT services, and will continue to progress as tolerated. Current PT DC recommendation Moderate intensity short-term skilled physical therapy up to 5x/week once medically appropriate.      GOALS:    Problem: Physical Therapy - Adult  Goal: By Discharge: Performs mobility at highest level of function for planned discharge setting.  See evaluation for individualized goals.  Description: FUNCTIONAL STATUS PRIOR TO ADMISSION: pt reports residing at a

## 2025-05-20 NOTE — PROGRESS NOTES
negative  Continue ASA and plavix  Mental status improved, patient A&Ox3    Visual hallucinations   Possibly related to hospital delirium but patient remains oriented   Has been ongoing intermittently for 1 year  Consult psychiatry    Right upper extremity jerking  Mild on exam  No focal neurologic deficit  Consider possibly related to Cymbalta, electrolyte derangements including AL, UTI  Check CT of the head  Check mag  Appreciate psychiatry input on continuing versus discontinuing Cymbalta    Retropharyngeal abscess   CT cervical spine wo contrast shows no evidence of abscess although done without contrast  Monitor for any changes in neck, fever, enlargement     HFrEF  LBBB Consistent with baseline  BNP: 6016  Regular patient follow-up with cardiology  Echo March of this year with EF 20-25%  Continue carvedilol  Hold aggressive IVF in setting of HFrEF     Left foot chronic infection  Podiatry consulted, low suspicion for osteo  Wound care consult     Hyperbilirubinemia  US Gallbladder RUQ with no sig abnl  Monitor serial LFTs  Hepatitis panel resulted negative     Essential hypertension  Continue home Coreg  Metolazone, Lasix, spironolactone on hold at this time for AL     Peripheral neuropathy  Continue Cymbalta and Lyrica      Seasonal allergies  Continue cetirizine     History of CVA  Restart ASA and plavix  Allergy to statins    History of cervical fusion approximately 20 years ago  CT C-spine with no acute fracture or subluxation, C3-C6 anterior cervical fusion postoperative changes noted, loosening of C3-C4 screws compared to CT 3/2025, stable multilevel cervical degenerative changes noted  Pain control with tramadol, flexeril PRN    [x] High (any 2)     A. Problems (any 1)  [x] Acute/Chronic Illness/injury posing threat to life or bodily function: AMS, UTI  [] Severe exacerbation of chronic illness:    ---------------------------------------------------------------------  B. Risk of Treatment (any 1)    [x] Drugs/treatments that require intensive monitoring for toxicity include:    [x] IV ABX requiring serial renal monitoring for nephrotoxicity:     [] IV Narcotic analgesia for adverse drug reaction  [] Aggressive IV diuresis requiring serial monitoring for renal impairment and electrolyte derangements  [] Critical electrolyte abnormalities requiring IV replacement and close serial monitoring  [x] Insulin - monitoring serial FSBS for Hypoglycemic adverse drug reaction  [] Other -   [] Change in code status:    [] Decision to escalate care:    [] Major surgery/procedure with associated risk factors:    ----------------------------------------------------------------------  C. Data (any 2)  [x] Discussed current management and discharge planning options with Case Management.  [x] Discussed management of the case with: patient, nurse  [] Telemetry personally reviewed and interpreted as documented above    [] Imaging personally reviewed and interpreted, includes:    [] Data Review (any 3)  [x] All available Consultant notes from yesterday/today were reviewed  [x] All current labs were reviewed and interpreted for clinical significance   [x] Appropriate follow-up labs were ordered  [] Collateral history obtained from:      Code Status: Full  DVT Prophylaxis: SCD  GI Prophylaxis: Protonix    Subjective:     Chief Complaint / Reason for Visit  Patient evaluated bedside.  Patient awake, alert, oriented x 3.  Patient required straight cath this morning for retention. State she feels the urge to urinate but is unable to when she tries. Patient otherwise has mild fatigue. Reports right arm jerking that developed earlier today.  No associated upper extremity weakness, lower extremity weakness, slurred speech, facial droop, decreased sensation.  Patient has a history of right arm jerking that occurred while she was admitted to [facility], states \"no workup was done\".  No associated headache.  Patient feels that she

## 2025-05-20 NOTE — PROGRESS NOTES
Renal Progress Note    Patient: Nicci Hernandez MRN: [id number]  SSN: [id number]    YOB: 1960  Age: 64 y.o.  Sex: female      Admit Date: 5/16/2025    LOS: 4 days     Subjective:   Patient seen at bedside working with therapy. More alert and awake today, no acute distress.   Denies any shortness of breath or swelling in extremities.  Repeat labs show improved creatinine of 2.0.    Current Facility-Administered Medications   Medication Dose Route Frequency    lactulose (CHRONULAC) 10 GM/15ML solution 20 g  20 g Oral TID    aluminum & magnesium hydroxide-simethicone (MAALOX PLUS) 200-200-20 MG/5ML suspension 30 mL  30 mL Oral Q6H PRN    traMADol (ULTRAM) tablet 50 mg  50 mg Oral Q6H PRN    cyclobenzaprine (FLEXERIL) tablet 10 mg  10 mg Oral TID PRN    0.9 % sodium chloride infusion   IntraVENous Continuous    aspirin EC tablet 81 mg  81 mg Oral Daily    lidocaine 4 % external patch 1 patch  1 patch TransDERmal Daily    cefepime (MAXIPIME) 1,000 mg in sodium chloride 0.9 % 100 mL IVPB (addEASE)  1,000 mg IntraVENous Q24H    midodrine (PROAMATINE) tablet 5 mg  5 mg Oral TID WC    levETIRAcetam (KEPPRA) tablet 250 mg  250 mg Oral BID    insulin lispro (HUMALOG,ADMELOG) injection vial 0-8 Units  0-8 Units SubCUTAneous 4x Daily AC & HS    sodium chloride flush 0.9 % injection 5-40 mL  5-40 mL IntraVENous 2 times per day    sodium chloride flush 0.9 % injection 5-40 mL  5-40 mL IntraVENous PRN    0.9 % sodium chloride infusion   IntraVENous PRN    ondansetron (ZOFRAN-ODT) disintegrating tablet 4 mg  4 mg Oral Q8H PRN    Or    ondansetron (ZOFRAN) injection 4 mg  4 mg IntraVENous Q6H PRN    polyethylene glycol (GLYCOLAX) packet 17 g  17 g Oral Daily PRN    acetaminophen (TYLENOL) tablet 650 mg  650 mg Oral Q6H PRN    Or    acetaminophen (TYLENOL) suppository 650 mg  650 mg Rectal Q6H PRN    clopidogrel (PLAVIX) tablet 75 mg  75 mg Oral Daily    DULoxetine (CYMBALTA) extended release capsule 30 mg  30 mg Oral  11.4 (L) 11.5 - 16.0 g/dL    Hematocrit 38.7 35.0 - 47.0 %    MCV 94.9 80.0 - 99.0 FL    MCH 27.9 26.0 - 34.0 PG    MCHC 29.5 (L) 30.0 - 36.5 g/dL    RDW 15.7 (H) 11.5 - 14.5 %    Platelets 174 150 - 400 K/uL    MPV 11.6 8.9 - 12.9 FL    Nucleated RBCs 0.0 0.0  WBC    nRBC 0.00 0.00 - 0.01 K/uL    Neutrophils % 73.3 32.0 - 75.0 %    Lymphocytes % 11.9 (L) 12.0 - 49.0 %    Monocytes % 7.2 5.0 - 13.0 %    Eosinophils % 6.7 0.0 - 7.0 %    Basophils % 0.7 0.0 - 1.0 %    Immature Granulocytes % 0.2 0 - 0.5 %    Neutrophils Absolute 3.94 1.80 - 8.00 K/UL    Lymphocytes Absolute 0.64 (L) 0.80 - 3.50 K/UL    Monocytes Absolute 0.39 0.00 - 1.00 K/UL    Eosinophils Absolute 0.36 0.00 - 0.40 K/UL    Basophils Absolute 0.04 0.00 - 0.10 K/UL    Immature Granulocytes Absolute 0.01 0.00 - 0.04 K/UL    Differential Type AUTOMATED          Assessment and Plan:     64-year-old female with a past medical history significant for CKD 3B, type 2 diabetes mellitus, CAD s/p CABG, hypertension, and hyperlipidemia, cardiomyopathy, history of stroke, gastroparesis, PVD, ODALYS, history of cervical fusion about 20 years ago.  Presented to ED on 5/16 with altered mental status acute following ground-level fall. Nephrology consulted for AL on CKD.     AL on CKD 3B  -Creatinine admission at 2.74--> 2.36--> 2.50--> 2.0  - Most likely prerenal in etiology, bump in creatinine was most likely due to diarrhea she experienced following lactulose -patient reported having diarrhea \"all day\" plus poor p.o. intake given AMS on admission  - Bp soft but without severe drops on admission  - UA consistent with UTI, no proteinuria no hematuria  - Ultrasound without evidence of hydronephrosis, right kidney smaller than left  - Urine electrolytes noted  - strict I&O  - Will discontinue IV fluids for now and monitor renal response  - echo in March 2025 with LVEF of 20 to 25%, chest x-ray on admission without pulmonary edema, proBNP chronically elevated, she

## 2025-05-20 NOTE — PROGRESS NOTES
Patient has not voided since before change of shift, unable to urinate and bladder scan was performed. Bladder scan result was 605 ml. Once primary nurse returned to patient room for straight cath the patient had voided 500 ml on their own and bladder scan residual was 91 ml.

## 2025-05-20 NOTE — WOUND CARE
Take 1 tablet by mouth daily      magnesium oxide (MAG-OX) 400 (240 Mg) MG tablet Take 1 tablet by mouth daily      metOLazone (ZAROXOLYN) 2.5 MG tablet Take 1 tablet by mouth daily      ondansetron (ZOFRAN) 4 MG tablet Take 1 tablet by mouth every 8 hours as needed for Nausea or Vomiting      potassium chloride (K-TAB) 20 MEQ TBCR extended release tablet Take 1 tablet by mouth once      senna-docusate (PERICOLACE) 8.6-50 MG per tablet Take 1 tablet by mouth 2 times daily      insulin lispro (HUMALOG,ADMELOG) 100 UNIT/ML SOLN injection vial Inject 0-8 Units into the skin 4 times daily (before meals and nightly) 1 each 0    carvedilol (COREG) 6.25 MG tablet Take 1 tablet by mouth 2 times daily 60 tablet 3    pregabalin (LYRICA) 75 MG capsule Take 1 capsule by mouth 2 times daily.      methocarbamol (ROBAXIN) 750 MG tablet Take 1 tablet by mouth 2 times daily as needed      aspirin 81 MG chewable tablet Take 1 tablet by mouth daily      clopidogrel (PLAVIX) 75 MG tablet Take 1 tablet by mouth daily      spironolactone (ALDACTONE) 25 MG tablet Take 1 tablet by mouth daily      insulin glargine (LANTUS) 100 UNIT/ML injection vial Inject 15 Units into the skin daily (Patient not taking: Reported on 5/16/2025) 10 mL 3    Blood Glucose Monitoring Suppl (ONETOUCH VERIO FLEX SYSTEM) w/Device KIT Use to check BG 3 times daily. Dx code E11.65 (Patient not taking: Reported on 5/16/2025) 1 kit 0    Lancets (ONETOUCH DELICA PLUS DETIUI16G) MISC Use to check BG 3 times daily. Dx code E11.65 (Patient not taking: Reported on 5/16/2025) 300 each 3          /75   Pulse 65   Temp 97.9 °F (36.6 °C) (Oral)   Resp 17   Ht 1.575 m (5' 2.01\")   Wt 89.4 kg (197 lb)   SpO2 94%   BMI 36.02 kg/m²     Lab Results   Component Value Date/Time    WBC 4.0 05/19/2025 07:08 PM    LABA1C 6.2 (H) 11/22/2024 02:36 AM    POCGLU 190 (H) 05/20/2025 07:58 AM    POCGLU 228 (H) 05/19/2025 07:49 PM    HGB 11.4 (L) 05/19/2025 07:08 PM    HCT 39.9  05/19/2025 07:08 PM     05/19/2025 07:08 PM     ADULT ORAL NUTRITION SUPPLEMENT; Breakfast, Dinner; Wound Healing Oral Supplement  ADULT ORAL NUTRITION SUPPLEMENT; Lunch; Low Calorie/High Protein Oral Supplement  ADULT DIET; Easy to Chew         ASSESSMENT     Wound Identification & Type: resolving DU to left foot, POA; resolving stage 3 PI to left back, POA.   Dressing change: see flowchart  Verbal consent for picture: Yes    Contributing Factors: diabetes, poor glucose control, decreased mobility, shear force, obesity, and arterial insufficiency    Wound 03/19/25 Foot Left (Active)   Wound Image   05/20/25 1025   Wound Etiology Diabetic 05/20/25 1025   Dressing/Treatment Open to air 05/20/25 1025   Wound Assessment Dry 05/20/25 1025   Drainage Amount None (dry) 05/20/25 1025   Odor None 05/20/25 1025   Radha-wound Assessment Hypopigmented;Dry/flaky 05/20/25 1025   Margins Attached edges 05/20/25 1025   Number of days: 62       Wound 03/19/25 Back Left (Active)   Wound Image   05/20/25 1023   Wound Etiology Pressure Stage 3 05/20/25 1023   Dressing Status New dressing applied 05/20/25 1023   Wound Cleansed Cleansed with saline 05/20/25 1023   Dressing/Treatment Foam;Honey gel/honey paste 05/20/25 1023   Dressing Change Due 05/22/25 05/20/25 1023   Wound Assessment Dry;Pink/red;Epithelialization 05/20/25 1023   Drainage Amount None (dry) 05/20/25 1023   Odor None 05/20/25 1023   Radha-wound Assessment Hypopigmented 05/20/25 1023   Margins Attached edges 05/20/25 1023   Number of days: 62       Wound 04/08/25 Sacrum Inner;Left;Right redness/excoriation/open slit middle (Active)   Number of days: 42       [REMOVED] Wound 03/19/25 Back Medial;Upper (Removed)   Number of days: 62       [REMOVED] Wound Buttocks Right;Left redness/bruising/excoriation (Removed)   Number of days:         Assessment:   -A&O x: 4  -Verbally communicative (Y/N): Y  -Mobile (Y/N): Y but limited       -Assists in repositioning (Y/N):

## 2025-05-20 NOTE — PROGRESS NOTES
Comprehensive Nutrition Assessment    Type and Reason for Visit:  Reassess (Intakes)    Nutrition Recommendations/Plan:   Continue current diet to promote improved PO intakes (Pt prefers salads, grilled cheese w/ tomato soup, chix salad w/ crackers)  Continue Ensure HP 1x/d   Continue Chris BID (180kcal, 5gpro)  Manage BG w/ sliding scale to 140-180  Monitor/document wt, BM, PO+ONS%, fluid status in I/O     Malnutrition Assessment:  Malnutrition Status:  Moderate malnutrition (05/17/25 1307)    Context:  Acute Illness     Findings of the 6 clinical characteristics of malnutrition:  Energy Intake:  75% or less of estimated energy requirements for 7 or more days  Weight Loss:  Greater than 5% over 1 month (2/2 fluid status)     Body Fat Loss:  No body fat loss     Muscle Mass Loss:  No muscle mass loss    Fluid Accumulation:  Mild Extremities   Strength:  Not Performed    Nutrition Assessment:    Admit for AMS. Consulted for poor PO/appetite >5 days pta. Recent admit x1mo ago where RD followed t/o admit d/t poor PO intakes. Prev rec'd ONS 4x/d d/t pt report of difficulty chewing 2/2 pain from neck, back. Spoke w/ pt and daughter who reported appetite beginning to improve. Stated appetite poor since March d/t illness. No notable wt changes for pt/daughter; per EMR, ~45# wt loss x1mo 2/2 fluid shifts. Pt now back to UBW, still w/ mild edema. Fluid likely masking true wt loss. Provided diet edu on current SBS diet order- pt prefers to be switched back to Reg/thins; provided diet preferences of salads, grilled cheese w/ tomato soup, chix salad w/ crackers. Reported consuming 100% bfast this AM. Observed eating lunch- stated chix salad dry. Plans to add ONS while appetite continues to improve to meet EERs, promote wound healing. Labs: Cl 96, BUN 74, Cre 2.36, eGFR 22, -227, Alb, 2.7. Meds: carvdelilol, humalog, lactulose, MagOx, protonix.                                                     (5/20) Diet upgraded to  Nutrition Supplement  Nutrition Education/Counseling: Education/Counseling not indicated  Coordination of Nutrition Care: Continue to monitor while inpatient, Speech Therapy  Plan of Care discussed with: pt, daughter    Goals:  Goals: PO intake 75% or greater, by next RD assessment  Type of Goal: Continue current goal  Previous Goal Met: Progressing toward Goal(s)    Nutrition Monitoring and Evaluation:   Behavioral-Environmental Outcomes: Beliefs and Attitudes  Food/Nutrient Intake Outcomes: Diet Advancement/Tolerance, Food and Nutrient Intake, Supplement Intake  Physical Signs/Symptoms Outcomes: Biochemical Data, Chewing or Swallowing, GI Status, Fluid Status or Edema, Meal Time Behavior, Weight, Skin    Discharge Planning:    Continue current diet, Continue Oral Nutrition Supplement     Aviva Argueta RD  Contact: [phone]

## 2025-05-20 NOTE — PROGRESS NOTES
Physician Progress Note      PATIENT:               SU HILARIO  CSN #:                  [id number]  :                       1960  ADMIT DATE:       2025 1:25 PM  DISCH DATE:  RESPONDING  PROVIDER #:        EVELYN ONEILL          QUERY TEXT:    Please clarify the patient?s nutritional status:    The clinical indicators include:  RD NOTE  -  \"Malnutrition Assessment:  Malnutrition Status:  Moderate malnutrition (25 1307)  Context:  Acute Illness  Findings of the 6 clinical characteristics of malnutrition:  Energy Intake:  75% or less of estimated energy requirements for 7 or more   days  Weight Loss:  Greater than 5% over 1 month (2/2 fluid status)  Body Fat Loss:  No body fat loss  Muscle Mass Loss:  No muscle mass loss  Fluid Accumulation:  Mild Extremities   Strength:  Not Performed\"    \"Nutrition Assessment:  Admit for AMS. Consulted for poor PO/appetite >5 days pta. Recent admit x1mo   ago where RD followed t/o admit d/t poor PO intakes. Prev rec'd ONS 4x/d d/t   pt report of difficulty chewing 2/2 pain from neck, back. Spoke w/ pt and   daughter who reported appetite beginning to improve. Stated appetite poor   since March d/t illness. No notable wt changes for pt/daughter; per EMR,   45# wt loss x1mo 2/2 fluid shifts. Pt now back to UBW, still w/ mild edema.   Fluid likely masking true wt loss. Provided diet edu on current SBS diet   order- pt prefers to be switched back to Reg/thins; provided diet preferences   of salads, grilled cheese w/ tomato soup, chix salad w/ crackers. Reported   consuming 100% bfast this AM. Observed eating lunch- stated chix salad dry.   Plans to add ONS while appetite continues to improve to meet EERs, promote   wound healing. Labs: Cl 96, BUN 74, Cre 2.36, eGFR 22, -227, Alb, 2.7.   Meds: carvdelilol, humalog, lactulose, MagOx, protonix\"      TREATMENT-  diet as tolerated, upgrade if able, ensure, joselyn, manage BG  Options provided:  --  Protein calorie malnutrition moderate  -- Other - I will add my own diagnosis  -- Disagree - Not applicable / Not valid  -- Disagree - Clinically unable to determine / Unknown  -- Refer to Clinical Documentation Reviewer    PROVIDER RESPONSE TEXT:    This patient has moderate protein calorie malnutrition.    Query created by: Sophia Jensen on 5/20/2025 1:52 PM      QUERY TEXT:    Pressure ulcer of Back Left is documented in the medical record in Wound Care   note dated 5/20/2025. Please specify the present on admission status and/or   stage:    The clinical indicators include:  WOUND CARE 5/20-  \"Wound 03/19/25 Back Left (Active)  Wound Etiology Pressure Stage 3  Dressing Status New dressing applied  Wound Cleansed Cleansed with saline  Dressing/Treatment Foam;Honey gel/honey paste  Dressing Change Due 05/22/25  Wound Assessment Dry;Pink/red;Epithelialization  Drainage Amount None (dry)  Odor None  Radha-wound Assessment Hypopigmented  Margins Attached edges  Number of days: 62\"    TREATMENT-  minimize layers, turn q2, wedges, manage incontinence, skin protectant  Options provided:  -- Stage 3 Pressure Ulcer present on admission  -- Stage 3 Pressure Ulcer not present on admission  -- Other - I will add my own diagnosis  -- Disagree - Not applicable / Not valid  -- Disagree - Clinically unable to determine / Unknown  -- Refer to Clinical Documentation Reviewer    PROVIDER RESPONSE TEXT:    This patient has a Stage 3 pressure ulcer which was present on admission.    Query created by: Sophia Jensen on 5/20/2025 1:55 PM      Electronically signed by:  EVELYN ONEILL 5/20/2025 3:26 PM

## 2025-05-21 LAB
ALBUMIN SERPL-MCNC: 2.8 G/DL (ref 3.5–5)
ALBUMIN/GLOB SERPL: 0.5 (ref 1.1–2.2)
ALP SERPL-CCNC: 76 U/L (ref 45–117)
ALT SERPL-CCNC: 11 U/L (ref 12–78)
AMMONIA PLAS-SCNC: 46 UMOL/L
ANION GAP SERPL CALC-SCNC: 15 MMOL/L (ref 2–12)
AST SERPL W P-5'-P-CCNC: 27 U/L (ref 15–37)
BASOPHILS # BLD: 0.03 K/UL (ref 0–0.1)
BASOPHILS NFR BLD: 0.4 % (ref 0–1)
BILIRUB SERPL-MCNC: 1.4 MG/DL (ref 0.2–1)
BUN SERPL-MCNC: 56 MG/DL (ref 6–20)
BUN/CREAT SERPL: 29 (ref 12–20)
CA-I BLD-MCNC: 9.9 MG/DL (ref 8.5–10.1)
CHLORIDE SERPL-SCNC: 102 MMOL/L (ref 97–108)
CO2 SERPL-SCNC: 22 MMOL/L (ref 21–32)
CREAT SERPL-MCNC: 1.96 MG/DL (ref 0.55–1.02)
DIFFERENTIAL METHOD BLD: ABNORMAL
EOSINOPHIL # BLD: 0.11 K/UL (ref 0–0.4)
EOSINOPHIL NFR BLD: 1.5 % (ref 0–7)
ERYTHROCYTE [DISTWIDTH] IN BLOOD BY AUTOMATED COUNT: 15.8 % (ref 11.5–14.5)
GLOBULIN SER CALC-MCNC: 5.1 G/DL (ref 2–4)
GLUCOSE BLD STRIP.AUTO-MCNC: 184 MG/DL (ref 65–100)
GLUCOSE BLD STRIP.AUTO-MCNC: 197 MG/DL (ref 65–100)
GLUCOSE BLD STRIP.AUTO-MCNC: 215 MG/DL (ref 65–100)
GLUCOSE BLD STRIP.AUTO-MCNC: 224 MG/DL (ref 65–100)
GLUCOSE SERPL-MCNC: 178 MG/DL (ref 65–100)
HCT VFR BLD AUTO: 39.4 % (ref 35–47)
HGB BLD-MCNC: 11.8 G/DL (ref 11.5–16)
IMM GRANULOCYTES # BLD AUTO: 0.02 K/UL (ref 0–0.04)
IMM GRANULOCYTES NFR BLD AUTO: 0.3 % (ref 0–0.5)
LYMPHOCYTES # BLD: 0.56 K/UL (ref 0.8–3.5)
LYMPHOCYTES NFR BLD: 7.8 % (ref 12–49)
MAGNESIUM SERPL-MCNC: 2.7 MG/DL (ref 1.6–2.4)
MAGNESIUM SERPL-MCNC: 2.8 MG/DL (ref 1.6–2.4)
MCH RBC QN AUTO: 28.4 PG (ref 26–34)
MCHC RBC AUTO-ENTMCNC: 29.9 G/DL (ref 30–36.5)
MCV RBC AUTO: 94.9 FL (ref 80–99)
MONOCYTES # BLD: 0.39 K/UL (ref 0–1)
MONOCYTES NFR BLD: 5.4 % (ref 5–13)
NEUTS SEG # BLD: 6.1 K/UL (ref 1.8–8)
NEUTS SEG NFR BLD: 84.6 % (ref 32–75)
NRBC # BLD: 0 K/UL (ref 0–0.01)
NRBC BLD-RTO: 0 PER 100 WBC
PERFORMED BY:: ABNORMAL
PHOSPHATE SERPL-MCNC: 2.4 MG/DL (ref 2.6–4.7)
PLATELET # BLD AUTO: 164 K/UL (ref 150–400)
PMV BLD AUTO: 11.8 FL (ref 8.9–12.9)
POTASSIUM SERPL-SCNC: 3.6 MMOL/L (ref 3.5–5.1)
PROT SERPL-MCNC: 7.9 G/DL (ref 6.4–8.2)
RBC # BLD AUTO: 4.15 M/UL (ref 3.8–5.2)
SODIUM SERPL-SCNC: 139 MMOL/L (ref 136–145)
WBC # BLD AUTO: 7.2 K/UL (ref 3.6–11)

## 2025-05-21 PROCEDURE — 6360000002 HC RX W HCPCS: Performed by: PHYSICIAN ASSISTANT

## 2025-05-21 PROCEDURE — 80053 COMPREHEN METABOLIC PANEL: CPT

## 2025-05-21 PROCEDURE — 36415 COLL VENOUS BLD VENIPUNCTURE: CPT

## 2025-05-21 PROCEDURE — 51798 US URINE CAPACITY MEASURE: CPT

## 2025-05-21 PROCEDURE — 2500000003 HC RX 250 WO HCPCS: Performed by: PHYSICIAN ASSISTANT

## 2025-05-21 PROCEDURE — 6370000000 HC RX 637 (ALT 250 FOR IP): Performed by: STUDENT IN AN ORGANIZED HEALTH CARE EDUCATION/TRAINING PROGRAM

## 2025-05-21 PROCEDURE — 97530 THERAPEUTIC ACTIVITIES: CPT

## 2025-05-21 PROCEDURE — 51701 INSERT BLADDER CATHETER: CPT

## 2025-05-21 PROCEDURE — 6370000000 HC RX 637 (ALT 250 FOR IP): Performed by: PHYSICIAN ASSISTANT

## 2025-05-21 PROCEDURE — 82962 GLUCOSE BLOOD TEST: CPT

## 2025-05-21 PROCEDURE — 6370000000 HC RX 637 (ALT 250 FOR IP): Performed by: SPECIALIST

## 2025-05-21 PROCEDURE — 1100000000 HC RM PRIVATE

## 2025-05-21 PROCEDURE — 6370000000 HC RX 637 (ALT 250 FOR IP): Performed by: NURSE PRACTITIONER

## 2025-05-21 PROCEDURE — 85025 COMPLETE CBC W/AUTO DIFF WBC: CPT

## 2025-05-21 PROCEDURE — 2580000003 HC RX 258: Performed by: PHYSICIAN ASSISTANT

## 2025-05-21 PROCEDURE — 84100 ASSAY OF PHOSPHORUS: CPT

## 2025-05-21 PROCEDURE — 83735 ASSAY OF MAGNESIUM: CPT

## 2025-05-21 PROCEDURE — 82140 ASSAY OF AMMONIA: CPT

## 2025-05-21 PROCEDURE — 97535 SELF CARE MNGMENT TRAINING: CPT

## 2025-05-21 RX ORDER — BETHANECHOL CHLORIDE 25 MG/1
25 TABLET ORAL 3 TIMES DAILY
Status: DISCONTINUED | OUTPATIENT
Start: 2025-05-21 | End: 2025-05-31 | Stop reason: HOSPADM

## 2025-05-21 RX ORDER — TAMSULOSIN HYDROCHLORIDE 0.4 MG/1
0.4 CAPSULE ORAL DAILY
Status: DISCONTINUED | OUTPATIENT
Start: 2025-05-21 | End: 2025-05-21

## 2025-05-21 RX ADMIN — SODIUM CHLORIDE, PRESERVATIVE FREE 10 ML: 5 INJECTION INTRAVENOUS at 20:05

## 2025-05-21 RX ADMIN — LACTULOSE 20 G: 20 SOLUTION ORAL at 20:04

## 2025-05-21 RX ADMIN — SODIUM CHLORIDE, PRESERVATIVE FREE 10 ML: 5 INJECTION INTRAVENOUS at 09:05

## 2025-05-21 RX ADMIN — BETHANECHOL CHLORIDE 25 MG: 25 TABLET ORAL at 20:03

## 2025-05-21 RX ADMIN — CARVEDILOL 6.25 MG: 3.12 TABLET, FILM COATED ORAL at 10:40

## 2025-05-21 RX ADMIN — DULOXETINE 30 MG: 30 CAPSULE, DELAYED RELEASE ORAL at 10:41

## 2025-05-21 RX ADMIN — Medication 400 MG: at 10:40

## 2025-05-21 RX ADMIN — TAMSULOSIN HYDROCHLORIDE 0.4 MG: 0.4 CAPSULE ORAL at 10:41

## 2025-05-21 RX ADMIN — LEVETIRACETAM 250 MG: 250 TABLET, FILM COATED ORAL at 10:41

## 2025-05-21 RX ADMIN — CLOPIDOGREL BISULFATE 75 MG: 75 TABLET, FILM COATED ORAL at 10:41

## 2025-05-21 RX ADMIN — CEFEPIME 1000 MG: 1 INJECTION, POWDER, FOR SOLUTION INTRAMUSCULAR; INTRAVENOUS at 09:04

## 2025-05-21 RX ADMIN — PREGABALIN 75 MG: 50 CAPSULE ORAL at 20:03

## 2025-05-21 RX ADMIN — BETHANECHOL CHLORIDE 25 MG: 25 TABLET ORAL at 17:17

## 2025-05-21 RX ADMIN — PANTOPRAZOLE SODIUM 40 MG: 40 TABLET, DELAYED RELEASE ORAL at 05:37

## 2025-05-21 RX ADMIN — CETIRIZINE HYDROCHLORIDE 5 MG: 10 TABLET, FILM COATED ORAL at 10:41

## 2025-05-21 RX ADMIN — ACETAMINOPHEN 650 MG: 325 TABLET ORAL at 10:41

## 2025-05-21 RX ADMIN — ASPIRIN 81 MG: 81 TABLET, COATED ORAL at 10:40

## 2025-05-21 RX ADMIN — PREGABALIN 75 MG: 50 CAPSULE ORAL at 10:41

## 2025-05-21 RX ADMIN — PREGABALIN 75 MG: 50 CAPSULE ORAL at 01:07

## 2025-05-21 RX ADMIN — INSULIN LISPRO 2 UNITS: 100 INJECTION, SOLUTION INTRAVENOUS; SUBCUTANEOUS at 20:04

## 2025-05-21 ASSESSMENT — PAIN SCALES - WONG BAKER
WONGBAKER_NUMERICALRESPONSE: NO HURT
WONGBAKER_NUMERICALRESPONSE: NO HURT

## 2025-05-21 ASSESSMENT — PAIN DESCRIPTION - LOCATION: LOCATION: NECK

## 2025-05-21 ASSESSMENT — PAIN SCALES - GENERAL
PAINLEVEL_OUTOF10: 8
PAINLEVEL_OUTOF10: 0

## 2025-05-21 NOTE — BSMART NOTE
Liaison attempted 2x to meet with the pt . Pt was sleeping soundly and would not wake up when her name was called.

## 2025-05-21 NOTE — PROGRESS NOTES
Renal Progress Note    Patient: Nicci Hernandez MRN: [id number]  SSN: [id number]    YOB: 1960  Age: 64 y.o.  Sex: female      Admit Date: 5/16/2025    LOS: 5 days     Subjective:   Patient seen at bedside. Attempting to talk to daughter on the phone but unable to stay awake during conversation.  Denies any shortness of breath or swelling in extremities. Appears euvolemic.   Creatinine is 1.96 today.       Current Facility-Administered Medications   Medication Dose Route Frequency    [Held by provider] lactulose (CHRONULAC) 10 GM/15ML solution 20 g  20 g Oral TID    tamsulosin (FLOMAX) capsule 0.4 mg  0.4 mg Oral Daily    aluminum & magnesium hydroxide-simethicone (MAALOX PLUS) 200-200-20 MG/5ML suspension 30 mL  30 mL Oral Q6H PRN    traMADol (ULTRAM) tablet 50 mg  50 mg Oral Q6H PRN    cyclobenzaprine (FLEXERIL) tablet 10 mg  10 mg Oral TID PRN    aspirin EC tablet 81 mg  81 mg Oral Daily    lidocaine 4 % external patch 1 patch  1 patch TransDERmal Daily    cefepime (MAXIPIME) 1,000 mg in sodium chloride 0.9 % 100 mL IVPB (addEASE)  1,000 mg IntraVENous Q24H    midodrine (PROAMATINE) tablet 5 mg  5 mg Oral TID WC    levETIRAcetam (KEPPRA) tablet 250 mg  250 mg Oral BID    insulin lispro (HUMALOG,ADMELOG) injection vial 0-8 Units  0-8 Units SubCUTAneous 4x Daily AC & HS    sodium chloride flush 0.9 % injection 5-40 mL  5-40 mL IntraVENous 2 times per day    sodium chloride flush 0.9 % injection 5-40 mL  5-40 mL IntraVENous PRN    0.9 % sodium chloride infusion   IntraVENous PRN    ondansetron (ZOFRAN-ODT) disintegrating tablet 4 mg  4 mg Oral Q8H PRN    Or    ondansetron (ZOFRAN) injection 4 mg  4 mg IntraVENous Q6H PRN    polyethylene glycol (GLYCOLAX) packet 17 g  17 g Oral Daily PRN    acetaminophen (TYLENOL) tablet 650 mg  650 mg Oral Q6H PRN    Or    acetaminophen (TYLENOL) suppository 650 mg  650 mg Rectal Q6H PRN    clopidogrel (PLAVIX) tablet 75 mg  75 mg Oral Daily    DULoxetine (CYMBALTA)

## 2025-05-21 NOTE — PROGRESS NOTES
Hospitalist Progress Note    NAME:   Nicci Hernandez   : 1960   MRN: [id number]     Date/Time: 2025 10:12 AM  Patient PCP: Franco Ross MD    Estimated discharge date:48hrs  Barriers: IV abx, urine culture, CT head, psych eval, clinical improvement, neph clearance    Hospital course:  Nicci Hernandez is a 64 y.o. female with PMHx significant for CAD, HFrEF, CKD, diabetes, HLD, HTN, who has been recurrently hospitalized since March who presents to the ED for altered mental status, acute on chronic, that began following a ground-level fall on 2025. She has been on extensive antibiotics as an outpatient for a retropharyngeal abscess as well as for a chronic left foot infection. She was brought to the ED and initial workup revealed unremarkable CBC, lipase negative, CMP revealed chloride 92, CO2 36, glucose 242, creatinine 2.74, T. bili 1.2. Initial imaging including CT head, CT C-spine, CXR unremarkable. XR left foot revealed nonspecific findings that did not appear acute compared to previous, however will have podiatry consult. UA showed UTI. Blood and urine cultures drawn. Started on ceftriaxone. MRI brain without contrast ordered for AMS, no acute infarct. Renal US shows medical renal disease, without hydronephrosis and trace debris in the bladder. 2025 labs showed elevated ammonia and Tbili, US Gallbladder RUQ and Hepatitis panel ordered. Initial urine culture prelim with Ecoli. Switched antibiotic to cefepime. Final urine culture result mixed colonies. BNP: 6016, nephrology consult placed for fluid management with AL and CKD with history of HFrEF. Mental status significantly improved since admission, patient now oriented x 3. Repeat culture ordered given contaminated initial specimen.    Patient with gradual improvement in renal function. Patient remains oriented but noting visual hallucinations and brain fog. Patient also developed right arm jerking, states she has had in the past

## 2025-05-21 NOTE — PLAN OF CARE
PHYSICAL THERAPY TREATMENT     Patient: Nicci Hernandez (64 y.o. female)  Date: 5/21/2025  Diagnosis: AL (acute kidney injury) [N17.9]  Altered mental status, unspecified altered mental status type [R41.82]  AMS (altered mental status) [R41.82] AMS (altered mental status)      Precautions: Fall Risk, Contact Precautions                      Recommendations for nursing mobility: Encourage HEP in prep for ADLs/mobility; see handout for details, Frequent repositioning to prevent skin breakdown, Use of bed/chair alarm for safety, and Assist x2    In place during session: EKG/telemetry   Chart, physical therapy assessment, plan of care and goals were reviewed.  ASSESSMENT  Patient continues with skilled PT services and is slowly progressing towards goals. Pt getting up to EOB with MEDELLIN upon PT arrival, agreeable to session. Pt A&O x 2. (See below for objective details and assist levels).     Overall pt tolerated session fair today. Patient initially demonstrated difficulty with balance at EOB but once completely scooted and feet planted on the floor, pt balance improved. Pt overall appear drowsy today and also reports feeling \"wiped out\". Fair sitting balance noted today and able to complete a few exercises with cues for technique. Pt stood 2x at EOB with mod A x2 overall and able to maintain standing longer than previous session but still only able to take 1-2- small side steps EOB. Pt also noted to be leaning legs up against bed on first transfer req cues to correct upright posture and technique. More confusion noted today during session, RN aware of confusion prior to therapy session. Vitals stable during session. Will continue to benefit from skilled PT services, and will continue to progress as tolerated. Current PT DC recommendation Moderate intensity short-term skilled physical therapy up to 5x/week once medically appropriate.      GOALS:    Problem: Physical Therapy - Adult  Goal: By Discharge: Performs

## 2025-05-21 NOTE — PROGRESS NOTES
4 Eyes Skin Assessment     NAME:  Nicci Hernandez  YOB: 1960  MEDICAL RECORD NUMBER:  [id number]    The patient is being assessed for  Other weekly    I agree that at least one RN has performed a thorough Head to Toe Skin Assessment on the patient. ALL assessment sites listed below have been assessed.      Areas assessed by both nurses:    Head, Face, Ears, Shoulders, Back, Chest, Arms, Elbows, Hands, Sacrum. Buttock, Coccyx, Ischium, Legs. Feet and Heels, and Under Medical Devices         Does the Patient have a Wound? Yes wound(s) were present on assessment. LDA wound assessment was Initiated and completed by RN       Rupert Prevention initiated by RN: Yes  Wound Care Orders initiated by RN: Yes    Pressure Injury (Stage 3,4, Unstageable, DTI, NWPT, and Complex wounds) if present, place Wound referral order by RN under : Yes    New Ostomies, if present place, Ostomy referral order under : No     Nurse 1 eSignature: Electronically signed by Kandy Greenberg RN on 5/21/25 at 1:39 AM EDT    **SHARE this note so that the co-signing nurse can place an eSignature**    Nurse 2 eSignature: Electronically signed by Monica Mancera RN on 5/21/25 at 3:29 AM EDT

## 2025-05-21 NOTE — PLAN OF CARE
OCCUPATIONAL THERAPY TREATMENT  Patient: Nicci Hernandez (64 y.o. female)  Date: 5/21/2025  Primary Diagnosis: AL (acute kidney injury) [N17.9]  Altered mental status, unspecified altered mental status type [R41.82]  AMS (altered mental status) [R41.82]       Precautions: Fall Risk, Contact Precautions                Recommendations for nursing mobility: Encourage HEP in prep for ADLs/mobility; see handout for details, Frequent repositioning to prevent skin breakdown, Use of bed/chair alarm for safety, Assist x1, and Assist x2    In place during session: Peripheral IV and EKG/telemetry   Chart, occupational therapy assessment, plan of care, and goals were reviewed.    ASSESSMENT  Patient continues with skilled OT services and is slowly progressing towards goals.  Pt received semi-supine in bed upon arrival, AXO x 2 and agreeable to MEDELLIN tx at this time. Pt cooperative and demonstrated fair effort during activities with additional time d/t decreased activity tolerance, increased weakness and increased confusion. Pt req'd min/mod A for all bed mobility<>EOB with vc's for correct technique using bed rail. PTA arrived for ambulation. Pt req'd min A /CGA for drinking d/t jerky movements, spilling beverage. SBA/set-up for simple grooming. Pt completed 2x STS and side stepped with last stand with mod A x2 using AD with multimodal cues for correct hand placement, proper posture and RW mgmt for safety. While Standing, pt req'd TA for posterior toileting hygiene for bladder voiding. Pt req'd max A x2 for scooting HOB and complete linen change. Pt left with PTA.    (See below for objective details and assist levels)     Overall, pt limited by impaired cognition, increased confusion and generalized weakness that interferes with performance in ADL's and functional tf's safely.  Will continue to progress. Current OT recommendations for discharge Moderate intensity short-term skilled occupational therapy up to 5x/week.            Skilled Clinical Factors: Standing: assist with toileting hygiene for bladder voiding, mod A x2         Pain Ratin/10  Pain Intervention(s):       Activity Tolerance:   Fair , Poor, and requires frequent rest breaks    After treatment patient left in no apparent distress:   Bed locked and returned to lowest position, Patient left in no apparent distress in bed, Call bell within reach, Bed/ chair alarm activated, Side rails x3, and PTA , and nsg updated     COMMUNICATION/EDUCATION:   The patient’s plan of care was discussed with: Physical therapy assistant and Registered nurse        Patient Education  Education Given To: Patient  Education Provided: Role of Therapy;Mobility Training;Transfer Training;Fall Prevention Strategies;Equipment;Orientation  Education Method: Verbal;Demonstration  Barriers to Learning: Cognition  Education Outcome: Demonstrated understanding;Continued education needed    Thank you for this referral.  DARLING CALLOWAY  Minutes: 33

## 2025-05-21 NOTE — CONSULTS
The patient has been see at the bedside, full consult note to follow.  Urinary retention   The patient has been straight cath multiple times. Post void residual 512 cc today   Will insert becerra catheter, start patient on tamsulosin and bethanechol   Voiding trial at discharge, follow up after discharge

## 2025-05-22 LAB
ALBUMIN SERPL-MCNC: 2.6 G/DL (ref 3.5–5)
ALBUMIN/GLOB SERPL: 0.5 (ref 1.1–2.2)
ALP SERPL-CCNC: 82 U/L (ref 45–117)
ALT SERPL-CCNC: 15 U/L (ref 12–78)
AMMONIA PLAS-SCNC: 57 UMOL/L
ANION GAP SERPL CALC-SCNC: 12 MMOL/L (ref 2–12)
AST SERPL W P-5'-P-CCNC: 25 U/L (ref 15–37)
BASOPHILS # BLD: 0.03 K/UL (ref 0–0.1)
BASOPHILS NFR BLD: 0.3 % (ref 0–1)
BILIRUB SERPL-MCNC: 1.3 MG/DL (ref 0.2–1)
BUN SERPL-MCNC: 54 MG/DL (ref 6–20)
BUN/CREAT SERPL: 28 (ref 12–20)
CA-I BLD-MCNC: 9.6 MG/DL (ref 8.5–10.1)
CHLORIDE SERPL-SCNC: 104 MMOL/L (ref 97–108)
CO2 SERPL-SCNC: 21 MMOL/L (ref 21–32)
CREAT SERPL-MCNC: 1.9 MG/DL (ref 0.55–1.02)
DIFFERENTIAL METHOD BLD: ABNORMAL
EOSINOPHIL # BLD: 0.09 K/UL (ref 0–0.4)
EOSINOPHIL NFR BLD: 0.8 % (ref 0–7)
ERYTHROCYTE [DISTWIDTH] IN BLOOD BY AUTOMATED COUNT: 15.6 % (ref 11.5–14.5)
GLOBULIN SER CALC-MCNC: 5.5 G/DL (ref 2–4)
GLUCOSE BLD STRIP.AUTO-MCNC: 168 MG/DL (ref 65–100)
GLUCOSE BLD STRIP.AUTO-MCNC: 199 MG/DL (ref 65–100)
GLUCOSE BLD STRIP.AUTO-MCNC: 201 MG/DL (ref 65–100)
GLUCOSE BLD STRIP.AUTO-MCNC: 203 MG/DL (ref 65–100)
GLUCOSE SERPL-MCNC: 208 MG/DL (ref 65–100)
HCT VFR BLD AUTO: 43.8 % (ref 35–47)
HGB BLD-MCNC: 12.7 G/DL (ref 11.5–16)
IMM GRANULOCYTES # BLD AUTO: 0.05 K/UL (ref 0–0.04)
IMM GRANULOCYTES NFR BLD AUTO: 0.4 % (ref 0–0.5)
LYMPHOCYTES # BLD: 0.6 K/UL (ref 0.8–3.5)
LYMPHOCYTES NFR BLD: 5.4 % (ref 12–49)
MCH RBC QN AUTO: 28.4 PG (ref 26–34)
MCHC RBC AUTO-ENTMCNC: 29 G/DL (ref 30–36.5)
MCV RBC AUTO: 98 FL (ref 80–99)
MONOCYTES # BLD: 0.59 K/UL (ref 0–1)
MONOCYTES NFR BLD: 5.3 % (ref 5–13)
NEUTS SEG # BLD: 9.8 K/UL (ref 1.8–8)
NEUTS SEG NFR BLD: 87.8 % (ref 32–75)
NRBC # BLD: 0 K/UL (ref 0–0.01)
NRBC BLD-RTO: 0 PER 100 WBC
PERFORMED BY:: ABNORMAL
PHOSPHATE SERPL-MCNC: 2.2 MG/DL (ref 2.6–4.7)
PLATELET # BLD AUTO: 155 K/UL (ref 150–400)
PMV BLD AUTO: 12.1 FL (ref 8.9–12.9)
POTASSIUM SERPL-SCNC: 3.8 MMOL/L (ref 3.5–5.1)
PROCALCITONIN SERPL-MCNC: 0.2 NG/ML
PROT SERPL-MCNC: 8.1 G/DL (ref 6.4–8.2)
RBC # BLD AUTO: 4.47 M/UL (ref 3.8–5.2)
SODIUM SERPL-SCNC: 137 MMOL/L (ref 136–145)
WBC # BLD AUTO: 11.2 K/UL (ref 3.6–11)

## 2025-05-22 PROCEDURE — 85025 COMPLETE CBC W/AUTO DIFF WBC: CPT

## 2025-05-22 PROCEDURE — 82140 ASSAY OF AMMONIA: CPT

## 2025-05-22 PROCEDURE — 36415 COLL VENOUS BLD VENIPUNCTURE: CPT

## 2025-05-22 PROCEDURE — 2500000003 HC RX 250 WO HCPCS: Performed by: PHYSICIAN ASSISTANT

## 2025-05-22 PROCEDURE — 82962 GLUCOSE BLOOD TEST: CPT

## 2025-05-22 PROCEDURE — 84100 ASSAY OF PHOSPHORUS: CPT

## 2025-05-22 PROCEDURE — 1100000000 HC RM PRIVATE

## 2025-05-22 PROCEDURE — 6370000000 HC RX 637 (ALT 250 FOR IP)

## 2025-05-22 PROCEDURE — 6370000000 HC RX 637 (ALT 250 FOR IP): Performed by: PHYSICIAN ASSISTANT

## 2025-05-22 PROCEDURE — 80053 COMPREHEN METABOLIC PANEL: CPT

## 2025-05-22 PROCEDURE — 6370000000 HC RX 637 (ALT 250 FOR IP): Performed by: STUDENT IN AN ORGANIZED HEALTH CARE EDUCATION/TRAINING PROGRAM

## 2025-05-22 PROCEDURE — 6370000000 HC RX 637 (ALT 250 FOR IP): Performed by: NURSE PRACTITIONER

## 2025-05-22 PROCEDURE — 84145 PROCALCITONIN (PCT): CPT

## 2025-05-22 RX ORDER — LACTULOSE 10 G/15ML
20 SOLUTION ORAL 3 TIMES DAILY
Status: COMPLETED | OUTPATIENT
Start: 2025-05-22 | End: 2025-05-23

## 2025-05-22 RX ORDER — CIPROFLOXACIN 500 MG/1
500 TABLET, FILM COATED ORAL EVERY 12 HOURS SCHEDULED
Status: COMPLETED | OUTPATIENT
Start: 2025-05-22 | End: 2025-05-26

## 2025-05-22 RX ADMIN — PREGABALIN 75 MG: 50 CAPSULE ORAL at 08:34

## 2025-05-22 RX ADMIN — BETHANECHOL CHLORIDE 25 MG: 25 TABLET ORAL at 23:38

## 2025-05-22 RX ADMIN — LACTULOSE 20 G: 20 SOLUTION ORAL at 16:09

## 2025-05-22 RX ADMIN — CIPROFLOXACIN HYDROCHLORIDE 500 MG: 500 TABLET, FILM COATED ORAL at 23:38

## 2025-05-22 RX ADMIN — DIBASIC SODIUM PHOSPHATE, MONOBASIC POTASSIUM PHOSPHATE AND MONOBASIC SODIUM PHOSPHATE 2 TABLET: 852; 155; 130 TABLET ORAL at 18:03

## 2025-05-22 RX ADMIN — BETHANECHOL CHLORIDE 25 MG: 25 TABLET ORAL at 16:09

## 2025-05-22 RX ADMIN — CETIRIZINE HYDROCHLORIDE 5 MG: 10 TABLET, FILM COATED ORAL at 08:33

## 2025-05-22 RX ADMIN — BETHANECHOL CHLORIDE 25 MG: 25 TABLET ORAL at 08:33

## 2025-05-22 RX ADMIN — RIFAXIMIN 550 MG: 550 TABLET ORAL at 23:38

## 2025-05-22 RX ADMIN — CLOPIDOGREL BISULFATE 75 MG: 75 TABLET, FILM COATED ORAL at 08:34

## 2025-05-22 RX ADMIN — MIDODRINE HYDROCHLORIDE 5 MG: 5 TABLET ORAL at 18:02

## 2025-05-22 RX ADMIN — ASPIRIN 81 MG: 81 TABLET, COATED ORAL at 08:34

## 2025-05-22 RX ADMIN — INSULIN LISPRO 2 UNITS: 100 INJECTION, SOLUTION INTRAVENOUS; SUBCUTANEOUS at 08:34

## 2025-05-22 RX ADMIN — MIDODRINE HYDROCHLORIDE 5 MG: 5 TABLET ORAL at 12:00

## 2025-05-22 RX ADMIN — CARVEDILOL 6.25 MG: 3.12 TABLET, FILM COATED ORAL at 23:37

## 2025-05-22 RX ADMIN — CIPROFLOXACIN HYDROCHLORIDE 500 MG: 500 TABLET, FILM COATED ORAL at 08:34

## 2025-05-22 RX ADMIN — SODIUM CHLORIDE, PRESERVATIVE FREE 10 ML: 5 INJECTION INTRAVENOUS at 08:35

## 2025-05-22 RX ADMIN — CARVEDILOL 6.25 MG: 3.12 TABLET, FILM COATED ORAL at 08:33

## 2025-05-22 RX ADMIN — INSULIN LISPRO 2 UNITS: 100 INJECTION, SOLUTION INTRAVENOUS; SUBCUTANEOUS at 23:39

## 2025-05-22 RX ADMIN — PREGABALIN 75 MG: 50 CAPSULE ORAL at 23:38

## 2025-05-22 RX ADMIN — DIBASIC SODIUM PHOSPHATE, MONOBASIC POTASSIUM PHOSPHATE AND MONOBASIC SODIUM PHOSPHATE 2 TABLET: 852; 155; 130 TABLET ORAL at 16:08

## 2025-05-22 RX ADMIN — TAMSULOSIN HYDROCHLORIDE 0.4 MG: 0.4 CAPSULE ORAL at 08:33

## 2025-05-22 RX ADMIN — DULOXETINE 30 MG: 30 CAPSULE, DELAYED RELEASE ORAL at 08:34

## 2025-05-22 RX ADMIN — SODIUM CHLORIDE, PRESERVATIVE FREE 10 ML: 5 INJECTION INTRAVENOUS at 23:39

## 2025-05-22 RX ADMIN — LACTULOSE 20 G: 20 SOLUTION ORAL at 23:39

## 2025-05-22 RX ADMIN — PANTOPRAZOLE SODIUM 40 MG: 40 TABLET, DELAYED RELEASE ORAL at 05:50

## 2025-05-22 RX ADMIN — INSULIN LISPRO 2 UNITS: 100 INJECTION, SOLUTION INTRAVENOUS; SUBCUTANEOUS at 18:02

## 2025-05-22 RX ADMIN — LACTULOSE 20 G: 20 SOLUTION ORAL at 11:58

## 2025-05-22 ASSESSMENT — PAIN SCALES - GENERAL: PAINLEVEL_OUTOF10: 0

## 2025-05-22 ASSESSMENT — PAIN SCALES - WONG BAKER: WONGBAKER_NUMERICALRESPONSE: NO HURT

## 2025-05-22 NOTE — PROGRESS NOTES
Hospitalist Progress Note    NAME:   Nicci Hernandez   : 1960   MRN: [id number]     Date/Time: 2025 11:44 AM  Patient PCP: Franco Ross MD    Estimated discharge date:48hrs  Barriers: psych eval, clinical improvement, improvement in Ammonia, neph clearance    Hospital course:  Nicci Hernandez is a 64 y.o. female with PMHx significant for CAD, HFrEF, CKD, diabetes, HLD, HTN, who has been recurrently hospitalized since March who presents to the ED for altered mental status, acute on chronic, that began following a ground-level fall on 2025. She has been on extensive antibiotics as an outpatient for a retropharyngeal abscess as well as for a chronic left foot infection. She was brought to the ED and initial workup revealed unremarkable CBC, lipase negative, CMP revealed chloride 92, CO2 36, glucose 242, creatinine 2.74, T. bili 1.2. Initial imaging including CT head, CT C-spine, CXR unremarkable. XR left foot revealed nonspecific findings that did not appear acute compared to previous, however will have podiatry consult. UA showed UTI. Blood and urine cultures drawn. Started on ceftriaxone. MRI brain without contrast ordered for AMS, no acute infarct. Renal US shows medical renal disease, without hydronephrosis and trace debris in the bladder. 2025 labs showed elevated ammonia and Tbili, US Gallbladder RUQ and Hepatitis panel ordered. Initial urine culture prelim with Ecoli. Switched antibiotic to cefepime. Final urine culture result mixed colonies. BNP: 6016, nephrology consult placed for fluid management with AL and CKD with history of HFrEF. Mental status significantly improved since admission, patient now oriented x 3. Repeat culture ordered given contaminated initial specimen.    Patient with gradual improvement in renal function. Patient remains oriented but noting visual hallucinations and brain fog. Patient also developed right arm jerking, states she has had in the past but

## 2025-05-22 NOTE — PROGRESS NOTES
Renal Progress Note    Patient: Nicci Hernandez MRN: [id number]  SSN: [id number]    YOB: 1960  Age: 64 y.o.  Sex: female      Admit Date: 5/16/2025    LOS: 6 days     Subjective:   Patient seen at bedside. Awake, able to answer questions appropriately.  With urinary retention, Osborne now in place, >1.2 L UOP in past 24 hrs.  Creatinine is stable at 1.9.  Restarted on lactulose for increasing ammonia.     Current Facility-Administered Medications   Medication Dose Route Frequency    ciprofloxacin (CIPRO) tablet 500 mg  500 mg Oral 2 times per day    lactulose (CHRONULAC) 10 GM/15ML solution 20 g  20 g Oral TID    bethanechol (URECHOLINE) tablet 25 mg  25 mg Oral TID    tamsulosin (FLOMAX) capsule 0.4 mg  0.4 mg Oral Daily    aluminum & magnesium hydroxide-simethicone (MAALOX PLUS) 200-200-20 MG/5ML suspension 30 mL  30 mL Oral Q6H PRN    cyclobenzaprine (FLEXERIL) tablet 10 mg  10 mg Oral TID PRN    aspirin EC tablet 81 mg  81 mg Oral Daily    lidocaine 4 % external patch 1 patch  1 patch TransDERmal Daily    midodrine (PROAMATINE) tablet 5 mg  5 mg Oral TID WC    insulin lispro (HUMALOG,ADMELOG) injection vial 0-8 Units  0-8 Units SubCUTAneous 4x Daily AC & HS    sodium chloride flush 0.9 % injection 5-40 mL  5-40 mL IntraVENous 2 times per day    sodium chloride flush 0.9 % injection 5-40 mL  5-40 mL IntraVENous PRN    0.9 % sodium chloride infusion   IntraVENous PRN    ondansetron (ZOFRAN-ODT) disintegrating tablet 4 mg  4 mg Oral Q8H PRN    Or    ondansetron (ZOFRAN) injection 4 mg  4 mg IntraVENous Q6H PRN    polyethylene glycol (GLYCOLAX) packet 17 g  17 g Oral Daily PRN    acetaminophen (TYLENOL) tablet 650 mg  650 mg Oral Q6H PRN    Or    acetaminophen (TYLENOL) suppository 650 mg  650 mg Rectal Q6H PRN    clopidogrel (PLAVIX) tablet 75 mg  75 mg Oral Daily    DULoxetine (CYMBALTA) extended release capsule 30 mg  30 mg Oral Daily    fluticasone (FLONASE) 50 MCG/ACT nasal spray 2 spray  2 spray  108 mmol/L    CO2 21 21 - 32 mmol/L    Anion Gap 12 2 - 12 mmol/L    Glucose 208 (H) 65 - 100 mg/dL    BUN 54 (H) 6 - 20 mg/dL    Creatinine 1.90 (H) 0.55 - 1.02 mg/dL    BUN/Creatinine Ratio 28 (H) 12 - 20      Est, Glom Filt Rate 29 (L) >60 ml/min/1.73m2    Calcium 9.6 8.5 - 10.1 mg/dL    Total Bilirubin 1.3 (H) 0.2 - 1.0 mg/dL    AST 25 15 - 37 U/L    ALT 15 12 - 78 U/L    Alk Phosphatase 82 45 - 117 U/L    Total Protein 8.1 6.4 - 8.2 g/dL    Albumin 2.6 (L) 3.5 - 5.0 g/dL    Globulin 5.5 (H) 2.0 - 4.0 g/dL    Albumin/Globulin Ratio 0.5 (L) 1.1 - 2.2     Phosphorus    Collection Time: 05/22/25  6:23 AM   Result Value Ref Range    Phosphorus 2.2 (L) 2.6 - 4.7 mg/dL   Ammonia    Collection Time: 05/22/25  6:23 AM   Result Value Ref Range    Ammonia 57 (H) <32 umol/L   Procalcitonin    Collection Time: 05/22/25  6:23 AM   Result Value Ref Range    Procalcitonin 0.20 (H) 0 ng/mL   POCT Glucose    Collection Time: 05/22/25  8:13 AM   Result Value Ref Range    POC Glucose 199 (H) 65 - 100 mg/dL    Performed by: [facility]re[facility] PCT    POCT Glucose    Collection Time: 05/22/25 11:07 AM   Result Value Ref Range    POC Glucose 168 (H) 65 - 100 mg/dL    Performed by: [facility] PCT         Assessment and Plan:     64-year-old female with a past medical history significant for CKD 3B, type 2 diabetes mellitus, CAD s/p CABG, hypertension, and hyperlipidemia, cardiomyopathy, history of stroke, gastroparesis, PVD, ODALYS, history of cervical fusion about 20 years ago.  Presented to ED on 5/16 with altered mental status acute following ground-level fall. Nephrology consulted for AL on CKD.     AL on CKD 3B  - Creatinine admission at 2.74--> 2.36--> 2.50--> 2.0--> 1.96--> 1.9  - Most likely prerenal in etiology, bump in creatinine was most likely due to diarrhea she experienced following lactulose -patient reported having diarrhea \"all day\" plus poor p.o. intake given AMS on admission  - Bp soft but without severe drops on

## 2025-05-22 NOTE — PROGRESS NOTES
Physician Progress Note      PATIENT:               SU HILARIO  CSN #:                  [id number]  :                       1960  ADMIT DATE:       2025 1:25 PM  DISCH DATE:  RESPONDING  PROVIDER #:        Santana Beltran MD          QUERY TEXT:    Anemia is documented in the medical record starting Neph Consult . Please   specify the type:    The clinical indicators include:  H&P -  \" 64 y.o.  female with PMHx significant for CAD, HFrEF, CKD, diabetes, HLD,   HTN,\"    NEPH CONSULT -  \"Anemia  - Hemoglobin normal at 12  - Continue to monitor \"    NEPH PN -  \"Anemia  - Hemoglobin ok at 11.4  - Continue to monitor\"      LABS -  Hgb 11.5 - 11.6 - 12.0 - 11.4 - 11.4  Options provided:  -- Chronic anemia  -- Related to chronic blood loss  -- Related to iron deficiency  -- Related to chronic disease, Please specify chronic disease such as CKD,   kidney failure, neoplastic disease.  -- Hemolytic anemia, Please specify type)  -- Macrocytic anemia  -- Microcytic anemia  -- Normocytic anemia  -- Pernicious anemia  -- Other - I will add my own diagnosis  -- Disagree - Not applicable / Not valid  -- Disagree - Clinically unable to determine / Unknown  -- Refer to Clinical Documentation Reviewer    PROVIDER RESPONSE TEXT:    Provider disagreed with this query.  No evidence of anemia    Query created by: Sophia Jensen on 2025 7:46 AM      Electronically signed by:  Santana Beltran MD 2025 8:48 PM

## 2025-05-23 LAB
ALBUMIN SERPL-MCNC: 2.5 G/DL (ref 3.5–5)
ALBUMIN/GLOB SERPL: 0.5 (ref 1.1–2.2)
ALP SERPL-CCNC: 77 U/L (ref 45–117)
ALT SERPL-CCNC: 9 U/L (ref 12–78)
AMMONIA PLAS-SCNC: 49 UMOL/L
ANION GAP SERPL CALC-SCNC: 10 MMOL/L (ref 2–12)
AST SERPL W P-5'-P-CCNC: 19 U/L (ref 15–37)
BACTERIA SPEC CULT: ABNORMAL
BACTERIA SPEC CULT: ABNORMAL
BACTERIA SPEC CULT: NORMAL
BACTERIA SPEC CULT: NORMAL
BASOPHILS # BLD: 0.09 K/UL (ref 0–0.1)
BASOPHILS NFR BLD: 1 % (ref 0–1)
BILIRUB SERPL-MCNC: 1.2 MG/DL (ref 0.2–1)
BUN SERPL-MCNC: 54 MG/DL (ref 6–20)
BUN/CREAT SERPL: 25 (ref 12–20)
CA-I BLD-MCNC: 9.3 MG/DL (ref 8.5–10.1)
CHLORIDE SERPL-SCNC: 106 MMOL/L (ref 97–108)
CO2 SERPL-SCNC: 25 MMOL/L (ref 21–32)
COLONY COUNT, CNT: ABNORMAL
COLONY COUNT, CNT: ABNORMAL
CREAT SERPL-MCNC: 2.14 MG/DL (ref 0.55–1.02)
CRP SERPL-MCNC: 13.6 MG/DL (ref 0–0.3)
DIFFERENTIAL METHOD BLD: ABNORMAL
EOSINOPHIL # BLD: 0 K/UL (ref 0–0.4)
EOSINOPHIL NFR BLD: 0 % (ref 0–7)
ERYTHROCYTE [DISTWIDTH] IN BLOOD BY AUTOMATED COUNT: 15.6 % (ref 11.5–14.5)
GLOBULIN SER CALC-MCNC: 5 G/DL (ref 2–4)
GLUCOSE BLD STRIP.AUTO-MCNC: 160 MG/DL (ref 65–100)
GLUCOSE BLD STRIP.AUTO-MCNC: 186 MG/DL (ref 65–100)
GLUCOSE BLD STRIP.AUTO-MCNC: 198 MG/DL (ref 65–100)
GLUCOSE BLD STRIP.AUTO-MCNC: 208 MG/DL (ref 65–100)
GLUCOSE SERPL-MCNC: 199 MG/DL (ref 65–100)
HCT VFR BLD AUTO: 37.6 % (ref 35–47)
HGB BLD-MCNC: 11.4 G/DL (ref 11.5–16)
IMM GRANULOCYTES # BLD AUTO: 0 K/UL
IMM GRANULOCYTES NFR BLD AUTO: 0 %
INR PPP: 1.4 (ref 0.9–1.1)
LYMPHOCYTES # BLD: 0.52 K/UL (ref 0.8–3.5)
LYMPHOCYTES NFR BLD: 6 % (ref 12–49)
Lab: ABNORMAL
Lab: NORMAL
Lab: NORMAL
MCH RBC QN AUTO: 28.5 PG (ref 26–34)
MCHC RBC AUTO-ENTMCNC: 30.3 G/DL (ref 30–36.5)
MCV RBC AUTO: 94 FL (ref 80–99)
MONOCYTES # BLD: 0.34 K/UL (ref 0–1)
MONOCYTES NFR BLD: 4 % (ref 5–13)
NEUTS BAND NFR BLD MANUAL: 5 % (ref 0–6)
NEUTS SEG # BLD: 7.65 K/UL (ref 1.8–8)
NEUTS SEG NFR BLD: 84 % (ref 32–75)
NRBC # BLD: 0 K/UL (ref 0–0.01)
NRBC BLD-RTO: 0 PER 100 WBC
PERFORMED BY:: ABNORMAL
PLATELET # BLD AUTO: 169 K/UL (ref 150–400)
PMV BLD AUTO: 11.9 FL (ref 8.9–12.9)
POTASSIUM SERPL-SCNC: 3 MMOL/L (ref 3.5–5.1)
PROCALCITONIN SERPL-MCNC: 3.48 NG/ML
PROT SERPL-MCNC: 7.5 G/DL (ref 6.4–8.2)
PROTHROMBIN TIME: 17.9 SEC (ref 11.9–14.6)
RBC # BLD AUTO: 4 M/UL (ref 3.8–5.2)
RBC MORPH BLD: ABNORMAL
SODIUM SERPL-SCNC: 141 MMOL/L (ref 136–145)
WBC # BLD AUTO: 8.6 K/UL (ref 3.6–11)

## 2025-05-23 PROCEDURE — 86235 NUCLEAR ANTIGEN ANTIBODY: CPT

## 2025-05-23 PROCEDURE — 86225 DNA ANTIBODY NATIVE: CPT

## 2025-05-23 PROCEDURE — 84145 PROCALCITONIN (PCT): CPT

## 2025-05-23 PROCEDURE — 6370000000 HC RX 637 (ALT 250 FOR IP): Performed by: STUDENT IN AN ORGANIZED HEALTH CARE EDUCATION/TRAINING PROGRAM

## 2025-05-23 PROCEDURE — 2500000003 HC RX 250 WO HCPCS: Performed by: PHYSICIAN ASSISTANT

## 2025-05-23 PROCEDURE — 83516 IMMUNOASSAY NONANTIBODY: CPT

## 2025-05-23 PROCEDURE — 82140 ASSAY OF AMMONIA: CPT

## 2025-05-23 PROCEDURE — 85610 PROTHROMBIN TIME: CPT

## 2025-05-23 PROCEDURE — 86140 C-REACTIVE PROTEIN: CPT

## 2025-05-23 PROCEDURE — 82390 ASSAY OF CERULOPLASMIN: CPT

## 2025-05-23 PROCEDURE — 1100000000 HC RM PRIVATE

## 2025-05-23 PROCEDURE — 6370000000 HC RX 637 (ALT 250 FOR IP): Performed by: PHYSICIAN ASSISTANT

## 2025-05-23 PROCEDURE — 80053 COMPREHEN METABOLIC PANEL: CPT

## 2025-05-23 PROCEDURE — 86381 MITOCHONDRIAL ANTIBODY EACH: CPT

## 2025-05-23 PROCEDURE — 97530 THERAPEUTIC ACTIVITIES: CPT

## 2025-05-23 PROCEDURE — 6360000002 HC RX W HCPCS: Performed by: PHYSICIAN ASSISTANT

## 2025-05-23 PROCEDURE — 6370000000 HC RX 637 (ALT 250 FOR IP)

## 2025-05-23 PROCEDURE — 6370000000 HC RX 637 (ALT 250 FOR IP): Performed by: NURSE PRACTITIONER

## 2025-05-23 PROCEDURE — 82962 GLUCOSE BLOOD TEST: CPT

## 2025-05-23 PROCEDURE — 36415 COLL VENOUS BLD VENIPUNCTURE: CPT

## 2025-05-23 PROCEDURE — 85025 COMPLETE CBC W/AUTO DIFF WBC: CPT

## 2025-05-23 RX ORDER — POTASSIUM CHLORIDE 1500 MG/1
40 TABLET, EXTENDED RELEASE ORAL 2 TIMES DAILY
Status: COMPLETED | OUTPATIENT
Start: 2025-05-23 | End: 2025-05-24

## 2025-05-23 RX ORDER — ENOXAPARIN SODIUM 100 MG/ML
40 INJECTION SUBCUTANEOUS DAILY
Status: DISCONTINUED | OUTPATIENT
Start: 2025-05-23 | End: 2025-05-31 | Stop reason: HOSPADM

## 2025-05-23 RX ADMIN — PANTOPRAZOLE SODIUM 40 MG: 40 TABLET, DELAYED RELEASE ORAL at 06:32

## 2025-05-23 RX ADMIN — ASPIRIN 81 MG: 81 TABLET, COATED ORAL at 10:57

## 2025-05-23 RX ADMIN — BETHANECHOL CHLORIDE 25 MG: 25 TABLET ORAL at 15:35

## 2025-05-23 RX ADMIN — CETIRIZINE HYDROCHLORIDE 5 MG: 10 TABLET, FILM COATED ORAL at 10:57

## 2025-05-23 RX ADMIN — CARVEDILOL 6.25 MG: 3.12 TABLET, FILM COATED ORAL at 20:54

## 2025-05-23 RX ADMIN — PREGABALIN 75 MG: 50 CAPSULE ORAL at 21:02

## 2025-05-23 RX ADMIN — CARVEDILOL 6.25 MG: 3.12 TABLET, FILM COATED ORAL at 10:57

## 2025-05-23 RX ADMIN — ENOXAPARIN SODIUM 40 MG: 100 INJECTION SUBCUTANEOUS at 10:56

## 2025-05-23 RX ADMIN — POTASSIUM CHLORIDE 40 MEQ: 1500 TABLET, EXTENDED RELEASE ORAL at 20:55

## 2025-05-23 RX ADMIN — BETHANECHOL CHLORIDE 25 MG: 25 TABLET ORAL at 20:55

## 2025-05-23 RX ADMIN — INSULIN LISPRO 2 UNITS: 100 INJECTION, SOLUTION INTRAVENOUS; SUBCUTANEOUS at 21:01

## 2025-05-23 RX ADMIN — MIDODRINE HYDROCHLORIDE 5 MG: 5 TABLET ORAL at 10:56

## 2025-05-23 RX ADMIN — LACTULOSE 20 G: 20 SOLUTION ORAL at 20:55

## 2025-05-23 RX ADMIN — LACTULOSE 20 G: 20 SOLUTION ORAL at 10:57

## 2025-05-23 RX ADMIN — PREGABALIN 75 MG: 50 CAPSULE ORAL at 10:57

## 2025-05-23 RX ADMIN — CIPROFLOXACIN HYDROCHLORIDE 500 MG: 500 TABLET, FILM COATED ORAL at 20:55

## 2025-05-23 RX ADMIN — MIDODRINE HYDROCHLORIDE 5 MG: 5 TABLET ORAL at 18:17

## 2025-05-23 RX ADMIN — RIFAXIMIN 550 MG: 550 TABLET ORAL at 21:01

## 2025-05-23 RX ADMIN — CLOPIDOGREL BISULFATE 75 MG: 75 TABLET, FILM COATED ORAL at 10:57

## 2025-05-23 RX ADMIN — BETHANECHOL CHLORIDE 25 MG: 25 TABLET ORAL at 10:57

## 2025-05-23 RX ADMIN — LACTULOSE 20 G: 20 SOLUTION ORAL at 15:35

## 2025-05-23 RX ADMIN — SODIUM CHLORIDE, PRESERVATIVE FREE 10 ML: 5 INJECTION INTRAVENOUS at 21:01

## 2025-05-23 RX ADMIN — MIDODRINE HYDROCHLORIDE 5 MG: 5 TABLET ORAL at 13:17

## 2025-05-23 RX ADMIN — SODIUM CHLORIDE, PRESERVATIVE FREE 10 ML: 5 INJECTION INTRAVENOUS at 10:58

## 2025-05-23 RX ADMIN — CIPROFLOXACIN HYDROCHLORIDE 500 MG: 500 TABLET, FILM COATED ORAL at 10:58

## 2025-05-23 RX ADMIN — INSULIN LISPRO 2 UNITS: 100 INJECTION, SOLUTION INTRAVENOUS; SUBCUTANEOUS at 11:54

## 2025-05-23 RX ADMIN — TAMSULOSIN HYDROCHLORIDE 0.4 MG: 0.4 CAPSULE ORAL at 10:57

## 2025-05-23 RX ADMIN — RIFAXIMIN 550 MG: 550 TABLET ORAL at 10:57

## 2025-05-23 NOTE — PROGRESS NOTES
K/UL    Differential Type Manual      RBC Comment Normocytic, Normochromic     Comprehensive Metabolic Panel    Collection Time: 05/23/25  8:58 AM   Result Value Ref Range    Sodium 141 136 - 145 mmol/L    Potassium 3.0 (L) 3.5 - 5.1 mmol/L    Chloride 106 97 - 108 mmol/L    CO2 25 21 - 32 mmol/L    Anion Gap 10 2 - 12 mmol/L    Glucose 199 (H) 65 - 100 mg/dL    BUN 54 (H) 6 - 20 mg/dL    Creatinine 2.14 (H) 0.55 - 1.02 mg/dL    BUN/Creatinine Ratio 25 (H) 12 - 20      Est, Glom Filt Rate 25 (L) >60 ml/min/1.73m2    Calcium 9.3 8.5 - 10.1 mg/dL    Total Bilirubin 1.2 (H) 0.2 - 1.0 mg/dL    AST 19 15 - 37 U/L    ALT 9 (L) 12 - 78 U/L    Alk Phosphatase 77 45 - 117 U/L    Total Protein 7.5 6.4 - 8.2 g/dL    Albumin 2.5 (L) 3.5 - 5.0 g/dL    Globulin 5.0 (H) 2.0 - 4.0 g/dL    Albumin/Globulin Ratio 0.5 (L) 1.1 - 2.2     Ammonia    Collection Time: 05/23/25  8:58 AM   Result Value Ref Range    Ammonia 49 (H) <32 umol/L   Procalcitonin    Collection Time: 05/23/25  8:58 AM   Result Value Ref Range    Procalcitonin 3.48 (H) 0 ng/mL   POCT Glucose    Collection Time: 05/23/25 11:20 AM   Result Value Ref Range    POC Glucose 208 (H) 65 - 100 mg/dL    Performed by: Romeo Guajardo         Assessment and Plan:     64-year-old female with a past medical history significant for CKD 3B, type 2 diabetes mellitus, CAD s/p CABG, hypertension, and hyperlipidemia, cardiomyopathy, history of stroke, gastroparesis, PVD, ODALYS, history of cervical fusion about 20 years ago.  Presented to ED on 5/16 with altered mental status acute following ground-level fall. Nephrology consulted for AL on CKD.     AL on CKD 3B  - Creatinine admission at 2.74--> 2.36--> 2.50--> 2.0--> 1.96--> 1.9--> 2.14  - Most likely prerenal in etiology, bumps in creatinine most likely due to diarrhea she experienced following lactulose -patient reported having diarrhea \"all day\" plus poor p.o. intake given AMS on admission and waxing and waning mental status  - Bp soft  but without severe drops on admission  - UA consistent with UTI, no proteinuria no hematuria  - Ultrasound without evidence of hydronephrosis, right kidney smaller than left  - Urine electrolytes noted  - Osborne in place, strict I&O  - Off IV fluids   - Echo in March 2025 with LVEF of 20 to 25%, chest x-ray on admission without pulmonary edema, proBNP chronically elevated, she is on RA  - Continue to hold spironolactone in addition to other diuretics      CKD3b  - Risk factors include diabetes, CAD, cardiomyopathy, HTN, h/o recurrent AKIs  - Baseline appears to be between 1.6 and 1.8  - Urine PCR was 0.9 in April 2025     CKD- MBD  - Corrected calcium is 10.5, phosphorus is 2.2, will replete and recheck   - History of hyperparathyroidism, most recent iPTH in April 2025 was 69, within target  - Not on any vitamin D supplements      Acid/base/electrolytes  - Within normal limits     Hypertension/volume  - Blood pressures stable and controlled  - On carvedilol  - Continue midodrine 5 mg tid     Acute encephalopathy  - Head CT negative  - Restarted on lactulose today     Acute cystitis  - On antibiotics by primary team  - Repeat urine cx growing possible Enterococcus faecalis     Type 2 diabetes mellitus, uncontrolled  - Continue insulin  - Most recent hemoglobin A1c 6.2 previously elevated up to 11.4     History of lateral pharyngeal abscess  - Cervical spine unremarkable     Heart failure with reduced EF  Left bundle branch block  -Resume home meds, monitor blood pressure     Left foot chronic infection  - Podiatry consulted    Signed By: ILIA Tan - CNP     May 23, 2025      Addendum:  Rounds made along with Mai Feliz NP  Patient seen and examined at bedside,    Labs and treatments reviewed   Plan discussed with patient and NP   Reviewed NP's  notes, amended and agree with assessment and plan    Dr. Santana Beltran

## 2025-05-23 NOTE — CONSULTS
Gastroenterology Consult Note        Patient: Nicci Hernandez MRN: [id number]  SSN: [id number]    YOB: 1960  Age: 64 y.o.  Sex: female      Subjective:      Nicci Hernandez is a 64 y.o. female who is being seen for jaundice.  64 y.o. female with PMHx significant for CAD, HFrEF, CKD, diabetes, HLD, HTN, who has been recurrently hospitalized since March who presents to the ED for altered mental status, acute on chronic, has been hospital treatment for the fall, encephalopathy, renal insufficiency, GI consultation placed for jaundice, abnormal function test, patient has no pain no nausea vomiting, however history not reliable, patient has been multiple medication for psychological issues,  Past Medical History:   Diagnosis Date    Arthritis     Bilateral lower leg cellulitis     CAD (coronary artery disease) 2009    Hx of 2 MI's and then CABG 5/2009    Cardiomyopathy, ischemic     CHF (congestive heart failure) (HCC)     CKD (chronic kidney disease)     Diabetes (HCC)     IDDM type 3    Diabetic neuropathy, painful (HCC)     Dyslipidemia     Elevated uric acid in blood     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)     Gastroparesis     Hip pain, left     Hypercholesterolemia     Hypertension     Hypertension, diastolic, benign     Hypoalbuminemia     Peripheral vascular disease     Presence of stent in coronary artery in patient with coronary artery disease     S/P CABG x 3     Type 2 diabetes mellitus with hyperglycemia, with long-term current use of insulin (HCC)     Unspecified sleep apnea     no cpap repeat study sched for 9/20/13     Past Surgical History:   Procedure Laterality Date    ANTERIOR CERVICAL DISCECTOMY W/ FUSION  2003    ANTERIOR CERVICAL FUSION INSTRUMENTATION    BACK SURGERY  12/09/2022    Back surgery/plate in disc 3 -disc 6    CARDIAC CATHETERIZATION  1/2021, 2/2021    with stents placed.     CERVICAL LAMINECTOMY      C3 and C4    CHOLECYSTECTOMY  06/2012

## 2025-05-23 NOTE — PROGRESS NOTES
Comprehensive Nutrition Assessment    Type and Reason for Visit:  Reassess (Intakes)    Nutrition Recommendations/Plan:   Continue current diet to promote improved PO intakes (Pt prefers salads, grilled cheese w/ tomato soup, chix salad w/ crackers)  Continue Ensure HP 1x/d   Continue Chris BID (180kcal, 5gpro)  Manage BG w/ sliding scale to 140-180  Monitor/document wt, BM, PO+ONS%, fluid status in I/O     Malnutrition Assessment:  Malnutrition Status:  Moderate malnutrition (05/17/25 1307)    Context:  Acute Illness     Findings of the 6 clinical characteristics of malnutrition:  Energy Intake:  75% or less of estimated energy requirements for 7 or more days  Weight Loss:  Greater than 5% over 1 month (2/2 fluid status)     Body Fat Loss:  No body fat loss     Muscle Mass Loss:  No muscle mass loss    Fluid Accumulation:  Mild Extremities   Strength:  Not Performed    Nutrition Assessment:    Admit for AMS. Consulted for poor PO/appetite >5 days pta. Recent admit x1mo ago where RD followed t/o admit d/t poor PO intakes. Prev rec'd ONS 4x/d d/t pt report of difficulty chewing 2/2 pain from neck, back. Spoke w/ pt and daughter who reported appetite beginning to improve. Stated appetite poor since March d/t illness. No notable wt changes for pt/daughter; per EMR, ~45# wt loss x1mo 2/2 fluid shifts. Pt now back to UBW, still w/ mild edema. Fluid likely masking true wt loss. Provided diet edu on current SBS diet order- pt prefers to be switched back to Reg/thins; provided diet preferences of salads, grilled cheese w/ tomato soup, chix salad w/ crackers. Reported consuming 100% bfast this AM. Observed eating lunch- stated chix salad dry. Plans to add ONS while appetite continues to improve to meet EERs, promote wound healing. Labs: Cl 96, BUN 74, Cre 2.36, eGFR 22, -227, Alb, 2.7. Meds: carvdelilol, humalog, lactulose, MagOx, protonix. (5/20) Diet upgraded to ETC (from SBS) and therapeutic restrictions removed

## 2025-05-23 NOTE — CARE COORDINATION
1356: CM met with daughter, Narcisa, she request capacity be evaluated. Provider informed.    6974: CM reviewed chart. DCP [facility] when medicallly ready. CM will continue to follow.

## 2025-05-23 NOTE — PROGRESS NOTES
Hospitalist Progress Note    NAME:   Nicci Hernandez   : 1960   MRN: [id number]     Date/Time: 2025 9:41 AM  Patient PCP: Franco Ross MD    Estimated discharge date: 48hrs  Barriers: psych eval, clinical improvement, improvement in Ammonia, neph clearance, GI consult    Hospital course:  Nicci Hernandez is a 64 y.o. female with PMHx significant for CAD, HFrEF, CKD, diabetes, HLD, HTN, who has been recurrently hospitalized since March who presents to the ED for altered mental status, acute on chronic, that began following a ground-level fall on 2025. She has been on extensive antibiotics as an outpatient for a retropharyngeal abscess as well as for a chronic left foot infection. She was brought to the ED and initial workup revealed unremarkable CBC, lipase negative, CMP revealed chloride 92, CO2 36, glucose 242, creatinine 2.74, T. bili 1.2. Initial imaging including CT head, CT C-spine, CXR unremarkable. XR left foot revealed nonspecific findings that did not appear acute compared to previous, however will have podiatry consult. UA showed UTI. Blood and urine cultures drawn. Started on ceftriaxone. MRI brain without contrast ordered for AMS, no acute infarct. Renal US shows medical renal disease, without hydronephrosis and trace debris in the bladder. 2025 labs showed elevated ammonia and Tbili, US Gallbladder RUQ and Hepatitis panel ordered. Initial urine culture prelim with Ecoli. Switched antibiotic to cefepime. Final urine culture result mixed colonies. BNP: 6016, nephrology consult placed for fluid management with AL and CKD with history of HFrEF.     Patient with gradual improvement in renal function. Patient remains oriented but noting visual hallucinations and brain fog. Patient also developed right arm jerking, states she has had in the past but resolved on its own. No other associated sx. CT head ordered to reevaluate for CVA, patient has no focal neurologic deficit on exam.

## 2025-05-23 NOTE — PROCEDURES
PROCEDURE NOTE  Date: 5/23/2025   Name: Nicci Hernandez  YOB: 1960    Procedures      Please refer to chart review-notes-scanned EGD report for this finding and documentation    Mild gastritis, duodenitis noted,  No active bleeding any stentable,    Continue followed by nephrologist,  Recommend outpatient colonoscopy

## 2025-05-23 NOTE — PLAN OF CARE
PHYSICAL THERAPY TREATMENT     Patient: Nicci Hernandez (64 y.o. female)  Date: 5/23/2025  Diagnosis: AL (acute kidney injury) [N17.9]  Altered mental status, unspecified altered mental status type [R41.82]  AMS (altered mental status) [R41.82] AMS (altered mental status)      Precautions: Fall Risk, Contact Precautions                      Recommendations for nursing mobility: Out of bed to chair for meals, Encourage HEP in prep for ADLs/mobility; see handout for details, Frequent repositioning to prevent skin breakdown, Use of bed/chair alarm for safety, LE elevation for management of edema, and Assist x2    In place during session: Osborne Catheter  Chart, physical therapy assessment, plan of care and goals were reviewed.  ASSESSMENT  Patient continues with skilled PT services and is progressing towards goals. Pt in bed upon PT arrival, agreeable to session. Pt A&O x 2. (See below for objective details and assist levels).     Overall pt tolerated session poor today. Patient demos decreased functional mobility today and requiring more assist for mobility. Also, continues to be confused limiting progress. Patient very drowsy and even had difficulty with holding drink. Very limited progress and req constant tactile and verbal cueing for LE therex in bed. Only able to tolerate EOB for a few minutes and req mod-max A to maintain balance. Notified nursing of decrease in mobility and impaired cognition. Will continue to benefit from skilled PT services, and will continue to progress as tolerated. Current PT DC recommendation Moderate intensity short-term skilled physical therapy up to 5x/week once medically appropriate.     GOALS:    Problem: Physical Therapy - Adult  Goal: By Discharge: Performs mobility at highest level of function for planned discharge setting.  See evaluation for individualized goals.  Description: FUNCTIONAL STATUS PRIOR TO ADMISSION: pt reports residing at a \"group home\" type place where

## 2025-05-23 NOTE — PLAN OF CARE
Problem: Chronic Conditions and Co-morbidities  Goal: Patient's chronic conditions and co-morbidity symptoms are monitored and maintained or improved  5/22/2025 2353 by Tremaine Hartman RN  Outcome: Progressing  5/22/2025 1642 by Leslye Hodgson RN  Outcome: Progressing     Problem: Discharge Planning  Goal: Discharge to home or other facility with appropriate resources  5/22/2025 2353 by Tremaine Hartman RN  Outcome: Progressing  5/22/2025 1642 by Leslye Hodgson RN  Outcome: Progressing     Problem: Pain  Goal: Verbalizes/displays adequate comfort level or baseline comfort level  5/22/2025 2353 by Tremaine Hartman RN  Outcome: Progressing  5/22/2025 1642 by Leslye Hodgson RN  Outcome: Progressing     Problem: Pain  Goal: Verbalizes/displays adequate comfort level or baseline comfort level  5/22/2025 2353 by Tremaine Hartman RN  Outcome: Progressing  5/22/2025 1642 by Leslye Hodgson RN  Outcome: Progressing     Problem: Skin/Tissue Integrity  Goal: Skin integrity remains intact  Description: 1.  Monitor for areas of redness and/or skin breakdown2.  Assess vascular access sites hourly3.  Every 4-6 hours minimum:  Change oxygen saturation probe site4.  Every 4-6 hours:  If on nasal continuous positive airway pressure, respiratory therapy assess nares and determine need for appliance change or resting period  5/22/2025 2353 by Tremaine Hartman RN  Outcome: Progressing  5/22/2025 1642 by Leslye Hodgson RN  Outcome: Progressing     Problem: Safety - Adult  Goal: Free from fall injury  5/22/2025 2353 by Tremaine Hartman RN  Outcome: Progressing  5/22/2025 1642 by Leslye Hodgson RN  Outcome: Progressing     Problem: ABCDS Injury Assessment  Goal: Absence of physical injury  5/22/2025 2353 by Tremaine Hartman RN  Outcome: Progressing  5/22/2025 1642 by Leslye Hodgson RN  Outcome: Progressing  5/22/2025 1642 by Leslye Hodgson RN  Outcome: Not

## 2025-05-23 NOTE — BSMART NOTE
Initial [facility] Liaison Assessment Form     Section I - Integrated Summary    Reason for consult is: visual hallucinations/ arm jerking .    LOS:  7     Presenting problem/Summary:    Pt was admitted to the hospital after a fall on 5/16/25 and presenting with altered mental status . Pt was seen face to face on the medical floor. Pt was awake and laying in bed. Liaison introduced self and role. Pt seemed confused as Liaison had to repeat the same question multiple time and she had difficulty answering any questions. When asked about visual hallucinations but just repeated any answer to a previous question. Pt was unable to recall where she was , the date and if she still lived in the home with her .   Pt denies SI/HI/AH. At this time the pt does not appear to be a unreliable historian.     Precipitant Factors are unknown .    The information is given by the patient and medical records   Current Psychiatrist and/or  is none reported .  Previous Hospitalizations/Treatment: none reported     Plan: Defer to the most recent psychiatric consult note for recommendations. Liaison will monitor and support     Lethality Assessment:  The potential for suicide is not noted.    The potential for homicide is not noted.  The patient has not been a perpetrator of sexual or physical abuse.    There are not pending charges.  The patient is not felt to be at risk for self-harm or harm to others.      Section II - Psychosocial  The patient's overall mood and attitude is euthymic .  Feelings of helplessness and hopelessness are not observed.  Generalized anxiety is not observed.  Panic is not observed. Phobias are not observed.  Obsessive compulsive tendencies are not observed.      Section III - Mental Status Exam  The patient's appearance shows no evidence of impairment.  The patient's behavior shows poor eye contact. The patient is only aware of  person.  The patient's speech is slowed.  The patient's mood is

## 2025-05-24 ENCOUNTER — APPOINTMENT (OUTPATIENT)
Facility: HOSPITAL | Age: 65
End: 2025-05-24
Payer: MEDICARE

## 2025-05-24 LAB
ALBUMIN SERPL-MCNC: 2.5 G/DL (ref 3.5–5)
AMMONIA PLAS-SCNC: 30 UMOL/L
ANION GAP SERPL CALC-SCNC: 10 MMOL/L (ref 2–12)
APPEARANCE UR: ABNORMAL
BACTERIA URNS QL MICRO: ABNORMAL /HPF
BASOPHILS # BLD: 0.04 K/UL (ref 0–0.1)
BASOPHILS NFR BLD: 0.5 % (ref 0–1)
BILIRUB UR QL: NEGATIVE
BUN SERPL-MCNC: 58 MG/DL (ref 6–20)
BUN/CREAT SERPL: 25 (ref 12–20)
CA-I BLD-MCNC: 8.9 MG/DL (ref 8.5–10.1)
CHLORIDE SERPL-SCNC: 109 MMOL/L (ref 97–108)
CO2 SERPL-SCNC: 25 MMOL/L (ref 21–32)
COLOR UR: ABNORMAL
CREAT SERPL-MCNC: 2.34 MG/DL (ref 0.55–1.02)
DIFFERENTIAL METHOD BLD: ABNORMAL
EOSINOPHIL # BLD: 0.02 K/UL (ref 0–0.4)
EOSINOPHIL NFR BLD: 0.3 % (ref 0–7)
EPITH CASTS URNS QL MICRO: ABNORMAL /LPF
ERYTHROCYTE [DISTWIDTH] IN BLOOD BY AUTOMATED COUNT: 15.9 % (ref 11.5–14.5)
GLUCOSE BLD STRIP.AUTO-MCNC: 143 MG/DL (ref 65–100)
GLUCOSE BLD STRIP.AUTO-MCNC: 153 MG/DL (ref 65–100)
GLUCOSE BLD STRIP.AUTO-MCNC: 172 MG/DL (ref 65–100)
GLUCOSE BLD STRIP.AUTO-MCNC: 182 MG/DL (ref 65–100)
GLUCOSE SERPL-MCNC: 173 MG/DL (ref 65–100)
GLUCOSE UR STRIP.AUTO-MCNC: NEGATIVE MG/DL
HCT VFR BLD AUTO: 36.9 % (ref 35–47)
HGB BLD-MCNC: 10.9 G/DL (ref 11.5–16)
HGB UR QL STRIP: ABNORMAL
HYALINE CASTS URNS QL MICRO: ABNORMAL /LPF (ref 0–5)
IMM GRANULOCYTES # BLD AUTO: 0.06 K/UL (ref 0–0.04)
IMM GRANULOCYTES NFR BLD AUTO: 0.8 % (ref 0–0.5)
KETONES UR QL STRIP.AUTO: NEGATIVE MG/DL
LEUKOCYTE ESTERASE UR QL STRIP.AUTO: ABNORMAL
LYMPHOCYTES # BLD: 0.74 K/UL (ref 0.8–3.5)
LYMPHOCYTES NFR BLD: 10.1 % (ref 12–49)
MCH RBC QN AUTO: 28.5 PG (ref 26–34)
MCHC RBC AUTO-ENTMCNC: 29.5 G/DL (ref 30–36.5)
MCV RBC AUTO: 96.3 FL (ref 80–99)
MONOCYTES # BLD: 0.5 K/UL (ref 0–1)
MONOCYTES NFR BLD: 6.8 % (ref 5–13)
MUCOUS THREADS URNS QL MICRO: ABNORMAL /LPF
NEUTS SEG # BLD: 6 K/UL (ref 1.8–8)
NEUTS SEG NFR BLD: 81.5 % (ref 32–75)
NITRITE UR QL STRIP.AUTO: NEGATIVE
NRBC # BLD: 0 K/UL (ref 0–0.01)
NRBC BLD-RTO: 0 PER 100 WBC
PERFORMED BY:: ABNORMAL
PH UR STRIP: 5 (ref 5–8)
PHOSPHATE SERPL-MCNC: 2.9 MG/DL (ref 2.6–4.7)
PLATELET # BLD AUTO: 164 K/UL (ref 150–400)
PMV BLD AUTO: 11.9 FL (ref 8.9–12.9)
POTASSIUM SERPL-SCNC: 3.4 MMOL/L (ref 3.5–5.1)
PROCALCITONIN SERPL-MCNC: 6.72 NG/ML
PROCALCITONIN SERPL-MCNC: 7.42 NG/ML
PROT UR STRIP-MCNC: 100 MG/DL
RBC # BLD AUTO: 3.83 M/UL (ref 3.8–5.2)
RBC #/AREA URNS HPF: >100 /HPF (ref 0–5)
SODIUM SERPL-SCNC: 144 MMOL/L (ref 136–145)
SP GR UR REFRACTOMETRY: 1.02 (ref 1–1.03)
URINE CULTURE IF INDICATED: ABNORMAL
UROBILINOGEN UR QL STRIP.AUTO: 0.1 EU/DL (ref 0.1–1)
WBC # BLD AUTO: 7.4 K/UL (ref 3.6–11)
WBC CASTS URNS QL MICRO: PRESENT
WBC URNS QL MICRO: ABNORMAL /HPF (ref 0–4)

## 2025-05-24 PROCEDURE — 6370000000 HC RX 637 (ALT 250 FOR IP): Performed by: NURSE PRACTITIONER

## 2025-05-24 PROCEDURE — 2500000003 HC RX 250 WO HCPCS: Performed by: PHYSICIAN ASSISTANT

## 2025-05-24 PROCEDURE — 36415 COLL VENOUS BLD VENIPUNCTURE: CPT

## 2025-05-24 PROCEDURE — 97530 THERAPEUTIC ACTIVITIES: CPT

## 2025-05-24 PROCEDURE — 85025 COMPLETE CBC W/AUTO DIFF WBC: CPT

## 2025-05-24 PROCEDURE — 87086 URINE CULTURE/COLONY COUNT: CPT

## 2025-05-24 PROCEDURE — 82962 GLUCOSE BLOOD TEST: CPT

## 2025-05-24 PROCEDURE — 2580000003 HC RX 258: Performed by: INTERNAL MEDICINE

## 2025-05-24 PROCEDURE — 84145 PROCALCITONIN (PCT): CPT

## 2025-05-24 PROCEDURE — 6370000000 HC RX 637 (ALT 250 FOR IP): Performed by: PHYSICIAN ASSISTANT

## 2025-05-24 PROCEDURE — 6360000002 HC RX W HCPCS: Performed by: PHYSICIAN ASSISTANT

## 2025-05-24 PROCEDURE — 99223 1ST HOSP IP/OBS HIGH 75: CPT | Performed by: INTERNAL MEDICINE

## 2025-05-24 PROCEDURE — 6370000000 HC RX 637 (ALT 250 FOR IP)

## 2025-05-24 PROCEDURE — 71045 X-RAY EXAM CHEST 1 VIEW: CPT

## 2025-05-24 PROCEDURE — 1100000000 HC RM PRIVATE

## 2025-05-24 PROCEDURE — 82140 ASSAY OF AMMONIA: CPT

## 2025-05-24 PROCEDURE — 80069 RENAL FUNCTION PANEL: CPT

## 2025-05-24 PROCEDURE — 81001 URINALYSIS AUTO W/SCOPE: CPT

## 2025-05-24 PROCEDURE — 6370000000 HC RX 637 (ALT 250 FOR IP): Performed by: STUDENT IN AN ORGANIZED HEALTH CARE EDUCATION/TRAINING PROGRAM

## 2025-05-24 PROCEDURE — 6370000000 HC RX 637 (ALT 250 FOR IP): Performed by: INTERNAL MEDICINE

## 2025-05-24 RX ORDER — 0.9 % SODIUM CHLORIDE 0.9 %
500 INTRAVENOUS SOLUTION INTRAVENOUS ONCE
Status: COMPLETED | OUTPATIENT
Start: 2025-05-24 | End: 2025-05-24

## 2025-05-24 RX ORDER — LACTULOSE 10 G/15ML
20 SOLUTION ORAL 3 TIMES DAILY
Status: COMPLETED | OUTPATIENT
Start: 2025-05-24 | End: 2025-05-25

## 2025-05-24 RX ORDER — MIDODRINE HYDROCHLORIDE 5 MG/1
10 TABLET ORAL
Status: DISCONTINUED | OUTPATIENT
Start: 2025-05-24 | End: 2025-05-31 | Stop reason: HOSPADM

## 2025-05-24 RX ORDER — CARVEDILOL 3.12 MG/1
3.12 TABLET ORAL 2 TIMES DAILY
Status: DISCONTINUED | OUTPATIENT
Start: 2025-05-24 | End: 2025-05-31 | Stop reason: HOSPADM

## 2025-05-24 RX ADMIN — CARVEDILOL 6.25 MG: 3.12 TABLET, FILM COATED ORAL at 09:55

## 2025-05-24 RX ADMIN — CARVEDILOL 3.12 MG: 3.12 TABLET, FILM COATED ORAL at 20:37

## 2025-05-24 RX ADMIN — ASPIRIN 81 MG: 81 TABLET, COATED ORAL at 09:56

## 2025-05-24 RX ADMIN — MIDODRINE HYDROCHLORIDE 5 MG: 5 TABLET ORAL at 09:56

## 2025-05-24 RX ADMIN — INSULIN LISPRO 2 UNITS: 100 INJECTION, SOLUTION INTRAVENOUS; SUBCUTANEOUS at 20:39

## 2025-05-24 RX ADMIN — SODIUM CHLORIDE, PRESERVATIVE FREE 10 ML: 5 INJECTION INTRAVENOUS at 09:57

## 2025-05-24 RX ADMIN — LACTULOSE 20 G: 20 SOLUTION ORAL at 14:45

## 2025-05-24 RX ADMIN — PANTOPRAZOLE SODIUM 40 MG: 40 TABLET, DELAYED RELEASE ORAL at 07:34

## 2025-05-24 RX ADMIN — MIDODRINE HYDROCHLORIDE 10 MG: 5 TABLET ORAL at 16:58

## 2025-05-24 RX ADMIN — LACTULOSE 20 G: 20 SOLUTION ORAL at 20:36

## 2025-05-24 RX ADMIN — CIPROFLOXACIN HYDROCHLORIDE 500 MG: 500 TABLET, FILM COATED ORAL at 20:37

## 2025-05-24 RX ADMIN — SODIUM CHLORIDE 500 ML: 0.9 INJECTION, SOLUTION INTRAVENOUS at 12:57

## 2025-05-24 RX ADMIN — CIPROFLOXACIN HYDROCHLORIDE 500 MG: 500 TABLET, FILM COATED ORAL at 09:56

## 2025-05-24 RX ADMIN — ACETAMINOPHEN 650 MG: 650 SUPPOSITORY RECTAL at 00:56

## 2025-05-24 RX ADMIN — SODIUM CHLORIDE, PRESERVATIVE FREE 10 ML: 5 INJECTION INTRAVENOUS at 20:37

## 2025-05-24 RX ADMIN — PREGABALIN 75 MG: 50 CAPSULE ORAL at 09:56

## 2025-05-24 RX ADMIN — BETHANECHOL CHLORIDE 25 MG: 25 TABLET ORAL at 09:56

## 2025-05-24 RX ADMIN — PREGABALIN 75 MG: 50 CAPSULE ORAL at 20:36

## 2025-05-24 RX ADMIN — CETIRIZINE HYDROCHLORIDE 5 MG: 10 TABLET, FILM COATED ORAL at 09:55

## 2025-05-24 RX ADMIN — LACTULOSE 20 G: 20 SOLUTION ORAL at 10:41

## 2025-05-24 RX ADMIN — RIFAXIMIN 550 MG: 550 TABLET ORAL at 20:36

## 2025-05-24 RX ADMIN — RIFAXIMIN 550 MG: 550 TABLET ORAL at 09:56

## 2025-05-24 RX ADMIN — TAMSULOSIN HYDROCHLORIDE 0.4 MG: 0.4 CAPSULE ORAL at 09:56

## 2025-05-24 RX ADMIN — BETHANECHOL CHLORIDE 25 MG: 25 TABLET ORAL at 14:45

## 2025-05-24 RX ADMIN — MIDODRINE HYDROCHLORIDE 5 MG: 5 TABLET ORAL at 12:54

## 2025-05-24 RX ADMIN — CLOPIDOGREL BISULFATE 75 MG: 75 TABLET, FILM COATED ORAL at 09:56

## 2025-05-24 RX ADMIN — ENOXAPARIN SODIUM 40 MG: 100 INJECTION SUBCUTANEOUS at 09:56

## 2025-05-24 RX ADMIN — POTASSIUM CHLORIDE 40 MEQ: 1500 TABLET, EXTENDED RELEASE ORAL at 09:55

## 2025-05-24 RX ADMIN — BETHANECHOL CHLORIDE 25 MG: 25 TABLET ORAL at 20:37

## 2025-05-24 NOTE — CONSULTS
Take 1 tablet by mouth 2 times daily as needed 12/31/22  Yes Provider, MD Sharon   aspirin 81 MG chewable tablet Take 1 tablet by mouth daily   Yes Automatic Reconciliation, Ar   clopidogrel (PLAVIX) 75 MG tablet Take 1 tablet by mouth daily   Yes Automatic Reconciliation, Ar   spironolactone (ALDACTONE) 25 MG tablet Take 1 tablet by mouth daily 6/28/22  Yes Automatic Reconciliation, Ar   insulin glargine (LANTUS) 100 UNIT/ML injection vial Inject 15 Units into the skin daily  Patient not taking: Reported on 5/16/2025 4/1/25   Seamus Rodriguez MD   Blood Glucose Monitoring Suppl (ONETOUCH VERIO FLEX SYSTEM) w/Device KIT Use to check BG 3 times daily. Dx code E11.65  Patient not taking: Reported on 5/16/2025 8/19/24   Madelyn Juares MD   Lancets (ONETOUCH DELICA PLUS XUYNSM54C) MISC Use to check BG 3 times daily. Dx code E11.65  Patient not taking: Reported on 5/16/2025 8/19/24   Madelyn Juares MD        lactulose (CHRONULAC) 10 GM/15ML solution 20 g, TID  enoxaparin (LOVENOX) injection 40 mg, Daily  ciprofloxacin (CIPRO) tablet 500 mg, 2 times per day  rifAXIMin (XIFAXAN) tablet 550 mg, BID  bethanechol (URECHOLINE) tablet 25 mg, TID  tamsulosin (FLOMAX) capsule 0.4 mg, Daily  aluminum & magnesium hydroxide-simethicone (MAALOX PLUS) 200-200-20 MG/5ML suspension 30 mL, Q6H PRN  cyclobenzaprine (FLEXERIL) tablet 10 mg, TID PRN  aspirin EC tablet 81 mg, Daily  lidocaine 4 % external patch 1 patch, Daily  midodrine (PROAMATINE) tablet 5 mg, TID WC  insulin lispro (HUMALOG,ADMELOG) injection vial 0-8 Units, 4x Daily AC & HS  sodium chloride flush 0.9 % injection 5-40 mL, 2 times per day  sodium chloride flush 0.9 % injection 5-40 mL, PRN  0.9 % sodium chloride infusion, PRN  ondansetron (ZOFRAN-ODT) disintegrating tablet 4 mg, Q8H PRN   Or  ondansetron (ZOFRAN) injection 4 mg, Q6H PRN  polyethylene glycol (GLYCOLAX) packet 17 g, Daily PRN  acetaminophen (TYLENOL) tablet 650 mg, Q6H PRN   Or  acetaminophen  Negative   Specific [place], UA 1.003 - 1.030   1.009   Blood, Urine Negative   Negative   Protein, UA Negative mg/dL Negative   Urobilinogen 0.1 - 1.0 EU/dL 0.1   Nitrite, Urine Negative   Negative   Leukocyte Esterase, Urine Negative   Moderate !   Appearance Clear   Clear   pH, Urine 5.0 - 8.0   8.0   Hyaline Casts, UA 0 - 5 /lpf 0-2   WBC, UA 0 - 4 /hpf 20-50   RBC, UA 0 - 5 /hpf 0-5   Epithelial Cells, UA Few /lpf Few   Bacteria, UA Negative /hpf Negative         Recent Labs     05/22/25  0623 05/23/25  0858   AST 25 19   ALT 15 9*   BILITOT 1.3* 1.2*   ALKPHOS 82 77     Procalcitonin 0.28 >0.26 >0.16 >0.20 >3.48 >7.42 > 6.72      CRP 1.77 >13.60      Blood cultures (5/16) No growth FINAL  Blood cultures (5/16) No growth FINAL    Urine culture (5/19) 70,000 col/ml Enterococcus faecalis and Pseudomonas aeruginosa   Enterococcus faecalis Pseudomonas aeruginosa     BACTERIAL SUSCEPTIBILITY PANEL JULIAN BACTERIAL SUSCEPTIBILITY PANEL JULIAN    amikacin   <=2 ug/mL Sensitive    ampicillin <=2 ug/mL Sensitive      cefepime   <=1 ug/mL Sensitive    cefTAZidime   <=1 ug/mL Sensitive    ciprofloxacin <=0.5 ug/mL Sensitive <=0.25 ug/mL Sensitive    DAPTOmycin 32 ug/mL Resistant 1      levofloxacin 0.5 ug/mL Sensitive 0.5 ug/mL Sensitive    linezolid 2 ug/mL Sensitive      meropenem   0.5 ug/mL Sensitive    nitrofurantoin <=16 ug/mL Sensitive      piperacillin-tazobactam   <=4 ug/mL Sensitive    tetracycline >=16 ug/mL Resistant      tobramycin   <=1 ug/mL Sensitive    vancomycin 2 ug/mL Sensitive 1         Imaging/Diagnostics   Personally interpreted by me.    XR CHEST (5/24)    CT HEAD WO CONTRAST  Result Date: 5/20/2025  No acute intracranial process. There is no intracranial mass or hemorrhage. Electronically signed by LILIANA MOORE    US GALLBLADDER RUQ  Result Date: 5/19/2025  No significant abnormalities. Electronically signed by MOY Hare    MRI BRAIN WO CONTRAST  Result Date: 5/18/2025  No acute brain infarct or

## 2025-05-24 NOTE — PROGRESS NOTES
Gastroenterology Progress Note        Patient: Nicci Hernandez MRN: [id number]  SSN: [id number]    YOB: 1960  Age: 64 y.o.  Sex: female      Admit Date: 5/16/2025    LOS: 8 days     Subjective:   Patient examined, no pain no nausea vomit  Past Medical History:   Diagnosis Date    Arthritis     Bilateral lower leg cellulitis     CAD (coronary artery disease) 2009    Hx of 2 MI's and then CABG 5/2009    Cardiomyopathy, ischemic     CHF (congestive heart failure) ([facility])     CKD (chronic kidney disease)     Diabetes ([facility])     IDDM type 3    Diabetic neuropathy, painful ([facility])     Dyslipidemia     Elevated uric acid in blood     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[facility])     Gastroparesis     Hip pain, left     Hypercholesterolemia     Hypertension     Hypertension, diastolic, benign     Hypoalbuminemia     Peripheral vascular disease     Presence of stent in coronary artery in patient with coronary artery disease     S/P CABG x 3     Type 2 diabetes mellitus with hyperglycemia, with long-term current use of insulin ([facility])     Unspecified sleep apnea     no cpap repeat study sched for 9/20/13        Current Facility-Administered Medications:     lactulose (CHRONULAC) 10 GM/15ML solution 20 g, 20 g, Oral, TID, Basil Whelan PA-C, 20 g at 05/24/25 1041    enoxaparin (LOVENOX) injection 40 mg, 40 mg, SubCUTAneous, Daily, Basil Whelan PA-C, 40 mg at 05/24/25 0956    ciprofloxacin (CIPRO) tablet 500 mg, 500 mg, Oral, 2 times per day, Madiha Aguilar PA-C, 500 mg at 05/24/25 0956    rifAXIMin (XIFAXAN) tablet 550 mg, 550 mg, Oral, BID, Madiha Aguilar PA-C, 550 mg at 05/24/25 0956    bethanechol (URECHOLINE) tablet 25 mg, 25 mg, Oral, TID, Claire Evans APRN - NP, 25 mg at 05/24/25 0956    tamsulosin (FLOMAX) capsule 0.4 mg, 0.4 mg, Oral, Daily, Madiha Aguilar PA-C, 0.4 mg at 05/24/25 0956    aluminum & magnesium hydroxide-simethicone (MAALOX PLUS) 200-200-20 MG/5ML

## 2025-05-24 NOTE — PROGRESS NOTES
Renal Progress Note    Patient: Nicci Hernandez MRN: [id number]  SSN: [id number]    YOB: 1960  Age: 64 y.o.  Sex: female      Admit Date: 5/16/2025    LOS: 8 days     Subjective:   Patient seen and examined at the bedside this morning.  OT was in the room.  Patient was alert awake answering questions appropriately    She reported feeling well, denied nausea, vomiting, abdominal pain chest pain or shortness of breath.    Osborne catheter noted documented urine output 250 cc in the last 24 hours    Febrile earlier this morning with temperature 100.6.  Blood pressure this morning 106/52  Creatinine 2.34 (from 2.14), potassium 3.4  Ammonia level down to 30 (57--> 49)      Current Facility-Administered Medications   Medication Dose Route Frequency    lactulose (CHRONULAC) 10 GM/15ML solution 20 g  20 g Oral TID    enoxaparin (LOVENOX) injection 40 mg  40 mg SubCUTAneous Daily    ciprofloxacin (CIPRO) tablet 500 mg  500 mg Oral 2 times per day    rifAXIMin (XIFAXAN) tablet 550 mg  550 mg Oral BID    bethanechol (URECHOLINE) tablet 25 mg  25 mg Oral TID    tamsulosin (FLOMAX) capsule 0.4 mg  0.4 mg Oral Daily    aluminum & magnesium hydroxide-simethicone (MAALOX PLUS) 200-200-20 MG/5ML suspension 30 mL  30 mL Oral Q6H PRN    cyclobenzaprine (FLEXERIL) tablet 10 mg  10 mg Oral TID PRN    aspirin EC tablet 81 mg  81 mg Oral Daily    lidocaine 4 % external patch 1 patch  1 patch TransDERmal Daily    midodrine (PROAMATINE) tablet 5 mg  5 mg Oral TID WC    insulin lispro (HUMALOG,ADMELOG) injection vial 0-8 Units  0-8 Units SubCUTAneous 4x Daily AC & HS    sodium chloride flush 0.9 % injection 5-40 mL  5-40 mL IntraVENous 2 times per day    sodium chloride flush 0.9 % injection 5-40 mL  5-40 mL IntraVENous PRN    0.9 % sodium chloride infusion   IntraVENous PRN    ondansetron (ZOFRAN-ODT) disintegrating tablet 4 mg  4 mg Oral Q8H PRN    Or    ondansetron (ZOFRAN) injection 4 mg  4 mg IntraVENous Q6H PRN     ago.  Presented to ED on 5/16 with altered mental status acute following ground-level fall. Nephrology consulted for AL on CKD.     AL on CKD 3B  - Creatinine admission at 2.74--> 2.36--> 2.50--> 2.0--> 1.96--> 1.9  - Most likely prerenal in etiology, bump in creatinine was most likely due to diarrhea she experienced following lactulose -patient reported having diarrhea \"all day\" plus poor p.o. intake given AMS on admission  - Bp soft but without severe drops on admission  - UA consistent with UTI, no proteinuria no hematuria  - Ultrasound without evidence of hydronephrosis, right kidney smaller than left  - Urine electrolytes noted  - strict I&O  - now Off IV fluids   - Echo in March 2025 with LVEF of 20 to 25%, chest x-ray on admission without pulmonary edema, proBNP chronically elevated, she is on RA  - Continue to hold spironolactone in addition to other diuretics      5/24  - Creatinine slightly up today, with noted fever and soft blood pressure, and low UOP   -Will bolus 500 cc of normal saline and monitor response    CKD3b  - Risk factors include diabetes, CAD, cardiomyopathy, HTN, h/o recurrent AKIs  - Baseline appears to be between 1.6 and 1.8  - Urine PCR was 0.9 in April 2025     CKD- MBD  - normal Ca level and phosphorus level up to 2.9 after repletion  - History of hyperparathyroidism, most recent iPTH in April 2025 was 69, within target.  Normal vitamin D level  - No intervention needed     Acid/base/electrolytes  -Hypokalemia today K3.4-replete per primary team     Hypertension/volume  - soft BP  - On carvedilol  - Continue midodrine 5 mg tid  - IVFs as above     Acute encephalopathy  - Head CT negative  - Restarted on lactulose, psych meds also adjusted  -Improving     Acute cystitis  - On antibiotics by primary team  - Repeat urine cx growing possible Enterococcus faecalis     Type 2 diabetes mellitus, uncontrolled  - Continue insulin  - Most recent hemoglobin A1c 6.2 previously elevated up to

## 2025-05-24 NOTE — PLAN OF CARE
Problem: Occupational Therapy - Adult  Goal: By Discharge: Performs self-care activities at highest level of function for planned discharge setting.  See evaluation for individualized goals.  Description: FUNCTIONAL STATUS PRIOR TO ADMISSION:  Patient admitted from [facility] and with recent IRF stay. Reports working with therapy at SNF.        OCCUPATIONAL THERAPY TREATMENT  Patient: Nicci Hernandez (64 y.o. female)  Date: 5/24/2025  Primary Diagnosis: AL (acute kidney injury) [N17.9]  Altered mental status, unspecified altered mental status type [R41.82]  AMS (altered mental status) [R41.82]       Precautions: Fall Risk, Contact Precautions                Recommendations for nursing mobility: Encourage HEP in prep for ADLs/mobility; see handout for details, Frequent repositioning to prevent skin breakdown, Use of bed/chair alarm for safety, and Assist x2    In place during session: Osborne Catheter and EKG/telemetry   Chart, occupational therapy assessment, plan of care, and goals were reviewed.  ASSESSMENT  Patient continues with skilled OT services and is slowly progressing towards goals. Pt received semi supine in bed upon OT arrival, agreeable to session. Pt A&O x 4. Pt requiring mod-max A for all bed mobility this date and decreased tolerance for sitting EOB activity stating she was too tired once getting EOB. Pt agreeable to review UB HEP 1 set 10 reps in all planes in prep for OOB mobility for participation in ADL routine. Pt thought she had a bowel movement while in bed, but when rolling for hygiene, none present. (See below for objective details and assist levels).     Overall pt tolerated session fair today. . Current OT recommendations for discharge Moderate intensity short-term skilled occupational therapy up to 5x/week. Will continue to benefit from skilled OT services, and will continue to progress as tolerated.     GOALS:  Occupational Therapy Goals:  Initiated 5/19/2025  1.  Patient will perform  assistance;Decreased awareness of need for safety  Problem Solving: Assistance required to correct errors made;Decreased awareness of errors;Assistance required to identify errors made  Insights: Decreased awareness of deficits  Initiation: Requires cues for some  Sequencing: Requires cues for some    Functional Mobility and Transfers for ADLs:  Bed Mobility:  Bed Mobility Training  Bed Mobility Training: Yes  Overall Level of Assistance: Substantial/Maximal assistance  Interventions: Verbal cues;Manual cues  Rolling: Partial/Moderate assistance  Supine to Sit: Substantial/Maximal assistance  Sit to Supine: Substantial/Maximal assistance  Scooting: Substantial/Maximal assistance    Balance:  Balance  Sitting: Impaired  Sitting - Static: Poor (constant support)    ADL Intervention:    UE Bathing: Setup  UE Bathing Skilled Clinical Factors: high fowlers    LE Dressing: Dependent/Total        Therapeutic exercise:    Exercise Sets Reps AROM AAROM PROM Self PROM Comments   Shoulder flex/ext   [x] [] [] []    Elbow flex/ext   [x] [] [] []    Wrist flex/ext   [x] [] [] []                Pain Ratin/10 in L leg  Pain Intervention(s):   nursing notified and addressing    Activity Tolerance:   Fair     After treatment patient left in no apparent distress:   Bed locked and returned to lowest position, Patient left in no apparent distress in bed and placed in chair position., Call bell within reach, Bed/ chair alarm activated, Side rails x3, and Heels elevated for pressure relief, and nsg updated     COMMUNICATION/EDUCATION:   The patient’s plan of care was discussed with: Registered nurse    Patient Education  Education Given To: Patient  Education Provided: Role of Therapy;Transfer Training;ADL Adaptive Strategies  Education Method: Verbal  Barriers to Learning: Cognition  Education Outcome: Verbalized understanding;Continued education needed    Thank you for this referral.  DARLING White  Minutes: 24

## 2025-05-24 NOTE — PROGRESS NOTES
Hospitalist Progress Note    NAME:   Nicci Hernandez   : 1960   MRN: [id number]     Date/Time: 2025 12:07 PM  Patient PCP: Franco Ross MD    Estimated discharge date: 48hrs  Barriers: Clinical improvement, 48 hours fever free, ID consult    Hospital course:  Nicci Hernandez is a 64 y.o. female with PMHx significant for CAD, HFrEF, CKD, diabetes, HLD, HTN, who has been recurrently hospitalized since March who presents to the ED for altered mental status, acute on chronic, that began following a ground-level fall on 2025. She has been on extensive antibiotics as an outpatient for a retropharyngeal abscess as well as for a chronic left foot infection. She was brought to the ED and initial workup revealed unremarkable CBC, lipase negative, CMP revealed chloride 92, CO2 36, glucose 242, creatinine 2.74, T. bili 1.2. Initial imaging including CT head, CT C-spine, CXR unremarkable. XR left foot revealed nonspecific findings that did not appear acute compared to previous, however will have podiatry consult. UA showed UTI. Blood and urine cultures drawn. Started on ceftriaxone. MRI brain without contrast ordered for AMS, no acute infarct. Renal US shows medical renal disease, without hydronephrosis and trace debris in the bladder. 2025 labs showed elevated ammonia and Tbili, US Gallbladder RUQ and Hepatitis panel ordered. Initial urine culture prelim with Ecoli. Switched antibiotic to cefepime. Final urine culture result mixed colonies. BNP: 6016, nephrology consult placed for fluid management with AL and CKD with history of HFrEF.     Patient with gradual improvement in renal function. Patient remains oriented but noting visual hallucinations and brain fog. Patient also developed right arm jerking, states she has had in the past but resolved on its own. No other associated sx. CT head ordered to reevaluate for CVA, patient has no focal neurologic deficit on exam. Psychiatry consulted for  with , nursing, pharmacist and clinical coordinator.  Patient's plan of care was discussed; medications were reviewed and discharge planning was addressed.     ________________________________________________________________________  Total NON critical care TIME:  35  Minutes    Total CRITICAL CARE TIME Spent:   Minutes non procedure based      Comments   >50% of visit spent in counseling and coordination of care     ________________________________________________________________________  Basil Whelan PA-C     Procedures: see electronic medical records for all procedures/Xrays and details which were not copied into this note but were reviewed prior to creation of Plan.      LABS:  I reviewed today's most current labs and imaging studies.  Pertinent labs include:  Recent Labs     05/22/25  0623 05/23/25  0858 05/24/25  0645   WBC 11.2* 8.6 7.4   HGB 12.7 11.4* 10.9*   HCT 43.8 37.6 36.9    169 164     Recent Labs     05/22/25  0623 05/23/25  0858 05/23/25  1445 05/24/25  0645    141  --  144   K 3.8 3.0*  --  3.4*    106  --  109*   CO2 21 25  --  25   BUN 54* 54*  --  58*   PHOS 2.2*  --   --  2.9   ALT 15 9*  --   --    INR  --   --  1.4*  --        Signed: Basil Whelan PA-C

## 2025-05-25 LAB
ALBUMIN SERPL-MCNC: 2.5 G/DL (ref 3.5–5)
ANION GAP SERPL CALC-SCNC: 9 MMOL/L (ref 2–12)
BASOPHILS # BLD: 0.05 K/UL (ref 0–0.1)
BASOPHILS NFR BLD: 0.9 % (ref 0–1)
BUN SERPL-MCNC: 55 MG/DL (ref 6–20)
BUN/CREAT SERPL: 24 (ref 12–20)
CA-I BLD-MCNC: 9.2 MG/DL (ref 8.5–10.1)
CHLORIDE SERPL-SCNC: 106 MMOL/L (ref 97–108)
CO2 SERPL-SCNC: 24 MMOL/L (ref 21–32)
CREAT SERPL-MCNC: 2.3 MG/DL (ref 0.55–1.02)
CRP SERPL-MCNC: 13.2 MG/DL (ref 0–0.3)
DIFFERENTIAL METHOD BLD: ABNORMAL
EOSINOPHIL # BLD: 0.33 K/UL (ref 0–0.4)
EOSINOPHIL NFR BLD: 5.9 % (ref 0–7)
ERYTHROCYTE [DISTWIDTH] IN BLOOD BY AUTOMATED COUNT: 15.2 % (ref 11.5–14.5)
FLUAV RNA SPEC QL NAA+PROBE: NOT DETECTED
FLUBV RNA SPEC QL NAA+PROBE: NOT DETECTED
GLUCOSE BLD STRIP.AUTO-MCNC: 119 MG/DL (ref 65–100)
GLUCOSE BLD STRIP.AUTO-MCNC: 125 MG/DL (ref 65–100)
GLUCOSE BLD STRIP.AUTO-MCNC: 262 MG/DL (ref 65–100)
GLUCOSE BLD STRIP.AUTO-MCNC: 283 MG/DL (ref 65–100)
GLUCOSE SERPL-MCNC: 125 MG/DL (ref 65–100)
HCT VFR BLD AUTO: 37.3 % (ref 35–47)
HGB BLD-MCNC: 11 G/DL (ref 11.5–16)
IMM GRANULOCYTES # BLD AUTO: 0.02 K/UL (ref 0–0.04)
IMM GRANULOCYTES NFR BLD AUTO: 0.4 % (ref 0–0.5)
LYMPHOCYTES # BLD: 0.88 K/UL (ref 0.8–3.5)
LYMPHOCYTES NFR BLD: 15.8 % (ref 12–49)
MCH RBC QN AUTO: 27.8 PG (ref 26–34)
MCHC RBC AUTO-ENTMCNC: 29.5 G/DL (ref 30–36.5)
MCV RBC AUTO: 94.2 FL (ref 80–99)
MONOCYTES # BLD: 0.52 K/UL (ref 0–1)
MONOCYTES NFR BLD: 9.3 % (ref 5–13)
NEUTS SEG # BLD: 3.77 K/UL (ref 1.8–8)
NEUTS SEG NFR BLD: 67.7 % (ref 32–75)
NRBC # BLD: 0 K/UL (ref 0–0.01)
NRBC BLD-RTO: 0 PER 100 WBC
PERFORMED BY:: ABNORMAL
PHOSPHATE SERPL-MCNC: 2.8 MG/DL (ref 2.6–4.7)
PLATELET # BLD AUTO: 154 K/UL (ref 150–400)
PMV BLD AUTO: 11.5 FL (ref 8.9–12.9)
POTASSIUM SERPL-SCNC: 3.6 MMOL/L (ref 3.5–5.1)
PROCALCITONIN SERPL-MCNC: 5.09 NG/ML
RBC # BLD AUTO: 3.96 M/UL (ref 3.8–5.2)
SARS-COV-2 RNA RESP QL NAA+PROBE: NOT DETECTED
SODIUM SERPL-SCNC: 139 MMOL/L (ref 136–145)
WBC # BLD AUTO: 5.6 K/UL (ref 3.6–11)

## 2025-05-25 PROCEDURE — 6370000000 HC RX 637 (ALT 250 FOR IP): Performed by: PHYSICIAN ASSISTANT

## 2025-05-25 PROCEDURE — 6370000000 HC RX 637 (ALT 250 FOR IP): Performed by: STUDENT IN AN ORGANIZED HEALTH CARE EDUCATION/TRAINING PROGRAM

## 2025-05-25 PROCEDURE — 80069 RENAL FUNCTION PANEL: CPT

## 2025-05-25 PROCEDURE — 87636 SARSCOV2 & INF A&B AMP PRB: CPT

## 2025-05-25 PROCEDURE — 6370000000 HC RX 637 (ALT 250 FOR IP): Performed by: NURSE PRACTITIONER

## 2025-05-25 PROCEDURE — 99232 SBSQ HOSP IP/OBS MODERATE 35: CPT | Performed by: INTERNAL MEDICINE

## 2025-05-25 PROCEDURE — 6370000000 HC RX 637 (ALT 250 FOR IP): Performed by: INTERNAL MEDICINE

## 2025-05-25 PROCEDURE — 84145 PROCALCITONIN (PCT): CPT

## 2025-05-25 PROCEDURE — 1100000000 HC RM PRIVATE

## 2025-05-25 PROCEDURE — 82962 GLUCOSE BLOOD TEST: CPT

## 2025-05-25 PROCEDURE — 86140 C-REACTIVE PROTEIN: CPT

## 2025-05-25 PROCEDURE — 2500000003 HC RX 250 WO HCPCS: Performed by: PHYSICIAN ASSISTANT

## 2025-05-25 PROCEDURE — 85025 COMPLETE CBC W/AUTO DIFF WBC: CPT

## 2025-05-25 PROCEDURE — 6360000002 HC RX W HCPCS: Performed by: PHYSICIAN ASSISTANT

## 2025-05-25 RX ADMIN — SODIUM CHLORIDE, PRESERVATIVE FREE 10 ML: 5 INJECTION INTRAVENOUS at 09:00

## 2025-05-25 RX ADMIN — RIFAXIMIN 550 MG: 550 TABLET ORAL at 22:37

## 2025-05-25 RX ADMIN — BETHANECHOL CHLORIDE 25 MG: 25 TABLET ORAL at 22:37

## 2025-05-25 RX ADMIN — BETHANECHOL CHLORIDE 25 MG: 25 TABLET ORAL at 08:59

## 2025-05-25 RX ADMIN — CIPROFLOXACIN HYDROCHLORIDE 500 MG: 500 TABLET, FILM COATED ORAL at 22:37

## 2025-05-25 RX ADMIN — ASPIRIN 81 MG: 81 TABLET, COATED ORAL at 08:59

## 2025-05-25 RX ADMIN — TAMSULOSIN HYDROCHLORIDE 0.4 MG: 0.4 CAPSULE ORAL at 09:00

## 2025-05-25 RX ADMIN — CARVEDILOL 3.12 MG: 3.12 TABLET, FILM COATED ORAL at 22:37

## 2025-05-25 RX ADMIN — LACTULOSE 20 G: 20 SOLUTION ORAL at 08:58

## 2025-05-25 RX ADMIN — INSULIN LISPRO 4 UNITS: 100 INJECTION, SOLUTION INTRAVENOUS; SUBCUTANEOUS at 22:36

## 2025-05-25 RX ADMIN — LACTULOSE 20 G: 20 SOLUTION ORAL at 12:58

## 2025-05-25 RX ADMIN — INSULIN LISPRO 4 UNITS: 100 INJECTION, SOLUTION INTRAVENOUS; SUBCUTANEOUS at 18:09

## 2025-05-25 RX ADMIN — PREGABALIN 75 MG: 50 CAPSULE ORAL at 08:59

## 2025-05-25 RX ADMIN — BETHANECHOL CHLORIDE 25 MG: 25 TABLET ORAL at 12:59

## 2025-05-25 RX ADMIN — CLOPIDOGREL BISULFATE 75 MG: 75 TABLET, FILM COATED ORAL at 08:59

## 2025-05-25 RX ADMIN — PREGABALIN 75 MG: 50 CAPSULE ORAL at 22:37

## 2025-05-25 RX ADMIN — RIFAXIMIN 550 MG: 550 TABLET ORAL at 08:59

## 2025-05-25 RX ADMIN — MIDODRINE HYDROCHLORIDE 10 MG: 5 TABLET ORAL at 08:59

## 2025-05-25 RX ADMIN — SODIUM CHLORIDE, PRESERVATIVE FREE 10 ML: 5 INJECTION INTRAVENOUS at 22:37

## 2025-05-25 RX ADMIN — CETIRIZINE HYDROCHLORIDE 5 MG: 10 TABLET, FILM COATED ORAL at 08:59

## 2025-05-25 RX ADMIN — CARVEDILOL 3.12 MG: 3.12 TABLET, FILM COATED ORAL at 09:00

## 2025-05-25 RX ADMIN — CIPROFLOXACIN HYDROCHLORIDE 500 MG: 500 TABLET, FILM COATED ORAL at 08:59

## 2025-05-25 RX ADMIN — PANTOPRAZOLE SODIUM 40 MG: 40 TABLET, DELAYED RELEASE ORAL at 07:13

## 2025-05-25 RX ADMIN — LACTULOSE 20 G: 20 SOLUTION ORAL at 22:36

## 2025-05-25 RX ADMIN — MIDODRINE HYDROCHLORIDE 10 MG: 5 TABLET ORAL at 18:07

## 2025-05-25 RX ADMIN — ENOXAPARIN SODIUM 40 MG: 100 INJECTION SUBCUTANEOUS at 08:58

## 2025-05-25 RX ADMIN — MIDODRINE HYDROCHLORIDE 10 MG: 5 TABLET ORAL at 12:58

## 2025-05-25 ASSESSMENT — PAIN SCALES - GENERAL: PAINLEVEL_OUTOF10: 0

## 2025-05-25 NOTE — PROGRESS NOTES
Progress Note  Date:2025       Room:[facility]  Patient Name:Nicci Hernandez     YOB: 1960     Age:64 y.o.        Subjective    Subjective  Patient followed for presumably uncomplicated UTI involving Enterococcci and Pseudomonas, continued on Ciprofloxacin.  Patient lying in bed eating lunch. Complaining of straining to urinate.      Objective         Vitals Last 24 Hours:  TEMPERATURE:  Temp  Av.7 °F (36.5 °C)  Min: 97.5 °F (36.4 °C)  Max: 98.2 °F (36.8 °C)  RESPIRATIONS RANGE: Resp  Av  Min: 16  Max: 18  PULSE OXIMETRY RANGE: SpO2  Av %  Min: 94 %  Max: 98 %  PULSE RANGE: Pulse  Av.1  Min: 70  Max: 75  BLOOD PRESSURE RANGE: Systolic (24hrs), Av , Min:94 , Max:116   ; Diastolic (24hrs), Av, Min:51, Max:73       Objective  Vitals and nursing note reviewed.   Constitutional:       Appearance: She is ill-appearing.   HENT:      Head: Normocephalic and atraumatic.      Right Ear: External ear normal.      Left Ear: External ear normal.      Nose: Nose normal.      Mouth/Throat:      Pharynx: Oropharynx is clear.   Eyes:      Extraocular Movements: Extraocular movements intact.   Cardiovascular:      Rate and Rhythm: Normal rate and regular rhythm.      Pulses: Normal pulses.      Heart sounds: Normal heart sounds. No murmur heard.  Pulmonary:      Effort: Pulmonary effort is normal.      Breath sounds: Normal breath sounds.   Abdominal:      General: Bowel sounds are normal. There is no distension.      Palpations: Abdomen is soft. There is no mass.      Tenderness: There is no abdominal tenderness. There is no right CVA tenderness, left CVA tenderness or guarding.   Genitourinary:     Comments: No Osborne catheter  Musculoskeletal:      Cervical back: Neck supple.      Right lower leg: No edema.      Left lower leg: No edema.   Skin:     Findings: No rash.   Neurological:      General: No focal deficit present.      Mental Status: She is alert and oriented to person, place,

## 2025-05-25 NOTE — PROGRESS NOTES
Renal Progress Note    Patient: Nicci Hernandez MRN: [id number]  SSN: [id number]    YOB: 1960  Age: 64 y.o.  Sex: female      Admit Date: 5/16/2025    LOS: 9 days     Subjective:   Patient seen and examined at the bedside this morning.  Reports feeling well.  No overnight events.  She denied nausea, vomiting, abdominal pain.  Per RN patient continues to have diarrhea more than 3 BMs a day  Patient also reports needing to strain when voiding although has Osborne in place  documented urine output 250 cc   Afebrile, blood pressure soft  Status post 500 mL normal saline bolus yesterday  Creatinine stable at 2.3  Mental status continues to improve      Current Facility-Administered Medications   Medication Dose Route Frequency    lactulose (CHRONULAC) 10 GM/15ML solution 20 g  20 g Oral TID    carvedilol (COREG) tablet 3.125 mg  3.125 mg Oral BID    midodrine (PROAMATINE) tablet 10 mg  10 mg Oral TID WC    enoxaparin (LOVENOX) injection 40 mg  40 mg SubCUTAneous Daily    ciprofloxacin (CIPRO) tablet 500 mg  500 mg Oral 2 times per day    rifAXIMin (XIFAXAN) tablet 550 mg  550 mg Oral BID    bethanechol (URECHOLINE) tablet 25 mg  25 mg Oral TID    tamsulosin (FLOMAX) capsule 0.4 mg  0.4 mg Oral Daily    aluminum & magnesium hydroxide-simethicone (MAALOX PLUS) 200-200-20 MG/5ML suspension 30 mL  30 mL Oral Q6H PRN    cyclobenzaprine (FLEXERIL) tablet 10 mg  10 mg Oral TID PRN    aspirin EC tablet 81 mg  81 mg Oral Daily    lidocaine 4 % external patch 1 patch  1 patch TransDERmal Daily    insulin lispro (HUMALOG,ADMELOG) injection vial 0-8 Units  0-8 Units SubCUTAneous 4x Daily AC & HS    sodium chloride flush 0.9 % injection 5-40 mL  5-40 mL IntraVENous 2 times per day    sodium chloride flush 0.9 % injection 5-40 mL  5-40 mL IntraVENous PRN    0.9 % sodium chloride infusion   IntraVENous PRN    ondansetron (ZOFRAN-ODT) disintegrating tablet 4 mg  4 mg Oral Q8H PRN    Or    ondansetron (ZOFRAN) injection 4 mg

## 2025-05-25 NOTE — PROGRESS NOTES
Hospitalist Progress Note    NAME:   Nicci Hernandez   : 1960   MRN: [id number]     Date/Time: 2025 1:44 PM  Patient PCP: Franco Ross MD    Estimated discharge date: 24-48hrs  Barriers: Clinical improvement, 48 hours fever free, urine culture    Hospital course:  Nicci Hernandez is a 64 y.o. female with PMHx significant for CAD, HFrEF, CKD, diabetes, HLD, HTN, who has been recurrently hospitalized since March who presents to the ED for altered mental status, acute on chronic, that began following a ground-level fall on 2025. She has been on extensive antibiotics as an outpatient for a retropharyngeal abscess as well as for a chronic left foot infection. She was brought to the ED and initial workup revealed unremarkable CBC, lipase negative, CMP revealed chloride 92, CO2 36, glucose 242, creatinine 2.74, T. bili 1.2. Initial imaging including CT head, CT C-spine, CXR unremarkable. XR left foot revealed nonspecific findings that did not appear acute compared to previous, however will have podiatry consult. UA showed UTI. Blood and urine cultures drawn. Started on ceftriaxone. MRI brain without contrast ordered for AMS, no acute infarct. Renal US shows medical renal disease, without hydronephrosis and trace debris in the bladder. 2025 labs showed elevated ammonia and Tbili, US Gallbladder RUQ and Hepatitis panel ordered. Initial urine culture prelim with Ecoli. Switched antibiotic to cefepime. Final urine culture result mixed colonies. BNP: 6016, nephrology consult placed for fluid management with AL and CKD with history of HFrEF.     Patient with gradual improvement in renal function. Patient remains oriented but noting visual hallucinations and brain fog. Patient also developed right arm jerking, states she has had in the past but resolved on its own. No other associated sx. CT head ordered to reevaluate for CVA, patient has no focal neurologic deficit on exam. Psychiatry consulted for

## 2025-05-26 LAB
ALBUMIN SERPL-MCNC: 2.5 G/DL (ref 3.5–5)
ALBUMIN SERPL-MCNC: 2.5 G/DL (ref 3.5–5)
ALBUMIN/GLOB SERPL: 0.5 (ref 1.1–2.2)
ALP SERPL-CCNC: 73 U/L (ref 45–117)
ALT SERPL-CCNC: 12 U/L (ref 12–78)
AMMONIA PLAS-SCNC: 31 UMOL/L
ANION GAP SERPL CALC-SCNC: 10 MMOL/L (ref 2–12)
AST SERPL W P-5'-P-CCNC: 24 U/L (ref 15–37)
BACTERIA SPEC CULT: NORMAL
BASOPHILS # BLD: 0.04 K/UL (ref 0–0.1)
BASOPHILS NFR BLD: 0.6 % (ref 0–1)
BILIRUB DIRECT SERPL-MCNC: 0.4 MG/DL (ref 0–0.2)
BILIRUB SERPL-MCNC: 0.8 MG/DL (ref 0.2–1)
BUN SERPL-MCNC: 51 MG/DL (ref 6–20)
BUN/CREAT SERPL: 22 (ref 12–20)
CA-I BLD-MCNC: 9.3 MG/DL (ref 8.5–10.1)
CHLORIDE SERPL-SCNC: 106 MMOL/L (ref 97–108)
CO2 SERPL-SCNC: 20 MMOL/L (ref 21–32)
CREAT SERPL-MCNC: 2.35 MG/DL (ref 0.55–1.02)
CRP SERPL-MCNC: 9.81 MG/DL (ref 0–0.3)
DIFFERENTIAL METHOD BLD: ABNORMAL
EOSINOPHIL # BLD: 0.26 K/UL (ref 0–0.4)
EOSINOPHIL NFR BLD: 3.9 % (ref 0–7)
ERYTHROCYTE [DISTWIDTH] IN BLOOD BY AUTOMATED COUNT: 15 % (ref 11.5–14.5)
GLOBULIN SER CALC-MCNC: 5.1 G/DL (ref 2–4)
GLUCOSE BLD STRIP.AUTO-MCNC: 154 MG/DL (ref 65–100)
GLUCOSE BLD STRIP.AUTO-MCNC: 161 MG/DL (ref 65–100)
GLUCOSE BLD STRIP.AUTO-MCNC: 166 MG/DL (ref 65–100)
GLUCOSE BLD STRIP.AUTO-MCNC: 219 MG/DL (ref 65–100)
GLUCOSE SERPL-MCNC: 186 MG/DL (ref 65–100)
HCT VFR BLD AUTO: 36.3 % (ref 35–47)
HGB BLD-MCNC: 11 G/DL (ref 11.5–16)
IMM GRANULOCYTES # BLD AUTO: 0.03 K/UL (ref 0–0.04)
IMM GRANULOCYTES NFR BLD AUTO: 0.5 % (ref 0–0.5)
LYMPHOCYTES # BLD: 0.87 K/UL (ref 0.8–3.5)
LYMPHOCYTES NFR BLD: 13.2 % (ref 12–49)
Lab: NORMAL
MCH RBC QN AUTO: 28 PG (ref 26–34)
MCHC RBC AUTO-ENTMCNC: 30.3 G/DL (ref 30–36.5)
MCV RBC AUTO: 92.4 FL (ref 80–99)
MONOCYTES # BLD: 0.66 K/UL (ref 0–1)
MONOCYTES NFR BLD: 10 % (ref 5–13)
NEUTS SEG # BLD: 4.73 K/UL (ref 1.8–8)
NEUTS SEG NFR BLD: 71.8 % (ref 32–75)
NRBC # BLD: 0 K/UL (ref 0–0.01)
NRBC BLD-RTO: 0 PER 100 WBC
PERFORMED BY:: ABNORMAL
PHOSPHATE SERPL-MCNC: 2.4 MG/DL (ref 2.6–4.7)
PLATELET # BLD AUTO: 170 K/UL (ref 150–400)
PMV BLD AUTO: 11.9 FL (ref 8.9–12.9)
POTASSIUM SERPL-SCNC: 3.7 MMOL/L (ref 3.5–5.1)
PROCALCITONIN SERPL-MCNC: 2.56 NG/ML
PROT SERPL-MCNC: 7.6 G/DL (ref 6.4–8.2)
RBC # BLD AUTO: 3.93 M/UL (ref 3.8–5.2)
SODIUM SERPL-SCNC: 136 MMOL/L (ref 136–145)
WBC # BLD AUTO: 6.6 K/UL (ref 3.6–11)

## 2025-05-26 PROCEDURE — 85025 COMPLETE CBC W/AUTO DIFF WBC: CPT

## 2025-05-26 PROCEDURE — 51702 INSERT TEMP BLADDER CATH: CPT

## 2025-05-26 PROCEDURE — 6370000000 HC RX 637 (ALT 250 FOR IP): Performed by: PHYSICIAN ASSISTANT

## 2025-05-26 PROCEDURE — 6370000000 HC RX 637 (ALT 250 FOR IP): Performed by: INTERNAL MEDICINE

## 2025-05-26 PROCEDURE — 80076 HEPATIC FUNCTION PANEL: CPT

## 2025-05-26 PROCEDURE — 82962 GLUCOSE BLOOD TEST: CPT

## 2025-05-26 PROCEDURE — 82140 ASSAY OF AMMONIA: CPT

## 2025-05-26 PROCEDURE — 99232 SBSQ HOSP IP/OBS MODERATE 35: CPT | Performed by: INTERNAL MEDICINE

## 2025-05-26 PROCEDURE — 86140 C-REACTIVE PROTEIN: CPT

## 2025-05-26 PROCEDURE — 97530 THERAPEUTIC ACTIVITIES: CPT

## 2025-05-26 PROCEDURE — 6370000000 HC RX 637 (ALT 250 FOR IP): Performed by: NURSE PRACTITIONER

## 2025-05-26 PROCEDURE — 80069 RENAL FUNCTION PANEL: CPT

## 2025-05-26 PROCEDURE — 6360000002 HC RX W HCPCS: Performed by: PHYSICIAN ASSISTANT

## 2025-05-26 PROCEDURE — 2500000003 HC RX 250 WO HCPCS: Performed by: PHYSICIAN ASSISTANT

## 2025-05-26 PROCEDURE — 6370000000 HC RX 637 (ALT 250 FOR IP): Performed by: STUDENT IN AN ORGANIZED HEALTH CARE EDUCATION/TRAINING PROGRAM

## 2025-05-26 PROCEDURE — 36415 COLL VENOUS BLD VENIPUNCTURE: CPT

## 2025-05-26 PROCEDURE — 1100000000 HC RM PRIVATE

## 2025-05-26 PROCEDURE — 84145 PROCALCITONIN (PCT): CPT

## 2025-05-26 RX ADMIN — BETHANECHOL CHLORIDE 25 MG: 25 TABLET ORAL at 09:16

## 2025-05-26 RX ADMIN — TAMSULOSIN HYDROCHLORIDE 0.4 MG: 0.4 CAPSULE ORAL at 09:15

## 2025-05-26 RX ADMIN — CLOPIDOGREL BISULFATE 75 MG: 75 TABLET, FILM COATED ORAL at 09:16

## 2025-05-26 RX ADMIN — CIPROFLOXACIN HYDROCHLORIDE 500 MG: 500 TABLET, FILM COATED ORAL at 20:47

## 2025-05-26 RX ADMIN — MIDODRINE HYDROCHLORIDE 10 MG: 5 TABLET ORAL at 12:51

## 2025-05-26 RX ADMIN — RIFAXIMIN 550 MG: 550 TABLET ORAL at 09:16

## 2025-05-26 RX ADMIN — CETIRIZINE HYDROCHLORIDE 5 MG: 10 TABLET, FILM COATED ORAL at 09:16

## 2025-05-26 RX ADMIN — CARVEDILOL 3.12 MG: 3.12 TABLET, FILM COATED ORAL at 09:16

## 2025-05-26 RX ADMIN — PREGABALIN 75 MG: 50 CAPSULE ORAL at 20:47

## 2025-05-26 RX ADMIN — PANTOPRAZOLE SODIUM 40 MG: 40 TABLET, DELAYED RELEASE ORAL at 06:48

## 2025-05-26 RX ADMIN — BETHANECHOL CHLORIDE 25 MG: 25 TABLET ORAL at 20:47

## 2025-05-26 RX ADMIN — MIDODRINE HYDROCHLORIDE 10 MG: 5 TABLET ORAL at 17:50

## 2025-05-26 RX ADMIN — INSULIN LISPRO 2 UNITS: 100 INJECTION, SOLUTION INTRAVENOUS; SUBCUTANEOUS at 20:48

## 2025-05-26 RX ADMIN — CARVEDILOL 3.12 MG: 3.12 TABLET, FILM COATED ORAL at 20:49

## 2025-05-26 RX ADMIN — ASPIRIN 81 MG: 81 TABLET, COATED ORAL at 09:16

## 2025-05-26 RX ADMIN — PREGABALIN 75 MG: 50 CAPSULE ORAL at 09:15

## 2025-05-26 RX ADMIN — RIFAXIMIN 550 MG: 550 TABLET ORAL at 20:47

## 2025-05-26 RX ADMIN — CIPROFLOXACIN HYDROCHLORIDE 500 MG: 500 TABLET, FILM COATED ORAL at 09:16

## 2025-05-26 RX ADMIN — MIDODRINE HYDROCHLORIDE 10 MG: 5 TABLET ORAL at 09:15

## 2025-05-26 RX ADMIN — ENOXAPARIN SODIUM 40 MG: 100 INJECTION SUBCUTANEOUS at 09:16

## 2025-05-26 RX ADMIN — BETHANECHOL CHLORIDE 25 MG: 25 TABLET ORAL at 12:51

## 2025-05-26 RX ADMIN — SODIUM CHLORIDE, PRESERVATIVE FREE 10 ML: 5 INJECTION INTRAVENOUS at 20:48

## 2025-05-26 NOTE — PROGRESS NOTES
Renal Progress Note    Patient: Nicci Hernandez MRN: [id number]  SSN: [id number]    YOB: 1960  Age: 64 y.o.  Sex: female      Admit Date: 5/16/2025    LOS: 10 days     Subjective:   Patient seen and examined at the bedside this morning.  She was crying and yelling asking for staff to help her.  She reported feeling sad due to \"family things\"  She then calmed down and reported feeling okay with no overnight events.  With no acute complaints.  She denied pain, nausea, vomiting.  Reported diarrhea is better.  She denied hallucinations.  VSS  Documented urine output 500 mL  Labs pending for today    Current Facility-Administered Medications   Medication Dose Route Frequency    carvedilol (COREG) tablet 3.125 mg  3.125 mg Oral BID    midodrine (PROAMATINE) tablet 10 mg  10 mg Oral TID WC    enoxaparin (LOVENOX) injection 40 mg  40 mg SubCUTAneous Daily    ciprofloxacin (CIPRO) tablet 500 mg  500 mg Oral 2 times per day    rifAXIMin (XIFAXAN) tablet 550 mg  550 mg Oral BID    bethanechol (URECHOLINE) tablet 25 mg  25 mg Oral TID    tamsulosin (FLOMAX) capsule 0.4 mg  0.4 mg Oral Daily    aluminum & magnesium hydroxide-simethicone (MAALOX PLUS) 200-200-20 MG/5ML suspension 30 mL  30 mL Oral Q6H PRN    cyclobenzaprine (FLEXERIL) tablet 10 mg  10 mg Oral TID PRN    aspirin EC tablet 81 mg  81 mg Oral Daily    lidocaine 4 % external patch 1 patch  1 patch TransDERmal Daily    insulin lispro (HUMALOG,ADMELOG) injection vial 0-8 Units  0-8 Units SubCUTAneous 4x Daily AC & HS    sodium chloride flush 0.9 % injection 5-40 mL  5-40 mL IntraVENous 2 times per day    sodium chloride flush 0.9 % injection 5-40 mL  5-40 mL IntraVENous PRN    0.9 % sodium chloride infusion   IntraVENous PRN    ondansetron (ZOFRAN-ODT) disintegrating tablet 4 mg  4 mg Oral Q8H PRN    Or    ondansetron (ZOFRAN) injection 4 mg  4 mg IntraVENous Q6H PRN    polyethylene glycol (GLYCOLAX) packet 17 g  17 g Oral Daily PRN    acetaminophen

## 2025-05-26 NOTE — PLAN OF CARE
OCCUPATIONAL THERAPY TREATMENT  Patient: Nicci Hernandez (64 y.o. female)  Date: 5/26/2025  Primary Diagnosis: AL (acute kidney injury) [N17.9]  Altered mental status, unspecified altered mental status type [R41.82]  AMS (altered mental status) [R41.82]       Precautions: Fall Risk, Contact Precautions                Recommendations for nursing mobility: Encourage HEP in prep for ADLs/mobility; see handout for details, Frequent repositioning to prevent skin breakdown, Use of bed/chair alarm for safety, and Assist x2    In place during session: Osborne Catheter and EKG/telemetry   Chart, occupational therapy assessment, plan of care, and goals were reviewed.    ASSESSMENT  Patient continues with skilled OT services and is slowly progressing towards goals.  Pt received semi-supine in bed upon arrival, AXO x 3 with cues and agreeable to MEDELLIN/PTA tx at this time. Pt cooperative and demonstrated fair/poor effort during activities with additional time d/t impaired cognition. Pt noted with decline in functional mobility<>EOB with increased physical assistance x2. Poor static/dynamic sitting balance with max A x2. Pt able to follow commands with increased time and repetition.Pt CGA/set-up for simple grooming. Pt able to follow HEP with demonstration and cues for correct technique, see grid below.  Pt very tearful and calling out for daughter that wasn't there. Physician and nurse aware of therapy outcomes. Pt will require OT reassessment.   (See below for objective details and assist levels)     Overall, pt limited by impaired cognition and generalized weakness that interferes with performance in ADL's and functional tf's safely.  Will continue to progress. Current OT recommendations for discharge Moderate intensity short-term skilled occupational therapy up to 5x/week.           Start of session End of session   SPO2 (%) 96 97   Heart Rate (BPM) 77 78         GOALS:    Problem: Occupational Therapy - Adult  Goal: By

## 2025-05-26 NOTE — PROGRESS NOTES
Progress Note  Date:2025       Room:[facility]  Patient Name:Nicci Hernandez     YOB: 1960     Age:64 y.o.        Subjective    Subjective  Patient followed for presumably uncomplicated UTI involving Enterococcci and Pseudomonas, continued on Ciprofloxacin.  Patient had difficulty urinating yesterday but today there is a purewick in place.     Objective         Vitals Last 24 Hours:  TEMPERATURE:  Temp  Av.9 °F (36.6 °C)  Min: 97.7 °F (36.5 °C)  Max: 98.2 °F (36.8 °C)  RESPIRATIONS RANGE: Resp  Av.3  Min: 16  Max: 20  PULSE OXIMETRY RANGE: SpO2  Av %  Min: 95 %  Max: 100 %  PULSE RANGE: Pulse  Av  Min: 72  Max: 80  BLOOD PRESSURE RANGE: Systolic (24hrs), Av , Min:110 , Max:123   ; Diastolic (24hrs), Av, Min:59, Max:93        Objective:  Vital signs: (most recent): Blood pressure 110/66, pulse 73, temperature 98.2 °F (36.8 °C), temperature source Oral, resp. rate 16, height 1.575 m (5' 2.01\"), weight 89.4 kg (197 lb), SpO2 99%.      Vitals and nursing note reviewed.   Constitutional:       Appearance: She is ill-appearing.   HENT:      Head: Normocephalic and atraumatic.      Right Ear: External ear normal.      Left Ear: External ear normal.      Nose: Nose normal.      Mouth/Throat:      Pharynx: Oropharynx is clear.   Eyes:      Extraocular Movements: Extraocular movements intact.   Cardiovascular:      Rate and Rhythm: Normal rate and regular rhythm.      Pulses: Normal pulses.      Heart sounds: Normal heart sounds. No murmur heard.  Pulmonary:      Effort: Pulmonary effort is normal.      Breath sounds: Normal breath sounds.   Abdominal:      General: Bowel sounds are normal. There is no distension.      Palpations: Abdomen is soft. There is no mass.      Tenderness: There is no abdominal tenderness. There is no right CVA tenderness, left CVA tenderness or guarding.   Genitourinary:     Comments: No Osborne catheter  Musculoskeletal:      Cervical back: Neck supple.

## 2025-05-26 NOTE — PLAN OF CARE
Problem: Chronic Conditions and Co-morbidities  Goal: Patient's chronic conditions and co-morbidity symptoms are monitored and maintained or improved  Outcome: Progressing     Problem: Discharge Planning  Goal: Discharge to home or other facility with appropriate resources  Outcome: Progressing     Problem: Pain  Goal: Verbalizes/displays adequate comfort level or baseline comfort level  Outcome: Progressing     Problem: Skin/Tissue Integrity  Goal: Skin integrity remains intact  Description: 1.  Monitor for areas of redness and/or skin breakdown2.  Assess vascular access sites hourly3.  Every 4-6 hours minimum:  Change oxygen saturation probe site4.  Every 4-6 hours:  If on nasal continuous positive airway pressure, respiratory therapy assess nares and determine need for appliance change or resting period  Outcome: Progressing     Problem: Safety - Adult  Goal: Free from fall injury  Outcome: Progressing     Problem: ABCDS Injury Assessment  Goal: Absence of physical injury  Outcome: Progressing     Problem: Nutrition Deficit:  Goal: Optimize nutritional status  Outcome: Progressing     Problem: Physical Therapy - Adult  Goal: By Discharge: Performs mobility at highest level of function for planned discharge setting.  See evaluation for individualized goals.  Description: FUNCTIONAL STATUS PRIOR TO ADMISSION: pt reports residing at a \"group home\" type place where she has a private room and people come into help her. Per chart review pt is from [facility] DC from [place] on 4/9/25 to [facility].     Physical Therapy Goals  Initiated 5/18/2025  Pt stated goal: to get better  Pt will be I with LE HEP in 7 days.  Pt will perform bed mobility with Contact Guard Assist in 7 days.  Pt will perform transfers with Contact Guard Assist in 7 days.   Pt will amb 25-50 feet with LRAD safely with Contact Guard Assist in 7 days.  Pt will demonstrate improvement in standing balance from Moderate Assist to Contact Guard

## 2025-05-26 NOTE — PLAN OF CARE
Problem: Physical Therapy - Adult  Goal: By Discharge: Performs mobility at highest level of function for planned discharge setting.  See evaluation for individualized goals.  Description: FUNCTIONAL STATUS PRIOR TO ADMISSION: pt reports residing at a \"group home\" type place where she has a private room and people come into help her. Per chart review pt is from [place], pt DC from [place] on 4/9/25 to [facility]     Physical Therapy Goals  Initiated 5/18/2025  Pt stated goal: to get better  Pt will be I with LE HEP in 7 days.  Pt will perform bed mobility with Contact Guard Assist in 7 days.  Pt will perform transfers with Contact Guard Assist in 7 days.   Pt will amb 25-50 feet with LRAD safely with Contact Guard Assist in 7 days.  Pt will demonstrate improvement in standing balance from Moderate Assist to Contact Guard Assist in 7 days.      Outcome: Progressing            PHYSICAL THERAPY TREATMENT     Patient: Nicci Hernandez (64 y.o. female)  Date: 5/26/2025  Diagnosis: AL (acute kidney injury) [N17.9]  Altered mental status, unspecified altered mental status type [R41.82]  AMS (altered mental status) [R41.82] AMS (altered mental status)      Precautions: Fall Risk, Contact Precautions                      Recommendations for nursing mobility: Out of bed to chair for meals, Encourage HEP in prep for ADLs/mobility; see handout for details, Use of bed/chair alarm for safety, Use of BSC for toileting , AD and gt belt for bed to chair , and Assist x2    In place during session: Osborne Catheter and EKG/telemetry   Chart, physical therapy assessment, plan of care and goals were reviewed.  ASSESSMENT  Patient continues with skilled PT services and is slowly progressing towards goals. Pt semi-supine upon PT arrival, agreeable to session. Pt A&O x 3. Co-treat with OT due to level of assist required for bed mobility and transfers and due to incr confusion as well as decr tolerance to activity. (See below for

## 2025-05-26 NOTE — PROGRESS NOTES
Hospitalist Progress Note    NAME:   Nicci Hernandez   : 1960   MRN: [id number]     Date/Time: 2025 1:16 PM  Patient PCP: Franco Ross MD    Estimated discharge date: 48 hours  Barriers: fluctuating AMS       Hospital course:  Nicci Hernandez is a 64 y.o. female with PMHx significant for CAD, HFrEF, CKD, diabetes, HLD, HTN, who has been recurrently hospitalized since March who presents to the ED for altered mental status, acute on chronic, that began following a ground-level fall on 2025. She has been on extensive antibiotics as an outpatient for a retropharyngeal abscess as well as for a chronic left foot infection. She was brought to the ED and initial workup revealed unremarkable CBC, lipase negative, CMP revealed chloride 92, CO2 36, glucose 242, creatinine 2.74, T. bili 1.2. Initial imaging including CT head, CT C-spine, CXR unremarkable. XR left foot revealed nonspecific findings that did not appear acute compared to previous, however will have podiatry consult. UA showed UTI. Blood and urine cultures drawn. Started on ceftriaxone. MRI brain without contrast ordered for AMS, no acute infarct. Renal US shows medical renal disease, without hydronephrosis and trace debris in the bladder. 2025 labs showed elevated ammonia and Tbili, US Gallbladder RUQ and Hepatitis panel ordered. Initial urine culture prelim with Ecoli. Switched antibiotic to cefepime. Final urine culture result mixed colonies. BNP: 6016, nephrology consult placed for fluid management with AL and CKD with history of HFrEF.      Patient with gradual improvement in renal function. Patient remains oriented but noting visual hallucinations and brain fog. Patient also developed right arm jerking, states she has had in the past but resolved on its own. No other associated sx. CT head ordered to reevaluate for CVA, patient has no focal neurologic deficit on exam. Psychiatry consulted for hallucinations, consideration of

## 2025-05-27 LAB
ALBUMIN SERPL-MCNC: 2.5 G/DL (ref 3.5–5)
ALBUMIN SERPL-MCNC: 2.6 G/DL (ref 3.5–5)
ALBUMIN/GLOB SERPL: 0.5 (ref 1.1–2.2)
ALP SERPL-CCNC: 78 U/L (ref 45–117)
ALT SERPL-CCNC: 9 U/L (ref 12–78)
AMMONIA PLAS-SCNC: 65 UMOL/L
ANION GAP SERPL CALC-SCNC: 9 MMOL/L (ref 2–12)
AST SERPL W P-5'-P-CCNC: 27 U/L (ref 15–37)
BASOPHILS # BLD: 0.05 K/UL (ref 0–0.1)
BASOPHILS NFR BLD: 0.6 % (ref 0–1)
BILIRUB DIRECT SERPL-MCNC: 0.3 MG/DL (ref 0–0.2)
BILIRUB SERPL-MCNC: 0.8 MG/DL (ref 0.2–1)
BUN SERPL-MCNC: 48 MG/DL (ref 6–20)
BUN/CREAT SERPL: 22 (ref 12–20)
CA-I BLD-MCNC: 9.4 MG/DL (ref 8.5–10.1)
CENTROMERE B AB SER-ACNC: <0.2 AI (ref 0–0.9)
CHLORIDE SERPL-SCNC: 107 MMOL/L (ref 97–108)
CHROMATIN AB SERPL-ACNC: <0.2 AI (ref 0–0.9)
CO2 SERPL-SCNC: 20 MMOL/L (ref 21–32)
CREAT SERPL-MCNC: 2.2 MG/DL (ref 0.55–1.02)
DIFFERENTIAL METHOD BLD: ABNORMAL
DSDNA AB SER-ACNC: <1 IU/ML (ref 0–9)
ENA JO1 AB SER-ACNC: <0.2 AI (ref 0–0.9)
ENA RNP AB SER-ACNC: <0.2 AI (ref 0–0.9)
ENA SCL70 AB SER-ACNC: <0.2 AI (ref 0–0.9)
ENA SM AB SER-ACNC: <0.2 AI (ref 0–0.9)
ENA SM+RNP AB SER-ACNC: <0.2 AI (ref 0–0.9)
ENA SS-A AB SER-ACNC: <0.2 AI (ref 0–0.9)
ENA SS-B AB SER-ACNC: <0.2 AI (ref 0–0.9)
EOSINOPHIL # BLD: 0.1 K/UL (ref 0–0.4)
EOSINOPHIL NFR BLD: 1.1 % (ref 0–7)
ERYTHROCYTE [DISTWIDTH] IN BLOOD BY AUTOMATED COUNT: 15 % (ref 11.5–14.5)
GLOBULIN SER CALC-MCNC: 5.4 G/DL (ref 2–4)
GLUCOSE BLD STRIP.AUTO-MCNC: 196 MG/DL (ref 65–100)
GLUCOSE BLD STRIP.AUTO-MCNC: 222 MG/DL (ref 65–100)
GLUCOSE BLD STRIP.AUTO-MCNC: 232 MG/DL (ref 65–100)
GLUCOSE BLD STRIP.AUTO-MCNC: 250 MG/DL (ref 65–100)
GLUCOSE SERPL-MCNC: 199 MG/DL (ref 65–100)
HCT VFR BLD AUTO: 40.7 % (ref 35–47)
HGB BLD-MCNC: 12.2 G/DL (ref 11.5–16)
IMM GRANULOCYTES # BLD AUTO: 0.07 K/UL (ref 0–0.04)
IMM GRANULOCYTES NFR BLD AUTO: 0.8 % (ref 0–0.5)
LYMPHOCYTES # BLD: 0.85 K/UL (ref 0.8–3.5)
LYMPHOCYTES NFR BLD: 9.4 % (ref 12–49)
MCH RBC QN AUTO: 28.3 PG (ref 26–34)
MCHC RBC AUTO-ENTMCNC: 30 G/DL (ref 30–36.5)
MCV RBC AUTO: 94.4 FL (ref 80–99)
MONOCYTES # BLD: 1 K/UL (ref 0–1)
MONOCYTES NFR BLD: 11 % (ref 5–13)
NEUTS SEG # BLD: 7 K/UL (ref 1.8–8)
NEUTS SEG NFR BLD: 77.1 % (ref 32–75)
NRBC # BLD: 0 K/UL (ref 0–0.01)
NRBC BLD-RTO: 0 PER 100 WBC
PERFORMED BY:: ABNORMAL
PHOSPHATE SERPL-MCNC: 2.3 MG/DL (ref 2.6–4.7)
PLATELET # BLD AUTO: 161 K/UL (ref 150–400)
PMV BLD AUTO: 12 FL (ref 8.9–12.9)
POTASSIUM SERPL-SCNC: 3.6 MMOL/L (ref 3.5–5.1)
PROT SERPL-MCNC: 8 G/DL (ref 6.4–8.2)
RBC # BLD AUTO: 4.31 M/UL (ref 3.8–5.2)
RIBOSOMAL P AB SER-ACNC: <0.2 AI (ref 0–0.9)
SEE BELOW:, 164879: NORMAL
SODIUM SERPL-SCNC: 136 MMOL/L (ref 136–145)
WBC # BLD AUTO: 9.1 K/UL (ref 3.6–11)

## 2025-05-27 PROCEDURE — 2500000003 HC RX 250 WO HCPCS: Performed by: INTERNAL MEDICINE

## 2025-05-27 PROCEDURE — 2500000003 HC RX 250 WO HCPCS: Performed by: PHYSICIAN ASSISTANT

## 2025-05-27 PROCEDURE — 99232 SBSQ HOSP IP/OBS MODERATE 35: CPT | Performed by: INTERNAL MEDICINE

## 2025-05-27 PROCEDURE — 82140 ASSAY OF AMMONIA: CPT

## 2025-05-27 PROCEDURE — 1100000000 HC RM PRIVATE

## 2025-05-27 PROCEDURE — 6370000000 HC RX 637 (ALT 250 FOR IP): Performed by: INTERNAL MEDICINE

## 2025-05-27 PROCEDURE — 6370000000 HC RX 637 (ALT 250 FOR IP): Performed by: NURSE PRACTITIONER

## 2025-05-27 PROCEDURE — 2580000003 HC RX 258: Performed by: INTERNAL MEDICINE

## 2025-05-27 PROCEDURE — 6370000000 HC RX 637 (ALT 250 FOR IP): Performed by: PHYSICIAN ASSISTANT

## 2025-05-27 PROCEDURE — 6370000000 HC RX 637 (ALT 250 FOR IP): Performed by: STUDENT IN AN ORGANIZED HEALTH CARE EDUCATION/TRAINING PROGRAM

## 2025-05-27 PROCEDURE — 80069 RENAL FUNCTION PANEL: CPT

## 2025-05-27 PROCEDURE — 6360000002 HC RX W HCPCS: Performed by: PHYSICIAN ASSISTANT

## 2025-05-27 PROCEDURE — 85025 COMPLETE CBC W/AUTO DIFF WBC: CPT

## 2025-05-27 PROCEDURE — 82962 GLUCOSE BLOOD TEST: CPT

## 2025-05-27 PROCEDURE — 80076 HEPATIC FUNCTION PANEL: CPT

## 2025-05-27 PROCEDURE — 36415 COLL VENOUS BLD VENIPUNCTURE: CPT

## 2025-05-27 PROCEDURE — 97530 THERAPEUTIC ACTIVITIES: CPT

## 2025-05-27 RX ORDER — LACTULOSE 10 G/15ML
20 SOLUTION ORAL 2 TIMES DAILY
Status: DISCONTINUED | OUTPATIENT
Start: 2025-05-27 | End: 2025-05-27

## 2025-05-27 RX ORDER — LACTULOSE 10 G/15ML
30 SOLUTION ORAL 2 TIMES DAILY
Status: DISCONTINUED | OUTPATIENT
Start: 2025-05-27 | End: 2025-05-27

## 2025-05-27 RX ORDER — LACTULOSE 10 G/15ML
30 SOLUTION ORAL 2 TIMES DAILY
Status: DISCONTINUED | OUTPATIENT
Start: 2025-05-27 | End: 2025-05-28

## 2025-05-27 RX ORDER — DULOXETIN HYDROCHLORIDE 30 MG/1
30 CAPSULE, DELAYED RELEASE ORAL DAILY
Status: DISCONTINUED | OUTPATIENT
Start: 2025-05-28 | End: 2025-05-31 | Stop reason: HOSPADM

## 2025-05-27 RX ORDER — SODIUM CHLORIDE 450 MG/100ML
INJECTION, SOLUTION INTRAVENOUS CONTINUOUS
Status: DISCONTINUED | OUTPATIENT
Start: 2025-05-27 | End: 2025-05-28

## 2025-05-27 RX ADMIN — SODIUM CHLORIDE, PRESERVATIVE FREE 10 ML: 5 INJECTION INTRAVENOUS at 22:29

## 2025-05-27 RX ADMIN — CARVEDILOL 3.12 MG: 3.12 TABLET, FILM COATED ORAL at 10:52

## 2025-05-27 RX ADMIN — ACETAMINOPHEN 650 MG: 325 TABLET ORAL at 16:49

## 2025-05-27 RX ADMIN — BETHANECHOL CHLORIDE 25 MG: 25 TABLET ORAL at 13:52

## 2025-05-27 RX ADMIN — INSULIN LISPRO 4 UNITS: 100 INJECTION, SOLUTION INTRAVENOUS; SUBCUTANEOUS at 22:29

## 2025-05-27 RX ADMIN — ENOXAPARIN SODIUM 40 MG: 100 INJECTION SUBCUTANEOUS at 10:53

## 2025-05-27 RX ADMIN — SODIUM CHLORIDE, PRESERVATIVE FREE 10 ML: 5 INJECTION INTRAVENOUS at 10:55

## 2025-05-27 RX ADMIN — ASPIRIN 81 MG: 81 TABLET, COATED ORAL at 10:52

## 2025-05-27 RX ADMIN — ACETAMINOPHEN 650 MG: 325 TABLET ORAL at 22:28

## 2025-05-27 RX ADMIN — RIFAXIMIN 550 MG: 550 TABLET ORAL at 22:29

## 2025-05-27 RX ADMIN — SODIUM PHOSPHATE, MONOBASIC, MONOHYDRATE AND SODIUM PHOSPHATE, DIBASIC, ANHYDROUS 15 MMOL: 142; 276 INJECTION, SOLUTION INTRAVENOUS at 15:17

## 2025-05-27 RX ADMIN — LACTULOSE 30 G: 20 SOLUTION ORAL at 22:29

## 2025-05-27 RX ADMIN — INSULIN LISPRO 2 UNITS: 100 INJECTION, SOLUTION INTRAVENOUS; SUBCUTANEOUS at 17:02

## 2025-05-27 RX ADMIN — PANTOPRAZOLE SODIUM 40 MG: 40 TABLET, DELAYED RELEASE ORAL at 06:26

## 2025-05-27 RX ADMIN — SODIUM CHLORIDE: 4.5 INJECTION, SOLUTION INTRAVENOUS at 13:46

## 2025-05-27 RX ADMIN — RIFAXIMIN 550 MG: 550 TABLET ORAL at 10:51

## 2025-05-27 RX ADMIN — RIFAXIMIN 550 MG: 550 TABLET ORAL at 15:15

## 2025-05-27 RX ADMIN — MIDODRINE HYDROCHLORIDE 10 MG: 5 TABLET ORAL at 13:52

## 2025-05-27 RX ADMIN — INSULIN LISPRO 2 UNITS: 100 INJECTION, SOLUTION INTRAVENOUS; SUBCUTANEOUS at 10:53

## 2025-05-27 RX ADMIN — CARVEDILOL 3.12 MG: 3.12 TABLET, FILM COATED ORAL at 22:29

## 2025-05-27 RX ADMIN — CETIRIZINE HYDROCHLORIDE 5 MG: 10 TABLET, FILM COATED ORAL at 10:52

## 2025-05-27 RX ADMIN — BETHANECHOL CHLORIDE 25 MG: 25 TABLET ORAL at 22:29

## 2025-05-27 RX ADMIN — PREGABALIN 75 MG: 50 CAPSULE ORAL at 10:52

## 2025-05-27 RX ADMIN — LACTULOSE 30 G: 20 SOLUTION ORAL at 10:54

## 2025-05-27 RX ADMIN — CLOPIDOGREL BISULFATE 75 MG: 75 TABLET, FILM COATED ORAL at 10:52

## 2025-05-27 RX ADMIN — BETHANECHOL CHLORIDE 25 MG: 25 TABLET ORAL at 10:52

## 2025-05-27 RX ADMIN — TAMSULOSIN HYDROCHLORIDE 0.4 MG: 0.4 CAPSULE ORAL at 10:52

## 2025-05-27 RX ADMIN — PREGABALIN 75 MG: 50 CAPSULE ORAL at 22:29

## 2025-05-27 ASSESSMENT — PAIN DESCRIPTION - DESCRIPTORS
DESCRIPTORS: ACHING;THROBBING
DESCRIPTORS: ACHING
DESCRIPTORS: ACHING;THROBBING

## 2025-05-27 ASSESSMENT — ENCOUNTER SYMPTOMS
NAUSEA: 0
VOMITING: 0
DIARRHEA: 1
COUGH: 0
CONSTIPATION: 0
ABDOMINAL PAIN: 0
SHORTNESS OF BREATH: 0

## 2025-05-27 ASSESSMENT — PAIN DESCRIPTION - LOCATION
LOCATION: BACK

## 2025-05-27 ASSESSMENT — PAIN SCALES - GENERAL
PAINLEVEL_OUTOF10: 0
PAINLEVEL_OUTOF10: 0
PAINLEVEL_OUTOF10: 8
PAINLEVEL_OUTOF10: 3
PAINLEVEL_OUTOF10: 3

## 2025-05-27 ASSESSMENT — PAIN - FUNCTIONAL ASSESSMENT: PAIN_FUNCTIONAL_ASSESSMENT: PREVENTS OR INTERFERES WITH ALL ACTIVE AND SOME PASSIVE ACTIVITIES

## 2025-05-27 ASSESSMENT — PAIN DESCRIPTION - ORIENTATION
ORIENTATION: MID;POSTERIOR
ORIENTATION: MID;POSTERIOR;LOWER

## 2025-05-27 NOTE — THERAPY RECERTIFICATION
PHYSICAL THERAPY REEVALUATION  Patient: Nicci Hernandez (64 y.o. female)  Date: 5/27/2025  Primary Diagnosis: AL (acute kidney injury) [N17.9]  Altered mental status, unspecified altered mental status type [R41.82]  AMS (altered mental status) [R41.82]       Precautions: Restrictions/Precautions  Restrictions/Precautions: Fall Risk, Contact Precautions  Recommendations for nursing mobility: Frequent repositioning to prevent skin breakdown, Use of bed/chair alarm for safety, LE elevation for management of edema, Maxi Move for bed to chair transfers, and Assist x2    In place during session: Central Venous Line, External Catheter, and EKG/telemetry   Chart, physical therapy assessment, plan of care and goals were reviewed.    ASSESSMENT  Patient initially seen for PT evaluation 5/18/25 and 3 skilled PT sessions since evalution. Patient seen today for PT reevaluation s/t LOS. Patient A&O x person and place, time with cueing. Pt semi-supine in bed upon arrival, agreeable to session.      Based on the objective data described, the patient currently presents with impaired functional mobility, decreased independence in ADLs, decreased ROM, impaired strength, decreased activity tolerance, impaired cognition, decreased command following, decreased coordination, impaired balance, and impaired posture. (See below for objective details and assist levels).    Overall pt tolerated session fair today, currently with no c/o pain throughout session. Pt completed bed mobility and EOB transfers with max A and additional time. Pt sat EOB ~ 3-4 min prior to returning to supine position due to extremely heavy posterior lean and inability to maintain without max to total A despite use of UEx. Pt will benefit from continued skilled PT to address above deficits and return to PLOF, PT goals and POC reviewed on this date and continue to remain appropriate at this time. Current PT DC recommendation Moderate intensity short-term skilled  wheelchair, bedside commode, etc.)   [] 1   [x] 2   [] 3   [] 4   3.  Moving from lying on back to sitting on the side of the bed?   [] 1   [x] 2   [] 3   [] 4          How much help from another person does the patient currently need... Total A Lot A Little None   4.  Moving to and from a bed to a chair (including a wheelchair)?   [] 1   [x] 2   [] 3   [] 4   5.  Need to walk in hospital room?   [] 1   [x] 2   [] 3   [] 4   6.  Climbing 3-5 steps with a railing?   [x] 1   [] 2   [] 3   [] 4   © , Trustees of [facility], under license to [employer]. All rights reserved     Score:  Initial:  Most Recent: (Date: 2025 )   Interpretation of Tool:  Represents activities that are increasingly more difficult (i.e. Bed mobility, Transfers, Gait).  Score 24 23 22-20 19-15 14-10 9-7 6   Modifier CH CI CJ CK CL CM CN     Pain Ratin/10   Pain Intervention(s):   pain is at a level acceptable to the patient    Activity Tolerance:   Fair  and requires frequent rest breaks    After treatment patient left in no apparent distress:   Bed locked and in lowest position Patient left in no apparent distress in bed, Call bell within reach, Bed/ chair alarm activated, Side rails x3, Heels elevated for pressure relief, and Updated patient's board on functional status and mobility recommendations and nsg updated.    COMMUNICATION/EDUCATION:   The patient’s plan of care was discussed with: Registered nurse, Case management, and Certified nursing assistant/patient care technician    Patient Education  Education Given To: Patient  Education Provided: Role of Therapy;Plan of Care;Home Exercise Program;Transfer Training;Energy Conservation;Equipment;Fall Prevention Strategies  Education Provided Comments: DC recommendations, PT POC, LE therex, safety and importance of mobility including up to chair  Education Method: Demonstration;Verbal  Barriers to Learning: Cognition  Education Outcome: Verbalized

## 2025-05-27 NOTE — PROGRESS NOTES
Hospitalist Progress Note    NAME:   Nicci Hernandez   : 1960   MRN: [id number]     Date/Time: 2025 2:31 PM  Patient PCP: Franco Ross MD    Estimated discharge date:24-48 hours  Barriers: Ammonia levels, AMS fluctuating     Hospital course:  Nicci Hernandez is a 64 y.o. female with PMHx significant for CAD, HFrEF, CKD, diabetes, HLD, HTN, who has been recurrently hospitalized since March who presents to the ED for altered mental status, acute on chronic, that began following a ground-level fall on 2025. She has been on extensive antibiotics as an outpatient for a retropharyngeal abscess as well as for a chronic left foot infection. She was brought to the ED and initial workup revealed unremarkable CBC, lipase negative, CMP revealed chloride 92, CO2 36, glucose 242, creatinine 2.74, T. bili 1.2. Initial imaging including CT head, CT C-spine, CXR unremarkable. XR left foot revealed nonspecific findings that did not appear acute compared to previous, however will have podiatry consult. UA showed UTI. Blood and urine cultures drawn. Started on ceftriaxone. MRI brain without contrast ordered for AMS, no acute infarct. Renal US shows medical renal disease, without hydronephrosis and trace debris in the bladder. 2025 labs showed elevated ammonia and Tbili, US Gallbladder RUQ and Hepatitis panel ordered. Initial urine culture prelim with Ecoli. Switched antibiotic to cefepime. Final urine culture result mixed colonies. BNP: 6016, nephrology consult placed for fluid management with AL and CKD with history of HFrEF.      Patient with gradual improvement in renal function. Patient remains oriented but noting visual hallucinations and brain fog. Patient also developed right arm jerking, states she has had in the past but resolved on its own. No other associated sx. CT head ordered to reevaluate for CVA, patient has no focal neurologic deficit on exam. Psychiatry consulted for hallucinations,  Left message for patient to return call to clinic.

## 2025-05-27 NOTE — PROGRESS NOTES
elevated back to 65, restarted on lactulose     Acute cystitis  - Repeat urine cx growing possible Enterococcus faecalis  - ID following, completed course of antibiotic     Type 2 diabetes mellitus, uncontrolled  - Continue insulin  - Most recent hemoglobin A1c 6.2 previously elevated up to 11.4     History of lateral pharyngeal abscess  - Cervical spine unremarkable     Heart failure with reduced EF  Left bundle branch block  -Resume home meds, monitor blood pressure     Left foot chronic infection  - Podiatry consulted  - Wound care following    Hyperbilirubinemia  - GI consulted    Fever on 5/24  - Resolved  - ID following  - On p.o. Cipro- completed     Signed By: Esther Collier MD     May 27, 2025

## 2025-05-27 NOTE — PROGRESS NOTES
Comprehensive Nutrition Assessment    Type and Reason for Visit:  Reassess    Nutrition Recommendations/Plan:   RN to obtain updated wt and document intakes  Continue current diet as tolerated (texture mods per SLP or provider); pt prefers salads, grilled cheese w/tomato soup, chix salad w/crackers)  Continue Chris BID and LC HP ONS QD  Manage -180  Monitor/document wt, BM, PO+ONS%, fluid status in I/O      Malnutrition Assessment:  Malnutrition Status:  Moderate malnutrition (05/17/25 1307)    Context:  Acute Illness     Findings of the 6 clinical characteristics of malnutrition:  Energy Intake:  75% or less of estimated energy requirements for 7 or more days  Weight Loss:  Greater than 5% over 1 month (2/2 fluid status)     Body Fat Loss:  No body fat loss     Muscle Mass Loss:  No muscle mass loss    Fluid Accumulation:  Mild Extremities   Strength:  Not Performed    Nutrition Assessment:    Admit for AMS. Consulted for poor PO/appetite >5 days pta. Recent admit x1mo ago where RD followed t/o admit d/t poor PO intakes. Prev rec'd ONS 4x/d d/t pt report of difficulty chewing 2/2 pain from neck, back. Spoke w/ pt and daughter who reported appetite beginning to improve. Stated appetite poor since March d/t illness. No notable wt changes for pt/daughter; per EMR, ~45# wt loss x1mo 2/2 fluid shifts. Pt now back to UBW, still w/ mild edema. Fluid likely masking true wt loss. Provided diet edu on current SBS diet order- pt prefers to be switched back to Reg/thins; provided diet preferences of salads, grilled cheese w/ tomato soup, chix salad w/ crackers. Reported consuming 100% bfast this AM. Observed eating lunch- stated chix salad dry. Plans to add ONS while appetite continues to improve to meet EERs, promote wound healing. Labs: Cl 96, BUN 74, Cre 2.36, eGFR 22, -227, Alb, 2.7. Meds: carvdelilol, humalog, lactulose, MagOx, protonix. (5/20) Diet upgraded to ETC (from SBS) and therapeutic restrictions  removed to promote intakes. Meds reviewed. No updated wts. Alt labs: BUN 59, Cre 2.06, . DU to L foot resolving (podiatry following) and stage III to back resolving per WCN. (5/23) ETC diet continues +Chris BID, +LC HP ONS QD to meet estimated needs r/t poor intakes. No updated wts. PO intake 1-25% and 25-50%. Alt labs: K 3.0, BUN 54, Cre 2.14, . Meds: Urecholine, Cipro, Humalog, lactulose, Protonix, Klowcon, Xifaxan, Flomax.             (5/27) Diet advanced to regular texture. No intakes documented to trend. No updated wts. Pt continues to have impaired cognitive fx. Alt labs: BUN 48, Cre 2.20, Mg 2.7, . Spoke w/RN who agreed to obtain updated wt and document PO intake. Noted pt new to RN.    Nutrition Related Findings:    NFPE deferred d/t contact iso. Pt appeared nourished. No c/s difficulty reported. No N/V/D/C reported. Last BM 5/17. +1 pitting BUE, Trace BLE edema. (5/23) No edema. LBM 5/23 (5/27) LBM 5/26. Impaired cognition. No edema. No new wounds. Wound Type: Stage III, Multiple, Pressure Injury, Diabetic Ulcer (Improving, followed by WCN and podiatry)       Current Nutrition Intake & Therapies:    Average Meal Intake: 1-25% (No updates x 3 days)  Average Supplements Intake: Unable to assess  ADULT ORAL NUTRITION SUPPLEMENT; Breakfast, Dinner; Wound Healing Oral Supplement  ADULT ORAL NUTRITION SUPPLEMENT; Lunch; Low Calorie/High Protein Oral Supplement  ADULT DIET; Regular; 4 carb choices (60 gm/meal)    Anthropometric Measures:  Height: 157.5 cm (5' 2.01\")  Ideal Body Weight (IBW): 110 lbs (50 kg)       Current Body Weight: 89.4 kg (197 lb 1.5 oz), 179.2 % IBW. Weight Source: Not specified  Current BMI (kg/m2): 36  Usual Body Weight: 89.4 kg (197 lb) (per chart review)     % Weight Change (Calculated): 0  Weight Adjustment For: No Adjustment     BMI Categories: Obese Class 2 (BMI 35.0 -39.9)    Estimated Daily Nutrient Needs:  Energy Requirements Based On: Formula  Weight Used for

## 2025-05-28 PROBLEM — K76.82 HEPATIC ENCEPHALOPATHY (HCC): Status: ACTIVE | Noted: 2025-05-28

## 2025-05-28 PROBLEM — I50.20 HFREF (HEART FAILURE WITH REDUCED EJECTION FRACTION) (HCC): Status: ACTIVE | Noted: 2025-05-28

## 2025-05-28 PROBLEM — N39.0 UTI (URINARY TRACT INFECTION): Status: RESOLVED | Noted: 2025-05-28 | Resolved: 2025-05-28

## 2025-05-28 PROBLEM — E80.6 HYPERBILIRUBINEMIA: Status: ACTIVE | Noted: 2025-05-28

## 2025-05-28 PROBLEM — K76.82 HEPATIC ENCEPHALOPATHY (HCC): Status: RESOLVED | Noted: 2025-05-28 | Resolved: 2025-05-28

## 2025-05-28 PROBLEM — K76.0 HEPATIC STEATOSIS: Status: ACTIVE | Noted: 2025-05-28

## 2025-05-28 PROBLEM — N39.0 UTI (URINARY TRACT INFECTION): Status: ACTIVE | Noted: 2025-05-28

## 2025-05-28 PROBLEM — R50.9 FEVER: Status: RESOLVED | Noted: 2025-05-28 | Resolved: 2025-05-28

## 2025-05-28 PROBLEM — R33.9 URINARY RETENTION: Status: ACTIVE | Noted: 2025-05-28

## 2025-05-28 PROBLEM — Z86.73 HISTORY OF CVA (CEREBROVASCULAR ACCIDENT): Status: ACTIVE | Noted: 2025-05-28

## 2025-05-28 PROBLEM — R50.9 FEVER: Status: ACTIVE | Noted: 2025-05-28
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
## 2025-05-28 PROBLEM — N17.9 AKI (ACUTE KIDNEY INJURY): Status: RESOLVED | Noted: 2025-03-21 | Resolved: 2025-05-28

## 2025-05-28 PROBLEM — E87.20 METABOLIC ACIDOSIS: Status: ACTIVE | Noted: 2025-05-28

## 2025-05-28 PROBLEM — R63.0 ANOREXIA: Status: RESOLVED | Noted: 2025-05-28 | Resolved: 2025-05-28

## 2025-05-28 PROBLEM — R44.1 VISUAL HALLUCINATIONS: Status: ACTIVE | Noted: 2025-05-28

## 2025-05-28 PROBLEM — E87.20 METABOLIC ACIDOSIS: Status: RESOLVED | Noted: 2025-05-28 | Resolved: 2025-05-28

## 2025-05-28 PROBLEM — E80.6 HYPERBILIRUBINEMIA: Status: RESOLVED | Noted: 2025-05-28 | Resolved: 2025-05-28

## 2025-05-28 PROBLEM — R44.1 VISUAL HALLUCINATIONS: Status: RESOLVED | Noted: 2025-05-28 | Resolved: 2025-05-28

## 2025-05-28 PROBLEM — E11.9 DIABETES MELLITUS TYPE II, CONTROLLED (HCC): Status: ACTIVE | Noted: 2025-03-21

## 2025-05-28 PROBLEM — R63.0 ANOREXIA: Status: ACTIVE | Noted: 2025-05-28

## 2025-05-28 PROBLEM — R41.82 AMS (ALTERED MENTAL STATUS): Status: RESOLVED | Noted: 2025-05-16 | Resolved: 2025-05-28

## 2025-05-28 LAB
ALBUMIN SERPL-MCNC: 2.3 G/DL (ref 3.5–5)
ALBUMIN/GLOB SERPL: 0.5 (ref 1.1–2.2)
ALP SERPL-CCNC: 74 U/L (ref 45–117)
ALT SERPL-CCNC: 9 U/L (ref 12–78)
AMMONIA PLAS-SCNC: 91 UMOL/L
ANION GAP SERPL CALC-SCNC: 13 MMOL/L (ref 2–12)
AST SERPL W P-5'-P-CCNC: 18 U/L (ref 15–37)
BASOPHILS # BLD: 0.07 K/UL (ref 0–0.1)
BASOPHILS NFR BLD: 0.6 % (ref 0–1)
BILIRUB SERPL-MCNC: 0.8 MG/DL (ref 0.2–1)
BUN SERPL-MCNC: 43 MG/DL (ref 6–20)
BUN/CREAT SERPL: 24 (ref 12–20)
CA-I BLD-MCNC: 9 MG/DL (ref 8.5–10.1)
CERULOPLASMIN SERPL-MCNC: 43.9 MG/DL (ref 19–39)
CHLORIDE SERPL-SCNC: 107 MMOL/L (ref 97–108)
CO2 SERPL-SCNC: 17 MMOL/L (ref 21–32)
CREAT SERPL-MCNC: 1.81 MG/DL (ref 0.55–1.02)
CRP SERPL-MCNC: 13.4 MG/DL (ref 0–0.3)
DIFFERENTIAL METHOD BLD: ABNORMAL
EOSINOPHIL # BLD: 0.19 K/UL (ref 0–0.4)
EOSINOPHIL NFR BLD: 1.6 % (ref 0–7)
ERYTHROCYTE [DISTWIDTH] IN BLOOD BY AUTOMATED COUNT: 15.2 % (ref 11.5–14.5)
GLOBULIN SER CALC-MCNC: 5.1 G/DL (ref 2–4)
GLUCOSE BLD STRIP.AUTO-MCNC: 190 MG/DL (ref 65–100)
GLUCOSE BLD STRIP.AUTO-MCNC: 192 MG/DL (ref 65–100)
GLUCOSE BLD STRIP.AUTO-MCNC: 196 MG/DL (ref 65–100)
GLUCOSE BLD STRIP.AUTO-MCNC: 207 MG/DL (ref 65–100)
GLUCOSE SERPL-MCNC: 159 MG/DL (ref 65–100)
HCT VFR BLD AUTO: 37.3 % (ref 35–47)
HGB BLD-MCNC: 11.2 G/DL (ref 11.5–16)
IMM GRANULOCYTES # BLD AUTO: 0.12 K/UL (ref 0–0.04)
IMM GRANULOCYTES NFR BLD AUTO: 1 % (ref 0–0.5)
LYMPHOCYTES # BLD: 0.63 K/UL (ref 0.8–3.5)
LYMPHOCYTES NFR BLD: 5.2 % (ref 12–49)
MCH RBC QN AUTO: 27.9 PG (ref 26–34)
MCHC RBC AUTO-ENTMCNC: 30 G/DL (ref 30–36.5)
MCV RBC AUTO: 92.8 FL (ref 80–99)
MITOCHONDRIA M2 IGG SER-ACNC: <20 UNITS (ref 0–20)
MONOCYTES # BLD: 1.02 K/UL (ref 0–1)
MONOCYTES NFR BLD: 8.5 % (ref 5–13)
NEUTS SEG # BLD: 9.99 K/UL (ref 1.8–8)
NEUTS SEG NFR BLD: 83.1 % (ref 32–75)
NRBC # BLD: 0 K/UL (ref 0–0.01)
NRBC BLD-RTO: 0 PER 100 WBC
PERFORMED BY:: ABNORMAL
PHOSPHATE SERPL-MCNC: 3.3 MG/DL (ref 2.6–4.7)
PLATELET # BLD AUTO: 157 K/UL (ref 150–400)
PMV BLD AUTO: 11.8 FL (ref 8.9–12.9)
POTASSIUM SERPL-SCNC: 3.4 MMOL/L (ref 3.5–5.1)
PROCALCITONIN SERPL-MCNC: 0.5 NG/ML
PROT SERPL-MCNC: 7.4 G/DL (ref 6.4–8.2)
RBC # BLD AUTO: 4.02 M/UL (ref 3.8–5.2)
SODIUM SERPL-SCNC: 137 MMOL/L (ref 136–145)
WBC # BLD AUTO: 12 K/UL (ref 3.6–11)

## 2025-05-28 PROCEDURE — 1100000000 HC RM PRIVATE

## 2025-05-28 PROCEDURE — 6370000000 HC RX 637 (ALT 250 FOR IP)

## 2025-05-28 PROCEDURE — 6370000000 HC RX 637 (ALT 250 FOR IP): Performed by: PHYSICIAN ASSISTANT

## 2025-05-28 PROCEDURE — 6370000000 HC RX 637 (ALT 250 FOR IP): Performed by: STUDENT IN AN ORGANIZED HEALTH CARE EDUCATION/TRAINING PROGRAM

## 2025-05-28 PROCEDURE — 84100 ASSAY OF PHOSPHORUS: CPT

## 2025-05-28 PROCEDURE — 99232 SBSQ HOSP IP/OBS MODERATE 35: CPT | Performed by: INTERNAL MEDICINE

## 2025-05-28 PROCEDURE — 6360000002 HC RX W HCPCS

## 2025-05-28 PROCEDURE — 84145 PROCALCITONIN (PCT): CPT

## 2025-05-28 PROCEDURE — 86140 C-REACTIVE PROTEIN: CPT

## 2025-05-28 PROCEDURE — 6370000000 HC RX 637 (ALT 250 FOR IP): Performed by: INTERNAL MEDICINE

## 2025-05-28 PROCEDURE — 2500000003 HC RX 250 WO HCPCS: Performed by: PHYSICIAN ASSISTANT

## 2025-05-28 PROCEDURE — 85025 COMPLETE CBC W/AUTO DIFF WBC: CPT

## 2025-05-28 PROCEDURE — 82962 GLUCOSE BLOOD TEST: CPT

## 2025-05-28 PROCEDURE — 36415 COLL VENOUS BLD VENIPUNCTURE: CPT

## 2025-05-28 PROCEDURE — 82140 ASSAY OF AMMONIA: CPT

## 2025-05-28 PROCEDURE — 2580000003 HC RX 258

## 2025-05-28 PROCEDURE — 6370000000 HC RX 637 (ALT 250 FOR IP): Performed by: PSYCHIATRY & NEUROLOGY

## 2025-05-28 PROCEDURE — 6360000002 HC RX W HCPCS: Performed by: PHYSICIAN ASSISTANT

## 2025-05-28 PROCEDURE — 80053 COMPREHEN METABOLIC PANEL: CPT

## 2025-05-28 PROCEDURE — 6370000000 HC RX 637 (ALT 250 FOR IP): Performed by: NURSE PRACTITIONER

## 2025-05-28 RX ORDER — LIDOCAINE 4 G/G
1 PATCH TOPICAL DAILY
Qty: 5 EACH | Refills: 0 | Status: SHIPPED | OUTPATIENT
Start: 2025-05-29 | End: 2025-06-03

## 2025-05-28 RX ORDER — CARVEDILOL 3.12 MG/1
3.12 TABLET ORAL 2 TIMES DAILY
Qty: 60 TABLET | Refills: 0 | Status: SHIPPED | OUTPATIENT
Start: 2025-05-28 | End: 2025-06-27

## 2025-05-28 RX ORDER — TAMSULOSIN HYDROCHLORIDE 0.4 MG/1
0.4 CAPSULE ORAL DAILY
Qty: 30 CAPSULE | Refills: 0 | Status: SHIPPED | OUTPATIENT
Start: 2025-05-29 | End: 2025-06-28

## 2025-05-28 RX ORDER — RISPERIDONE 0.25 MG/1
0.25 TABLET ORAL DAILY
Status: DISCONTINUED | OUTPATIENT
Start: 2025-05-28 | End: 2025-05-31 | Stop reason: HOSPADM

## 2025-05-28 RX ORDER — SODIUM BICARBONATE 650 MG/1
650 TABLET ORAL 2 TIMES DAILY
Status: DISCONTINUED | OUTPATIENT
Start: 2025-05-28 | End: 2025-05-30

## 2025-05-28 RX ORDER — LACTULOSE 10 G/15ML
20 SOLUTION ORAL 2 TIMES DAILY
Qty: 1800 ML | Refills: 0 | Status: SHIPPED | OUTPATIENT
Start: 2025-05-28 | End: 2025-05-29 | Stop reason: HOSPADM

## 2025-05-28 RX ORDER — SODIUM BICARBONATE 650 MG/1
650 TABLET ORAL 2 TIMES DAILY
Qty: 6 TABLET | Refills: 0 | Status: SHIPPED | OUTPATIENT
Start: 2025-05-28 | End: 2025-05-31

## 2025-05-28 RX ORDER — LACTULOSE 10 G/15ML
30 SOLUTION ORAL 3 TIMES DAILY
Status: DISCONTINUED | OUTPATIENT
Start: 2025-05-28 | End: 2025-05-31 | Stop reason: HOSPADM

## 2025-05-28 RX ORDER — BETHANECHOL CHLORIDE 25 MG/1
25 TABLET ORAL 3 TIMES DAILY
Qty: 90 TABLET | Refills: 0 | Status: SHIPPED | OUTPATIENT
Start: 2025-05-28 | End: 2025-06-27

## 2025-05-28 RX ORDER — POTASSIUM CHLORIDE 1500 MG/1
40 TABLET, EXTENDED RELEASE ORAL ONCE
Status: COMPLETED | OUTPATIENT
Start: 2025-05-28 | End: 2025-05-28

## 2025-05-28 RX ORDER — MIDODRINE HYDROCHLORIDE 10 MG/1
10 TABLET ORAL
Qty: 90 TABLET | Refills: 0 | Status: SHIPPED | OUTPATIENT
Start: 2025-05-28 | End: 2025-06-27

## 2025-05-28 RX ADMIN — ENOXAPARIN SODIUM 40 MG: 100 INJECTION SUBCUTANEOUS at 09:12

## 2025-05-28 RX ADMIN — PANTOPRAZOLE SODIUM 40 MG: 40 TABLET, DELAYED RELEASE ORAL at 06:04

## 2025-05-28 RX ADMIN — TAMSULOSIN HYDROCHLORIDE 0.4 MG: 0.4 CAPSULE ORAL at 09:11

## 2025-05-28 RX ADMIN — MIDODRINE HYDROCHLORIDE 10 MG: 5 TABLET ORAL at 17:26

## 2025-05-28 RX ADMIN — INSULIN LISPRO 2 UNITS: 100 INJECTION, SOLUTION INTRAVENOUS; SUBCUTANEOUS at 21:42

## 2025-05-28 RX ADMIN — RISPERIDONE 0.25 MG: 0.25 TABLET, FILM COATED ORAL at 15:08

## 2025-05-28 RX ADMIN — POTASSIUM CHLORIDE: 2 INJECTION, SOLUTION, CONCENTRATE INTRAVENOUS at 15:12

## 2025-05-28 RX ADMIN — CARVEDILOL 3.12 MG: 3.12 TABLET, FILM COATED ORAL at 09:14

## 2025-05-28 RX ADMIN — RIFAXIMIN 550 MG: 550 TABLET ORAL at 09:10

## 2025-05-28 RX ADMIN — BETHANECHOL CHLORIDE 25 MG: 25 TABLET ORAL at 15:08

## 2025-05-28 RX ADMIN — INSULIN LISPRO 2 UNITS: 100 INJECTION, SOLUTION INTRAVENOUS; SUBCUTANEOUS at 12:47

## 2025-05-28 RX ADMIN — CYCLOBENZAPRINE 10 MG: 10 TABLET, FILM COATED ORAL at 15:22

## 2025-05-28 RX ADMIN — PREGABALIN 75 MG: 50 CAPSULE ORAL at 21:42

## 2025-05-28 RX ADMIN — INSULIN LISPRO 2 UNITS: 100 INJECTION, SOLUTION INTRAVENOUS; SUBCUTANEOUS at 09:12

## 2025-05-28 RX ADMIN — CLOPIDOGREL BISULFATE 75 MG: 75 TABLET, FILM COATED ORAL at 09:11

## 2025-05-28 RX ADMIN — POTASSIUM CHLORIDE 40 MEQ: 1500 TABLET, EXTENDED RELEASE ORAL at 12:47

## 2025-05-28 RX ADMIN — CETIRIZINE HYDROCHLORIDE 5 MG: 10 TABLET, FILM COATED ORAL at 09:11

## 2025-05-28 RX ADMIN — WATER: 1 IRRIGANT IRRIGATION at 22:00

## 2025-05-28 RX ADMIN — SODIUM BICARBONATE 650 MG: 650 TABLET ORAL at 15:08

## 2025-05-28 RX ADMIN — PREGABALIN 75 MG: 50 CAPSULE ORAL at 09:10

## 2025-05-28 RX ADMIN — DULOXETINE 30 MG: 30 CAPSULE, DELAYED RELEASE ORAL at 09:11

## 2025-05-28 RX ADMIN — INSULIN LISPRO 2 UNITS: 100 INJECTION, SOLUTION INTRAVENOUS; SUBCUTANEOUS at 17:26

## 2025-05-28 RX ADMIN — RIFAXIMIN 550 MG: 550 TABLET ORAL at 21:42

## 2025-05-28 RX ADMIN — SODIUM BICARBONATE 650 MG: 650 TABLET ORAL at 21:42

## 2025-05-28 RX ADMIN — BETHANECHOL CHLORIDE 25 MG: 25 TABLET ORAL at 09:11

## 2025-05-28 RX ADMIN — SODIUM CHLORIDE, PRESERVATIVE FREE 10 ML: 5 INJECTION INTRAVENOUS at 21:43

## 2025-05-28 RX ADMIN — CARVEDILOL 3.12 MG: 3.12 TABLET, FILM COATED ORAL at 21:46

## 2025-05-28 RX ADMIN — ASPIRIN 81 MG: 81 TABLET, COATED ORAL at 09:11

## 2025-05-28 RX ADMIN — RIFAXIMIN 550 MG: 550 TABLET ORAL at 15:08

## 2025-05-28 RX ADMIN — LACTULOSE 30 G: 20 SOLUTION ORAL at 09:13

## 2025-05-28 RX ADMIN — LACTULOSE 30 G: 20 SOLUTION ORAL at 15:08

## 2025-05-28 RX ADMIN — BETHANECHOL CHLORIDE 25 MG: 25 TABLET ORAL at 21:46

## 2025-05-28 RX ADMIN — LACTULOSE 30 G: 20 SOLUTION ORAL at 21:43

## 2025-05-28 RX ADMIN — ALUMINUM HYDROXIDE, MAGNESIUM HYDROXIDE, AND SIMETHICONE 30 ML: 200; 200; 20 SUSPENSION ORAL at 09:34

## 2025-05-28 ASSESSMENT — ENCOUNTER SYMPTOMS
DIARRHEA: 1
SHORTNESS OF BREATH: 0
VOMITING: 0
WHEEZING: 0
COUGH: 0
CONSTIPATION: 0
ABDOMINAL PAIN: 0
NAUSEA: 0

## 2025-05-28 ASSESSMENT — PAIN SCALES - GENERAL: PAINLEVEL_OUTOF10: 8

## 2025-05-28 NOTE — PROGRESS NOTES
Hospitalist Progress Note    NAME:   Nicci Hernandez   : 1960   MRN: [id number]     Date/Time: 2025 3:26 PM  Patient PCP: Franco Ross MD    Estimated discharge date:25  Barriers: Elevated ammonia     Hospital course:  Nicci Hernandez is a 64 y.o. female with PMHx significant for CAD, HFrEF, CKD, diabetes, HLD, HTN, who has been recurrently hospitalized since March who presents to the ED for altered mental status, acute on chronic, that began following a ground-level fall on 2025. She has been on extensive antibiotics as an outpatient for a retropharyngeal abscess as well as for a chronic left foot infection. She was brought to the ED and initial workup revealed unremarkable CBC, lipase negative, CMP revealed chloride 92, CO2 36, glucose 242, creatinine 2.74, T. bili 1.2. Initial imaging including CT head, CT C-spine, CXR unremarkable. XR left foot revealed nonspecific findings that did not appear acute compared to previous, however will have podiatry consult. UA showed UTI. Blood and urine cultures drawn. Started on ceftriaxone. MRI brain without contrast ordered for AMS, no acute infarct. Renal US shows medical renal disease, without hydronephrosis and trace debris in the bladder. 2025 labs showed elevated ammonia and Tbili, US Gallbladder RUQ and Hepatitis panel ordered. Initial urine culture prelim with Ecoli. Switched antibiotic to cefepime. Final urine culture result mixed colonies. BNP: 6016, nephrology consult placed for fluid management with AL and CKD with history of HFrEF.      Patient with gradual improvement in renal function. Patient remains oriented but noting visual hallucinations and brain fog. Patient also developed right arm jerking, states she has had in the past but resolved on its own. No other associated sx. CT head ordered to reevaluate for CVA, patient has no focal neurologic deficit on exam. Psychiatry consulted for hallucinations, consideration of  7.3     Retropharyngeal abscess   CT cervical spine wo contrast shows no evidence of abscess although done without contrast  Monitor for any changes in neck, fever, enlargement     HFrEF  LBBB Consistent with baseline  BNP: 6016  Regular patient follow-up with cardiology  Echo March of this year with EF 20-25%  Continue carvedilol  Hold aggressive IVF in setting of HFrEF     Left foot chronic infection  Podiatry consulted, low suspicion for osteo  Wound care consulted  Will follow up outpatient podiatry in 2 weeks      Hyperbilirubinemia, improving  US Gallbladder RUQ with no sig abnl  Monitor serial LFTs, improving   Hepatitis panel resulted negative  MEY and AMA negative  monitor for need for MRCP if bilirubin increases, trending down      Essential hypertension  Continue home Coreg  Metolazone, Lasix, spironolactone on hold at this time for AL     Peripheral neuropathy  Continue Lyrica   Continue Cymbalata      Seasonal allergies  Continue cetirizine     History of CVA  Continue ASA and plavix  Allergy to statins     History of cervical fusion approximately 20 years ago  CT C-spine with no acute fracture or subluxation, C3-C6 anterior cervical fusion postoperative changes noted, loosening of C3-C4 screws compared to CT 3/2025, stable multilevel cervical degenerative changes noted  Pain control with flexeril PRN, lidocaine patch        Code Status: Full code   DVT Prophylaxis: Lovenox  GI Prophylaxis:Protonix    --------------------------------------------------------------------  [] High (any 2)    A. Problems (any 1)  [] Acute/Chronic Illness/injury posing threat to life or bodily function:    [] Severe exacerbation of chronic illness:    ---------------------------------------------------------------------  B. Risk of Treatment (any 1)   [] Drugs/treatments that require intensive monitoring for toxicity include:    [] IV ABX requiring serial renal monitoring for nephrotoxicity:     [] IV Narcotic analgesia for

## 2025-05-28 NOTE — PROGRESS NOTES
Progress Note  Date:2025       Room:[facility]  Patient Name:Nicci Hernandez     YOB: 1960     Age:64 y.o.        Subjective    Subjective  Patient followed for presumably uncomplicated UTI involving Enterococcci and Pseudomonas, continued on Ciprofloxacin. Patient appears very confused today,?hallucinating.    Objective         Vitals Last 24 Hours:  TEMPERATURE:  Temp  Av.5 °F (36.9 °C)  Min: 97.7 °F (36.5 °C)  Max: 100 °F (37.8 °C)  RESPIRATIONS RANGE: Resp  Av  Min: 15  Max: 18  PULSE OXIMETRY RANGE: SpO2  Av.3 %  Min: 95 %  Max: 99 %  PULSE RANGE: Pulse  Av.3  Min: 80  Max: 92  BLOOD PRESSURE RANGE: Systolic (24hrs), Av , Min:109 , Max:126   ; Diastolic (24hrs), Av, Min:66, Max:87        Objective:  Vital signs: (most recent): Blood pressure 109/87, pulse 92, temperature 98.2 °F (36.8 °C), temperature source Axillary, resp. rate 17, height 1.575 m (5' 2.01\"), weight 89.5 kg (197 lb 5 oz), SpO2 97%.      Vitals and nursing note reviewed.   Constitutional:       Appearance: She is ill-appearing.   HENT:      Head: Normocephalic and atraumatic.      Right Ear: External ear normal.      Left Ear: External ear normal.      Nose: Nose normal.      Mouth/Throat:      Pharynx: Oropharynx is clear.   Eyes:      Extraocular Movements: Extraocular movements intact.   Cardiovascular:      Rate and Rhythm: Normal rate and regular rhythm.      Pulses: Normal pulses.      Heart sounds: Normal heart sounds. No murmur heard.  Pulmonary:      Effort: Pulmonary effort is normal.      Breath sounds: Normal breath sounds.   Abdominal:      General: Bowel sounds are normal. There is no distension.      Palpations: Abdomen is soft. There is no mass.      Tenderness: There is no abdominal tenderness. There is no right CVA tenderness, left CVA tenderness or guarding.   Genitourinary: External urinary device  Musculoskeletal:      Cervical back: Neck supple.      Right lower leg: No edema.      Date: 5/24/2025  EXAM: XR CHEST PORTABLE ACC#: [id number]  INDICATION: FUO, []  COMPARISON: Chest radiograph May 16, 2025 FINDINGS: AP portable chest radiograph. Status post sternotomy and CABG. The heart is enlarged but stable. The lungs are adequately expanded. There is no focal infiltrate, pleural effusion, or pneumothorax. The vascular clarity is within normal limits.     Stable, mild cardiomegaly. No lung consolidation or pulmonary edema. Electronically signed by Nilson Bhagat    Assessment//Plan           Hospital Problems           Last Modified POA    HTN (hypertension) 5/28/2025 Yes    Stage 3b chronic kidney disease (HCC) 5/16/2025 Yes    Peripheral neuropathy 5/28/2025 Yes    Diabetes mellitus type II, controlled ([facility]) 5/28/2025 Yes    Moderate protein-calorie malnutrition 5/17/2025 Yes    History of fusion of cervical spine 5/17/2025 Yes    Urinary retention 5/28/2025 Yes    HFrEF (heart failure with reduced ejection fraction) ([facility]) 5/28/2025 Yes    Hepatic steatosis 5/28/2025 Yes    History of CVA (cerebrovascular accident) 5/28/2025 Yes      ?Uncomplicated UTI with pyuria, secondary to Enterococcus faecalis and Pseudomonas aeruginosa, Day #7 oral Ciprofloxacin  Sepsis with fever, leukocytosis, elevated procalcitonin and CRP  CKD  Altered mental status  Uncontrolled diabetes mellitus  Difficulty urinating    Comment:  It appears likely that indeed the likely explanation for clinical failure earlier was because the Enterococcus was not covered by Cefepime as they are intrinsically resistant to Cephalosporins.  Now on Ciprofloxacin to which the Enterococcus was susceptible.  Procalcitonin decreasing but WBC and CRP increased today.     Plan   Discontinue Ciprofloxacin  In am, repeat procalcitonin and CRP  3. Repeat urinalysis with reflex culture    Electronically signed by Demetrius Coulter MD

## 2025-05-28 NOTE — PLAN OF CARE
Problem: Chronic Conditions and Co-morbidities  Goal: Patient's chronic conditions and co-morbidity symptoms are monitored and maintained or improved  Outcome: Progressing     Problem: Pain  Goal: Verbalizes/displays adequate comfort level or baseline comfort level  Outcome: Progressing     Problem: Skin/Tissue Integrity  Goal: Skin integrity remains intact  Description: 1.  Monitor for areas of redness and/or skin breakdown2.  Assess vascular access sites hourly3.  Every 4-6 hours minimum:  Change oxygen saturation probe site4.  Every 4-6 hours:  If on nasal continuous positive airway pressure, respiratory therapy assess nares and determine need for appliance change or resting period  Outcome: Progressing  Flowsheets (Taken 5/27/2025 1400 by Jessica Linton LPN)  Skin Integrity Remains Intact:   Monitor for areas of redness and/or skin breakdown   Turn and reposition as indicated   Assess need for specialty bed     Problem: Safety - Adult  Goal: Free from fall injury  Outcome: Progressing     Problem: ABCDS Injury Assessment  Goal: Absence of physical injury  Outcome: Progressing     Problem: Nutrition Deficit:  Goal: Optimize nutritional status  Outcome: Progressing     Problem: Physical Therapy - Adult  Goal: By Discharge: Performs mobility at highest level of function for planned discharge setting.  See evaluation for individualized goals.  Description: FUNCTIONAL STATUS PRIOR TO ADMISSION: pt reports residing at a \"group home\" type place where she has a private room and people come into help her. Per chart review pt is from [facility] from [place] on 4/9/25 to [facility].     Physical Therapy Goals  Initiated 5/18/2025  Pt stated goal: to get better  Pt will be I with LE HEP in 7 days.  Pt will perform bed mobility with Contact Guard Assist in 7 days.  Pt will perform transfers with Contact Guard Assist in 7 days.   Pt will amb 25-50 feet with LRAD safely with Contact Guard Assist in 7 days.  Pt

## 2025-05-28 NOTE — PROGRESS NOTES
Progress Note  Date:2025       Room:[facility]  Patient Name:Nicci Hernandez     YOB: 1960     Age:64 y.o.        Subjective    Subjective  Patient followed for presumably uncomplicated UTI involving Enterococcci and Pseudomonas, continued on Ciprofloxacin.     Objective         Vitals Last 24 Hours:  TEMPERATURE:  Temp  Av °F (36.7 °C)  Min: 97.7 °F (36.5 °C)  Max: 98.2 °F (36.8 °C)  RESPIRATIONS RANGE: Resp  Av  Min: 18  Max: 18  PULSE OXIMETRY RANGE: SpO2  Av.5 %  Min: 96 %  Max: 99 %  PULSE RANGE: Pulse  Av.8  Min: 77  Max: 88  BLOOD PRESSURE RANGE: Systolic (24hrs), Av , Min:103 , Max:130   ; Diastolic (24hrs), Av, Min:60, Max:76        Objective:  Vital signs: (most recent): Blood pressure 118/76, pulse 88, temperature 97.7 °F (36.5 °C), temperature source Oral, resp. rate 18, height 1.575 m (5' 2.01\"), weight 89.5 kg (197 lb 5 oz), SpO2 99%.      Vitals and nursing note reviewed.   Constitutional:       Appearance: She is ill-appearing.   HENT:      Head: Normocephalic and atraumatic.      Right Ear: External ear normal.      Left Ear: External ear normal.      Nose: Nose normal.      Mouth/Throat:      Pharynx: Oropharynx is clear.   Eyes:      Extraocular Movements: Extraocular movements intact.   Cardiovascular:      Rate and Rhythm: Normal rate and regular rhythm.      Pulses: Normal pulses.      Heart sounds: Normal heart sounds. No murmur heard.  Pulmonary:      Effort: Pulmonary effort is normal.      Breath sounds: Normal breath sounds.   Abdominal:      General: Bowel sounds are normal. There is no distension.      Palpations: Abdomen is soft. There is no mass.      Tenderness: There is no abdominal tenderness. There is no right CVA tenderness, left CVA tenderness or guarding.   Genitourinary:     Comments: No Osborne catheter  Musculoskeletal:      Cervical back: Neck supple.      Right lower leg: No edema.      Left lower leg: No edema.   Skin:      Findings: No rash.   Neurological:      General: No focal deficit present.      Mental Status: She is alert and oriented to person, place, and time.   Psychiatric:         Mood and Affect: Mood normal.         Behavior: Behavior normal.         Thought Content: Thought content normal.         Judgment: Judgment normal.         Labs/Imaging/Diagnostics    Labs:  CBC:  Recent Labs     05/25/25  0946 05/26/25  0527 05/27/25  0556   WBC 5.6 6.6 9.1   RBC 3.96 3.93 4.31   HGB 11.0* 11.0* 12.2   HCT 37.3 36.3 40.7   MCV 94.2 92.4 94.4   RDW 15.2* 15.0* 15.0*    170 161     CHEMISTRIES:  Recent Labs     05/25/25  0946 05/26/25  0527 05/27/25  0558    136 136   K 3.6 3.7 3.6    106 107   CO2 24 20* 20*   BUN 55* 51* 48*   CREATININE 2.30* 2.35* 2.20*   GLUCOSE 125* 186* 199*   PHOS 2.8 2.4* 2.3*        LIVER PROFILE:  Recent Labs     05/26/25  0527 05/27/25  0556   AST 24 27   ALT 12 9*   BILIDIR 0.4* 0.3*   BILITOT 0.8 0.8   ALKPHOS 73 78       Procalcitonin 0.28 >0.26 >0.16 >0.20 >3.48 >7.42 > 6.72 >5.09 >2.56         CRP 1.77 >13.60 >13.20 >9.81         Blood cultures (5/16) No growth FINAL  Blood cultures (5/16) No growth FINAL     Urine culture (5/19) 70,000 col/ml Enterococcus faecalis and Pseudomonas aeruginosa            Enterococcus faecalis Pseudomonas aeruginosa       BACTERIAL SUSCEPTIBILITY PANEL JULIAN BACTERIAL SUSCEPTIBILITY PANEL JULIAN     amikacin     <=2 ug/mL Sensitive     ampicillin <=2 ug/mL Sensitive         cefepime     <=1 ug/mL Sensitive     cefTAZidime     <=1 ug/mL Sensitive     ciprofloxacin <=0.5 ug/mL Sensitive <=0.25 ug/mL Sensitive     DAPTOmycin 32 ug/mL Resistant 1         levofloxacin 0.5 ug/mL Sensitive 0.5 ug/mL Sensitive     linezolid 2 ug/mL Sensitive         meropenem     0.5 ug/mL Sensitive     nitrofurantoin <=16 ug/mL Sensitive         piperacillin-tazobactam     <=4 ug/mL Sensitive     tetracycline >=16 ug/mL Resistant         tobramycin     <=1 ug/mL Sensitive

## 2025-05-28 NOTE — CARE COORDINATION
1459: Discharge held due to elevated ammonia.     CM noted discharge order. Patient going to [facility], nurse can call report at [phone]. Daughter agreeable with plan, asked that psyc see before discharge, nurse informed.    Transition of Care Plan:    RUR: 26%  Prior Level of Functioning: Needs Assistance  Disposition: IPR  RAMA: today  If SNF or IPR: Date FOC offered: 5/20/2025  Date FOC received: 5/20/2025  Accepting facility: [facility]  Date authorization started with reference number: n/a  Date authorization received and expires: n/a  Follow up appointments: Per MD  DME needed: n/a  [facility]  IM/IMM Medicare/ letter given: yes  Is patient a  and connected with [facility]? N/a   If yes, was [place] transfer form completed and [facility] notified?   Caregiver Contact: Narcisa, daughter  Discharge Caregiver contacted prior to discharge? yes  Care Conference needed? no  Barriers to discharge: none

## 2025-05-28 NOTE — DISCHARGE SUMMARY
Discharge Summary    Name: Nicci Hernandez  [id number]  YOB: 1960 (Age: 64 y.o.)   Date of Admission: 5/16/2025  Date of Discharge: 5/29/2025  Attending Physician: Donovan Espinoza MD    Discharge Diagnosis:   Principal Problem (Resolved):    AMS (altered mental status)  Active Problems:    HTN (hypertension)    Stage 3b chronic kidney disease (HCC)    Peripheral neuropathy    Diabetes mellitus type II, controlled (HCC)    Moderate protein-calorie malnutrition    History of fusion of cervical spine    Urinary retention    HFrEF (heart failure with reduced ejection fraction) (HCC)    Hepatic steatosis    History of CVA (cerebrovascular accident)  Resolved Problems:    AL (acute kidney injury)    Fall    Hepatic encephalopathy (HCC)    Metabolic acidosis    Visual hallucinations    Fever    UTI (urinary tract infection)    Anorexia    Hyperbilirubinemia       Consultations:  IP CONSULT TO PODIATRY  IP CONSULT TO TELE-NEUROLOGY  IP CONSULT TO DIETITIAN  IP CONSULT TO OUTPATIENT WOUND CLINIC  IP CONSULT TO NEPHROLOGY  IP CONSULT TO UROLOGY  IP CONSULT TO PSYCHIATRY  IP CONSULT TO GI  IP CONSULT TO INFECTIOUS DISEASES    Brief Hospital Course by Main Problems:     Patient is a 63yo female with PMHx significant for CAD, HFrEF, CKD, DMTII, HLD, and HTN who presented to the ED for AMS after a ground level fall on 5/16/25. Experienced recurrent hospitalizations that dates back to March of 2025. She has been on extensive antibiotics as an outpatient for a retropharyngeal abscess as well as for a chronic left foot infection. Initial imaging including CT head, CT C-spine, CXR unremarkable. XR of left foot revealed nonspecific findings and podiatry put on board with low suspicion for osteo.  Blood cultures drawn without growth throughout hospital stay. Initial UA consistent with UTI and admitted. Started on ceftriaxone and switched to cefepime 2/2 E.coli growth. Switched to

## 2025-05-28 NOTE — PROGRESS NOTES
Renal Progress Note    Patient: Nicci Hernandez MRN: [id number]  SSN: [id number]    YOB: 1960  Age: 64 y.o.  Sex: female      Admit Date: 5/16/2025    LOS: 12 days     Subjective:   Patient seen and examined at the bedside this morning. She is awake, lying flat, denying any complaints including shortness of breath or urinary retention.  Creatinine improved to 1.8.  CO2 17.  No UOP documented.    Scheduled for discharge today.    Current Facility-Administered Medications   Medication Dose Route Frequency    risperiDONE (RisperDAL) tablet 0.25 mg  0.25 mg Oral Daily    lactulose (CHRONULAC) 10 GM/15ML solution 30 g  30 g Oral BID    0.45 % sodium chloride infusion   IntraVENous Continuous    rifAXIMin (XIFAXAN) tablet 550 mg  550 mg Oral TID    DULoxetine (CYMBALTA) extended release capsule 30 mg  30 mg Oral Daily    carvedilol (COREG) tablet 3.125 mg  3.125 mg Oral BID    midodrine (PROAMATINE) tablet 10 mg  10 mg Oral TID WC    enoxaparin (LOVENOX) injection 40 mg  40 mg SubCUTAneous Daily    bethanechol (URECHOLINE) tablet 25 mg  25 mg Oral TID    tamsulosin (FLOMAX) capsule 0.4 mg  0.4 mg Oral Daily    aluminum & magnesium hydroxide-simethicone (MAALOX PLUS) 200-200-20 MG/5ML suspension 30 mL  30 mL Oral Q6H PRN    cyclobenzaprine (FLEXERIL) tablet 10 mg  10 mg Oral TID PRN    aspirin EC tablet 81 mg  81 mg Oral Daily    lidocaine 4 % external patch 1 patch  1 patch TransDERmal Daily    insulin lispro (HUMALOG,ADMELOG) injection vial 0-8 Units  0-8 Units SubCUTAneous 4x Daily AC & HS    sodium chloride flush 0.9 % injection 5-40 mL  5-40 mL IntraVENous 2 times per day    sodium chloride flush 0.9 % injection 5-40 mL  5-40 mL IntraVENous PRN    0.9 % sodium chloride infusion   IntraVENous PRN    ondansetron (ZOFRAN-ODT) disintegrating tablet 4 mg  4 mg Oral Q8H PRN    Or    ondansetron (ZOFRAN) injection 4 mg  4 mg IntraVENous Q6H PRN    polyethylene glycol (GLYCOLAX) packet 17 g  17 g Oral Daily PRN  following    Hyperbilirubinemia  - GI consulted    Fever on 5/24  - Resolved  - ID following  - On p.o. Cipro- completed     Signed By: ILIA Tan - CNP     May 28, 2025    Addendum:    Rounds made along with Mai Feliz NP  Patient seen and examined at bedside,   Labs and treatments reviewed  Plan discussed with patient and NP  Reviewed NP's  notes, amended and agree with assessment and plan     Camilla Zuniga MD

## 2025-05-29 ENCOUNTER — APPOINTMENT (OUTPATIENT)
Facility: HOSPITAL | Age: 65
End: 2025-05-29
Payer: MEDICARE

## 2025-05-29 LAB
ALBUMIN SERPL-MCNC: 1.6 G/DL (ref 3.5–5)
AMMONIA PLAS-SCNC: 48 UMOL/L
ANION GAP SERPL CALC-SCNC: 8 MMOL/L (ref 2–12)
APPEARANCE UR: ABNORMAL
BACTERIA URNS QL MICRO: NEGATIVE /HPF
BASOPHILS # BLD: 0 K/UL (ref 0–0.1)
BASOPHILS NFR BLD: 0 % (ref 0–1)
BILIRUB UR QL: NEGATIVE
BUN SERPL-MCNC: 37 MG/DL (ref 6–20)
BUN/CREAT SERPL: 22 (ref 12–20)
CA-I BLD-MCNC: 8.6 MG/DL (ref 8.5–10.1)
CHLORIDE SERPL-SCNC: 110 MMOL/L (ref 97–108)
CO2 SERPL-SCNC: 19 MMOL/L (ref 21–32)
COLOR UR: ABNORMAL
CREAT SERPL-MCNC: 1.66 MG/DL (ref 0.55–1.02)
DIFFERENTIAL METHOD BLD: ABNORMAL
EOSINOPHIL # BLD: 0 K/UL (ref 0–0.4)
EOSINOPHIL NFR BLD: 0 % (ref 0–7)
EPITH CASTS URNS QL MICRO: ABNORMAL /LPF
ERYTHROCYTE [DISTWIDTH] IN BLOOD BY AUTOMATED COUNT: 15.6 % (ref 11.5–14.5)
GLUCOSE BLD STRIP.AUTO-MCNC: 166 MG/DL (ref 65–100)
GLUCOSE BLD STRIP.AUTO-MCNC: 180 MG/DL (ref 65–100)
GLUCOSE BLD STRIP.AUTO-MCNC: 199 MG/DL (ref 65–100)
GLUCOSE BLD STRIP.AUTO-MCNC: 240 MG/DL (ref 65–100)
GLUCOSE SERPL-MCNC: 170 MG/DL (ref 65–100)
GLUCOSE UR STRIP.AUTO-MCNC: NEGATIVE MG/DL
HCT VFR BLD AUTO: 40.9 % (ref 35–47)
HGB BLD-MCNC: 11.4 G/DL (ref 11.5–16)
HGB UR QL STRIP: NEGATIVE
IMM GRANULOCYTES # BLD AUTO: 0 K/UL
IMM GRANULOCYTES NFR BLD AUTO: 0 %
KETONES UR QL STRIP.AUTO: NEGATIVE MG/DL
LEUKOCYTE ESTERASE UR QL STRIP.AUTO: ABNORMAL
LYMPHOCYTES # BLD: 0.74 K/UL (ref 0.8–3.5)
LYMPHOCYTES NFR BLD: 9 % (ref 12–49)
MCH RBC QN AUTO: 28.2 PG (ref 26–34)
MCHC RBC AUTO-ENTMCNC: 27.9 G/DL (ref 30–36.5)
MCV RBC AUTO: 101.2 FL (ref 80–99)
METAMYELOCYTES NFR BLD MANUAL: 1 %
MONOCYTES # BLD: 0.16 K/UL (ref 0–1)
MONOCYTES NFR BLD: 2 % (ref 5–13)
MUCOUS THREADS URNS QL MICRO: ABNORMAL /LPF
NEUTS SEG # BLD: 7.22 K/UL (ref 1.8–8)
NEUTS SEG NFR BLD: 88 % (ref 32–75)
NITRITE UR QL STRIP.AUTO: NEGATIVE
NRBC # BLD: 0 K/UL (ref 0–0.01)
NRBC BLD-RTO: 0 PER 100 WBC
PERFORMED BY:: ABNORMAL
PH UR STRIP: 5 (ref 5–8)
PHOSPHATE SERPL-MCNC: 1.5 MG/DL (ref 2.6–4.7)
PLATELET # BLD AUTO: 146 K/UL (ref 150–400)
PMV BLD AUTO: 11.4 FL (ref 8.9–12.9)
POTASSIUM SERPL-SCNC: 4.1 MMOL/L (ref 3.5–5.1)
PROCALCITONIN SERPL-MCNC: 0.84 NG/ML
PROT UR STRIP-MCNC: 30 MG/DL
RBC # BLD AUTO: 4.04 M/UL (ref 3.8–5.2)
RBC #/AREA URNS HPF: ABNORMAL /HPF (ref 0–5)
RBC MORPH BLD: ABNORMAL
SODIUM SERPL-SCNC: 137 MMOL/L (ref 136–145)
SP GR UR REFRACTOMETRY: 1.02 (ref 1–1.03)
URINE CULTURE IF INDICATED: ABNORMAL
UROBILINOGEN UR QL STRIP.AUTO: 0.1 EU/DL (ref 0.1–1)
WBC # BLD AUTO: 8.2 K/UL (ref 3.6–11)
WBC URNS QL MICRO: ABNORMAL /HPF (ref 0–4)

## 2025-05-29 PROCEDURE — 71045 X-RAY EXAM CHEST 1 VIEW: CPT

## 2025-05-29 PROCEDURE — 2580000003 HC RX 258

## 2025-05-29 PROCEDURE — 84100 ASSAY OF PHOSPHORUS: CPT

## 2025-05-29 PROCEDURE — 82140 ASSAY OF AMMONIA: CPT

## 2025-05-29 PROCEDURE — 1100000000 HC RM PRIVATE

## 2025-05-29 PROCEDURE — 80048 BASIC METABOLIC PNL TOTAL CA: CPT

## 2025-05-29 PROCEDURE — 6360000002 HC RX W HCPCS: Performed by: PHYSICIAN ASSISTANT

## 2025-05-29 PROCEDURE — 97530 THERAPEUTIC ACTIVITIES: CPT

## 2025-05-29 PROCEDURE — 2500000003 HC RX 250 WO HCPCS

## 2025-05-29 PROCEDURE — 2500000003 HC RX 250 WO HCPCS: Performed by: PHYSICIAN ASSISTANT

## 2025-05-29 PROCEDURE — 82040 ASSAY OF SERUM ALBUMIN: CPT

## 2025-05-29 PROCEDURE — 99232 SBSQ HOSP IP/OBS MODERATE 35: CPT | Performed by: INTERNAL MEDICINE

## 2025-05-29 PROCEDURE — 6370000000 HC RX 637 (ALT 250 FOR IP): Performed by: PSYCHIATRY & NEUROLOGY

## 2025-05-29 PROCEDURE — 82962 GLUCOSE BLOOD TEST: CPT

## 2025-05-29 PROCEDURE — 6370000000 HC RX 637 (ALT 250 FOR IP): Performed by: STUDENT IN AN ORGANIZED HEALTH CARE EDUCATION/TRAINING PROGRAM

## 2025-05-29 PROCEDURE — 6370000000 HC RX 637 (ALT 250 FOR IP): Performed by: NURSE PRACTITIONER

## 2025-05-29 PROCEDURE — 6370000000 HC RX 637 (ALT 250 FOR IP)

## 2025-05-29 PROCEDURE — 6370000000 HC RX 637 (ALT 250 FOR IP): Performed by: INTERNAL MEDICINE

## 2025-05-29 PROCEDURE — 6370000000 HC RX 637 (ALT 250 FOR IP): Performed by: PHYSICIAN ASSISTANT

## 2025-05-29 PROCEDURE — 84145 PROCALCITONIN (PCT): CPT

## 2025-05-29 PROCEDURE — 81001 URINALYSIS AUTO W/SCOPE: CPT

## 2025-05-29 PROCEDURE — 85025 COMPLETE CBC W/AUTO DIFF WBC: CPT

## 2025-05-29 RX ORDER — GUAIFENESIN 200 MG/10ML
200 LIQUID ORAL ONCE
Status: COMPLETED | OUTPATIENT
Start: 2025-05-29 | End: 2025-05-29

## 2025-05-29 RX ORDER — RISPERIDONE 0.25 MG/1
0.25 TABLET ORAL DAILY
Qty: 30 TABLET | Refills: 0 | Status: SHIPPED | OUTPATIENT
Start: 2025-05-30 | End: 2025-06-29

## 2025-05-29 RX ORDER — LACTULOSE 10 G/15ML
30 SOLUTION ORAL 3 TIMES DAILY
Qty: 4050 ML | Refills: 0 | Status: SHIPPED | OUTPATIENT
Start: 2025-05-29 | End: 2025-06-28

## 2025-05-29 RX ADMIN — CARVEDILOL 3.12 MG: 3.12 TABLET, FILM COATED ORAL at 10:06

## 2025-05-29 RX ADMIN — SODIUM BICARBONATE 650 MG: 650 TABLET ORAL at 10:08

## 2025-05-29 RX ADMIN — GUAIFENESIN 200 MG: 200 SOLUTION ORAL at 17:08

## 2025-05-29 RX ADMIN — SODIUM CHLORIDE, PRESERVATIVE FREE 10 ML: 5 INJECTION INTRAVENOUS at 10:09

## 2025-05-29 RX ADMIN — PREGABALIN 75 MG: 50 CAPSULE ORAL at 10:06

## 2025-05-29 RX ADMIN — PREGABALIN 75 MG: 50 CAPSULE ORAL at 21:26

## 2025-05-29 RX ADMIN — SODIUM BICARBONATE 650 MG: 650 TABLET ORAL at 21:26

## 2025-05-29 RX ADMIN — DULOXETINE 30 MG: 30 CAPSULE, DELAYED RELEASE ORAL at 10:05

## 2025-05-29 RX ADMIN — ACETAMINOPHEN 650 MG: 325 TABLET ORAL at 20:16

## 2025-05-29 RX ADMIN — INSULIN LISPRO 2 UNITS: 100 INJECTION, SOLUTION INTRAVENOUS; SUBCUTANEOUS at 21:27

## 2025-05-29 RX ADMIN — CLOPIDOGREL BISULFATE 75 MG: 75 TABLET, FILM COATED ORAL at 10:07

## 2025-05-29 RX ADMIN — BETHANECHOL CHLORIDE 25 MG: 25 TABLET ORAL at 10:08

## 2025-05-29 RX ADMIN — CARVEDILOL 3.12 MG: 3.12 TABLET, FILM COATED ORAL at 21:26

## 2025-05-29 RX ADMIN — ACETAMINOPHEN 650 MG: 325 TABLET ORAL at 10:07

## 2025-05-29 RX ADMIN — CETIRIZINE HYDROCHLORIDE 5 MG: 10 TABLET, FILM COATED ORAL at 10:07

## 2025-05-29 RX ADMIN — ENOXAPARIN SODIUM 40 MG: 100 INJECTION SUBCUTANEOUS at 10:08

## 2025-05-29 RX ADMIN — ONDANSETRON 4 MG: 2 INJECTION INTRAMUSCULAR; INTRAVENOUS at 12:53

## 2025-05-29 RX ADMIN — MIDODRINE HYDROCHLORIDE 10 MG: 5 TABLET ORAL at 10:06

## 2025-05-29 RX ADMIN — ASPIRIN 81 MG: 81 TABLET, COATED ORAL at 10:08

## 2025-05-29 RX ADMIN — LACTULOSE 30 G: 20 SOLUTION ORAL at 21:26

## 2025-05-29 RX ADMIN — BETHANECHOL CHLORIDE 25 MG: 25 TABLET ORAL at 21:26

## 2025-05-29 RX ADMIN — TAMSULOSIN HYDROCHLORIDE 0.4 MG: 0.4 CAPSULE ORAL at 10:06

## 2025-05-29 RX ADMIN — RIFAXIMIN 550 MG: 550 TABLET ORAL at 10:08

## 2025-05-29 RX ADMIN — RISPERIDONE 0.25 MG: 0.25 TABLET, FILM COATED ORAL at 10:05

## 2025-05-29 RX ADMIN — RIFAXIMIN 550 MG: 550 TABLET ORAL at 21:27

## 2025-05-29 RX ADMIN — PANTOPRAZOLE SODIUM 40 MG: 40 TABLET, DELAYED RELEASE ORAL at 06:28

## 2025-05-29 RX ADMIN — MIDODRINE HYDROCHLORIDE 10 MG: 5 TABLET ORAL at 17:07

## 2025-05-29 RX ADMIN — SODIUM PHOSPHATE, MONOBASIC, MONOHYDRATE AND SODIUM PHOSPHATE, DIBASIC, ANHYDROUS 30 MMOL: 142; 276 INJECTION, SOLUTION INTRAVENOUS at 12:48

## 2025-05-29 ASSESSMENT — PAIN SCALES - GENERAL
PAINLEVEL_OUTOF10: 2
PAINLEVEL_OUTOF10: 10

## 2025-05-29 NOTE — PLAN OF CARE
OCCUPATIONAL THERAPY TREATMENT  Patient: Nicci Hernandez (64 y.o. female)  Date: 5/29/2025  Primary Diagnosis: AL (acute kidney injury) [N17.9]  Altered mental status, unspecified altered mental status type [R41.82]  AMS (altered mental status) [R41.82]       Precautions: Fall Risk, Contact Precautions                Recommendations for nursing mobility: Encourage HEP in prep for ADLs/mobility; see handout for details, Frequent repositioning to prevent skin breakdown, and Assist x2    In place during session: Peripheral IV  Chart, occupational therapy assessment, plan of care, and goals were reviewed.  ASSESSMENT  Patient continues with skilled OT services and is slowly progressing towards goals. Pt semi supine in bed upon MEDELLIN/PT arrival, agreeable to session. Pt A&O x 4. Pt required vc's for time. Pt completed light grooming and UE therex semi supine in bed. Pt completed UE therex, see grid below for details, to maintain/ increase strength and endurance to aid in adl performance.Pt completed bed mobility w/ overall Max A x 2. Pt tolerated eob for ~ 3-5 minutes and had to return to supine d/t pt feeling nauseated. Education provided on energy conservation, HEP and PLB. Discussed relaxation technique to assist in controlling pain. Pt verbalizing good understanding of all education. Pt left supine in bed with all known needs met. (See below for objective details and assist levels).     Overall pt tolerated session fair/poor today with frequent rest breaks.  Pt limited by pain levels. Current OT recommendations for discharge Moderate intensity short-term skilled occupational therapy up to 5x/week. Will continue to benefit from skilled OT services, and will continue to progress as tolerated.       GOALS:    Problem: Occupational Therapy - Adult  Goal: By Discharge: Performs self-care activities at highest level of function for planned discharge setting.  See evaluation for individualized goals.  Description:

## 2025-05-29 NOTE — PROGRESS NOTES
Progress Note  Date:2025       Room:[facility]  Patient Name:Nicci Hernandez     YOB: 1960     Age:64 y.o.        Subjective    Subjective  Patient followed for presumably uncomplicated UTI involving Enterococcci and Pseudomonas, continued on Ciprofloxacin. Patient is asleep at this time.     Objective         Vitals Last 24 Hours:  TEMPERATURE:  Temp  Av.5 °F (36.9 °C)  Min: 97.5 °F (36.4 °C)  Max: 99.9 °F (37.7 °C)  RESPIRATIONS RANGE: Resp  Av.7  Min: 16  Max: 18  PULSE OXIMETRY RANGE: SpO2  Av.3 %  Min: 96 %  Max: 100 %  PULSE RANGE: Pulse  Av.3  Min: 76  Max: 97  BLOOD PRESSURE RANGE: Systolic (24hrs), Av , Min:108 , Max:116   ; Diastolic (24hrs), Av, Min:56, Max:71        Objective:  Vital signs: (most recent): Blood pressure 110/70, pulse 76, temperature 97.5 °F (36.4 °C), temperature source Axillary, resp. rate 16, height 1.575 m (5' 2.01\"), weight 89.5 kg (197 lb 5 oz), SpO2 100%.      Vitals and nursing note reviewed.   Constitutional:       Appearance: She is ill-appearing.   HENT:      Head: Normocephalic and atraumatic.      Right Ear: External ear normal.      Left Ear: External ear normal.      Nose: Nose normal.      Mouth/Throat:      Pharynx: Oropharynx is clear.   Eyes:      Extraocular Movements: Extraocular movements intact.   Cardiovascular:      Rate and Rhythm: Normal rate and regular rhythm.      Pulses: Normal pulses.      Heart sounds: Normal heart sounds. No murmur heard.  Pulmonary:      Effort: Pulmonary effort is normal.      Breath sounds: Normal breath sounds.   Abdominal:      General: Bowel sounds are normal. There is no distension.      Palpations: Abdomen is soft. There is no mass.      Tenderness: There is no abdominal tenderness. There is no right CVA tenderness, left CVA tenderness or guarding.   Genitourinary: External urinary device  Musculoskeletal:      Cervical back: Neck supple.      Right lower leg: No edema.      Left lower

## 2025-05-29 NOTE — PROGRESS NOTES
4 Eyes Skin Assessment     NAME:  Nicci Hernandez  YOB: 1960  MEDICAL RECORD NUMBER:  [id number]    The patient is being assessed for  Other weekly assessment    I agree that at least one RN has performed a thorough Head to Toe Skin Assessment on the patient. ALL assessment sites listed below have been assessed.      Areas assessed by both nurses:    Head, Face, Ears, Shoulders, Back, Chest, Arms, Elbows, Hands, Sacrum. Buttock, Coccyx, Ischium, Legs. Feet and Heels, and Under Medical Devices         Does the Patient have a Wound? No noted wound(s)       Rupert Prevention initiated by RN: Yes  Wound Care Orders initiated by RN: No    Pressure Injury (Stage 3,4, Unstageable, DTI, NWPT, and Complex wounds) if present, place Wound referral order by RN under : No    New Ostomies, if present place, Ostomy referral order under : No     Nurse 1 eSignature: Electronically signed by Jessica Linton LPN on 5/28/25 at 8:35 PM EDT    **SHARE this note so that the co-signing nurse can place an eSignature**    Nurse 2 eSignature: Electronically signed by Giuliana Villalta RN on 5/29/25 at 2:39 AM EDT

## 2025-05-29 NOTE — PROGRESS NOTES
Spiritual Health History and Assessment/Progress Note  [facility]    Loneliness/Social Isolation,  , Life Adjustments, Adjustment to illness,      Name: Nicci Hernandez MRN: [id number]    Age: 64 y.o.     Sex: female   Language: English   Restorationism: Episcopalian   AMS (altered mental status)     Date: 5/29/2025            Total Time Calculated: 20 min              Spiritual Assessment continued in SSR 5 [facility]G        Referral/Consult From: Nurse   Encounter Overview/Reason: Loneliness/Social Isolation  Service Provided For: Patient    [place], Belief, Meaning:   Patient identifies as spiritual, is connected with a abeba tradition or spiritual practice, and has beliefs or practices that help with coping during difficult times  Family/Friends No family/friends present      Importance and Influence:  Patient has spiritual/personal beliefs that influence decisions regarding their health  Family/Friends No family/friends present    Community:  Patient is connected with a spiritual community and feels well-supported. Support system includes: Parent/s, Children, [place] Community, Friends, and Extended family  Family/Friends No family/friends present    Assessment and Plan of Care:   This  in to visit this Patient at this time. Patient is alone in the room resting in bed. Patient states she is feeling better than when she was first admitted to the hospital. Patient shared life/review/legacy including family, work, being of Episcopalian abeba, and reason for being admitted to the hospital. Patient states she believes in God and thankful for her family and that her  checks on her. Patient shared she gets miguel having her family and being able to have time with her grands. Listened empathically providing time and space for reflection and sharing of life's sacred events. Prayed fervent prayer at Patient's request. Maintained ministry of presence. Patient smiled intermittently and thanked  for visit.

## 2025-05-29 NOTE — PROGRESS NOTES
Renal Progress Note    Patient: Nicci Hernandez MRN: [id number]  SSN: [id number]    YOB: 1960  Age: 64 y.o.  Sex: female      Admit Date: 5/16/2025    LOS: 13 days     Subjective:   Patient seen and examined at the bedside this morning. Reports nausea, dry heaves. Denies vomiting.  On RA.  Creatinine improved to 1.8-->1.6.  CO2 17-->19.  350 ccs UOP documented yesterday.  Phosphorus 1.5.    Scheduled for discharge today.    Current Facility-Administered Medications   Medication Dose Route Frequency    risperiDONE (RisperDAL) tablet 0.25 mg  0.25 mg Oral Daily    sodium bicarbonate tablet 650 mg  650 mg Oral BID    lactulose (CHRONULAC) 10 GM/15ML solution 30 g  30 g Oral TID    rifAXIMin (XIFAXAN) tablet 550 mg  550 mg Oral TID    DULoxetine (CYMBALTA) extended release capsule 30 mg  30 mg Oral Daily    carvedilol (COREG) tablet 3.125 mg  3.125 mg Oral BID    midodrine (PROAMATINE) tablet 10 mg  10 mg Oral TID WC    enoxaparin (LOVENOX) injection 40 mg  40 mg SubCUTAneous Daily    bethanechol (URECHOLINE) tablet 25 mg  25 mg Oral TID    tamsulosin (FLOMAX) capsule 0.4 mg  0.4 mg Oral Daily    aluminum & magnesium hydroxide-simethicone (MAALOX PLUS) 200-200-20 MG/5ML suspension 30 mL  30 mL Oral Q6H PRN    cyclobenzaprine (FLEXERIL) tablet 10 mg  10 mg Oral TID PRN    aspirin EC tablet 81 mg  81 mg Oral Daily    lidocaine 4 % external patch 1 patch  1 patch TransDERmal Daily    insulin lispro (HUMALOG,ADMELOG) injection vial 0-8 Units  0-8 Units SubCUTAneous 4x Daily AC & HS    sodium chloride flush 0.9 % injection 5-40 mL  5-40 mL IntraVENous 2 times per day    sodium chloride flush 0.9 % injection 5-40 mL  5-40 mL IntraVENous PRN    0.9 % sodium chloride infusion   IntraVENous PRN    ondansetron (ZOFRAN-ODT) disintegrating tablet 4 mg  4 mg Oral Q8H PRN    Or    ondansetron (ZOFRAN) injection 4 mg  4 mg IntraVENous Q6H PRN    polyethylene glycol (GLYCOLAX) packet 17 g  17 g Oral Daily PRN

## 2025-05-29 NOTE — CARE COORDINATION
CM noted discharge order. Patient going to [facility], nurse can call report at [phone]. Daughter agreeable with plan.    Transition of Care Plan:    RUR: 26%  Prior Level of Functioning: Needs Assistance  Disposition: IPR  RAMA: today  If SNF or IPR: Date FOC offered: 5/20/2025  Date FOC received: 5/20/2025  Accepting facility: [facility]  Date authorization started with reference number: n/a  Date authorization received and expires: n/a  Follow up appointments: Per MD  DME needed: n/a  Transportation at discharge: [facility] 7pm  IM/IMM Medicare/ letter given: yes  Is patient a [place] and connected with [facility]? N/a   If yes, was [place] transfer form completed and [facility] notified?   Caregiver Contact: Narcisa, daughter  Discharge Caregiver contacted prior to discharge? yes  Care Conference needed? no  Barriers to discharge: none    0912: CM reviewed chart. DCP [facility] when medicallly ready. CM will continue to follow.

## 2025-05-29 NOTE — PLAN OF CARE
PHYSICAL THERAPY TREATMENT     Patient: Nicci Hernandez (64 y.o. female)  Date: 5/29/2025  Diagnosis: AL (acute kidney injury) [N17.9]  Altered mental status, unspecified altered mental status type [R41.82]  AMS (altered mental status) [R41.82] AMS (altered mental status)      Precautions: Fall Risk, Contact Precautions                      Recommendations for nursing mobility: Out of bed to chair for meals, Use of bed/chair alarm for safety, Use of BSC for toileting , and AD and gt belt for bed to chair     In place during session: Peripheral IV, External Catheter, and EKG/telemetry   Chart, physical therapy assessment, plan of care and goals were reviewed.  ASSESSMENT  Patient continues with skilled PT services and is progressing towards goals. Pt supine in bed upon PT arrival, agreeable to session. Pt A&O x 2-3. (See below for objective details and assist levels).     Overall pt tolerated session fair/poor today, pt sat EOB approx 5min, with Mod/Max A x1, treatment session limited by back and subsequent nausea in sitting, treatment session terminated.  Will continue to benefit from skilled PT services, and will continue to progress as tolerated. Current PT DC recommendation Moderate intensity short-term skilled physical therapy up to 5x/week once medically appropriate.      GOALS:    Problem: Physical Therapy - Adult  Goal: By Discharge: Performs mobility at highest level of function for planned discharge setting.  See evaluation for individualized goals.  Description: FUNCTIONAL STATUS PRIOR TO ADMISSION: pt reports residing at a \"group home\" type place where she has a private room and people come into help her. Per chart review pt is from [place] pt DC from [place] on 4/9/25 to [facility].     Physical Therapy Goals  Initiated 5/18/2025  Pt stated goal: to get better  Pt will be I with [facility] in 7 days.  Pt will perform bed mobility with Contact Guard Assist in 7 days.  Pt will perform transfers

## 2025-05-29 NOTE — PROGRESS NOTES
Attempted to remove CVC per orders for discharge. Sutures clipped and pulled out. When attempting to remove line, RN met resistance & unable to remove line. ICU nurse was contacted who stated she would send a nurse to assess the line.

## 2025-05-29 NOTE — CONSULTS
[place] [address] MED[facility] [place] [id number]                              CONSULTATION      PATIENT NAME: SU HILARIO              : 1960  MED REC NO: [id number]                       ROOM: 566  ACCOUNT NO: [id number]                       ADMIT DATE: 2025  PROVIDER: Dallin Romo MD    DATE OF SERVICE:  2025    ATTENDING PHYSICIAN:  ELVA CREWS    REASON FOR CONSULT:  Visual hallucinations on jerking related to duloxetine.     Followed by Madiha Aguilar PA-C.    The patient is going to nursing home.  The family want to be seen by a psychiatrist.  Saw the patient.  Chart reviewed.  This is a 64-year-old   female patient admitted for fall at home on May 16, 2025, at 6 a.m., apparently usual alert and oriented with a GCF 15.  Reported to strike head during the fall in the morning.  The patient become progressively more altered throughout the day.  The patient having slurred speech.  Reports feeling sleepy and sluggish with a headache.  The patient has history of acute kidney injury on the top of CKD stage 3B, UTI, acute cystitis, diabetes mellitus type 2, hypoglycemia, anorexia, AMS, acute on chronic head trauma, possible metabolic encephalopathy, retropharyngeal abscesses, left bundle branch block, left foot chronic infection, hyperbilirubinemia, essential hypertension, peripheral neuropathy, seasonal allergies, history of CVA.  The patient is seen for followup.  She is in the bed.  She says neck hurts, says so far doing okay, acknowledges depression and not suicidal, graded depression as 3, anxiety as 5, denied any psychotic symptoms.  Says she is , lost her  on , fell at home, fell back, and he was 69 years old, they were  for 25 years.  The patient is getting duloxetine.  Family has concern whether it caused altered mental status or also some shaking.  She says she has 1 son and 5

## 2025-05-29 NOTE — PLAN OF CARE
Problem: Chronic Conditions and Co-morbidities  Goal: Patient's chronic conditions and co-morbidity symptoms are monitored and maintained or improved  Outcome: Progressing     Problem: Discharge Planning  Goal: Discharge to home or other facility with appropriate resources  Outcome: Progressing     Problem: Pain  Goal: Verbalizes/displays adequate comfort level or baseline comfort level  Outcome: Progressing     Problem: Skin/Tissue Integrity  Goal: Skin integrity remains intact  Description: 1.  Monitor for areas of redness and/or skin breakdown2.  Assess vascular access sites hourly3.  Every 4-6 hours minimum:  Change oxygen saturation probe site4.  Every 4-6 hours:  If on nasal continuous positive airway pressure, respiratory therapy assess nares and determine need for appliance change or resting period  Outcome: Progressing  Flowsheets (Taken 5/28/2025 1732 by Jessica Linton LPN)  Skin Integrity Remains Intact:   Monitor for areas of redness and/or skin breakdown   Turn and reposition as indicated     Problem: Safety - Adult  Goal: Free from fall injury  Outcome: Progressing     Problem: ABCDS Injury Assessment  Goal: Absence of physical injury  Outcome: Progressing     Problem: Nutrition Deficit:  Goal: Optimize nutritional status  Outcome: Progressing

## 2025-05-30 ENCOUNTER — APPOINTMENT (OUTPATIENT)
Facility: HOSPITAL | Age: 65
End: 2025-05-30
Payer: MEDICARE

## 2025-05-30 LAB
ALBUMIN SERPL-MCNC: 2 G/DL (ref 3.5–5)
ANION GAP SERPL CALC-SCNC: 10 MMOL/L (ref 2–12)
BUN SERPL-MCNC: 36 MG/DL (ref 6–20)
BUN/CREAT SERPL: 19 (ref 12–20)
CA-I BLD-MCNC: 8.6 MG/DL (ref 8.5–10.1)
CHLORIDE SERPL-SCNC: 109 MMOL/L (ref 97–108)
CO2 SERPL-SCNC: 16 MMOL/L (ref 21–32)
CREAT SERPL-MCNC: 1.86 MG/DL (ref 0.55–1.02)
GLUCOSE BLD STRIP.AUTO-MCNC: 177 MG/DL (ref 65–100)
GLUCOSE BLD STRIP.AUTO-MCNC: 191 MG/DL (ref 65–100)
GLUCOSE BLD STRIP.AUTO-MCNC: 223 MG/DL (ref 65–100)
GLUCOSE SERPL-MCNC: 165 MG/DL (ref 65–100)
PERFORMED BY:: ABNORMAL
PHOSPHATE SERPL-MCNC: 3.9 MG/DL (ref 2.6–4.7)
POTASSIUM SERPL-SCNC: 4.8 MMOL/L (ref 3.5–5.1)
SODIUM SERPL-SCNC: 135 MMOL/L (ref 136–145)

## 2025-05-30 PROCEDURE — 2500000003 HC RX 250 WO HCPCS

## 2025-05-30 PROCEDURE — 2700000000 HC OXYGEN THERAPY PER DAY

## 2025-05-30 PROCEDURE — 6360000002 HC RX W HCPCS: Performed by: PHYSICIAN ASSISTANT

## 2025-05-30 PROCEDURE — 0JPTX3Z REMOVAL OF INFUSION DEVICE FROM TRUNK SUBCUTANEOUS TISSUE AND FASCIA, EXTERNAL APPROACH: ICD-10-PCS | Performed by: PHYSICIAN ASSISTANT

## 2025-05-30 PROCEDURE — 82962 GLUCOSE BLOOD TEST: CPT

## 2025-05-30 PROCEDURE — 6370000000 HC RX 637 (ALT 250 FOR IP): Performed by: PSYCHIATRY & NEUROLOGY

## 2025-05-30 PROCEDURE — 6370000000 HC RX 637 (ALT 250 FOR IP): Performed by: PHYSICIAN ASSISTANT

## 2025-05-30 PROCEDURE — 36589 REMOVAL TUNNELED CV CATH: CPT

## 2025-05-30 PROCEDURE — 80069 RENAL FUNCTION PANEL: CPT

## 2025-05-30 PROCEDURE — 6370000000 HC RX 637 (ALT 250 FOR IP): Performed by: NURSE PRACTITIONER

## 2025-05-30 PROCEDURE — 99232 SBSQ HOSP IP/OBS MODERATE 35: CPT | Performed by: INTERNAL MEDICINE

## 2025-05-30 PROCEDURE — 2500000003 HC RX 250 WO HCPCS: Performed by: PHYSICIAN ASSISTANT

## 2025-05-30 PROCEDURE — 36415 COLL VENOUS BLD VENIPUNCTURE: CPT

## 2025-05-30 PROCEDURE — 6370000000 HC RX 637 (ALT 250 FOR IP): Performed by: INTERNAL MEDICINE

## 2025-05-30 PROCEDURE — 6370000000 HC RX 637 (ALT 250 FOR IP): Performed by: STUDENT IN AN ORGANIZED HEALTH CARE EDUCATION/TRAINING PROGRAM

## 2025-05-30 PROCEDURE — 1100000000 HC RM PRIVATE

## 2025-05-30 PROCEDURE — 6370000000 HC RX 637 (ALT 250 FOR IP)

## 2025-05-30 PROCEDURE — 6360000002 HC RX W HCPCS

## 2025-05-30 RX ORDER — SODIUM BICARBONATE 650 MG/1
1300 TABLET ORAL 2 TIMES DAILY
Qty: 12 TABLET | Refills: 0 | Status: SHIPPED | OUTPATIENT
Start: 2025-05-30 | End: 2025-06-02

## 2025-05-30 RX ORDER — FUROSEMIDE 10 MG/ML
40 INJECTION INTRAMUSCULAR; INTRAVENOUS ONCE
Status: COMPLETED | OUTPATIENT
Start: 2025-05-30 | End: 2025-05-30

## 2025-05-30 RX ORDER — INDOMETHACIN 25 MG/1
50 CAPSULE ORAL ONCE
Status: COMPLETED | OUTPATIENT
Start: 2025-05-30 | End: 2025-05-30

## 2025-05-30 RX ORDER — SODIUM BICARBONATE 650 MG/1
1300 TABLET ORAL 2 TIMES DAILY
Status: DISCONTINUED | OUTPATIENT
Start: 2025-05-30 | End: 2025-05-31 | Stop reason: HOSPADM

## 2025-05-30 RX ADMIN — CLOPIDOGREL BISULFATE 75 MG: 75 TABLET, FILM COATED ORAL at 09:30

## 2025-05-30 RX ADMIN — LACTULOSE 30 G: 20 SOLUTION ORAL at 09:29

## 2025-05-30 RX ADMIN — ASPIRIN 81 MG: 81 TABLET, COATED ORAL at 09:29

## 2025-05-30 RX ADMIN — BETHANECHOL CHLORIDE 25 MG: 25 TABLET ORAL at 09:30

## 2025-05-30 RX ADMIN — INSULIN LISPRO 2 UNITS: 100 INJECTION, SOLUTION INTRAVENOUS; SUBCUTANEOUS at 20:00

## 2025-05-30 RX ADMIN — SODIUM BICARBONATE 650 MG: 650 TABLET ORAL at 09:29

## 2025-05-30 RX ADMIN — RISPERIDONE 0.25 MG: 0.25 TABLET, FILM COATED ORAL at 09:29

## 2025-05-30 RX ADMIN — FUROSEMIDE 40 MG: 10 INJECTION, SOLUTION INTRAMUSCULAR; INTRAVENOUS at 13:03

## 2025-05-30 RX ADMIN — MIDODRINE HYDROCHLORIDE 10 MG: 5 TABLET ORAL at 13:03

## 2025-05-30 RX ADMIN — SODIUM CHLORIDE, PRESERVATIVE FREE 10 ML: 5 INJECTION INTRAVENOUS at 09:48

## 2025-05-30 RX ADMIN — SODIUM BICARBONATE 1300 MG: 650 TABLET ORAL at 20:00

## 2025-05-30 RX ADMIN — INSULIN LISPRO 2 UNITS: 100 INJECTION, SOLUTION INTRAVENOUS; SUBCUTANEOUS at 13:04

## 2025-05-30 RX ADMIN — BETHANECHOL CHLORIDE 25 MG: 25 TABLET ORAL at 20:00

## 2025-05-30 RX ADMIN — TAMSULOSIN HYDROCHLORIDE 0.4 MG: 0.4 CAPSULE ORAL at 09:31

## 2025-05-30 RX ADMIN — SODIUM BICARBONATE 50 MEQ: 84 INJECTION, SOLUTION INTRAVENOUS at 13:02

## 2025-05-30 RX ADMIN — DULOXETINE 30 MG: 30 CAPSULE, DELAYED RELEASE ORAL at 09:29

## 2025-05-30 RX ADMIN — RIFAXIMIN 550 MG: 550 TABLET ORAL at 20:00

## 2025-05-30 RX ADMIN — LACTULOSE 30 G: 20 SOLUTION ORAL at 16:45

## 2025-05-30 RX ADMIN — SODIUM BICARBONATE 1300 MG: 650 TABLET ORAL at 13:03

## 2025-05-30 RX ADMIN — PANTOPRAZOLE SODIUM 40 MG: 40 TABLET, DELAYED RELEASE ORAL at 06:06

## 2025-05-30 RX ADMIN — ENOXAPARIN SODIUM 40 MG: 100 INJECTION SUBCUTANEOUS at 09:34

## 2025-05-30 RX ADMIN — LACTULOSE 30 G: 20 SOLUTION ORAL at 19:59

## 2025-05-30 RX ADMIN — CETIRIZINE HYDROCHLORIDE 5 MG: 10 TABLET, FILM COATED ORAL at 09:29

## 2025-05-30 RX ADMIN — MIDODRINE HYDROCHLORIDE 10 MG: 5 TABLET ORAL at 09:30

## 2025-05-30 RX ADMIN — WATER: 1 IRRIGANT IRRIGATION at 13:30

## 2025-05-30 RX ADMIN — PREGABALIN 75 MG: 50 CAPSULE ORAL at 09:29

## 2025-05-30 RX ADMIN — RIFAXIMIN 550 MG: 550 TABLET ORAL at 09:30

## 2025-05-30 RX ADMIN — MIDODRINE HYDROCHLORIDE 10 MG: 5 TABLET ORAL at 18:49

## 2025-05-30 RX ADMIN — PREGABALIN 75 MG: 50 CAPSULE ORAL at 20:00

## 2025-05-30 RX ADMIN — RIFAXIMIN 550 MG: 550 TABLET ORAL at 16:45

## 2025-05-30 RX ADMIN — CARVEDILOL 3.12 MG: 3.12 TABLET, FILM COATED ORAL at 09:29

## 2025-05-30 RX ADMIN — BETHANECHOL CHLORIDE 25 MG: 25 TABLET ORAL at 16:45

## 2025-05-30 ASSESSMENT — PAIN SCALES - GENERAL
PAINLEVEL_OUTOF10: 0
PAINLEVEL_OUTOF10: 0

## 2025-05-30 NOTE — PLAN OF CARE
Problem: Chronic Conditions and Co-morbidities  Goal: Patient's chronic conditions and co-morbidity symptoms are monitored and maintained or improved  Outcome: Progressing     Problem: Discharge Planning  Goal: Discharge to home or other facility with appropriate resources  Outcome: Progressing     Problem: Pain  Goal: Verbalizes/displays adequate comfort level or baseline comfort level  Outcome: Progressing     Problem: Skin/Tissue Integrity  Goal: Skin integrity remains intact  Description: 1.  Monitor for areas of redness and/or skin breakdown2.  Assess vascular access sites hourly3.  Every 4-6 hours minimum:  Change oxygen saturation probe site4.  Every 4-6 hours:  If on nasal continuous positive airway pressure, respiratory therapy assess nares and determine need for appliance change or resting period  Outcome: Progressing  Flowsheets (Taken 5/29/2025 2002)  Skin Integrity Remains Intact: Monitor for areas of redness and/or skin breakdown     Problem: Safety - Adult  Goal: Free from fall injury  Outcome: Progressing  Flowsheets (Taken 5/29/2025 2002)  Free From Fall Injury: Instruct family/caregiver on patient safety     Problem: ABCDS Injury Assessment  Goal: Absence of physical injury  Outcome: Progressing  Flowsheets (Taken 5/29/2025 2002)  Absence of Physical Injury: Implement safety measures based on patient assessment     Problem: Nutrition Deficit:  Goal: Optimize nutritional status  Outcome: Progressing     Problem: Physical Therapy - Adult  Goal: By Discharge: Performs mobility at highest level of function for planned discharge setting.  See evaluation for individualized goals.  Description: FUNCTIONAL STATUS PRIOR TO ADMISSION: pt reports residing at a \"group home\" type place where she has a private room and people come into help her. Per chart review pt is from sarahi Armenta DC from [place] on 4/9/25 to [facility].     Physical Therapy Goals  Initiated 5/18/2025  Pt stated goal: to get better  Pt

## 2025-05-30 NOTE — PLAN OF CARE
Problem: Chronic Conditions and Co-morbidities  Goal: Patient's chronic conditions and co-morbidity symptoms are monitored and maintained or improved  5/30/2025 0818 by Afsaneh Medina RN  Outcome: Progressing  5/30/2025 0252 by Danitza Valle RN  Outcome: Progressing     Problem: Discharge Planning  Goal: Discharge to home or other facility with appropriate resources  5/30/2025 0818 by Afsaneh Medina RN  Outcome: Progressing  5/30/2025 0252 by Danitza Valle RN  Outcome: Progressing     Problem: Pain  Goal: Verbalizes/displays adequate comfort level or baseline comfort level  5/30/2025 0818 by Afsaneh Medina RN  Outcome: Progressing  5/30/2025 0252 by Danitza Valle RN  Outcome: Progressing     Problem: Skin/Tissue Integrity  Goal: Skin integrity remains intact  Description: 1.  Monitor for areas of redness and/or skin breakdown2.  Assess vascular access sites hourly3.  Every 4-6 hours minimum:  Change oxygen saturation probe site4.  Every 4-6 hours:  If on nasal continuous positive airway pressure, respiratory therapy assess nares and determine need for appliance change or resting period  5/30/2025 0818 by Afsaneh Medina RN  Outcome: Progressing  5/30/2025 0252 by Danitza Valle RN  Outcome: Progressing  Flowsheets (Taken 5/29/2025 2002)  Skin Integrity Remains Intact: Monitor for areas of redness and/or skin breakdown     Problem: Safety - Adult  Goal: Free from fall injury  5/30/2025 0818 by Afsaneh Medina RN  Outcome: Progressing  5/30/2025 0252 by Danitza Valle RN  Outcome: Progressing  Flowsheets (Taken 5/29/2025 2002)  Free From Fall Injury: Instruct family/caregiver on patient safety     Problem: ABCDS Injury Assessment  Goal: Absence of physical injury  5/30/2025 0818 by Afsaneh Medina RN  Outcome: Progressing  5/30/2025 0252 by Danitza Valle RN  Outcome: Progressing  Flowsheets (Taken 5/29/2025 2002)  Absence of Physical Injury: Implement safety measures based on patient assessment

## 2025-05-30 NOTE — PROGRESS NOTES
Comprehensive Nutrition Assessment    Type and Reason for Visit:  Reassess    Nutrition Recommendations/Plan:   Continue Current Diet  Encourage PO intakes >50%  Monitor/document wt, BM, PO+ONS% in I/O  Continue Ensure LCHP daily at lunch  Continue Chris BID - promote wound healing      Malnutrition Assessment:  Malnutrition Status:  Moderate malnutrition (05/17/25 1307)    Context:  Acute Illness     Findings of the 6 clinical characteristics of malnutrition:  Energy Intake:  75% or less of estimated energy requirements for 7 or more days  Weight Loss:  Greater than 5% over 1 month (2/2 fluid status)     Body Fat Loss:  No body fat loss     Muscle Mass Loss:  No muscle mass loss    Fluid Accumulation:  Mild Extremities   Strength:  Not Performed    Nutrition Assessment:    Admit for AMS. Consulted for poor PO/appetite >5 days pta. Recent admit x1mo ago where RD followed t/o admit d/t poor PO intakes. Prev rec'd ONS 4x/d d/t pt report of difficulty chewing 2/2 pain from neck, back. Spoke w/ pt and daughter who reported appetite beginning to improve. Stated appetite poor since March d/t illness. No notable wt changes for pt/daughter; per EMR, ~45# wt loss x1mo 2/2 fluid shifts. Pt now back to UBW, still w/ mild edema. Fluid likely masking true wt loss. Provided diet edu on current SBS diet order- pt prefers to be switched back to Reg/thins; provided diet preferences of salads, grilled cheese w/ tomato soup, chix salad w/ crackers. Reported consuming 100% bfast this AM. Observed eating lunch- stated chix salad dry. Plans to add ONS while appetite continues to improve to meet EERs, promote wound healing. Labs: Cl 96, BUN 74, Cre 2.36, eGFR 22, -227, Alb, 2.7. Meds: carvdelilol, humalog, lactulose, MagOx, protonix. (5/20) Diet upgraded to ETC (from SBS) and therapeutic restrictions removed to promote intakes. Meds reviewed. No updated wts. Alt labs: BUN 59, Cre 2.06, . DU to L foot resolving (podiatry

## 2025-05-30 NOTE — DISCHARGE SUMMARY
Discharge Summary    Name: Nicci Hernandez  [id number]  YOB: 1960 (Age: 64 y.o.)   Date of Admission: 5/16/2025  Date of Discharge: 5/30/2025  Attending Physician: Flores Talavera MD    Discharge Diagnosis:   Principal Problem (Resolved):    AMS (altered mental status)  Active Problems:    HTN (hypertension)    Stage 3b chronic kidney disease (HCC)    Peripheral neuropathy    Diabetes mellitus type II, controlled (HCC)    Moderate protein-calorie malnutrition    History of fusion of cervical spine    Urinary retention    HFrEF (heart failure with reduced ejection fraction) (HCC)    Hepatic steatosis    History of CVA (cerebrovascular accident)  Resolved Problems:    AL (acute kidney injury)    Fall    Hepatic encephalopathy (HCC)    Metabolic acidosis    Visual hallucinations    Fever    UTI (urinary tract infection)    Anorexia    Hyperbilirubinemia       Consultations:  IP CONSULT TO PODIATRY  IP CONSULT TO TELE-NEUROLOGY  IP CONSULT TO DIETITIAN  IP CONSULT TO OUTPATIENT WOUND CLINIC  IP CONSULT TO NEPHROLOGY  IP CONSULT TO UROLOGY  IP CONSULT TO PSYCHIATRY  IP CONSULT TO GI  IP CONSULT TO INFECTIOUS DISEASES  IP CONSULT TO INTERVENTIONAL RADIOLOGY    Brief Hospital Course by Main Problems:   Patient is a 63yo female with PMHx significant for CAD, HFrEF, CKD, DMTII, HLD, and HTN who presented to the ED for AMS after a ground level fall on 5/16/25. Experienced recurrent hospitalizations that dates back to March of 2025. She has been on extensive antibiotics as an outpatient for a retropharyngeal abscess as well as for a chronic left foot infection. Initial imaging including CT head, CT C-spine, CXR unremarkable. XR of left foot revealed nonspecific findings and podiatry put on board with low suspicion for osteo.  Blood cultures drawn without growth throughout hospital stay. Initial UA consistent with UTI and admitted. Started on ceftriaxone and switched to  saline, apply 1 mm layer of Therahoney gel, and cover with a gentle lite foam every other day and prn for soiling.      Follow up with:   PCP : Franco Ross MD Dave', Akshay V, MD  [address] [facility] [id number] [phone]    Follow up in 1 week(s)  Follow-up for recent hospital admission    [facility]  [address] [facility] [id number] [phone]  Follow up  As needed, If symptoms worsen    Susy Biswas MD  [address] [facility] [address]  [phone]    Follow up in 1 week(s)  Continued outpatient GI care    Camilla Zuniga MD  [address]  [facility] [address]  [phone]    Follow up in 1 week(s)  Follow-up for continued outpatient nephrologic care    Francisco Scott MD  [address]  [facility] [id number] [phone]    Follow up in 1 week(s)  Referral for outpatient neurologic care    Yunior Reilly MD  [address]  [facility] [id number] [phone]    Follow up in 1 week(s)  Referral for outpatient hepatology care for recurrent hepatic encephalopathy    Rey Aviles MD  [address] [facility] [id number] [phone]    Follow up in 1 week(s)  Follow-up for urinary retention        Please note that this dictation was completed with [employer], the computer voice recognition software. Quite often unanticipated grammatical, syntax, homophones, and other interpretive errors are inadvertently transcribed by the computer software. Please disregard these errors. Please excuse any errors that have escaped final proofreading.    Total time in minutes spent coordinating this discharge (includes going over instructions, follow-up, prescriptions, and preparing report for sign off to her PCP) :  35 minutes

## 2025-05-30 NOTE — CARE COORDINATION
CM reviewed chart. [facility] can accept tomorrow once lint is removed. [facility] out of beds today. CM will continue to follow.

## 2025-05-30 NOTE — PLAN OF CARE
Problem: Nutrition Deficit:  Goal: Optimize nutritional status  5/30/2025 1054 by Aviva Jerome RD  Outcome: Progressing  Flowsheets (Taken 5/17/2025 1309 by Reyna Mojica)  Nutrient intake appropriate for improving, restoring, or maintaining nutritional needs:   Assess nutritional status and recommend course of action   Monitor oral intake, labs, and treatment plans   Recommend appropriate diets, oral nutritional supplements, and vitamin/mineral supplements

## 2025-05-30 NOTE — PROGRESS NOTES
Renal Progress Note    Patient: Nicci Hernandez MRN: [id number]  SSN: [id number]    YOB: 1960  Age: 64 y.o.  Sex: female      Admit Date: 5/16/2025    LOS: 14 days     Subjective:   Patient seen and examined at the bedside this morning. No new complaints at time of visit. Denies shortness of breath. Reports good UOP.  Creatinine 1.8. CO2 16.  Phos 3.9.    Current Facility-Administered Medications   Medication Dose Route Frequency    sodium bicarbonate tablet 1,300 mg  1,300 mg Oral BID    sodium bicarbonate 8.4 % injection 50 mEq  50 mEq IntraVENous Once    furosemide (LASIX) injection 40 mg  40 mg IntraVENous Once    lactulose enema   Rectal Once    risperiDONE (RisperDAL) tablet 0.25 mg  0.25 mg Oral Daily    lactulose (CHRONULAC) 10 GM/15ML solution 30 g  30 g Oral TID    rifAXIMin (XIFAXAN) tablet 550 mg  550 mg Oral TID    DULoxetine (CYMBALTA) extended release capsule 30 mg  30 mg Oral Daily    carvedilol (COREG) tablet 3.125 mg  3.125 mg Oral BID    midodrine (PROAMATINE) tablet 10 mg  10 mg Oral TID WC    enoxaparin (LOVENOX) injection 40 mg  40 mg SubCUTAneous Daily    bethanechol (URECHOLINE) tablet 25 mg  25 mg Oral TID    tamsulosin (FLOMAX) capsule 0.4 mg  0.4 mg Oral Daily    aluminum & magnesium hydroxide-simethicone (MAALOX PLUS) 200-200-20 MG/5ML suspension 30 mL  30 mL Oral Q6H PRN    cyclobenzaprine (FLEXERIL) tablet 10 mg  10 mg Oral TID PRN    aspirin EC tablet 81 mg  81 mg Oral Daily    lidocaine 4 % external patch 1 patch  1 patch TransDERmal Daily    insulin lispro (HUMALOG,ADMELOG) injection vial 0-8 Units  0-8 Units SubCUTAneous 4x Daily AC & HS    sodium chloride flush 0.9 % injection 5-40 mL  5-40 mL IntraVENous 2 times per day    sodium chloride flush 0.9 % injection 5-40 mL  5-40 mL IntraVENous PRN    0.9 % sodium chloride infusion   IntraVENous PRN    ondansetron (ZOFRAN-ODT) disintegrating tablet 4 mg  4 mg Oral Q8H PRN    Or    ondansetron (ZOFRAN) injection 4 mg  4 mg

## 2025-05-30 NOTE — PROGRESS NOTES
Progress Note  Date:2025       Room:[facility]  Patient Name:Nicci Hernandez     YOB: 1960     Age:64 y.o.        Subjective    Subjective  Patient followed for presumably uncomplicated UTI involving Enterococcci and Pseudomonas, status posst Ciprofloxacin with improved urinalysis, however still has low grade temperatures and elevated procalcitonin and CRP.  She is resting comfortably and her only complaint is \"family\".  Denies cough, SOB, urinary symptoms.  Staff just looked at her backside and no significant skin breakdown.      Objective         Vitals Last 24 Hours:  TEMPERATURE:  Temp  Av.2 °F (37.3 °C)  Min: 97.7 °F (36.5 °C)  Max: 100 °F (37.8 °C)  RESPIRATIONS RANGE: Resp  Av  Min: 16  Max: 18  PULSE OXIMETRY RANGE: SpO2  Av %  Min: 97 %  Max: 100 %  PULSE RANGE: Pulse  Av.7  Min: 78  Max: 92  BLOOD PRESSURE RANGE: Systolic (24hrs), Av , Min:91 , Max:124   ; Diastolic (24hrs), Av, Min:55, Max:77        Objective:  Vital signs: (most recent): Blood pressure 124/77, pulse 90, temperature 100 °F (37.8 °C), temperature source Axillary, resp. rate 17, height 1.575 m (5' 2.01\"), weight 89.5 kg (197 lb 5 oz), SpO2 100%.      Vitals and nursing note reviewed.   Constitutional:       Appearance: She is ill-appearing.   HENT:      Head: Normocephalic and atraumatic.      Right Ear: External ear normal.      Left Ear: External ear normal.      Nose: Nose normal.      Mouth/Throat:      Pharynx: Oropharynx is clear.   Eyes:      Extraocular Movements: Extraocular movements intact.   Cardiovascular:      Rate and Rhythm: Normal rate and regular rhythm.      Pulses: Normal pulses.      Heart sounds: Normal heart sounds. No murmur heard.  Pulmonary:      Effort: Pulmonary effort is normal.      Breath sounds: Normal breath sounds.   Abdominal:      General: Bowel sounds are normal. There is no distension.      Palpations: Abdomen is soft. There is no mass.      Tenderness: There

## 2025-05-30 NOTE — PROGRESS NOTES
IR attempted to get patient to IR for Powerline removal  Patient refused Transport, stating \"she had too many things today\"  Transport cancelled  Primary RN aware

## 2025-05-30 NOTE — BSMART NOTE
BSMART Liaison Team Note     LOS:  14     Patient goal(s) for today: take medications as prescribed, make needs known in an appropriate manner, discharge   BSMART Liaison team focus/goals: assess needs, provide support and education, coordinate care,     Progress note:   Pt was seen on the medical floor.  Pt was laying in bed awake upon liaison arrival. Pt continues to have nasal canula. Pt reported she was feeling good regarding her mood. Pt denies depressed mood and anxiety.  Pt talked about her discharge tomorrow and being eager to start rehab so she can finally come home. Pt reported she is not sleeping good but she is not eating well.Pt is not fond of the food , stating \" it makes me nauseous\".        Admission Status: Voluntary  TDO Execution Date and Time: N/A   Commitment Hearing Scheduled: N/A       Date and Time:  N/A  CSB Updates: N/A    Barriers to Discharge: MEDICAL   Guns in the home: No   Insurance info/prescription coverage:  MEDICARE   Outpatient provider(s):  none reported     Diagnosis:   Past Medical History:   Diagnosis Date    Arthritis     Bilateral lower leg cellulitis     CAD (coronary artery disease) 2009    Hx of 2 MI's and then CABG 5/2009    Cardiomyopathy, ischemic     CHF (congestive heart failure) (HCC)     CKD (chronic kidney disease)     Diabetes (HCC)     IDDM type 3    Diabetic neuropathy, painful (HCC)     Dyslipidemia     Elevated uric acid in blood     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

## 2025-05-30 NOTE — PROGRESS NOTES
Finally was able to get ahold of [facility] at 0052. Informed them that pt needs central line removed, then would be over to them. He asked if it was going to be after 0700 and nurse responded yes.

## 2025-05-31 VITALS
DIASTOLIC BLOOD PRESSURE: 69 MMHG | OXYGEN SATURATION: 94 % | WEIGHT: 197.31 LBS | HEIGHT: 62 IN | RESPIRATION RATE: 16 BRPM | BODY MASS INDEX: 36.31 KG/M2 | SYSTOLIC BLOOD PRESSURE: 131 MMHG | TEMPERATURE: 98.1 F | HEART RATE: 85 BPM

## 2025-05-31 LAB
ALBUMIN SERPL-MCNC: 2.1 G/DL (ref 3.5–5)
ANION GAP SERPL CALC-SCNC: 9 MMOL/L (ref 2–12)
BASOPHILS # BLD: 0.05 K/UL (ref 0–0.1)
BASOPHILS NFR BLD: 0.8 % (ref 0–1)
BUN SERPL-MCNC: 39 MG/DL (ref 6–20)
BUN/CREAT SERPL: 20 (ref 12–20)
CA-I BLD-MCNC: 8.5 MG/DL (ref 8.5–10.1)
CHLORIDE SERPL-SCNC: 108 MMOL/L (ref 97–108)
CO2 SERPL-SCNC: 22 MMOL/L (ref 21–32)
CREAT SERPL-MCNC: 1.97 MG/DL (ref 0.55–1.02)
CRP SERPL-MCNC: 16.1 MG/DL (ref 0–0.3)
DIFFERENTIAL METHOD BLD: ABNORMAL
EOSINOPHIL # BLD: 0.19 K/UL (ref 0–0.4)
EOSINOPHIL NFR BLD: 3 % (ref 0–7)
ERYTHROCYTE [DISTWIDTH] IN BLOOD BY AUTOMATED COUNT: 15.3 % (ref 11.5–14.5)
GLUCOSE BLD STRIP.AUTO-MCNC: 205 MG/DL (ref 65–100)
GLUCOSE BLD STRIP.AUTO-MCNC: 232 MG/DL (ref 65–100)
GLUCOSE SERPL-MCNC: 177 MG/DL (ref 65–100)
HCT VFR BLD AUTO: 34.5 % (ref 35–47)
HGB BLD-MCNC: 10.4 G/DL (ref 11.5–16)
IMM GRANULOCYTES # BLD AUTO: 0.02 K/UL (ref 0–0.04)
IMM GRANULOCYTES NFR BLD AUTO: 0.3 % (ref 0–0.5)
LYMPHOCYTES # BLD: 0.69 K/UL (ref 0.8–3.5)
LYMPHOCYTES NFR BLD: 11 % (ref 12–49)
MCH RBC QN AUTO: 28.1 PG (ref 26–34)
MCHC RBC AUTO-ENTMCNC: 30.1 G/DL (ref 30–36.5)
MCV RBC AUTO: 93.2 FL (ref 80–99)
MONOCYTES # BLD: 0.69 K/UL (ref 0–1)
MONOCYTES NFR BLD: 11 % (ref 5–13)
NEUTS SEG # BLD: 4.65 K/UL (ref 1.8–8)
NEUTS SEG NFR BLD: 73.9 % (ref 32–75)
NRBC # BLD: 0 K/UL (ref 0–0.01)
NRBC BLD-RTO: 0 PER 100 WBC
PERFORMED BY:: ABNORMAL
PERFORMED BY:: ABNORMAL
PHOSPHATE SERPL-MCNC: 3.1 MG/DL (ref 2.6–4.7)
PLATELET # BLD AUTO: 174 K/UL (ref 150–400)
PMV BLD AUTO: 12.4 FL (ref 8.9–12.9)
POTASSIUM SERPL-SCNC: 3.5 MMOL/L (ref 3.5–5.1)
PROCALCITONIN SERPL-MCNC: 0.72 NG/ML
RBC # BLD AUTO: 3.7 M/UL (ref 3.8–5.2)
SODIUM SERPL-SCNC: 139 MMOL/L (ref 136–145)
WBC # BLD AUTO: 6.3 K/UL (ref 3.6–11)

## 2025-05-31 PROCEDURE — 36415 COLL VENOUS BLD VENIPUNCTURE: CPT

## 2025-05-31 PROCEDURE — 6370000000 HC RX 637 (ALT 250 FOR IP): Performed by: PSYCHIATRY & NEUROLOGY

## 2025-05-31 PROCEDURE — 6370000000 HC RX 637 (ALT 250 FOR IP): Performed by: PHYSICIAN ASSISTANT

## 2025-05-31 PROCEDURE — 6370000000 HC RX 637 (ALT 250 FOR IP): Performed by: INTERNAL MEDICINE

## 2025-05-31 PROCEDURE — 80069 RENAL FUNCTION PANEL: CPT

## 2025-05-31 PROCEDURE — 6370000000 HC RX 637 (ALT 250 FOR IP): Performed by: STUDENT IN AN ORGANIZED HEALTH CARE EDUCATION/TRAINING PROGRAM

## 2025-05-31 PROCEDURE — 99232 SBSQ HOSP IP/OBS MODERATE 35: CPT | Performed by: INTERNAL MEDICINE

## 2025-05-31 PROCEDURE — 6370000000 HC RX 637 (ALT 250 FOR IP): Performed by: NURSE PRACTITIONER

## 2025-05-31 PROCEDURE — 6360000002 HC RX W HCPCS: Performed by: PHYSICIAN ASSISTANT

## 2025-05-31 PROCEDURE — 86140 C-REACTIVE PROTEIN: CPT

## 2025-05-31 PROCEDURE — 85025 COMPLETE CBC W/AUTO DIFF WBC: CPT

## 2025-05-31 PROCEDURE — 82962 GLUCOSE BLOOD TEST: CPT

## 2025-05-31 PROCEDURE — 6370000000 HC RX 637 (ALT 250 FOR IP)

## 2025-05-31 PROCEDURE — 84145 PROCALCITONIN (PCT): CPT

## 2025-05-31 RX ORDER — AMOXICILLIN AND CLAVULANATE POTASSIUM 500; 125 MG/1; MG/1
1 TABLET, FILM COATED ORAL DAILY
Qty: 7 TABLET | Refills: 0 | Status: SHIPPED | OUTPATIENT
Start: 2025-05-31 | End: 2025-06-07

## 2025-05-31 RX ADMIN — ASPIRIN 81 MG: 81 TABLET, COATED ORAL at 08:54

## 2025-05-31 RX ADMIN — BETHANECHOL CHLORIDE 25 MG: 25 TABLET ORAL at 08:54

## 2025-05-31 RX ADMIN — MIDODRINE HYDROCHLORIDE 10 MG: 5 TABLET ORAL at 08:54

## 2025-05-31 RX ADMIN — PREGABALIN 75 MG: 50 CAPSULE ORAL at 08:54

## 2025-05-31 RX ADMIN — RIFAXIMIN 550 MG: 550 TABLET ORAL at 08:54

## 2025-05-31 RX ADMIN — LACTULOSE 30 G: 20 SOLUTION ORAL at 08:53

## 2025-05-31 RX ADMIN — DULOXETINE 30 MG: 30 CAPSULE, DELAYED RELEASE ORAL at 08:54

## 2025-05-31 RX ADMIN — INSULIN LISPRO 2 UNITS: 100 INJECTION, SOLUTION INTRAVENOUS; SUBCUTANEOUS at 08:48

## 2025-05-31 RX ADMIN — PANTOPRAZOLE SODIUM 40 MG: 40 TABLET, DELAYED RELEASE ORAL at 06:22

## 2025-05-31 RX ADMIN — INSULIN LISPRO 2 UNITS: 100 INJECTION, SOLUTION INTRAVENOUS; SUBCUTANEOUS at 11:48

## 2025-05-31 RX ADMIN — TAMSULOSIN HYDROCHLORIDE 0.4 MG: 0.4 CAPSULE ORAL at 08:54

## 2025-05-31 RX ADMIN — CARVEDILOL 3.12 MG: 3.12 TABLET, FILM COATED ORAL at 08:54

## 2025-05-31 RX ADMIN — SODIUM BICARBONATE 1300 MG: 650 TABLET ORAL at 08:54

## 2025-05-31 RX ADMIN — RISPERIDONE 0.25 MG: 0.25 TABLET, FILM COATED ORAL at 08:54

## 2025-05-31 RX ADMIN — CLOPIDOGREL BISULFATE 75 MG: 75 TABLET, FILM COATED ORAL at 08:54

## 2025-05-31 RX ADMIN — CETIRIZINE HYDROCHLORIDE 5 MG: 10 TABLET, FILM COATED ORAL at 08:54

## 2025-05-31 RX ADMIN — ENOXAPARIN SODIUM 40 MG: 100 INJECTION SUBCUTANEOUS at 08:53

## 2025-05-31 RX ADMIN — MIDODRINE HYDROCHLORIDE 10 MG: 5 TABLET ORAL at 11:48

## 2025-05-31 NOTE — CARE COORDINATION
Transition of Care Plan:    RUR: 26%   Prior Level of Functioning:   Disposition: [facility] [place]. Accepting physician is Dr. Brooks.  RN to call report @ [phone].   If SNF or IPR: Date FOC offered: 19 May 2025  Date FOC received: 19 May 2025  Accepting facility: [facility]  Follow up appointments:   DME needed:   Transportation at discharge: [facility] @ 12:30  IM/IMM Medicare/ letter given: Medicare pt has received, reviewed, and signed 2nd IM letter informing them of their right to appeal the discharge.  Signed copy has been placed on pt bedside chart.  Is patient a  and connected with [facility]? N/A  If yes, was  transfer form completed and [facility] notified?   Caregiver Contact:   Discharge Caregiver contacted prior to discharge? Narcisa Duncan   Care Conference needed?   Barriers to discharge: Unable to reach the patient's daughterNarcisa.      11:07AM Inbound call from patient's daughterNarcisa. Informed of discharge today; and bed available at [facility].      GARCÍA Coburn    10:20AM Spoke to liaison (Jamie) [facility].   Informed they have a bed for the patient today.  Will call the patient's daughter to inform.     Outbound call to patient's daughterNarcisa @ [phone].  No answer and voicemail left requesting a return phone call.     GARCÍA Coburn

## 2025-05-31 NOTE — PLAN OF CARE
Problem: Chronic Conditions and Co-morbidities  Goal: Patient's chronic conditions and co-morbidity symptoms are monitored and maintained or improved  5/31/2025 1219 by Afsaneh Medina RN  Outcome: Progressing  5/30/2025 2309 by Danitza Valle RN  Outcome: Progressing     Problem: Discharge Planning  Goal: Discharge to home or other facility with appropriate resources  5/31/2025 1219 by Afsaneh Medina RN  Outcome: Progressing  5/30/2025 2309 by Danitza Valle RN  Outcome: Progressing     Problem: Pain  Goal: Verbalizes/displays adequate comfort level or baseline comfort level  5/31/2025 1219 by Afsaneh Medina RN  Outcome: Progressing  5/30/2025 2309 by Danitza Valle RN  Outcome: Progressing     Problem: Skin/Tissue Integrity  Goal: Skin integrity remains intact  Description: 1.  Monitor for areas of redness and/or skin breakdown2.  Assess vascular access sites hourly3.  Every 4-6 hours minimum:  Change oxygen saturation probe site4.  Every 4-6 hours:  If on nasal continuous positive airway pressure, respiratory therapy assess nares and determine need for appliance change or resting period  5/31/2025 1219 by Afsaneh Medina RN  Outcome: Progressing  Flowsheets (Taken 5/31/2025 0855)  Skin Integrity Remains Intact: Monitor for areas of redness and/or skin breakdown  5/30/2025 2309 by Danitza Valle RN  Outcome: Progressing  Flowsheets  Taken 5/30/2025 2002 by Danitza Valle RN  Skin Integrity Remains Intact: Monitor for areas of redness and/or skin breakdown  Taken 5/30/2025 2000 by Danitza Valle RN  Skin Integrity Remains Intact: Monitor for areas of redness and/or skin breakdown  Taken 5/30/2025 0930 by Afsaneh Medina RN  Skin Integrity Remains Intact: Monitor for areas of redness and/or skin breakdown     Problem: Safety - Adult  Goal: Free from fall injury  5/31/2025 1219 by Afsaneh Medina RN  Outcome: Progressing  5/30/2025 2309 by Danitza Valle RN  Outcome: Progressing  Flowsheets (Taken

## 2025-05-31 NOTE — PLAN OF CARE
Problem: Chronic Conditions and Co-morbidities  Goal: Patient's chronic conditions and co-morbidity symptoms are monitored and maintained or improved  5/31/2025 1309 by Afsaneh Medina RN  Outcome: Completed  5/31/2025 1219 by Afsaneh Medina RN  Outcome: Progressing  5/30/2025 2309 by Danitza Valle RN  Outcome: Progressing     Problem: Discharge Planning  Goal: Discharge to home or other facility with appropriate resources  5/31/2025 1309 by Afsaneh Medina RN  Outcome: Completed  5/31/2025 1219 by Afsaneh Medina RN  Outcome: Progressing  5/30/2025 2309 by Danitza Valle RN  Outcome: Progressing     Problem: Pain  Goal: Verbalizes/displays adequate comfort level or baseline comfort level  5/31/2025 1309 by Afsaneh Medina RN  Outcome: Completed  5/31/2025 1219 by Afsaneh Medina RN  Outcome: Progressing  5/30/2025 2309 by Danitza Valle RN  Outcome: Progressing     Problem: Skin/Tissue Integrity  Goal: Skin integrity remains intact  Description: 1.  Monitor for areas of redness and/or skin breakdown2.  Assess vascular access sites hourly3.  Every 4-6 hours minimum:  Change oxygen saturation probe site4.  Every 4-6 hours:  If on nasal continuous positive airway pressure, respiratory therapy assess nares and determine need for appliance change or resting period  5/31/2025 1309 by Afsaneh Medina RN  Outcome: Completed  5/31/2025 1219 by Afsaneh Mednia RN  Outcome: Progressing  Flowsheets (Taken 5/31/2025 0855)  Skin Integrity Remains Intact: Monitor for areas of redness and/or skin breakdown  5/30/2025 2309 by Danitza Valle RN  Outcome: Progressing  Flowsheets  Taken 5/30/2025 2002 by Danitza Valle RN  Skin Integrity Remains Intact: Monitor for areas of redness and/or skin breakdown  Taken 5/30/2025 2000 by Danitza Valle RN  Skin Integrity Remains Intact: Monitor for areas of redness and/or skin breakdown  Taken 5/30/2025 0930 by Afsaneh Medina RN  Skin Integrity Remains Intact: Monitor for areas  of redness and/or skin breakdown     Problem: Safety - Adult  Goal: Free from fall injury  5/31/2025 1309 by Afsaneh Medina RN  Outcome: Completed  5/31/2025 1219 by Afsaneh Medina RN  Outcome: Progressing  5/30/2025 2309 by Danitza Valle RN  Outcome: Progressing  Flowsheets (Taken 5/30/2025 2002)  Free From Fall Injury: Instruct family/caregiver on patient safety     Problem: ABCDS Injury Assessment  Goal: Absence of physical injury  5/31/2025 1309 by Afsaneh Medina RN  Outcome: Completed  5/31/2025 1219 by Afsaneh Medina RN  Outcome: Progressing  5/30/2025 2309 by Danitza Valle RN  Outcome: Progressing  Flowsheets (Taken 5/30/2025 2002)  Absence of Physical Injury: Implement safety measures based on patient assessment     Problem: Nutrition Deficit:  Goal: Optimize nutritional status  5/31/2025 1309 by Afsaneh Medina RN  Outcome: Completed  5/31/2025 1219 by Afsaneh Medina RN  Outcome: Progressing  5/30/2025 2309 by Danitza Valle RN  Outcome: Progressing

## 2025-05-31 NOTE — PROGRESS NOTES
Infectious Disease Progress Note          Subjective:   Pt seen on f/u for Dr Coulter. She is followed by ID for complicated UTI w isolation of E.Faecalis, P.aeruginosa from urine Cx on , s/p Ciprofloxacin w neg repeat urine Cx on . She is afebrile w a normal WBC on routine labs. CRP is rising, but procal trending down. CXR on  revealed Cardiomegaly w mild pulmonary edema, and UA On  revealed 5-10 urine WBCs w no bacteriuria or funguria. Her stools are loose, but she is on lactulose and rifaximin.  She is scheduled for discharge to [facility] today  Objective:   Physical Exam:     /69   Pulse 85   Temp 98.1 °F (36.7 °C) (Oral)   Resp 16   Ht 1.575 m (5' 2.01\")   Wt 89.5 kg (197 lb 5 oz)   SpO2 94%   BMI 36.08 kg/m²  O2 Flow Rate (L/min): 0.5 L/min O2 Device: None (Room air)    Temp (24hrs), Av.1 °F (36.7 °C), Min:97.6 °F (36.4 °C), Max:98.6 °F (37 °C)    No intake/output data recorded.    1901 -  0700  In: 380 [P.O.:380]  Out: -     General: NAD, alert and following commands, somewhat confused  HEENT: MAGNOLIA, Moist mucosa   Lungs: CTA b/l, decreased at bases, no wheeze/rhonchi  Heart: S1S2+, RRR, no murmur  Abdo: Soft, NT, distended, NT, +BS   : No Osborne  Exts: + edema, + pulses b/l   Skin: No wounds, No rashes or lesions    Data Review:       Recent Days:    Recent Labs     25  0915 25  0556   WBC 8.2 6.3   HGB 11.4* 10.4*   HCT 40.9 34.5*   * 174     Recent Labs     25  0915 25  0652 25  0556   BUN 37* 36* 39*   CREATININE 1.66* 1.86* 1.97*       Lab Results   Component Value Date/Time    CRP 16.10 2025 05:56 AM          Microbiology     Results       Procedure Component Value Units Date/Time    COVID-19 & Influenza Combo [[id number]] Collected: 25 0330    Order Status: Completed Specimen: Nasopharyngeal Updated: 25     SARS-CoV-2, PCR Not Detected        Comment: Not Detected results

## 2025-05-31 NOTE — DISCHARGE SUMMARY
Discharge Summary    Name: Nicci Hernandez  [id number]  YOB: 1960 (Age: 64 y.o.)   Date of Admission: 5/16/2025  Date of Discharge: 5/31/2025  Attending Physician: Flores Talavera MD    Discharge Diagnosis:   Principal Problem (Resolved):    AMS (altered mental status)  Active Problems:    HTN (hypertension)    Stage 3b chronic kidney disease (HCC)    Peripheral neuropathy    Diabetes mellitus type II, controlled (HCC)    Moderate protein-calorie malnutrition    History of fusion of cervical spine    Urinary retention    HFrEF (heart failure with reduced ejection fraction) (HCC)    Hepatic steatosis    History of CVA (cerebrovascular accident)  Resolved Problems:    AL (acute kidney injury)    Fall    Hepatic encephalopathy (HCC)    Metabolic acidosis    Visual hallucinations    Fever    UTI (urinary tract infection)    Anorexia    Hyperbilirubinemia       Consultations:  IP CONSULT TO PODIATRY  IP CONSULT TO TELE-NEUROLOGY  IP CONSULT TO DIETITIAN  IP CONSULT TO OUTPATIENT WOUND CLINIC  IP CONSULT TO NEPHROLOGY  IP CONSULT TO UROLOGY  IP CONSULT TO PSYCHIATRY  IP CONSULT TO GI  IP CONSULT TO INFECTIOUS DISEASES    Brief Hospital Course by Main Problems:   Patient is a 63yo female with PMHx significant for CAD, HFrEF, CKD, DMTII, HLD, and HTN who presented to the ED for AMS after a ground level fall on 5/16/25. Experienced recurrent hospitalizations that dates back to March of 2025. She has been on extensive antibiotics as an outpatient for a retropharyngeal abscess as well as for a chronic left foot infection. Initial imaging including CT head, CT C-spine, CXR unremarkable. XR of left foot revealed nonspecific findings and podiatry put on board with low suspicion for osteo.  Blood cultures drawn without growth throughout hospital stay. Initial UA consistent with UTI and admitted. Started on ceftriaxone and switched to cefepime 2/2 E.coli growth. Switched to

## 2025-05-31 NOTE — PLAN OF CARE
Problem: Chronic Conditions and Co-morbidities  Goal: Patient's chronic conditions and co-morbidity symptoms are monitored and maintained or improved  Outcome: Progressing     Problem: Discharge Planning  Goal: Discharge to home or other facility with appropriate resources  Outcome: Progressing     Problem: Pain  Goal: Verbalizes/displays adequate comfort level or baseline comfort level  Outcome: Progressing     Problem: Skin/Tissue Integrity  Goal: Skin integrity remains intact  Description: 1.  Monitor for areas of redness and/or skin breakdown2.  Assess vascular access sites hourly3.  Every 4-6 hours minimum:  Change oxygen saturation probe site4.  Every 4-6 hours:  If on nasal continuous positive airway pressure, respiratory therapy assess nares and determine need for appliance change or resting period  Outcome: Progressing  Flowsheets  Taken 5/30/2025 2002  Skin Integrity Remains Intact: Monitor for areas of redness and/or skin breakdown  Taken 5/30/2025 2000  Skin Integrity Remains Intact: Monitor for areas of redness and/or skin breakdown     Problem: Safety - Adult  Goal: Free from fall injury  Outcome: Progressing  Flowsheets (Taken 5/30/2025 2002)  Free From Fall Injury: Instruct family/caregiver on patient safety     Problem: ABCDS Injury Assessment  Goal: Absence of physical injury  Outcome: Progressing  Flowsheets (Taken 5/30/2025 2002)  Absence of Physical Injury: Implement safety measures based on patient assessment     Problem: Nutrition Deficit:  Goal: Optimize nutritional status  5/30/2025 2309 by Danitza Valle, RN  Outcome: Progressing  5/30/2025 1054 by Aviva Jerome, RD  Outcome: Progressing  Flowsheets (Taken 5/17/2025 1309 by Reyna Mojica)  Nutrient intake appropriate for improving, restoring, or maintaining nutritional needs:   Assess nutritional status and recommend course of action   Monitor oral intake, labs, and treatment plans   Recommend appropriate diets, oral nutritional

## 2025-05-31 NOTE — PROGRESS NOTES
Pt being discharged to [facility] via [facility].  Discharge AVS and face sheet printed and reviewed w/[facility]  Report called to [facility].

## 2025-05-31 NOTE — PROGRESS NOTES
Renal Progress Note    Patient: Nicci Hernandez MRN: [id number]  SSN: [id number]    YOB: 1960  Age: 64 y.o.  Sex: female      Admit Date: 5/16/2025    LOS: 15 days     Subjective:   Patient seen and examined at the bedside this morning. No new complaints at time of visit. Denies shortness of breath. Reports good UOP.  Creatinine 1.8. CO2 16.  Phos 3.9.    Current Facility-Administered Medications   Medication Dose Route Frequency    sodium bicarbonate tablet 1,300 mg  1,300 mg Oral BID    risperiDONE (RisperDAL) tablet 0.25 mg  0.25 mg Oral Daily    lactulose (CHRONULAC) 10 GM/15ML solution 30 g  30 g Oral TID    rifAXIMin (XIFAXAN) tablet 550 mg  550 mg Oral TID    DULoxetine (CYMBALTA) extended release capsule 30 mg  30 mg Oral Daily    carvedilol (COREG) tablet 3.125 mg  3.125 mg Oral BID    midodrine (PROAMATINE) tablet 10 mg  10 mg Oral TID WC    enoxaparin (LOVENOX) injection 40 mg  40 mg SubCUTAneous Daily    bethanechol (URECHOLINE) tablet 25 mg  25 mg Oral TID    tamsulosin (FLOMAX) capsule 0.4 mg  0.4 mg Oral Daily    aluminum & magnesium hydroxide-simethicone (MAALOX PLUS) 200-200-20 MG/5ML suspension 30 mL  30 mL Oral Q6H PRN    cyclobenzaprine (FLEXERIL) tablet 10 mg  10 mg Oral TID PRN    aspirin EC tablet 81 mg  81 mg Oral Daily    lidocaine 4 % external patch 1 patch  1 patch TransDERmal Daily    insulin lispro (HUMALOG,ADMELOG) injection vial 0-8 Units  0-8 Units SubCUTAneous 4x Daily AC & HS    sodium chloride flush 0.9 % injection 5-40 mL  5-40 mL IntraVENous 2 times per day    sodium chloride flush 0.9 % injection 5-40 mL  5-40 mL IntraVENous PRN    0.9 % sodium chloride infusion   IntraVENous PRN    ondansetron (ZOFRAN-ODT) disintegrating tablet 4 mg  4 mg Oral Q8H PRN    Or    ondansetron (ZOFRAN) injection 4 mg  4 mg IntraVENous Q6H PRN    polyethylene glycol (GLYCOLAX) packet 17 g  17 g Oral Daily PRN    acetaminophen (TYLENOL) tablet 650 mg  650 mg Oral Q6H PRN    Or

## 2025-06-13 ENCOUNTER — HOSPITAL ENCOUNTER (OUTPATIENT)
Facility: HOSPITAL | Age: 65
Setting detail: SPECIMEN
Discharge: HOME | End: 2025-06-16

## 2025-06-13 LAB
ALBUMIN SERPL-MCNC: 2.5 G/DL (ref 3.5–5)
ALBUMIN/GLOB SERPL: 0.5 (ref 1.1–2.2)
ALP SERPL-CCNC: 114 U/L (ref 45–117)
ALT SERPL-CCNC: 9 U/L (ref 12–78)
AMMONIA PLAS-SCNC: 143 UMOL/L
ANION GAP SERPL CALC-SCNC: 8 MMOL/L (ref 2–12)
AST SERPL W P-5'-P-CCNC: 26 U/L (ref 15–37)
BILIRUB SERPL-MCNC: 0.8 MG/DL (ref 0.2–1)
BUN SERPL-MCNC: 24 MG/DL (ref 6–20)
BUN/CREAT SERPL: 16 (ref 12–20)
CA-I BLD-MCNC: 9.5 MG/DL (ref 8.5–10.1)
CHLORIDE SERPL-SCNC: 99 MMOL/L (ref 97–108)
CO2 SERPL-SCNC: 30 MMOL/L (ref 21–32)
CREAT SERPL-MCNC: 1.51 MG/DL (ref 0.55–1.02)
GLOBULIN SER CALC-MCNC: 5.5 G/DL (ref 2–4)
GLUCOSE SERPL-MCNC: 229 MG/DL (ref 65–100)
POTASSIUM SERPL-SCNC: 3.6 MMOL/L (ref 3.5–5.1)
PROT SERPL-MCNC: 8 G/DL (ref 6.4–8.2)
SODIUM SERPL-SCNC: 137 MMOL/L (ref 136–145)

## 2025-06-13 PROCEDURE — 80053 COMPREHEN METABOLIC PANEL: CPT

## 2025-06-13 PROCEDURE — 82140 ASSAY OF AMMONIA: CPT

## 2025-06-14 ENCOUNTER — HOSPITAL ENCOUNTER (OUTPATIENT)
Facility: HOSPITAL | Age: 65
Setting detail: SPECIMEN
Discharge: HOME | End: 2025-06-17

## 2025-06-14 LAB
ALBUMIN SERPL-MCNC: 2.4 G/DL (ref 3.5–5)
ALBUMIN/GLOB SERPL: 0.5 (ref 1.1–2.2)
ALP SERPL-CCNC: 105 U/L (ref 45–117)
ALT SERPL-CCNC: 11 U/L (ref 12–78)
AMMONIA PLAS-SCNC: 78 UMOL/L
ANION GAP SERPL CALC-SCNC: 8 MMOL/L (ref 2–12)
AST SERPL W P-5'-P-CCNC: 21 U/L (ref 15–37)
BILIRUB SERPL-MCNC: 0.7 MG/DL (ref 0.2–1)
BUN SERPL-MCNC: 27 MG/DL (ref 6–20)
BUN/CREAT SERPL: 15 (ref 12–20)
CA-I BLD-MCNC: 9.5 MG/DL (ref 8.5–10.1)
CHLORIDE SERPL-SCNC: 101 MMOL/L (ref 97–108)
CO2 SERPL-SCNC: 33 MMOL/L (ref 21–32)
CREAT SERPL-MCNC: 1.77 MG/DL (ref 0.55–1.02)
GLOBULIN SER CALC-MCNC: 4.5 G/DL (ref 2–4)
GLUCOSE SERPL-MCNC: 155 MG/DL (ref 65–100)
POTASSIUM SERPL-SCNC: 3.2 MMOL/L (ref 3.5–5.1)
PROT SERPL-MCNC: 6.9 G/DL (ref 6.4–8.2)
SODIUM SERPL-SCNC: 142 MMOL/L (ref 136–145)

## 2025-06-14 PROCEDURE — 82140 ASSAY OF AMMONIA: CPT

## 2025-06-14 PROCEDURE — 80053 COMPREHEN METABOLIC PANEL: CPT

## 2025-06-15 ENCOUNTER — HOSPITAL ENCOUNTER (OUTPATIENT)
Facility: HOSPITAL | Age: 65
Setting detail: SPECIMEN
Discharge: HOME | End: 2025-06-18

## 2025-06-15 LAB
AMMONIA PLAS-SCNC: 64 UMOL/L
POTASSIUM SERPL-SCNC: 4.1 MMOL/L (ref 3.5–5.1)

## 2025-06-15 PROCEDURE — 36415 COLL VENOUS BLD VENIPUNCTURE: CPT

## 2025-06-15 PROCEDURE — 84132 ASSAY OF SERUM POTASSIUM: CPT

## 2025-06-15 PROCEDURE — 82140 ASSAY OF AMMONIA: CPT

## 2025-06-16 ENCOUNTER — HOSPITAL ENCOUNTER (OUTPATIENT)
Facility: HOSPITAL | Age: 65
Setting detail: SPECIMEN
Discharge: HOME | End: 2025-06-19

## 2025-06-16 LAB
AMMONIA PLAS-SCNC: 49 UMOL/L
ANION GAP SERPL CALC-SCNC: 10 MMOL/L (ref 2–12)
BUN SERPL-MCNC: 25 MG/DL (ref 6–20)
BUN/CREAT SERPL: 14 (ref 12–20)
CA-I BLD-MCNC: 9.5 MG/DL (ref 8.5–10.1)
CHLORIDE SERPL-SCNC: 98 MMOL/L (ref 97–108)
CO2 SERPL-SCNC: 32 MMOL/L (ref 21–32)
CREAT SERPL-MCNC: 1.81 MG/DL (ref 0.55–1.02)
GLUCOSE SERPL-MCNC: 185 MG/DL (ref 65–100)
POTASSIUM SERPL-SCNC: 4.1 MMOL/L (ref 3.5–5.1)
SODIUM SERPL-SCNC: 140 MMOL/L (ref 136–145)

## 2025-06-16 PROCEDURE — 82140 ASSAY OF AMMONIA: CPT

## 2025-06-16 PROCEDURE — 80048 BASIC METABOLIC PNL TOTAL CA: CPT

## (undated) DEVICE — (D)SENSOR RMFG 02 PULS OXMTR -- DISC BY MFR USE ITEM 133445

## (undated) DEVICE — BAG BELONG PT PERS CLEAR HANDL

## (undated) DEVICE — SOLUTION IRRIG 1000ML 0.9% SOD CHL USP POUR PLAS BTL

## (undated) DEVICE — GOWN,SIRUS,NONRNF,SETINSLV,2XL,18/CS: Brand: MEDLINE

## (undated) DEVICE — EXTREMITY-MRMC: Brand: MEDLINE INDUSTRIES, INC.

## (undated) DEVICE — SOLIDIFIER MEDC 1200ML -- CONVERT TO 356117

## (undated) DEVICE — CONTAINER SPEC 20 ML LID NEUT BUFF FORMALIN 10 % POLYPR STS

## (undated) DEVICE — 1200 GUARD II KIT W/5MM TUBE W/O VAC TUBE: Brand: GUARDIAN

## (undated) DEVICE — BAG SPEC BIOHZRD 10 X 10 IN --

## (undated) DEVICE — Device

## (undated) DEVICE — CUFF RMFG BP INF SZ 11 DISP -- LAWSON OEM ITEM 238915

## (undated) DEVICE — CANN NASAL O2 CAPNOGRAPHY AD -- FILTERLINE

## (undated) DEVICE — SET GRAV CK VLV NEEDLESS ST 3 GANGED 4WAY STPCOCK HI FLO 10

## (undated) DEVICE — CATH IV AUTOGRD BC BLU 22GA 25 -- INSYTE

## (undated) DEVICE — KIT COLON W/ 1.1OZ LUB AND 2 END

## (undated) DEVICE — SKIN PREP TRAY 4 COMPARTM TRAY: Brand: MEDLINE INDUSTRIES, INC.

## (undated) DEVICE — BANDAGE,GAUZE,BULKEE II,4.5"X4.1YD,STRL: Brand: MEDLINE

## (undated) DEVICE — ELECTRODE,RADIOTRANSLUCENT,FOAM,3PK: Brand: MEDLINE

## (undated) DEVICE — FORCEPS BX L240CM JAW DIA2.8MM L CAP W/ NDL MIC MESH TOOTH

## (undated) DEVICE — BITEBLOCK ENDOSCP 60FR MAXI WHT POLYETH STURDY W/ VELC WVN

## (undated) DEVICE — 3M™ CUROS™ DISINFECTING CAP FOR NEEDLELESS CONNECTORS 270/CARTON 20 CARTONS/CASE CFF1-270: Brand: CUROS™

## (undated) DEVICE — GLOVE ORANGE PI 7 1/2   MSG9075